# Patient Record
Sex: MALE | Race: WHITE | NOT HISPANIC OR LATINO | Employment: FULL TIME | URBAN - METROPOLITAN AREA
[De-identification: names, ages, dates, MRNs, and addresses within clinical notes are randomized per-mention and may not be internally consistent; named-entity substitution may affect disease eponyms.]

---

## 2018-02-19 ENCOUNTER — TRANSCRIBE ORDERS (OUTPATIENT)
Dept: ADMINISTRATIVE | Facility: HOSPITAL | Age: 48
End: 2018-02-19

## 2018-02-19 DIAGNOSIS — C02.1 MALIGNANT NEOPLASM OF TIP AND LATERAL BORDER OF TONGUE (HCC): Primary | ICD-10-CM

## 2018-02-23 ENCOUNTER — HOSPITAL ENCOUNTER (OUTPATIENT)
Dept: RADIOLOGY | Age: 48
Discharge: HOME | End: 2018-02-23
Payer: COMMERCIAL

## 2018-02-23 VITALS — WEIGHT: 226 LBS

## 2018-02-23 DIAGNOSIS — C02.1 MALIGNANT NEOPLASM OF TIP AND LATERAL BORDER OF TONGUE (HCC): ICD-10-CM

## 2018-02-23 LAB — GLUCOSE SERPL-MCNC: 69 MG/DL (ref 65–140)

## 2018-02-23 PROCEDURE — 78815 PET IMAGE W/CT SKULL-THIGH: CPT

## 2018-02-23 PROCEDURE — 74177 CT ABD & PELVIS W/CONTRAST: CPT

## 2018-02-23 PROCEDURE — 82948 REAGENT STRIP/BLOOD GLUCOSE: CPT

## 2018-02-23 PROCEDURE — 71260 CT THORAX DX C+: CPT

## 2018-02-23 PROCEDURE — A9552 F18 FDG: HCPCS

## 2018-02-23 RX ORDER — LORAZEPAM 2 MG/ML
1 INJECTION INTRAMUSCULAR
Status: COMPLETED | OUTPATIENT
Start: 2018-02-23 | End: 2018-02-23

## 2018-02-23 RX ADMIN — LORAZEPAM 1 MG: 2 INJECTION INTRAMUSCULAR; INTRAVENOUS at 09:05

## 2018-02-23 RX ADMIN — IOHEXOL 10 ML: 240 INJECTION, SOLUTION INTRATHECAL; INTRAVASCULAR; INTRAVENOUS; ORAL at 09:10

## 2018-02-23 RX ADMIN — IOHEXOL 100 ML: 350 INJECTION, SOLUTION INTRAVENOUS at 11:40

## 2018-05-18 PROBLEM — C02.9 TONGUE CANCER (HCC): Status: ACTIVE | Noted: 2018-05-18

## 2018-05-18 RX ORDER — PROCHLORPERAZINE MALEATE 10 MG
10 TABLET ORAL EVERY 6 HOURS PRN
COMMUNITY

## 2018-05-18 RX ORDER — OXYCODONE HYDROCHLORIDE AND ACETAMINOPHEN 5; 325 MG/1; MG/1
1 TABLET ORAL EVERY 4 HOURS PRN
COMMUNITY

## 2018-05-18 RX ORDER — ALPRAZOLAM 0.25 MG/1
TABLET ORAL 3 TIMES DAILY PRN
COMMUNITY
End: 2022-05-18 | Stop reason: ALTCHOICE

## 2018-05-18 RX ORDER — ONDANSETRON HYDROCHLORIDE 8 MG/1
TABLET, FILM COATED ORAL EVERY 8 HOURS PRN
COMMUNITY

## 2018-05-22 ENCOUNTER — RADIATION ONCOLOGY CONSULT (OUTPATIENT)
Dept: RADIATION ONCOLOGY | Facility: HOSPITAL | Age: 48
End: 2018-05-22
Attending: RADIOLOGY

## 2018-09-17 ENCOUNTER — TRANSCRIBE ORDERS (OUTPATIENT)
Dept: ADMINISTRATIVE | Facility: HOSPITAL | Age: 48
End: 2018-09-17

## 2018-09-17 DIAGNOSIS — C02.9 TONGUE CARCINOMA (HCC): Primary | ICD-10-CM

## 2018-11-07 ENCOUNTER — HOSPITAL ENCOUNTER (OUTPATIENT)
Dept: RADIOLOGY | Age: 48
Discharge: HOME | End: 2018-11-07
Payer: COMMERCIAL

## 2018-11-07 DIAGNOSIS — C02.9 TONGUE CARCINOMA (HCC): ICD-10-CM

## 2018-11-07 LAB — GLUCOSE SERPL-MCNC: 84 MG/DL (ref 65–140)

## 2018-11-07 PROCEDURE — 78815 PET IMAGE W/CT SKULL-THIGH: CPT

## 2018-11-07 PROCEDURE — A9552 F18 FDG: HCPCS

## 2018-11-07 PROCEDURE — 82948 REAGENT STRIP/BLOOD GLUCOSE: CPT

## 2018-11-07 RX ORDER — LORAZEPAM 2 MG/ML
1 INJECTION INTRAMUSCULAR
Status: COMPLETED | OUTPATIENT
Start: 2018-11-07 | End: 2018-11-07

## 2018-11-07 RX ADMIN — LORAZEPAM 1 MG: 2 INJECTION INTRAMUSCULAR; INTRAVENOUS at 09:55

## 2018-12-12 ENCOUNTER — TRANSCRIBE ORDERS (OUTPATIENT)
Dept: ADMINISTRATIVE | Facility: HOSPITAL | Age: 48
End: 2018-12-12

## 2018-12-12 DIAGNOSIS — C02.9 LEIOMYOSARCOMA OF TONGUE (HCC): Primary | ICD-10-CM

## 2019-02-27 ENCOUNTER — HOSPITAL ENCOUNTER (OUTPATIENT)
Dept: RADIOLOGY | Age: 49
Discharge: HOME | End: 2019-02-27
Payer: COMMERCIAL

## 2019-02-27 DIAGNOSIS — C02.9 LEIOMYOSARCOMA OF TONGUE (HCC): ICD-10-CM

## 2019-02-27 PROCEDURE — 70491 CT SOFT TISSUE NECK W/DYE: CPT

## 2019-02-27 RX ADMIN — IODIXANOL 100 ML: 320 INJECTION, SOLUTION INTRAVASCULAR at 13:58

## 2020-01-15 ENCOUNTER — TRANSCRIBE ORDERS (OUTPATIENT)
Dept: ADMINISTRATIVE | Facility: HOSPITAL | Age: 50
End: 2020-01-15

## 2020-01-15 ENCOUNTER — HOSPITAL ENCOUNTER (OUTPATIENT)
Dept: NON INVASIVE DIAGNOSTICS | Facility: HOSPITAL | Age: 50
Discharge: HOME | End: 2020-01-15
Attending: PREVENTIVE MEDICINE
Payer: COMMERCIAL

## 2020-01-15 ENCOUNTER — HOSPITAL ENCOUNTER (OUTPATIENT)
Dept: RADIOLOGY | Facility: HOSPITAL | Age: 50
Discharge: HOME | End: 2020-01-15
Attending: PREVENTIVE MEDICINE
Payer: COMMERCIAL

## 2020-01-15 ENCOUNTER — APPOINTMENT (OUTPATIENT)
Dept: WOUND CARE | Facility: HOSPITAL | Age: 50
End: 2020-01-15
Payer: COMMERCIAL

## 2020-01-15 DIAGNOSIS — M27.2: Primary | ICD-10-CM

## 2020-01-15 DIAGNOSIS — M27.2: ICD-10-CM

## 2020-01-15 LAB
ATRIAL RATE: 59 BPM
P AXIS: 59 DEGREES
PR INTERVAL: 154 MS
QRS AXIS: 27 DEGREES
QRSD INTERVAL: 98 MS
QT INTERVAL: 426 MS
QTC INTERVAL: 421 MS
T WAVE AXIS: 64 DEGREES
VENTRICULAR RATE: 59 BPM

## 2020-01-15 PROCEDURE — G0463 HOSPITAL OUTPT CLINIC VISIT: HCPCS

## 2020-01-15 PROCEDURE — 93010 ELECTROCARDIOGRAM REPORT: CPT | Performed by: INTERNAL MEDICINE

## 2020-01-15 PROCEDURE — 93005 ELECTROCARDIOGRAM TRACING: CPT

## 2020-01-15 PROCEDURE — 71046 X-RAY EXAM CHEST 2 VIEWS: CPT

## 2020-01-15 PROCEDURE — 99203 OFFICE O/P NEW LOW 30 MIN: CPT

## 2020-01-22 ENCOUNTER — APPOINTMENT (OUTPATIENT)
Dept: WOUND CARE | Facility: HOSPITAL | Age: 50
End: 2020-01-22
Payer: COMMERCIAL

## 2020-01-22 PROCEDURE — G0277 HBOT, FULL BODY CHAMBER, 30M: HCPCS

## 2020-01-23 ENCOUNTER — APPOINTMENT (OUTPATIENT)
Dept: WOUND CARE | Facility: HOSPITAL | Age: 50
End: 2020-01-23
Payer: COMMERCIAL

## 2020-01-23 PROCEDURE — G0277 HBOT, FULL BODY CHAMBER, 30M: HCPCS

## 2020-01-24 ENCOUNTER — APPOINTMENT (OUTPATIENT)
Dept: WOUND CARE | Facility: HOSPITAL | Age: 50
End: 2020-01-24
Payer: COMMERCIAL

## 2020-01-24 PROCEDURE — G0277 HBOT, FULL BODY CHAMBER, 30M: HCPCS

## 2020-01-27 ENCOUNTER — APPOINTMENT (OUTPATIENT)
Dept: WOUND CARE | Facility: HOSPITAL | Age: 50
End: 2020-01-27
Payer: COMMERCIAL

## 2020-01-27 PROCEDURE — G0277 HBOT, FULL BODY CHAMBER, 30M: HCPCS

## 2020-01-28 ENCOUNTER — APPOINTMENT (OUTPATIENT)
Dept: WOUND CARE | Facility: HOSPITAL | Age: 50
End: 2020-01-28
Payer: COMMERCIAL

## 2020-01-28 PROCEDURE — G0277 HBOT, FULL BODY CHAMBER, 30M: HCPCS

## 2020-01-29 ENCOUNTER — APPOINTMENT (OUTPATIENT)
Dept: WOUND CARE | Facility: HOSPITAL | Age: 50
End: 2020-01-29
Payer: COMMERCIAL

## 2020-01-29 PROCEDURE — G0277 HBOT, FULL BODY CHAMBER, 30M: HCPCS

## 2020-01-30 ENCOUNTER — APPOINTMENT (OUTPATIENT)
Dept: WOUND CARE | Facility: HOSPITAL | Age: 50
End: 2020-01-30
Payer: COMMERCIAL

## 2020-01-30 PROCEDURE — G0277 HBOT, FULL BODY CHAMBER, 30M: HCPCS

## 2020-01-31 ENCOUNTER — APPOINTMENT (OUTPATIENT)
Dept: WOUND CARE | Facility: HOSPITAL | Age: 50
End: 2020-01-31
Payer: COMMERCIAL

## 2020-01-31 PROCEDURE — G0277 HBOT, FULL BODY CHAMBER, 30M: HCPCS

## 2020-02-03 ENCOUNTER — APPOINTMENT (OUTPATIENT)
Dept: WOUND CARE | Facility: HOSPITAL | Age: 50
End: 2020-02-03
Payer: COMMERCIAL

## 2020-02-03 PROCEDURE — G0277 HBOT, FULL BODY CHAMBER, 30M: HCPCS

## 2020-02-04 ENCOUNTER — APPOINTMENT (OUTPATIENT)
Dept: WOUND CARE | Facility: HOSPITAL | Age: 50
End: 2020-02-04
Payer: COMMERCIAL

## 2020-02-04 PROCEDURE — G0277 HBOT, FULL BODY CHAMBER, 30M: HCPCS

## 2020-02-05 ENCOUNTER — APPOINTMENT (OUTPATIENT)
Dept: WOUND CARE | Facility: HOSPITAL | Age: 50
End: 2020-02-05
Payer: COMMERCIAL

## 2020-02-05 PROCEDURE — G0277 HBOT, FULL BODY CHAMBER, 30M: HCPCS

## 2020-02-06 ENCOUNTER — APPOINTMENT (OUTPATIENT)
Dept: WOUND CARE | Facility: HOSPITAL | Age: 50
End: 2020-02-06
Payer: COMMERCIAL

## 2020-02-06 PROCEDURE — G0277 HBOT, FULL BODY CHAMBER, 30M: HCPCS

## 2020-02-07 ENCOUNTER — APPOINTMENT (OUTPATIENT)
Dept: WOUND CARE | Facility: HOSPITAL | Age: 50
End: 2020-02-07
Payer: COMMERCIAL

## 2020-02-07 PROCEDURE — G0277 HBOT, FULL BODY CHAMBER, 30M: HCPCS

## 2020-02-10 ENCOUNTER — APPOINTMENT (OUTPATIENT)
Dept: WOUND CARE | Facility: HOSPITAL | Age: 50
End: 2020-02-10
Payer: COMMERCIAL

## 2020-02-10 PROCEDURE — G0277 HBOT, FULL BODY CHAMBER, 30M: HCPCS

## 2020-02-11 ENCOUNTER — APPOINTMENT (OUTPATIENT)
Dept: WOUND CARE | Facility: HOSPITAL | Age: 50
End: 2020-02-11
Payer: COMMERCIAL

## 2020-02-11 PROCEDURE — G0277 HBOT, FULL BODY CHAMBER, 30M: HCPCS

## 2020-02-12 ENCOUNTER — APPOINTMENT (OUTPATIENT)
Dept: WOUND CARE | Facility: HOSPITAL | Age: 50
End: 2020-02-12
Payer: COMMERCIAL

## 2020-02-12 PROCEDURE — G0277 HBOT, FULL BODY CHAMBER, 30M: HCPCS

## 2020-02-13 ENCOUNTER — APPOINTMENT (OUTPATIENT)
Dept: WOUND CARE | Facility: HOSPITAL | Age: 50
End: 2020-02-13
Payer: COMMERCIAL

## 2020-02-13 PROCEDURE — G0277 HBOT, FULL BODY CHAMBER, 30M: HCPCS

## 2020-02-14 ENCOUNTER — APPOINTMENT (OUTPATIENT)
Dept: WOUND CARE | Facility: HOSPITAL | Age: 50
End: 2020-02-14
Payer: COMMERCIAL

## 2020-02-14 PROCEDURE — G0277 HBOT, FULL BODY CHAMBER, 30M: HCPCS

## 2020-02-18 ENCOUNTER — APPOINTMENT (OUTPATIENT)
Dept: WOUND CARE | Facility: HOSPITAL | Age: 50
End: 2020-02-18
Payer: COMMERCIAL

## 2020-02-18 PROCEDURE — G0277 HBOT, FULL BODY CHAMBER, 30M: HCPCS

## 2020-02-19 ENCOUNTER — APPOINTMENT (OUTPATIENT)
Dept: WOUND CARE | Facility: HOSPITAL | Age: 50
End: 2020-02-19
Payer: COMMERCIAL

## 2020-02-19 PROCEDURE — G0277 HBOT, FULL BODY CHAMBER, 30M: HCPCS

## 2020-03-03 ENCOUNTER — APPOINTMENT (OUTPATIENT)
Dept: WOUND CARE | Facility: HOSPITAL | Age: 50
End: 2020-03-03
Payer: COMMERCIAL

## 2020-03-03 PROCEDURE — G0277 HBOT, FULL BODY CHAMBER, 30M: HCPCS

## 2020-03-05 ENCOUNTER — APPOINTMENT (OUTPATIENT)
Dept: WOUND CARE | Facility: HOSPITAL | Age: 50
End: 2020-03-05
Payer: COMMERCIAL

## 2020-03-05 PROCEDURE — G0277 HBOT, FULL BODY CHAMBER, 30M: HCPCS

## 2020-03-06 ENCOUNTER — APPOINTMENT (OUTPATIENT)
Dept: WOUND CARE | Facility: HOSPITAL | Age: 50
End: 2020-03-06
Payer: COMMERCIAL

## 2020-03-06 PROCEDURE — G0277 HBOT, FULL BODY CHAMBER, 30M: HCPCS

## 2020-03-09 ENCOUNTER — APPOINTMENT (OUTPATIENT)
Dept: WOUND CARE | Facility: HOSPITAL | Age: 50
End: 2020-03-09
Payer: COMMERCIAL

## 2020-03-09 PROCEDURE — G0277 HBOT, FULL BODY CHAMBER, 30M: HCPCS

## 2020-03-10 ENCOUNTER — APPOINTMENT (OUTPATIENT)
Dept: WOUND CARE | Facility: HOSPITAL | Age: 50
End: 2020-03-10
Payer: COMMERCIAL

## 2020-03-10 PROCEDURE — G0277 HBOT, FULL BODY CHAMBER, 30M: HCPCS

## 2020-03-11 ENCOUNTER — APPOINTMENT (OUTPATIENT)
Dept: WOUND CARE | Facility: HOSPITAL | Age: 50
End: 2020-03-11
Payer: COMMERCIAL

## 2020-03-11 PROCEDURE — G0277 HBOT, FULL BODY CHAMBER, 30M: HCPCS

## 2020-03-12 ENCOUNTER — APPOINTMENT (OUTPATIENT)
Dept: WOUND CARE | Facility: HOSPITAL | Age: 50
End: 2020-03-12
Payer: COMMERCIAL

## 2020-03-12 PROCEDURE — G0277 HBOT, FULL BODY CHAMBER, 30M: HCPCS

## 2020-03-13 ENCOUNTER — APPOINTMENT (OUTPATIENT)
Dept: WOUND CARE | Facility: HOSPITAL | Age: 50
End: 2020-03-13
Payer: COMMERCIAL

## 2020-03-13 PROCEDURE — G0277 HBOT, FULL BODY CHAMBER, 30M: HCPCS

## 2020-03-16 ENCOUNTER — APPOINTMENT (OUTPATIENT)
Dept: WOUND CARE | Facility: HOSPITAL | Age: 50
End: 2020-03-16
Payer: COMMERCIAL

## 2020-03-16 PROCEDURE — G0277 HBOT, FULL BODY CHAMBER, 30M: HCPCS

## 2020-03-17 ENCOUNTER — APPOINTMENT (OUTPATIENT)
Dept: WOUND CARE | Facility: HOSPITAL | Age: 50
End: 2020-03-17
Payer: COMMERCIAL

## 2020-03-17 PROCEDURE — G0277 HBOT, FULL BODY CHAMBER, 30M: HCPCS

## 2020-09-02 ENCOUNTER — TRANSCRIBE ORDERS (OUTPATIENT)
Dept: ADMINISTRATIVE | Facility: HOSPITAL | Age: 50
End: 2020-09-02

## 2020-09-02 DIAGNOSIS — Z09 FOLLOW UP: Primary | ICD-10-CM

## 2020-09-02 DIAGNOSIS — C02.9 LEIOMYOSARCOMA OF TONGUE (HCC): ICD-10-CM

## 2020-09-08 ENCOUNTER — HOSPITAL ENCOUNTER (OUTPATIENT)
Dept: RADIOLOGY | Age: 50
Discharge: HOME | End: 2020-09-08
Payer: COMMERCIAL

## 2020-09-08 DIAGNOSIS — Z09 FOLLOW UP: ICD-10-CM

## 2020-09-08 DIAGNOSIS — C02.9 LEIOMYOSARCOMA OF TONGUE (HCC): ICD-10-CM

## 2020-09-08 PROCEDURE — 71260 CT THORAX DX C+: CPT

## 2020-09-08 PROCEDURE — 70491 CT SOFT TISSUE NECK W/DYE: CPT

## 2020-09-08 PROCEDURE — G1004 CDSM NDSC: HCPCS

## 2020-09-08 RX ADMIN — IOHEXOL 85 ML: 350 INJECTION, SOLUTION INTRAVENOUS at 14:14

## 2020-09-22 ENCOUNTER — TRANSCRIBE ORDERS (OUTPATIENT)
Dept: RADIOLOGY | Facility: HOSPITAL | Age: 50
End: 2020-09-22

## 2020-09-22 DIAGNOSIS — C02.9 TONGUE CANCER (HCC): Primary | ICD-10-CM

## 2020-09-23 ENCOUNTER — TRANSCRIBE ORDERS (OUTPATIENT)
Dept: ADMINISTRATIVE | Facility: HOSPITAL | Age: 50
End: 2020-09-23

## 2020-09-23 DIAGNOSIS — C02.9 MALIGNANT NEOPLASM OF TONGUE, UNSPECIFIED (HCC): Primary | ICD-10-CM

## 2020-10-01 ENCOUNTER — HOSPITAL ENCOUNTER (OUTPATIENT)
Dept: RADIOLOGY | Facility: HOSPITAL | Age: 50
Discharge: HOME | End: 2020-10-01
Payer: COMMERCIAL

## 2020-10-01 DIAGNOSIS — C02.9 MALIGNANT NEOPLASM OF TONGUE, UNSPECIFIED (HCC): ICD-10-CM

## 2020-10-01 PROCEDURE — G1004 CDSM NDSC: HCPCS

## 2020-10-01 PROCEDURE — 74177 CT ABD & PELVIS W/CONTRAST: CPT

## 2020-10-01 RX ADMIN — IOHEXOL 100 ML: 350 INJECTION, SOLUTION INTRAVENOUS at 17:24

## 2020-10-07 ENCOUNTER — TRANSCRIBE ORDERS (OUTPATIENT)
Dept: RADIOLOGY | Facility: HOSPITAL | Age: 50
End: 2020-10-07

## 2020-10-07 DIAGNOSIS — C02.9 TONGUE CANCER (HCC): Primary | ICD-10-CM

## 2020-10-08 ENCOUNTER — PREP FOR PROCEDURE (OUTPATIENT)
Dept: INTERVENTIONAL RADIOLOGY/VASCULAR | Facility: CLINIC | Age: 50
End: 2020-10-08

## 2020-10-08 DIAGNOSIS — C02.9 TONGUE CANCER (HCC): ICD-10-CM

## 2020-10-08 DIAGNOSIS — R16.0 LIVER MASS: Primary | ICD-10-CM

## 2020-10-09 RX ORDER — SODIUM CHLORIDE 9 MG/ML
75 INJECTION, SOLUTION INTRAVENOUS CONTINUOUS
Status: CANCELLED | OUTPATIENT
Start: 2020-10-15

## 2020-10-15 ENCOUNTER — HOSPITAL ENCOUNTER (OUTPATIENT)
Dept: RADIOLOGY | Facility: HOSPITAL | Age: 50
Discharge: HOME | End: 2020-10-15
Attending: RADIOLOGY | Admitting: RADIOLOGY
Payer: COMMERCIAL

## 2020-10-15 VITALS
WEIGHT: 220 LBS | SYSTOLIC BLOOD PRESSURE: 122 MMHG | HEART RATE: 65 BPM | DIASTOLIC BLOOD PRESSURE: 65 MMHG | OXYGEN SATURATION: 98 % | HEIGHT: 72 IN | TEMPERATURE: 97.8 F | BODY MASS INDEX: 29.8 KG/M2 | RESPIRATION RATE: 18 BRPM

## 2020-10-15 DIAGNOSIS — C02.9 TONGUE CANCER (HCC): ICD-10-CM

## 2020-10-15 DIAGNOSIS — R16.0 LIVER MASS: ICD-10-CM

## 2020-10-15 LAB
BASOPHILS # BLD AUTO: 0.05 THOUSANDS/ΜL (ref 0–0.1)
BASOPHILS NFR BLD AUTO: 1 % (ref 0–1)
EOSINOPHIL # BLD AUTO: 0.2 THOUSAND/ΜL (ref 0–0.61)
EOSINOPHIL NFR BLD AUTO: 3 % (ref 0–6)
ERYTHROCYTE [DISTWIDTH] IN BLOOD BY AUTOMATED COUNT: 12.5 % (ref 11.6–15.1)
HCT VFR BLD AUTO: 45.8 % (ref 36.5–49.3)
HGB BLD-MCNC: 16.2 G/DL (ref 12–17)
IMM GRANULOCYTES # BLD AUTO: 0.03 THOUSAND/UL (ref 0–0.2)
IMM GRANULOCYTES NFR BLD AUTO: 1 % (ref 0–2)
INR PPP: 0.96 (ref 0.84–1.19)
LYMPHOCYTES # BLD AUTO: 1.6 THOUSANDS/ΜL (ref 0.6–4.47)
LYMPHOCYTES NFR BLD AUTO: 26 % (ref 14–44)
MCH RBC QN AUTO: 33.7 PG (ref 26.8–34.3)
MCHC RBC AUTO-ENTMCNC: 35.4 G/DL (ref 31.4–37.4)
MCV RBC AUTO: 95 FL (ref 82–98)
MONOCYTES # BLD AUTO: 0.66 THOUSAND/ΜL (ref 0.17–1.22)
MONOCYTES NFR BLD AUTO: 11 % (ref 4–12)
NEUTROPHILS # BLD AUTO: 3.52 THOUSANDS/ΜL (ref 1.85–7.62)
NEUTS SEG NFR BLD AUTO: 58 % (ref 43–75)
NRBC BLD AUTO-RTO: 0 /100 WBCS
PLATELET # BLD AUTO: 203 THOUSANDS/UL (ref 149–390)
PMV BLD AUTO: 9.4 FL (ref 8.9–12.7)
PROTHROMBIN TIME: 12.8 SECONDS (ref 11.6–14.5)
RBC # BLD AUTO: 4.81 MILLION/UL (ref 3.88–5.62)
WBC # BLD AUTO: 6.06 THOUSAND/UL (ref 4.31–10.16)

## 2020-10-15 PROCEDURE — 85025 COMPLETE CBC W/AUTO DIFF WBC: CPT | Performed by: RADIOLOGY

## 2020-10-15 PROCEDURE — NC001 PR NO CHARGE: Performed by: RADIOLOGY

## 2020-10-15 PROCEDURE — 88341 IMHCHEM/IMCYTCHM EA ADD ANTB: CPT | Performed by: PATHOLOGY

## 2020-10-15 PROCEDURE — 88333 PATH CONSLTJ SURG CYTO XM 1: CPT | Performed by: PATHOLOGY

## 2020-10-15 PROCEDURE — 88342 IMHCHEM/IMCYTCHM 1ST ANTB: CPT

## 2020-10-15 PROCEDURE — 88342 IMHCHEM/IMCYTCHM 1ST ANTB: CPT | Performed by: PATHOLOGY

## 2020-10-15 PROCEDURE — 99152 MOD SED SAME PHYS/QHP 5/>YRS: CPT

## 2020-10-15 PROCEDURE — 85610 PROTHROMBIN TIME: CPT | Performed by: RADIOLOGY

## 2020-10-15 PROCEDURE — 88307 TISSUE EXAM BY PATHOLOGIST: CPT | Performed by: PATHOLOGY

## 2020-10-15 PROCEDURE — 47000 NEEDLE BIOPSY OF LIVER PERQ: CPT | Performed by: RADIOLOGY

## 2020-10-15 PROCEDURE — 77012 CT SCAN FOR NEEDLE BIOPSY: CPT

## 2020-10-15 PROCEDURE — 77012 CT SCAN FOR NEEDLE BIOPSY: CPT | Performed by: RADIOLOGY

## 2020-10-15 PROCEDURE — 88365 INSITU HYBRIDIZATION (FISH): CPT

## 2020-10-15 PROCEDURE — 99152 MOD SED SAME PHYS/QHP 5/>YRS: CPT | Performed by: RADIOLOGY

## 2020-10-15 PROCEDURE — 99153 MOD SED SAME PHYS/QHP EA: CPT

## 2020-10-15 PROCEDURE — 47000 NEEDLE BIOPSY OF LIVER PERQ: CPT

## 2020-10-15 RX ORDER — MIDAZOLAM HYDROCHLORIDE 2 MG/2ML
INJECTION, SOLUTION INTRAMUSCULAR; INTRAVENOUS CODE/TRAUMA/SEDATION MEDICATION
Status: COMPLETED | OUTPATIENT
Start: 2020-10-15 | End: 2020-10-15

## 2020-10-15 RX ORDER — LIDOCAINE WITH 8.4% SOD BICARB 0.9%(10ML)
SYRINGE (ML) INJECTION CODE/TRAUMA/SEDATION MEDICATION
Status: COMPLETED | OUTPATIENT
Start: 2020-10-15 | End: 2020-10-15

## 2020-10-15 RX ORDER — FENTANYL CITRATE 50 UG/ML
INJECTION, SOLUTION INTRAMUSCULAR; INTRAVENOUS CODE/TRAUMA/SEDATION MEDICATION
Status: COMPLETED | OUTPATIENT
Start: 2020-10-15 | End: 2020-10-15

## 2020-10-15 RX ADMIN — MIDAZOLAM 1 MG: 1 INJECTION INTRAMUSCULAR; INTRAVENOUS at 10:55

## 2020-10-15 RX ADMIN — FENTANYL CITRATE 50 MCG: 50 INJECTION, SOLUTION INTRAMUSCULAR; INTRAVENOUS at 10:34

## 2020-10-15 RX ADMIN — FENTANYL CITRATE 25 MCG: 50 INJECTION, SOLUTION INTRAMUSCULAR; INTRAVENOUS at 10:55

## 2020-10-15 RX ADMIN — MIDAZOLAM 1 MG: 1 INJECTION INTRAMUSCULAR; INTRAVENOUS at 10:13

## 2020-10-15 RX ADMIN — IODIXANOL 80 ML: 320 INJECTION, SOLUTION INTRAVASCULAR at 11:51

## 2020-10-15 RX ADMIN — Medication 10 ML: at 10:44

## 2020-10-15 RX ADMIN — MIDAZOLAM 1 MG: 1 INJECTION INTRAMUSCULAR; INTRAVENOUS at 10:33

## 2020-10-15 RX ADMIN — FENTANYL CITRATE 50 MCG: 50 INJECTION, SOLUTION INTRAMUSCULAR; INTRAVENOUS at 10:14

## 2020-10-19 LAB — SCAN RESULT: NORMAL

## 2020-10-28 ENCOUNTER — TRANSCRIBE ORDERS (OUTPATIENT)
Dept: ADMINISTRATIVE | Facility: HOSPITAL | Age: 50
End: 2020-10-28

## 2020-10-28 DIAGNOSIS — C78.7 SECONDARY MALIGNANT NEOPLASM OF LIVER (HCC): Primary | ICD-10-CM

## 2020-11-05 ENCOUNTER — HOSPITAL ENCOUNTER (OUTPATIENT)
Dept: RADIOLOGY | Age: 50
Discharge: HOME | End: 2020-11-05
Payer: COMMERCIAL

## 2020-11-05 DIAGNOSIS — C78.7 SECONDARY MALIGNANT NEOPLASM OF LIVER (HCC): ICD-10-CM

## 2020-11-05 LAB — GLUCOSE SERPL-MCNC: 94 MG/DL (ref 65–140)

## 2020-11-05 PROCEDURE — G1004 CDSM NDSC: HCPCS

## 2020-11-05 PROCEDURE — A9552 F18 FDG: HCPCS

## 2020-11-05 PROCEDURE — 82948 REAGENT STRIP/BLOOD GLUCOSE: CPT

## 2020-11-05 PROCEDURE — 78815 PET IMAGE W/CT SKULL-THIGH: CPT

## 2020-11-12 LAB — SCAN RESULT: NORMAL

## 2021-03-17 ENCOUNTER — TRANSCRIBE ORDERS (OUTPATIENT)
Dept: ADMINISTRATIVE | Facility: HOSPITAL | Age: 51
End: 2021-03-17

## 2021-03-17 DIAGNOSIS — C25.3 MALIGNANT NEOPLASM OF PANCREATIC DUCT (HCC): Primary | ICD-10-CM

## 2021-03-24 ENCOUNTER — HOSPITAL ENCOUNTER (OUTPATIENT)
Dept: RADIOLOGY | Age: 51
Discharge: HOME | End: 2021-03-24
Payer: COMMERCIAL

## 2021-03-24 DIAGNOSIS — C25.3 MALIGNANT NEOPLASM OF PANCREATIC DUCT (HCC): ICD-10-CM

## 2021-03-24 PROCEDURE — G1004 CDSM NDSC: HCPCS

## 2021-03-24 PROCEDURE — 74177 CT ABD & PELVIS W/CONTRAST: CPT

## 2021-03-24 RX ADMIN — IOHEXOL 100 ML: 350 INJECTION, SOLUTION INTRAVENOUS at 10:39

## 2021-04-26 ENCOUNTER — TRANSCRIBE ORDERS (OUTPATIENT)
Dept: ADMINISTRATIVE | Facility: HOSPITAL | Age: 51
End: 2021-04-26

## 2021-04-26 DIAGNOSIS — C25.3 MALIGNANT NEOPLASM OF PANCREATIC DUCT (HCC): Primary | ICD-10-CM

## 2021-04-28 ENCOUNTER — HOSPITAL ENCOUNTER (OUTPATIENT)
Dept: RADIOLOGY | Age: 51
Discharge: HOME | End: 2021-04-28
Payer: COMMERCIAL

## 2021-04-28 DIAGNOSIS — C25.3 MALIGNANT NEOPLASM OF PANCREATIC DUCT (HCC): ICD-10-CM

## 2021-04-28 LAB — GLUCOSE SERPL-MCNC: 114 MG/DL (ref 65–140)

## 2021-04-28 PROCEDURE — 78815 PET IMAGE W/CT SKULL-THIGH: CPT

## 2021-04-28 PROCEDURE — 82948 REAGENT STRIP/BLOOD GLUCOSE: CPT

## 2021-04-28 PROCEDURE — A9552 F18 FDG: HCPCS

## 2021-04-28 PROCEDURE — G1004 CDSM NDSC: HCPCS

## 2021-12-17 ENCOUNTER — APPOINTMENT (OUTPATIENT)
Dept: LAB | Age: 51
End: 2021-12-17
Payer: COMMERCIAL

## 2021-12-17 DIAGNOSIS — C22.8 MALIGNANT NEOPLASM OF LIVER, PRIMARY (HCC): ICD-10-CM

## 2021-12-17 LAB
ALBUMIN SERPL BCP-MCNC: 4 G/DL (ref 3.5–5)
ALP SERPL-CCNC: 55 U/L (ref 46–116)
ALT SERPL W P-5'-P-CCNC: 58 U/L (ref 12–78)
ANION GAP SERPL CALCULATED.3IONS-SCNC: 5 MMOL/L (ref 4–13)
AST SERPL W P-5'-P-CCNC: 38 U/L (ref 5–45)
BASOPHILS # BLD AUTO: 0.06 THOUSANDS/ΜL (ref 0–0.1)
BASOPHILS NFR BLD AUTO: 1 % (ref 0–1)
BILIRUB SERPL-MCNC: 0.78 MG/DL (ref 0.2–1)
BUN SERPL-MCNC: 9 MG/DL (ref 5–25)
CALCIUM SERPL-MCNC: 9.2 MG/DL (ref 8.3–10.1)
CEA SERPL-MCNC: 1.1 NG/ML (ref 0–3)
CHLORIDE SERPL-SCNC: 103 MMOL/L (ref 100–108)
CO2 SERPL-SCNC: 29 MMOL/L (ref 21–32)
CREAT SERPL-MCNC: 0.89 MG/DL (ref 0.6–1.3)
EOSINOPHIL # BLD AUTO: 0.14 THOUSAND/ΜL (ref 0–0.61)
EOSINOPHIL NFR BLD AUTO: 2 % (ref 0–6)
ERYTHROCYTE [DISTWIDTH] IN BLOOD BY AUTOMATED COUNT: 12 % (ref 11.6–15.1)
GFR SERPL CREATININE-BSD FRML MDRD: 99 ML/MIN/1.73SQ M
GLUCOSE P FAST SERPL-MCNC: 121 MG/DL (ref 65–99)
HCT VFR BLD AUTO: 49.7 % (ref 36.5–49.3)
HGB BLD-MCNC: 17.3 G/DL (ref 12–17)
IMM GRANULOCYTES # BLD AUTO: 0.03 THOUSAND/UL (ref 0–0.2)
IMM GRANULOCYTES NFR BLD AUTO: 1 % (ref 0–2)
LYMPHOCYTES # BLD AUTO: 1.42 THOUSANDS/ΜL (ref 0.6–4.47)
LYMPHOCYTES NFR BLD AUTO: 23 % (ref 14–44)
MCH RBC QN AUTO: 34.7 PG (ref 26.8–34.3)
MCHC RBC AUTO-ENTMCNC: 34.8 G/DL (ref 31.4–37.4)
MCV RBC AUTO: 100 FL (ref 82–98)
MONOCYTES # BLD AUTO: 0.65 THOUSAND/ΜL (ref 0.17–1.22)
MONOCYTES NFR BLD AUTO: 11 % (ref 4–12)
NEUTROPHILS # BLD AUTO: 3.86 THOUSANDS/ΜL (ref 1.85–7.62)
NEUTS SEG NFR BLD AUTO: 62 % (ref 43–75)
NRBC BLD AUTO-RTO: 0 /100 WBCS
PLATELET # BLD AUTO: 213 THOUSANDS/UL (ref 149–390)
PMV BLD AUTO: 9.5 FL (ref 8.9–12.7)
POTASSIUM SERPL-SCNC: 4.8 MMOL/L (ref 3.5–5.3)
PROT SERPL-MCNC: 7.3 G/DL (ref 6.4–8.2)
RBC # BLD AUTO: 4.99 MILLION/UL (ref 3.88–5.62)
SODIUM SERPL-SCNC: 137 MMOL/L (ref 136–145)
WBC # BLD AUTO: 6.16 THOUSAND/UL (ref 4.31–10.16)

## 2021-12-17 PROCEDURE — 85025 COMPLETE CBC W/AUTO DIFF WBC: CPT

## 2021-12-17 PROCEDURE — 86301 IMMUNOASSAY TUMOR CA 19-9: CPT

## 2021-12-17 PROCEDURE — 82378 CARCINOEMBRYONIC ANTIGEN: CPT

## 2021-12-17 PROCEDURE — 80053 COMPREHEN METABOLIC PANEL: CPT

## 2021-12-17 PROCEDURE — 36415 COLL VENOUS BLD VENIPUNCTURE: CPT

## 2021-12-19 LAB — CANCER AG19-9 SERPL-ACNC: 25 U/ML (ref 0–35)

## 2021-12-22 ENCOUNTER — HOSPITAL ENCOUNTER (OUTPATIENT)
Dept: RADIOLOGY | Age: 51
Discharge: HOME | End: 2021-12-22
Payer: COMMERCIAL

## 2021-12-22 DIAGNOSIS — C22.8 MALIGNANT NEOPLASM OF LIVER, PRIMARY, UNSPECIFIED AS TO TYPE (HCC): ICD-10-CM

## 2021-12-22 PROCEDURE — 74177 CT ABD & PELVIS W/CONTRAST: CPT

## 2021-12-22 RX ADMIN — IOHEXOL 100 ML: 350 INJECTION, SOLUTION INTRAVENOUS at 08:53

## 2022-04-26 ENCOUNTER — HOSPITAL ENCOUNTER (OUTPATIENT)
Dept: RADIOLOGY | Facility: HOSPITAL | Age: 52
Discharge: HOME | End: 2022-04-26
Payer: COMMERCIAL

## 2022-04-26 DIAGNOSIS — C22.1 INTRAHEPATIC BILE DUCT CARCINOMA (HCC): ICD-10-CM

## 2022-04-26 PROCEDURE — 74177 CT ABD & PELVIS W/CONTRAST: CPT

## 2022-04-26 PROCEDURE — G1004 CDSM NDSC: HCPCS

## 2022-04-26 PROCEDURE — 70491 CT SOFT TISSUE NECK W/DYE: CPT

## 2022-04-26 PROCEDURE — 71260 CT THORAX DX C+: CPT

## 2022-04-26 RX ADMIN — IOHEXOL 100 ML: 350 INJECTION, SOLUTION INTRAVENOUS at 07:54

## 2022-05-11 ENCOUNTER — PREP FOR PROCEDURE (OUTPATIENT)
Dept: INTERVENTIONAL RADIOLOGY/VASCULAR | Facility: CLINIC | Age: 52
End: 2022-05-11

## 2022-05-11 DIAGNOSIS — K76.9 LIVER LESION: Primary | ICD-10-CM

## 2022-05-11 RX ORDER — SODIUM CHLORIDE 9 MG/ML
75 INJECTION, SOLUTION INTRAVENOUS CONTINUOUS
Status: CANCELLED | OUTPATIENT
Start: 2022-05-11

## 2022-05-16 NOTE — PRE-PROCEDURE INSTRUCTIONS
Pre-procedure Instructions for Interventional Radiology  [address] [place] [address] [address] [phone]    You are scheduled for a/an Liver Mass Biopsy  On Weds 5/18/22  Your tentative arrival time is AM   Short stay will notify you the day before your procedure with the exact arrival time and the location to arrive  To prepare for your procedure:  1  Please arrange for someone to drive you home after the procedure and stay with you until the next morning if you are instructed to do so  This is typically for patients receiving some type of sedative or anesthetic for the procedure  2  DO NOT EAT OR DRINK ANYTHING after midnight on the evening before your procedure including candy & gum   3  ONLY SIPS OF WATER with your medications are allowed on the morning of your procedure  4  TAKE ALL OF YOUR REGULAR MEDICATIONS THE MORNING OF YOUR PROCEDURE with sips of water! We may call you to stop some of your blood sugar, blood pressure and blood thinning medications depending on the procedure  Please take all of these medications unless we instruct you to stop them  5  If you have an allergy to x-ray dye, please contact Interventional Radiology for an x-ray dye preparation which usually consists of an oral steroid and Benadryl  The day of your procedure:  1  Bring a list of the medications you take at home  2  Bring medications you take for breathing problems (such as inhalers), medications for chest pain, or both  3  Bring a case for your glasses or contacts  4  Bring your insurance card and a form of photo ID   5  Please leave all valuables such as credit cards and jewelry at home  6  Report to the registration desk in the main lobby at the [facility], [facility]  Ask to be directed to [facility]  7  While your procedure is being performed, your family may wait in the Radiology Waiting Room on the 1st floor in Radiology    if they need to leave, they may provide a number to be called following the procedure  8  Be prepared to stay overnight just in case  Sometimes procedures will indicate the need for further observation or treatment  9  If you are scheduled for a follow-up visit with the Interventional Radiologist after your procedure, you will be called with a date and time  10  Covid vaccine completed      Special Instructions (Medications to stop taking before your procedure etc ):

## 2022-05-18 ENCOUNTER — HOSPITAL ENCOUNTER (OUTPATIENT)
Dept: RADIOLOGY | Facility: HOSPITAL | Age: 52
Discharge: HOME | End: 2022-05-18
Attending: RADIOLOGY | Admitting: INTERNAL MEDICINE
Payer: COMMERCIAL

## 2022-05-18 VITALS
RESPIRATION RATE: 17 BRPM | SYSTOLIC BLOOD PRESSURE: 129 MMHG | HEIGHT: 72 IN | WEIGHT: 240 LBS | BODY MASS INDEX: 32.51 KG/M2 | OXYGEN SATURATION: 96 % | HEART RATE: 77 BPM | TEMPERATURE: 98.4 F | DIASTOLIC BLOOD PRESSURE: 64 MMHG

## 2022-05-18 DIAGNOSIS — K76.9 LIVER LESION: ICD-10-CM

## 2022-05-18 LAB
ANION GAP SERPL CALCULATED.3IONS-SCNC: 5 MMOL/L (ref 4–13)
BUN SERPL-MCNC: 13 MG/DL (ref 5–25)
CALCIUM SERPL-MCNC: 9.1 MG/DL (ref 8.3–10.1)
CHLORIDE SERPL-SCNC: 107 MMOL/L (ref 100–108)
CO2 SERPL-SCNC: 24 MMOL/L (ref 21–32)
CREAT SERPL-MCNC: 0.78 MG/DL (ref 0.6–1.3)
ERYTHROCYTE [DISTWIDTH] IN BLOOD BY AUTOMATED COUNT: 13.6 % (ref 11.6–15.1)
GFR SERPL CREATININE-BSD FRML MDRD: 105 ML/MIN/1.73SQ M
GLUCOSE P FAST SERPL-MCNC: 111 MG/DL (ref 65–99)
GLUCOSE SERPL-MCNC: 111 MG/DL (ref 65–140)
HCT VFR BLD AUTO: 38.5 % (ref 36.5–49.3)
HGB BLD-MCNC: 13.6 G/DL (ref 12–17)
INR PPP: 1.01 (ref 0.84–1.19)
MCH RBC QN AUTO: 33.6 PG (ref 26.8–34.3)
MCHC RBC AUTO-ENTMCNC: 35.3 G/DL (ref 31.4–37.4)
MCV RBC AUTO: 95 FL (ref 82–98)
PLATELET # BLD AUTO: 220 THOUSANDS/UL (ref 149–390)
PMV BLD AUTO: 9.1 FL (ref 8.9–12.7)
POTASSIUM SERPL-SCNC: 3.9 MMOL/L (ref 3.5–5.3)
PROTHROMBIN TIME: 12.9 SECONDS (ref 11.6–14.5)
RBC # BLD AUTO: 4.05 MILLION/UL (ref 3.88–5.62)
SODIUM SERPL-SCNC: 136 MMOL/L (ref 136–145)
WBC # BLD AUTO: 34.27 THOUSAND/UL (ref 4.31–10.16)

## 2022-05-18 PROCEDURE — 88341 IMHCHEM/IMCYTCHM EA ADD ANTB: CPT | Performed by: PATHOLOGY

## 2022-05-18 PROCEDURE — 47000 NEEDLE BIOPSY OF LIVER PERQ: CPT | Performed by: INTERNAL MEDICINE

## 2022-05-18 PROCEDURE — 99152 MOD SED SAME PHYS/QHP 5/>YRS: CPT

## 2022-05-18 PROCEDURE — 88363 XM ARCHIVE TISSUE MOLEC ANAL: CPT | Performed by: PATHOLOGY

## 2022-05-18 PROCEDURE — 85027 COMPLETE CBC AUTOMATED: CPT | Performed by: RADIOLOGY

## 2022-05-18 PROCEDURE — 99152 MOD SED SAME PHYS/QHP 5/>YRS: CPT | Performed by: INTERNAL MEDICINE

## 2022-05-18 PROCEDURE — 85610 PROTHROMBIN TIME: CPT | Performed by: RADIOLOGY

## 2022-05-18 PROCEDURE — 88307 TISSUE EXAM BY PATHOLOGIST: CPT | Performed by: PATHOLOGY

## 2022-05-18 PROCEDURE — 88342 IMHCHEM/IMCYTCHM 1ST ANTB: CPT | Performed by: PATHOLOGY

## 2022-05-18 PROCEDURE — 76942 ECHO GUIDE FOR BIOPSY: CPT | Performed by: INTERNAL MEDICINE

## 2022-05-18 PROCEDURE — 47000 NEEDLE BIOPSY OF LIVER PERQ: CPT

## 2022-05-18 PROCEDURE — 80048 BASIC METABOLIC PNL TOTAL CA: CPT | Performed by: RADIOLOGY

## 2022-05-18 PROCEDURE — 99153 MOD SED SAME PHYS/QHP EA: CPT

## 2022-05-18 RX ORDER — SODIUM CHLORIDE 9 MG/ML
75 INJECTION, SOLUTION INTRAVENOUS CONTINUOUS
Status: DISCONTINUED | OUTPATIENT
Start: 2022-05-18 | End: 2022-05-19 | Stop reason: HOSPADM

## 2022-05-18 RX ORDER — FENTANYL CITRATE 50 UG/ML
INJECTION, SOLUTION INTRAMUSCULAR; INTRAVENOUS CODE/TRAUMA/SEDATION MEDICATION
Status: COMPLETED | OUTPATIENT
Start: 2022-05-18 | End: 2022-05-18

## 2022-05-18 RX ORDER — MIDAZOLAM HYDROCHLORIDE 2 MG/2ML
INJECTION, SOLUTION INTRAMUSCULAR; INTRAVENOUS CODE/TRAUMA/SEDATION MEDICATION
Status: COMPLETED | OUTPATIENT
Start: 2022-05-18 | End: 2022-05-18

## 2022-05-18 RX ORDER — LIDOCAINE WITH 8.4% SOD BICARB 0.9%(10ML)
SYRINGE (ML) INJECTION CODE/TRAUMA/SEDATION MEDICATION
Status: COMPLETED | OUTPATIENT
Start: 2022-05-18 | End: 2022-05-18

## 2022-05-18 RX ADMIN — Medication 10 ML: at 11:03

## 2022-05-18 RX ADMIN — MIDAZOLAM 1 MG: 1 INJECTION INTRAMUSCULAR; INTRAVENOUS at 11:02

## 2022-05-18 RX ADMIN — FENTANYL CITRATE 50 MCG: 50 INJECTION INTRAMUSCULAR; INTRAVENOUS at 11:09

## 2022-05-18 RX ADMIN — MIDAZOLAM 1 MG: 1 INJECTION INTRAMUSCULAR; INTRAVENOUS at 11:15

## 2022-05-18 RX ADMIN — SODIUM CHLORIDE 75 ML/HR: 0.9 INJECTION, SOLUTION INTRAVENOUS at 09:49

## 2022-05-18 RX ADMIN — MIDAZOLAM 1 MG: 1 INJECTION INTRAMUSCULAR; INTRAVENOUS at 11:09

## 2022-05-18 RX ADMIN — FENTANYL CITRATE 50 MCG: 50 INJECTION INTRAMUSCULAR; INTRAVENOUS at 11:34

## 2022-05-18 RX ADMIN — FENTANYL CITRATE 50 MCG: 50 INJECTION INTRAMUSCULAR; INTRAVENOUS at 11:02

## 2022-05-18 RX ADMIN — MIDAZOLAM 1 MG: 1 INJECTION INTRAMUSCULAR; INTRAVENOUS at 11:23

## 2022-05-18 NOTE — BRIEF OP NOTE (RAD/CATH)
INTERVENTIONAL RADIOLOGY PROCEDURE NOTE    Date: 5/18/2022    Procedure: IR BIOPSY LIVER MASS    Preoperative diagnosis:   1  Liver lesion         Postoperative diagnosis: Same  Surgeon: Chevy Cristina MD     Assistant: None  No qualified resident was available  Blood loss: 5 mL    Specimens: Multiple core biopsy of liver mass     Findings: Ultrasound-guided core biopsy of a left hepatic liver mass  Please see the radiology report for details  Complications: None immediate      Anesthesia: conscious sedation and local

## 2022-05-18 NOTE — SEDATION DOCUMENTATION
Liver biopsy completed by Dr Yulisa Zamora  VSS  Bedrest for 4 hours to begin now  Report called to Jian Maguire in short stay

## 2022-05-18 NOTE — H&P
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, and history of liver cancer s/p chemotherapy and ablation  Most recently, he had a CT of the abdomen and pelvis showing interval growth of rim enhancing peripheral liver lesions  We will plan for ultrasound-guided biopsy of one of the peripheral left lateral hepatic lesions today  Problem List Items Addressed This Visit    None     Visit Diagnoses     Liver lesion        Relevant Orders    IR biopsy liver mass             Subjective:     Patient ID: Irvin Negron is a 49 y o  male  History of Present Illness  Patient is a 49year-old male with 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, and history of liver cancer s/p chemotherapy and ablation  Most recently, he had a CT of the abdomen and pelvis showing growth of rim enhancing peripheral liver lesions  He states he is currently doing well and denies any symptoms    Review of Systems   All other systems reviewed and are negative  Past Medical History:   Diagnosis Date    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 [facility])         Past Surgical History:   Procedure Laterality Date    CT NEEDLE BIOPSY LIVER  10/15/2020        Social History     Tobacco Use   Smoking Status Current Every Day Smoker    Packs/day: 2 00    Years: 20 00    Pack years: 40 00   Smokeless Tobacco Current User        Social History     Substance and Sexual Activity   Alcohol Use Yes    Comment: 1-2 drinks per day        Social History     Substance and Sexual Activity   Drug Use Not on file        Allergies   Allergen Reactions    Penicillins Other (See Comments)     As a child had reaction not sure what it was          Current Outpatient Medications Medication Sig Dispense Refill    ondansetron (ZOFRAN) 8 mg tablet Take by mouth every 8 (eight) hours as needed for nausea or vomiting      oxyCODONE-acetaminophen (PERCOCET) 5-325 mg per tablet Take 1 tablet by mouth every 4 (four) hours as needed for moderate pain      prochlorperazine (COMPAZINE) 10 mg tablet Take 10 mg by mouth every 6 (six) hours as needed for nausea or vomiting       Current Facility-Administered Medications   Medication Dose Route Frequency Provider Last Rate Last Admin    sodium chloride 0 9 % infusion  75 mL/hr Intravenous Continuous Giang MD Elizabeth 75 mL/hr at 05/18/22 0949 75 mL/hr at 05/18/22 0949          Objective:    Vitals:    05/18/22 1029   BP: 117/69   Pulse: 71   Resp: 18   Temp: 98 3 °F (36 8 °C)   TempSrc: Oral   SpO2: 95%   Weight: 109 kg (240 lb)   Height: 6' (1 829 m)        Physical Exam  Vitals reviewed  Constitutional:       Appearance: Normal appearance  HENT:      Head: Normocephalic and atraumatic  Nose: Nose normal    Cardiovascular:      Rate and Rhythm: Normal rate  Pulmonary:      Effort: Pulmonary effort is normal    Neurological:      Mental Status: He is alert  No results found for: BNP   Lab Results   Component Value Date    WBC 6 16 12/17/2021    HGB 13 6 05/18/2022    HCT 38 5 05/18/2022    MCV 95 05/18/2022     05/18/2022     Lab Results   Component Value Date    INR 1 01 05/18/2022    INR 0 96 10/15/2020    PROTIME 12 9 05/18/2022    PROTIME 12 8 10/15/2020     No results found for: PTT      I have personally reviewed pertinent imaging and laboratory results  Code Status: No Order  Advance Directive and Living Will:      Power of :    POLST:      This text is generated with voice recognition software  There may be translation, syntax,  or grammatical errors  If you have any questions, please contact the dictating provider

## 2022-05-18 NOTE — DISCHARGE INSTRUCTIONS
Percutaneous Liver Biopsy   WHAT YOU NEED TO KNOW:   A PLB is a procedure to remove a sample of tissue from your liver  The sample can be sent to a lab and tested for liver disease, cancer, or infection  After the procedure you may have pain and bruising at the biopsy site  You may also have pain in your right shoulder  These symptoms should get better in 48 to 72 hours  DISCHARGE INSTRUCTIONS:     [address] and Destiny Felipe patients,  Contact Interventional Radiology at [phone] PATIENTS: Contact Interventional Radiology at [phone]   [facility] PATIENTS: Contact Interventional Radiology at [phone] if:    Fever greater than 101 or chills  You have severe pain in your abdomen  Your abdomen is larger than usual and feels hard  Your neck is more swollen and you have trouble swallowing  You feel weak or dizzy  Your heart is beating faster than usual    Your pain does not get better after you take pain medicine  Your wound is red, swollen, or draining pus  You have nausea or are vomiting  Your skin is itchy, swollen, or you have a rash  You have questions or concerns about your condition or care  Medicines:   Acetaminophen decreases pain and fever  It is available without a doctor's order  Acetaminophen can cause liver damage if not taken correctly  Take your home medicine as directed  Resume your normal diet  Small sips of flat soda will help with mild nausea  Self-care:   Rest as directed  Do not play sports, exercise, or lift anything heavier than 5 pounds for up to 1 week  Apply firm, steady pressure if bleeding occurs  A small amount of bleeding from your wound is possible  Apply pressure with a clean gauze or towel for 5 to 10 minutes  Call 911 if bleeding becomes heavy or does not stop  Ask your healthcare provider when to take your blood thinner or antiplatelet medicine  You may need to wait 24 to 72 hours to take your medicine   This will prevent bleeding  Follow up with your healthcare provider as directed: Write down your questions so you remember to ask them during your visits  Moderate Sedation   WHAT YOU NEED TO KNOW:   Moderate sedation, or conscious sedation, is medicine used during procedures to help you feel relaxed and calm  You will be awake and able to follow directions without anxiety or pain  You will remember little to none of the procedure  You may feel tired, weak, or unsteady on your feet after you get sedation  You may also have trouble concentrating or short-term memory loss  These symptoms should go away in 24 hours or less  DISCHARGE INSTRUCTIONS:   Call 911 or have someone else call for any of the following: You have sudden trouble breathing  You cannot be woken  Seek care immediately if:   You have a severe headache or dizziness  Your heart is beating faster than usual   Contact your healthcare provider if:   You have a fever  You have nausea or are vomiting for more than 8 hours after the procedure  Your skin is itchy, swollen, or you have a rash  You have questions or concerns about your condition or care  Self-care:   Have someone stay with you for 24 hours  This person can drive you to errands and help you do things around the house  This person can also watch for problems  Rest and do quiet activities for 24 hours  Do not exercise, ride a bike, or play sports  Stand up slowly to prevent dizziness and falls  Take short walks around the house with another person  Slowly return to your usual activities the next day  Do not drive or use dangerous machines or tools for 24 hours  You may injure yourself or others  Examples include a lawnmower, saw, or drill  Do not return to work for 24 hours if you use dangerous machines or tools for work  Do not make important decisions for 24 hours  For example, do not sign important papers or invest money  Drink liquids as directed  Liquids help flush the sedation medicine out of your body  Ask how much liquid to drink each day and which liquids are best for you  Eat small, frequent meals to prevent nausea and vomiting  Start with clear liquids such as juice or broth  If you do not vomit after clear liquids, you can eat your usual foods  Do not drink alcohol or take medicines that make you drowsy  This includes medicines that help you sleep and anxiety medicines  Ask your healthcare provider if it is safe for you to take pain medicine  Follow up with your healthcare provider as directed: Write down your questions so you remember to ask them during your visits  © 2017 [address] [place] Information is for End User's use only and may not be sold, redistributed or otherwise used for commercial purposes  All illustrations and images included in CareNotes® are the copyrighted property of [employer]  or Ramin Mendez  The above information is an  only  It is not intended as medical advice for individual conditions or treatments  Talk to your doctor, nurse or pharmacist before following any medical regimen to see if it is safe and effective for you

## 2022-05-28 ENCOUNTER — NURSE TRIAGE (OUTPATIENT)
Dept: OTHER | Facility: OTHER | Age: 52
End: 2022-05-28

## 2022-05-28 NOTE — TELEPHONE ENCOUNTER
Regarding: tooth abscess  ----- Message from Gokul Govea sent at 5/28/2022  9:54 AM EDT -----  " I called his Oncologist and they recommend I called here, it looks like he has an abscess "

## 2022-05-28 NOTE — TELEPHONE ENCOUNTER
Reason for Disposition   Face is very swollen    Answer Assessment - Initial Assessment Questions  1  LOCATION: "Which tooth is hurting?"  (e g , right-side/left-side, upper/lower, front/back)      Bottom right, back    2  ONSET: "When did the toothache start?"  (e g , hours, days)       5/26, Friday prescribed abx by dentist 5/27 but was not seen in office for an eval    3  SEVERITY: "How bad is the toothache?"  (Scale 1-10; mild, moderate or severe)    - MILD (1-3): doesn't interfere with chewing     - MODERATE (4-7): interferes with chewing, interferes with normal activities, awakens from sleep      - SEVERE (8-10): unable to eat, unable to do any normal activities, excruciating pain         Moderate    4  SWELLING: "Is there any visible swelling of your face?"      Swelling on outside of face, going up cheek  Increased swelling with talking/chewing    5   OTHER SYMPTOMS: "Do you have any other symptoms?" (e g , fever)      Denies    Protocols used: TOOTHACHE-ADULT-

## 2022-10-24 ENCOUNTER — HOSPITAL ENCOUNTER (OUTPATIENT)
Dept: RADIOLOGY | Facility: HOSPITAL | Age: 52
Discharge: HOME | End: 2022-10-24
Payer: COMMERCIAL

## 2022-10-24 DIAGNOSIS — C24.8 MALIGNANT NEOPLASM OF OVERLAPPING SITES OF BILIARY TRACT (HCC): ICD-10-CM

## 2022-10-24 PROCEDURE — 71260 CT THORAX DX C+: CPT

## 2022-10-24 PROCEDURE — G1004 CDSM NDSC: HCPCS

## 2022-10-24 PROCEDURE — 74177 CT ABD & PELVIS W/CONTRAST: CPT

## 2022-10-24 RX ADMIN — IOHEXOL 100 ML: 350 INJECTION, SOLUTION INTRAVENOUS at 07:04

## 2023-01-01 ENCOUNTER — APPOINTMENT (INPATIENT)
Dept: RADIOLOGY | Facility: HOSPITAL | Age: 53
End: 2023-01-01
Attending: INTERNAL MEDICINE

## 2023-01-01 ENCOUNTER — APPOINTMENT (INPATIENT)
Dept: RADIOLOGY | Facility: HOSPITAL | Age: 53
End: 2023-01-01

## 2023-01-01 ENCOUNTER — HOSPICE ADMISSION (OUTPATIENT)
Dept: HOME HOSPICE | Facility: HOSPICE | Age: 53
End: 2023-01-01

## 2023-01-01 ENCOUNTER — HOME HEALTH ADMISSION (OUTPATIENT)
Dept: HOME HEALTH SERVICES | Facility: HOME HEALTHCARE | Age: 53
End: 2023-01-01

## 2023-01-01 ENCOUNTER — APPOINTMENT (INPATIENT)
Dept: NON INVASIVE DIAGNOSTICS | Facility: HOSPITAL | Age: 53
End: 2023-01-01

## 2023-01-01 ENCOUNTER — APPOINTMENT (EMERGENCY)
Dept: RADIOLOGY | Facility: HOSPITAL | Age: 53
End: 2023-01-01

## 2023-01-01 ENCOUNTER — HOSPITAL ENCOUNTER (INPATIENT)
Facility: HOSPITAL | Age: 53
LOS: 27 days | End: 2023-03-19
Attending: EMERGENCY MEDICINE | Admitting: EMERGENCY MEDICINE

## 2023-01-01 VITALS
HEIGHT: 72 IN | BODY MASS INDEX: 28.36 KG/M2 | RESPIRATION RATE: 10 BRPM | OXYGEN SATURATION: 95 % | TEMPERATURE: 97.8 F | HEART RATE: 93 BPM | WEIGHT: 209.4 LBS | SYSTOLIC BLOOD PRESSURE: 71 MMHG | DIASTOLIC BLOOD PRESSURE: 34 MMHG

## 2023-01-01 DIAGNOSIS — R18.0 MALIGNANT ASCITES: Primary | ICD-10-CM

## 2023-01-01 DIAGNOSIS — R65.21 SEPTIC SHOCK (HCC): ICD-10-CM

## 2023-01-01 DIAGNOSIS — E87.0 HYPERNATREMIA: ICD-10-CM

## 2023-01-01 DIAGNOSIS — R10.9 ABDOMINAL PAIN: ICD-10-CM

## 2023-01-01 DIAGNOSIS — E86.0 DEHYDRATION: ICD-10-CM

## 2023-01-01 DIAGNOSIS — N17.9 AKI (ACUTE KIDNEY INJURY) (HCC): ICD-10-CM

## 2023-01-01 DIAGNOSIS — E87.1 HYPONATREMIA: ICD-10-CM

## 2023-01-01 DIAGNOSIS — C78.7 METASTASIS TO LIVER (HCC): ICD-10-CM

## 2023-01-01 DIAGNOSIS — C22.1 CHOLANGIOCARCINOMA (HCC): ICD-10-CM

## 2023-01-01 DIAGNOSIS — C78.02 MALIGNANT NEOPLASM METASTATIC TO BOTH LUNGS (HCC): ICD-10-CM

## 2023-01-01 DIAGNOSIS — R11.2 NAUSEA AND VOMITING: ICD-10-CM

## 2023-01-01 DIAGNOSIS — D69.6 THROMBOCYTOPENIA (HCC): ICD-10-CM

## 2023-01-01 DIAGNOSIS — A41.9 SEPTIC SHOCK (HCC): ICD-10-CM

## 2023-01-01 DIAGNOSIS — A41.9 SEPSIS (HCC): ICD-10-CM

## 2023-01-01 DIAGNOSIS — K55.069 MESENTERIC THROMBOSIS (HCC): ICD-10-CM

## 2023-01-01 DIAGNOSIS — L03.90 CELLULITIS: ICD-10-CM

## 2023-01-01 DIAGNOSIS — K76.7 HEPATORENAL SYNDROME (HCC): ICD-10-CM

## 2023-01-01 DIAGNOSIS — R53.83 FATIGUE: ICD-10-CM

## 2023-01-01 DIAGNOSIS — C02.9 SQUAMOUS CELL CARCINOMA OF TONGUE (HCC): ICD-10-CM

## 2023-01-01 DIAGNOSIS — C78.01 MALIGNANT NEOPLASM METASTATIC TO BOTH LUNGS (HCC): ICD-10-CM

## 2023-01-01 LAB
25(OH)D2 SERPL-MCNC: 10 NG/ML
25(OH)D3 SERPL-MCNC: 18.4 NG/ML (ref 30–100)
25(OH)D3 SERPL-MCNC: <1 NG/ML
25(OH)D3+25(OH)D2 SERPL-MCNC: 11 NG/ML
2HR DELTA HS TROPONIN: 0 NG/L
4HR DELTA HS TROPONIN: -1 NG/L
ACTH PLAS-MCNC: 7.4 PG/ML (ref 7.2–63.3)
ALBUMIN FLD-MCNC: 1.7 G/DL
ALBUMIN SERPL BCP-MCNC: 1.9 G/DL (ref 3.5–5)
ALBUMIN SERPL BCP-MCNC: 2.6 G/DL (ref 3.5–5)
ALBUMIN SERPL BCP-MCNC: 3.1 G/DL (ref 3.5–5)
ALBUMIN SERPL BCP-MCNC: 3.1 G/DL (ref 3.5–5)
ALBUMIN SERPL BCP-MCNC: 3.2 G/DL (ref 3.5–5)
ALBUMIN SERPL BCP-MCNC: 3.2 G/DL (ref 3.5–5)
ALBUMIN SERPL BCP-MCNC: 3.3 G/DL (ref 3.5–5)
ALBUMIN SERPL BCP-MCNC: 3.3 G/DL (ref 3.5–5)
ALBUMIN SERPL BCP-MCNC: 3.4 G/DL (ref 3.5–5)
ALBUMIN SERPL BCP-MCNC: 3.4 G/DL (ref 3.5–5)
ALBUMIN SERPL BCP-MCNC: 3.5 G/DL (ref 3.5–5)
ALBUMIN SERPL BCP-MCNC: 3.6 G/DL (ref 3.5–5)
ALBUMIN SERPL BCP-MCNC: 3.8 G/DL (ref 3.5–5)
ALBUMIN SERPL BCP-MCNC: 3.9 G/DL (ref 3.5–5)
ALBUMIN SERPL BCP-MCNC: 4.1 G/DL (ref 3.5–5)
ALBUMIN SERPL BCP-MCNC: 4.1 G/DL (ref 3.5–5)
ALBUMIN SERPL BCP-MCNC: 4.2 G/DL (ref 3.5–5)
ALBUMIN SERPL BCP-MCNC: 4.4 G/DL (ref 3.5–5)
ALBUMIN SERPL BCP-MCNC: 5.1 G/DL (ref 3.5–5)
ALBUMIN SERPL ELPH-MCNC: 3.9 G/DL (ref 3.5–5)
ALBUMIN SERPL ELPH-MCNC: 70.9 % (ref 52–65)
ALBUMIN UR ELPH-MCNC: 35.7 %
ALP SERPL-CCNC: 103 U/L (ref 46–116)
ALP SERPL-CCNC: 103 U/L (ref 46–116)
ALP SERPL-CCNC: 104 U/L (ref 46–116)
ALP SERPL-CCNC: 105 U/L (ref 46–116)
ALP SERPL-CCNC: 105 U/L (ref 46–116)
ALP SERPL-CCNC: 109 U/L (ref 46–116)
ALP SERPL-CCNC: 110 U/L (ref 46–116)
ALP SERPL-CCNC: 113 U/L (ref 46–116)
ALP SERPL-CCNC: 115 U/L (ref 46–116)
ALP SERPL-CCNC: 116 U/L (ref 46–116)
ALP SERPL-CCNC: 118 U/L (ref 46–116)
ALP SERPL-CCNC: 127 U/L (ref 46–116)
ALP SERPL-CCNC: 129 U/L (ref 46–116)
ALP SERPL-CCNC: 137 U/L (ref 46–116)
ALP SERPL-CCNC: 141 U/L (ref 46–116)
ALP SERPL-CCNC: 198 U/L (ref 46–116)
ALP SERPL-CCNC: 84 U/L (ref 46–116)
ALP SERPL-CCNC: 88 U/L (ref 46–116)
ALP SERPL-CCNC: 94 U/L (ref 46–116)
ALP SERPL-CCNC: 95 U/L (ref 46–116)
ALP SERPL-CCNC: 98 U/L (ref 46–116)
ALPHA1 GLOB MFR UR ELPH: 3.4 %
ALPHA1 GLOB SERPL ELPH-MCNC: 0.17 G/DL (ref 0.1–0.4)
ALPHA1 GLOB SERPL ELPH-MCNC: 3 % (ref 2.5–5)
ALPHA2 GLOB MFR UR ELPH: 15.9 %
ALPHA2 GLOB SERPL ELPH-MCNC: 0.24 G/DL (ref 0.4–1.2)
ALPHA2 GLOB SERPL ELPH-MCNC: 4.3 % (ref 7–13)
ALT SERPL W P-5'-P-CCNC: 14 U/L (ref 12–78)
ALT SERPL W P-5'-P-CCNC: 14 U/L (ref 12–78)
ALT SERPL W P-5'-P-CCNC: 15 U/L (ref 12–78)
ALT SERPL W P-5'-P-CCNC: 16 U/L (ref 12–78)
ALT SERPL W P-5'-P-CCNC: 16 U/L (ref 12–78)
ALT SERPL W P-5'-P-CCNC: 18 U/L (ref 12–78)
ALT SERPL W P-5'-P-CCNC: 22 U/L (ref 12–78)
ALT SERPL W P-5'-P-CCNC: 25 U/L (ref 12–78)
ALT SERPL W P-5'-P-CCNC: 27 U/L (ref 12–78)
ALT SERPL W P-5'-P-CCNC: 27 U/L (ref 12–78)
ALT SERPL W P-5'-P-CCNC: 34 U/L (ref 12–78)
ALT SERPL W P-5'-P-CCNC: 37 U/L (ref 12–78)
ALT SERPL W P-5'-P-CCNC: 39 U/L (ref 12–78)
ALT SERPL W P-5'-P-CCNC: 39 U/L (ref 12–78)
ALT SERPL W P-5'-P-CCNC: 41 U/L (ref 12–78)
ALT SERPL W P-5'-P-CCNC: 42 U/L (ref 12–78)
ALT SERPL W P-5'-P-CCNC: 44 U/L (ref 12–78)
ALT SERPL W P-5'-P-CCNC: 48 U/L (ref 12–78)
ALT SERPL W P-5'-P-CCNC: 48 U/L (ref 12–78)
ALT SERPL W P-5'-P-CCNC: 54 U/L (ref 12–78)
ALT SERPL W P-5'-P-CCNC: 54 U/L (ref 12–78)
ALT SERPL W P-5'-P-CCNC: 71 U/L (ref 12–78)
ALT SERPL W P-5'-P-CCNC: 91 U/L (ref 12–78)
AMMONIA PLAS-SCNC: 106 UMOL/L (ref 11–35)
AMMONIA PLAS-SCNC: 63 UMOL/L (ref 11–35)
AMYLASE FLD QL: 13 U/L
AMYLASE FLD QL: 15 U/L
ANION GAP SERPL CALCULATED.3IONS-SCNC: 10 MMOL/L (ref 4–13)
ANION GAP SERPL CALCULATED.3IONS-SCNC: 11 MMOL/L (ref 4–13)
ANION GAP SERPL CALCULATED.3IONS-SCNC: 12 MMOL/L (ref 4–13)
ANION GAP SERPL CALCULATED.3IONS-SCNC: 13 MMOL/L (ref 4–13)
ANION GAP SERPL CALCULATED.3IONS-SCNC: 14 MMOL/L (ref 4–13)
ANION GAP SERPL CALCULATED.3IONS-SCNC: 15 MMOL/L (ref 4–13)
ANION GAP SERPL CALCULATED.3IONS-SCNC: 18 MMOL/L (ref 4–13)
ANION GAP SERPL CALCULATED.3IONS-SCNC: 7 MMOL/L (ref 4–13)
ANION GAP SERPL CALCULATED.3IONS-SCNC: 8 MMOL/L (ref 4–13)
ANION GAP SERPL CALCULATED.3IONS-SCNC: 9 MMOL/L (ref 4–13)
AORTIC ROOT: 3.5 CM
APICAL FOUR CHAMBER EJECTION FRACTION: 79 %
APPEARANCE FLD: ABNORMAL
APTT PPP: 102 SECONDS (ref 23–37)
APTT PPP: 103 SECONDS (ref 23–37)
APTT PPP: 105 SECONDS (ref 23–37)
APTT PPP: 107 SECONDS (ref 23–37)
APTT PPP: 109 SECONDS (ref 23–37)
APTT PPP: 118 SECONDS (ref 23–37)
APTT PPP: 126 SECONDS (ref 23–37)
APTT PPP: 140 SECONDS (ref 23–37)
APTT PPP: 182 SECONDS (ref 23–37)
APTT PPP: 38 SECONDS (ref 23–37)
APTT PPP: 47 SECONDS (ref 23–37)
APTT PPP: 65 SECONDS (ref 23–37)
APTT PPP: 72 SECONDS (ref 23–37)
APTT PPP: 73 SECONDS (ref 23–37)
APTT PPP: 79 SECONDS (ref 23–37)
APTT PPP: 82 SECONDS (ref 23–37)
APTT PPP: 83 SECONDS (ref 23–37)
APTT PPP: 83 SECONDS (ref 23–37)
APTT PPP: 86 SECONDS (ref 23–37)
APTT PPP: 87 SECONDS (ref 23–37)
APTT PPP: 91 SECONDS (ref 23–37)
APTT PPP: 97 SECONDS (ref 23–37)
APTT PPP: >210 SECONDS (ref 23–37)
AST SERPL W P-5'-P-CCNC: 50 U/L (ref 5–45)
AST SERPL W P-5'-P-CCNC: 52 U/L (ref 5–45)
AST SERPL W P-5'-P-CCNC: 53 U/L (ref 5–45)
AST SERPL W P-5'-P-CCNC: 54 U/L (ref 5–45)
AST SERPL W P-5'-P-CCNC: 57 U/L (ref 5–45)
AST SERPL W P-5'-P-CCNC: 58 U/L (ref 5–45)
AST SERPL W P-5'-P-CCNC: 59 U/L (ref 5–45)
AST SERPL W P-5'-P-CCNC: 61 U/L (ref 5–45)
AST SERPL W P-5'-P-CCNC: 62 U/L (ref 5–45)
AST SERPL W P-5'-P-CCNC: 64 U/L (ref 5–45)
AST SERPL W P-5'-P-CCNC: 65 U/L (ref 5–45)
AST SERPL W P-5'-P-CCNC: 65 U/L (ref 5–45)
AST SERPL W P-5'-P-CCNC: 67 U/L (ref 5–45)
AST SERPL W P-5'-P-CCNC: 71 U/L (ref 5–45)
AST SERPL W P-5'-P-CCNC: 73 U/L (ref 5–45)
AST SERPL W P-5'-P-CCNC: 76 U/L (ref 5–45)
AST SERPL W P-5'-P-CCNC: 80 U/L (ref 5–45)
AST SERPL W P-5'-P-CCNC: 80 U/L (ref 5–45)
AST SERPL W P-5'-P-CCNC: 89 U/L (ref 5–45)
AST SERPL W P-5'-P-CCNC: 94 U/L (ref 5–45)
AST SERPL W P-5'-P-CCNC: 98 U/L (ref 5–45)
ATRIAL RATE: 576 BPM
ATRIAL RATE: 66 BPM
ATRIAL RATE: 77 BPM
ATRIAL RATE: 83 BPM
B-GLOBULIN MFR UR ELPH: 21.2 %
BACTERIA BLD CULT: NORMAL
BACTERIA SPEC ANAEROBE CULT: NO GROWTH
BACTERIA SPEC BFLD CULT: NO GROWTH
BACTERIA UR QL AUTO: ABNORMAL /HPF
BASE EX.OXY STD BLDV CALC-SCNC: 71.8 % (ref 60–80)
BASE EX.OXY STD BLDV CALC-SCNC: 74.2 % (ref 60–80)
BASE EX.OXY STD BLDV CALC-SCNC: 78.4 % (ref 60–80)
BASE EX.OXY STD BLDV CALC-SCNC: 81.4 % (ref 60–80)
BASE EX.OXY STD BLDV CALC-SCNC: 94.8 % (ref 60–80)
BASE EXCESS BLDA CALC-SCNC: -2.6 MMOL/L
BASE EXCESS BLDA CALC-SCNC: -6 MMOL/L (ref -2–3)
BASE EXCESS BLDA CALC-SCNC: -6.5 MMOL/L
BASE EXCESS BLDA CALC-SCNC: -7.7 MMOL/L
BASE EXCESS BLDV CALC-SCNC: -1.6 MMOL/L
BASE EXCESS BLDV CALC-SCNC: -4.3 MMOL/L
BASE EXCESS BLDV CALC-SCNC: -6.9 MMOL/L
BASE EXCESS BLDV CALC-SCNC: -7.6 MMOL/L
BASE EXCESS BLDV CALC-SCNC: -9.9 MMOL/L
BASOPHILS # BLD AUTO: 0.02 THOUSANDS/ÂΜL (ref 0–0.1)
BASOPHILS # BLD AUTO: 0.03 THOUSANDS/ÂΜL (ref 0–0.1)
BASOPHILS # BLD AUTO: 0.04 THOUSANDS/ÂΜL (ref 0–0.1)
BASOPHILS # BLD AUTO: 0.07 THOUSANDS/ÂΜL (ref 0–0.1)
BASOPHILS NFR BLD AUTO: 0 % (ref 0–1)
BETA GLOB ABNORMAL SERPL ELPH-MCNC: 0.07 G/DL (ref 0.4–0.8)
BETA1 GLOB SERPL ELPH-MCNC: 1.3 % (ref 5–13)
BETA2 GLOB SERPL ELPH-MCNC: 4.7 % (ref 2–8)
BETA2+GAMMA GLOB SERPL ELPH-MCNC: 0.26 G/DL (ref 0.2–0.5)
BILIRUB DIRECT SERPL-MCNC: 0.92 MG/DL (ref 0–0.2)
BILIRUB DIRECT SERPL-MCNC: 1.26 MG/DL (ref 0–0.2)
BILIRUB DIRECT SERPL-MCNC: 1.3 MG/DL (ref 0–0.2)
BILIRUB DIRECT SERPL-MCNC: 2.38 MG/DL (ref 0–0.2)
BILIRUB DIRECT SERPL-MCNC: 2.54 MG/DL (ref 0–0.2)
BILIRUB SERPL-MCNC: 1.18 MG/DL (ref 0.2–1)
BILIRUB SERPL-MCNC: 1.96 MG/DL (ref 0.2–1)
BILIRUB SERPL-MCNC: 2.61 MG/DL (ref 0.2–1)
BILIRUB SERPL-MCNC: 2.67 MG/DL (ref 0.2–1)
BILIRUB SERPL-MCNC: 2.95 MG/DL (ref 0.2–1)
BILIRUB SERPL-MCNC: 3.03 MG/DL (ref 0.2–1)
BILIRUB SERPL-MCNC: 3.07 MG/DL (ref 0.2–1)
BILIRUB SERPL-MCNC: 3.25 MG/DL (ref 0.2–1)
BILIRUB SERPL-MCNC: 3.95 MG/DL (ref 0.2–1)
BILIRUB SERPL-MCNC: 4.77 MG/DL (ref 0.2–1)
BILIRUB SERPL-MCNC: 5.01 MG/DL (ref 0.2–1)
BILIRUB SERPL-MCNC: 5.23 MG/DL (ref 0.2–1)
BILIRUB SERPL-MCNC: 5.34 MG/DL (ref 0.2–1)
BILIRUB SERPL-MCNC: 5.35 MG/DL (ref 0.2–1)
BILIRUB SERPL-MCNC: 5.41 MG/DL (ref 0.2–1)
BILIRUB SERPL-MCNC: 5.64 MG/DL (ref 0.2–1)
BILIRUB SERPL-MCNC: 5.93 MG/DL (ref 0.2–1)
BILIRUB SERPL-MCNC: 5.94 MG/DL (ref 0.2–1)
BILIRUB SERPL-MCNC: 5.99 MG/DL (ref 0.2–1)
BILIRUB SERPL-MCNC: 6.15 MG/DL (ref 0.2–1)
BILIRUB SERPL-MCNC: 6.15 MG/DL (ref 0.2–1)
BILIRUB SERPL-MCNC: 6.49 MG/DL (ref 0.2–1)
BILIRUB SERPL-MCNC: 6.78 MG/DL (ref 0.2–1)
BILIRUB UR QL STRIP: ABNORMAL
BILIRUB UR QL STRIP: ABNORMAL
BILIRUB UR QL STRIP: NEGATIVE
BLD SMEAR INTERP: NORMAL
BUN SERPL-MCNC: 104 MG/DL (ref 5–25)
BUN SERPL-MCNC: 105 MG/DL (ref 5–25)
BUN SERPL-MCNC: 106 MG/DL (ref 5–25)
BUN SERPL-MCNC: 107 MG/DL (ref 5–25)
BUN SERPL-MCNC: 108 MG/DL (ref 5–25)
BUN SERPL-MCNC: 108 MG/DL (ref 5–25)
BUN SERPL-MCNC: 112 MG/DL (ref 5–25)
BUN SERPL-MCNC: 120 MG/DL (ref 5–25)
BUN SERPL-MCNC: 121 MG/DL (ref 5–25)
BUN SERPL-MCNC: 124 MG/DL (ref 5–25)
BUN SERPL-MCNC: 27 MG/DL (ref 5–25)
BUN SERPL-MCNC: 27 MG/DL (ref 5–25)
BUN SERPL-MCNC: 28 MG/DL (ref 5–25)
BUN SERPL-MCNC: 35 MG/DL (ref 5–25)
BUN SERPL-MCNC: 35 MG/DL (ref 5–25)
BUN SERPL-MCNC: 41 MG/DL (ref 5–25)
BUN SERPL-MCNC: 47 MG/DL (ref 5–25)
BUN SERPL-MCNC: 47 MG/DL (ref 5–25)
BUN SERPL-MCNC: 49 MG/DL (ref 5–25)
BUN SERPL-MCNC: 53 MG/DL (ref 5–25)
BUN SERPL-MCNC: 56 MG/DL (ref 5–25)
BUN SERPL-MCNC: 58 MG/DL (ref 5–25)
BUN SERPL-MCNC: 59 MG/DL (ref 5–25)
BUN SERPL-MCNC: 62 MG/DL (ref 5–25)
BUN SERPL-MCNC: 64 MG/DL (ref 5–25)
BUN SERPL-MCNC: 66 MG/DL (ref 5–25)
BUN SERPL-MCNC: 74 MG/DL (ref 5–25)
BUN SERPL-MCNC: 75 MG/DL (ref 5–25)
BUN SERPL-MCNC: 79 MG/DL (ref 5–25)
BUN SERPL-MCNC: 81 MG/DL (ref 5–25)
BUN SERPL-MCNC: 83 MG/DL (ref 5–25)
BUN SERPL-MCNC: 85 MG/DL (ref 5–25)
BUN SERPL-MCNC: 86 MG/DL (ref 5–25)
BUN SERPL-MCNC: 89 MG/DL (ref 5–25)
BUN SERPL-MCNC: 94 MG/DL (ref 5–25)
BUN SERPL-MCNC: 96 MG/DL (ref 5–25)
BUN SERPL-MCNC: 96 MG/DL (ref 5–25)
BUN SERPL-MCNC: 98 MG/DL (ref 5–25)
CA-I BLD-SCNC: 0.88 MMOL/L (ref 1.12–1.32)
CA-I BLD-SCNC: 0.89 MMOL/L (ref 1.12–1.32)
CA-I BLD-SCNC: 0.94 MMOL/L (ref 1.12–1.32)
CA-I BLD-SCNC: 0.95 MMOL/L (ref 1.12–1.32)
CA-I BLD-SCNC: 0.96 MMOL/L (ref 1.12–1.32)
CA-I BLD-SCNC: 0.97 MMOL/L (ref 1.12–1.32)
CA-I BLD-SCNC: 0.98 MMOL/L (ref 1.12–1.32)
CA-I BLD-SCNC: 0.99 MMOL/L (ref 1.12–1.32)
CA-I BLD-SCNC: 0.99 MMOL/L (ref 1.12–1.32)
CA-I BLD-SCNC: 1.02 MMOL/L (ref 1.12–1.32)
CA-I BLD-SCNC: 2 MMOL/L (ref 1.12–1.32)
CALCIUM ALBUM COR SERPL-MCNC: 7.2 MG/DL (ref 8.3–10.1)
CALCIUM ALBUM COR SERPL-MCNC: 7.5 MG/DL (ref 8.3–10.1)
CALCIUM ALBUM COR SERPL-MCNC: 7.8 MG/DL (ref 8.3–10.1)
CALCIUM ALBUM COR SERPL-MCNC: 7.9 MG/DL (ref 8.3–10.1)
CALCIUM ALBUM COR SERPL-MCNC: 7.9 MG/DL (ref 8.3–10.1)
CALCIUM ALBUM COR SERPL-MCNC: 8.2 MG/DL (ref 8.3–10.1)
CALCIUM ALBUM COR SERPL-MCNC: 8.6 MG/DL (ref 8.3–10.1)
CALCIUM ALBUM COR SERPL-MCNC: 8.8 MG/DL (ref 8.3–10.1)
CALCIUM SERPL-MCNC: 6.2 MG/DL (ref 8.3–10.1)
CALCIUM SERPL-MCNC: 6.4 MG/DL (ref 8.3–10.1)
CALCIUM SERPL-MCNC: 6.5 MG/DL (ref 8.3–10.1)
CALCIUM SERPL-MCNC: 6.5 MG/DL (ref 8.3–10.1)
CALCIUM SERPL-MCNC: 6.6 MG/DL (ref 8.3–10.1)
CALCIUM SERPL-MCNC: 6.6 MG/DL (ref 8.3–10.1)
CALCIUM SERPL-MCNC: 6.7 MG/DL (ref 8.3–10.1)
CALCIUM SERPL-MCNC: 6.9 MG/DL (ref 8.3–10.1)
CALCIUM SERPL-MCNC: 7.1 MG/DL (ref 8.3–10.1)
CALCIUM SERPL-MCNC: 7.1 MG/DL (ref 8.3–10.1)
CALCIUM SERPL-MCNC: 7.2 MG/DL (ref 8.3–10.1)
CALCIUM SERPL-MCNC: 7.4 MG/DL (ref 8.3–10.1)
CALCIUM SERPL-MCNC: 7.4 MG/DL (ref 8.3–10.1)
CALCIUM SERPL-MCNC: 7.5 MG/DL (ref 8.3–10.1)
CALCIUM SERPL-MCNC: 7.5 MG/DL (ref 8.3–10.1)
CALCIUM SERPL-MCNC: 7.6 MG/DL (ref 8.3–10.1)
CALCIUM SERPL-MCNC: 7.8 MG/DL (ref 8.3–10.1)
CALCIUM SERPL-MCNC: 7.8 MG/DL (ref 8.3–10.1)
CALCIUM SERPL-MCNC: 8 MG/DL (ref 8.3–10.1)
CALCIUM SERPL-MCNC: 8.1 MG/DL (ref 8.3–10.1)
CALCIUM SERPL-MCNC: 8.2 MG/DL (ref 8.3–10.1)
CALCIUM SERPL-MCNC: 8.4 MG/DL (ref 8.3–10.1)
CALCIUM SERPL-MCNC: 8.5 MG/DL (ref 8.3–10.1)
CALCIUM SERPL-MCNC: 8.5 MG/DL (ref 8.3–10.1)
CALCIUM SERPL-MCNC: 8.7 MG/DL (ref 8.3–10.1)
CARDIAC TROPONIN I PNL SERPL HS: 16 NG/L
CARDIAC TROPONIN I PNL SERPL HS: 17 NG/L
CARDIAC TROPONIN I PNL SERPL HS: 17 NG/L
CHLORIDE SERPL-SCNC: 100 MMOL/L (ref 96–108)
CHLORIDE SERPL-SCNC: 101 MMOL/L (ref 96–108)
CHLORIDE SERPL-SCNC: 102 MMOL/L (ref 96–108)
CHLORIDE SERPL-SCNC: 103 MMOL/L (ref 96–108)
CHLORIDE SERPL-SCNC: 104 MMOL/L (ref 96–108)
CHLORIDE SERPL-SCNC: 104 MMOL/L (ref 96–108)
CHLORIDE SERPL-SCNC: 105 MMOL/L (ref 96–108)
CHLORIDE SERPL-SCNC: 106 MMOL/L (ref 96–108)
CHLORIDE SERPL-SCNC: 108 MMOL/L (ref 96–108)
CHLORIDE SERPL-SCNC: 108 MMOL/L (ref 96–108)
CHLORIDE SERPL-SCNC: 109 MMOL/L (ref 96–108)
CHLORIDE SERPL-SCNC: 91 MMOL/L (ref 96–108)
CHLORIDE SERPL-SCNC: 92 MMOL/L (ref 96–108)
CHLORIDE SERPL-SCNC: 93 MMOL/L (ref 96–108)
CHLORIDE SERPL-SCNC: 94 MMOL/L (ref 96–108)
CHLORIDE SERPL-SCNC: 95 MMOL/L (ref 96–108)
CHLORIDE SERPL-SCNC: 95 MMOL/L (ref 96–108)
CHLORIDE SERPL-SCNC: 96 MMOL/L (ref 96–108)
CHLORIDE SERPL-SCNC: 97 MMOL/L (ref 96–108)
CHLORIDE SERPL-SCNC: 98 MMOL/L (ref 96–108)
CHLORIDE SERPL-SCNC: 99 MMOL/L (ref 96–108)
CHLORIDE UR-SCNC: <10 MMOL/L
CHOLEST SERPL-MCNC: <50 MG/DL
CLARITY UR: ABNORMAL
CO2 SERPL-SCNC: 13 MMOL/L (ref 21–32)
CO2 SERPL-SCNC: 14 MMOL/L (ref 21–32)
CO2 SERPL-SCNC: 14 MMOL/L (ref 21–32)
CO2 SERPL-SCNC: 15 MMOL/L (ref 21–32)
CO2 SERPL-SCNC: 16 MMOL/L (ref 21–32)
CO2 SERPL-SCNC: 17 MMOL/L (ref 21–32)
CO2 SERPL-SCNC: 18 MMOL/L (ref 21–32)
CO2 SERPL-SCNC: 19 MMOL/L (ref 21–32)
CO2 SERPL-SCNC: 22 MMOL/L (ref 21–32)
COLOR FLD: ABNORMAL
COLOR UR: ABNORMAL
COLOR UR: YELLOW
COLOR UR: YELLOW
CORTIS SERPL-MCNC: 10.1 UG/DL
CORTIS SERPL-MCNC: 20 UG/DL
CORTIS SERPL-MCNC: 25.4 UG/DL
CORTIS SERPL-MCNC: 31.5 UG/DL
CREAT SERPL-MCNC: 0.55 MG/DL (ref 0.6–1.3)
CREAT SERPL-MCNC: 0.6 MG/DL (ref 0.6–1.3)
CREAT SERPL-MCNC: 0.71 MG/DL (ref 0.6–1.3)
CREAT SERPL-MCNC: 0.79 MG/DL (ref 0.6–1.3)
CREAT SERPL-MCNC: 0.8 MG/DL (ref 0.6–1.3)
CREAT SERPL-MCNC: 0.87 MG/DL (ref 0.6–1.3)
CREAT SERPL-MCNC: 0.9 MG/DL (ref 0.6–1.3)
CREAT SERPL-MCNC: 0.92 MG/DL (ref 0.6–1.3)
CREAT SERPL-MCNC: 1.06 MG/DL (ref 0.6–1.3)
CREAT SERPL-MCNC: 1.15 MG/DL (ref 0.6–1.3)
CREAT SERPL-MCNC: 1.31 MG/DL (ref 0.6–1.3)
CREAT SERPL-MCNC: 1.43 MG/DL (ref 0.6–1.3)
CREAT SERPL-MCNC: 1.5 MG/DL (ref 0.6–1.3)
CREAT SERPL-MCNC: 1.51 MG/DL (ref 0.6–1.3)
CREAT SERPL-MCNC: 1.51 MG/DL (ref 0.6–1.3)
CREAT SERPL-MCNC: 1.54 MG/DL (ref 0.6–1.3)
CREAT SERPL-MCNC: 1.68 MG/DL (ref 0.6–1.3)
CREAT SERPL-MCNC: 1.71 MG/DL (ref 0.6–1.3)
CREAT SERPL-MCNC: 1.76 MG/DL (ref 0.6–1.3)
CREAT SERPL-MCNC: 1.76 MG/DL (ref 0.6–1.3)
CREAT SERPL-MCNC: 1.8 MG/DL (ref 0.6–1.3)
CREAT SERPL-MCNC: 1.88 MG/DL (ref 0.6–1.3)
CREAT SERPL-MCNC: 2.04 MG/DL (ref 0.6–1.3)
CREAT SERPL-MCNC: 2.05 MG/DL (ref 0.6–1.3)
CREAT SERPL-MCNC: 2.06 MG/DL (ref 0.6–1.3)
CREAT SERPL-MCNC: 2.14 MG/DL (ref 0.6–1.3)
CREAT SERPL-MCNC: 2.37 MG/DL (ref 0.6–1.3)
CREAT SERPL-MCNC: 2.38 MG/DL (ref 0.6–1.3)
CREAT SERPL-MCNC: 2.49 MG/DL (ref 0.6–1.3)
CREAT SERPL-MCNC: 2.54 MG/DL (ref 0.6–1.3)
CREAT SERPL-MCNC: 2.6 MG/DL (ref 0.6–1.3)
CREAT SERPL-MCNC: 2.71 MG/DL (ref 0.6–1.3)
CREAT SERPL-MCNC: 2.76 MG/DL (ref 0.6–1.3)
CREAT SERPL-MCNC: 2.8 MG/DL (ref 0.6–1.3)
CREAT SERPL-MCNC: 2.8 MG/DL (ref 0.6–1.3)
CREAT SERPL-MCNC: 3.4 MG/DL (ref 0.6–1.3)
CREAT SERPL-MCNC: 3.76 MG/DL (ref 0.6–1.3)
CREAT SERPL-MCNC: 4.32 MG/DL (ref 0.6–1.3)
CREAT UR-MCNC: 68.8 MG/DL
D DIMER PPP FEU-MCNC: 4.25 UG/ML FEU
DAT POLY-SP REAG RBC QL: NEGATIVE
DEPRECATED AT III PPP: 37 % OF NORMAL (ref 92–136)
DME PARACHUTE DELIVERY DATE ACTUAL: NORMAL
DME PARACHUTE DELIVERY DATE REQUESTED: NORMAL
DME PARACHUTE DELIVERY DATE REQUESTED: NORMAL
DME PARACHUTE ITEM DESCRIPTION: NORMAL
DME PARACHUTE ITEM DESCRIPTION: NORMAL
DME PARACHUTE ORDER STATUS: NORMAL
DME PARACHUTE ORDER STATUS: NORMAL
DME PARACHUTE SUPPLIER NAME: NORMAL
DME PARACHUTE SUPPLIER NAME: NORMAL
DME PARACHUTE SUPPLIER PHONE: NORMAL
DME PARACHUTE SUPPLIER PHONE: NORMAL
E WAVE DECELERATION TIME: 321 MS
EOSINOPHIL # BLD AUTO: 0.01 THOUSAND/ÂΜL (ref 0–0.61)
EOSINOPHIL # BLD AUTO: 0.02 THOUSAND/ÂΜL (ref 0–0.61)
EOSINOPHIL # BLD AUTO: 0.06 THOUSAND/ÂΜL (ref 0–0.61)
EOSINOPHIL # BLD AUTO: 0.08 THOUSAND/ÂΜL (ref 0–0.61)
EOSINOPHIL # BLD AUTO: 0.08 THOUSAND/ÂΜL (ref 0–0.61)
EOSINOPHIL # BLD AUTO: 0.1 THOUSAND/ÂΜL (ref 0–0.61)
EOSINOPHIL # BLD AUTO: 0.15 THOUSAND/ÂΜL (ref 0–0.61)
EOSINOPHIL # BLD AUTO: 0.17 THOUSAND/ÂΜL (ref 0–0.61)
EOSINOPHIL # BLD AUTO: 0.19 THOUSAND/ÂΜL (ref 0–0.61)
EOSINOPHIL # BLD AUTO: 0.19 THOUSAND/ÂΜL (ref 0–0.61)
EOSINOPHIL # BLD AUTO: 0.21 THOUSAND/ÂΜL (ref 0–0.61)
EOSINOPHIL # BLD AUTO: 0.22 THOUSAND/ÂΜL (ref 0–0.61)
EOSINOPHIL # BLD AUTO: 0.25 THOUSAND/ÂΜL (ref 0–0.61)
EOSINOPHIL # BLD AUTO: 0.25 THOUSAND/ÂΜL (ref 0–0.61)
EOSINOPHIL # BLD AUTO: 0.26 THOUSAND/ÂΜL (ref 0–0.61)
EOSINOPHIL # BLD AUTO: 0.3 THOUSAND/ÂΜL (ref 0–0.61)
EOSINOPHIL NFR BLD AUTO: 0 % (ref 0–6)
EOSINOPHIL NFR BLD AUTO: 1 % (ref 0–6)
EOSINOPHIL NFR BLD AUTO: 2 % (ref 0–6)
ERYTHROCYTE [DISTWIDTH] IN BLOOD BY AUTOMATED COUNT: 14.2 % (ref 11.6–15.1)
ERYTHROCYTE [DISTWIDTH] IN BLOOD BY AUTOMATED COUNT: 14.3 % (ref 11.6–15.1)
ERYTHROCYTE [DISTWIDTH] IN BLOOD BY AUTOMATED COUNT: 14.4 % (ref 11.6–15.1)
ERYTHROCYTE [DISTWIDTH] IN BLOOD BY AUTOMATED COUNT: 14.6 % (ref 11.6–15.1)
ERYTHROCYTE [DISTWIDTH] IN BLOOD BY AUTOMATED COUNT: 14.8 % (ref 11.6–15.1)
ERYTHROCYTE [DISTWIDTH] IN BLOOD BY AUTOMATED COUNT: 15.1 % (ref 11.6–15.1)
ERYTHROCYTE [DISTWIDTH] IN BLOOD BY AUTOMATED COUNT: 15.5 % (ref 11.6–15.1)
ERYTHROCYTE [DISTWIDTH] IN BLOOD BY AUTOMATED COUNT: 15.7 % (ref 11.6–15.1)
ERYTHROCYTE [DISTWIDTH] IN BLOOD BY AUTOMATED COUNT: 16.5 % (ref 11.6–15.1)
ERYTHROCYTE [DISTWIDTH] IN BLOOD BY AUTOMATED COUNT: 17.4 % (ref 11.6–15.1)
ERYTHROCYTE [DISTWIDTH] IN BLOOD BY AUTOMATED COUNT: 17.5 % (ref 11.6–15.1)
ERYTHROCYTE [DISTWIDTH] IN BLOOD BY AUTOMATED COUNT: 17.7 % (ref 11.6–15.1)
ERYTHROCYTE [DISTWIDTH] IN BLOOD BY AUTOMATED COUNT: 18.4 % (ref 11.6–15.1)
ERYTHROCYTE [DISTWIDTH] IN BLOOD BY AUTOMATED COUNT: 18.7 % (ref 11.6–15.1)
ERYTHROCYTE [DISTWIDTH] IN BLOOD BY AUTOMATED COUNT: 19.3 % (ref 11.6–15.1)
ERYTHROCYTE [DISTWIDTH] IN BLOOD BY AUTOMATED COUNT: 19.4 % (ref 11.6–15.1)
ERYTHROCYTE [DISTWIDTH] IN BLOOD BY AUTOMATED COUNT: 19.9 % (ref 11.6–15.1)
ERYTHROCYTE [DISTWIDTH] IN BLOOD BY AUTOMATED COUNT: 20.4 % (ref 11.6–15.1)
ERYTHROCYTE [DISTWIDTH] IN BLOOD BY AUTOMATED COUNT: 21.1 % (ref 11.6–15.1)
ERYTHROCYTE [DISTWIDTH] IN BLOOD BY AUTOMATED COUNT: 21.2 % (ref 11.6–15.1)
ERYTHROCYTE [DISTWIDTH] IN BLOOD BY AUTOMATED COUNT: 21.5 % (ref 11.6–15.1)
ERYTHROCYTE [DISTWIDTH] IN BLOOD BY AUTOMATED COUNT: 22.3 % (ref 11.6–15.1)
ERYTHROCYTE [DISTWIDTH] IN BLOOD BY AUTOMATED COUNT: 22.7 % (ref 11.6–15.1)
FDP BLD QL AGGL: <10
FIBRINOGEN PPP-MCNC: 155 MG/DL (ref 227–495)
FRACTIONAL SHORTENING: 35 (ref 28–44)
GAMMA GLOB ABNORMAL SERPL ELPH-MCNC: 0.87 G/DL (ref 0.5–1.6)
GAMMA GLOB MFR UR ELPH: 23.8 %
GAMMA GLOB SERPL ELPH-MCNC: 15.8 % (ref 12–22)
GFR SERPL CREATININE-BSD FRML MDRD: 103 ML/MIN/1.73SQ M
GFR SERPL CREATININE-BSD FRML MDRD: 103 ML/MIN/1.73SQ M
GFR SERPL CREATININE-BSD FRML MDRD: 108 ML/MIN/1.73SQ M
GFR SERPL CREATININE-BSD FRML MDRD: 116 ML/MIN/1.73SQ M
GFR SERPL CREATININE-BSD FRML MDRD: 120 ML/MIN/1.73SQ M
GFR SERPL CREATININE-BSD FRML MDRD: 14 ML/MIN/1.73SQ M
GFR SERPL CREATININE-BSD FRML MDRD: 17 ML/MIN/1.73SQ M
GFR SERPL CREATININE-BSD FRML MDRD: 19 ML/MIN/1.73SQ M
GFR SERPL CREATININE-BSD FRML MDRD: 24 ML/MIN/1.73SQ M
GFR SERPL CREATININE-BSD FRML MDRD: 24 ML/MIN/1.73SQ M
GFR SERPL CREATININE-BSD FRML MDRD: 25 ML/MIN/1.73SQ M
GFR SERPL CREATININE-BSD FRML MDRD: 25 ML/MIN/1.73SQ M
GFR SERPL CREATININE-BSD FRML MDRD: 27 ML/MIN/1.73SQ M
GFR SERPL CREATININE-BSD FRML MDRD: 28 ML/MIN/1.73SQ M
GFR SERPL CREATININE-BSD FRML MDRD: 28 ML/MIN/1.73SQ M
GFR SERPL CREATININE-BSD FRML MDRD: 30 ML/MIN/1.73SQ M
GFR SERPL CREATININE-BSD FRML MDRD: 30 ML/MIN/1.73SQ M
GFR SERPL CREATININE-BSD FRML MDRD: 34 ML/MIN/1.73SQ M
GFR SERPL CREATININE-BSD FRML MDRD: 36 ML/MIN/1.73SQ M
GFR SERPL CREATININE-BSD FRML MDRD: 40 ML/MIN/1.73SQ M
GFR SERPL CREATININE-BSD FRML MDRD: 42 ML/MIN/1.73SQ M
GFR SERPL CREATININE-BSD FRML MDRD: 43 ML/MIN/1.73SQ M
GFR SERPL CREATININE-BSD FRML MDRD: 43 ML/MIN/1.73SQ M
GFR SERPL CREATININE-BSD FRML MDRD: 45 ML/MIN/1.73SQ M
GFR SERPL CREATININE-BSD FRML MDRD: 46 ML/MIN/1.73SQ M
GFR SERPL CREATININE-BSD FRML MDRD: 51 ML/MIN/1.73SQ M
GFR SERPL CREATININE-BSD FRML MDRD: 52 ML/MIN/1.73SQ M
GFR SERPL CREATININE-BSD FRML MDRD: 52 ML/MIN/1.73SQ M
GFR SERPL CREATININE-BSD FRML MDRD: 53 ML/MIN/1.73SQ M
GFR SERPL CREATININE-BSD FRML MDRD: 56 ML/MIN/1.73SQ M
GFR SERPL CREATININE-BSD FRML MDRD: 62 ML/MIN/1.73SQ M
GFR SERPL CREATININE-BSD FRML MDRD: 73 ML/MIN/1.73SQ M
GFR SERPL CREATININE-BSD FRML MDRD: 80 ML/MIN/1.73SQ M
GFR SERPL CREATININE-BSD FRML MDRD: 95 ML/MIN/1.73SQ M
GFR SERPL CREATININE-BSD FRML MDRD: 98 ML/MIN/1.73SQ M
GFR SERPL CREATININE-BSD FRML MDRD: 99 ML/MIN/1.73SQ M
GLUCOSE FLD-MCNC: 139 MG/DL
GLUCOSE SERPL-MCNC: 100 MG/DL (ref 65–140)
GLUCOSE SERPL-MCNC: 101 MG/DL (ref 65–140)
GLUCOSE SERPL-MCNC: 105 MG/DL (ref 65–140)
GLUCOSE SERPL-MCNC: 106 MG/DL (ref 65–140)
GLUCOSE SERPL-MCNC: 108 MG/DL (ref 65–140)
GLUCOSE SERPL-MCNC: 108 MG/DL (ref 65–140)
GLUCOSE SERPL-MCNC: 109 MG/DL (ref 65–140)
GLUCOSE SERPL-MCNC: 110 MG/DL (ref 65–140)
GLUCOSE SERPL-MCNC: 111 MG/DL (ref 65–140)
GLUCOSE SERPL-MCNC: 112 MG/DL (ref 65–140)
GLUCOSE SERPL-MCNC: 113 MG/DL (ref 65–140)
GLUCOSE SERPL-MCNC: 114 MG/DL (ref 65–140)
GLUCOSE SERPL-MCNC: 115 MG/DL (ref 65–140)
GLUCOSE SERPL-MCNC: 116 MG/DL (ref 65–140)
GLUCOSE SERPL-MCNC: 117 MG/DL (ref 65–140)
GLUCOSE SERPL-MCNC: 118 MG/DL (ref 65–140)
GLUCOSE SERPL-MCNC: 118 MG/DL (ref 65–140)
GLUCOSE SERPL-MCNC: 119 MG/DL (ref 65–140)
GLUCOSE SERPL-MCNC: 120 MG/DL (ref 65–140)
GLUCOSE SERPL-MCNC: 121 MG/DL (ref 65–140)
GLUCOSE SERPL-MCNC: 122 MG/DL (ref 65–140)
GLUCOSE SERPL-MCNC: 123 MG/DL (ref 65–140)
GLUCOSE SERPL-MCNC: 124 MG/DL (ref 65–140)
GLUCOSE SERPL-MCNC: 124 MG/DL (ref 65–140)
GLUCOSE SERPL-MCNC: 125 MG/DL (ref 65–140)
GLUCOSE SERPL-MCNC: 126 MG/DL (ref 65–140)
GLUCOSE SERPL-MCNC: 128 MG/DL (ref 65–140)
GLUCOSE SERPL-MCNC: 128 MG/DL (ref 65–140)
GLUCOSE SERPL-MCNC: 129 MG/DL (ref 65–140)
GLUCOSE SERPL-MCNC: 130 MG/DL (ref 65–140)
GLUCOSE SERPL-MCNC: 130 MG/DL (ref 65–140)
GLUCOSE SERPL-MCNC: 131 MG/DL (ref 65–140)
GLUCOSE SERPL-MCNC: 131 MG/DL (ref 65–140)
GLUCOSE SERPL-MCNC: 132 MG/DL (ref 65–140)
GLUCOSE SERPL-MCNC: 133 MG/DL (ref 65–140)
GLUCOSE SERPL-MCNC: 133 MG/DL (ref 65–140)
GLUCOSE SERPL-MCNC: 135 MG/DL (ref 65–140)
GLUCOSE SERPL-MCNC: 136 MG/DL (ref 65–140)
GLUCOSE SERPL-MCNC: 136 MG/DL (ref 65–140)
GLUCOSE SERPL-MCNC: 137 MG/DL (ref 65–140)
GLUCOSE SERPL-MCNC: 139 MG/DL (ref 65–140)
GLUCOSE SERPL-MCNC: 140 MG/DL (ref 65–140)
GLUCOSE SERPL-MCNC: 141 MG/DL (ref 65–140)
GLUCOSE SERPL-MCNC: 142 MG/DL (ref 65–140)
GLUCOSE SERPL-MCNC: 142 MG/DL (ref 65–140)
GLUCOSE SERPL-MCNC: 143 MG/DL (ref 65–140)
GLUCOSE SERPL-MCNC: 143 MG/DL (ref 65–140)
GLUCOSE SERPL-MCNC: 144 MG/DL (ref 65–140)
GLUCOSE SERPL-MCNC: 145 MG/DL (ref 65–140)
GLUCOSE SERPL-MCNC: 146 MG/DL (ref 65–140)
GLUCOSE SERPL-MCNC: 147 MG/DL (ref 65–140)
GLUCOSE SERPL-MCNC: 149 MG/DL (ref 65–140)
GLUCOSE SERPL-MCNC: 149 MG/DL (ref 65–140)
GLUCOSE SERPL-MCNC: 152 MG/DL (ref 65–140)
GLUCOSE SERPL-MCNC: 153 MG/DL (ref 65–140)
GLUCOSE SERPL-MCNC: 153 MG/DL (ref 65–140)
GLUCOSE SERPL-MCNC: 155 MG/DL (ref 65–140)
GLUCOSE SERPL-MCNC: 156 MG/DL (ref 65–140)
GLUCOSE SERPL-MCNC: 158 MG/DL (ref 65–140)
GLUCOSE SERPL-MCNC: 158 MG/DL (ref 65–140)
GLUCOSE SERPL-MCNC: 159 MG/DL (ref 65–140)
GLUCOSE SERPL-MCNC: 160 MG/DL (ref 65–140)
GLUCOSE SERPL-MCNC: 162 MG/DL (ref 65–140)
GLUCOSE SERPL-MCNC: 163 MG/DL (ref 65–140)
GLUCOSE SERPL-MCNC: 164 MG/DL (ref 65–140)
GLUCOSE SERPL-MCNC: 166 MG/DL (ref 65–140)
GLUCOSE SERPL-MCNC: 167 MG/DL (ref 65–140)
GLUCOSE SERPL-MCNC: 168 MG/DL (ref 65–140)
GLUCOSE SERPL-MCNC: 169 MG/DL (ref 65–140)
GLUCOSE SERPL-MCNC: 173 MG/DL (ref 65–140)
GLUCOSE SERPL-MCNC: 179 MG/DL (ref 65–140)
GLUCOSE SERPL-MCNC: 184 MG/DL (ref 65–140)
GLUCOSE SERPL-MCNC: 184 MG/DL (ref 65–140)
GLUCOSE SERPL-MCNC: 187 MG/DL (ref 65–140)
GLUCOSE SERPL-MCNC: 187 MG/DL (ref 65–140)
GLUCOSE SERPL-MCNC: 190 MG/DL (ref 65–140)
GLUCOSE SERPL-MCNC: 191 MG/DL (ref 65–140)
GLUCOSE SERPL-MCNC: 192 MG/DL (ref 65–140)
GLUCOSE SERPL-MCNC: 202 MG/DL (ref 65–140)
GLUCOSE SERPL-MCNC: 208 MG/DL (ref 65–140)
GLUCOSE SERPL-MCNC: 225 MG/DL (ref 65–140)
GLUCOSE SERPL-MCNC: 228 MG/DL (ref 65–140)
GLUCOSE SERPL-MCNC: 254 MG/DL (ref 65–140)
GLUCOSE SERPL-MCNC: 256 MG/DL (ref 65–140)
GLUCOSE SERPL-MCNC: 69 MG/DL (ref 65–140)
GLUCOSE SERPL-MCNC: 73 MG/DL (ref 65–140)
GLUCOSE SERPL-MCNC: 75 MG/DL (ref 65–140)
GLUCOSE SERPL-MCNC: 85 MG/DL (ref 65–140)
GLUCOSE SERPL-MCNC: 89 MG/DL (ref 65–140)
GLUCOSE SERPL-MCNC: 92 MG/DL (ref 65–140)
GLUCOSE SERPL-MCNC: 93 MG/DL (ref 65–140)
GLUCOSE SERPL-MCNC: 96 MG/DL (ref 65–140)
GLUCOSE SERPL-MCNC: 96 MG/DL (ref 65–140)
GLUCOSE SERPL-MCNC: 97 MG/DL (ref 65–140)
GLUCOSE SERPL-MCNC: 97 MG/DL (ref 65–140)
GLUCOSE UR STRIP-MCNC: NEGATIVE MG/DL
GRAM STN SPEC: NORMAL
GRAN CASTS #/AREA URNS LPF: ABNORMAL /[LPF]
HAPTOGLOB SERPL-MCNC: 44 MG/DL (ref 29–370)
HCO3 BLDA-SCNC: 14.6 MMOL/L (ref 22–28)
HCO3 BLDA-SCNC: 16.1 MMOL/L (ref 22–28)
HCO3 BLDA-SCNC: 17.7 MMOL/L (ref 24–30)
HCO3 BLDA-SCNC: 18.3 MMOL/L (ref 22–28)
HCO3 BLDV-SCNC: 14.7 MMOL/L (ref 24–30)
HCO3 BLDV-SCNC: 15.8 MMOL/L (ref 24–30)
HCO3 BLDV-SCNC: 17.3 MMOL/L (ref 24–30)
HCO3 BLDV-SCNC: 17.5 MMOL/L (ref 24–30)
HCO3 BLDV-SCNC: 19.7 MMOL/L (ref 24–30)
HCT VFR BLD AUTO: 25.7 % (ref 36.5–49.3)
HCT VFR BLD AUTO: 26.2 % (ref 36.5–49.3)
HCT VFR BLD AUTO: 26.3 % (ref 36.5–49.3)
HCT VFR BLD AUTO: 27 % (ref 36.5–49.3)
HCT VFR BLD AUTO: 27.4 % (ref 36.5–49.3)
HCT VFR BLD AUTO: 28.6 % (ref 36.5–49.3)
HCT VFR BLD AUTO: 29.1 % (ref 36.5–49.3)
HCT VFR BLD AUTO: 29.3 % (ref 36.5–49.3)
HCT VFR BLD AUTO: 29.3 % (ref 36.5–49.3)
HCT VFR BLD AUTO: 29.5 % (ref 36.5–49.3)
HCT VFR BLD AUTO: 30.4 % (ref 36.5–49.3)
HCT VFR BLD AUTO: 30.6 % (ref 36.5–49.3)
HCT VFR BLD AUTO: 31 % (ref 36.5–49.3)
HCT VFR BLD AUTO: 32.5 % (ref 36.5–49.3)
HCT VFR BLD AUTO: 32.7 % (ref 36.5–49.3)
HCT VFR BLD AUTO: 32.9 % (ref 36.5–49.3)
HCT VFR BLD AUTO: 34 % (ref 36.5–49.3)
HCT VFR BLD AUTO: 34.8 % (ref 36.5–49.3)
HCT VFR BLD AUTO: 34.8 % (ref 36.5–49.3)
HCT VFR BLD AUTO: 36.4 % (ref 36.5–49.3)
HCT VFR BLD AUTO: 36.9 % (ref 36.5–49.3)
HCT VFR BLD AUTO: 38.8 % (ref 36.5–49.3)
HCT VFR BLD AUTO: 41.5 % (ref 36.5–49.3)
HCT VFR BLD AUTO: 48.2 % (ref 36.5–49.3)
HCT VFR BLD CALC: 49 % (ref 36.5–49.3)
HDLC SERPL-MCNC: 16 MG/DL
HGB BLD-MCNC: 10 G/DL (ref 12–17)
HGB BLD-MCNC: 10.3 G/DL (ref 12–17)
HGB BLD-MCNC: 10.5 G/DL (ref 12–17)
HGB BLD-MCNC: 10.7 G/DL (ref 12–17)
HGB BLD-MCNC: 10.7 G/DL (ref 12–17)
HGB BLD-MCNC: 11.4 G/DL (ref 12–17)
HGB BLD-MCNC: 11.6 G/DL (ref 12–17)
HGB BLD-MCNC: 11.6 G/DL (ref 12–17)
HGB BLD-MCNC: 11.8 G/DL (ref 12–17)
HGB BLD-MCNC: 12.2 G/DL (ref 12–17)
HGB BLD-MCNC: 12.5 G/DL (ref 12–17)
HGB BLD-MCNC: 13 G/DL (ref 12–17)
HGB BLD-MCNC: 13.3 G/DL (ref 12–17)
HGB BLD-MCNC: 14.6 G/DL (ref 12–17)
HGB BLD-MCNC: 16.9 G/DL (ref 12–17)
HGB BLD-MCNC: 9.1 G/DL (ref 12–17)
HGB BLD-MCNC: 9.2 G/DL (ref 12–17)
HGB BLD-MCNC: 9.2 G/DL (ref 12–17)
HGB BLD-MCNC: 9.6 G/DL (ref 12–17)
HGB BLD-MCNC: 9.6 G/DL (ref 12–17)
HGB BLD-MCNC: 9.8 G/DL (ref 12–17)
HGB BLD-MCNC: 9.9 G/DL (ref 12–17)
HGB BLD-MCNC: 9.9 G/DL (ref 12–17)
HGB BLDA-MCNC: 16.7 G/DL (ref 12–17)
HGB UR QL STRIP.AUTO: ABNORMAL
HGB UR QL STRIP.AUTO: NEGATIVE
HGB UR QL STRIP.AUTO: NEGATIVE
HISTIOCYTES NFR FLD: 14 %
HYALINE CASTS #/AREA URNS LPF: ABNORMAL /LPF
IGG/ALB SER: 2.44 {RATIO} (ref 1.1–1.8)
IMM GRANULOCYTES # BLD AUTO: 0.03 THOUSAND/UL (ref 0–0.2)
IMM GRANULOCYTES # BLD AUTO: 0.04 THOUSAND/UL (ref 0–0.2)
IMM GRANULOCYTES # BLD AUTO: 0.07 THOUSAND/UL (ref 0–0.2)
IMM GRANULOCYTES # BLD AUTO: 0.07 THOUSAND/UL (ref 0–0.2)
IMM GRANULOCYTES # BLD AUTO: 0.08 THOUSAND/UL (ref 0–0.2)
IMM GRANULOCYTES # BLD AUTO: 0.08 THOUSAND/UL (ref 0–0.2)
IMM GRANULOCYTES # BLD AUTO: 0.09 THOUSAND/UL (ref 0–0.2)
IMM GRANULOCYTES # BLD AUTO: 0.1 THOUSAND/UL (ref 0–0.2)
IMM GRANULOCYTES # BLD AUTO: 0.12 THOUSAND/UL (ref 0–0.2)
IMM GRANULOCYTES # BLD AUTO: 0.13 THOUSAND/UL (ref 0–0.2)
IMM GRANULOCYTES # BLD AUTO: 0.15 THOUSAND/UL (ref 0–0.2)
IMM GRANULOCYTES # BLD AUTO: 0.25 THOUSAND/UL (ref 0–0.2)
IMM GRANULOCYTES NFR BLD AUTO: 0 % (ref 0–2)
IMM GRANULOCYTES NFR BLD AUTO: 1 % (ref 0–2)
INR PPP: 1.49 (ref 0.84–1.19)
INR PPP: 1.66 (ref 0.84–1.19)
INR PPP: 2.28 (ref 0.84–1.19)
INR PPP: 2.31 (ref 0.84–1.19)
INR PPP: 2.32 (ref 0.84–1.19)
INR PPP: 2.36 (ref 0.84–1.19)
INR PPP: 2.36 (ref 0.84–1.19)
INR PPP: 2.42 (ref 0.84–1.19)
INR PPP: 2.45 (ref 0.84–1.19)
INR PPP: 2.46 (ref 0.84–1.19)
INR PPP: 2.46 (ref 0.84–1.19)
INR PPP: 2.6 (ref 0.84–1.19)
INR PPP: 2.62 (ref 0.84–1.19)
INR PPP: 2.67 (ref 0.84–1.19)
INR PPP: 2.86 (ref 0.84–1.19)
INR PPP: 3.38 (ref 0.84–1.19)
INR PPP: 3.64 (ref 0.84–1.19)
INR PPP: 4 (ref 0.84–1.19)
INR PPP: 4.56 (ref 0.84–1.19)
INR PPP: 5.99 (ref 0.84–1.19)
INR PPP: 8.74 (ref 0.84–1.19)
INTERPRETATION UR IFE-IMP: NORMAL
INTERVENTRICULAR SEPTUM IN DIASTOLE (PARASTERNAL SHORT AXIS VIEW): 1.3 CM
INTERVENTRICULAR SEPTUM: 1.3 CM (ref 0.6–1.1)
KAPPA LC FREE SER-MCNC: 114.3 MG/L (ref 3.3–19.4)
KAPPA LC FREE/LAMBDA FREE SER: 1.32 {RATIO} (ref 0.26–1.65)
KETONES UR STRIP-MCNC: ABNORMAL MG/DL
KETONES UR STRIP-MCNC: ABNORMAL MG/DL
KETONES UR STRIP-MCNC: NEGATIVE MG/DL
LAAS-AP2: 16 CM2
LAAS-AP4: 18.1 CM2
LACTATE SERPL-SCNC: 3.1 MMOL/L (ref 0.5–2)
LACTATE SERPL-SCNC: 3.3 MMOL/L (ref 0.5–2)
LACTATE SERPL-SCNC: 3.5 MMOL/L (ref 0.5–2)
LACTATE SERPL-SCNC: 3.5 MMOL/L (ref 0.5–2)
LACTATE SERPL-SCNC: 3.8 MMOL/L (ref 0.5–2)
LACTATE SERPL-SCNC: 4 MMOL/L (ref 0.5–2)
LACTATE SERPL-SCNC: 4.1 MMOL/L (ref 0.5–2)
LACTATE SERPL-SCNC: 4.2 MMOL/L (ref 0.5–2)
LACTATE SERPL-SCNC: 4.3 MMOL/L (ref 0.5–2)
LACTATE SERPL-SCNC: 4.6 MMOL/L (ref 0.5–2)
LACTATE SERPL-SCNC: 4.8 MMOL/L (ref 0.5–2)
LACTATE SERPL-SCNC: 5.2 MMOL/L (ref 0.5–2)
LACTATE SERPL-SCNC: 6.1 MMOL/L (ref 0.5–2)
LACTATE SERPL-SCNC: 8.8 MMOL/L (ref 0.5–2)
LAMBDA LC FREE SERPL-MCNC: 86.4 MG/L (ref 5.7–26.3)
LDH FLD L TO P-CCNC: 131 U/L
LDH SERPL-CCNC: 284 U/L (ref 81–234)
LEFT ATRIUM SIZE: 3.3 CM
LEFT INTERNAL DIMENSION IN SYSTOLE: 3 CM (ref 2.1–4)
LEFT VENTRICULAR INTERNAL DIMENSION IN DIASTOLE: 4.6 CM (ref 3.5–6)
LEFT VENTRICULAR POSTERIOR WALL IN END DIASTOLE: 1.2 CM
LEFT VENTRICULAR STROKE VOLUME: 63 ML
LEUKOCYTE ESTERASE UR QL STRIP: ABNORMAL
LEUKOCYTE ESTERASE UR QL STRIP: NEGATIVE
LEUKOCYTE ESTERASE UR QL STRIP: NEGATIVE
LVSV (TEICH): 63 ML
LYMPHOCYTES # BLD AUTO: 0.66 THOUSANDS/ÂΜL (ref 0.6–4.47)
LYMPHOCYTES # BLD AUTO: 0.7 THOUSANDS/ÂΜL (ref 0.6–4.47)
LYMPHOCYTES # BLD AUTO: 0.71 THOUSANDS/ÂΜL (ref 0.6–4.47)
LYMPHOCYTES # BLD AUTO: 0.82 THOUSANDS/ÂΜL (ref 0.6–4.47)
LYMPHOCYTES # BLD AUTO: 0.84 THOUSANDS/ÂΜL (ref 0.6–4.47)
LYMPHOCYTES # BLD AUTO: 0.84 THOUSANDS/ÂΜL (ref 0.6–4.47)
LYMPHOCYTES # BLD AUTO: 0.85 THOUSANDS/ÂΜL (ref 0.6–4.47)
LYMPHOCYTES # BLD AUTO: 0.95 THOUSANDS/ÂΜL (ref 0.6–4.47)
LYMPHOCYTES # BLD AUTO: 0.97 THOUSANDS/ÂΜL (ref 0.6–4.47)
LYMPHOCYTES # BLD AUTO: 0.98 THOUSANDS/ÂΜL (ref 0.6–4.47)
LYMPHOCYTES # BLD AUTO: 1.07 THOUSANDS/ÂΜL (ref 0.6–4.47)
LYMPHOCYTES # BLD AUTO: 1.16 THOUSANDS/ÂΜL (ref 0.6–4.47)
LYMPHOCYTES # BLD AUTO: 1.21 THOUSANDS/ÂΜL (ref 0.6–4.47)
LYMPHOCYTES # BLD AUTO: 1.25 THOUSANDS/ÂΜL (ref 0.6–4.47)
LYMPHOCYTES # BLD AUTO: 1.28 THOUSANDS/ÂΜL (ref 0.6–4.47)
LYMPHOCYTES # BLD AUTO: 1.43 THOUSANDS/ÂΜL (ref 0.6–4.47)
LYMPHOCYTES NFR BLD AUTO: 10 % (ref 14–44)
LYMPHOCYTES NFR BLD AUTO: 10 % (ref 14–44)
LYMPHOCYTES NFR BLD AUTO: 11 % (ref 14–44)
LYMPHOCYTES NFR BLD AUTO: 33 %
LYMPHOCYTES NFR BLD AUTO: 4 % (ref 14–44)
LYMPHOCYTES NFR BLD AUTO: 5 % (ref 14–44)
LYMPHOCYTES NFR BLD AUTO: 50 %
LYMPHOCYTES NFR BLD AUTO: 57 %
LYMPHOCYTES NFR BLD AUTO: 6 % (ref 14–44)
LYMPHOCYTES NFR BLD AUTO: 6 % (ref 14–44)
LYMPHOCYTES NFR BLD AUTO: 7 % (ref 14–44)
LYMPHOCYTES NFR BLD AUTO: 7 % (ref 14–44)
LYMPHOCYTES NFR BLD AUTO: 8 % (ref 14–44)
LYMPHOCYTES NFR BLD AUTO: 9 % (ref 14–44)
LYMPHOCYTES NFR BLD: 2 % (ref 14–44)
M PEAK ID 2: 2.4 %
M PROTEIN 1 MFR SERPL ELPH: 2.8 %
M PROTEIN 1 SERPL ELPH-MCNC: 0.15 G/DL
M PROTEIN 2 SERPL ELPH-MCNC: 0.13 G/DL
M PROTEIN 3 MFR SERPL ELPH: 2 %
M PROTEIN 3 SERPL ELPH-MCNC: 0.11 G/DL
MAGNESIUM SERPL-MCNC: 2.7 MG/DL (ref 1.6–2.6)
MAGNESIUM SERPL-MCNC: 2.8 MG/DL (ref 1.6–2.6)
MAGNESIUM SERPL-MCNC: 2.9 MG/DL (ref 1.6–2.6)
MAGNESIUM SERPL-MCNC: 2.9 MG/DL (ref 1.6–2.6)
MAGNESIUM SERPL-MCNC: 3 MG/DL (ref 1.6–2.6)
MAGNESIUM SERPL-MCNC: 3.1 MG/DL (ref 1.6–2.6)
MAGNESIUM SERPL-MCNC: 3.1 MG/DL (ref 1.6–2.6)
MAGNESIUM SERPL-MCNC: 3.6 MG/DL (ref 1.6–2.6)
MAGNESIUM SERPL-MCNC: 4.5 MG/DL (ref 1.6–2.6)
MCH RBC QN AUTO: 30.1 PG (ref 26.8–34.3)
MCH RBC QN AUTO: 30.2 PG (ref 26.8–34.3)
MCH RBC QN AUTO: 30.3 PG (ref 26.8–34.3)
MCH RBC QN AUTO: 30.4 PG (ref 26.8–34.3)
MCH RBC QN AUTO: 30.5 PG (ref 26.8–34.3)
MCH RBC QN AUTO: 30.6 PG (ref 26.8–34.3)
MCH RBC QN AUTO: 30.7 PG (ref 26.8–34.3)
MCH RBC QN AUTO: 31 PG (ref 26.8–34.3)
MCH RBC QN AUTO: 31 PG (ref 26.8–34.3)
MCH RBC QN AUTO: 31.1 PG (ref 26.8–34.3)
MCH RBC QN AUTO: 31.2 PG (ref 26.8–34.3)
MCH RBC QN AUTO: 31.2 PG (ref 26.8–34.3)
MCH RBC QN AUTO: 31.3 PG (ref 26.8–34.3)
MCH RBC QN AUTO: 31.5 PG (ref 26.8–34.3)
MCH RBC QN AUTO: 31.7 PG (ref 26.8–34.3)
MCHC RBC AUTO-ENTMCNC: 32.8 G/DL (ref 31.4–37.4)
MCHC RBC AUTO-ENTMCNC: 33.1 G/DL (ref 31.4–37.4)
MCHC RBC AUTO-ENTMCNC: 33.3 G/DL (ref 31.4–37.4)
MCHC RBC AUTO-ENTMCNC: 33.8 G/DL (ref 31.4–37.4)
MCHC RBC AUTO-ENTMCNC: 33.8 G/DL (ref 31.4–37.4)
MCHC RBC AUTO-ENTMCNC: 33.9 G/DL (ref 31.4–37.4)
MCHC RBC AUTO-ENTMCNC: 34.3 G/DL (ref 31.4–37.4)
MCHC RBC AUTO-ENTMCNC: 34.5 G/DL (ref 31.4–37.4)
MCHC RBC AUTO-ENTMCNC: 34.7 G/DL (ref 31.4–37.4)
MCHC RBC AUTO-ENTMCNC: 34.9 G/DL (ref 31.4–37.4)
MCHC RBC AUTO-ENTMCNC: 35 G/DL (ref 31.4–37.4)
MCHC RBC AUTO-ENTMCNC: 35 G/DL (ref 31.4–37.4)
MCHC RBC AUTO-ENTMCNC: 35.1 G/DL (ref 31.4–37.4)
MCHC RBC AUTO-ENTMCNC: 35.2 G/DL (ref 31.4–37.4)
MCHC RBC AUTO-ENTMCNC: 35.2 G/DL (ref 31.4–37.4)
MCHC RBC AUTO-ENTMCNC: 35.3 G/DL (ref 31.4–37.4)
MCHC RBC AUTO-ENTMCNC: 35.4 G/DL (ref 31.4–37.4)
MCHC RBC AUTO-ENTMCNC: 35.4 G/DL (ref 31.4–37.4)
MCHC RBC AUTO-ENTMCNC: 35.6 G/DL (ref 31.4–37.4)
MCV RBC AUTO: 87 FL (ref 82–98)
MCV RBC AUTO: 88 FL (ref 82–98)
MCV RBC AUTO: 89 FL (ref 82–98)
MCV RBC AUTO: 90 FL (ref 82–98)
MCV RBC AUTO: 91 FL (ref 82–98)
MCV RBC AUTO: 91 FL (ref 82–98)
MCV RBC AUTO: 92 FL (ref 82–98)
MCV RBC AUTO: 92 FL (ref 82–98)
MONO+MESO NFR FLD MANUAL: 10 %
MONO+MESO NFR FLD MANUAL: 4 %
MONO+MESO NFR FLD MANUAL: 7 %
MONOCYTES # BLD AUTO: 0.87 THOUSAND/ÂΜL (ref 0.17–1.22)
MONOCYTES # BLD AUTO: 1.02 THOUSAND/ÂΜL (ref 0.17–1.22)
MONOCYTES # BLD AUTO: 1.11 THOUSAND/ÂΜL (ref 0.17–1.22)
MONOCYTES # BLD AUTO: 1.19 THOUSAND/ÂΜL (ref 0.17–1.22)
MONOCYTES # BLD AUTO: 1.21 THOUSAND/ÂΜL (ref 0.17–1.22)
MONOCYTES # BLD AUTO: 1.36 THOUSAND/ÂΜL (ref 0.17–1.22)
MONOCYTES # BLD AUTO: 1.42 THOUSAND/ÂΜL (ref 0.17–1.22)
MONOCYTES # BLD AUTO: 1.47 THOUSAND/ÂΜL (ref 0.17–1.22)
MONOCYTES # BLD AUTO: 1.48 THOUSAND/ÂΜL (ref 0.17–1.22)
MONOCYTES # BLD AUTO: 1.54 THOUSAND/ÂΜL (ref 0.17–1.22)
MONOCYTES # BLD AUTO: 1.55 THOUSAND/ÂΜL (ref 0.17–1.22)
MONOCYTES # BLD AUTO: 1.7 THOUSAND/ÂΜL (ref 0.17–1.22)
MONOCYTES # BLD AUTO: 1.73 THOUSAND/ÂΜL (ref 0.17–1.22)
MONOCYTES # BLD AUTO: 1.98 THOUSAND/ÂΜL (ref 0.17–1.22)
MONOCYTES # BLD AUTO: 2.41 THOUSAND/ÂΜL (ref 0.17–1.22)
MONOCYTES # BLD AUTO: 4.18 THOUSAND/ÂΜL (ref 0.17–1.22)
MONOCYTES NFR BLD AUTO: 10 % (ref 4–12)
MONOCYTES NFR BLD AUTO: 11 % (ref 4–12)
MONOCYTES NFR BLD AUTO: 12 % (ref 4–12)
MONOCYTES NFR BLD AUTO: 13 % (ref 4–12)
MONOCYTES NFR BLD AUTO: 14 %
MONOCYTES NFR BLD AUTO: 15 % (ref 4–12)
MONOCYTES NFR BLD AUTO: 16 % (ref 4–12)
MONOCYTES NFR BLD AUTO: 3 % (ref 4–12)
MONOCYTES NFR BLD AUTO: 35 %
MONOCYTES NFR BLD AUTO: 7 % (ref 4–12)
MONOCYTES NFR BLD AUTO: 8 % (ref 4–12)
MONOCYTES NFR BLD AUTO: 8 % (ref 4–12)
MONOCYTES NFR BLD AUTO: 9 % (ref 4–12)
MRSA NOSE QL CULT: NORMAL
MUCOUS THREADS UR QL AUTO: ABNORMAL
MV E'TISSUE VEL-LAT: 17 CM/S
MV E'TISSUE VEL-SEP: 11 CM/S
MV PEAK A VEL: 0.73 M/S
MV PEAK E VEL: 92 CM/S
MV STENOSIS PRESSURE HALF TIME: 93 MS
MV VALVE AREA P 1/2 METHOD: 2.37
NEUTROPHILS # BLD AUTO: 10.41 THOUSANDS/ÂΜL (ref 1.85–7.62)
NEUTROPHILS # BLD AUTO: 10.66 THOUSANDS/ÂΜL (ref 1.85–7.62)
NEUTROPHILS # BLD AUTO: 11.89 THOUSANDS/ÂΜL (ref 1.85–7.62)
NEUTROPHILS # BLD AUTO: 11.91 THOUSANDS/ÂΜL (ref 1.85–7.62)
NEUTROPHILS # BLD AUTO: 12.26 THOUSANDS/ÂΜL (ref 1.85–7.62)
NEUTROPHILS # BLD AUTO: 12.55 THOUSANDS/ÂΜL (ref 1.85–7.62)
NEUTROPHILS # BLD AUTO: 14.48 THOUSANDS/ÂΜL (ref 1.85–7.62)
NEUTROPHILS # BLD AUTO: 15.42 THOUSANDS/ÂΜL (ref 1.85–7.62)
NEUTROPHILS # BLD AUTO: 16.98 THOUSANDS/ÂΜL (ref 1.85–7.62)
NEUTROPHILS # BLD AUTO: 17.4 THOUSANDS/ÂΜL (ref 1.85–7.62)
NEUTROPHILS # BLD AUTO: 25.46 THOUSANDS/ÂΜL (ref 1.85–7.62)
NEUTROPHILS # BLD AUTO: 6.51 THOUSANDS/ÂΜL (ref 1.85–7.62)
NEUTROPHILS # BLD AUTO: 6.85 THOUSANDS/ÂΜL (ref 1.85–7.62)
NEUTROPHILS # BLD AUTO: 6.94 THOUSANDS/ÂΜL (ref 1.85–7.62)
NEUTROPHILS # BLD AUTO: 7.32 THOUSANDS/ÂΜL (ref 1.85–7.62)
NEUTROPHILS # BLD AUTO: 8.95 THOUSANDS/ÂΜL (ref 1.85–7.62)
NEUTS SEG NFR BLD AUTO: 14 %
NEUTS SEG NFR BLD AUTO: 21 %
NEUTS SEG NFR BLD AUTO: 40 %
NEUTS SEG NFR BLD AUTO: 71 % (ref 43–75)
NEUTS SEG NFR BLD AUTO: 73 % (ref 43–75)
NEUTS SEG NFR BLD AUTO: 75 % (ref 43–75)
NEUTS SEG NFR BLD AUTO: 78 % (ref 43–75)
NEUTS SEG NFR BLD AUTO: 79 % (ref 43–75)
NEUTS SEG NFR BLD AUTO: 80 % (ref 43–75)
NEUTS SEG NFR BLD AUTO: 80 % (ref 43–75)
NEUTS SEG NFR BLD AUTO: 82 % (ref 43–75)
NEUTS SEG NFR BLD AUTO: 85 % (ref 43–75)
NEUTS SEG NFR BLD AUTO: 86 % (ref 43–75)
NEUTS SEG NFR BLD AUTO: 95 % (ref 45–77)
NITRITE UR QL STRIP: NEGATIVE
NON VENT ROOM AIR: 21 %
NON-SQ EPI CELLS URNS QL MICRO: ABNORMAL /HPF
NONHDLC SERPL-MCNC: <34 MG/DL
NRBC BLD AUTO-RTO: 0 /100 WBCS
NT-PROBNP SERPL-MCNC: 340 PG/ML
O2 CT BLDA-SCNC: 17.7 ML/DL (ref 16–23)
O2 CT BLDA-SCNC: 18.7 ML/DL (ref 16–23)
O2 CT BLDA-SCNC: 19 ML/DL (ref 16–23)
O2 CT BLDV-SCNC: 13.1 ML/DL
O2 CT BLDV-SCNC: 13.3 ML/DL
O2 CT BLDV-SCNC: 15.3 ML/DL
O2 CT BLDV-SCNC: 16.6 ML/DL
O2 CT BLDV-SCNC: 17.6 ML/DL
OSMOLALITY UR/SERPL-RTO: 288 MMOL/KG (ref 282–298)
OSMOLALITY UR/SERPL-RTO: 296 MMOL/KG (ref 282–298)
OSMOLALITY UR: 373 MMOL/KG
OSMOLALITY UR: 506 MMOL/KG
OTHER FIO2: ABNORMAL %
OXYHGB MFR BLDA: 96.1 % (ref 94–97)
OXYHGB MFR BLDA: 96.4 % (ref 94–97)
OXYHGB MFR BLDA: 96.5 % (ref 94–97)
P AXIS: 56 DEGREES
P AXIS: 67 DEGREES
P AXIS: 70 DEGREES
P AXIS: 72 DEGREES
PCO2 BLD: 19 MMOL/L (ref 21–32)
PCO2 BLD: 31.5 MM HG (ref 42–50)
PCO2 BLDA: 22.5 MM HG (ref 36–44)
PCO2 BLDA: 23 MM HG (ref 36–44)
PCO2 BLDA: 25.3 MM HG (ref 36–44)
PCO2 BLDV: 24.9 MM HG (ref 42–50)
PCO2 BLDV: 25.1 MM HG (ref 42–50)
PCO2 BLDV: 26.4 MM HG (ref 42–50)
PCO2 BLDV: 29 MM HG (ref 42–50)
PCO2 BLDV: 30.9 MM HG (ref 42–50)
PF4 HEPARIN CMPLX AB SER-ACNC: 0.26 OD (ref 0–0.4)
PH BLD: 7.36 [PH] (ref 7.3–7.4)
PH BLDA: 7.42 [PH] (ref 7.35–7.45)
PH BLDA: 7.43 [PH] (ref 7.35–7.45)
PH BLDA: 7.52 [PH] (ref 7.35–7.45)
PH BLDV: 7.32 [PH] (ref 7.3–7.4)
PH BLDV: 7.37 [PH] (ref 7.3–7.4)
PH BLDV: 7.39 [PH] (ref 7.3–7.4)
PH BLDV: 7.46 [PH] (ref 7.3–7.4)
PH BLDV: 7.51 [PH] (ref 7.3–7.4)
PH UR STRIP.AUTO: 5 [PH]
PHOSPHATE SERPL-MCNC: 1.2 MG/DL (ref 2.7–4.5)
PHOSPHATE SERPL-MCNC: 1.5 MG/DL (ref 2.7–4.5)
PHOSPHATE SERPL-MCNC: 2.3 MG/DL (ref 2.7–4.5)
PHOSPHATE SERPL-MCNC: 2.4 MG/DL (ref 2.7–4.5)
PHOSPHATE SERPL-MCNC: 2.5 MG/DL (ref 2.7–4.5)
PHOSPHATE SERPL-MCNC: 2.7 MG/DL (ref 2.7–4.5)
PHOSPHATE SERPL-MCNC: 3.1 MG/DL (ref 2.7–4.5)
PHOSPHATE SERPL-MCNC: 3.4 MG/DL (ref 2.7–4.5)
PHOSPHATE SERPL-MCNC: 3.6 MG/DL (ref 2.7–4.5)
PHOSPHATE SERPL-MCNC: 3.8 MG/DL (ref 2.7–4.5)
PHOSPHATE SERPL-MCNC: 4.4 MG/DL (ref 2.7–4.5)
PHOSPHATE SERPL-MCNC: 4.8 MG/DL (ref 2.7–4.5)
PHOSPHATE SERPL-MCNC: 4.9 MG/DL (ref 2.7–4.5)
PHOSPHATE SERPL-MCNC: 4.9 MG/DL (ref 2.7–4.5)
PLATELET # BLD AUTO: 102 THOUSANDS/UL (ref 149–390)
PLATELET # BLD AUTO: 107 THOUSANDS/UL (ref 149–390)
PLATELET # BLD AUTO: 136 THOUSANDS/UL (ref 149–390)
PLATELET # BLD AUTO: 35 THOUSANDS/UL (ref 149–390)
PLATELET # BLD AUTO: 43 THOUSANDS/UL (ref 149–390)
PLATELET # BLD AUTO: 46 THOUSANDS/UL (ref 149–390)
PLATELET # BLD AUTO: 47 THOUSANDS/UL (ref 149–390)
PLATELET # BLD AUTO: 48 THOUSANDS/UL (ref 149–390)
PLATELET # BLD AUTO: 50 THOUSANDS/UL (ref 149–390)
PLATELET # BLD AUTO: 50 THOUSANDS/UL (ref 149–390)
PLATELET # BLD AUTO: 58 THOUSANDS/UL (ref 149–390)
PLATELET # BLD AUTO: 60 THOUSANDS/UL (ref 149–390)
PLATELET # BLD AUTO: 63 THOUSANDS/UL (ref 149–390)
PLATELET # BLD AUTO: 66 THOUSANDS/UL (ref 149–390)
PLATELET # BLD AUTO: 69 THOUSANDS/UL (ref 149–390)
PLATELET # BLD AUTO: 70 THOUSANDS/UL (ref 149–390)
PLATELET # BLD AUTO: 71 THOUSANDS/UL (ref 149–390)
PLATELET # BLD AUTO: 71 THOUSANDS/UL (ref 149–390)
PLATELET # BLD AUTO: 72 THOUSANDS/UL (ref 149–390)
PLATELET # BLD AUTO: 75 THOUSANDS/UL (ref 149–390)
PLATELET # BLD AUTO: 76 THOUSANDS/UL (ref 149–390)
PLATELET # BLD AUTO: 77 THOUSANDS/UL (ref 149–390)
PLATELET # BLD AUTO: 81 THOUSANDS/UL (ref 149–390)
PLATELET # BLD AUTO: 88 THOUSANDS/UL (ref 149–390)
PLATELET # BLD AUTO: 93 THOUSANDS/UL (ref 149–390)
PLATELET # BLD AUTO: 94 THOUSANDS/UL (ref 149–390)
PLATELET # BLD AUTO: 98 THOUSANDS/UL (ref 149–390)
PLATELET BLD QL SMEAR: ABNORMAL
PMV BLD AUTO: 10.1 FL (ref 8.9–12.7)
PMV BLD AUTO: 10.1 FL (ref 8.9–12.7)
PMV BLD AUTO: 10.2 FL (ref 8.9–12.7)
PMV BLD AUTO: 10.4 FL (ref 8.9–12.7)
PMV BLD AUTO: 10.5 FL (ref 8.9–12.7)
PMV BLD AUTO: 10.5 FL (ref 8.9–12.7)
PMV BLD AUTO: 10.9 FL (ref 8.9–12.7)
PMV BLD AUTO: 11 FL (ref 8.9–12.7)
PMV BLD AUTO: 11.1 FL (ref 8.9–12.7)
PMV BLD AUTO: 11.1 FL (ref 8.9–12.7)
PMV BLD AUTO: 11.2 FL (ref 8.9–12.7)
PMV BLD AUTO: 11.3 FL (ref 8.9–12.7)
PMV BLD AUTO: 11.4 FL (ref 8.9–12.7)
PMV BLD AUTO: 11.5 FL (ref 8.9–12.7)
PMV BLD AUTO: 11.8 FL (ref 8.9–12.7)
PMV BLD AUTO: 12.1 FL (ref 8.9–12.7)
PMV BLD AUTO: 12.6 FL (ref 8.9–12.7)
PMV BLD AUTO: 12.9 FL (ref 8.9–12.7)
PMV BLD AUTO: 9.7 FL (ref 8.9–12.7)
PMV BLD AUTO: 9.8 FL (ref 8.9–12.7)
PMV BLD AUTO: 9.8 FL (ref 8.9–12.7)
PMV BLD AUTO: 9.9 FL (ref 8.9–12.7)
PO2 BLD: 40 MM HG (ref 35–45)
PO2 BLDA: 91.3 MM HG (ref 75–129)
PO2 BLDA: 92.5 MM HG (ref 75–129)
PO2 BLDA: 94.6 MM HG (ref 75–129)
PO2 BLDV: 120.8 MM HG (ref 35–45)
PO2 BLDV: 37.4 MM HG (ref 35–45)
PO2 BLDV: 40.3 MM HG (ref 35–45)
PO2 BLDV: 45.4 MM HG (ref 35–45)
PO2 BLDV: 45.7 MM HG (ref 35–45)
POTASSIUM BLD-SCNC: >8 MMOL/L (ref 3.5–5.3)
POTASSIUM SERPL-SCNC: 3.3 MMOL/L (ref 3.5–5.3)
POTASSIUM SERPL-SCNC: 3.4 MMOL/L (ref 3.5–5.3)
POTASSIUM SERPL-SCNC: 3.4 MMOL/L (ref 3.5–5.3)
POTASSIUM SERPL-SCNC: 3.5 MMOL/L (ref 3.5–5.3)
POTASSIUM SERPL-SCNC: 3.6 MMOL/L (ref 3.5–5.3)
POTASSIUM SERPL-SCNC: 3.7 MMOL/L (ref 3.5–5.3)
POTASSIUM SERPL-SCNC: 3.8 MMOL/L (ref 3.5–5.3)
POTASSIUM SERPL-SCNC: 3.9 MMOL/L (ref 3.5–5.3)
POTASSIUM SERPL-SCNC: 3.9 MMOL/L (ref 3.5–5.3)
POTASSIUM SERPL-SCNC: 4 MMOL/L (ref 3.5–5.3)
POTASSIUM SERPL-SCNC: 4.1 MMOL/L (ref 3.5–5.3)
POTASSIUM SERPL-SCNC: 4.1 MMOL/L (ref 3.5–5.3)
POTASSIUM SERPL-SCNC: 4.2 MMOL/L (ref 3.5–5.3)
POTASSIUM SERPL-SCNC: 4.2 MMOL/L (ref 3.5–5.3)
POTASSIUM SERPL-SCNC: 4.3 MMOL/L (ref 3.5–5.3)
POTASSIUM SERPL-SCNC: 4.4 MMOL/L (ref 3.5–5.3)
POTASSIUM SERPL-SCNC: 4.5 MMOL/L (ref 3.5–5.3)
POTASSIUM SERPL-SCNC: 4.5 MMOL/L (ref 3.5–5.3)
POTASSIUM SERPL-SCNC: 4.6 MMOL/L (ref 3.5–5.3)
POTASSIUM SERPL-SCNC: 4.6 MMOL/L (ref 3.5–5.3)
POTASSIUM SERPL-SCNC: 4.7 MMOL/L (ref 3.5–5.3)
POTASSIUM SERPL-SCNC: 5 MMOL/L (ref 3.5–5.3)
POTASSIUM SERPL-SCNC: 5.1 MMOL/L (ref 3.5–5.3)
POTASSIUM SERPL-SCNC: 5.7 MMOL/L (ref 3.5–5.3)
POTASSIUM SERPL-SCNC: 6.2 MMOL/L (ref 3.5–5.3)
POTASSIUM SERPL-SCNC: 6.3 MMOL/L (ref 3.5–5.3)
PR INTERVAL: 150 MS
PR INTERVAL: 152 MS
PR INTERVAL: 172 MS
PROCALCITONIN SERPL-MCNC: 4.53 NG/ML
PROCALCITONIN SERPL-MCNC: 4.92 NG/ML
PROT FLD-MCNC: 2.2 G/DL
PROT PATTERN SERPL ELPH-IMP: ABNORMAL
PROT PATTERN UR ELPH-IMP: ABNORMAL
PROT SERPL-MCNC: 4.4 G/DL (ref 6.4–8.4)
PROT SERPL-MCNC: 4.5 G/DL (ref 6.4–8.4)
PROT SERPL-MCNC: 4.6 G/DL (ref 6.4–8.4)
PROT SERPL-MCNC: 4.7 G/DL (ref 6.4–8.4)
PROT SERPL-MCNC: 4.7 G/DL (ref 6.4–8.4)
PROT SERPL-MCNC: 4.8 G/DL (ref 6.4–8.4)
PROT SERPL-MCNC: 4.9 G/DL (ref 6.4–8.4)
PROT SERPL-MCNC: 5 G/DL (ref 6.4–8.4)
PROT SERPL-MCNC: 5 G/DL (ref 6.4–8.4)
PROT SERPL-MCNC: 5.1 G/DL (ref 6.4–8.4)
PROT SERPL-MCNC: 5.2 G/DL (ref 6.4–8.4)
PROT SERPL-MCNC: 5.3 G/DL (ref 6.4–8.4)
PROT SERPL-MCNC: 5.3 G/DL (ref 6.4–8.4)
PROT SERPL-MCNC: 5.5 G/DL (ref 6.4–8.2)
PROT SERPL-MCNC: 5.6 G/DL (ref 6.4–8.4)
PROT SERPL-MCNC: 5.8 G/DL (ref 6.4–8.4)
PROT SERPL-MCNC: 5.8 G/DL (ref 6.4–8.4)
PROT SERPL-MCNC: 6.2 G/DL (ref 6.4–8.4)
PROT UR STRIP-MCNC: ABNORMAL MG/DL
PROT UR-MCNC: 131 MG/DL
PROTHROMBIN TIME: 18.3 SECONDS (ref 11.6–14.5)
PROTHROMBIN TIME: 19.8 SECONDS (ref 11.6–14.5)
PROTHROMBIN TIME: 25.4 SECONDS (ref 11.6–14.5)
PROTHROMBIN TIME: 25.6 SECONDS (ref 11.6–14.5)
PROTHROMBIN TIME: 25.7 SECONDS (ref 11.6–14.5)
PROTHROMBIN TIME: 26.1 SECONDS (ref 11.6–14.5)
PROTHROMBIN TIME: 26.1 SECONDS (ref 11.6–14.5)
PROTHROMBIN TIME: 26.6 SECONDS (ref 11.6–14.5)
PROTHROMBIN TIME: 26.9 SECONDS (ref 11.6–14.5)
PROTHROMBIN TIME: 26.9 SECONDS (ref 11.6–14.5)
PROTHROMBIN TIME: 27 SECONDS (ref 11.6–14.5)
PROTHROMBIN TIME: 28.1 SECONDS (ref 11.6–14.5)
PROTHROMBIN TIME: 28.3 SECONDS (ref 11.6–14.5)
PROTHROMBIN TIME: 28.7 SECONDS (ref 11.6–14.5)
PROTHROMBIN TIME: 30.2 SECONDS (ref 11.6–14.5)
PROTHROMBIN TIME: 34.4 SECONDS (ref 11.6–14.5)
PROTHROMBIN TIME: 36.5 SECONDS (ref 11.6–14.5)
PROTHROMBIN TIME: 39.3 SECONDS (ref 11.6–14.5)
PROTHROMBIN TIME: 43.5 SECONDS (ref 11.6–14.5)
PROTHROMBIN TIME: 53.7 SECONDS (ref 11.6–14.5)
PROTHROMBIN TIME: 71.9 SECONDS (ref 11.6–14.5)
PTH-INTACT SERPL-MCNC: 619.6 PG/ML (ref 18.4–80.1)
PTH-INTACT SERPL-MCNC: 713.2 PG/ML (ref 18.4–80.1)
QRS AXIS: 37 DEGREES
QRS AXIS: 38 DEGREES
QRS AXIS: 40 DEGREES
QRS AXIS: 73 DEGREES
QRSD INTERVAL: 88 MS
QRSD INTERVAL: 90 MS
QRSD INTERVAL: 92 MS
QRSD INTERVAL: 96 MS
QT INTERVAL: 254 MS
QT INTERVAL: 308 MS
QT INTERVAL: 400 MS
QT INTERVAL: 433 MS
QTC INTERVAL: 372 MS
QTC INTERVAL: 453 MS
QTC INTERVAL: 470 MS
QTC INTERVAL: 491 MS
RA PRESSURE ESTIMATED: 15 MMHG
RBC # BLD AUTO: 2.9 MILLION/UL (ref 3.88–5.62)
RBC # BLD AUTO: 2.92 MILLION/UL (ref 3.88–5.62)
RBC # BLD AUTO: 2.92 MILLION/UL (ref 3.88–5.62)
RBC # BLD AUTO: 3.09 MILLION/UL (ref 3.88–5.62)
RBC # BLD AUTO: 3.09 MILLION/UL (ref 3.88–5.62)
RBC # BLD AUTO: 3.16 MILLION/UL (ref 3.88–5.62)
RBC # BLD AUTO: 3.25 MILLION/UL (ref 3.88–5.62)
RBC # BLD AUTO: 3.26 MILLION/UL (ref 3.88–5.62)
RBC # BLD AUTO: 3.29 MILLION/UL (ref 3.88–5.62)
RBC # BLD AUTO: 3.31 MILLION/UL (ref 3.88–5.62)
RBC # BLD AUTO: 3.42 MILLION/UL (ref 3.88–5.62)
RBC # BLD AUTO: 3.45 MILLION/UL (ref 3.88–5.62)
RBC # BLD AUTO: 3.48 MILLION/UL (ref 3.88–5.62)
RBC # BLD AUTO: 3.65 MILLION/UL (ref 3.88–5.62)
RBC # BLD AUTO: 3.72 MILLION/UL (ref 3.88–5.62)
RBC # BLD AUTO: 3.78 MILLION/UL (ref 3.88–5.62)
RBC # BLD AUTO: 3.78 MILLION/UL (ref 3.88–5.62)
RBC # BLD AUTO: 3.82 MILLION/UL (ref 3.88–5.62)
RBC # BLD AUTO: 3.87 MILLION/UL (ref 3.88–5.62)
RBC # BLD AUTO: 4.03 MILLION/UL (ref 3.88–5.62)
RBC # BLD AUTO: 4.11 MILLION/UL (ref 3.88–5.62)
RBC # BLD AUTO: 4.14 MILLION/UL (ref 3.88–5.62)
RBC # BLD AUTO: 4.14 MILLION/UL (ref 3.88–5.62)
RBC # BLD AUTO: 4.18 MILLION/UL (ref 3.88–5.62)
RBC # BLD AUTO: 4.42 MILLION/UL (ref 3.88–5.62)
RBC # BLD AUTO: 5.55 MILLION/UL (ref 3.88–5.62)
RBC #/AREA URNS AUTO: ABNORMAL /HPF
RBC MORPH BLD: NORMAL
RETICS # AUTO: NORMAL 10*3/UL (ref 14356–105094)
RETICS # CALC: 1.25 % (ref 0.37–1.87)
RIGHT ATRIUM AREA SYSTOLE A4C: 16 CM2
RIGHT VENTRICLE ID DIMENSION: 3.8 CM
RV PSP: 30 MMHG
SAO2 % BLD FROM PO2: 73 % (ref 60–85)
SITE: ABNORMAL
SL CV LEFT ATRIUM LENGTH A2C: 5 CM
SL CV LV EF: 65
SL CV PED ECHO LEFT VENTRICLE DIASTOLIC VOLUME (MOD BIPLANE) 2D: 98 ML
SL CV PED ECHO LEFT VENTRICLE SYSTOLIC VOLUME (MOD BIPLANE) 2D: 35 ML
SODIUM 24H UR-SCNC: <5 MOL/L
SODIUM 24H UR-SCNC: <5 MOL/L
SODIUM BLD-SCNC: 114 MMOL/L (ref 136–145)
SODIUM SERPL-SCNC: 118 MMOL/L (ref 135–147)
SODIUM SERPL-SCNC: 121 MMOL/L (ref 135–147)
SODIUM SERPL-SCNC: 122 MMOL/L (ref 135–147)
SODIUM SERPL-SCNC: 122 MMOL/L (ref 135–147)
SODIUM SERPL-SCNC: 123 MMOL/L (ref 135–147)
SODIUM SERPL-SCNC: 123 MMOL/L (ref 135–147)
SODIUM SERPL-SCNC: 124 MMOL/L (ref 135–147)
SODIUM SERPL-SCNC: 125 MMOL/L (ref 135–147)
SODIUM SERPL-SCNC: 125 MMOL/L (ref 135–147)
SODIUM SERPL-SCNC: 126 MMOL/L (ref 135–147)
SODIUM SERPL-SCNC: 127 MMOL/L (ref 135–147)
SODIUM SERPL-SCNC: 128 MMOL/L (ref 135–147)
SODIUM SERPL-SCNC: 129 MMOL/L (ref 135–147)
SODIUM SERPL-SCNC: 130 MMOL/L (ref 135–147)
SODIUM SERPL-SCNC: 131 MMOL/L (ref 135–147)
SODIUM SERPL-SCNC: 132 MMOL/L (ref 135–147)
SODIUM SERPL-SCNC: 133 MMOL/L (ref 135–147)
SODIUM SERPL-SCNC: 134 MMOL/L (ref 135–147)
SODIUM SERPL-SCNC: 135 MMOL/L (ref 135–147)
SP GR UR STRIP.AUTO: 1.01 (ref 1–1.03)
SP GR UR STRIP.AUTO: 1.02 (ref 1–1.03)
SP GR UR STRIP.AUTO: 1.02 (ref 1–1.03)
SPECIMEN SOURCE: ABNORMAL
SRA .2 IU/ML UFH SER-ACNC: 3 % (ref 0–20)
SRA 100IU/ML UFH SER-ACNC: <1 % (ref 0–20)
SRA UFH SER-IMP: NORMAL
T WAVE AXIS: 60 DEGREES
T WAVE AXIS: 68 DEGREES
T WAVE AXIS: 74 DEGREES
T WAVE AXIS: 97 DEGREES
TOTAL CELLS COUNTED SPEC: 10
TOTAL CELLS COUNTED SPEC: 100
TOTAL CELLS COUNTED SPEC: 100
TOTAL CELLS COUNTED SPEC: 14
TPA PPP QL CHRO: 27 % OF NORMAL (ref 77–138)
TR MAX PG: 15 MMHG
TR PEAK VELOCITY: 1.9 M/S
TRICUSPID ANNULAR PLANE SYSTOLIC EXCURSION: 3.1 CM
TRICUSPID VALVE PEAK REGURGITATION VELOCITY: 1.92 M/S
TRIGL SERPL-MCNC: 39 MG/DL
TSH SERPL DL<=0.05 MIU/L-ACNC: 3.89 UIU/ML (ref 0.45–4.5)
UROBILINOGEN UR STRIP-ACNC: 2 MG/DL
UROBILINOGEN UR STRIP-ACNC: <2 MG/DL
UROBILINOGEN UR STRIP-ACNC: <2 MG/DL
VANCOMYCIN SERPL-MCNC: 11.4 UG/ML (ref 10–20)
VENTRICULAR RATE: 129 BPM
VENTRICULAR RATE: 130 BPM
VENTRICULAR RATE: 77 BPM
VENTRICULAR RATE: 83 BPM
WBC # BLD AUTO: 10.03 THOUSAND/UL (ref 4.31–10.16)
WBC # BLD AUTO: 11.52 THOUSAND/UL (ref 4.31–10.16)
WBC # BLD AUTO: 12.31 THOUSAND/UL (ref 4.31–10.16)
WBC # BLD AUTO: 13.3 THOUSAND/UL (ref 4.31–10.16)
WBC # BLD AUTO: 14.63 THOUSAND/UL (ref 4.31–10.16)
WBC # BLD AUTO: 14.93 THOUSAND/UL (ref 4.31–10.16)
WBC # BLD AUTO: 15.32 THOUSAND/UL (ref 4.31–10.16)
WBC # BLD AUTO: 15.88 THOUSAND/UL (ref 4.31–10.16)
WBC # BLD AUTO: 16.16 THOUSAND/UL (ref 4.31–10.16)
WBC # BLD AUTO: 16.95 THOUSAND/UL (ref 4.31–10.16)
WBC # BLD AUTO: 17.77 THOUSAND/UL (ref 4.31–10.16)
WBC # BLD AUTO: 19.65 THOUSAND/UL (ref 4.31–10.16)
WBC # BLD AUTO: 20.02 THOUSAND/UL (ref 4.31–10.16)
WBC # BLD AUTO: 20.02 THOUSAND/UL (ref 4.31–10.16)
WBC # BLD AUTO: 20.95 THOUSAND/UL (ref 4.31–10.16)
WBC # BLD AUTO: 21.3 THOUSAND/UL (ref 4.31–10.16)
WBC # BLD AUTO: 31.18 THOUSAND/UL (ref 4.31–10.16)
WBC # BLD AUTO: 6.72 THOUSAND/UL (ref 4.31–10.16)
WBC # BLD AUTO: 7.1 THOUSAND/UL (ref 4.31–10.16)
WBC # BLD AUTO: 7.82 THOUSAND/UL (ref 4.31–10.16)
WBC # BLD AUTO: 8.19 THOUSAND/UL (ref 4.31–10.16)
WBC # BLD AUTO: 9.05 THOUSAND/UL (ref 4.31–10.16)
WBC # BLD AUTO: 9.1 THOUSAND/UL (ref 4.31–10.16)
WBC # BLD AUTO: 9.21 THOUSAND/UL (ref 4.31–10.16)
WBC # BLD AUTO: 9.21 THOUSAND/UL (ref 4.31–10.16)
WBC # BLD AUTO: 9.97 THOUSAND/UL (ref 4.31–10.16)
WBC # FLD MANUAL: 155 /UL
WBC # FLD MANUAL: 385 /UL
WBC # FLD MANUAL: 78 /UL
WBC #/AREA URNS AUTO: ABNORMAL /HPF

## 2023-01-01 PROCEDURE — 03HY32Z INSERTION OF MONITORING DEVICE INTO UPPER ARTERY, PERCUTANEOUS APPROACH: ICD-10-PCS | Performed by: INTERNAL MEDICINE

## 2023-01-01 PROCEDURE — 0W9G3ZZ DRAINAGE OF PERITONEAL CAVITY, PERCUTANEOUS APPROACH: ICD-10-PCS | Performed by: INTERNAL MEDICINE

## 2023-01-01 PROCEDURE — 0W9G30Z DRAINAGE OF PERITONEAL CAVITY WITH DRAINAGE DEVICE, PERCUTANEOUS APPROACH: ICD-10-PCS | Performed by: RADIOLOGY

## 2023-01-01 PROCEDURE — 4A133B1 MONITORING OF ARTERIAL PRESSURE, PERIPHERAL, PERCUTANEOUS APPROACH: ICD-10-PCS | Performed by: INTERNAL MEDICINE

## 2023-01-01 PROCEDURE — 0W9G3ZZ DRAINAGE OF PERITONEAL CAVITY, PERCUTANEOUS APPROACH: ICD-10-PCS | Performed by: RADIOLOGY

## 2023-01-01 PROCEDURE — 4A133J1 MONITORING OF ARTERIAL PULSE, PERIPHERAL, PERCUTANEOUS APPROACH: ICD-10-PCS | Performed by: INTERNAL MEDICINE

## 2023-01-01 RX ORDER — ALBUMIN (HUMAN) 12.5 G/50ML
25 SOLUTION INTRAVENOUS EVERY 8 HOURS
Status: DISCONTINUED | OUTPATIENT
Start: 2023-01-01 | End: 2023-01-01

## 2023-01-01 RX ORDER — CHLORHEXIDINE GLUCONATE 0.12 MG/ML
15 RINSE ORAL EVERY 12 HOURS SCHEDULED
Status: DISCONTINUED | OUTPATIENT
Start: 2023-01-01 | End: 2023-01-01 | Stop reason: HOSPADM

## 2023-01-01 RX ORDER — HEPARIN SODIUM 10000 [USP'U]/100ML
3-30 INJECTION, SOLUTION INTRAVENOUS
Status: DISCONTINUED | OUTPATIENT
Start: 2023-01-01 | End: 2023-01-01

## 2023-01-01 RX ORDER — OXYCODONE HYDROCHLORIDE 5 MG/1
5 TABLET ORAL EVERY 4 HOURS PRN
Status: DISCONTINUED | OUTPATIENT
Start: 2023-01-01 | End: 2023-01-01

## 2023-01-01 RX ORDER — ONDANSETRON 2 MG/ML
4 INJECTION INTRAMUSCULAR; INTRAVENOUS EVERY 4 HOURS PRN
Status: DISCONTINUED | OUTPATIENT
Start: 2023-01-01 | End: 2023-01-01 | Stop reason: HOSPADM

## 2023-01-01 RX ORDER — LIDOCAINE HYDROCHLORIDE 10 MG/ML
INJECTION, SOLUTION EPIDURAL; INFILTRATION; INTRACAUDAL; PERINEURAL AS NEEDED
Status: COMPLETED | OUTPATIENT
Start: 2023-01-01 | End: 2023-01-01

## 2023-01-01 RX ORDER — LIDOCAINE HYDROCHLORIDE 10 MG/ML
INJECTION, SOLUTION EPIDURAL; INFILTRATION; INTRACAUDAL; PERINEURAL
Status: COMPLETED
Start: 2023-01-01 | End: 2023-01-01

## 2023-01-01 RX ORDER — CALCIUM GLUCONATE 20 MG/ML
2 INJECTION, SOLUTION INTRAVENOUS ONCE
Status: COMPLETED | OUTPATIENT
Start: 2023-01-01 | End: 2023-01-01

## 2023-01-01 RX ORDER — ENOXAPARIN SODIUM 100 MG/ML
1 INJECTION SUBCUTANEOUS EVERY 12 HOURS SCHEDULED
Status: DISCONTINUED | OUTPATIENT
Start: 2023-01-01 | End: 2023-01-01

## 2023-01-01 RX ORDER — ALBUMIN, HUMAN INJ 5% 5 %
SOLUTION INTRAVENOUS
Status: COMPLETED
Start: 2023-01-01 | End: 2023-01-01

## 2023-01-01 RX ORDER — ALBUMIN (HUMAN) 12.5 G/50ML
12.5 SOLUTION INTRAVENOUS ONCE
Status: COMPLETED | OUTPATIENT
Start: 2023-01-01 | End: 2023-01-01

## 2023-01-01 RX ORDER — HEPARIN SODIUM 1000 [USP'U]/ML
8800 INJECTION, SOLUTION INTRAVENOUS; SUBCUTANEOUS EVERY 6 HOURS PRN
Status: DISCONTINUED | OUTPATIENT
Start: 2023-01-01 | End: 2023-01-01

## 2023-01-01 RX ORDER — LIDOCAINE WITH 8.4% SOD BICARB 0.9%(10ML)
10 SYRINGE (ML) INJECTION ONCE
Status: DISCONTINUED | OUTPATIENT
Start: 2023-01-01 | End: 2023-01-01

## 2023-01-01 RX ORDER — SODIUM CHLORIDE, SODIUM GLUCONATE, SODIUM ACETATE, POTASSIUM CHLORIDE, MAGNESIUM CHLORIDE, SODIUM PHOSPHATE, DIBASIC, AND POTASSIUM PHOSPHATE .53; .5; .37; .037; .03; .012; .00082 G/100ML; G/100ML; G/100ML; G/100ML; G/100ML; G/100ML; G/100ML
500 INJECTION, SOLUTION INTRAVENOUS ONCE
Status: COMPLETED | OUTPATIENT
Start: 2023-01-01 | End: 2023-01-01

## 2023-01-01 RX ORDER — FENTANYL CITRATE 50 UG/ML
INJECTION, SOLUTION INTRAMUSCULAR; INTRAVENOUS AS NEEDED
Status: COMPLETED | OUTPATIENT
Start: 2023-01-01 | End: 2023-01-01

## 2023-01-01 RX ORDER — LANOLIN ALCOHOL/MO/W.PET/CERES
6 CREAM (GRAM) TOPICAL
Status: DISCONTINUED | OUTPATIENT
Start: 2023-01-01 | End: 2023-01-01

## 2023-01-01 RX ORDER — MIDAZOLAM HYDROCHLORIDE 2 MG/2ML
INJECTION, SOLUTION INTRAMUSCULAR; INTRAVENOUS AS NEEDED
Status: COMPLETED | OUTPATIENT
Start: 2023-01-01 | End: 2023-01-01

## 2023-01-01 RX ORDER — ALBUMIN, HUMAN INJ 5% 5 %
25 SOLUTION INTRAVENOUS ONCE
Status: COMPLETED | OUTPATIENT
Start: 2023-01-01 | End: 2023-01-01

## 2023-01-01 RX ORDER — HEPARIN SODIUM 1000 [USP'U]/ML
7600 INJECTION, SOLUTION INTRAVENOUS; SUBCUTANEOUS EVERY 6 HOURS PRN
Status: DISCONTINUED | OUTPATIENT
Start: 2023-01-01 | End: 2023-01-01

## 2023-01-01 RX ORDER — SODIUM CHLORIDE 9 MG/ML
75 INJECTION, SOLUTION INTRAVENOUS CONTINUOUS
Status: DISCONTINUED | OUTPATIENT
Start: 2023-01-01 | End: 2023-01-01

## 2023-01-01 RX ORDER — ALBUMIN (HUMAN) 12.5 G/50ML
25 SOLUTION INTRAVENOUS EVERY 6 HOURS
Status: DISPENSED | OUTPATIENT
Start: 2023-01-01 | End: 2023-01-01

## 2023-01-01 RX ORDER — HEPARIN SODIUM 1000 [USP'U]/ML
4400 INJECTION, SOLUTION INTRAVENOUS; SUBCUTANEOUS EVERY 6 HOURS PRN
Status: DISCONTINUED | OUTPATIENT
Start: 2023-01-01 | End: 2023-01-01

## 2023-01-01 RX ORDER — LACTULOSE 20 G/30ML
20 SOLUTION ORAL 3 TIMES DAILY
Status: DISCONTINUED | OUTPATIENT
Start: 2023-01-01 | End: 2023-01-01

## 2023-01-01 RX ORDER — LIDOCAINE HYDROCHLORIDE 10 MG/ML
5 INJECTION, SOLUTION EPIDURAL; INFILTRATION; INTRACAUDAL; PERINEURAL ONCE
Status: COMPLETED | OUTPATIENT
Start: 2023-01-01 | End: 2023-01-01

## 2023-01-01 RX ORDER — FUROSEMIDE 10 MG/ML
20 INJECTION INTRAMUSCULAR; INTRAVENOUS ONCE
Status: DISCONTINUED | OUTPATIENT
Start: 2023-01-01 | End: 2023-01-01

## 2023-01-01 RX ORDER — XYLITOL/YERBA SANTA
5 AEROSOL, SPRAY WITH PUMP (ML) MUCOUS MEMBRANE 4 TIMES DAILY PRN
Status: DISCONTINUED | OUTPATIENT
Start: 2023-01-01 | End: 2023-01-01 | Stop reason: HOSPADM

## 2023-01-01 RX ORDER — ALBUMIN (HUMAN) 12.5 G/50ML
25 SOLUTION INTRAVENOUS EVERY 6 HOURS
Status: COMPLETED | OUTPATIENT
Start: 2023-01-01 | End: 2023-01-01

## 2023-01-01 RX ORDER — VANCOMYCIN HYDROCHLORIDE 1 G/200ML
12.5 INJECTION, SOLUTION INTRAVENOUS DAILY PRN
Status: DISCONTINUED | OUTPATIENT
Start: 2023-01-01 | End: 2023-01-01

## 2023-01-01 RX ORDER — INSULIN LISPRO 100 [IU]/ML
1-6 INJECTION, SOLUTION INTRAVENOUS; SUBCUTANEOUS
Status: DISCONTINUED | OUTPATIENT
Start: 2023-01-01 | End: 2023-01-01

## 2023-01-01 RX ORDER — CALCIUM GLUCONATE 20 MG/ML
1 INJECTION, SOLUTION INTRAVENOUS ONCE
Status: COMPLETED | OUTPATIENT
Start: 2023-01-01 | End: 2023-01-01

## 2023-01-01 RX ORDER — DEXTROSE MONOHYDRATE 25 G/50ML
25 INJECTION, SOLUTION INTRAVENOUS ONCE
Status: COMPLETED | OUTPATIENT
Start: 2023-01-01 | End: 2023-01-01

## 2023-01-01 RX ORDER — INSULIN LISPRO 100 [IU]/ML
5 INJECTION, SOLUTION INTRAVENOUS; SUBCUTANEOUS ONCE
Status: COMPLETED | OUTPATIENT
Start: 2023-01-01 | End: 2023-01-01

## 2023-01-01 RX ORDER — ARGATROBAN 1 MG/ML
.1-3 INJECTION INTRAVENOUS
Status: DISCONTINUED | OUTPATIENT
Start: 2023-01-01 | End: 2023-01-01

## 2023-01-01 RX ORDER — POTASSIUM CHLORIDE 20 MEQ/1
20 TABLET, EXTENDED RELEASE ORAL ONCE
Status: COMPLETED | OUTPATIENT
Start: 2023-01-01 | End: 2023-01-01

## 2023-01-01 RX ORDER — ALBUMIN (HUMAN) 12.5 G/50ML
25 SOLUTION INTRAVENOUS EVERY 6 HOURS
Status: DISCONTINUED | OUTPATIENT
Start: 2023-01-01 | End: 2023-01-01

## 2023-01-01 RX ORDER — BUMETANIDE 0.25 MG/ML
2 INJECTION INTRAMUSCULAR; INTRAVENOUS ONCE
Status: DISCONTINUED | OUTPATIENT
Start: 2023-01-01 | End: 2023-01-01

## 2023-01-01 RX ORDER — NOREPINEPHRINE BITARTRATE 1 MG/ML
INJECTION, SOLUTION INTRAVENOUS
Status: DISPENSED
Start: 2023-01-01 | End: 2023-01-01

## 2023-01-01 RX ORDER — PANTOPRAZOLE SODIUM 40 MG/10ML
40 INJECTION, POWDER, LYOPHILIZED, FOR SOLUTION INTRAVENOUS
Status: DISCONTINUED | OUTPATIENT
Start: 2023-01-01 | End: 2023-01-01

## 2023-01-01 RX ORDER — METRONIDAZOLE 500 MG/100ML
500 INJECTION, SOLUTION INTRAVENOUS EVERY 8 HOURS
Status: DISCONTINUED | OUTPATIENT
Start: 2023-01-01 | End: 2023-01-01

## 2023-01-01 RX ORDER — DEXTROSE MONOHYDRATE 25 G/50ML
INJECTION, SOLUTION INTRAVENOUS
Status: COMPLETED
Start: 2023-01-01 | End: 2023-01-01

## 2023-01-01 RX ORDER — ALBUMIN (HUMAN) 12.5 G/50ML
12.5 SOLUTION INTRAVENOUS 2 TIMES DAILY
Status: DISCONTINUED | OUTPATIENT
Start: 2023-01-01 | End: 2023-01-01

## 2023-01-01 RX ORDER — LIDOCAINE HYDROCHLORIDE 10 MG/ML
10 INJECTION, SOLUTION EPIDURAL; INFILTRATION; INTRACAUDAL; PERINEURAL ONCE
Status: COMPLETED | OUTPATIENT
Start: 2023-01-01 | End: 2023-01-01

## 2023-01-01 RX ORDER — CALCITRIOL 0.25 UG/1
0.25 CAPSULE, LIQUID FILLED ORAL DAILY
Status: COMPLETED | OUTPATIENT
Start: 2023-01-01 | End: 2023-01-01

## 2023-01-01 RX ORDER — SODIUM CHLORIDE 9 MG/ML
60 INJECTION, SOLUTION INTRAVENOUS CONTINUOUS
Status: DISPENSED | OUTPATIENT
Start: 2023-01-01 | End: 2023-01-01

## 2023-01-01 RX ORDER — PANTOPRAZOLE SODIUM 40 MG/1
40 TABLET, DELAYED RELEASE ORAL
Status: DISCONTINUED | OUTPATIENT
Start: 2023-01-01 | End: 2023-01-01

## 2023-01-01 RX ORDER — LACTULOSE 20 G/30ML
30 SOLUTION ORAL ONCE
Status: COMPLETED | OUTPATIENT
Start: 2023-01-01 | End: 2023-01-01

## 2023-01-01 RX ORDER — HEPARIN SODIUM 1000 [USP'U]/ML
7600 INJECTION, SOLUTION INTRAVENOUS; SUBCUTANEOUS ONCE
Status: COMPLETED | OUTPATIENT
Start: 2023-01-01 | End: 2023-01-01

## 2023-01-01 RX ORDER — VANCOMYCIN HYDROCHLORIDE 1 G/200ML
12.5 INJECTION, SOLUTION INTRAVENOUS EVERY 12 HOURS
Status: DISCONTINUED | OUTPATIENT
Start: 2023-01-01 | End: 2023-01-01

## 2023-01-01 RX ORDER — OCTREOTIDE ACETATE 500 UG/ML
200 INJECTION, SOLUTION INTRAVENOUS; SUBCUTANEOUS EVERY 8 HOURS SCHEDULED
Status: DISCONTINUED | OUTPATIENT
Start: 2023-01-01 | End: 2023-01-01

## 2023-01-01 RX ORDER — MIDODRINE HYDROCHLORIDE 5 MG/1
10 TABLET ORAL
Status: DISCONTINUED | OUTPATIENT
Start: 2023-01-01 | End: 2023-01-01

## 2023-01-01 RX ORDER — CEFAZOLIN SODIUM 1 G/50ML
SOLUTION INTRAVENOUS
Status: COMPLETED | OUTPATIENT
Start: 2023-01-01 | End: 2023-01-01

## 2023-01-01 RX ORDER — GABAPENTIN 100 MG/1
200 CAPSULE ORAL 2 TIMES DAILY
Status: DISCONTINUED | OUTPATIENT
Start: 2023-01-01 | End: 2023-01-01

## 2023-01-01 RX ORDER — ONDANSETRON 2 MG/ML
4 INJECTION INTRAMUSCULAR; INTRAVENOUS EVERY 8 HOURS PRN
Status: DISCONTINUED | OUTPATIENT
Start: 2023-01-01 | End: 2023-01-01

## 2023-01-01 RX ORDER — HEPARIN SODIUM 1000 [USP'U]/ML
3800 INJECTION, SOLUTION INTRAVENOUS; SUBCUTANEOUS EVERY 6 HOURS PRN
Status: DISCONTINUED | OUTPATIENT
Start: 2023-01-01 | End: 2023-01-01

## 2023-01-01 RX ORDER — SODIUM CHLORIDE, SODIUM LACTATE, POTASSIUM CHLORIDE, CALCIUM CHLORIDE 600; 310; 30; 20 MG/100ML; MG/100ML; MG/100ML; MG/100ML
100 INJECTION, SOLUTION INTRAVENOUS CONTINUOUS
Status: DISCONTINUED | OUTPATIENT
Start: 2023-01-01 | End: 2023-01-01

## 2023-01-01 RX ORDER — SODIUM POLYSTYRENE SULFONATE 4.1 MEQ/G
15 POWDER, FOR SUSPENSION ORAL; RECTAL ONCE
Status: DISCONTINUED | OUTPATIENT
Start: 2023-01-01 | End: 2023-01-01

## 2023-01-01 RX ORDER — GABAPENTIN 300 MG/1
300 CAPSULE ORAL 2 TIMES DAILY
Status: DISCONTINUED | OUTPATIENT
Start: 2023-01-01 | End: 2023-01-01

## 2023-01-01 RX ORDER — FLUCONAZOLE 200 MG/1
200 TABLET ORAL DAILY
Status: DISPENSED | OUTPATIENT
Start: 2023-01-01 | End: 2023-01-01

## 2023-01-01 RX ORDER — FLUCONAZOLE 200 MG/1
200 TABLET ORAL DAILY
Status: DISCONTINUED | OUTPATIENT
Start: 2023-01-01 | End: 2023-01-01

## 2023-01-01 RX ORDER — ENOXAPARIN SODIUM 100 MG/ML
1 INJECTION SUBCUTANEOUS EVERY 12 HOURS SCHEDULED
Qty: 60 ML | Refills: 0 | Status: SHIPPED | OUTPATIENT
Start: 2023-01-01 | End: 2023-01-01

## 2023-01-01 RX ORDER — SODIUM BICARBONATE 650 MG/1
1300 TABLET ORAL
Status: DISCONTINUED | OUTPATIENT
Start: 2023-01-01 | End: 2023-01-01

## 2023-01-01 RX ORDER — ENOXAPARIN SODIUM 100 MG/ML
1 INJECTION SUBCUTANEOUS
Status: DISCONTINUED | OUTPATIENT
Start: 2023-01-01 | End: 2023-01-01

## 2023-01-01 RX ORDER — INSULIN LISPRO 100 [IU]/ML
10 INJECTION, SOLUTION INTRAVENOUS; SUBCUTANEOUS ONCE
Status: DISCONTINUED | OUTPATIENT
Start: 2023-01-01 | End: 2023-01-01

## 2023-01-01 RX ORDER — LACTULOSE 20 G/30ML
20 SOLUTION ORAL 2 TIMES DAILY
Status: DISCONTINUED | OUTPATIENT
Start: 2023-01-01 | End: 2023-01-01

## 2023-01-01 RX ORDER — MICONAZOLE NITRATE 20 MG/G
CREAM TOPICAL 2 TIMES DAILY
Status: DISCONTINUED | OUTPATIENT
Start: 2023-01-01 | End: 2023-01-01

## 2023-01-01 RX ORDER — ALBUMIN (HUMAN) 12.5 G/50ML
25 SOLUTION INTRAVENOUS ONCE
Status: COMPLETED | OUTPATIENT
Start: 2023-01-01 | End: 2023-01-01

## 2023-01-01 RX ORDER — HEPARIN SODIUM 1000 [USP'U]/ML
8800 INJECTION, SOLUTION INTRAVENOUS; SUBCUTANEOUS ONCE
Status: DISCONTINUED | OUTPATIENT
Start: 2023-01-01 | End: 2023-01-01

## 2023-01-01 RX ORDER — SODIUM CHLORIDE 1000 MG
2 TABLET, SOLUBLE MISCELLANEOUS 2 TIMES DAILY WITH MEALS
Status: DISCONTINUED | OUTPATIENT
Start: 2023-01-01 | End: 2023-01-01

## 2023-01-01 RX ORDER — ALBUMIN, HUMAN INJ 5% 5 %
12.5 SOLUTION INTRAVENOUS ONCE
Status: COMPLETED | OUTPATIENT
Start: 2023-01-01 | End: 2023-01-01

## 2023-01-01 RX ORDER — CEFAZOLIN SODIUM 1 G/50ML
1000 SOLUTION INTRAVENOUS EVERY 8 HOURS
Status: DISCONTINUED | OUTPATIENT
Start: 2023-01-01 | End: 2023-01-01

## 2023-01-01 RX ORDER — RIFAMPIN 300 MG/1
600 CAPSULE ORAL EVERY 12 HOURS SCHEDULED
Status: DISCONTINUED | OUTPATIENT
Start: 2023-01-01 | End: 2023-01-01

## 2023-01-01 RX ORDER — POTASSIUM CHLORIDE 20MEQ/15ML
40 LIQUID (ML) ORAL ONCE
Status: COMPLETED | OUTPATIENT
Start: 2023-01-01 | End: 2023-01-01

## 2023-01-01 RX ORDER — SODIUM CHLORIDE, SODIUM GLUCONATE, SODIUM ACETATE, POTASSIUM CHLORIDE, MAGNESIUM CHLORIDE, SODIUM PHOSPHATE, DIBASIC, AND POTASSIUM PHOSPHATE .53; .5; .37; .037; .03; .012; .00082 G/100ML; G/100ML; G/100ML; G/100ML; G/100ML; G/100ML; G/100ML
1000 INJECTION, SOLUTION INTRAVENOUS ONCE
Status: DISCONTINUED | OUTPATIENT
Start: 2023-01-01 | End: 2023-01-01

## 2023-01-01 RX ORDER — MIDODRINE HYDROCHLORIDE 5 MG/1
15 TABLET ORAL 4 TIMES DAILY
Status: DISCONTINUED | OUTPATIENT
Start: 2023-01-01 | End: 2023-01-01

## 2023-01-01 RX ORDER — MIDODRINE HYDROCHLORIDE 5 MG/1
15 TABLET ORAL
Status: DISCONTINUED | OUTPATIENT
Start: 2023-01-01 | End: 2023-01-01

## 2023-01-01 RX ORDER — ALBUMIN, HUMAN INJ 5% 5 %
12.5 SOLUTION INTRAVENOUS ONCE
Status: CANCELLED | OUTPATIENT
Start: 2023-01-01 | End: 2023-01-01

## 2023-01-01 RX ORDER — HYDROMORPHONE HCL/PF 1 MG/ML
0.3 SYRINGE (ML) INJECTION EVERY 2 HOUR PRN
Status: DISCONTINUED | OUTPATIENT
Start: 2023-01-01 | End: 2023-01-01 | Stop reason: HOSPADM

## 2023-01-01 RX ORDER — LACTULOSE 20 G/30ML
30 SOLUTION ORAL 3 TIMES DAILY
Status: DISCONTINUED | OUTPATIENT
Start: 2023-01-01 | End: 2023-01-01

## 2023-01-01 RX ORDER — SODIUM CHLORIDE 1000 MG
2 TABLET, SOLUBLE MISCELLANEOUS ONCE
Status: COMPLETED | OUTPATIENT
Start: 2023-01-01 | End: 2023-01-01

## 2023-01-01 RX ORDER — GLYCOPYRROLATE 0.2 MG/ML
0.1 INJECTION INTRAMUSCULAR; INTRAVENOUS EVERY 4 HOURS PRN
Status: DISCONTINUED | OUTPATIENT
Start: 2023-01-01 | End: 2023-01-01 | Stop reason: HOSPADM

## 2023-01-01 RX ORDER — ERGOCALCIFEROL 1.25 MG/1
50000 CAPSULE ORAL WEEKLY
Status: DISCONTINUED | OUTPATIENT
Start: 2023-01-01 | End: 2023-01-01

## 2023-01-01 RX ORDER — MIDODRINE HYDROCHLORIDE 5 MG/1
20 TABLET ORAL 4 TIMES DAILY
Status: DISCONTINUED | OUTPATIENT
Start: 2023-01-01 | End: 2023-01-01

## 2023-01-01 RX ORDER — BUMETANIDE 0.25 MG/ML
1 INJECTION INTRAMUSCULAR; INTRAVENOUS ONCE
Status: COMPLETED | OUTPATIENT
Start: 2023-01-01 | End: 2023-01-01

## 2023-01-01 RX ORDER — CHLORHEXIDINE GLUCONATE 0.12 MG/ML
15 RINSE ORAL EVERY 12 HOURS SCHEDULED
Status: DISCONTINUED | OUTPATIENT
Start: 2023-01-01 | End: 2023-01-01

## 2023-01-01 RX ORDER — SUCRALFATE 1 G/1
1 TABLET ORAL
Status: DISCONTINUED | OUTPATIENT
Start: 2023-01-01 | End: 2023-01-01

## 2023-01-01 RX ORDER — LACTULOSE 20 G/30ML
10 SOLUTION ORAL DAILY
Status: DISCONTINUED | OUTPATIENT
Start: 2023-01-01 | End: 2023-01-01

## 2023-01-01 RX ORDER — HALOPERIDOL 5 MG/ML
0.5 INJECTION INTRAMUSCULAR EVERY 2 HOUR PRN
Status: DISCONTINUED | OUTPATIENT
Start: 2023-01-01 | End: 2023-01-01 | Stop reason: HOSPADM

## 2023-01-01 RX ORDER — BUMETANIDE 0.25 MG/ML
2 INJECTION INTRAMUSCULAR; INTRAVENOUS ONCE
Status: COMPLETED | OUTPATIENT
Start: 2023-01-01 | End: 2023-01-01

## 2023-01-01 RX ORDER — ALBUMIN (HUMAN) 12.5 G/50ML
12.5 SOLUTION INTRAVENOUS ONCE
Status: DISCONTINUED | OUTPATIENT
Start: 2023-01-01 | End: 2023-01-01

## 2023-01-01 RX ORDER — COSYNTROPIN 0.25 MG/ML
0.25 INJECTION, POWDER, FOR SOLUTION INTRAMUSCULAR; INTRAVENOUS ONCE
Status: COMPLETED | OUTPATIENT
Start: 2023-01-01 | End: 2023-01-01

## 2023-01-01 RX ADMIN — MICONAZOLE NITRATE: 20 CREAM TOPICAL at 17:03

## 2023-01-01 RX ADMIN — SUCRALFATE 1 G: 1 TABLET ORAL at 22:31

## 2023-01-01 RX ADMIN — NOREPINEPHRINE BITARTRATE 3 MCG/MIN: 1 INJECTION, SOLUTION, CONCENTRATE INTRAVENOUS at 09:29

## 2023-01-01 RX ADMIN — PHENYLEPHRINE HYDROCHLORIDE 200 MCG/MIN: 50 INJECTION INTRAVENOUS at 03:59

## 2023-01-01 RX ADMIN — METRONIDAZOLE 500 MG: 500 INJECTION, SOLUTION INTRAVENOUS at 08:07

## 2023-01-01 RX ADMIN — GABAPENTIN 200 MG: 100 CAPSULE ORAL at 09:03

## 2023-01-01 RX ADMIN — SODIUM BICARBONATE 1300 MG: 650 TABLET ORAL at 08:28

## 2023-01-01 RX ADMIN — ENOXAPARIN SODIUM 100 MG: 100 INJECTION SUBCUTANEOUS at 09:31

## 2023-01-01 RX ADMIN — HYDROMORPHONE HYDROCHLORIDE 0.2 MG: 1 INJECTION, SOLUTION INTRAMUSCULAR; INTRAVENOUS; SUBCUTANEOUS at 09:14

## 2023-01-01 RX ADMIN — SUCRALFATE 1 G: 1 TABLET ORAL at 21:21

## 2023-01-01 RX ADMIN — PANTOPRAZOLE SODIUM 40 MG: 40 TABLET, DELAYED RELEASE ORAL at 06:18

## 2023-01-01 RX ADMIN — MICONAZOLE NITRATE: 20 CREAM TOPICAL at 17:23

## 2023-01-01 RX ADMIN — HYDROMORPHONE HYDROCHLORIDE 0.3 MG: 1 INJECTION, SOLUTION INTRAMUSCULAR; INTRAVENOUS; SUBCUTANEOUS at 20:02

## 2023-01-01 RX ADMIN — SODIUM CHLORIDE 2 G: 1 TABLET ORAL at 17:25

## 2023-01-01 RX ADMIN — SODIUM CHLORIDE 75 ML/HR: 0.9 INJECTION, SOLUTION INTRAVENOUS at 12:15

## 2023-01-01 RX ADMIN — ALBUMIN (HUMAN) 25 G: 0.25 INJECTION, SOLUTION INTRAVENOUS at 14:18

## 2023-01-01 RX ADMIN — INSULIN LISPRO 1 UNITS: 100 INJECTION, SOLUTION INTRAVENOUS; SUBCUTANEOUS at 12:32

## 2023-01-01 RX ADMIN — ALBUMIN (HUMAN) 25 G: 0.25 INJECTION, SOLUTION INTRAVENOUS at 18:05

## 2023-01-01 RX ADMIN — METRONIDAZOLE 500 MG: 500 INJECTION, SOLUTION INTRAVENOUS at 18:02

## 2023-01-01 RX ADMIN — CHLORHEXIDINE GLUCONATE 0.12% ORAL RINSE 15 ML: 1.2 LIQUID ORAL at 20:10

## 2023-01-01 RX ADMIN — ALBUMIN (HUMAN) 25 G: 0.25 INJECTION, SOLUTION INTRAVENOUS at 11:44

## 2023-01-01 RX ADMIN — MIDODRINE HYDROCHLORIDE 15 MG: 5 TABLET ORAL at 08:14

## 2023-01-01 RX ADMIN — FLUCONAZOLE 200 MG: 200 TABLET ORAL at 10:27

## 2023-01-01 RX ADMIN — HYDROMORPHONE HYDROCHLORIDE 0.3 MG: 1 INJECTION, SOLUTION INTRAMUSCULAR; INTRAVENOUS; SUBCUTANEOUS at 00:14

## 2023-01-01 RX ADMIN — PANTOPRAZOLE SODIUM 40 MG: 40 TABLET, DELAYED RELEASE ORAL at 06:07

## 2023-01-01 RX ADMIN — MIDODRINE HYDROCHLORIDE 15 MG: 5 TABLET ORAL at 16:16

## 2023-01-01 RX ADMIN — SUCRALFATE 1 G: 1 TABLET ORAL at 11:36

## 2023-01-01 RX ADMIN — SUCRALFATE 1 G: 1 TABLET ORAL at 21:23

## 2023-01-01 RX ADMIN — INSULIN LISPRO 1 UNITS: 100 INJECTION, SOLUTION INTRAVENOUS; SUBCUTANEOUS at 11:51

## 2023-01-01 RX ADMIN — MIDODRINE HYDROCHLORIDE 15 MG: 5 TABLET ORAL at 06:00

## 2023-01-01 RX ADMIN — SODIUM CHLORIDE 75 ML/HR: 0.9 INJECTION, SOLUTION INTRAVENOUS at 14:48

## 2023-01-01 RX ADMIN — ARGATROBAN 0.25 MCG/KG/MIN: 50 INJECTION, SOLUTION INTRAVENOUS at 04:42

## 2023-01-01 RX ADMIN — MIDODRINE HYDROCHLORIDE 15 MG: 5 TABLET ORAL at 11:38

## 2023-01-01 RX ADMIN — SODIUM BICARBONATE 100 ML/HR: 84 INJECTION, SOLUTION INTRAVENOUS at 23:01

## 2023-01-01 RX ADMIN — INSULIN LISPRO 1 UNITS: 100 INJECTION, SOLUTION INTRAVENOUS; SUBCUTANEOUS at 07:47

## 2023-01-01 RX ADMIN — SUCRALFATE 1 G: 1 TABLET ORAL at 06:14

## 2023-01-01 RX ADMIN — CALCIUM GLUCONATE 2 G: 20 INJECTION, SOLUTION INTRAVENOUS at 05:52

## 2023-01-01 RX ADMIN — MIDODRINE HYDROCHLORIDE 15 MG: 5 TABLET ORAL at 16:49

## 2023-01-01 RX ADMIN — ALBUMIN (HUMAN) 25 G: 0.25 INJECTION, SOLUTION INTRAVENOUS at 05:58

## 2023-01-01 RX ADMIN — MELATONIN 6 MG: at 20:57

## 2023-01-01 RX ADMIN — SUCRALFATE 1 G: 1 TABLET ORAL at 06:01

## 2023-01-01 RX ADMIN — GABAPENTIN 200 MG: 100 CAPSULE ORAL at 17:00

## 2023-01-01 RX ADMIN — FLUCONAZOLE 200 MG: 200 TABLET ORAL at 13:12

## 2023-01-01 RX ADMIN — GABAPENTIN 300 MG: 300 CAPSULE ORAL at 17:15

## 2023-01-01 RX ADMIN — CALCIUM GLUCONATE 2 G: 20 INJECTION, SOLUTION INTRAVENOUS at 07:32

## 2023-01-01 RX ADMIN — VANCOMYCIN HYDROCHLORIDE 2000 MG: 10 INJECTION, POWDER, LYOPHILIZED, FOR SOLUTION INTRAVENOUS at 09:13

## 2023-01-01 RX ADMIN — MICONAZOLE NITRATE: 20 CREAM TOPICAL at 09:40

## 2023-01-01 RX ADMIN — MIDODRINE HYDROCHLORIDE 10 MG: 5 TABLET ORAL at 15:59

## 2023-01-01 RX ADMIN — SUCRALFATE 1 G: 1 TABLET ORAL at 12:10

## 2023-01-01 RX ADMIN — MIDODRINE HYDROCHLORIDE 15 MG: 5 TABLET ORAL at 21:32

## 2023-01-01 RX ADMIN — ALBUMIN (HUMAN) 12.5 G: 12.5 INJECTION, SOLUTION INTRAVENOUS at 09:24

## 2023-01-01 RX ADMIN — NOREPINEPHRINE BITARTRATE 5 MCG/MIN: 1 INJECTION, SOLUTION, CONCENTRATE INTRAVENOUS at 16:00

## 2023-01-01 RX ADMIN — NOREPINEPHRINE BITARTRATE 7 MCG/MIN: 1 INJECTION, SOLUTION, CONCENTRATE INTRAVENOUS at 20:34

## 2023-01-01 RX ADMIN — SUCRALFATE 1 G: 1 TABLET ORAL at 05:49

## 2023-01-01 RX ADMIN — LIDOCAINE HYDROCHLORIDE 10 ML: 20 SOLUTION ORAL; TOPICAL at 07:15

## 2023-01-01 RX ADMIN — MIDODRINE HYDROCHLORIDE 15 MG: 5 TABLET ORAL at 06:08

## 2023-01-01 RX ADMIN — PHENYLEPHRINE HYDROCHLORIDE 220 MCG/MIN: 50 INJECTION INTRAVENOUS at 08:34

## 2023-01-01 RX ADMIN — SUCRALFATE 1 G: 1 TABLET ORAL at 21:37

## 2023-01-01 RX ADMIN — NOREPINEPHRINE BITARTRATE 5 MCG/MIN: 1 INJECTION, SOLUTION, CONCENTRATE INTRAVENOUS at 13:59

## 2023-01-01 RX ADMIN — POTASSIUM CHLORIDE 20 MEQ: 1500 TABLET, EXTENDED RELEASE ORAL at 14:30

## 2023-01-01 RX ADMIN — MIDODRINE HYDROCHLORIDE 15 MG: 5 TABLET ORAL at 16:22

## 2023-01-01 RX ADMIN — ALBUMIN (HUMAN) 12.5 G: 0.25 INJECTION, SOLUTION INTRAVENOUS at 13:03

## 2023-01-01 RX ADMIN — SODIUM BICARBONATE 100 ML/HR: 84 INJECTION, SOLUTION INTRAVENOUS at 08:55

## 2023-01-01 RX ADMIN — ALBUMIN (HUMAN) 25 G: 0.25 INJECTION, SOLUTION INTRAVENOUS at 15:51

## 2023-01-01 RX ADMIN — SUCRALFATE 1 G: 1 TABLET ORAL at 11:18

## 2023-01-01 RX ADMIN — LACTULOSE 200 G: 10 SOLUTION ORAL; RECTAL at 16:01

## 2023-01-01 RX ADMIN — GABAPENTIN 300 MG: 300 CAPSULE ORAL at 17:25

## 2023-01-01 RX ADMIN — LIDOCAINE HYDROCHLORIDE 10 ML: 10 INJECTION, SOLUTION EPIDURAL; INFILTRATION; INTRACAUDAL; PERINEURAL at 16:05

## 2023-01-01 RX ADMIN — ONDANSETRON HYDROCHLORIDE 4 MG: 2 INJECTION, SOLUTION INTRAMUSCULAR; INTRAVENOUS at 00:06

## 2023-01-01 RX ADMIN — MIDAZOLAM 0.5 MG: 1 INJECTION INTRAMUSCULAR; INTRAVENOUS at 14:52

## 2023-01-01 RX ADMIN — BUMETANIDE 2 MG: 0.25 INJECTION INTRAMUSCULAR; INTRAVENOUS at 12:47

## 2023-01-01 RX ADMIN — FLUCONAZOLE 200 MG: 200 TABLET ORAL at 09:26

## 2023-01-01 RX ADMIN — METRONIDAZOLE 500 MG: 500 INJECTION, SOLUTION INTRAVENOUS at 00:48

## 2023-01-01 RX ADMIN — THIAMINE HYDROCHLORIDE 200 MG: 100 INJECTION, SOLUTION INTRAMUSCULAR; INTRAVENOUS at 11:43

## 2023-01-01 RX ADMIN — CHLORHEXIDINE GLUCONATE 0.12% ORAL RINSE 15 ML: 1.2 LIQUID ORAL at 08:18

## 2023-01-01 RX ADMIN — SUCRALFATE 1 G: 1 TABLET ORAL at 17:15

## 2023-01-01 RX ADMIN — PANTOPRAZOLE SODIUM 40 MG: 40 TABLET, DELAYED RELEASE ORAL at 06:00

## 2023-01-01 RX ADMIN — LACTULOSE 20 G: 20 SOLUTION ORAL at 16:38

## 2023-01-01 RX ADMIN — METRONIDAZOLE 500 MG: 500 INJECTION, SOLUTION INTRAVENOUS at 00:32

## 2023-01-01 RX ADMIN — SODIUM CHLORIDE 125 ML/HR: 0.9 INJECTION, SOLUTION INTRAVENOUS at 13:00

## 2023-01-01 RX ADMIN — SUCRALFATE 1 G: 1 TABLET ORAL at 21:46

## 2023-01-01 RX ADMIN — LACTULOSE 30 G: 20 SOLUTION ORAL at 07:46

## 2023-01-01 RX ADMIN — LACTULOSE 20 G: 20 SOLUTION ORAL at 09:31

## 2023-01-01 RX ADMIN — CHLORHEXIDINE GLUCONATE 0.12% ORAL RINSE 15 ML: 1.2 LIQUID ORAL at 09:13

## 2023-01-01 RX ADMIN — LIDOCAINE HYDROCHLORIDE 10 ML: 10 INJECTION, SOLUTION EPIDURAL; INFILTRATION; INTRACAUDAL; PERINEURAL at 11:10

## 2023-01-01 RX ADMIN — LACTULOSE 20 G: 20 SOLUTION ORAL at 16:40

## 2023-01-01 RX ADMIN — DEXTROSE MONOHYDRATE 25 ML: 25 INJECTION, SOLUTION INTRAVENOUS at 07:51

## 2023-01-01 RX ADMIN — ENOXAPARIN SODIUM 100 MG: 100 INJECTION SUBCUTANEOUS at 21:09

## 2023-01-01 RX ADMIN — MIDODRINE HYDROCHLORIDE 15 MG: 5 TABLET ORAL at 17:01

## 2023-01-01 RX ADMIN — PANTOPRAZOLE SODIUM 40 MG: 40 TABLET, DELAYED RELEASE ORAL at 06:06

## 2023-01-01 RX ADMIN — HYDROMORPHONE HYDROCHLORIDE 0.3 MG: 1 INJECTION, SOLUTION INTRAMUSCULAR; INTRAVENOUS; SUBCUTANEOUS at 02:07

## 2023-01-01 RX ADMIN — SODIUM BICARBONATE 1300 MG: 650 TABLET ORAL at 09:37

## 2023-01-01 RX ADMIN — SODIUM BICARBONATE 50 MEQ: 84 INJECTION INTRAVENOUS at 08:04

## 2023-01-01 RX ADMIN — METRONIDAZOLE 500 MG: 500 INJECTION, SOLUTION INTRAVENOUS at 16:00

## 2023-01-01 RX ADMIN — CHLORHEXIDINE GLUCONATE 0.12% ORAL RINSE 15 ML: 1.2 LIQUID ORAL at 09:29

## 2023-01-01 RX ADMIN — MICONAZOLE NITRATE: 20 CREAM TOPICAL at 08:32

## 2023-01-01 RX ADMIN — MIDODRINE HYDROCHLORIDE 15 MG: 5 TABLET ORAL at 06:33

## 2023-01-01 RX ADMIN — GABAPENTIN 200 MG: 100 CAPSULE ORAL at 08:13

## 2023-01-01 RX ADMIN — GLYCOPYRROLATE 0.1 MG: 0.2 INJECTION, SOLUTION INTRAMUSCULAR; INTRAVENOUS at 20:20

## 2023-01-01 RX ADMIN — ENOXAPARIN SODIUM 100 MG: 100 INJECTION SUBCUTANEOUS at 08:58

## 2023-01-01 RX ADMIN — ALBUMIN (HUMAN) 25 G: 0.25 INJECTION, SOLUTION INTRAVENOUS at 19:32

## 2023-01-01 RX ADMIN — ENOXAPARIN SODIUM 100 MG: 100 INJECTION SUBCUTANEOUS at 08:18

## 2023-01-01 RX ADMIN — RIFAXIMIN 550 MG: 550 TABLET ORAL at 20:57

## 2023-01-01 RX ADMIN — PANTOPRAZOLE SODIUM 40 MG: 40 TABLET, DELAYED RELEASE ORAL at 06:33

## 2023-01-01 RX ADMIN — GABAPENTIN 200 MG: 100 CAPSULE ORAL at 08:26

## 2023-01-01 RX ADMIN — SUCRALFATE 1 G: 1 TABLET ORAL at 17:22

## 2023-01-01 RX ADMIN — LACTULOSE 20 G: 20 SOLUTION ORAL at 21:33

## 2023-01-01 RX ADMIN — CHLORHEXIDINE GLUCONATE 0.12% ORAL RINSE 15 ML: 1.2 LIQUID ORAL at 08:13

## 2023-01-01 RX ADMIN — LIDOCAINE HYDROCHLORIDE 10 ML: 20 SOLUTION ORAL; TOPICAL at 15:51

## 2023-01-01 RX ADMIN — SUCRALFATE 1 G: 1 TABLET ORAL at 06:06

## 2023-01-01 RX ADMIN — SUCRALFATE 1 G: 1 TABLET ORAL at 11:21

## 2023-01-01 RX ADMIN — GABAPENTIN 200 MG: 100 CAPSULE ORAL at 17:31

## 2023-01-01 RX ADMIN — ENOXAPARIN SODIUM 100 MG: 100 INJECTION SUBCUTANEOUS at 08:14

## 2023-01-01 RX ADMIN — MIDODRINE HYDROCHLORIDE 15 MG: 5 TABLET ORAL at 06:03

## 2023-01-01 RX ADMIN — CHLORHEXIDINE GLUCONATE 0.12% ORAL RINSE 15 ML: 1.2 LIQUID ORAL at 21:37

## 2023-01-01 RX ADMIN — CHLORHEXIDINE GLUCONATE 0.12% ORAL RINSE 15 ML: 1.2 LIQUID ORAL at 08:17

## 2023-01-01 RX ADMIN — HALOPERIDOL LACTATE 0.5 MG: 5 INJECTION, SOLUTION INTRAMUSCULAR at 16:47

## 2023-01-01 RX ADMIN — CHLORHEXIDINE GLUCONATE 0.12% ORAL RINSE 15 ML: 1.2 LIQUID ORAL at 21:14

## 2023-01-01 RX ADMIN — SODIUM BICARBONATE 100 ML/HR: 84 INJECTION, SOLUTION INTRAVENOUS at 10:16

## 2023-01-01 RX ADMIN — MIDODRINE HYDROCHLORIDE 15 MG: 5 TABLET ORAL at 09:12

## 2023-01-01 RX ADMIN — MIDODRINE HYDROCHLORIDE 15 MG: 5 TABLET ORAL at 16:45

## 2023-01-01 RX ADMIN — ENOXAPARIN SODIUM 100 MG: 100 INJECTION SUBCUTANEOUS at 08:13

## 2023-01-01 RX ADMIN — MICONAZOLE NITRATE: 20 CREAM TOPICAL at 08:18

## 2023-01-01 RX ADMIN — CHLORHEXIDINE GLUCONATE 0.12% ORAL RINSE 15 ML: 1.2 LIQUID ORAL at 10:27

## 2023-01-01 RX ADMIN — CALCIUM GLUCONATE 2 G: 20 INJECTION, SOLUTION INTRAVENOUS at 21:45

## 2023-01-01 RX ADMIN — HYDROMORPHONE HYDROCHLORIDE 0.2 MG: 1 INJECTION, SOLUTION INTRAMUSCULAR; INTRAVENOUS; SUBCUTANEOUS at 14:05

## 2023-01-01 RX ADMIN — METRONIDAZOLE 500 MG: 500 INJECTION, SOLUTION INTRAVENOUS at 16:07

## 2023-01-01 RX ADMIN — CHLORHEXIDINE GLUCONATE 0.12% ORAL RINSE 15 ML: 1.2 LIQUID ORAL at 21:15

## 2023-01-01 RX ADMIN — CALCIUM GLUCONATE 1 G: 20 INJECTION, SOLUTION INTRAVENOUS at 13:10

## 2023-01-01 RX ADMIN — SUCRALFATE 1 G: 1 TABLET ORAL at 21:09

## 2023-01-01 RX ADMIN — MELATONIN 6 MG: at 21:05

## 2023-01-01 RX ADMIN — MIDODRINE HYDROCHLORIDE 15 MG: 5 TABLET ORAL at 11:30

## 2023-01-01 RX ADMIN — SUCRALFATE 1 G: 1 TABLET ORAL at 12:08

## 2023-01-01 RX ADMIN — INSULIN LISPRO 2 UNITS: 100 INJECTION, SOLUTION INTRAVENOUS; SUBCUTANEOUS at 08:13

## 2023-01-01 RX ADMIN — SODIUM BICARBONATE 1300 MG: 650 TABLET ORAL at 08:18

## 2023-01-01 RX ADMIN — LIDOCAINE HYDROCHLORIDE 10 ML: 20 SOLUTION ORAL; TOPICAL at 12:32

## 2023-01-01 RX ADMIN — THIAMINE HYDROCHLORIDE 200 MG: 100 INJECTION, SOLUTION INTRAMUSCULAR; INTRAVENOUS at 00:03

## 2023-01-01 RX ADMIN — SUCRALFATE 1 G: 1 TABLET ORAL at 16:10

## 2023-01-01 RX ADMIN — HYDROMORPHONE HYDROCHLORIDE 0.3 MG: 1 INJECTION, SOLUTION INTRAMUSCULAR; INTRAVENOUS; SUBCUTANEOUS at 16:50

## 2023-01-01 RX ADMIN — ALBUMIN (HUMAN) 25 G: 0.25 INJECTION, SOLUTION INTRAVENOUS at 05:22

## 2023-01-01 RX ADMIN — CHLORHEXIDINE GLUCONATE 0.12% ORAL RINSE 15 ML: 1.2 LIQUID ORAL at 08:34

## 2023-01-01 RX ADMIN — ALBUMIN (HUMAN) 25 G: 0.25 INJECTION, SOLUTION INTRAVENOUS at 04:06

## 2023-01-01 RX ADMIN — CHLORHEXIDINE GLUCONATE 0.12% ORAL RINSE 15 ML: 1.2 LIQUID ORAL at 10:14

## 2023-01-01 RX ADMIN — ENOXAPARIN SODIUM 100 MG: 100 INJECTION SUBCUTANEOUS at 08:16

## 2023-01-01 RX ADMIN — HYDROMORPHONE HYDROCHLORIDE 0.2 MG: 1 INJECTION, SOLUTION INTRAMUSCULAR; INTRAVENOUS; SUBCUTANEOUS at 18:05

## 2023-01-01 RX ADMIN — LIDOCAINE HYDROCHLORIDE 10 ML: 20 SOLUTION ORAL; TOPICAL at 18:25

## 2023-01-01 RX ADMIN — LACTULOSE 30 G: 20 SOLUTION ORAL at 21:37

## 2023-01-01 RX ADMIN — SUCRALFATE 1 G: 1 TABLET ORAL at 06:07

## 2023-01-01 RX ADMIN — PANTOPRAZOLE SODIUM 40 MG: 40 TABLET, DELAYED RELEASE ORAL at 06:08

## 2023-01-01 RX ADMIN — HYDROMORPHONE HYDROCHLORIDE 0.3 MG: 1 INJECTION, SOLUTION INTRAMUSCULAR; INTRAVENOUS; SUBCUTANEOUS at 19:00

## 2023-01-01 RX ADMIN — CHLORHEXIDINE GLUCONATE 0.12% ORAL RINSE 15 ML: 1.2 LIQUID ORAL at 08:36

## 2023-01-01 RX ADMIN — FENTANYL CITRATE 25 MCG: 50 INJECTION, SOLUTION INTRAMUSCULAR; INTRAVENOUS at 14:30

## 2023-01-01 RX ADMIN — ENOXAPARIN SODIUM 100 MG: 100 INJECTION SUBCUTANEOUS at 20:57

## 2023-01-01 RX ADMIN — DEXTROSE MONOHYDRATE 25 ML: 25 INJECTION, SOLUTION INTRAVENOUS at 11:44

## 2023-01-01 RX ADMIN — CHLORHEXIDINE GLUCONATE 0.12% ORAL RINSE 15 ML: 1.2 LIQUID ORAL at 08:16

## 2023-01-01 RX ADMIN — MICONAZOLE NITRATE: 20 CREAM TOPICAL at 10:19

## 2023-01-01 RX ADMIN — MICONAZOLE NITRATE: 20 CREAM TOPICAL at 08:17

## 2023-01-01 RX ADMIN — CEFEPIME 2000 MG: 2 INJECTION, POWDER, FOR SOLUTION INTRAVENOUS at 07:35

## 2023-01-01 RX ADMIN — CHLORHEXIDINE GLUCONATE 0.12% ORAL RINSE 15 ML: 1.2 LIQUID ORAL at 22:11

## 2023-01-01 RX ADMIN — ERGOCALCIFEROL 50000 UNITS: 1.25 CAPSULE, LIQUID FILLED ORAL at 09:38

## 2023-01-01 RX ADMIN — POTASSIUM CHLORIDE 20 MEQ: 1500 TABLET, EXTENDED RELEASE ORAL at 11:38

## 2023-01-01 RX ADMIN — HYDROMORPHONE HYDROCHLORIDE 0.3 MG: 1 INJECTION, SOLUTION INTRAMUSCULAR; INTRAVENOUS; SUBCUTANEOUS at 22:03

## 2023-01-01 RX ADMIN — HEPARIN SODIUM 15 UNITS/KG/HR: 10000 INJECTION, SOLUTION INTRAVENOUS at 21:10

## 2023-01-01 RX ADMIN — HYDROMORPHONE HYDROCHLORIDE 0.3 MG: 1 INJECTION, SOLUTION INTRAMUSCULAR; INTRAVENOUS; SUBCUTANEOUS at 20:20

## 2023-01-01 RX ADMIN — SUCRALFATE 1 G: 1 TABLET ORAL at 06:00

## 2023-01-01 RX ADMIN — ENOXAPARIN SODIUM 100 MG: 100 INJECTION SUBCUTANEOUS at 08:28

## 2023-01-01 RX ADMIN — SUCRALFATE 1 G: 1 TABLET ORAL at 08:30

## 2023-01-01 RX ADMIN — SUCRALFATE 1 G: 1 TABLET ORAL at 06:33

## 2023-01-01 RX ADMIN — PHENYLEPHRINE HYDROCHLORIDE 210 MCG/MIN: 50 INJECTION INTRAVENOUS at 00:23

## 2023-01-01 RX ADMIN — CHLORHEXIDINE GLUCONATE 0.12% ORAL RINSE 15 ML: 1.2 LIQUID ORAL at 20:57

## 2023-01-01 RX ADMIN — SUCRALFATE 1 G: 1 TABLET ORAL at 22:29

## 2023-01-01 RX ADMIN — MIDODRINE HYDROCHLORIDE 15 MG: 5 TABLET ORAL at 11:36

## 2023-01-01 RX ADMIN — ALBUMIN (HUMAN) 25 G: 0.25 INJECTION, SOLUTION INTRAVENOUS at 16:08

## 2023-01-01 RX ADMIN — RIFAXIMIN 550 MG: 550 TABLET ORAL at 21:36

## 2023-01-01 RX ADMIN — CEFAZOLIN SODIUM 1000 MG: 1 SOLUTION INTRAVENOUS at 14:35

## 2023-01-01 RX ADMIN — SUCRALFATE 1 G: 1 TABLET ORAL at 17:13

## 2023-01-01 RX ADMIN — GABAPENTIN 200 MG: 100 CAPSULE ORAL at 17:33

## 2023-01-01 RX ADMIN — SODIUM CHLORIDE 2 G: 1 TABLET ORAL at 16:21

## 2023-01-01 RX ADMIN — MIDODRINE HYDROCHLORIDE 15 MG: 5 TABLET ORAL at 05:42

## 2023-01-01 RX ADMIN — SUCRALFATE 1 G: 1 TABLET ORAL at 22:00

## 2023-01-01 RX ADMIN — SUCRALFATE 1 G: 1 TABLET ORAL at 11:48

## 2023-01-01 RX ADMIN — CHLORHEXIDINE GLUCONATE 0.12% ORAL RINSE 15 ML: 1.2 LIQUID ORAL at 10:04

## 2023-01-01 RX ADMIN — HEPARIN SODIUM 7600 UNITS: 1000 INJECTION INTRAVENOUS; SUBCUTANEOUS at 15:55

## 2023-01-01 RX ADMIN — POTASSIUM & SODIUM PHOSPHATES POWDER PACK 280-160-250 MG 2 PACKET: 280-160-250 PACK at 16:22

## 2023-01-01 RX ADMIN — ALBUMIN (HUMAN) 25 G: 0.25 INJECTION, SOLUTION INTRAVENOUS at 05:00

## 2023-01-01 RX ADMIN — INSULIN LISPRO 1 UNITS: 100 INJECTION, SOLUTION INTRAVENOUS; SUBCUTANEOUS at 12:33

## 2023-01-01 RX ADMIN — FENTANYL CITRATE 25 MCG: 50 INJECTION, SOLUTION INTRAMUSCULAR; INTRAVENOUS at 14:52

## 2023-01-01 RX ADMIN — INSULIN LISPRO 1 UNITS: 100 INJECTION, SOLUTION INTRAVENOUS; SUBCUTANEOUS at 18:13

## 2023-01-01 RX ADMIN — ARGATROBAN 0.06 MCG/KG/MIN: 50 INJECTION, SOLUTION INTRAVENOUS at 14:11

## 2023-01-01 RX ADMIN — MIDODRINE HYDROCHLORIDE 15 MG: 5 TABLET ORAL at 11:44

## 2023-01-01 RX ADMIN — ALBUMIN (HUMAN) 25 G: 0.25 INJECTION, SOLUTION INTRAVENOUS at 22:21

## 2023-01-01 RX ADMIN — MIDODRINE HYDROCHLORIDE 15 MG: 5 TABLET ORAL at 06:18

## 2023-01-01 RX ADMIN — SODIUM CHLORIDE 2 G: 1 TABLET ORAL at 08:30

## 2023-01-01 RX ADMIN — MICONAZOLE NITRATE: 20 CREAM TOPICAL at 18:19

## 2023-01-01 RX ADMIN — LIDOCAINE HYDROCHLORIDE 10 ML: 10 INJECTION, SOLUTION EPIDURAL; INFILTRATION; INTRACAUDAL; PERINEURAL at 14:52

## 2023-01-01 RX ADMIN — LIDOCAINE HYDROCHLORIDE 10 ML: 20 SOLUTION ORAL; TOPICAL at 05:51

## 2023-01-01 RX ADMIN — FLUCONAZOLE 200 MG: 200 TABLET ORAL at 08:17

## 2023-01-01 RX ADMIN — OCTREOTIDE ACETATE 200 MCG: 500 INJECTION, SOLUTION INTRAVENOUS; SUBCUTANEOUS at 05:00

## 2023-01-01 RX ADMIN — CALCIUM GLUCONATE 1 G: 20 INJECTION, SOLUTION INTRAVENOUS at 06:27

## 2023-01-01 RX ADMIN — SODIUM BICARBONATE 1300 MG: 650 TABLET ORAL at 17:25

## 2023-01-01 RX ADMIN — OCTREOTIDE ACETATE 200 MCG: 500 INJECTION, SOLUTION INTRAVENOUS; SUBCUTANEOUS at 16:25

## 2023-01-01 RX ADMIN — ALBUMIN (HUMAN) 25 G: 0.25 INJECTION, SOLUTION INTRAVENOUS at 12:47

## 2023-01-01 RX ADMIN — LACTULOSE 10 G: 20 SOLUTION ORAL at 08:36

## 2023-01-01 RX ADMIN — HYDROMORPHONE HYDROCHLORIDE 0.3 MG: 1 INJECTION, SOLUTION INTRAMUSCULAR; INTRAVENOUS; SUBCUTANEOUS at 15:10

## 2023-01-01 RX ADMIN — ALBUMIN, HUMAN INJ 5% 25 G: 5 SOLUTION at 13:53

## 2023-01-01 RX ADMIN — INSULIN LISPRO 1 UNITS: 100 INJECTION, SOLUTION INTRAVENOUS; SUBCUTANEOUS at 11:54

## 2023-01-01 RX ADMIN — MICONAZOLE NITRATE: 20 CREAM TOPICAL at 08:34

## 2023-01-01 RX ADMIN — HYDROMORPHONE HYDROCHLORIDE 0.3 MG: 1 INJECTION, SOLUTION INTRAMUSCULAR; INTRAVENOUS; SUBCUTANEOUS at 17:54

## 2023-01-01 RX ADMIN — RIFAXIMIN 550 MG: 550 TABLET ORAL at 07:46

## 2023-01-01 RX ADMIN — MICONAZOLE NITRATE: 20 CREAM TOPICAL at 09:03

## 2023-01-01 RX ADMIN — LIDOCAINE HYDROCHLORIDE 5 ML: 10 INJECTION, SOLUTION EPIDURAL; INFILTRATION; INTRACAUDAL; PERINEURAL at 16:54

## 2023-01-01 RX ADMIN — MIDODRINE HYDROCHLORIDE 15 MG: 5 TABLET ORAL at 06:06

## 2023-01-01 RX ADMIN — POTASSIUM & SODIUM PHOSPHATES POWDER PACK 280-160-250 MG 2 PACKET: 280-160-250 PACK at 07:44

## 2023-01-01 RX ADMIN — CEFTRIAXONE SODIUM 2000 MG: 10 INJECTION, POWDER, FOR SOLUTION INTRAVENOUS at 10:25

## 2023-01-01 RX ADMIN — MICONAZOLE NITRATE: 20 CREAM TOPICAL at 08:19

## 2023-01-01 RX ADMIN — HEPARIN SODIUM 12 UNITS/KG/HR: 10000 INJECTION, SOLUTION INTRAVENOUS at 10:42

## 2023-01-01 RX ADMIN — SODIUM CHLORIDE 60 ML/HR: 0.9 INJECTION, SOLUTION INTRAVENOUS at 18:40

## 2023-01-01 RX ADMIN — CEFTRIAXONE SODIUM 2000 MG: 10 INJECTION, POWDER, FOR SOLUTION INTRAVENOUS at 10:01

## 2023-01-01 RX ADMIN — CEFAZOLIN SODIUM 1000 MG: 1 SOLUTION INTRAVENOUS at 21:40

## 2023-01-01 RX ADMIN — MICONAZOLE NITRATE: 20 CREAM TOPICAL at 17:11

## 2023-01-01 RX ADMIN — ALBUMIN (HUMAN) 25 G: 0.25 INJECTION, SOLUTION INTRAVENOUS at 13:10

## 2023-01-01 RX ADMIN — SUCRALFATE 1 G: 1 TABLET ORAL at 21:02

## 2023-01-01 RX ADMIN — SUCRALFATE 1 G: 1 TABLET ORAL at 21:10

## 2023-01-01 RX ADMIN — Medication 5 SPRAY: at 05:51

## 2023-01-01 RX ADMIN — CALCITRIOL CAPSULES 0.25 MCG 0.25 MCG: 0.25 CAPSULE ORAL at 11:20

## 2023-01-01 RX ADMIN — VANCOMYCIN HYDROCHLORIDE 1000 MG: 1 INJECTION, SOLUTION INTRAVENOUS at 20:14

## 2023-01-01 RX ADMIN — MIDODRINE HYDROCHLORIDE 15 MG: 5 TABLET ORAL at 22:11

## 2023-01-01 RX ADMIN — SODIUM CHLORIDE 2 G: 1 TABLET ORAL at 14:30

## 2023-01-01 RX ADMIN — SUCRALFATE 1 G: 1 TABLET ORAL at 21:41

## 2023-01-01 RX ADMIN — SUCRALFATE 1 G: 1 TABLET ORAL at 11:20

## 2023-01-01 RX ADMIN — LACTULOSE 10 G: 20 SOLUTION ORAL at 08:26

## 2023-01-01 RX ADMIN — ENOXAPARIN SODIUM 100 MG: 100 INJECTION SUBCUTANEOUS at 08:21

## 2023-01-01 RX ADMIN — CHLORHEXIDINE GLUCONATE 0.12% ORAL RINSE 15 ML: 1.2 LIQUID ORAL at 08:27

## 2023-01-01 RX ADMIN — HYDROMORPHONE HYDROCHLORIDE 0.3 MG: 1 INJECTION, SOLUTION INTRAMUSCULAR; INTRAVENOUS; SUBCUTANEOUS at 05:09

## 2023-01-01 RX ADMIN — SUCRALFATE 1 G: 1 TABLET ORAL at 11:44

## 2023-01-01 RX ADMIN — MICONAZOLE NITRATE: 20 CREAM TOPICAL at 17:14

## 2023-01-01 RX ADMIN — MELATONIN 6 MG: at 22:00

## 2023-01-01 RX ADMIN — MIDODRINE HYDROCHLORIDE 15 MG: 5 TABLET ORAL at 08:28

## 2023-01-01 RX ADMIN — ERGOCALCIFEROL 50000 UNITS: 1.25 CAPSULE, LIQUID FILLED ORAL at 08:04

## 2023-01-01 RX ADMIN — ARGATROBAN 0.5 MCG/KG/MIN: 50 INJECTION, SOLUTION INTRAVENOUS at 17:09

## 2023-01-01 RX ADMIN — CHLORHEXIDINE GLUCONATE 0.12% ORAL RINSE 15 ML: 1.2 LIQUID ORAL at 09:02

## 2023-01-01 RX ADMIN — MICONAZOLE NITRATE: 20 CREAM TOPICAL at 17:27

## 2023-01-01 RX ADMIN — ALBUMIN (HUMAN) 25 G: 12.5 INJECTION, SOLUTION INTRAVENOUS at 11:09

## 2023-01-01 RX ADMIN — SODIUM BICARBONATE 50 MEQ: 84 INJECTION INTRAVENOUS at 13:49

## 2023-01-01 RX ADMIN — CALCITRIOL CAPSULES 0.25 MCG 0.25 MCG: 0.25 CAPSULE ORAL at 08:13

## 2023-01-01 RX ADMIN — METRONIDAZOLE 500 MG: 500 INJECTION, SOLUTION INTRAVENOUS at 23:53

## 2023-01-01 RX ADMIN — MELATONIN 6 MG: at 19:55

## 2023-01-01 RX ADMIN — SODIUM BICARBONATE 1300 MG: 650 TABLET ORAL at 17:21

## 2023-01-01 RX ADMIN — HYDROMORPHONE HYDROCHLORIDE 0.2 MG: 1 INJECTION, SOLUTION INTRAMUSCULAR; INTRAVENOUS; SUBCUTANEOUS at 02:43

## 2023-01-01 RX ADMIN — ALBUMIN (HUMAN) 25 G: 0.25 INJECTION, SOLUTION INTRAVENOUS at 00:05

## 2023-01-01 RX ADMIN — MIDODRINE HYDROCHLORIDE 15 MG: 5 TABLET ORAL at 22:29

## 2023-01-01 RX ADMIN — MICONAZOLE NITRATE: 20 CREAM TOPICAL at 08:05

## 2023-01-01 RX ADMIN — MIDODRINE HYDROCHLORIDE 15 MG: 5 TABLET ORAL at 06:07

## 2023-01-01 RX ADMIN — MICONAZOLE NITRATE: 20 CREAM TOPICAL at 08:13

## 2023-01-01 RX ADMIN — CEFEPIME 2000 MG: 2 INJECTION, POWDER, FOR SOLUTION INTRAVENOUS at 09:30

## 2023-01-01 RX ADMIN — POTASSIUM & SODIUM PHOSPHATES POWDER PACK 280-160-250 MG 2 PACKET: 280-160-250 PACK at 07:24

## 2023-01-01 RX ADMIN — PANTOPRAZOLE SODIUM 40 MG: 40 TABLET, DELAYED RELEASE ORAL at 06:03

## 2023-01-01 RX ADMIN — SUCRALFATE 1 G: 1 TABLET ORAL at 16:02

## 2023-01-01 RX ADMIN — MIDODRINE HYDROCHLORIDE 15 MG: 5 TABLET ORAL at 11:56

## 2023-01-01 RX ADMIN — PANTOPRAZOLE SODIUM 40 MG: 40 TABLET, DELAYED RELEASE ORAL at 05:49

## 2023-01-01 RX ADMIN — SODIUM CHLORIDE 2 G: 1 TABLET ORAL at 08:18

## 2023-01-01 RX ADMIN — ALBUMIN (HUMAN) 12.5 G: 12.5 INJECTION, SOLUTION INTRAVENOUS at 15:58

## 2023-01-01 RX ADMIN — LACTULOSE 10 G: 20 SOLUTION ORAL at 08:58

## 2023-01-01 RX ADMIN — ALBUMIN (HUMAN) 25 G: 0.25 INJECTION, SOLUTION INTRAVENOUS at 04:48

## 2023-01-01 RX ADMIN — SUCRALFATE 1 G: 1 TABLET ORAL at 16:45

## 2023-01-01 RX ADMIN — MIDODRINE HYDROCHLORIDE 15 MG: 5 TABLET ORAL at 17:21

## 2023-01-01 RX ADMIN — SODIUM BICARBONATE 1300 MG: 650 TABLET ORAL at 12:33

## 2023-01-01 RX ADMIN — SODIUM BICARBONATE 100 ML/HR: 84 INJECTION, SOLUTION INTRAVENOUS at 11:34

## 2023-01-01 RX ADMIN — SUCRALFATE 1 G: 1 TABLET ORAL at 06:18

## 2023-01-01 RX ADMIN — CEFAZOLIN SODIUM 1000 MG: 1 SOLUTION INTRAVENOUS at 13:55

## 2023-01-01 RX ADMIN — CEFEPIME 2000 MG: 2 INJECTION, POWDER, FOR SOLUTION INTRAVENOUS at 00:58

## 2023-01-01 RX ADMIN — MICONAZOLE NITRATE: 20 CREAM TOPICAL at 17:01

## 2023-01-01 RX ADMIN — ALBUMIN (HUMAN) 12.5 G: 0.25 INJECTION, SOLUTION INTRAVENOUS at 16:20

## 2023-01-01 RX ADMIN — ENOXAPARIN SODIUM 100 MG: 100 INJECTION SUBCUTANEOUS at 21:37

## 2023-01-01 RX ADMIN — CEFTRIAXONE SODIUM 2000 MG: 10 INJECTION, POWDER, FOR SOLUTION INTRAVENOUS at 11:48

## 2023-01-01 RX ADMIN — LIDOCAINE HYDROCHLORIDE 10 ML: 20 SOLUTION ORAL; TOPICAL at 11:48

## 2023-01-01 RX ADMIN — CHLORHEXIDINE GLUCONATE 0.12% ORAL RINSE 15 ML: 1.2 LIQUID ORAL at 08:26

## 2023-01-01 RX ADMIN — MIDODRINE HYDROCHLORIDE 15 MG: 5 TABLET ORAL at 11:48

## 2023-01-01 RX ADMIN — ENOXAPARIN SODIUM 100 MG: 100 INJECTION SUBCUTANEOUS at 22:11

## 2023-01-01 RX ADMIN — MICONAZOLE NITRATE: 20 CREAM TOPICAL at 17:28

## 2023-01-01 RX ADMIN — CHLORHEXIDINE GLUCONATE 0.12% ORAL RINSE 15 ML: 1.2 LIQUID ORAL at 22:46

## 2023-01-01 RX ADMIN — OCTREOTIDE ACETATE 200 MCG: 500 INJECTION, SOLUTION INTRAVENOUS; SUBCUTANEOUS at 22:09

## 2023-01-01 RX ADMIN — MICONAZOLE NITRATE: 20 CREAM TOPICAL at 18:27

## 2023-01-01 RX ADMIN — CHLORHEXIDINE GLUCONATE 0.12% ORAL RINSE 15 ML: 1.2 LIQUID ORAL at 22:29

## 2023-01-01 RX ADMIN — ALBUMIN (HUMAN) 25 G: 0.25 INJECTION, SOLUTION INTRAVENOUS at 10:28

## 2023-01-01 RX ADMIN — RIFAXIMIN 550 MG: 550 TABLET ORAL at 08:28

## 2023-01-01 RX ADMIN — RIFAXIMIN 550 MG: 550 TABLET ORAL at 21:46

## 2023-01-01 RX ADMIN — SUCRALFATE 1 G: 1 TABLET ORAL at 15:47

## 2023-01-01 RX ADMIN — CHLORHEXIDINE GLUCONATE 0.12% ORAL RINSE 15 ML: 1.2 LIQUID ORAL at 21:09

## 2023-01-01 RX ADMIN — ALBUMIN (HUMAN) 25 G: 0.25 INJECTION, SOLUTION INTRAVENOUS at 00:54

## 2023-01-01 RX ADMIN — RIFAXIMIN 550 MG: 550 TABLET ORAL at 08:18

## 2023-01-01 RX ADMIN — SUCRALFATE 1 G: 1 TABLET ORAL at 16:43

## 2023-01-01 RX ADMIN — LACTULOSE 20 G: 20 SOLUTION ORAL at 17:22

## 2023-01-01 RX ADMIN — HYDROMORPHONE HYDROCHLORIDE 0.3 MG: 1 INJECTION, SOLUTION INTRAMUSCULAR; INTRAVENOUS; SUBCUTANEOUS at 12:36

## 2023-01-01 RX ADMIN — ENOXAPARIN SODIUM 100 MG: 100 INJECTION SUBCUTANEOUS at 08:36

## 2023-01-01 RX ADMIN — PHENYLEPHRINE HYDROCHLORIDE 210 MCG/MIN: 50 INJECTION INTRAVENOUS at 12:44

## 2023-01-01 RX ADMIN — MIDODRINE HYDROCHLORIDE 15 MG: 5 TABLET ORAL at 09:37

## 2023-01-01 RX ADMIN — GABAPENTIN 200 MG: 100 CAPSULE ORAL at 19:32

## 2023-01-01 RX ADMIN — ALBUMIN (HUMAN) 25 G: 0.25 INJECTION, SOLUTION INTRAVENOUS at 17:16

## 2023-01-01 RX ADMIN — MIDODRINE HYDROCHLORIDE 15 MG: 5 TABLET ORAL at 16:02

## 2023-01-01 RX ADMIN — ENOXAPARIN SODIUM 100 MG: 100 INJECTION SUBCUTANEOUS at 21:40

## 2023-01-01 RX ADMIN — MIDODRINE HYDROCHLORIDE 15 MG: 5 TABLET ORAL at 17:06

## 2023-01-01 RX ADMIN — CHLORHEXIDINE GLUCONATE 0.12% ORAL RINSE 15 ML: 1.2 LIQUID ORAL at 07:46

## 2023-01-01 RX ADMIN — CHLORHEXIDINE GLUCONATE 0.12% ORAL RINSE 15 ML: 1.2 LIQUID ORAL at 08:04

## 2023-01-01 RX ADMIN — CHLORHEXIDINE GLUCONATE 0.12% ORAL RINSE 15 ML: 1.2 LIQUID ORAL at 08:55

## 2023-01-01 RX ADMIN — ALBUMIN (HUMAN) 12.5 G: 0.25 INJECTION, SOLUTION INTRAVENOUS at 20:52

## 2023-01-01 RX ADMIN — SUCRALFATE 1 G: 1 TABLET ORAL at 18:07

## 2023-01-01 RX ADMIN — SUCRALFATE 1 G: 1 TABLET ORAL at 06:27

## 2023-01-01 RX ADMIN — SUCRALFATE 1 G: 1 TABLET ORAL at 12:37

## 2023-01-01 RX ADMIN — GABAPENTIN 300 MG: 300 CAPSULE ORAL at 08:15

## 2023-01-01 RX ADMIN — ALBUMIN, HUMAN INJ 5% 12.5 G: 5 SOLUTION at 15:58

## 2023-01-01 RX ADMIN — PANTOPRAZOLE SODIUM 40 MG: 40 TABLET, DELAYED RELEASE ORAL at 06:27

## 2023-01-01 RX ADMIN — METRONIDAZOLE 500 MG: 500 INJECTION, SOLUTION INTRAVENOUS at 15:51

## 2023-01-01 RX ADMIN — LACTULOSE 20 G: 20 SOLUTION ORAL at 17:26

## 2023-01-01 RX ADMIN — BUMETANIDE 2 MG: 0.25 INJECTION INTRAMUSCULAR; INTRAVENOUS at 12:57

## 2023-01-01 RX ADMIN — MICONAZOLE NITRATE: 20 CREAM TOPICAL at 17:34

## 2023-01-01 RX ADMIN — SUCRALFATE 1 G: 1 TABLET ORAL at 21:33

## 2023-01-01 RX ADMIN — ENOXAPARIN SODIUM 100 MG: 100 INJECTION SUBCUTANEOUS at 07:46

## 2023-01-01 RX ADMIN — ALBUMIN (HUMAN) 25 G: 0.25 INJECTION, SOLUTION INTRAVENOUS at 05:59

## 2023-01-01 RX ADMIN — MELATONIN 6 MG: at 21:32

## 2023-01-01 RX ADMIN — MIDODRINE HYDROCHLORIDE 20 MG: 5 TABLET ORAL at 21:36

## 2023-01-01 RX ADMIN — ALBUMIN (HUMAN) 25 G: 0.25 INJECTION, SOLUTION INTRAVENOUS at 19:37

## 2023-01-01 RX ADMIN — MICONAZOLE NITRATE: 20 CREAM TOPICAL at 18:15

## 2023-01-01 RX ADMIN — LACTULOSE 30 G: 20 SOLUTION ORAL at 14:27

## 2023-01-01 RX ADMIN — ALBUMIN (HUMAN) 25 G: 0.25 INJECTION, SOLUTION INTRAVENOUS at 12:59

## 2023-01-01 RX ADMIN — ALBUMIN (HUMAN) 25 G: 12.5 INJECTION, SOLUTION INTRAVENOUS at 13:53

## 2023-01-01 RX ADMIN — CHLORHEXIDINE GLUCONATE 0.12% ORAL RINSE 15 ML: 1.2 LIQUID ORAL at 08:58

## 2023-01-01 RX ADMIN — MICONAZOLE NITRATE: 20 CREAM TOPICAL at 17:37

## 2023-01-01 RX ADMIN — METRONIDAZOLE 500 MG: 500 INJECTION, SOLUTION INTRAVENOUS at 10:15

## 2023-01-01 RX ADMIN — GABAPENTIN 300 MG: 300 CAPSULE ORAL at 16:40

## 2023-01-01 RX ADMIN — METRONIDAZOLE 500 MG: 500 INJECTION, SOLUTION INTRAVENOUS at 10:26

## 2023-01-01 RX ADMIN — CHLORHEXIDINE GLUCONATE 0.12% ORAL RINSE 15 ML: 1.2 LIQUID ORAL at 21:36

## 2023-01-01 RX ADMIN — ALBUMIN (HUMAN) 25 G: 0.25 INJECTION, SOLUTION INTRAVENOUS at 11:43

## 2023-01-01 RX ADMIN — MELATONIN 6 MG: at 21:09

## 2023-01-01 RX ADMIN — LACTULOSE 10 G: 20 SOLUTION ORAL at 14:26

## 2023-01-01 RX ADMIN — INSULIN HUMAN 10 UNITS: 100 INJECTION, SOLUTION PARENTERAL at 11:44

## 2023-01-01 RX ADMIN — GABAPENTIN 300 MG: 300 CAPSULE ORAL at 08:36

## 2023-01-01 RX ADMIN — SUCRALFATE 1 G: 1 TABLET ORAL at 06:08

## 2023-01-01 RX ADMIN — CALCIUM GLUCONATE 1 G: 20 INJECTION, SOLUTION INTRAVENOUS at 07:24

## 2023-01-01 RX ADMIN — MIDODRINE HYDROCHLORIDE 10 MG: 5 TABLET ORAL at 11:13

## 2023-01-01 RX ADMIN — SUCRALFATE 1 G: 1 TABLET ORAL at 17:31

## 2023-01-01 RX ADMIN — LACTULOSE 20 G: 20 SOLUTION ORAL at 08:46

## 2023-01-01 RX ADMIN — MIDODRINE HYDROCHLORIDE 15 MG: 5 TABLET ORAL at 12:37

## 2023-01-01 RX ADMIN — HEPARIN SODIUM 18 UNITS/KG/HR: 10000 INJECTION, SOLUTION INTRAVENOUS at 16:20

## 2023-01-01 RX ADMIN — METRONIDAZOLE 500 MG: 500 INJECTION, SOLUTION INTRAVENOUS at 09:01

## 2023-01-01 RX ADMIN — INSULIN LISPRO 1 UNITS: 100 INJECTION, SOLUTION INTRAVENOUS; SUBCUTANEOUS at 12:10

## 2023-01-01 RX ADMIN — PANTOPRAZOLE SODIUM 40 MG: 40 TABLET, DELAYED RELEASE ORAL at 05:59

## 2023-01-01 RX ADMIN — ALBUMIN (HUMAN) 12.5 G: 0.25 INJECTION, SOLUTION INTRAVENOUS at 17:26

## 2023-01-01 RX ADMIN — METRONIDAZOLE 500 MG: 500 INJECTION, SOLUTION INTRAVENOUS at 01:58

## 2023-01-01 RX ADMIN — GABAPENTIN 300 MG: 300 CAPSULE ORAL at 17:21

## 2023-01-01 RX ADMIN — VASOPRESSIN 0.04 UNITS/MIN: 20 INJECTION INTRAVENOUS at 00:11

## 2023-01-01 RX ADMIN — PHYTONADIONE 5 MG: 10 INJECTION, EMULSION INTRAMUSCULAR; INTRAVENOUS; SUBCUTANEOUS at 11:17

## 2023-01-01 RX ADMIN — CALCITRIOL CAPSULES 0.25 MCG 0.25 MCG: 0.25 CAPSULE ORAL at 08:04

## 2023-01-01 RX ADMIN — NOREPINEPHRINE BITARTRATE: 1 INJECTION, SOLUTION, CONCENTRATE INTRAVENOUS at 00:11

## 2023-01-01 RX ADMIN — MELATONIN 6 MG: at 21:41

## 2023-01-01 RX ADMIN — APIXABAN 10 MG: 5 TABLET, FILM COATED ORAL at 10:25

## 2023-01-01 RX ADMIN — CHLORHEXIDINE GLUCONATE 0.12% ORAL RINSE 15 ML: 1.2 LIQUID ORAL at 20:15

## 2023-01-01 RX ADMIN — SUCRALFATE 1 G: 1 TABLET ORAL at 12:32

## 2023-01-01 RX ADMIN — PANTOPRAZOLE SODIUM 40 MG: 40 TABLET, DELAYED RELEASE ORAL at 05:41

## 2023-01-01 RX ADMIN — CHLORHEXIDINE GLUCONATE 0.12% ORAL RINSE 15 ML: 1.2 LIQUID ORAL at 09:31

## 2023-01-01 RX ADMIN — INSULIN HUMAN 10 UNITS: 100 INJECTION, SOLUTION PARENTERAL at 06:00

## 2023-01-01 RX ADMIN — ENOXAPARIN SODIUM 100 MG: 100 INJECTION SUBCUTANEOUS at 09:38

## 2023-01-01 RX ADMIN — SODIUM BICARBONATE 1300 MG: 650 TABLET ORAL at 08:58

## 2023-01-01 RX ADMIN — VANCOMYCIN HYDROCHLORIDE 1000 MG: 1 INJECTION, SOLUTION INTRAVENOUS at 09:27

## 2023-01-01 RX ADMIN — CEFEPIME 2000 MG: 2 INJECTION, POWDER, FOR SOLUTION INTRAVENOUS at 17:13

## 2023-01-01 RX ADMIN — SUCRALFATE 1 G: 1 TABLET ORAL at 17:26

## 2023-01-01 RX ADMIN — HALOPERIDOL LACTATE 0.5 MG: 5 INJECTION, SOLUTION INTRAMUSCULAR at 03:25

## 2023-01-01 RX ADMIN — MIDODRINE HYDROCHLORIDE 15 MG: 5 TABLET ORAL at 11:20

## 2023-01-01 RX ADMIN — ALBUMIN (HUMAN) 25 G: 0.25 INJECTION, SOLUTION INTRAVENOUS at 10:24

## 2023-01-01 RX ADMIN — MIDODRINE HYDROCHLORIDE 15 MG: 5 TABLET ORAL at 11:53

## 2023-01-01 RX ADMIN — RIFAXIMIN 550 MG: 550 TABLET ORAL at 09:13

## 2023-01-01 RX ADMIN — PHYTONADIONE 10 MG: 10 INJECTION, EMULSION INTRAMUSCULAR; INTRAVENOUS; SUBCUTANEOUS at 18:19

## 2023-01-01 RX ADMIN — ENOXAPARIN SODIUM 100 MG: 100 INJECTION SUBCUTANEOUS at 22:29

## 2023-01-01 RX ADMIN — NOREPINEPHRINE BITARTRATE: 1 INJECTION, SOLUTION, CONCENTRATE INTRAVENOUS at 10:35

## 2023-01-01 RX ADMIN — ALBUMIN (HUMAN) 25 G: 0.25 INJECTION, SOLUTION INTRAVENOUS at 16:11

## 2023-01-01 RX ADMIN — SUCRALFATE 1 G: 1 TABLET ORAL at 06:04

## 2023-01-01 RX ADMIN — PANTOPRAZOLE SODIUM 40 MG: 40 TABLET, DELAYED RELEASE ORAL at 06:04

## 2023-01-01 RX ADMIN — INSULIN LISPRO 1 UNITS: 100 INJECTION, SOLUTION INTRAVENOUS; SUBCUTANEOUS at 08:22

## 2023-01-01 RX ADMIN — PANTOPRAZOLE SODIUM 40 MG: 40 INJECTION, POWDER, FOR SOLUTION INTRAVENOUS at 12:58

## 2023-01-01 RX ADMIN — ALBUMIN (HUMAN) 25 G: 0.25 INJECTION, SOLUTION INTRAVENOUS at 05:45

## 2023-01-01 RX ADMIN — MIDODRINE HYDROCHLORIDE 15 MG: 5 TABLET ORAL at 18:07

## 2023-01-01 RX ADMIN — PANTOPRAZOLE SODIUM 40 MG: 40 TABLET, DELAYED RELEASE ORAL at 06:01

## 2023-01-01 RX ADMIN — ALBUMIN (HUMAN) 25 G: 0.25 INJECTION, SOLUTION INTRAVENOUS at 22:45

## 2023-01-01 RX ADMIN — LACTULOSE 20 G: 20 SOLUTION ORAL at 17:14

## 2023-01-01 RX ADMIN — SUCRALFATE 1 G: 1 TABLET ORAL at 22:05

## 2023-01-01 RX ADMIN — SUCRALFATE 1 G: 1 TABLET ORAL at 11:38

## 2023-01-01 RX ADMIN — SUCRALFATE 1 G: 1 TABLET ORAL at 21:15

## 2023-01-01 RX ADMIN — METRONIDAZOLE 500 MG: 500 INJECTION, SOLUTION INTRAVENOUS at 08:58

## 2023-01-01 RX ADMIN — ENOXAPARIN SODIUM 100 MG: 100 INJECTION SUBCUTANEOUS at 21:15

## 2023-01-01 RX ADMIN — MELATONIN 6 MG: at 21:23

## 2023-01-01 RX ADMIN — THIAMINE HYDROCHLORIDE 200 MG: 100 INJECTION, SOLUTION INTRAMUSCULAR; INTRAVENOUS at 23:36

## 2023-01-01 RX ADMIN — ENOXAPARIN SODIUM 100 MG: 100 INJECTION SUBCUTANEOUS at 08:26

## 2023-01-01 RX ADMIN — ALBUTEROL SULFATE 10 MG: 2.5 SOLUTION RESPIRATORY (INHALATION) at 10:40

## 2023-01-01 RX ADMIN — SUCRALFATE 1 G: 1 TABLET ORAL at 22:39

## 2023-01-01 RX ADMIN — CALCIUM GLUCONATE 2 G: 20 INJECTION, SOLUTION INTRAVENOUS at 07:36

## 2023-01-01 RX ADMIN — ALBUMIN (HUMAN) 25 G: 0.25 INJECTION, SOLUTION INTRAVENOUS at 10:41

## 2023-01-01 RX ADMIN — SUCRALFATE 1 G: 1 TABLET ORAL at 22:11

## 2023-01-01 RX ADMIN — PANTOPRAZOLE SODIUM 40 MG: 40 TABLET, DELAYED RELEASE ORAL at 05:58

## 2023-01-01 RX ADMIN — HYDROMORPHONE HYDROCHLORIDE 0.3 MG: 1 INJECTION, SOLUTION INTRAMUSCULAR; INTRAVENOUS; SUBCUTANEOUS at 04:41

## 2023-01-01 RX ADMIN — MIDODRINE HYDROCHLORIDE 15 MG: 5 TABLET ORAL at 11:18

## 2023-01-01 RX ADMIN — ALBUMIN (HUMAN) 12.5 G: 0.25 INJECTION, SOLUTION INTRAVENOUS at 14:28

## 2023-01-01 RX ADMIN — MIDODRINE HYDROCHLORIDE 15 MG: 5 TABLET ORAL at 17:15

## 2023-01-01 RX ADMIN — MELATONIN 6 MG: at 20:55

## 2023-01-01 RX ADMIN — ALBUMIN (HUMAN) 25 G: 0.25 INJECTION, SOLUTION INTRAVENOUS at 14:11

## 2023-01-01 RX ADMIN — HALOPERIDOL LACTATE 0.5 MG: 5 INJECTION, SOLUTION INTRAMUSCULAR at 01:17

## 2023-01-01 RX ADMIN — SODIUM BICARBONATE 1300 MG: 650 TABLET ORAL at 16:10

## 2023-01-01 RX ADMIN — INSULIN LISPRO 1 UNITS: 100 INJECTION, SOLUTION INTRAVENOUS; SUBCUTANEOUS at 17:06

## 2023-01-01 RX ADMIN — INSULIN LISPRO 5 UNITS: 100 INJECTION, SOLUTION INTRAVENOUS; SUBCUTANEOUS at 02:50

## 2023-01-01 RX ADMIN — CHLORHEXIDINE GLUCONATE 0.12% ORAL RINSE 15 ML: 1.2 LIQUID ORAL at 20:28

## 2023-01-01 RX ADMIN — MELATONIN 6 MG: at 21:15

## 2023-01-01 RX ADMIN — MIDODRINE HYDROCHLORIDE 15 MG: 5 TABLET ORAL at 11:28

## 2023-01-01 RX ADMIN — SODIUM CHLORIDE, SODIUM LACTATE, POTASSIUM CHLORIDE, AND CALCIUM CHLORIDE 100 ML/HR: .6; .31; .03; .02 INJECTION, SOLUTION INTRAVENOUS at 21:33

## 2023-01-01 RX ADMIN — CALCIUM GLUCONATE 2 G: 20 INJECTION, SOLUTION INTRAVENOUS at 14:01

## 2023-01-01 RX ADMIN — MICONAZOLE NITRATE: 20 CREAM TOPICAL at 08:55

## 2023-01-01 RX ADMIN — MIDODRINE HYDROCHLORIDE 15 MG: 5 TABLET ORAL at 05:49

## 2023-01-01 RX ADMIN — MELATONIN 6 MG: at 20:15

## 2023-01-01 RX ADMIN — MIDODRINE HYDROCHLORIDE 15 MG: 5 TABLET ORAL at 06:04

## 2023-01-01 RX ADMIN — ALBUMIN (HUMAN) 12.5 G: 0.25 INJECTION, SOLUTION INTRAVENOUS at 14:43

## 2023-01-01 RX ADMIN — MICONAZOLE NITRATE: 20 CREAM TOPICAL at 17:31

## 2023-01-01 RX ADMIN — ENOXAPARIN SODIUM 100 MG: 100 INJECTION SUBCUTANEOUS at 20:55

## 2023-01-01 RX ADMIN — MIDODRINE HYDROCHLORIDE 10 MG: 5 TABLET ORAL at 06:14

## 2023-01-01 RX ADMIN — CEFAZOLIN SODIUM 1000 MG: 1 SOLUTION INTRAVENOUS at 05:49

## 2023-01-01 RX ADMIN — LACTULOSE 30 G: 20 SOLUTION ORAL at 20:57

## 2023-01-01 RX ADMIN — LIDOCAINE HYDROCHLORIDE 10 ML: 20 SOLUTION ORAL; TOPICAL at 21:16

## 2023-01-01 RX ADMIN — ALBUMIN (HUMAN) 25 G: 0.25 INJECTION, SOLUTION INTRAVENOUS at 22:30

## 2023-01-01 RX ADMIN — HYDROMORPHONE HYDROCHLORIDE 0.3 MG: 1 INJECTION, SOLUTION INTRAMUSCULAR; INTRAVENOUS; SUBCUTANEOUS at 15:14

## 2023-01-01 RX ADMIN — MIDODRINE HYDROCHLORIDE 15 MG: 5 TABLET ORAL at 16:10

## 2023-01-01 RX ADMIN — ENOXAPARIN SODIUM 100 MG: 100 INJECTION SUBCUTANEOUS at 08:46

## 2023-01-01 RX ADMIN — NOREPINEPHRINE BITARTRATE 4 MCG/MIN: 1 INJECTION, SOLUTION, CONCENTRATE INTRAVENOUS at 05:49

## 2023-01-01 RX ADMIN — MIDODRINE HYDROCHLORIDE 10 MG: 5 TABLET ORAL at 18:40

## 2023-01-01 RX ADMIN — CEFAZOLIN SODIUM 1000 MG: 1 SOLUTION INTRAVENOUS at 14:11

## 2023-01-01 RX ADMIN — HEPARIN SODIUM 18 UNITS/KG/HR: 10000 INJECTION, SOLUTION INTRAVENOUS at 07:38

## 2023-01-01 RX ADMIN — LACTULOSE 10 G: 20 SOLUTION ORAL at 09:38

## 2023-01-01 RX ADMIN — ALBUMIN (HUMAN) 25 G: 0.25 INJECTION, SOLUTION INTRAVENOUS at 22:29

## 2023-01-01 RX ADMIN — MIDODRINE HYDROCHLORIDE 15 MG: 5 TABLET ORAL at 16:40

## 2023-01-01 RX ADMIN — RIFAXIMIN 550 MG: 550 TABLET ORAL at 09:31

## 2023-01-01 RX ADMIN — FLUCONAZOLE 200 MG: 200 TABLET ORAL at 08:55

## 2023-01-01 RX ADMIN — SODIUM BICARBONATE 1300 MG: 650 TABLET ORAL at 17:15

## 2023-01-01 RX ADMIN — MIDODRINE HYDROCHLORIDE 15 MG: 5 TABLET ORAL at 15:47

## 2023-01-01 RX ADMIN — MICONAZOLE NITRATE: 20 CREAM TOPICAL at 10:28

## 2023-01-01 RX ADMIN — METRONIDAZOLE 500 MG: 500 INJECTION, SOLUTION INTRAVENOUS at 02:29

## 2023-01-01 RX ADMIN — SODIUM BICARBONATE 1300 MG: 650 TABLET ORAL at 08:46

## 2023-01-01 RX ADMIN — MIDODRINE HYDROCHLORIDE 15 MG: 5 TABLET ORAL at 16:31

## 2023-01-01 RX ADMIN — ALBUMIN (HUMAN) 25 G: 0.25 INJECTION, SOLUTION INTRAVENOUS at 04:42

## 2023-01-01 RX ADMIN — MICONAZOLE NITRATE: 20 CREAM TOPICAL at 18:08

## 2023-01-01 RX ADMIN — ENOXAPARIN SODIUM 100 MG: 100 INJECTION SUBCUTANEOUS at 21:24

## 2023-01-01 RX ADMIN — PANTOPRAZOLE SODIUM 40 MG: 40 TABLET, DELAYED RELEASE ORAL at 05:25

## 2023-01-01 RX ADMIN — MICONAZOLE NITRATE: 20 CREAM TOPICAL at 18:12

## 2023-01-01 RX ADMIN — ALBUMIN (HUMAN) 25 G: 0.25 INJECTION, SOLUTION INTRAVENOUS at 16:19

## 2023-01-01 RX ADMIN — MELATONIN 6 MG: at 21:01

## 2023-01-01 RX ADMIN — SUCRALFATE 1 G: 1 TABLET ORAL at 16:40

## 2023-01-01 RX ADMIN — SUCRALFATE 1 G: 1 TABLET ORAL at 17:25

## 2023-01-01 RX ADMIN — SUCRALFATE 1 G: 1 TABLET ORAL at 06:03

## 2023-01-01 RX ADMIN — PANTOPRAZOLE SODIUM 40 MG: 40 TABLET, DELAYED RELEASE ORAL at 05:33

## 2023-01-01 RX ADMIN — SODIUM CHLORIDE 75 ML/HR: 0.9 INJECTION, SOLUTION INTRAVENOUS at 09:01

## 2023-01-01 RX ADMIN — MIDODRINE HYDROCHLORIDE 15 MG: 5 TABLET ORAL at 12:42

## 2023-01-01 RX ADMIN — CHLORHEXIDINE GLUCONATE 0.12% ORAL RINSE 15 ML: 1.2 LIQUID ORAL at 21:40

## 2023-01-01 RX ADMIN — MICONAZOLE NITRATE: 20 CREAM TOPICAL at 09:02

## 2023-01-01 RX ADMIN — DEXTROSE MONOHYDRATE 25 ML: 25 INJECTION, SOLUTION INTRAVENOUS at 11:20

## 2023-01-01 RX ADMIN — SUCRALFATE 1 G: 1 TABLET ORAL at 16:31

## 2023-01-01 RX ADMIN — CHLORHEXIDINE GLUCONATE 0.12% ORAL RINSE 15 ML: 1.2 LIQUID ORAL at 21:10

## 2023-01-01 RX ADMIN — ALBUMIN (HUMAN) 25 G: 12.5 INJECTION, SOLUTION INTRAVENOUS at 23:16

## 2023-01-01 RX ADMIN — PHENYLEPHRINE HYDROCHLORIDE 220 MCG/MIN: 50 INJECTION INTRAVENOUS at 20:48

## 2023-01-01 RX ADMIN — SODIUM BICARBONATE 1300 MG: 650 TABLET ORAL at 08:36

## 2023-01-01 RX ADMIN — MIDODRINE HYDROCHLORIDE 20 MG: 5 TABLET ORAL at 21:45

## 2023-01-01 RX ADMIN — PHENYLEPHRINE HYDROCHLORIDE 210 MCG/MIN: 50 INJECTION INTRAVENOUS at 18:10

## 2023-01-01 RX ADMIN — RIFAXIMIN 550 MG: 550 TABLET ORAL at 22:29

## 2023-01-01 RX ADMIN — MIDODRINE HYDROCHLORIDE 15 MG: 5 TABLET ORAL at 06:01

## 2023-01-01 RX ADMIN — HALOPERIDOL LACTATE 0.5 MG: 5 INJECTION, SOLUTION INTRAMUSCULAR at 20:02

## 2023-01-01 RX ADMIN — PHENYLEPHRINE HYDROCHLORIDE 25 MCG/MIN: 50 INJECTION INTRAVENOUS at 22:33

## 2023-01-01 RX ADMIN — CHLORHEXIDINE GLUCONATE 0.12% ORAL RINSE 15 ML: 1.2 LIQUID ORAL at 08:22

## 2023-01-01 RX ADMIN — MIDODRINE HYDROCHLORIDE 15 MG: 5 TABLET ORAL at 16:43

## 2023-01-01 RX ADMIN — SUCRALFATE 1 G: 1 TABLET ORAL at 15:59

## 2023-01-01 RX ADMIN — HYDROMORPHONE HYDROCHLORIDE 0.3 MG: 1 INJECTION, SOLUTION INTRAMUSCULAR; INTRAVENOUS; SUBCUTANEOUS at 00:54

## 2023-01-01 RX ADMIN — SODIUM BICARBONATE 1300 MG: 650 TABLET ORAL at 16:21

## 2023-01-01 RX ADMIN — SUCRALFATE 1 G: 1 TABLET ORAL at 11:30

## 2023-01-01 RX ADMIN — SUCRALFATE 1 G: 1 TABLET ORAL at 11:53

## 2023-01-01 RX ADMIN — SUCRALFATE 1 G: 1 TABLET ORAL at 11:13

## 2023-01-01 RX ADMIN — ALBUMIN (HUMAN) 25 G: 0.25 INJECTION, SOLUTION INTRAVENOUS at 03:05

## 2023-01-01 RX ADMIN — ALBUMIN (HUMAN) 25 G: 0.25 INJECTION, SOLUTION INTRAVENOUS at 22:19

## 2023-01-01 RX ADMIN — SUCRALFATE 1 G: 1 TABLET ORAL at 22:46

## 2023-01-01 RX ADMIN — SUCRALFATE 1 G: 1 TABLET ORAL at 11:41

## 2023-01-01 RX ADMIN — GABAPENTIN 200 MG: 100 CAPSULE ORAL at 17:06

## 2023-01-01 RX ADMIN — SUCRALFATE 1 G: 1 TABLET ORAL at 21:05

## 2023-01-01 RX ADMIN — SUCRALFATE 1 G: 1 TABLET ORAL at 11:28

## 2023-01-01 RX ADMIN — SODIUM CHLORIDE 2 G: 1 TABLET ORAL at 18:40

## 2023-01-01 RX ADMIN — MIDODRINE HYDROCHLORIDE 15 MG: 5 TABLET ORAL at 17:26

## 2023-01-01 RX ADMIN — GABAPENTIN 200 MG: 100 CAPSULE ORAL at 08:16

## 2023-01-01 RX ADMIN — SODIUM BICARBONATE 1300 MG: 650 TABLET ORAL at 16:40

## 2023-01-01 RX ADMIN — SUCRALFATE 1 G: 1 TABLET ORAL at 17:06

## 2023-01-01 RX ADMIN — MIDODRINE HYDROCHLORIDE 20 MG: 5 TABLET ORAL at 08:18

## 2023-01-01 RX ADMIN — CHLORHEXIDINE GLUCONATE 0.12% ORAL RINSE 15 ML: 1.2 LIQUID ORAL at 21:23

## 2023-01-01 RX ADMIN — ALBUMIN (HUMAN) 25 G: 0.25 INJECTION, SOLUTION INTRAVENOUS at 00:03

## 2023-01-01 RX ADMIN — SODIUM CHLORIDE 2 UNITS/HR: 9 INJECTION, SOLUTION INTRAVENOUS at 08:55

## 2023-01-01 RX ADMIN — ALBUMIN (HUMAN) 25 G: 0.25 INJECTION, SOLUTION INTRAVENOUS at 11:28

## 2023-01-01 RX ADMIN — NOREPINEPHRINE BITARTRATE: 1 INJECTION, SOLUTION, CONCENTRATE INTRAVENOUS at 04:39

## 2023-01-01 RX ADMIN — NOREPINEPHRINE BITARTRATE 9 MCG/MIN: 1 INJECTION, SOLUTION, CONCENTRATE INTRAVENOUS at 04:34

## 2023-01-01 RX ADMIN — MICONAZOLE NITRATE 1 APPLICATION.: 20 CREAM TOPICAL at 20:56

## 2023-01-01 RX ADMIN — PHENYLEPHRINE HYDROCHLORIDE 180 MCG/MIN: 50 INJECTION INTRAVENOUS at 08:49

## 2023-01-01 RX ADMIN — MELATONIN 6 MG: at 21:14

## 2023-01-01 RX ADMIN — METRONIDAZOLE 500 MG: 500 INJECTION, SOLUTION INTRAVENOUS at 18:00

## 2023-01-01 RX ADMIN — ALBUMIN (HUMAN) 25 G: 0.25 INJECTION, SOLUTION INTRAVENOUS at 10:00

## 2023-01-01 RX ADMIN — GABAPENTIN 300 MG: 300 CAPSULE ORAL at 08:58

## 2023-01-01 RX ADMIN — SODIUM CHLORIDE, SODIUM GLUCONATE, SODIUM ACETATE, POTASSIUM CHLORIDE, MAGNESIUM CHLORIDE, SODIUM PHOSPHATE, DIBASIC, AND POTASSIUM PHOSPHATE 500 ML: .53; .5; .37; .037; .03; .012; .00082 INJECTION, SOLUTION INTRAVENOUS at 01:58

## 2023-01-01 RX ADMIN — ENOXAPARIN SODIUM 100 MG: 100 INJECTION SUBCUTANEOUS at 09:02

## 2023-01-01 RX ADMIN — GABAPENTIN 300 MG: 300 CAPSULE ORAL at 07:46

## 2023-01-01 RX ADMIN — FLUCONAZOLE 200 MG: 200 TABLET ORAL at 10:14

## 2023-01-01 RX ADMIN — ALBUMIN (HUMAN) 25 G: 0.25 INJECTION, SOLUTION INTRAVENOUS at 16:31

## 2023-01-01 RX ADMIN — MICONAZOLE NITRATE: 20 CREAM TOPICAL at 08:40

## 2023-01-01 RX ADMIN — CALCIUM GLUCONATE 2 G: 20 INJECTION, SOLUTION INTRAVENOUS at 04:31

## 2023-01-01 RX ADMIN — RIFAXIMIN 550 MG: 550 TABLET ORAL at 21:37

## 2023-01-01 RX ADMIN — MIDODRINE HYDROCHLORIDE 15 MG: 5 TABLET ORAL at 17:25

## 2023-01-01 RX ADMIN — OCTREOTIDE ACETATE 200 MCG: 500 INJECTION, SOLUTION INTRAVENOUS; SUBCUTANEOUS at 05:49

## 2023-01-01 RX ADMIN — CALCIUM GLUCONATE 2 G: 20 INJECTION, SOLUTION INTRAVENOUS at 07:42

## 2023-01-01 RX ADMIN — ERGOCALCIFEROL 50000 UNITS: 1.25 CAPSULE, LIQUID FILLED ORAL at 18:45

## 2023-01-01 RX ADMIN — CEFEPIME 2000 MG: 2 INJECTION, POWDER, FOR SOLUTION INTRAVENOUS at 20:10

## 2023-01-01 RX ADMIN — LIDOCAINE HYDROCHLORIDE 10 ML: 20 SOLUTION ORAL; TOPICAL at 16:51

## 2023-01-01 RX ADMIN — HALOPERIDOL LACTATE 0.5 MG: 5 INJECTION, SOLUTION INTRAMUSCULAR at 21:49

## 2023-01-01 RX ADMIN — ALBUMIN (HUMAN) 25 G: 0.25 INJECTION, SOLUTION INTRAVENOUS at 21:39

## 2023-01-01 RX ADMIN — CALCIUM GLUCONATE 2 G: 20 INJECTION, SOLUTION INTRAVENOUS at 10:20

## 2023-01-01 RX ADMIN — ENOXAPARIN SODIUM 100 MG: 100 INJECTION SUBCUTANEOUS at 21:10

## 2023-01-01 RX ADMIN — SUCRALFATE 1 G: 1 TABLET ORAL at 22:13

## 2023-01-01 RX ADMIN — MICONAZOLE NITRATE: 20 CREAM TOPICAL at 18:41

## 2023-01-01 RX ADMIN — MICONAZOLE NITRATE: 20 CREAM TOPICAL at 08:28

## 2023-01-01 RX ADMIN — MIDODRINE HYDROCHLORIDE 15 MG: 5 TABLET ORAL at 11:17

## 2023-01-01 RX ADMIN — PANTOPRAZOLE SODIUM 40 MG: 40 TABLET, DELAYED RELEASE ORAL at 06:21

## 2023-01-01 RX ADMIN — ALBUMIN (HUMAN) 25 G: 0.25 INJECTION, SOLUTION INTRAVENOUS at 05:26

## 2023-01-01 RX ADMIN — ALBUMIN (HUMAN) 25 G: 0.25 INJECTION, SOLUTION INTRAVENOUS at 18:11

## 2023-01-01 RX ADMIN — OCTREOTIDE ACETATE 200 MCG: 500 INJECTION, SOLUTION INTRAVENOUS; SUBCUTANEOUS at 17:14

## 2023-01-01 RX ADMIN — ALBUMIN (HUMAN) 25 G: 0.25 INJECTION, SOLUTION INTRAVENOUS at 00:39

## 2023-01-01 RX ADMIN — SODIUM BICARBONATE 1300 MG: 650 TABLET ORAL at 09:31

## 2023-01-01 RX ADMIN — CEFTRIAXONE SODIUM 2000 MG: 10 INJECTION, POWDER, FOR SOLUTION INTRAVENOUS at 10:29

## 2023-01-01 RX ADMIN — MIDODRINE HYDROCHLORIDE 15 MG: 5 TABLET ORAL at 12:32

## 2023-01-01 RX ADMIN — POTASSIUM CHLORIDE 40 MEQ: 1.5 SOLUTION ORAL at 21:07

## 2023-01-01 RX ADMIN — SUCRALFATE 1 G: 1 TABLET ORAL at 11:56

## 2023-01-01 RX ADMIN — SUCRALFATE 1 G: 1 TABLET ORAL at 16:22

## 2023-01-01 RX ADMIN — SODIUM CHLORIDE, SODIUM GLUCONATE, SODIUM ACETATE, POTASSIUM CHLORIDE, MAGNESIUM CHLORIDE, SODIUM PHOSPHATE, DIBASIC, AND POTASSIUM PHOSPHATE 1000 ML: .53; .5; .37; .037; .03; .012; .00082 INJECTION, SOLUTION INTRAVENOUS at 07:39

## 2023-01-01 RX ADMIN — CEFAZOLIN SODIUM 1000 MG: 1 SOLUTION INTRAVENOUS at 12:58

## 2023-01-01 RX ADMIN — CHLORHEXIDINE GLUCONATE 0.12% ORAL RINSE 15 ML: 1.2 LIQUID ORAL at 20:55

## 2023-01-01 RX ADMIN — COSYNTROPIN 0.25 MG: 0.25 INJECTION, POWDER, LYOPHILIZED, FOR SOLUTION INTRAVENOUS at 06:02

## 2023-01-01 RX ADMIN — SUCRALFATE 1 G: 1 TABLET ORAL at 16:49

## 2023-01-01 RX ADMIN — SODIUM BICARBONATE 1300 MG: 650 TABLET ORAL at 08:15

## 2023-01-01 RX ADMIN — MIDODRINE HYDROCHLORIDE 5 MG: 5 TABLET ORAL at 16:28

## 2023-01-01 RX ADMIN — MIDODRINE HYDROCHLORIDE 10 MG: 5 TABLET ORAL at 06:18

## 2023-01-01 RX ADMIN — MIDAZOLAM 0.5 MG: 1 INJECTION INTRAMUSCULAR; INTRAVENOUS at 14:30

## 2023-01-01 RX ADMIN — GABAPENTIN 200 MG: 100 CAPSULE ORAL at 08:22

## 2023-01-01 RX ADMIN — LIDOCAINE HYDROCHLORIDE 10 ML: 20 SOLUTION ORAL; TOPICAL at 08:03

## 2023-01-01 RX ADMIN — ALBUTEROL SULFATE 10 MG: 2.5 SOLUTION RESPIRATORY (INHALATION) at 08:02

## 2023-01-01 RX ADMIN — SUCRALFATE 1 G: 1 TABLET ORAL at 17:21

## 2023-01-01 RX ADMIN — MIDODRINE HYDROCHLORIDE 15 MG: 5 TABLET ORAL at 17:13

## 2023-01-01 RX ADMIN — METRONIDAZOLE 500 MG: 500 INJECTION, SOLUTION INTRAVENOUS at 08:13

## 2023-01-01 RX ADMIN — SUCRALFATE 1 G: 1 TABLET ORAL at 21:01

## 2023-01-01 RX ADMIN — METRONIDAZOLE 500 MG: 500 INJECTION, SOLUTION INTRAVENOUS at 18:13

## 2023-01-01 RX ADMIN — ALBUMIN (HUMAN) 25 G: 0.25 INJECTION, SOLUTION INTRAVENOUS at 00:24

## 2023-01-01 RX ADMIN — RIFAXIMIN 550 MG: 550 TABLET ORAL at 22:11

## 2023-01-01 RX ADMIN — MELATONIN 6 MG: at 21:21

## 2023-01-01 RX ADMIN — MIDODRINE HYDROCHLORIDE 15 MG: 5 TABLET ORAL at 21:02

## 2023-01-01 RX ADMIN — POTASSIUM & SODIUM PHOSPHATES POWDER PACK 280-160-250 MG 2 PACKET: 280-160-250 PACK at 16:49

## 2023-01-01 RX ADMIN — SUCRALFATE 1 G: 1 TABLET ORAL at 06:21

## 2023-01-01 RX ADMIN — ALBUMIN (HUMAN) 25 G: 0.25 INJECTION, SOLUTION INTRAVENOUS at 01:00

## 2023-01-01 RX ADMIN — MIDODRINE HYDROCHLORIDE 20 MG: 5 TABLET ORAL at 08:46

## 2023-01-01 RX ADMIN — POTASSIUM CHLORIDE 20 MEQ: 1500 TABLET, EXTENDED RELEASE ORAL at 10:13

## 2023-01-01 RX ADMIN — ALBUMIN (HUMAN) 25 G: 0.25 INJECTION, SOLUTION INTRAVENOUS at 10:39

## 2023-01-01 RX ADMIN — SODIUM CHLORIDE 1000 ML: 0.9 INJECTION, SOLUTION INTRAVENOUS at 10:30

## 2023-01-01 RX ADMIN — ENOXAPARIN SODIUM 100 MG: 100 INJECTION SUBCUTANEOUS at 22:39

## 2023-01-01 RX ADMIN — CHLORHEXIDINE GLUCONATE 0.12% ORAL RINSE 15 ML: 1.2 LIQUID ORAL at 21:21

## 2023-01-01 RX ADMIN — CALCITRIOL CAPSULES 0.25 MCG 0.25 MCG: 0.25 CAPSULE ORAL at 08:17

## 2023-01-01 RX ADMIN — POTASSIUM & SODIUM PHOSPHATES POWDER PACK 280-160-250 MG 2 PACKET: 280-160-250 PACK at 10:13

## 2023-01-01 RX ADMIN — CHLORHEXIDINE GLUCONATE 0.12% ORAL RINSE 15 ML: 1.2 LIQUID ORAL at 08:15

## 2023-01-01 RX ADMIN — CHLORHEXIDINE GLUCONATE 0.12% ORAL RINSE 15 ML: 1.2 LIQUID ORAL at 09:38

## 2023-01-01 RX ADMIN — SODIUM CHLORIDE 125 ML/HR: 0.9 INJECTION, SOLUTION INTRAVENOUS at 10:15

## 2023-01-01 RX ADMIN — Medication 5 SPRAY: at 22:27

## 2023-01-01 RX ADMIN — PERFLUTREN 0.4 ML/MIN: 6.52 INJECTION, SUSPENSION INTRAVENOUS at 07:45

## 2023-01-01 RX ADMIN — INSULIN LISPRO 1 UNITS: 100 INJECTION, SOLUTION INTRAVENOUS; SUBCUTANEOUS at 11:19

## 2023-01-01 RX ADMIN — SUCRALFATE 1 G: 1 TABLET ORAL at 11:17

## 2023-01-01 RX ADMIN — SODIUM BICARBONATE 100 ML/HR: 84 INJECTION, SOLUTION INTRAVENOUS at 00:47

## 2023-01-01 RX ADMIN — ALBUMIN (HUMAN) 12.5 G: 0.25 INJECTION, SOLUTION INTRAVENOUS at 12:37

## 2023-01-01 RX ADMIN — CHLORHEXIDINE GLUCONATE 0.12% ORAL RINSE 15 ML: 1.2 LIQUID ORAL at 22:39

## 2023-01-01 RX ADMIN — SODIUM BICARBONATE 100 ML/HR: 84 INJECTION, SOLUTION INTRAVENOUS at 16:30

## 2023-01-01 RX ADMIN — HEPARIN SODIUM 6 UNITS/KG/HR: 10000 INJECTION, SOLUTION INTRAVENOUS at 17:57

## 2023-01-01 RX ADMIN — SODIUM BICARBONATE 1300 MG: 650 TABLET ORAL at 16:31

## 2023-01-01 RX ADMIN — BUMETANIDE 1 MG: 0.25 INJECTION INTRAMUSCULAR; INTRAVENOUS at 18:11

## 2023-01-01 RX ADMIN — SODIUM BICARBONATE 1300 MG: 650 TABLET ORAL at 07:46

## 2023-01-01 RX ADMIN — METRONIDAZOLE 500 MG: 500 INJECTION, SOLUTION INTRAVENOUS at 02:14

## 2023-01-01 RX ADMIN — MICONAZOLE NITRATE: 20 CREAM TOPICAL at 07:47

## 2023-01-01 RX ADMIN — ERGOCALCIFEROL 50000 UNITS: 1.25 CAPSULE, LIQUID FILLED ORAL at 08:49

## 2023-01-01 RX ADMIN — MELATONIN 6 MG: at 20:28

## 2023-01-01 RX ADMIN — LACTULOSE 20 G: 20 SOLUTION ORAL at 08:15

## 2023-01-01 RX ADMIN — VASOPRESSIN 0.04 UNITS/MIN: 20 INJECTION INTRAVENOUS at 17:11

## 2023-01-01 RX ADMIN — ENOXAPARIN SODIUM 100 MG: 100 INJECTION SUBCUTANEOUS at 21:33

## 2023-01-01 RX ADMIN — SUCRALFATE 1 G: 1 TABLET ORAL at 17:01

## 2023-01-01 RX ADMIN — CEFAZOLIN SODIUM 1000 MG: 1 SOLUTION INTRAVENOUS at 06:05

## 2023-01-01 RX ADMIN — MICONAZOLE NITRATE: 20 CREAM TOPICAL at 09:31

## 2023-01-01 RX ADMIN — SUCRALFATE 1 G: 1 TABLET ORAL at 18:40

## 2023-01-01 RX ADMIN — SUCRALFATE 1 G: 1 TABLET ORAL at 21:36

## 2023-01-01 RX ADMIN — MELATONIN 6 MG: at 21:10

## 2023-01-01 RX ADMIN — CHLORHEXIDINE GLUCONATE 0.12% ORAL RINSE 15 ML: 1.2 LIQUID ORAL at 21:33

## 2023-01-01 RX ADMIN — MIDODRINE HYDROCHLORIDE 15 MG: 5 TABLET ORAL at 21:37

## 2023-01-01 RX ADMIN — ENOXAPARIN SODIUM 100 MG: 100 INJECTION SUBCUTANEOUS at 09:20

## 2023-01-01 RX ADMIN — CHLORHEXIDINE GLUCONATE 0.12% ORAL RINSE 15 ML: 1.2 LIQUID ORAL at 21:05

## 2023-01-01 RX ADMIN — CHLORHEXIDINE GLUCONATE 0.12% ORAL RINSE 15 ML: 1.2 LIQUID ORAL at 21:59

## 2023-01-01 RX ADMIN — SODIUM CHLORIDE 75 ML/HR: 0.9 INJECTION, SOLUTION INTRAVENOUS at 01:56

## 2023-01-01 RX ADMIN — MIDODRINE HYDROCHLORIDE 15 MG: 5 TABLET ORAL at 11:41

## 2023-01-01 RX ADMIN — OCTREOTIDE ACETATE 200 MCG: 500 INJECTION, SOLUTION INTRAVENOUS; SUBCUTANEOUS at 00:20

## 2023-01-01 RX ADMIN — POTASSIUM PHOSPHATE, MONOBASIC AND POTASSIUM PHOSPHATE, DIBASIC 30 MMOL: 224; 236 INJECTION, SOLUTION, CONCENTRATE INTRAVENOUS at 08:14

## 2023-01-01 RX ADMIN — MELATONIN 6 MG: at 22:46

## 2023-01-01 RX ADMIN — SUCRALFATE 1 G: 1 TABLET ORAL at 21:07

## 2023-01-01 RX ADMIN — INSULIN LISPRO 1 UNITS: 100 INJECTION, SOLUTION INTRAVENOUS; SUBCUTANEOUS at 17:21

## 2023-01-01 RX ADMIN — NOREPINEPHRINE BITARTRATE: 1 INJECTION, SOLUTION, CONCENTRATE INTRAVENOUS at 17:12

## 2023-01-01 RX ADMIN — ALBUMIN (HUMAN) 25 G: 0.25 INJECTION, SOLUTION INTRAVENOUS at 23:27

## 2023-01-01 RX ADMIN — ENOXAPARIN SODIUM 100 MG: 100 INJECTION SUBCUTANEOUS at 22:46

## 2023-01-01 RX ADMIN — DEXTROSE 500 ML: 5 SOLUTION INTRAVENOUS at 00:12

## 2023-01-01 RX ADMIN — CALCIUM GLUCONATE 2 G: 20 INJECTION, SOLUTION INTRAVENOUS at 03:38

## 2023-01-01 RX ADMIN — ALBUMIN (HUMAN) 25 G: 0.25 INJECTION, SOLUTION INTRAVENOUS at 14:15

## 2023-01-01 RX ADMIN — MELATONIN 6 MG: at 22:39

## 2023-01-01 RX ADMIN — PANTOPRAZOLE SODIUM 40 MG: 40 TABLET, DELAYED RELEASE ORAL at 05:00

## 2023-01-01 RX ADMIN — ENOXAPARIN SODIUM 100 MG: 100 INJECTION SUBCUTANEOUS at 08:04

## 2023-01-01 RX ADMIN — VASOPRESSIN 0.04 UNITS/MIN: 20 INJECTION INTRAVENOUS at 01:58

## 2023-01-01 RX ADMIN — INSULIN LISPRO 1 UNITS: 100 INJECTION, SOLUTION INTRAVENOUS; SUBCUTANEOUS at 16:31

## 2023-01-01 RX ADMIN — MICONAZOLE NITRATE: 20 CREAM TOPICAL at 17:19

## 2023-01-01 RX ADMIN — MIDODRINE HYDROCHLORIDE 15 MG: 5 TABLET ORAL at 17:31

## 2023-01-01 RX ADMIN — CHLORHEXIDINE GLUCONATE 0.12% ORAL RINSE 15 ML: 1.2 LIQUID ORAL at 22:00

## 2023-01-01 RX ADMIN — SODIUM CHLORIDE 1000 ML: 0.9 INJECTION, SOLUTION INTRAVENOUS at 04:15

## 2023-01-01 RX ADMIN — VASOPRESSIN 0.04 UNITS/MIN: 20 INJECTION INTRAVENOUS at 06:39

## 2023-01-01 RX ADMIN — SODIUM BICARBONATE 100 ML/HR: 84 INJECTION, SOLUTION INTRAVENOUS at 22:01

## 2023-01-01 RX ADMIN — LACTULOSE 30 G: 20 SOLUTION ORAL at 08:27

## 2023-01-01 RX ADMIN — INSULIN LISPRO 1 UNITS: 100 INJECTION, SOLUTION INTRAVENOUS; SUBCUTANEOUS at 11:38

## 2023-01-01 RX ADMIN — PHENYLEPHRINE HYDROCHLORIDE 200 MCG/MIN: 50 INJECTION INTRAVENOUS at 04:04

## 2023-01-01 RX ADMIN — GABAPENTIN 300 MG: 300 CAPSULE ORAL at 09:38

## 2023-01-01 RX ADMIN — VANCOMYCIN HYDROCHLORIDE 1000 MG: 1 INJECTION, SOLUTION INTRAVENOUS at 10:20

## 2023-01-01 RX ADMIN — MIDODRINE HYDROCHLORIDE 15 MG: 5 TABLET ORAL at 12:10

## 2023-01-01 RX ADMIN — CHLORHEXIDINE GLUCONATE 0.12% ORAL RINSE 15 ML: 1.2 LIQUID ORAL at 21:01

## 2023-01-01 RX ADMIN — ALBUMIN (HUMAN) 25 G: 12.5 INJECTION, SOLUTION INTRAVENOUS at 00:28

## 2023-01-01 RX ADMIN — SUCRALFATE 1 G: 1 TABLET ORAL at 15:51

## 2023-01-01 RX ADMIN — CALCIUM GLUCONATE 2 G: 20 INJECTION, SOLUTION INTRAVENOUS at 08:38

## 2023-01-01 RX ADMIN — SODIUM CHLORIDE 1000 ML: 0.9 INJECTION, SOLUTION INTRAVENOUS at 08:19

## 2023-01-01 RX ADMIN — PANTOPRAZOLE SODIUM 40 MG: 40 TABLET, DELAYED RELEASE ORAL at 05:40

## 2023-01-01 RX ADMIN — INSULIN HUMAN 10 UNITS: 100 INJECTION, SOLUTION PARENTERAL at 07:52

## 2023-01-01 RX ADMIN — CEFAZOLIN SODIUM 1000 MG: 1 SOLUTION INTRAVENOUS at 21:24

## 2023-01-01 RX ADMIN — RIFAXIMIN 550 MG: 550 TABLET ORAL at 08:47

## 2023-01-01 RX ADMIN — MIDODRINE HYDROCHLORIDE 15 MG: 5 TABLET ORAL at 07:46

## 2023-01-01 RX ADMIN — SUCRALFATE 1 G: 1 TABLET ORAL at 05:40

## 2023-01-09 ENCOUNTER — DOCUMENTATION (OUTPATIENT)
Dept: HEMATOLOGY ONCOLOGY | Facility: CLINIC | Age: 53
End: 2023-01-09

## 2023-01-09 ENCOUNTER — HOSPITAL ENCOUNTER (INPATIENT)
Facility: HOSPITAL | Age: 53
LOS: 2 days | Discharge: HOME | End: 2023-01-12
Attending: EMERGENCY MEDICINE | Admitting: GENERAL PRACTICE

## 2023-01-09 ENCOUNTER — APPOINTMENT (EMERGENCY)
Dept: CT IMAGING | Facility: HOSPITAL | Age: 53
End: 2023-01-09

## 2023-01-09 ENCOUNTER — PATIENT OUTREACH (OUTPATIENT)
Dept: HEMATOLOGY ONCOLOGY | Facility: CLINIC | Age: 53
End: 2023-01-09

## 2023-01-09 ENCOUNTER — TELEPHONE (OUTPATIENT)
Dept: SURGICAL ONCOLOGY | Facility: CLINIC | Age: 53
End: 2023-01-09

## 2023-01-09 DIAGNOSIS — C22.0 HEPATOCELLULAR CARCINOMA (HCC): ICD-10-CM

## 2023-01-09 DIAGNOSIS — R11.2 INTRACTABLE NAUSEA AND VOMITING: Primary | ICD-10-CM

## 2023-01-09 DIAGNOSIS — R11.14 BILIOUS VOMITING WITH NAUSEA: ICD-10-CM

## 2023-01-09 DIAGNOSIS — C22.7 OTHER SPECIFIED CARCINOMAS OF LIVER (HCC): ICD-10-CM

## 2023-01-09 DIAGNOSIS — C02.9 TONGUE CANCER (HCC): ICD-10-CM

## 2023-01-09 DIAGNOSIS — R18.8 OTHER ASCITES: ICD-10-CM

## 2023-01-09 DIAGNOSIS — K62.5 RECTAL BLEEDING: ICD-10-CM

## 2023-01-09 DIAGNOSIS — R18.0 ASCITES, MALIGNANT: ICD-10-CM

## 2023-01-09 DIAGNOSIS — M89.8X5 LYTIC BONE LESION OF HIP: ICD-10-CM

## 2023-01-09 DIAGNOSIS — C79.9 METASTATIC CANCER (HCC): ICD-10-CM

## 2023-01-09 DIAGNOSIS — R10.9 ABDOMINAL PAIN: ICD-10-CM

## 2023-01-09 PROBLEM — E87.1 HYPONATREMIA: Status: ACTIVE | Noted: 2023-01-09

## 2023-01-09 PROBLEM — C22.9 LIVER CANCER (HCC): Status: ACTIVE | Noted: 2023-01-09

## 2023-01-09 PROBLEM — J18.1 LEFT LOWER LOBE CONSOLIDATION (HCC): Status: ACTIVE | Noted: 2023-01-09

## 2023-01-09 LAB
ALBUMIN SERPL BCP-MCNC: 3.2 G/DL (ref 3.5–5)
ALBUMIN SERPL BCP-MCNC: 3.2 G/DL (ref 3.5–5)
ALP SERPL-CCNC: 86 U/L (ref 34–104)
ALP SERPL-CCNC: 86 U/L (ref 34–104)
ALT SERPL W P-5'-P-CCNC: 18 U/L (ref 7–52)
ALT SERPL W P-5'-P-CCNC: 18 U/L (ref 7–52)
AMORPH URATE CRY URNS QL MICRO: ABNORMAL
AMORPH URATE CRY URNS QL MICRO: ABNORMAL
ANION GAP SERPL CALCULATED.3IONS-SCNC: 7 MMOL/L (ref 4–13)
ANION GAP SERPL CALCULATED.3IONS-SCNC: 7 MMOL/L (ref 4–13)
AST SERPL W P-5'-P-CCNC: 39 U/L (ref 13–39)
AST SERPL W P-5'-P-CCNC: 39 U/L (ref 13–39)
ATRIAL RATE: 82 BPM
ATRIAL RATE: 82 BPM
BACTERIA UR QL AUTO: ABNORMAL /HPF
BACTERIA UR QL AUTO: ABNORMAL /HPF
BASOPHILS # BLD AUTO: 0.05 THOUSANDS/ÂΜL (ref 0–0.1)
BASOPHILS # BLD AUTO: 0.05 THOUSANDS/ÂΜL (ref 0–0.1)
BASOPHILS NFR BLD AUTO: 1 % (ref 0–1)
BASOPHILS NFR BLD AUTO: 1 % (ref 0–1)
BILIRUB SERPL-MCNC: 1.12 MG/DL (ref 0.2–1)
BILIRUB SERPL-MCNC: 1.12 MG/DL (ref 0.2–1)
BILIRUB UR QL STRIP: NEGATIVE
BILIRUB UR QL STRIP: NEGATIVE
BUN SERPL-MCNC: 8 MG/DL (ref 5–25)
BUN SERPL-MCNC: 8 MG/DL (ref 5–25)
CALCIUM ALBUM COR SERPL-MCNC: 8.9 MG/DL (ref 8.3–10.1)
CALCIUM ALBUM COR SERPL-MCNC: 8.9 MG/DL (ref 8.3–10.1)
CALCIUM SERPL-MCNC: 8.3 MG/DL (ref 8.4–10.2)
CALCIUM SERPL-MCNC: 8.3 MG/DL (ref 8.4–10.2)
CHLORIDE SERPL-SCNC: 100 MMOL/L (ref 96–108)
CHLORIDE SERPL-SCNC: 100 MMOL/L (ref 96–108)
CLARITY UR: ABNORMAL
CLARITY UR: ABNORMAL
CO2 SERPL-SCNC: 27 MMOL/L (ref 21–32)
CO2 SERPL-SCNC: 27 MMOL/L (ref 21–32)
COLOR UR: YELLOW
COLOR UR: YELLOW
CREAT SERPL-MCNC: 0.72 MG/DL (ref 0.6–1.3)
CREAT SERPL-MCNC: 0.72 MG/DL (ref 0.6–1.3)
EOSINOPHIL # BLD AUTO: 0.05 THOUSAND/ÂΜL (ref 0–0.61)
EOSINOPHIL # BLD AUTO: 0.05 THOUSAND/ÂΜL (ref 0–0.61)
EOSINOPHIL NFR BLD AUTO: 1 % (ref 0–6)
EOSINOPHIL NFR BLD AUTO: 1 % (ref 0–6)
ERYTHROCYTE [DISTWIDTH] IN BLOOD BY AUTOMATED COUNT: 12.2 % (ref 11.6–15.1)
ERYTHROCYTE [DISTWIDTH] IN BLOOD BY AUTOMATED COUNT: 12.2 % (ref 11.6–15.1)
GFR SERPL CREATININE-BSD FRML MDRD: 108 ML/MIN/1.73SQ M
GFR SERPL CREATININE-BSD FRML MDRD: 108 ML/MIN/1.73SQ M
GLUCOSE SERPL-MCNC: 108 MG/DL (ref 65–140)
GLUCOSE SERPL-MCNC: 108 MG/DL (ref 65–140)
GLUCOSE UR STRIP-MCNC: NEGATIVE MG/DL
GLUCOSE UR STRIP-MCNC: NEGATIVE MG/DL
HCT VFR BLD AUTO: 44.2 % (ref 36.5–49.3)
HCT VFR BLD AUTO: 44.2 % (ref 36.5–49.3)
HGB BLD-MCNC: 14.4 G/DL (ref 12–17)
HGB BLD-MCNC: 14.4 G/DL (ref 12–17)
HGB UR QL STRIP.AUTO: NEGATIVE
HGB UR QL STRIP.AUTO: NEGATIVE
IMM GRANULOCYTES # BLD AUTO: 0.03 THOUSAND/UL (ref 0–0.2)
IMM GRANULOCYTES # BLD AUTO: 0.03 THOUSAND/UL (ref 0–0.2)
IMM GRANULOCYTES NFR BLD AUTO: 1 % (ref 0–2)
IMM GRANULOCYTES NFR BLD AUTO: 1 % (ref 0–2)
KETONES UR STRIP-MCNC: NEGATIVE MG/DL
KETONES UR STRIP-MCNC: NEGATIVE MG/DL
LEUKOCYTE ESTERASE UR QL STRIP: NEGATIVE
LEUKOCYTE ESTERASE UR QL STRIP: NEGATIVE
LIPASE SERPL-CCNC: 16 U/L (ref 11–82)
LIPASE SERPL-CCNC: 16 U/L (ref 11–82)
LYMPHOCYTES # BLD AUTO: 0.66 THOUSANDS/ÂΜL (ref 0.6–4.47)
LYMPHOCYTES # BLD AUTO: 0.66 THOUSANDS/ÂΜL (ref 0.6–4.47)
LYMPHOCYTES NFR BLD AUTO: 10 % (ref 14–44)
LYMPHOCYTES NFR BLD AUTO: 10 % (ref 14–44)
MCH RBC QN AUTO: 32.2 PG (ref 26.8–34.3)
MCH RBC QN AUTO: 32.2 PG (ref 26.8–34.3)
MCHC RBC AUTO-ENTMCNC: 32.6 G/DL (ref 31.4–37.4)
MCHC RBC AUTO-ENTMCNC: 32.6 G/DL (ref 31.4–37.4)
MCV RBC AUTO: 99 FL (ref 82–98)
MCV RBC AUTO: 99 FL (ref 82–98)
MONOCYTES # BLD AUTO: 0.69 THOUSAND/ÂΜL (ref 0.17–1.22)
MONOCYTES # BLD AUTO: 0.69 THOUSAND/ÂΜL (ref 0.17–1.22)
MONOCYTES NFR BLD AUTO: 11 % (ref 4–12)
MONOCYTES NFR BLD AUTO: 11 % (ref 4–12)
MUCOUS THREADS UR QL AUTO: ABNORMAL
MUCOUS THREADS UR QL AUTO: ABNORMAL
NEUTROPHILS # BLD AUTO: 4.98 THOUSANDS/ÂΜL (ref 1.85–7.62)
NEUTROPHILS # BLD AUTO: 4.98 THOUSANDS/ÂΜL (ref 1.85–7.62)
NEUTS SEG NFR BLD AUTO: 76 % (ref 43–75)
NEUTS SEG NFR BLD AUTO: 76 % (ref 43–75)
NITRITE UR QL STRIP: NEGATIVE
NITRITE UR QL STRIP: NEGATIVE
NON-SQ EPI CELLS URNS QL MICRO: ABNORMAL /HPF
NON-SQ EPI CELLS URNS QL MICRO: ABNORMAL /HPF
NRBC BLD AUTO-RTO: 0 /100 WBCS
NRBC BLD AUTO-RTO: 0 /100 WBCS
P AXIS: 50 DEGREES
P AXIS: 50 DEGREES
PH UR STRIP.AUTO: 6 [PH]
PH UR STRIP.AUTO: 6 [PH]
PLATELET # BLD AUTO: 154 THOUSANDS/UL (ref 149–390)
PLATELET # BLD AUTO: 154 THOUSANDS/UL (ref 149–390)
PMV BLD AUTO: 10.3 FL (ref 8.9–12.7)
PMV BLD AUTO: 10.3 FL (ref 8.9–12.7)
POTASSIUM SERPL-SCNC: 4 MMOL/L (ref 3.5–5.3)
POTASSIUM SERPL-SCNC: 4 MMOL/L (ref 3.5–5.3)
PR INTERVAL: 146 MS
PR INTERVAL: 146 MS
PROT SERPL-MCNC: 6.1 G/DL (ref 6.4–8.4)
PROT SERPL-MCNC: 6.1 G/DL (ref 6.4–8.4)
PROT UR STRIP-MCNC: ABNORMAL MG/DL
PROT UR STRIP-MCNC: ABNORMAL MG/DL
QRS AXIS: 54 DEGREES
QRS AXIS: 54 DEGREES
QRSD INTERVAL: 88 MS
QRSD INTERVAL: 88 MS
QT INTERVAL: 404 MS
QT INTERVAL: 404 MS
QTC INTERVAL: 472 MS
QTC INTERVAL: 472 MS
RBC # BLD AUTO: 4.47 MILLION/UL (ref 3.88–5.62)
RBC # BLD AUTO: 4.47 MILLION/UL (ref 3.88–5.62)
RBC #/AREA URNS AUTO: ABNORMAL /HPF
RBC #/AREA URNS AUTO: ABNORMAL /HPF
SODIUM SERPL-SCNC: 134 MMOL/L (ref 135–147)
SODIUM SERPL-SCNC: 134 MMOL/L (ref 135–147)
SP GR UR STRIP.AUTO: 1.02 (ref 1–1.03)
SP GR UR STRIP.AUTO: 1.02 (ref 1–1.03)
T WAVE AXIS: 50 DEGREES
T WAVE AXIS: 50 DEGREES
UROBILINOGEN UR STRIP-ACNC: <2 MG/DL
UROBILINOGEN UR STRIP-ACNC: <2 MG/DL
VENTRICULAR RATE: 82 BPM
VENTRICULAR RATE: 82 BPM
WBC # BLD AUTO: 6.46 THOUSAND/UL (ref 4.31–10.16)
WBC # BLD AUTO: 6.46 THOUSAND/UL (ref 4.31–10.16)
WBC #/AREA URNS AUTO: ABNORMAL /HPF
WBC #/AREA URNS AUTO: ABNORMAL /HPF

## 2023-01-09 RX ORDER — MORPHINE SULFATE 4 MG/ML
4 INJECTION, SOLUTION INTRAMUSCULAR; INTRAVENOUS ONCE
Status: COMPLETED | OUTPATIENT
Start: 2023-01-09 | End: 2023-01-09

## 2023-01-09 RX ORDER — ACETAMINOPHEN 325 MG/1
650 TABLET ORAL EVERY 6 HOURS PRN
Status: DISCONTINUED | OUTPATIENT
Start: 2023-01-09 | End: 2023-01-12 | Stop reason: HOSPADM

## 2023-01-09 RX ORDER — ONDANSETRON 2 MG/ML
4 INJECTION INTRAMUSCULAR; INTRAVENOUS ONCE
Status: COMPLETED | OUTPATIENT
Start: 2023-01-09 | End: 2023-01-09

## 2023-01-09 RX ORDER — OXYCODONE HYDROCHLORIDE 5 MG/1
5 TABLET ORAL EVERY 4 HOURS PRN
Status: DISCONTINUED | OUTPATIENT
Start: 2023-01-09 | End: 2023-01-12 | Stop reason: HOSPADM

## 2023-01-09 RX ORDER — HYDROMORPHONE HCL IN WATER/PF 6 MG/30 ML
0.2 PATIENT CONTROLLED ANALGESIA SYRINGE INTRAVENOUS EVERY 2 HOUR PRN
Status: DISCONTINUED | OUTPATIENT
Start: 2023-01-09 | End: 2023-01-09

## 2023-01-09 RX ORDER — ACETAMINOPHEN 325 MG/1
650 TABLET ORAL EVERY 4 HOURS PRN
Status: DISCONTINUED | OUTPATIENT
Start: 2023-01-09 | End: 2023-01-09

## 2023-01-09 RX ORDER — OXYCODONE HYDROCHLORIDE 5 MG/1
2.5 TABLET ORAL EVERY 4 HOURS PRN
Status: DISCONTINUED | OUTPATIENT
Start: 2023-01-09 | End: 2023-01-12 | Stop reason: HOSPADM

## 2023-01-09 RX ORDER — ENOXAPARIN SODIUM 100 MG/ML
40 INJECTION SUBCUTANEOUS DAILY
Status: DISCONTINUED | OUTPATIENT
Start: 2023-01-10 | End: 2023-01-12 | Stop reason: HOSPADM

## 2023-01-09 RX ORDER — HYDROMORPHONE HCL IN WATER/PF 6 MG/30 ML
0.2 PATIENT CONTROLLED ANALGESIA SYRINGE INTRAVENOUS
Status: DISCONTINUED | OUTPATIENT
Start: 2023-01-09 | End: 2023-01-12 | Stop reason: HOSPADM

## 2023-01-09 RX ADMIN — MORPHINE SULFATE 4 MG: 4 INJECTION INTRAVENOUS at 14:14

## 2023-01-09 RX ADMIN — MORPHINE SULFATE 2 MG: 2 INJECTION, SOLUTION INTRAMUSCULAR; INTRAVENOUS at 16:48

## 2023-01-09 RX ADMIN — IOHEXOL 100 ML: 350 INJECTION, SOLUTION INTRAVENOUS at 15:00

## 2023-01-09 RX ADMIN — ONDANSETRON 4 MG: 2 INJECTION INTRAMUSCULAR; INTRAVENOUS at 14:11

## 2023-01-09 RX ADMIN — SODIUM CHLORIDE 500 ML: 0.9 INJECTION, SOLUTION INTRAVENOUS at 14:10

## 2023-01-09 RX ADMIN — ONDANSETRON 4 MG: 2 INJECTION INTRAMUSCULAR; INTRAVENOUS at 16:47

## 2023-01-09 NOTE — ASSESSMENT & PLAN NOTE
History of cholangiocarcinoma in 2020 s/p chemotherapy and ablation and recurrence of liver cancer in April 2022 status post 2 sessions of chemotherapy  Patient currently on Ivosidenib to 50 mg takes 2 tablets once a day- medication for cholangiocarcinoma  Patient has been for about 2 months on medication  Will hold off in the setting oral intolerance

## 2023-01-09 NOTE — H&P
[facility]  H&P- Purvi Andujar 1970, 47 y o  male MRN: [id number]  Unit/Bed#: S -01 Encounter: [id number]  Primary Care Provider: No primary care provider on file  Date and time admitted to hospital: 1/9/2023 12:09 PM    * Nausea and vomiting  Assessment & Plan  Patient on admission withf episodes of vomiting, with nonbloody, bilious stomach content, starting since Friday  Reports poor oral intake, heartburn  Denies chills, fever, dyspnea, chest pain  Medication side effect versus ascitis induced vs malignancy    Plan:  · N p o  for possible IR procedure  · Tigan as needed for nausea and vomiting  Patient with QT prolongation we will hold off on Reglan and Zofran  · Advance diet as tolerated  · GI consulted, appreciated input  Hyponatremia  Assessment & Plan  Lab Results   Component Value Date    SODIUM 134 (L) 01/09/2023    SODIUM 136 05/18/2022    SODIUM 137 12/17/2021     Weekly in the setting of ascites  Will trend daily BMP    Left lower lobe consolidation (HCC)  Assessment & Plan  CT abdomen and pelvis showed left lower lobe consolidation consistent with atelectasis  Underlying pneumonia is occluded  Patient denies chest pain, shortness of breath, cough, fever, chills  Currently on room air  Monitor vital signs and CBC  Pending procalcitonin    Lytic bone lesion of hip  Assessment & Plan  Patient with history of squamous cell cancer of the tongue, cholangiocarcinoma in 2020 s/p therapy and ablation, and recurrent hepatic cancer detected in April 2022 s/p 8 sessions of chemotherapy  Currently on Ivosidenib  CT scan abdomen and pelvis Slight interval enlargement of the right iliac bone lytic metastasis  Oncology team consult in place, appreciated input  Rectal bleeding  Assessment & Plan  Reported 1 episode of rectal bleeding  Bright red blood per rectum happening this morning  Reports also tenderness in the rectal area    Denies hematemesis  Physical exam with blood noted in the perineal area  GI consult in place  Appreciated input  Monitor CBC and hemoglobin    Ascites  Assessment & Plan  Patient presents with distended abdomen that progressively got worse for the past few months  Complains of dull tenderness  CT abdomen and pelvis showing Increasing size of the enhancing peritoneal metastases  Given the patent portal vein and lack of varices, the ascites is likely malignant and related to the peritoneaho implants  Plan:  · IR paracentesis therapeutic and diagnostic  · Ascitic fluid analysis including cytology, culture WBC with differentials  · Patient will be n p o  Liver cancer [facility])  Assessment & Plan  History of cholangiocarcinoma in 2020 s/p chemotherapy and ablation and recurrence of liver cancer in April 2022 status post 2 sessions of chemotherapy  Patient currently on Ivosidenib to 50 mg takes 2 tablets once a day- medication for cholangiocarcinoma  Patient has been for about 2 months on medication  Will hold off in the setting oral intolerance  Tongue cancer [facility])  Assessment & Plan  History of squamous cell carcinoma of the tongue s/p chemoradiation and partial glossectomy as well as bilateral neck dissection with lymph node removal, in remission  Patient with history of Tobacco abuse  Quit smoking    VTE Pharmacologic Prophylaxis: VTE Score: 4 Moderate Risk (Score 3-4) - Pharmacological DVT Prophylaxis Ordered: enoxaparin (Lovenox)  Code Status: Level 1 - Full Code patient  Discussion with family: Updated  (wife) at bedside  Anticipated Length of Stay: Patient will be admitted on an observation basis with an anticipated length of stay of less than 2 midnights secondary to nausea vomiting, ascitis      Chief Complaint: Nausea vomiting, abdominal pain, rectal bleeding    History of Present Illness:  Lizzie Seip is a 47 y o  male with a PMH of squamous cell carcinoma of the tongue, cholangiocarcinoma, metastasis to bone, liver, lungs, tobacco abuse who presents with results of nausea and vomiting, abdominal pain, rectal bleeding, abdominal distention  Nausea vomiting started on Friday patient reports none bloody, bilious stomach content  Patient reports abdominal distention that gradually progressed and for the past few days he reports neurolysed dull pain  On the Friday patient also mentioned episodes of diarrhea for 5 stools  As of this morning patient had one episode on hematochezia  In the ED patient stable, normal vital signs  On physical exam patient presents with abdominal distention and generalized tenderness on palpation  Noted fluid shift  CT scan following large amount of ascites with pneumoperitoneum, concern for malignant origin given patent portal vein and lack of varices  Patient was admitted for IR paracentesis with therapeutic and diagnostic purposes  Patient will also be evaluated by GI considering hematochezia (on rectal exam noted red blood in the perineal area)  Heme-onc team also consulted  Patient was admitted for observation under [facility] service  Review of Systems:  Review of Systems   Constitutional: Positive for appetite change  Negative for chills and fever  HENT: Negative for ear pain and sore throat  Eyes: Negative for pain and visual disturbance  Respiratory: Negative for cough and shortness of breath  Cardiovascular: Negative for chest pain and palpitations  Gastrointestinal: Positive for abdominal distention, abdominal pain, anal bleeding, blood in stool, diarrhea, nausea, rectal pain and vomiting  Genitourinary: Negative for difficulty urinating, dysuria, flank pain and hematuria  Musculoskeletal: Positive for back pain (Abdominal pain radiating to the back)  Negative for arthralgias  Skin: Negative for color change and rash  Neurological: Negative for seizures and syncope     All other systems reviewed and are negative  Past Medical and Surgical History:   Past Medical History:   Diagnosis Date   • 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 [facility])        Past Surgical History:   Procedure Laterality Date   • CT NEEDLE BIOPSY LIVER  10/15/2020   • IR BIOPSY LIVER MASS  5/18/2022       Meds/Allergies:  Prior to Admission medications    Medication Sig Start Date End Date Taking? Authorizing Provider   Ivosidenib 250 MG TABS Take by mouth   Yes Historical Provider, MD   ondansetron (ZOFRAN) 8 mg tablet Take by mouth every 8 (eight) hours as needed for nausea or vomiting    Historical Provider, MD   oxyCODONE-acetaminophen (PERCOCET) 5-325 mg per tablet Take 1 tablet by mouth every 4 (four) hours as needed for moderate pain    Historical Provider, MD   prochlorperazine (COMPAZINE) 10 mg tablet Take 10 mg by mouth every 6 (six) hours as needed for nausea or vomiting    Historical Provider, MD     I have reviewed home medications with patient personally  Allergies: Allergies   Allergen Reactions   • Penicillins Other (See Comments)     As a child had reaction not sure what it was          Social History:  Marital Status: /Civil Union   Occupation:   Patient Pre-hospital Living Situation: With spouse  Patient Pre-hospital Level of Mobility: walks  Patient Pre-hospital Diet Restrictions: none  Substance Use History:   Social History     Substance and Sexual Activity   Alcohol Use Yes    Comment: 1-2 drinks per day     Social History     Tobacco Use   Smoking Status Every Day   • Packs/day: 2 00   • Years: 20 00   • Pack years: 40 00   • Types: Cigarettes   Smokeless Tobacco Current     Social History     Substance and Sexual Activity   Drug Use Not on file       Family History:  Family History   Problem Relation Age of Onset   • Prostate cancer Father        Physical Exam:     Vitals:   Blood Pressure: 122/74 (01/09/23 1908)  Pulse: 77 (01/09/23 1908)  Temperature: 98 3 °F (36 8 °C) (01/09/23 1908)  Temp Source: Oral (01/09/23 1151)  Respirations: 16 (01/09/23 1630)  Weight - Scale: 112 kg (247 lb) (01/09/23 1151)  SpO2: 93 % (01/09/23 1908)    Physical Exam  Vitals and nursing note reviewed  Constitutional:       General: He is not in acute distress  Appearance: He is well-developed  He is not ill-appearing  HENT:      Head: Normocephalic and atraumatic  Eyes:      Conjunctiva/sclera: Conjunctivae normal    Cardiovascular:      Rate and Rhythm: Normal rate and regular rhythm  Heart sounds: No murmur heard  Pulmonary:      Effort: Pulmonary effort is normal  No respiratory distress  Breath sounds: No wheezing or rales  Abdominal:      General: Bowel sounds are normal  There is distension  Palpations: Abdomen is soft  Tenderness: There is abdominal tenderness  There is no right CVA tenderness or left CVA tenderness  Genitourinary:     Comments: Red blood in the perineal area  Musculoskeletal:         General: No swelling  Cervical back: Neck supple  Right lower leg: No edema  Left lower leg: No edema  Skin:     General: Skin is warm and dry  Capillary Refill: Capillary refill takes less than 2 seconds  Neurological:      General: No focal deficit present  Mental Status: He is alert and oriented to person, place, and time  Mental status is at baseline     Psychiatric:         Mood and Affect: Mood normal           Additional Data:     Lab Results:  Results from last 7 days   Lab Units 01/09/23  1242   WBC Thousand/uL 6 46   HEMOGLOBIN g/dL 14 4   HEMATOCRIT % 44 2   PLATELETS Thousands/uL 154   NEUTROS PCT % 76*   LYMPHS PCT % 10*   MONOS PCT % 11   EOS PCT % 1     Results from last 7 days   Lab Units 01/09/23  1242   SODIUM mmol/L 134*   POTASSIUM mmol/L 4 0   CHLORIDE mmol/L 100   CO2 mmol/L 27   BUN mg/dL 8   CREATININE mg/dL 0 72   ANION GAP mmol/L 7   CALCIUM mg/dL 8 3*   ALBUMIN g/dL 3 2*   TOTAL BILIRUBIN mg/dL 1 12*   ALK PHOS U/L 86 ALT U/L 18   AST U/L 39   GLUCOSE RANDOM mg/dL 108                       Lines/Drains:  Invasive Devices     Peripheral Intravenous Line  Duration           Peripheral IV 01/09/23 Left Antecubital <1 day                    Imaging: Reviewed radiology reports from this admission including: abdominal/pelvic CT  CT abdomen pelvis with contrast   Final Result by Fareed Marcano MD (01/09 1611)      Multifocal hepatoma  Increasing size of the enhancing peritoneal metastases  Given the patent portal vein and lack of varices, the ascites is likely malignant and related to the peritoneal implants  Multiple bilateral pulmonary nodules are stable  Slight interval enlargement of the right iliac bone lytic metastasis  Left lower lobe consolidation consistent with atelectasis  Underlying pneumonia is occluded  Right perinephric stranding of uncertain etiology  Diverticulosis  Workstation performed: [id number]         IR INPATIENT Paracentesis    (Results Pending)       EKG and Other Studies Reviewed on Admission:   · EKG: NSR  HR 82, noted QT prolongation 472  ** Please Note: This note has been constructed using a voice recognition system   **

## 2023-01-09 NOTE — ASSESSMENT & PLAN NOTE
History of squamous cell carcinoma of the tongue s/p chemoradiation and partial glossectomy as well as bilateral neck dissection with lymph node removal, in remission  Patient with history of Tobacco abuse      Quit smoking

## 2023-01-09 NOTE — ASSESSMENT & PLAN NOTE
Patient with history of squamous cell cancer of the tongue, cholangiocarcinoma in 2020 s/p therapy and ablation, and recurrent hepatic cancer detected in April 2022 s/p 8 sessions of chemotherapy  Currently on Ivosidenib  CT scan abdomen and pelvis Slight interval enlargement of the right iliac bone lytic metastasis  Oncology team consult in place, appreciated input

## 2023-01-09 NOTE — ED PROVIDER NOTES
History  Chief Complaint   Patient presents with   • Abdominal Pain     Patient c/o abdominal pain and bright red blood after a bowel movement today  Unsure if he has hemorrhoids  This is a 26-year-old male patient with known hepatocellular carcinoma and is under the care of a oncologist who has affiliation with both [facility] and [facility]  He was diagnosed with cancer in 2020  I am able to view some previous interactions however his charts were not merged  He has had previous chemo and radiation and is now on oral chemotherapy  Since December he is getting a distended abdomen and now has a lot of pressure and right upper quadrant pain  It is associated with nausea and vomiting for the last 2 days  Unable to keep anything down  Denies any fever chills headache blurred vision double vision cough chest no sore throat no chest pain or shortness of breath nothing makes it better or worse  Patient is requesting a second opinion and did call [facility] today there is notes documented in his history  He also noted today that he had a bright red bloody stool he believes it may be his hemorrhoids however he is unsure  He is nontoxic in no acute distress  He is not tachycardic or hypotensive  Differential diagnosis includes not limited to worsening of his cellular carcinoma with ascites, rectal bleeding secondary to hemorrhoids or metastasis/true rectal bleeding  Prior to Admission Medications   Prescriptions Last Dose Informant Patient Reported?  Taking?   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)        Past Surgical History:   Procedure Laterality Date   • CT NEEDLE BIOPSY LIVER  10/15/2020   • IR BIOPSY LIVER MASS  5/18/2022       Family History   Problem Relation Age of Onset   • Prostate cancer Father      I have reviewed and agree with the history as documented  E-Cigarette/Vaping     E-Cigarette/Vaping Substances     Social History     Tobacco Use   • Smoking status: Every Day     Packs/day: 2 00     Years: 20 00     Pack years: 40 00     Types: Cigarettes   • Smokeless tobacco: Current   Substance Use Topics   • Alcohol use: Yes     Comment: 1-2 drinks per day       Review of Systems   Constitutional: Negative for chills, diaphoresis, fatigue and fever  HENT: Negative for congestion, ear pain, nosebleeds and sore throat  Eyes: Negative for photophobia, pain, discharge and visual disturbance  Respiratory: Negative for cough, choking, chest tightness, shortness of breath and wheezing  Cardiovascular: Negative for chest pain and palpitations  Gastrointestinal: Positive for abdominal pain, blood in stool, nausea and vomiting  Negative for abdominal distention, anal bleeding, constipation, diarrhea and rectal pain  Genitourinary: Negative for dysuria, flank pain, frequency and hematuria  Musculoskeletal: Negative for arthralgias, back pain, gait problem and joint swelling  Skin: Negative for color change and rash  Neurological: Negative for dizziness, seizures, syncope and headaches  Psychiatric/Behavioral: Negative for behavioral problems and confusion  The patient is not nervous/anxious  All other systems reviewed and are negative  Physical Exam  Physical Exam  Vitals and nursing note reviewed  Constitutional:       General: He is not in acute distress  Appearance: Normal appearance  He is well-developed   He is not ill-appearing, toxic-appearing or diaphoretic  HENT:      Head: Normocephalic and atraumatic  Right Ear: Tympanic membrane, ear canal and external ear normal       Left Ear: Tympanic membrane, ear canal and external ear normal       Nose: Nose normal       Mouth/Throat:      Mouth: Mucous membranes are moist       Pharynx: Oropharynx is clear  No oropharyngeal exudate or posterior oropharyngeal erythema  Eyes:      General: No scleral icterus  Right eye: No discharge  Left eye: No discharge  Conjunctiva/sclera: Conjunctivae normal       Pupils: Pupils are equal, round, and reactive to light  Cardiovascular:      Rate and Rhythm: Normal rate and regular rhythm  Pulmonary:      Effort: Pulmonary effort is normal       Breath sounds: Normal breath sounds  Abdominal:      General: Bowel sounds are normal  There is distension  Palpations: Abdomen is soft  There is fluid wave  Tenderness: There is abdominal tenderness in the right upper quadrant  There is guarding  There is no right CVA tenderness, left CVA tenderness or rebound  Genitourinary:     Rectum: Guaiac result positive  Comments: Nonbleeding nonthrombosed hemorrhoid noted  Musculoskeletal:         General: Normal range of motion  Cervical back: Normal range of motion and neck supple  Right lower leg: No edema  Left lower leg: No edema  Skin:     General: Skin is warm  Capillary Refill: Capillary refill takes less than 2 seconds  Neurological:      General: No focal deficit present  Mental Status: He is alert and oriented to person, place, and time  Mental status is at baseline     Psychiatric:         Mood and Affect: Mood normal          Behavior: Behavior normal          Vital Signs  ED Triage Vitals   Temperature Pulse Respirations Blood Pressure SpO2   01/09/23 1151 01/09/23 1151 01/09/23 1151 01/09/23 1151 01/09/23 1151   97 7 °F (36 5 °C) 97 18 120/80 98 %      Temp Source Heart Rate Source Patient Position - Orthostatic VS BP Location FiO2 (%)   01/09/23 1151 01/09/23 1151 01/09/23 1245 01/09/23 1245 --   Oral Monitor Sitting Right arm       Pain Score       01/09/23 1400       4           Vitals:    01/09/23 1151 01/09/23 1245 01/09/23 1400   BP: 120/80 118/78 117/78   Pulse: 97 80 82   Patient Position - Orthostatic VS:  Sitting Lying         Visual Acuity      ED Medications  Medications   ondansetron (ZOFRAN) injection 4 mg (has no administration in time range)   morphine injection 2 mg (has no administration in time range)   sodium chloride 0 9 % bolus 500 mL (0 mL Intravenous Stopped 1/9/23 1553)   ondansetron (ZOFRAN) injection 4 mg (4 mg Intravenous Given 1/9/23 1411)   morphine injection 4 mg (4 mg Intravenous Given 1/9/23 1414)   iohexol (OMNIPAQUE) 350 MG/ML injection (SINGLE-DOSE) 100 mL (100 mL Intravenous Given 1/9/23 1500)       Diagnostic Studies  Results Reviewed     Procedure Component Value Units Date/Time    Urine Microscopic [[id number]]  (Abnormal) Collected: 01/09/23 1419    Lab Status: Final result Specimen: Urine, Clean Catch Updated: 01/09/23 1446     RBC, UA 1-2 /hpf      WBC, UA 2-4 /hpf      Epithelial Cells Occasional /hpf      Bacteria, UA Occasional /hpf      MUCUS THREADS Moderate     Amorphous Crystals, UA Occasional    UA w Reflex to Microscopic w Reflex to Culture [[id number]]  (Abnormal) Collected: 01/09/23 1419    Lab Status: Final result Specimen: Urine, Clean Catch Updated: 01/09/23 1434     Color, UA Yellow     Clarity, UA Turbid     Specific Lindsay, UA 1 019     pH, UA 6 0     Leukocytes, UA Negative     Nitrite, UA Negative     Protein, UA Trace mg/dl      Glucose, UA Negative mg/dl      Ketones, UA Negative mg/dl      Urobilinogen, UA <2 0 mg/dl      Bilirubin, UA Negative     Occult Blood, UA Negative    Lipase [[id number]]  (Normal) Collected: 01/09/23 1242    Lab Status: Final result Specimen: Blood from Arm, Left Updated: 01/09/23 1307     Lipase 16 u/L Comprehensive metabolic panel [[id number]]  (Abnormal) Collected: 01/09/23 1242    Lab Status: Final result Specimen: Blood from Arm, Left Updated: 01/09/23 1307     Sodium 134 mmol/L      Potassium 4 0 mmol/L      Chloride 100 mmol/L      CO2 27 mmol/L      ANION GAP 7 mmol/L      BUN 8 mg/dL      Creatinine 0 72 mg/dL      Glucose 108 mg/dL      Calcium 8 3 mg/dL      Corrected Calcium 8 9 mg/dL      AST 39 U/L      ALT 18 U/L      Alkaline Phosphatase 86 U/L      Total Protein 6 1 g/dL      Albumin 3 2 g/dL      Total Bilirubin 1 12 mg/dL      eGFR 108 ml/min/1 73sq m     Narrative:      [place] guidelines for Chronic Kidney Disease (CKD):   •  Stage 1 with normal or high GFR (GFR > 90 mL/min/1 73 square meters)  •  Stage 2 Mild CKD (GFR = 60-89 mL/min/1 73 square meters)  •  Stage 3A Moderate CKD (GFR = 45-59 mL/min/1 73 square meters)  •  Stage 3B Moderate CKD (GFR = 30-44 mL/min/1 73 square meters)  •  Stage 4 Severe CKD (GFR = 15-29 mL/min/1 73 square meters)  •  Stage 5 End Stage CKD (GFR <15 mL/min/1 73 square meters)  Note: GFR calculation is accurate only with a steady state creatinine    CBC and differential [[id number]]  (Abnormal) Collected: 01/09/23 1242    Lab Status: Final result Specimen: Blood from Arm, Left Updated: 01/09/23 1249     WBC 6 46 Thousand/uL      RBC 4 47 Million/uL      Hemoglobin 14 4 g/dL      Hematocrit 44 2 %      MCV 99 fL      MCH 32 2 pg      MCHC 32 6 g/dL      RDW 12 2 %      MPV 10 3 fL      Platelets 543 Thousands/uL      nRBC 0 /100 WBCs      Neutrophils Relative 76 %      Immat GRANS % 1 %      Lymphocytes Relative 10 %      Monocytes Relative 11 %      Eosinophils Relative 1 %      Basophils Relative 1 %      Neutrophils Absolute 4 98 Thousands/µL      Immature Grans Absolute 0 03 Thousand/uL      Lymphocytes Absolute 0 66 Thousands/µL      Monocytes Absolute 0 69 Thousand/µL      Eosinophils Absolute 0 05 Thousand/µL      Basophils Absolute 0 05 Thousands/µL                  CT abdomen pelvis with contrast   Final Result by Rocky Hendrix MD (01/09 1611)      Multifocal hepatoma  Increasing size of the enhancing peritoneal metastases  Given the patent portal vein and lack of varices, the ascites is likely malignant and related to the peritoneal implants  Multiple bilateral pulmonary nodules are stable  Slight interval enlargement of the right iliac bone lytic metastasis  Left lower lobe consolidation consistent with atelectasis  Underlying pneumonia is occluded  Right perinephric stranding of uncertain etiology  Diverticulosis  Workstation performed: [id number]                    Procedures  Procedures         ED Course                                             Medical Decision Making  52-year-old male patient known hepatocellular cancer has had increasing size of his abdomen and as of 2 days ago cannot eat or drink anything he has vomiting and nausea and right upper quadrant pain  He is not jaundiced  Patient had CAT scan and blood work to evaluate ascites and cancer pain control antiemetics  He is also looking for a second opinion regarding oncology  Abdominal pain: acute illness or injury     Details: Given morphine x2 with some decrease but not alleviation of the pain  Ascites, malignant: acute illness or injury     Details: Is increasing and will require paracentesis  Intractable nausea and vomiting: acute illness or injury     Details: Given 2 doses of Zofran no longer vomiting but having difficulty keeping liquids down  Metastatic cancer [facility]): acute illness or injury     Details: Increasing with metastasis to right iliac area  Rectal bleeding: acute illness or injury     Details: Heme-negative hemorrhoid noted that is not thrombosed or actively bleeding  H&H stable nothing seen on CT  Amount and/or Complexity of Data Reviewed  Independent Historian: spouse     Details:  And patient  External Data Reviewed: labs, radiology and notes  Details: Charts were not merged I was able to read information regarding patient's past medical history  Labs: ordered  Decision-making details documented in ED Course  Details: All labs were reviewed no significant abnormality that we can treat at this time  Radiology: ordered  Decision-making details documented in ED Course  Details: Please see CT report increasing ascites and increasing metastatic disease to the iliac right  Discussion of management or test interpretation with external provider(s): At this time due to the lack of p o  intake and pain control also because of the significant ascites patient will be admitted to the hospital observation for paracentesis and further evaluation by oncology and medicine    Risk  Prescription drug management  Decision regarding hospitalization  Risk Details:  Moderate to high risk patient has metastatic hepatocellular cancer unable to keep food or fluids down at this time will require paracentesis to help with the abdominal distention and malignant ascites        Disposition  Final diagnoses:   Intractable nausea and vomiting   Ascites, malignant   Abdominal pain   Metastatic cancer ([facility] [address] )   Rectal bleeding     Time reflects when diagnosis was documented in both MDM as applicable and the Disposition within this note     Time User Action Codes Description Comment    1/9/2023  4:35 PM DiNapoli, Mazin Add [R11 2] Intractable nausea and vomiting     1/9/2023  4:36 PM DiNapoli, Mazin Add [R18 0] Ascites, malignant     1/9/2023  4:36 PM DiNapoli, Mazin Add [R10 9] Abdominal pain     1/9/2023  4:36 PM DiNapoli, Mazin Add [C79 9] Metastatic cancer ([facility] [address] )     1/9/2023  4:41 PM DiNapoli, Mazin Add [K62 5] Rectal bleeding       ED Disposition     ED Disposition   Admit    Condition   Stable    Date/Time   Mon Jan 9, 2023  4:35 PM    Comment   Case was discussed with Dr Blanquita Avila and the patient's admission status was agreed to be Admission Status: observation status to the service of Dr Malu Restrepo   Follow-up Information    None         Patient's Medications   Discharge Prescriptions    No medications on file       No discharge procedures on file      PDMP Review     None          ED Provider  Electronically Signed by           Pam Perez PA-C  01/09/23 Faby Butcher PA-C  01/09/23 [id number]

## 2023-01-09 NOTE — ASSESSMENT & PLAN NOTE
Patient presents with distended abdomen that progressively got worse for the past few months  Complains of dull tenderness  CT abdomen and pelvis showing Increasing size of the enhancing peritoneal metastases  Given the patent portal vein and lack of varices, the ascites is likely malignant and related to the peritoneaho implants  Plan:  · IR paracentesis therapeutic and diagnostic  · Ascitic fluid analysis including cytology, culture WBC with differentials    · Patient will be n p o

## 2023-01-09 NOTE — PROGRESS NOTES
Intake received from [facility]  Chart reviewed with Vilma MCCORMACK  Most records are in patients chart  Requested MRI 12/2 done at [facility] be pushed through [facility] to patients chart  Per [address] patients wife requested we NOT reach out to Dr Brian Vaughn office for records at this time

## 2023-01-09 NOTE — PROGRESS NOTES
Chart reviewed for Initial contact, tracking, and assessment  The patient is currently being seen in [facility] for abdominal pain  Navigation will follow up after discharge

## 2023-01-09 NOTE — ASSESSMENT & PLAN NOTE
CT abdomen and pelvis showed left lower lobe consolidation consistent with atelectasis  Underlying pneumonia is occluded    Patient denies chest pain, shortness of breath, cough, fever, chills  Currently on room air  Monitor vital signs and CBC  Pending procalcitonin

## 2023-01-09 NOTE — ASSESSMENT & PLAN NOTE
Patient on admission withf episodes of vomiting, with nonbloody, bilious stomach content, starting since Friday  Reports poor oral intake, heartburn  Denies chills, fever, dyspnea, chest pain  Medication side effect versus ascitis induced vs malignancy    Plan:  · N p o  for possible IR procedure  · Tigan as needed for nausea and vomiting  Patient with QT prolongation we will hold off on Reglan and Zofran  · Advance diet as tolerated  · GI consulted, appreciated input

## 2023-01-09 NOTE — ASSESSMENT & PLAN NOTE
Reported 1 episode of rectal bleeding  Bright red blood per rectum happening this morning  Reports also tenderness in the rectal area  Denies hematemesis  Physical exam with blood noted in the perineal area  GI consult in place  Appreciated input    Monitor CBC and hemoglobin

## 2023-01-09 NOTE — PROGRESS NOTES
Intake received, chart reviewed for need of external records  Outside records requested from:[facility]  Pathology completed on:5/2022 (done at [facility])  Fax:  Phone:  Imaging completed on:12/2/2022  Fax: [phone]  Phone: [phone]    At patient request do not reach out to Dr Bobby Orellana office for records at this time

## 2023-01-10 ENCOUNTER — APPOINTMENT (OUTPATIENT)
Dept: RADIOLOGY | Facility: HOSPITAL | Age: 53
End: 2023-01-10

## 2023-01-10 LAB
ALBUMIN FLD-MCNC: 1.3 G/DL
ALBUMIN FLD-MCNC: 1.3 G/DL
ALBUMIN SERPL BCP-MCNC: 2.6 G/DL (ref 3.5–5)
ALBUMIN SERPL BCP-MCNC: 2.6 G/DL (ref 3.5–5)
ALP SERPL-CCNC: 73 U/L (ref 34–104)
ALP SERPL-CCNC: 73 U/L (ref 34–104)
ALT SERPL W P-5'-P-CCNC: 15 U/L (ref 7–52)
ALT SERPL W P-5'-P-CCNC: 15 U/L (ref 7–52)
ANION GAP SERPL CALCULATED.3IONS-SCNC: 5 MMOL/L (ref 4–13)
ANION GAP SERPL CALCULATED.3IONS-SCNC: 5 MMOL/L (ref 4–13)
APPEARANCE FLD: CLEAR
APPEARANCE FLD: CLEAR
AST SERPL W P-5'-P-CCNC: 32 U/L (ref 13–39)
AST SERPL W P-5'-P-CCNC: 32 U/L (ref 13–39)
BILIRUB SERPL-MCNC: 0.82 MG/DL (ref 0.2–1)
BILIRUB SERPL-MCNC: 0.82 MG/DL (ref 0.2–1)
BUN SERPL-MCNC: 8 MG/DL (ref 5–25)
BUN SERPL-MCNC: 8 MG/DL (ref 5–25)
CALCIUM ALBUM COR SERPL-MCNC: 8.7 MG/DL (ref 8.3–10.1)
CALCIUM ALBUM COR SERPL-MCNC: 8.7 MG/DL (ref 8.3–10.1)
CALCIUM SERPL-MCNC: 7.6 MG/DL (ref 8.4–10.2)
CALCIUM SERPL-MCNC: 7.6 MG/DL (ref 8.4–10.2)
CHLORIDE SERPL-SCNC: 103 MMOL/L (ref 96–108)
CHLORIDE SERPL-SCNC: 103 MMOL/L (ref 96–108)
CO2 SERPL-SCNC: 25 MMOL/L (ref 21–32)
CO2 SERPL-SCNC: 25 MMOL/L (ref 21–32)
COLOR FLD: YELLOW
COLOR FLD: YELLOW
CREAT SERPL-MCNC: 0.58 MG/DL (ref 0.6–1.3)
CREAT SERPL-MCNC: 0.58 MG/DL (ref 0.6–1.3)
EOSINOPHIL NFR FLD MANUAL: 1 %
EOSINOPHIL NFR FLD MANUAL: 1 %
ERYTHROCYTE [DISTWIDTH] IN BLOOD BY AUTOMATED COUNT: 12.4 % (ref 11.6–15.1)
ERYTHROCYTE [DISTWIDTH] IN BLOOD BY AUTOMATED COUNT: 12.4 % (ref 11.6–15.1)
GFR SERPL CREATININE-BSD FRML MDRD: 118 ML/MIN/1.73SQ M
GFR SERPL CREATININE-BSD FRML MDRD: 118 ML/MIN/1.73SQ M
GLUCOSE FLD-MCNC: 105 MG/DL
GLUCOSE FLD-MCNC: 105 MG/DL
GLUCOSE P FAST SERPL-MCNC: 96 MG/DL (ref 65–99)
GLUCOSE P FAST SERPL-MCNC: 96 MG/DL (ref 65–99)
GLUCOSE SERPL-MCNC: 96 MG/DL (ref 65–140)
GLUCOSE SERPL-MCNC: 96 MG/DL (ref 65–140)
HCT VFR BLD AUTO: 41.3 % (ref 36.5–49.3)
HCT VFR BLD AUTO: 41.3 % (ref 36.5–49.3)
HGB BLD-MCNC: 13.6 G/DL (ref 12–17)
HGB BLD-MCNC: 13.6 G/DL (ref 12–17)
LDH FLD L TO P-CCNC: 78 U/L
LDH FLD L TO P-CCNC: 78 U/L
LYMPHOCYTES NFR BLD AUTO: 30 %
LYMPHOCYTES NFR BLD AUTO: 30 %
MCH RBC QN AUTO: 33.1 PG (ref 26.8–34.3)
MCH RBC QN AUTO: 33.1 PG (ref 26.8–34.3)
MCHC RBC AUTO-ENTMCNC: 32.9 G/DL (ref 31.4–37.4)
MCHC RBC AUTO-ENTMCNC: 32.9 G/DL (ref 31.4–37.4)
MCV RBC AUTO: 101 FL (ref 82–98)
MCV RBC AUTO: 101 FL (ref 82–98)
MONO+MESO NFR FLD MANUAL: 6 %
MONO+MESO NFR FLD MANUAL: 6 %
MONOCYTES NFR BLD AUTO: 51 %
MONOCYTES NFR BLD AUTO: 51 %
NEUTS SEG NFR BLD AUTO: 12 %
NEUTS SEG NFR BLD AUTO: 12 %
PATHOLOGY REVIEW: YES
PATHOLOGY REVIEW: YES
PLATELET # BLD AUTO: 129 THOUSANDS/UL (ref 149–390)
PLATELET # BLD AUTO: 129 THOUSANDS/UL (ref 149–390)
PMV BLD AUTO: 10.7 FL (ref 8.9–12.7)
PMV BLD AUTO: 10.7 FL (ref 8.9–12.7)
POTASSIUM SERPL-SCNC: 3.7 MMOL/L (ref 3.5–5.3)
POTASSIUM SERPL-SCNC: 3.7 MMOL/L (ref 3.5–5.3)
PROT FLD-MCNC: 2.2 G/DL
PROT FLD-MCNC: 2.2 G/DL
PROT SERPL-MCNC: 5 G/DL (ref 6.4–8.4)
PROT SERPL-MCNC: 5 G/DL (ref 6.4–8.4)
RBC # BLD AUTO: 4.11 MILLION/UL (ref 3.88–5.62)
RBC # BLD AUTO: 4.11 MILLION/UL (ref 3.88–5.62)
SITE: NORMAL
SITE: NORMAL
SODIUM SERPL-SCNC: 133 MMOL/L (ref 135–147)
SODIUM SERPL-SCNC: 133 MMOL/L (ref 135–147)
TOTAL CELLS COUNTED SPEC: 100
TOTAL CELLS COUNTED SPEC: 100
WBC # BLD AUTO: 4.48 THOUSAND/UL (ref 4.31–10.16)
WBC # BLD AUTO: 4.48 THOUSAND/UL (ref 4.31–10.16)
WBC # FLD MANUAL: 160 /UL
WBC # FLD MANUAL: 160 /UL

## 2023-01-10 PROCEDURE — 0W9G3ZZ DRAINAGE OF PERITONEAL CAVITY, PERCUTANEOUS APPROACH: ICD-10-PCS | Performed by: INTERNAL MEDICINE

## 2023-01-10 PROCEDURE — 0W9G3ZX DRAINAGE OF PERITONEAL CAVITY, PERCUTANEOUS APPROACH, DIAGNOSTIC: ICD-10-PCS | Performed by: INTERNAL MEDICINE

## 2023-01-10 RX ORDER — LIDOCAINE HYDROCHLORIDE 10 MG/ML
INJECTION, SOLUTION EPIDURAL; INFILTRATION; INTRACAUDAL; PERINEURAL AS NEEDED
Status: COMPLETED | OUTPATIENT
Start: 2023-01-10 | End: 2023-01-10

## 2023-01-10 RX ADMIN — ENOXAPARIN SODIUM 40 MG: 40 INJECTION SUBCUTANEOUS at 09:52

## 2023-01-10 RX ADMIN — LIDOCAINE HYDROCHLORIDE 10 ML: 10 INJECTION, SOLUTION EPIDURAL; INFILTRATION; INTRACAUDAL at 12:57

## 2023-01-10 NOTE — UTILIZATION REVIEW
Initial Clinical Review    Observation 1/9 1637 changed to inpatient 1/10 1443  Pt requiring continued stay for Cardiology consult related to prolonged QT on EKG  Admission: Date/Time/Statement:   Admission Orders (From admission, onward)     Ordered        01/10/23 1443  Inpatient Admission  Once            01/09/23 1637  Place in Observation  Once                      Orders Placed This Encounter   Procedures   • Inpatient Admission     Standing Status:   Standing     Number of Occurrences:   1     Order Specific Question:   Level of Care     Answer:   Med Surg [16]     Order Specific Question:   Estimated length of stay     Answer:   More than 2 Midnights     Order Specific Question:   Certification     Answer:   I certify that inpatient services are medically necessary for this patient for a duration of greater than two midnights  See H&P and MD Progress Notes for additional information about the patient's course of treatment  ED Arrival Information     Expected   -    Arrival   1/9/2023 11:41    Acuity   Urgent            Means of arrival   Walk-In    Escorted by   Spouse    Service   Hospitalist    Admission type   Emergency            Arrival complaint   Rectal Bleeding/Abdominal Pain           Chief Complaint   Patient presents with   • Abdominal Pain     Patient c/o abdominal pain and bright red blood after a bowel movement today  Unsure if he has hemorrhoids  Initial Presentation: 47 y o  male with a PMH of squamous cell carcinoma of the tongue, cholangiocarcinoma, metastasis to bone, liver, lungs, tobacco abuse who presents with results of nausea and vomiting, abdominal pain, rectal bleeding, abdominal distention  Nausea vomiting started on Friday patient reports none bloody, bilious stomach content  Patient reports abdominal distention that gradually progressed and for the past few days he reports neurolysed dull pain    On the Friday patient also mentioned episodes of diarrhea for 5 stools  As of this morning patient had one episode on hematochezia  CT scan following large amount of ascites with pneumoperitoneum, concern for malignant origin given patent portal vein and lack of varices  Patient was admitted for IR paracentesis with therapeutic and diagnostic purposes  Plan: Observation for nausea and vomiting, hyponatremia, LLL consolidation, rectal bleeding, ascites, liver cancer: NPO for possible IR procedure, Tigan prn, GI consult, trend BMP and CBC, IR consult, Ascitic fluid analysis including cytology, culture WBC with differentials, hold lvosidenib due to oral intolerance, consult Hem/Onc      1/10 Observation changed to inpatient  Internal medicine: IR consult for paracentesis, Hem/Onc consult as mets increasing  GI consult: continue symptomatic management, would readdress discussion with palliative care for symptom management  Oncology consult: He is on Ivosidenib 500 mg daily, and tolerating this well  There is a question of prolonged QT on EKG and contraindication for continuing this medication  It is my recommendation to hold his medication until cleared by cardiology  We discussed that he has only been on the medication for 2 months and usually we have reassess response to treatment every 3 months  There are no other immediate needs for oncology during this hospitalization but we will continue to follow peripherally  Date: 1/11 Day: 2    Internal medicine: IR paracentesis yesterday- removed 3 9L, awaiting pathology report  Might need another paracentesis  Repeat EKG, QTC WNL, zofran PRN  Trend daily BMP, CBC and Hgb       ED Triage Vitals   Temperature Pulse Respirations Blood Pressure SpO2   01/09/23 1151 01/09/23 1151 01/09/23 1151 01/09/23 1151 01/09/23 1151   97 7 °F (36 5 °C) 97 18 120/80 98 %      Temp Source Heart Rate Source Patient Position - Orthostatic VS BP Location FiO2 (%)   01/09/23 1151 01/09/23 1151 01/09/23 1245 01/09/23 1245 --   Oral Monitor Sitting Right arm       Pain Score       01/09/23 1400       4          Wt Readings from Last 1 Encounters:   01/09/23 112 kg (247 lb)     Additional Vital Signs:     Date/Time Temp Pulse Resp BP MAP (mmHg) SpO2 O2 Device   01/11/23 0900 -- -- -- -- -- 93 % --   01/11/23 07:17:48 98 2 °F (36 8 °C) 67 18 112/67 82 92 % --   01/10/23 22:27:29 98 2 °F (36 8 °C) 76 16 112/66 81 91 % --   01/10/23 13:25:34 -- 71 16 121/67 -- 96 % --   01/10/23 12:55:46 -- 74 16 114/68 -- 97 % --   01/10/23 07:59:50 97 8 °F (36 6 °C) 65 18 114/69 84 95 % --   01/09/23 22:25:07 98 7 °F (37 1 °C) 86 17 121/78 92 94 % --   01/09/23 19:08:33 98 3 °F (36 8 °C) 77 -- 122/74 90 93 % --   01/09/23 1630 -- 82 16 128/76 97 93 % None (Room air)   01/09/23 1400 -- 82 16 117/78 94 97 % None (Room air)   01/09/23 1245 -- 80 16 118/78 94 98 % None (Room air)     Pertinent Labs/Diagnostic Test Results:   IR INPATIENT Paracentesis   Final Result by Guadalupe Levni MD (01/10 1641)   Diagnostic and therapeutic paracentesis  Workstation performed: [id number]         CT abdomen pelvis with contrast   Final Result by Aron Noel MD (01/09 1611)      Multifocal hepatoma  Increasing size of the enhancing peritoneal metastases  Given the patent portal vein and lack of varices, the ascites is likely malignant and related to the peritoneal implants  Multiple bilateral pulmonary nodules are stable  Slight interval enlargement of the right iliac bone lytic metastasis  Left lower lobe consolidation consistent with atelectasis  Underlying pneumonia is occluded  Right perinephric stranding of uncertain etiology  Diverticulosis              Workstation performed: [id number]           1/9 EKG:  Normal sinus rhythm  Low voltage QRS  Abnormal ECG  When compared with ECG of 15-LAURA-2020 11:06,  No significant change was found      Results from last 7 days   Lab Units 01/11/23  0611 01/10/23  0532 01/09/23  1242   WBC Thousand/uL 4  93 4 48 6 46   HEMOGLOBIN g/dL 13 7 13 6 14 4   HEMATOCRIT % 41 9 41 3 44 2   PLATELETS Thousands/uL 110* 129* 154   NEUTROS ABS Thousands/µL 3 62  --  4 98         Results from last 7 days   Lab Units 01/11/23  0611 01/10/23  0532 01/09/23  1242   SODIUM mmol/L 133* 133* 134*   POTASSIUM mmol/L 3 7 3 7 4 0   CHLORIDE mmol/L 104 103 100   CO2 mmol/L 25 25 27   ANION GAP mmol/L 4 5 7   BUN mg/dL 10 8 8   CREATININE mg/dL 0 55* 0 58* 0 72   EGFR ml/min/1 73sq m 120 118 108   CALCIUM mg/dL 7 3* 7 6* 8 3*   MAGNESIUM mg/dL 1 7*  --   --    PHOSPHORUS mg/dL 2 9  --   --      Results from last 7 days   Lab Units 01/10/23  0532 01/09/23  1242   AST U/L 32 39   ALT U/L 15 18   ALK PHOS U/L 73 86   TOTAL PROTEIN g/dL 5 0* 6 1*   ALBUMIN g/dL 2 6* 3 2*   TOTAL BILIRUBIN mg/dL 0 82 1 12*         Results from last 7 days   Lab Units 01/11/23  0611 01/10/23  0532 01/09/23  1242   GLUCOSE RANDOM mg/dL 110 96 108         Results from last 7 days   Lab Units 01/09/23  1242   LIPASE u/L 16                 Results from last 7 days   Lab Units 01/09/23  1419   CLARITY UA  Turbid   COLOR UA  Yellow   SPEC GRAV UA  1 019   PH UA  6 0   GLUCOSE UA mg/dl Negative   KETONES UA mg/dl Negative   BLOOD UA  Negative   PROTEIN UA mg/dl Trace*   NITRITE UA  Negative   BILIRUBIN UA  Negative   UROBILINOGEN UA (BE) mg/dl <2 0   LEUKOCYTES UA  Negative   WBC UA /hpf 2-4*   RBC UA /hpf 1-2   BACTERIA UA /hpf Occasional   EPITHELIAL CELLS WET PREP /hpf Occasional   MUCUS THREADS  Moderate*         ED Treatment:   Medication Administration from 01/09/2023 1141 to 01/09/2023 1844       Date/Time Order Dose Route Action     01/09/2023 1410 EST sodium chloride 0 9 % bolus 500 mL 500 mL Intravenous New Bag     01/09/2023 1411 EST ondansetron (ZOFRAN) injection 4 mg 4 mg Intravenous Given     01/09/2023 1414 EST morphine injection 4 mg 4 mg Intravenous Given     01/09/2023 1500 EST iohexol (OMNIPAQUE) 350 MG/ML injection (SINGLE-DOSE) 100 mL 100 mL Intravenous Given     01/09/2023 1647 EST ondansetron (ZOFRAN) injection 4 mg 4 mg Intravenous Given     01/09/2023 1648 EST morphine injection 2 mg 2 mg Intravenous Given        Past Medical History:   Diagnosis Date   • 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 ) [C79 9]  Abdominal pain [R10 9]  Intractable nausea and vomiting [R11 2]  Bilious vomiting with nausea [R11 14]  Age/Sex: 47 y o  male  Admission Orders:  Scheduled Medications:  enoxaparin, 40 mg, Subcutaneous, Daily  magnesium sulfate, 2 g, Intravenous, Once      Continuous IV Infusions:     PRN Meds:  acetaminophen, 650 mg, Oral, Q6H PRN  HYDROmorphone, 0 2 mg, Intravenous, Q3H PRN  oxyCODONE, 2 5 mg, Oral, Q4H PRN  oxyCODONE, 5 mg, Oral, Q4H PRN  trimethobenzamide, 200 mg, Intramuscular, Q6H PRN        IP CONSULT TO GASTROENTEROLOGY  IP CONSULT TO ONCOLOGY    Network Utilization Review Department  ATTENTION: Please call with any questions or concerns to [phone] and carefully listen to the prompts so that you are directed to the right person  All voicemails are confidential   Izaiah Alberts all requests for admission clinical reviews, approved or denied determinations and any other requests to dedicated fax number below belonging to the campus where the patient is receiving treatment   List of dedicated fax numbers for the Facilities:  [address] [place] [address] DENIALS (Administrative/Medical Necessity) [phone]   [address] [place] [address] (Maternity/NICU/Pediatrics) [phone]   [address] [place]iel  [phone]   [address] [address] [place] [address] [phone]   [address] Shelbi Plummer [facility] [address] [phone]

## 2023-01-10 NOTE — ASSESSMENT & PLAN NOTE
History of squamous cell carcinoma of the tongue s/p chemoradiation and partial glossectomy as well as bilateral neck dissection with lymph node removal, in remission    Patient with history of Tobacco abuse    Not currently smoking

## 2023-01-10 NOTE — ASSESSMENT & PLAN NOTE
Lab Results   Component Value Date    SODIUM 134 (L) 01/09/2023    SODIUM 136 05/18/2022    SODIUM 137 12/17/2021     Weekly in the setting of ascites  Will trend daily BMP

## 2023-01-10 NOTE — ASSESSMENT & PLAN NOTE
Reported 1 episode of rectal bleeding  Bright red blood per rectum happening this morning  Reports also tenderness in the rectal area  Denies hematemesis    Physical exam with blood noted in the perineal area  Hemoglobin 13 6    Plan-  Appreciate gastroenterology recommendations  Monitor CBC and hemoglobin

## 2023-01-10 NOTE — CONSULTS
Consultation - GI   Dariantroy Vazquezmarylu Andujar 47 y o  male MRN: [id number]  Unit/Bed#: S -01 Encounter: [id number]      Assessment/Plan     Assessment:  1  Abdominal distention associated with n/v and pain  - related to malignant ascites in the setting of stage IV cholangiocarcinoma  - going for IR paracentesis this afternoon    2  Cholangiocarcinoma  3  Plan:  - continue symptomatic management  - would readdress discussion with palliative care for symptom management    History of Present Illness   Physician Requesting Consult: Aiyana Montero DO  Reason for Consult / Principal Problem: abdominal distention  Hx and PE limited by:   HPI: Terri Penn is a 47 y o  male with a PMH of previous squamous cell carcinoma of the tongue, current cholangiocarcinoma on oral chemotherapy, metastasis to bone, liver, lungs, tobacco abuse who presents with results of nausea and vomiting, abdominal pain, rectal bleeding, abdominal distention  Patient's symptoms started on 1/6 with left sided abdominal pain that has spread to more generalized pain  He has associated nausea, indigestion, heartburn, and 2 episodes of vomiting on Friday/Saturday that was bilious  The emesis did relieve his pain temporarily  He has increased bloating and abdominal distention  Yesterday, he was having a bowel movement that was initially harder than usual, and then turned to diarrhea  At the end of the bowel movement, he noted blood on his toilet paper as well as some in the bowl  CT imaging showed ascites seemingly malignant in nature and some increase in his mets  The portal veins are patent, splenomegaly is present  Inpatient consult to gastroenterology  Consult performed by: Babs Jaimes DO  Consult ordered by: Aranza Head MD          Review of Systems   Constitutional: Positive for appetite change  Negative for chills and fever  HENT: Negative for trouble swallowing      Respiratory: Positive for shortness of breath (occasionally)  Negative for cough, choking and chest tightness  Cardiovascular: Negative for chest pain, palpitations and leg swelling  Gastrointestinal: Positive for abdominal distention, abdominal pain, anal bleeding, diarrhea and nausea  Negative for blood in stool, constipation and vomiting (not currently)  Genitourinary: Negative for difficulty urinating and frequency  Neurological: Negative for dizziness, weakness, light-headedness and headaches  Historical Information   Past Medical History:   Diagnosis Date   • 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 [facility])      Past Surgical History:   Procedure Laterality Date   • CT NEEDLE BIOPSY LIVER  10/15/2020   • IR BIOPSY LIVER MASS  5/18/2022     Social History   Social History     Substance and Sexual Activity   Alcohol Use Yes    Comment: 1-2 drinks per day     Social History     Substance and Sexual Activity   Drug Use Not on file     E-Cigarette/Vaping     E-Cigarette/Vaping Substances     Social History     Tobacco Use   Smoking Status Every Day   • Packs/day: 2 00   • Years: 20 00   • Pack years: 40 00   • Types: Cigarettes   Smokeless Tobacco Current     Family History:   Family History   Problem Relation Age of Onset   • Prostate cancer Father        Meds/Allergies   all current active meds have been reviewed    Allergies   Allergen Reactions   • Penicillins Other (See Comments)     As a child had reaction not sure what it was  Objective       Intake/Output Summary (Last 24 hours) at 1/10/2023 0833  Last data filed at 1/9/2023 1911  Gross per 24 hour   Intake 500 ml   Output 300 ml   Net 200 ml       Invasive Devices:   Peripheral IV 01/09/23 Left Antecubital (Active)   Site Assessment WDL 01/09/23 1930   Dressing Type Transparent 01/09/23 1930   Line Status Flushed;Saline locked 01/09/23 1930   Dressing Status Clean;Dry; Intact 01/09/23 1930   Dressing Change Due 01/13/23 01/09/23 1930   Reason Not Rotated Not due 01/09/23 1930       Physical Exam  Vitals and nursing note reviewed  Constitutional:       General: He is not in acute distress  Appearance: He is well-developed  HENT:      Head: Normocephalic and atraumatic  Eyes:      Extraocular Movements: Extraocular movements intact  Conjunctiva/sclera: Conjunctivae normal    Cardiovascular:      Rate and Rhythm: Normal rate and regular rhythm  Heart sounds: No murmur heard  Pulmonary:      Effort: Pulmonary effort is normal  No respiratory distress  Breath sounds: Normal breath sounds  Abdominal:      General: Bowel sounds are normal  There is distension  Palpations: Abdomen is soft  There is no mass  Tenderness: There is no abdominal tenderness  There is no guarding or rebound  Hernia: No hernia is present  Musculoskeletal:         General: No swelling  Cervical back: Neck supple  Right lower leg: No edema  Left lower leg: No edema  Skin:     General: Skin is warm and dry  Capillary Refill: Capillary refill takes less than 2 seconds  Neurological:      Mental Status: He is alert  Psychiatric:         Mood and Affect: Mood normal          Lab Results: I have personally reviewed pertinent reports  Imaging Studies: I have personally reviewed pertinent films in PACS  EKG, Pathology, and Other Studies: I have personally reviewed pertinent reports  VTE Prophylaxis: Enoxaparin (Lovenox)    Counseling / Coordination of Care  Total floor / unit time spent today 45 minutes  Greater than 50% of total time was spent with the patient and / or family counseling and / or coordination of care

## 2023-01-10 NOTE — CONSULTS
Medical Oncology/Hematology Consult Note  Honorio Fabian, male, 47 y o , 1970,  S /S -01, [id number]     Assessment and Plan  1  Stage IV Cholangiocarcinoma  Mr Sergio Hernandez is a 76-year-old male with metastatic cholangiocarcinoma getting treatment with third line treatment with an outpatient oncologist, Dr Josette Hawley  Presented to the ER with worsening abdominal distention with pain along with diarrhea nausea and vomiting  He was found to have a large amount of ascites which was removed with paracentesis today  Complete history was obtained from the patient as we are unable to view records from Dr Nikia Kirkland office  he is on Ivosidenib 500 mg daily, and tolerating this well  There is a question of prolonged QT on EKG and contraindication for continuing this medication  It is my recommendation to hold his medication until cleared by cardiology  He should also be evaluated with cardio oncology as an outpatient for close monitoring  He will continue to follow with his outpatient oncologist regarding some progression on CT scan  We discussed that he has only been on the medication for 2 months and usually we have reassess response to treatment every 3 months  There are no other immediate needs for oncology during this hospitalization but we will continue to follow peripherally and are available via [facility] text for any questions or concerns  The patient wishes to continue to follow with his outpatient oncologist on follow-up  He does have the number for HOPE line if he wishes to establish care within [facility] in the future  2  History of SCC of the tongue  -Continue to follow with outpatient oncologist    I have discussed this case with Dr Palacios, and they are in agreement with my assessment and plan        Reason for consultation: metastatic cancer, lytic bone lesion of hip    History of present illness:   Honorio Fabian is a 76-year-old male with past medical history of 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/chemo, metastatic cholangiocarcinoma metastatic to the bone/liver/lungs, tobacco abuse  He presented to the emergency room yesterday with nausea, vomiting, abdominal pain, rectal bleeding, and abdominal distention  He states that the symptoms started on Friday and worsened over the weekend which brought him to the emergency room  He does have as needed medications at home for symptoms which she did not take  He had episodes of diarrhea on Friday which likely provoked the rectal bleeding  On exam he had abdominal distention and tenderness to palpation  CT scan demonstrated a large amount of ascites with pneumoperitoneum  This imaging also noted increasing size of peritoneal metastasis, slight enlargement of the right iliac bone lytic lesion, and stable pulmonary nodules  Extensive oncology history obtained from the patient as his outpatient oncologist does not share records with our facility  He was diagnosed with squamous cell carcinoma of the tongue in 2018 he received radiation treatment after surgery along with chemotherapy and immunotherapy at [facility]  He continue to follow with his outpatient oncologist when in December 2020 he was found to have a lesion on his bile duct  It continued to grow and it was determined to be cholangiocarcinoma status post surgery and adjuvant treatment for about 6 months  Prior to May he was MIGEL  In May 2022 it had recurred and he was started on immunotherapy and chemotherapy for 8 cycles this showed progression and he was started on treatment with Ivosidenib 500 mg daily   He has been taking this medication for the last 2 months prior to coming into the hospital     On my exam today he just got back to his room from interventional radiology where they removed 3900 mL from his abdomen with paracentesis  Blood counts appear to be stable    And he states that his symptoms have slightly improved since admission  Review of Systems:   Review of Systems   Constitutional: Positive for appetite change  Negative for activity change, fatigue, fever and unexpected weight change  HENT: Negative  Eyes: Negative for photophobia and visual disturbance  Respiratory: Negative for cough, chest tightness and shortness of breath  Cardiovascular: Negative for chest pain, palpitations and leg swelling  Gastrointestinal: Positive for abdominal distention and abdominal pain  Negative for constipation, diarrhea, nausea and vomiting  Endocrine: Negative for cold intolerance and heat intolerance  Genitourinary: Negative  Musculoskeletal: Negative  Skin: Negative  Neurological: Negative  Hematological: Negative for adenopathy  Does not bruise/bleed easily  Psychiatric/Behavioral: Negative          Past Medical History:   Diagnosis Date   • 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 [facility])        Past Surgical History:   Procedure Laterality Date   • CT NEEDLE BIOPSY LIVER  10/15/2020   • IR BIOPSY LIVER MASS  5/18/2022       Family History   Problem Relation Age of Onset   • Prostate cancer Father        Social History     Socioeconomic History   • Marital status: /Civil Union     Spouse name: None   • Number of children: None   • Years of education: None   • Highest education level: None   Occupational History   • None   Tobacco Use   • Smoking status: Every Day     Packs/day: 2 00     Years: 20 00     Pack years: 40 00     Types: Cigarettes   • Smokeless tobacco: Current   Substance and Sexual Activity   • Alcohol use: Yes     Comment: 1-2 drinks per day   • Drug use: None   • Sexual activity: None   Other Topics Concern   • None   Social History Narrative   • None     Social Determinants of Health     Financial Resource Strain: Not on file   Food Insecurity: Not on file   Transportation Needs: Not on file   Physical Activity: Not on file   Stress: Not on file   Social Connections: Not on file   Intimate Partner Violence: Not on file   Housing Stability: Not on file         Current Facility-Administered Medications:   •  acetaminophen (TYLENOL) tablet 650 mg, 650 mg, Oral, Q6H PRN, Van Mcintosh MD  •  enoxaparin (LOVENOX) subcutaneous injection 40 mg, 40 mg, Subcutaneous, Daily, Ivan Adames MD, 40 mg at 01/10/23 2849  •  HYDROmorphone HCl (DILAUDID) injection 0 2 mg, 0 2 mg, Intravenous, Q3H PRN, Van Mcintosh MD  •  oxyCODONE (ROXICODONE) IR tablet 2 5 mg, 2 5 mg, Oral, Q4H PRN, Ivan Adames MD  •  oxyCODONE (ROXICODONE) IR tablet 5 mg, 5 mg, Oral, Q4H PRN, Ivan Adames MD  •  trimethobenzamide (TIGAN) IM injection 200 mg, 200 mg, Intramuscular, Q6H PRN, Ivan Adames MD    Medications Prior to Admission   Medication   • Ivosidenib 250 MG TABS   • ondansetron (ZOFRAN) 8 mg tablet   • oxyCODONE-acetaminophen (PERCOCET) 5-325 mg per tablet   • prochlorperazine (COMPAZINE) 10 mg tablet       Allergies   Allergen Reactions   • Penicillins Other (See Comments)     As a child had reaction not sure what it was  Physical Exam:    /69   Pulse 65   Temp 97 8 °F (36 6 °C)   Resp 18   Wt 112 kg (247 lb)   SpO2 95%   BMI 33 50 kg/m²     Physical Exam  Constitutional:       General: He is not in acute distress  Appearance: Normal appearance  He is ill-appearing  HENT:      Head: Normocephalic and atraumatic  Eyes:      Extraocular Movements: Extraocular movements intact  Conjunctiva/sclera: Conjunctivae normal    Cardiovascular:      Rate and Rhythm: Normal rate and regular rhythm  Pulses: Normal pulses  Heart sounds: Normal heart sounds  No murmur heard  Pulmonary:      Effort: Pulmonary effort is normal  No respiratory distress  Breath sounds: Normal breath sounds  Abdominal:      General: There is distension  Tenderness: There is abdominal tenderness     Musculoskeletal:         General: Normal range of motion  Cervical back: Normal range of motion  Right lower leg: No edema  Left lower leg: No edema  Skin:     General: Skin is warm and dry  Capillary Refill: Capillary refill takes less than 2 seconds  Coloration: Skin is pale  Skin is not jaundiced  Neurological:      General: No focal deficit present  Mental Status: He is alert and oriented to person, place, and time  Mental status is at baseline  Motor: No weakness  Gait: Gait normal    Psychiatric:         Mood and Affect: Mood normal          Behavior: Behavior normal          Thought Content:  Thought content normal          Judgment: Judgment normal          Recent Results (from the past 48 hour(s))   ECG 12 lead    Collection Time: 01/09/23 12:34 PM   Result Value Ref Range    Ventricular Rate 82 BPM    Atrial Rate 82 BPM    NJ Interval 146 ms    QRSD Interval 88 ms    QT Interval 404 ms    QTC Interval 472 ms    P Axis 50 degrees    QRS Axis 54 degrees    T Wave Axis 50 degrees   CBC and differential    Collection Time: 01/09/23 12:42 PM   Result Value Ref Range    WBC 6 46 4 31 - 10 16 Thousand/uL    RBC 4 47 3 88 - 5 62 Million/uL    Hemoglobin 14 4 12 0 - 17 0 g/dL    Hematocrit 44 2 36 5 - 49 3 %    MCV 99 (H) 82 - 98 fL    MCH 32 2 26 8 - 34 3 pg    MCHC 32 6 31 4 - 37 4 g/dL    RDW 12 2 11 6 - 15 1 %    MPV 10 3 8 9 - 12 7 fL    Platelets 896 712 - 898 Thousands/uL    nRBC 0 /100 WBCs    Neutrophils Relative 76 (H) 43 - 75 %    Immat GRANS % 1 0 - 2 %    Lymphocytes Relative 10 (L) 14 - 44 %    Monocytes Relative 11 4 - 12 %    Eosinophils Relative 1 0 - 6 %    Basophils Relative 1 0 - 1 %    Neutrophils Absolute 4 98 1 85 - 7 62 Thousands/µL    Immature Grans Absolute 0 03 0 00 - 0 20 Thousand/uL    Lymphocytes Absolute 0 66 0 60 - 4 47 Thousands/µL    Monocytes Absolute 0 69 0 17 - 1 22 Thousand/µL    Eosinophils Absolute 0 05 0 00 - 0 61 Thousand/µL    Basophils Absolute 0 05 0 00 - 0 10 Thousands/µL   Comprehensive metabolic panel    Collection Time: 01/09/23 12:42 PM   Result Value Ref Range    Sodium 134 (L) 135 - 147 mmol/L    Potassium 4 0 3 5 - 5 3 mmol/L    Chloride 100 96 - 108 mmol/L    CO2 27 21 - 32 mmol/L    ANION GAP 7 4 - 13 mmol/L    BUN 8 5 - 25 mg/dL    Creatinine 0 72 0 60 - 1 30 mg/dL    Glucose 108 65 - 140 mg/dL    Calcium 8 3 (L) 8 4 - 10 2 mg/dL    Corrected Calcium 8 9 8 3 - 10 1 mg/dL    AST 39 13 - 39 U/L    ALT 18 7 - 52 U/L    Alkaline Phosphatase 86 34 - 104 U/L    Total Protein 6 1 (L) 6 4 - 8 4 g/dL    Albumin 3 2 (L) 3 5 - 5 0 g/dL    Total Bilirubin 1 12 (H) 0 20 - 1 00 mg/dL    eGFR 108 ml/min/1 73sq m   Lipase    Collection Time: 01/09/23 12:42 PM   Result Value Ref Range    Lipase 16 11 - 82 u/L   UA w Reflex to Microscopic w Reflex to Culture    Collection Time: 01/09/23  2:19 PM    Specimen: Urine, Clean Catch   Result Value Ref Range    Color, UA Yellow     Clarity, UA Turbid     Specific [place], UA 1 019 1 003 - 1 030    pH, UA 6 0 4 5, 5 0, 5 5, 6 0, 6 5, 7 0, 7 5, 8 0    Leukocytes, UA Negative Negative    Nitrite, UA Negative Negative    Protein, UA Trace (A) Negative mg/dl    Glucose, UA Negative Negative mg/dl    Ketones, UA Negative Negative mg/dl    Urobilinogen, UA <2 0 <2 0 mg/dl mg/dl    Bilirubin, UA Negative Negative    Occult Blood, UA Negative Negative   Urine Microscopic    Collection Time: 01/09/23  2:19 PM   Result Value Ref Range    RBC, UA 1-2 None Seen, 1-2 /hpf    WBC, UA 2-4 (A) None Seen, 1-2 /hpf    Epithelial Cells Occasional None Seen, Occasional /hpf    Bacteria, UA Occasional None Seen, Occasional /hpf    MUCUS THREADS Moderate (A) None Seen    Amorphous Crystals, UA Occasional    CBC (With Platelets)    Collection Time: 01/10/23  5:32 AM   Result Value Ref Range    WBC 4 48 4 31 - 10 16 Thousand/uL    RBC 4 11 3 88 - 5 62 Million/uL    Hemoglobin 13 6 12 0 - 17 0 g/dL    Hematocrit 41 3 36 5 - 49 3 %     (H) 82 - 98 fL MCH 33 1 26 8 - 34 3 pg    MCHC 32 9 31 4 - 37 4 g/dL    RDW 12 4 11 6 - 15 1 %    Platelets 877 (L) 891 - 390 Thousands/uL    MPV 10 7 8 9 - 12 7 fL   Comprehensive metabolic panel    Collection Time: 01/10/23  5:32 AM   Result Value Ref Range    Sodium 133 (L) 135 - 147 mmol/L    Potassium 3 7 3 5 - 5 3 mmol/L    Chloride 103 96 - 108 mmol/L    CO2 25 21 - 32 mmol/L    ANION GAP 5 4 - 13 mmol/L    BUN 8 5 - 25 mg/dL    Creatinine 0 58 (L) 0 60 - 1 30 mg/dL    Glucose 96 65 - 140 mg/dL    Glucose, Fasting 96 65 - 99 mg/dL    Calcium 7 6 (L) 8 4 - 10 2 mg/dL    Corrected Calcium 8 7 8 3 - 10 1 mg/dL    AST 32 13 - 39 U/L    ALT 15 7 - 52 U/L    Alkaline Phosphatase 73 34 - 104 U/L    Total Protein 5 0 (L) 6 4 - 8 4 g/dL    Albumin 2 6 (L) 3 5 - 5 0 g/dL    Total Bilirubin 0 82 0 20 - 1 00 mg/dL    eGFR 118 ml/min/1 73sq m       CT abdomen pelvis with contrast    Result Date: 1/9/2023  Narrative: CT ABDOMEN AND PELVIS WITH IV CONTRAST INDICATION:   Hepatic cancer that is established now with ascites and right upper quadrant pain  COMPARISON:  October 24, 2022 TECHNIQUE:  CT examination of the abdomen and pelvis was performed  Axial, sagittal, and coronal 2D reformatted images were created from the source data and submitted for interpretation  Radiation dose length product (DLP) for this visit:  2832 mGy-cm   This examination, like all CT scans performed in the [facility], was performed utilizing techniques to minimize radiation dose exposure, including the use of iterative reconstruction and automated exposure control  IV Contrast:  100 mL of iohexol (OMNIPAQUE) Enteric Contrast:  Enteric contrast was not administered  FINDINGS: ABDOMEN LOWER CHEST:  Left lower lobe consolidation consistent with atelectasis  Pneumonia or aspiration are not excluded  Subsegmental right lower lobe atelectasis  Bilateral pulmonary nodules are stable   LIVER/BILIARY TREE:  The lateral segment left lobe is heterogeneous due to multiple hypodense nodules which may represent multifocal hepatoma or a single large mass  Scattered hypodensities liver history which are overall unchanged  Post ablation changes are noted at the junction of segment 4A/8  There The main portal vein, right portal vein and left portal vein are patent  GALLBLADDER:  Gallbladder is not identified  SPLEEN:  The spleen is enlarged, measuring 16 cm  PANCREAS:  Unremarkable  ADRENAL GLANDS:  Unremarkable  KIDNEYS/URETERS:  One or more simple renal cyst(s) is noted  New perinephric stranding on the right may be related to the ascites  Clinical correlation to exclude infection  Otherwise unremarkable kidneys  No hydronephrosis  STOMACH AND BOWEL:  There is colonic diverticulosis without evidence of acute diverticulitis  APPENDIX:  A normal appendix was visualized  ABDOMINOPELVIC CAVITY:  Large volume ascites  No pneumoperitoneum  Prominent portal lymph nodes measuring up to 1 cm transverse diameter  Multiple enhancing nodules along the pleural surface adjacent to the liver  The largest on series 1 image 29 measures 5 3 x 2 0 cm and is slightly increased in size compared to the prior study (6 x 1 4 cm)  Enhancing 4 mm nodule along the peritoneal wall in the right lower quadrant (301/77)  VESSELS:  Atherosclerotic changes are present  No evidence of aneurysm  No varices  PELVIS REPRODUCTIVE ORGANS:  Unremarkable for patient's age  URINARY BLADDER:  Unremarkable  ABDOMINAL WALL/INGUINAL REGIONS:  Umbilical hernia containing ascites  OSSEOUS STRUCTURES:  2 3 x 2 1 cm lytic lesion in the right iliac bone with disruption of the cortex consistent with a metastasis  Impression: Multifocal hepatoma  Increasing size of the enhancing peritoneal metastases  Given the patent portal vein and lack of varices, the ascites is likely malignant and related to the peritoneal implants  Multiple bilateral pulmonary nodules are stable   Slight interval enlargement of the right iliac bone lytic metastasis  Left lower lobe consolidation consistent with atelectasis  Underlying pneumonia is occluded  Right perinephric stranding of uncertain etiology  Diverticulosis  Workstation performed: [id number]       Labs and pertinent reports reviewed  This note has been generated by voice recognition software system  Therefore, there may be spelling, grammar, and or syntax errors  Please contact if questions arise

## 2023-01-10 NOTE — ASSESSMENT & PLAN NOTE
Patient presents with distended abdomen that progressively got worse for the past few months  Complains of dull tenderness  CT abdomen and pelvis showing Increasing size of the enhancing peritoneal metastases  Given the patent portal vein and lack of varices, the ascites is likely malignant and related to the peritoneaho implants      Plan:  · IR paracentesis therapeutic and diagnostic  · Ascitic fluid analysis including cytology, culture WBC with differentials

## 2023-01-10 NOTE — BRIEF OP NOTE (RAD/CATH)
INTERVENTIONAL RADIOLOGY PROCEDURE NOTE    Date: 1/10/2023    Procedure:   Paracentesis    Preoperative diagnosis:   1  Intractable nausea and vomiting    2  Ascites, malignant    3  Abdominal pain    4  Metastatic cancer ([address] )    5  Rectal bleeding    6  Bilious vomiting with nausea    7  Tongue cancer ([facility] [address] )    8  Hepatocellular carcinoma ([facility] [address] )    9  Lytic bone lesion of hip         Postoperative diagnosis: Same  Surgeon: Mitch Higuera MD     Assistant: None  No qualified resident was available  Blood loss: 1 mL    Specimens: Ascites fluid sent to the laboratory     Findings: Ultrasound-guided paracentesis performed  Complications: None immediate      Anesthesia: local

## 2023-01-10 NOTE — ASSESSMENT & PLAN NOTE
Lab Results   Component Value Date    SODIUM 133 (L) 01/10/2023    SODIUM 134 (L) 01/09/2023    SODIUM 136 05/18/2022     Weekly in the setting of ascites  Improved    Plan  Will trend daily BMP

## 2023-01-10 NOTE — DISCHARGE INSTRUCTIONS
Abdominal Paracentesis     WHAT YOU NEED TO KNOW:   Abdominal paracentesis is a procedure to remove abnormal fluid buildup in your abdomen  Fluid builds up because of liver problems, such as swelling and scarring  Heart failure, kidney disease, a mass, or problems with your pancreas may also cause fluid buildup  DISCHARGE INSTRUCTIONS:     Follow up with your healthcare provider as directed: Write down your questions so you remember to ask them during your visits  Wound care: Remove dressing after 24 hours  Leave glue in place  Return to your normal activities    Contact Interventional Radiology at [phone] [place] PATIENTS: Contact Interventional Radiology at [phone]) Kody Kline PATIENTS: Contact Interventional Radiology at [phone]) if:  You have a fever and your wound is red and swollen  You have yellow, green, or bad-smelling discharge coming from your wound  You have pain or swelling in your abdomen  You have an upset stomach or you vomit  You have sudden, sharp pain in your abdomen  You urinate very little or not at all  You feel confused and more tired than usual    Your arm or leg feels warm, tender, and painful  It may look swollen and red  You suddenly feel lightheaded and have trouble breathing

## 2023-01-10 NOTE — ASSESSMENT & PLAN NOTE
History of cholangiocarcinoma in 2020 s/p chemotherapy and ablation and recurrence of liver cancer in April 2022 status post 8 sessions of chemotherapy  Patient currently on Ivosidenib to 50 mg takes 2 tablets once a day- medication for cholangiocarcinoma      Patient has been for about 2 months on medication    Plan-  Plan to restart chemotherapy inpatient after paracentesis

## 2023-01-10 NOTE — PROGRESS NOTES
[facility]  Progress Note Velvet Feast Artchitracarlie 1970, 47 y o  male MRN: [id number]  Unit/Bed#: S -Liu Encounter: [id number]  Primary Care Provider: No primary care provider on file  Date and time admitted to hospital: 1/9/2023 12:09 PM    Ascites  Assessment & Plan  Patient presents with distended abdomen that progressively got worse for the past few months  Complains of dull tenderness  CT abdomen and pelvis showing Increasing size of the enhancing peritoneal metastases  Given the patent portal vein and lack of varices, the ascites is likely malignant and related to the peritoneaho implants  Plan:  · IR paracentesis therapeutic and diagnostic  · Ascitic fluid analysis including cytology, culture WBC with differentials    * Nausea and vomiting  Assessment & Plan  Patient on admission withf episodes of vomiting, with nonbloody, bilious stomach content, starting since Friday  Reports poor oral intake, heartburn  Denies chills, fever, dyspnea, chest pain  Medication side effect versus ascitis induced vs malignancy    Plan:  · Pt was NPO for possible GI intervention or imaging   · Tigan as needed for nausea and vomiting  Patient with QT prolongation we will hold off on Reglan and Zofran  · Appreciate gastroenterology recommendations  · Advance diet as tolerated after paracentesis    Hyponatremia  Assessment & Plan  Lab Results   Component Value Date    SODIUM 133 (L) 01/10/2023    SODIUM 134 (L) 01/09/2023    SODIUM 136 05/18/2022     Weekly in the setting of ascites  Improved    Plan  Will trend daily BMP    Left lower lobe consolidation ([address] )  Assessment & Plan  CT abdomen and pelvis showed left lower lobe consolidation consistent with atelectasis  Underlying pneumonia is occluded    Patient denies chest pain, shortness of breath, cough, fever, chills  Currently on room air    Plan-  Monitor vital signs and CBC  Procal-pending    Lytic bone lesion of hip  Assessment & Plan  Patient with history of squamous cell cancer of the tongue, cholangiocarcinoma in 2020 s/p therapy and ablation, and recurrent hepatic cancer detected in April 2022 s/p 8 sessions of chemotherapy  Currently on Ivosidenib  CT scan abdomen and pelvis Slight interval enlargement of the right iliac bone lytic metastasis  Plan-  Appreciate oncology recommendations      Rectal bleeding  Assessment & Plan  Reported 1 episode of rectal bleeding  Bright red blood per rectum happening this morning  Reports also tenderness in the rectal area  Denies hematemesis  Physical exam with blood noted in the perineal area  Hemoglobin 13 6    Plan-  Appreciate gastroenterology recommendations  Monitor CBC and hemoglobin    Liver cancer [facility])  Assessment & Plan  History of cholangiocarcinoma in 2020 s/p chemotherapy and ablation and recurrence of liver cancer in April 2022 status post 8 sessions of chemotherapy  Patient currently on Ivosidenib to 50 mg takes 2 tablets once a day- medication for cholangiocarcinoma  Patient has been for about 2 months on medication    Plan-  Plan to restart chemotherapy inpatient after paracentesis    Tongue cancer [facility])  Assessment & Plan  History of squamous cell carcinoma of the tongue s/p chemoradiation and partial glossectomy as well as bilateral neck dissection with lymph node removal, in remission  Patient with history of Tobacco abuse    Not currently smoking        VTE Pharmacologic Prophylaxis: VTE Score: 4 Moderate Risk (Score 3-4) - Pharmacological DVT Prophylaxis Ordered: enoxaparin (Lovenox)  Patient Centered Rounds: I performed bedside rounds with nursing staff today  Discussions with Specialists or Other Care Team Provider: Interventional radiology, gastroenterology, oncology    Education and Discussions with Family / Patient: Updated  (wife) via phone      Current Length of Stay: 0 day(s)  Current Patient Status: Observation   Discharge Plan: Anticipate discharge in 24-48 hrs to home  Code Status: Level 1 - Full Code    Subjective:   Patient was seen and examined at the bedside  Patient was resting comfortably in no acute distress  Patient is complaining of right-sided abdominal pain  Patient reports he feels well from admission yesterday  Patient denies headache, dizziness, fatigue, NVD, rectal bleeding, leg pain, or any other symptoms at this time  Objective:     Vitals:   Temp (24hrs), Av 3 °F (36 8 °C), Min:97 8 °F (36 6 °C), Max:98 7 °F (37 1 °C)    Temp:  [97 8 °F (36 6 °C)-98 7 °F (37 1 °C)] 97 8 °F (36 6 °C)  HR:  [65-86] 74  Resp:  [16-18] 16  BP: (114-128)/(68-78) 114/68  SpO2:  [93 %-97 %] 97 %  Body mass index is 33 5 kg/m²  Input and Output Summary (last 24 hours): Intake/Output Summary (Last 24 hours) at 1/10/2023 1313  Last data filed at 2023 1911  Gross per 24 hour   Intake 500 ml   Output 300 ml   Net 200 ml       Physical Exam:   Physical Exam  Vitals and nursing note reviewed  Constitutional:       General: He is not in acute distress  Appearance: Normal appearance  He is well-developed  He is not ill-appearing, toxic-appearing or diaphoretic  HENT:      Head: Normocephalic and atraumatic  Mouth/Throat:      Mouth: Mucous membranes are moist    Eyes:      Extraocular Movements: Extraocular movements intact  Conjunctiva/sclera: Conjunctivae normal       Pupils: Pupils are equal, round, and reactive to light  Cardiovascular:      Rate and Rhythm: Normal rate and regular rhythm  Pulses: Normal pulses  Heart sounds: Normal heart sounds  No murmur heard  No friction rub  No gallop  Pulmonary:      Effort: Pulmonary effort is normal  No respiratory distress  Breath sounds: Wheezing present  No rhonchi or rales  Comments: Inspiratory and expiratory wheezes bilaterally  Chest:      Chest wall: No tenderness     Abdominal:      General: Bowel sounds are normal  There is distension  Palpations: Abdomen is soft  Tenderness: There is abdominal tenderness  There is no right CVA tenderness or left CVA tenderness  Musculoskeletal:         General: Swelling present  No tenderness  Normal range of motion  Cervical back: Normal range of motion and neck supple  Right lower leg: No edema  Left lower leg: No edema  Skin:     General: Skin is warm and dry  Capillary Refill: Capillary refill takes less than 2 seconds  Neurological:      General: No focal deficit present  Mental Status: He is alert and oriented to person, place, and time  Mental status is at baseline  Psychiatric:         Mood and Affect: Mood normal          Behavior: Behavior normal          Thought Content:  Thought content normal           Additional Data:     Labs:  Results from last 7 days   Lab Units 01/10/23  0532 01/09/23  1242   WBC Thousand/uL 4 48 6 46   HEMOGLOBIN g/dL 13 6 14 4   HEMATOCRIT % 41 3 44 2   PLATELETS Thousands/uL 129* 154   NEUTROS PCT %  --  76*   LYMPHS PCT %  --  10*   MONOS PCT %  --  11   EOS PCT %  --  1     Results from last 7 days   Lab Units 01/10/23  0532   SODIUM mmol/L 133*   POTASSIUM mmol/L 3 7   CHLORIDE mmol/L 103   CO2 mmol/L 25   BUN mg/dL 8   CREATININE mg/dL 0 58*   ANION GAP mmol/L 5   CALCIUM mg/dL 7 6*   ALBUMIN g/dL 2 6*   TOTAL BILIRUBIN mg/dL 0 82   ALK PHOS U/L 73   ALT U/L 15   AST U/L 32   GLUCOSE RANDOM mg/dL 96                       Lines/Drains:  Invasive Devices     Peripheral Intravenous Line  Duration           Peripheral IV 01/09/23 Left Antecubital 1 day                      Imaging: Reviewed radiology reports from this admission including: abdominal/pelvic CT    Recent Cultures (last 7 days):         Last 24 Hours Medication List:   Current Facility-Administered Medications   Medication Dose Route Frequency Provider Last Rate   • acetaminophen  650 mg Oral Q6H PRN Van Malhotra MD     • enoxaparin  40 mg Subcutaneous Daily Linda Koo MD     • HYDROmorphone  0 2 mg Intravenous Q3H PRN Vilma Shaffer MD     • lidocaine (PF)    PRN Darnella Fabry, MD     • oxyCODONE  2 5 mg Oral Q4H PRN Linda Koo MD     • oxyCODONE  5 mg Oral Q4H PRN Linda Koo MD     • trimethobenzamide  200 mg Intramuscular Q6H PRN Linda Koo MD          Today, Patient Was Seen By: Christine Rivas MD    **Please Note: This note may have been constructed using a voice recognition system  **

## 2023-01-10 NOTE — ASSESSMENT & PLAN NOTE
Patient with history of squamous cell cancer of the tongue, cholangiocarcinoma in 2020 s/p therapy and ablation, and recurrent hepatic cancer detected in April 2022 s/p 8 sessions of chemotherapy  Currently on Ivosidenib  CT scan abdomen and pelvis Slight interval enlargement of the right iliac bone lytic metastasis        Plan-  Appreciate oncology recommendations

## 2023-01-10 NOTE — ASSESSMENT & PLAN NOTE
Patient on admission withf episodes of vomiting, with nonbloody, bilious stomach content, starting since Friday  Reports poor oral intake, heartburn  Denies chills, fever, dyspnea, chest pain  Medication side effect versus ascitis induced vs malignancy    Plan:  · Pt was NPO for possible GI intervention or imaging   · Tigan as needed for nausea and vomiting    Patient with QT prolongation we will hold off on Reglan and Zofran  · Appreciate gastroenterology recommendations  · Advance diet as tolerated after paracentesis

## 2023-01-10 NOTE — ASSESSMENT & PLAN NOTE
CT abdomen and pelvis showed left lower lobe consolidation consistent with atelectasis  Underlying pneumonia is occluded    Patient denies chest pain, shortness of breath, cough, fever, chills  Currently on room air    Plan-  Monitor vital signs and CBC  Procal-pending

## 2023-01-11 PROBLEM — R11.2 NAUSEA AND VOMITING: Status: RESOLVED | Noted: 2023-01-09 | Resolved: 2023-01-11

## 2023-01-11 LAB
ANION GAP SERPL CALCULATED.3IONS-SCNC: 4 MMOL/L (ref 4–13)
ANION GAP SERPL CALCULATED.3IONS-SCNC: 4 MMOL/L (ref 4–13)
ATRIAL RATE: 69 BPM
ATRIAL RATE: 69 BPM
BASOPHILS # BLD AUTO: 0.03 THOUSANDS/ÂΜL (ref 0–0.1)
BASOPHILS # BLD AUTO: 0.03 THOUSANDS/ÂΜL (ref 0–0.1)
BASOPHILS NFR BLD AUTO: 1 % (ref 0–1)
BASOPHILS NFR BLD AUTO: 1 % (ref 0–1)
BUN SERPL-MCNC: 10 MG/DL (ref 5–25)
BUN SERPL-MCNC: 10 MG/DL (ref 5–25)
CALCIUM SERPL-MCNC: 7.3 MG/DL (ref 8.4–10.2)
CALCIUM SERPL-MCNC: 7.3 MG/DL (ref 8.4–10.2)
CHLORIDE SERPL-SCNC: 104 MMOL/L (ref 96–108)
CHLORIDE SERPL-SCNC: 104 MMOL/L (ref 96–108)
CO2 SERPL-SCNC: 25 MMOL/L (ref 21–32)
CO2 SERPL-SCNC: 25 MMOL/L (ref 21–32)
CREAT SERPL-MCNC: 0.55 MG/DL (ref 0.6–1.3)
CREAT SERPL-MCNC: 0.55 MG/DL (ref 0.6–1.3)
EOSINOPHIL # BLD AUTO: 0.08 THOUSAND/ÂΜL (ref 0–0.61)
EOSINOPHIL # BLD AUTO: 0.08 THOUSAND/ÂΜL (ref 0–0.61)
EOSINOPHIL NFR BLD AUTO: 2 % (ref 0–6)
EOSINOPHIL NFR BLD AUTO: 2 % (ref 0–6)
ERYTHROCYTE [DISTWIDTH] IN BLOOD BY AUTOMATED COUNT: 12.3 % (ref 11.6–15.1)
ERYTHROCYTE [DISTWIDTH] IN BLOOD BY AUTOMATED COUNT: 12.3 % (ref 11.6–15.1)
GFR SERPL CREATININE-BSD FRML MDRD: 120 ML/MIN/1.73SQ M
GFR SERPL CREATININE-BSD FRML MDRD: 120 ML/MIN/1.73SQ M
GLUCOSE SERPL-MCNC: 110 MG/DL (ref 65–140)
GLUCOSE SERPL-MCNC: 110 MG/DL (ref 65–140)
HCT VFR BLD AUTO: 41.9 % (ref 36.5–49.3)
HCT VFR BLD AUTO: 41.9 % (ref 36.5–49.3)
HGB BLD-MCNC: 13.7 G/DL (ref 12–17)
HGB BLD-MCNC: 13.7 G/DL (ref 12–17)
IMM GRANULOCYTES # BLD AUTO: 0.01 THOUSAND/UL (ref 0–0.2)
IMM GRANULOCYTES # BLD AUTO: 0.01 THOUSAND/UL (ref 0–0.2)
IMM GRANULOCYTES NFR BLD AUTO: 0 % (ref 0–2)
IMM GRANULOCYTES NFR BLD AUTO: 0 % (ref 0–2)
LYMPHOCYTES # BLD AUTO: 0.65 THOUSANDS/ÂΜL (ref 0.6–4.47)
LYMPHOCYTES # BLD AUTO: 0.65 THOUSANDS/ÂΜL (ref 0.6–4.47)
LYMPHOCYTES NFR BLD AUTO: 13 % (ref 14–44)
LYMPHOCYTES NFR BLD AUTO: 13 % (ref 14–44)
MAGNESIUM SERPL-MCNC: 1.7 MG/DL (ref 1.9–2.7)
MAGNESIUM SERPL-MCNC: 1.7 MG/DL (ref 1.9–2.7)
MCH RBC QN AUTO: 32.3 PG (ref 26.8–34.3)
MCH RBC QN AUTO: 32.3 PG (ref 26.8–34.3)
MCHC RBC AUTO-ENTMCNC: 32.7 G/DL (ref 31.4–37.4)
MCHC RBC AUTO-ENTMCNC: 32.7 G/DL (ref 31.4–37.4)
MCV RBC AUTO: 99 FL (ref 82–98)
MCV RBC AUTO: 99 FL (ref 82–98)
MONOCYTES # BLD AUTO: 0.54 THOUSAND/ÂΜL (ref 0.17–1.22)
MONOCYTES # BLD AUTO: 0.54 THOUSAND/ÂΜL (ref 0.17–1.22)
MONOCYTES NFR BLD AUTO: 11 % (ref 4–12)
MONOCYTES NFR BLD AUTO: 11 % (ref 4–12)
NEUTROPHILS # BLD AUTO: 3.62 THOUSANDS/ÂΜL (ref 1.85–7.62)
NEUTROPHILS # BLD AUTO: 3.62 THOUSANDS/ÂΜL (ref 1.85–7.62)
NEUTS SEG NFR BLD AUTO: 73 % (ref 43–75)
NEUTS SEG NFR BLD AUTO: 73 % (ref 43–75)
NRBC BLD AUTO-RTO: 0 /100 WBCS
NRBC BLD AUTO-RTO: 0 /100 WBCS
P AXIS: 14 DEGREES
P AXIS: 14 DEGREES
PHOSPHATE SERPL-MCNC: 2.9 MG/DL (ref 2.7–4.5)
PHOSPHATE SERPL-MCNC: 2.9 MG/DL (ref 2.7–4.5)
PLATELET # BLD AUTO: 110 THOUSANDS/UL (ref 149–390)
PLATELET # BLD AUTO: 110 THOUSANDS/UL (ref 149–390)
PMV BLD AUTO: 10.3 FL (ref 8.9–12.7)
PMV BLD AUTO: 10.3 FL (ref 8.9–12.7)
POTASSIUM SERPL-SCNC: 3.7 MMOL/L (ref 3.5–5.3)
POTASSIUM SERPL-SCNC: 3.7 MMOL/L (ref 3.5–5.3)
PR INTERVAL: 106 MS
PR INTERVAL: 106 MS
QRS AXIS: 13 DEGREES
QRS AXIS: 13 DEGREES
QRSD INTERVAL: 92 MS
QRSD INTERVAL: 92 MS
QT INTERVAL: 440 MS
QT INTERVAL: 440 MS
QTC INTERVAL: 471 MS
QTC INTERVAL: 471 MS
RBC # BLD AUTO: 4.24 MILLION/UL (ref 3.88–5.62)
RBC # BLD AUTO: 4.24 MILLION/UL (ref 3.88–5.62)
SODIUM SERPL-SCNC: 133 MMOL/L (ref 135–147)
SODIUM SERPL-SCNC: 133 MMOL/L (ref 135–147)
T WAVE AXIS: 54 DEGREES
T WAVE AXIS: 54 DEGREES
VENTRICULAR RATE: 69 BPM
VENTRICULAR RATE: 69 BPM
WBC # BLD AUTO: 4.93 THOUSAND/UL (ref 4.31–10.16)
WBC # BLD AUTO: 4.93 THOUSAND/UL (ref 4.31–10.16)

## 2023-01-11 RX ORDER — POTASSIUM CHLORIDE 20 MEQ/1
20 TABLET, EXTENDED RELEASE ORAL ONCE
Status: COMPLETED | OUTPATIENT
Start: 2023-01-11 | End: 2023-01-11

## 2023-01-11 RX ORDER — MAGNESIUM SULFATE HEPTAHYDRATE 40 MG/ML
2 INJECTION, SOLUTION INTRAVENOUS ONCE
Status: COMPLETED | OUTPATIENT
Start: 2023-01-11 | End: 2023-01-12

## 2023-01-11 RX ADMIN — ENOXAPARIN SODIUM 40 MG: 40 INJECTION SUBCUTANEOUS at 09:11

## 2023-01-11 RX ADMIN — POTASSIUM CHLORIDE 20 MEQ: 1500 TABLET, EXTENDED RELEASE ORAL at 10:36

## 2023-01-11 RX ADMIN — POTASSIUM CHLORIDE 20 MEQ: 1500 TABLET, EXTENDED RELEASE ORAL at 10:34

## 2023-01-11 RX ADMIN — MAGNESIUM SULFATE HEPTAHYDRATE 2 G: 40 INJECTION, SOLUTION INTRAVENOUS at 09:12

## 2023-01-11 NOTE — ASSESSMENT & PLAN NOTE
Reported 1 episode of rectal bleeding  Bright red blood per rectum happening this morning  Reports also tenderness in the rectal area  Denies hematemesis    Physical exam with blood noted in the perineal area  Hemoglobin 13 7  Stable    Plan-  Appreciate gastroenterology recommendations- If sx reoccur, possible consideration for colonoscopy  Monitor CBC and hemoglobin Mother

## 2023-01-11 NOTE — ASSESSMENT & PLAN NOTE
History of cholangiocarcinoma in 2020 s/p chemotherapy and ablation and recurrence of liver cancer in April 2022 status post 8 sessions of chemotherapy  Patient currently on Ivosidenib to 50 mg takes 2 tablets once a day- medication for cholangiocarcinoma    Patient has been for about 2 months on medication    [id number] Hematology Oncology recommendations- Recommends cardiology oncology consult and close monitoring as prolonged QTC has been reported, Ivosidenib needs to bee reassessed every 3 months regardless, no inpatient intervention at this time  Pt plans to switch to [facility] for further management

## 2023-01-11 NOTE — ASSESSMENT & PLAN NOTE
History of squamous cell carcinoma of the tongue s/p chemoradiation and partial glossectomy as well as bilateral neck dissection with lymph node removal, in remission    Patient with history of Tobacco abuse    Not currently smoking Left message for son-in-law Chuy regarding X-ray result.  Negative for TB.  We can fax result over to facility if needed.  Chuy instructed to call back with instructions.

## 2023-01-11 NOTE — ASSESSMENT & PLAN NOTE
Reported 1 episode of rectal bleeding  Bright red blood per rectum happening this morning  Reports also tenderness in the rectal area  Denies hematemesis    Physical exam with blood noted in the perineal area  Hemoglobin 13 7  Stable    Plan-  Appreciate gastroenterology recommendations- If sx reoccur, possible consideration for colonoscopy  Monitor CBC and hemoglobin

## 2023-01-11 NOTE — ASSESSMENT & PLAN NOTE
CT abdomen and pelvis showed left lower lobe consolidation consistent with atelectasis  Underlying pneumonia is occluded    Patient denies chest pain, shortness of breath, cough, fever, chills  Currently on room air    Plan-  Monitor vital signs and CBC  Monitor off ABX

## 2023-01-11 NOTE — PROGRESS NOTES
[facility]  Progress Note Jason Chao Detrixhe 1970, 47 y o  male MRN: [id number]  Unit/Bed#: S -01 Encounter: [id number]  Primary Care Provider: No primary care provider on file  Date and time admitted to hospital: 1/9/2023 12:09 PM    Ascites  Assessment & Plan  Patient presents with distended abdomen that progressively got worse for the past few months  Complains of dull tenderness  CT abdomen and pelvis showing Increasing size of the enhancing peritoneal metastases  Given the patent portal vein and lack of varices, the ascites is likely malignant and related to the peritoneaho implants  Fluid  Protein 2 2, Albumin 1 3, LD 78, Gram stain no bacteria or polys   SAAG 1 1- Indicating likely portal HTN     Plan:  · IR paracentesis yesterday- removed 3 9L, awaiting pathology report  · Will monitor another day, might need another paracentesis or follow up as outpatient     * Nausea and vomiting-resolved as of 1/11/2023  Assessment & Plan  Patient on admission withf episodes of vomiting, with nonbloody, bilious stomach content, starting since Friday  Reports poor oral intake, heartburn  Denies chills, fever, dyspnea, chest pain  Medication side effect versus ascitis induced vs malignancy    Plan  · Repeat EKG, QTC WNL, zofran PRN  · Appreciate gastroenterology recommendations- Sx management     Hyponatremia  Assessment & Plan  Lab Results   Component Value Date    SODIUM 133 (L) 01/11/2023    SODIUM 133 (L) 01/10/2023    SODIUM 134 (L) 01/09/2023   Likely in the setting of ascites and initial poor po intake  Stable    Plan  Will trend daily BMP    Left lower lobe consolidation ([address] )  Assessment & Plan  CT abdomen and pelvis showed left lower lobe consolidation consistent with atelectasis  Underlying pneumonia is occluded    Patient denies chest pain, shortness of breath, cough, fever, chills  Currently on room air    Plan-  Monitor vital signs and CBC  Monitor off ABX      Lytic bone lesion of hip  Assessment & Plan  Patient with history of squamous cell cancer of the tongue, cholangiocarcinoma in 2020 s/p therapy and ablation, and recurrent hepatic cancer detected in April 2022 s/p 8 sessions of chemotherapy  Currently on Ivosidenib  CT scan abdomen and pelvis Slight interval enlargement of the right iliac bone lytic metastasis  Plan-  Appreciate oncology recommendations- see liver CA plan    Rectal bleeding  Assessment & Plan  Reported 1 episode of rectal bleeding  Bright red blood per rectum happening this morning  Reports also tenderness in the rectal area  Denies hematemesis  Physical exam with blood noted in the perineal area  Hemoglobin 13 7  Stable    Plan-  Appreciate gastroenterology recommendations- If sx reoccur, possible consideration for colonoscopy  Monitor CBC and hemoglobin    Liver cancer [facility])  Assessment & Plan  History of cholangiocarcinoma in 2020 s/p chemotherapy and ablation and recurrence of liver cancer in April 2022 status post 8 sessions of chemotherapy  Patient currently on Ivosidenib to 50 mg takes 2 tablets once a day- medication for cholangiocarcinoma  Patient has been for about 2 months on medication    Plan-  Appreciate Hematology Oncology recommendations- Recommends cardiology oncology consult and close monitoring as prolonged QTC has been reported, Ivosidenib needs to bee reassessed every 3 months regardless, no inpatient intervention at this time  Pt plans to switch to [facility] Hematology Oncology for further management     Tongue cancer [facility])  Assessment & Plan  History of squamous cell carcinoma of the tongue s/p chemoradiation and partial glossectomy as well as bilateral neck dissection with lymph node removal, in remission    Patient with history of Tobacco abuse    Not currently smoking            VTE Pharmacologic Prophylaxis: VTE Score: 4 Moderate Risk (Score 3-4) - Pharmacological DVT Prophylaxis Ordered: enoxaparin (Lovenox)  Patient Centered Rounds: I performed bedside rounds with nursing staff today  Discussions with Specialists or Other Care Team Provider: Gastroenterology, Interventional Radiology     Education and Discussions with Family / Patient: Pt reports he would update significant other  Current Length of Stay: 1 day(s)  Current Patient Status: Inpatient   Discharge Plan: Anticipate discharge in 24-48 hrs to home  Code Status: Level 1 - Full Code    Subjective:   Patient was seen and examined at the bedside resting comfortably in no acute distress  Pt reports that his abdomen feels better from yesterday however thinks the fluid had reaccumulated overnight  Pt denies fever, chills, abdominal pain, nvd, rectal bleeding, leg pain, or any other sx at this time    Objective:     Vitals:   Temp (24hrs), Av 2 °F (36 8 °C), Min:98 2 °F (36 8 °C), Max:98 2 °F (36 8 °C)    Temp:  [98 2 °F (36 8 °C)] 98 2 °F (36 8 °C)  HR:  [67-76] 67  Resp:  [16-18] 18  BP: (112)/(66-67) 112/67  SpO2:  [91 %-93 %] 93 %  Body mass index is 33 5 kg/m²  Input and Output Summary (last 24 hours): Intake/Output Summary (Last 24 hours) at 2023 1354  Last data filed at 1/10/2023 1500  Gross per 24 hour   Intake 420 ml   Output --   Net 420 ml       Physical Exam:   Physical Exam  Vitals and nursing note reviewed  Constitutional:       General: He is not in acute distress  Appearance: Normal appearance  He is well-developed  He is not ill-appearing, toxic-appearing or diaphoretic  HENT:      Head: Normocephalic and atraumatic  Mouth/Throat:      Mouth: Mucous membranes are moist    Eyes:      Extraocular Movements: Extraocular movements intact  Conjunctiva/sclera: Conjunctivae normal       Pupils: Pupils are equal, round, and reactive to light  Cardiovascular:      Rate and Rhythm: Normal rate and regular rhythm  Pulses: Normal pulses  Heart sounds: Normal heart sounds  No murmur heard      No friction rub  No gallop  Pulmonary:      Effort: Pulmonary effort is normal  No respiratory distress  Breath sounds: Normal breath sounds  No wheezing, rhonchi or rales  Chest:      Chest wall: No tenderness  Abdominal:      General: Bowel sounds are normal  There is distension  Palpations: Abdomen is soft  Tenderness: There is no abdominal tenderness  There is no right CVA tenderness or left CVA tenderness  Comments: Slightly improved from yesterday however still tense   Musculoskeletal:         General: No tenderness  Normal range of motion  Cervical back: Normal range of motion and neck supple  Right lower leg: No edema  Left lower leg: No edema  Skin:     General: Skin is warm and dry  Capillary Refill: Capillary refill takes less than 2 seconds  Neurological:      General: No focal deficit present  Mental Status: He is alert and oriented to person, place, and time  Mental status is at baseline  Psychiatric:         Mood and Affect: Mood normal          Behavior: Behavior normal          Thought Content:  Thought content normal           Additional Data:     Labs:  Results from last 7 days   Lab Units 01/11/23  0611   WBC Thousand/uL 4 93   HEMOGLOBIN g/dL 13 7   HEMATOCRIT % 41 9   PLATELETS Thousands/uL 110*   NEUTROS PCT % 73   LYMPHS PCT % 13*   MONOS PCT % 11   EOS PCT % 2     Results from last 7 days   Lab Units 01/11/23  0611 01/10/23  0532   SODIUM mmol/L 133* 133*   POTASSIUM mmol/L 3 7 3 7   CHLORIDE mmol/L 104 103   CO2 mmol/L 25 25   BUN mg/dL 10 8   CREATININE mg/dL 0 55* 0 58*   ANION GAP mmol/L 4 5   CALCIUM mg/dL 7 3* 7 6*   ALBUMIN g/dL  --  2 6*   TOTAL BILIRUBIN mg/dL  --  0 82   ALK PHOS U/L  --  73   ALT U/L  --  15   AST U/L  --  32   GLUCOSE RANDOM mg/dL 110 96                       Lines/Drains:  Invasive Devices     Peripheral Intravenous Line  Duration           Peripheral IV 01/09/23 Left Antecubital 2 days Imaging: Reviewed radiology reports from this admission including: abdominal/pelvic CT and procedure reports    Recent Cultures (last 7 days):   Results from last 7 days   Lab Units 01/10/23  1308   GRAM STAIN RESULT  No Polys or Bacteria seen   BODY FLUID CULTURE, STERILE  No growth       Last 24 Hours Medication List:   Current Facility-Administered Medications   Medication Dose Route Frequency Provider Last Rate   • acetaminophen  650 mg Oral Q6H PRN Yloi Shaffer MD     • enoxaparin  40 mg Subcutaneous Daily Gretchen Ennis MD     • HYDROmorphone  0 2 mg Intravenous Q3H PRN Yoli Shaffer MD     • oxyCODONE  2 5 mg Oral Q4H PRN Gretchen Ennis MD     • oxyCODONE  5 mg Oral Q4H PRN Gretchen Ennis MD     • trimethobenzamide  200 mg Intramuscular Q6H PRN Gretchen Ennis MD          Today, Patient Was Seen By: Yuliet Flanagan MD    **Please Note: This note may have been constructed using a voice recognition system  **

## 2023-01-11 NOTE — ASSESSMENT & PLAN NOTE
Patient presents with distended abdomen that progressively got worse for the past few months  Complains of dull tenderness  CT abdomen and pelvis showing Increasing size of the enhancing peritoneal metastases  Given the patent portal vein and lack of varices, the ascites is likely malignant and related to the peritoneaho implants    Fluid  Protein 2 2, Albumin 1 3, LD 78, Gram stain no bacteria or polys   SAAG 1 1- Indicating likely portal HTN     Plan:  · IR paracentesis yesterday- removed 3 9L, awaiting pathology report  · Will monitor another day, might need another paracentesis or follow up as outpatient

## 2023-01-11 NOTE — DISCHARGE SUMMARY
[facility]  Discharge- Genia Andujar 1970, 47 y o  male MRN: [id number]  Unit/Bed#: S -01 Encounter: [id number]  Primary Care Provider: No primary care provider on file  Date and time admitted to hospital: 1/9/2023 12:09 PM    Ascites  Assessment & Plan  Patient presents with distended abdomen that progressively got worse for the past few months  Complains of dull tenderness  CT abdomen and pelvis showing Increasing size of the enhancing peritoneal metastases  Given the patent portal vein and lack of varices, the ascites is likely malignant and related to the peritoneaho implants  Fluid  Protein 2 2, Albumin 1 3, LD 78, Gram stain no bacteria or polys   SAAG 1 1- Indicating likely portal HTN     Plan:  · IR paracentesis yesterday- removed 3 9L, awaiting pathology report  · Will monitor another day, might need another paracentesis or follow up as outpatient     * Nausea and vomiting-resolved as of 1/11/2023  Assessment & Plan  Patient on admission withf episodes of vomiting, with nonbloody, bilious stomach content, starting since Friday  Reports poor oral intake, heartburn  Denies chills, fever, dyspnea, chest pain  Medication side effect versus ascitis induced vs malignancy    Plan  · Repeat EKG, QTC WNL, zofran PRN  · Appreciate gastroenterology recommendations- Sx management     Hyponatremia  Assessment & Plan  Lab Results   Component Value Date    SODIUM 133 (L) 01/11/2023    SODIUM 133 (L) 01/10/2023    SODIUM 134 (L) 01/09/2023   Likely in the setting of ascites and initial poor po intake  Stable    Plan  Will trend daily BMP    Left lower lobe consolidation ([address] )  Assessment & Plan  CT abdomen and pelvis showed left lower lobe consolidation consistent with atelectasis  Underlying pneumonia is occluded    Patient denies chest pain, shortness of breath, cough, fever, chills  Currently on room air    Plan-  Monitor vital signs and CBC  Monitor off ABX      Lytic bone lesion of hip  Assessment & Plan  Patient with history of squamous cell cancer of the tongue, cholangiocarcinoma in 2020 s/p therapy and ablation, and recurrent hepatic cancer detected in April 2022 s/p 8 sessions of chemotherapy  Currently on Ivosidenib  CT scan abdomen and pelvis Slight interval enlargement of the right iliac bone lytic metastasis  Plan-  Appreciate oncology recommendations- see liver CA plan    Rectal bleeding  Assessment & Plan  Reported 1 episode of rectal bleeding  Bright red blood per rectum happening this morning  Reports also tenderness in the rectal area  Denies hematemesis  Physical exam with blood noted in the perineal area  Hemoglobin 13 7  Stable    Plan-  Appreciate gastroenterology recommendations- If sx reoccur, possible consideration for colonoscopy  Monitor CBC and hemoglobin    Liver cancer [facility])  Assessment & Plan  History of cholangiocarcinoma in 2020 s/p chemotherapy and ablation and recurrence of liver cancer in April 2022 status post 8 sessions of chemotherapy  Patient currently on Ivosidenib to 50 mg takes 2 tablets once a day- medication for cholangiocarcinoma  Patient has been for about 2 months on medication    Plan-  Appreciate Hematology Oncology recommendations- Recommends cardiology oncology consult and close monitoring as prolonged QTC has been reported, Ivosidenib needs to bee reassessed every 3 months regardless, no inpatient intervention at this time  Pt plans to switch to [facility] for further management     Tongue cancer [facility])  Assessment & Plan  History of squamous cell carcinoma of the tongue s/p chemoradiation and partial glossectomy as well as bilateral neck dissection with lymph node removal, in remission    Patient with history of Tobacco abuse    Not currently smoking          VTE Pharmacologic Prophylaxis: VTE Score: 4 High Risk (Score >/= 5) - Pharmacological DVT Prophylaxis Ordered: enoxaparin (Lovenox)  Sequential Compression Devices Ordered  Patient Centered Rounds: I performed bedside rounds with nursing staff today  Discussions with Specialists or Other Care Team Provider: Gastroenterology, Interventional Radiology    Education and Discussions with Family / Patient: Pt reports he will update significant other  Current Length of Stay: 1 day(s)  Current Patient Status: Inpatient   Discharge Plan: Anticipate discharge in 24-48 hrs to home  Code Status: Level 1 - Full Code    Subjective:   Patient was seen and examined at the bedside resting comfortably in no acute distress  Pt reports that his abdomen feels better from yesterday however thinks the fluid had reaccumulated overnight  Pt denies fever, chills, abdominal pain, nvd, rectal bleeding, leg pain, or any other sx at this time  Objective:     Vitals:   Temp (24hrs), Av 2 °F (36 8 °C), Min:98 2 °F (36 8 °C), Max:98 2 °F (36 8 °C)    Temp:  [98 2 °F (36 8 °C)] 98 2 °F (36 8 °C)  HR:  [67-76] 67  Resp:  [16-18] 18  BP: (112)/(66-67) 112/67  SpO2:  [91 %-93 %] 93 %  Body mass index is 33 5 kg/m²  Input and Output Summary (last 24 hours): Intake/Output Summary (Last 24 hours) at 2023 1348  Last data filed at 1/10/2023 1500  Gross per 24 hour   Intake 420 ml   Output --   Net 420 ml       Physical Exam:   Physical Exam  Vitals and nursing note reviewed  Constitutional:       General: He is not in acute distress  Appearance: Normal appearance  He is well-developed  He is not ill-appearing, toxic-appearing or diaphoretic  HENT:      Head: Normocephalic and atraumatic  Mouth/Throat:      Mouth: Mucous membranes are moist    Eyes:      Extraocular Movements: Extraocular movements intact  Conjunctiva/sclera: Conjunctivae normal       Pupils: Pupils are equal, round, and reactive to light  Cardiovascular:      Rate and Rhythm: Normal rate and regular rhythm  Pulses: Normal pulses        Heart sounds: Normal heart sounds  No murmur heard  No friction rub  No gallop  Pulmonary:      Effort: Pulmonary effort is normal  No respiratory distress  Breath sounds: Normal breath sounds  No wheezing, rhonchi or rales  Comments: Inspiratory and expiratory wheezes bilaterally  Chest:      Chest wall: No tenderness  Abdominal:      General: Bowel sounds are normal  There is distension  Palpations: Abdomen is soft  Tenderness: There is no abdominal tenderness  There is no right CVA tenderness or left CVA tenderness  Comments: Slightly improved from yesterday however still tense   Musculoskeletal:         General: No tenderness  Normal range of motion  Cervical back: Normal range of motion and neck supple  Right lower leg: No edema  Left lower leg: No edema  Skin:     General: Skin is warm and dry  Capillary Refill: Capillary refill takes less than 2 seconds  Neurological:      General: No focal deficit present  Mental Status: He is alert and oriented to person, place, and time  Mental status is at baseline  Psychiatric:         Mood and Affect: Mood normal          Behavior: Behavior normal          Thought Content:  Thought content normal           Additional Data:     Labs:  Results from last 7 days   Lab Units 01/11/23  0611   WBC Thousand/uL 4 93   HEMOGLOBIN g/dL 13 7   HEMATOCRIT % 41 9   PLATELETS Thousands/uL 110*   NEUTROS PCT % 73   LYMPHS PCT % 13*   MONOS PCT % 11   EOS PCT % 2     Results from last 7 days   Lab Units 01/11/23  0611 01/10/23  0532   SODIUM mmol/L 133* 133*   POTASSIUM mmol/L 3 7 3 7   CHLORIDE mmol/L 104 103   CO2 mmol/L 25 25   BUN mg/dL 10 8   CREATININE mg/dL 0 55* 0 58*   ANION GAP mmol/L 4 5   CALCIUM mg/dL 7 3* 7 6*   ALBUMIN g/dL  --  2 6*   TOTAL BILIRUBIN mg/dL  --  0 82   ALK PHOS U/L  --  73   ALT U/L  --  15   AST U/L  --  32   GLUCOSE RANDOM mg/dL 110 96                       Lines/Drains:  Invasive Devices     Peripheral Intravenous Line  Duration           Peripheral IV 01/09/23 Left Antecubital 2 days                      Imaging: Reviewed radiology reports from this admission including: abdominal/pelvic CT and procedure reports    Recent Cultures (last 7 days):   Results from last 7 days   Lab Units 01/10/23  1308   GRAM STAIN RESULT  No Polys or Bacteria seen   BODY FLUID CULTURE, STERILE  No growth       Last 24 Hours Medication List:   Current Facility-Administered Medications   Medication Dose Route Frequency Provider Last Rate   • acetaminophen  650 mg Oral Q6H PRN Yoli Shaffer MD     • enoxaparin  40 mg Subcutaneous Daily Gretchen Ennis MD     • HYDROmorphone  0 2 mg Intravenous Q3H PRN Yoli Shaffer MD     • oxyCODONE  2 5 mg Oral Q4H PRN Gretchen Ennis MD     • oxyCODONE  5 mg Oral Q4H PRN Gretchen Ennis MD     • trimethobenzamide  200 mg Intramuscular Q6H PRN Gretchen Ennis MD          Today, Patient Was Seen By: Yuliet Flanagan MD    **Please Note: This note may have been constructed using a voice recognition system  **

## 2023-01-11 NOTE — PLAN OF CARE
Problem: Nutrition/Hydration-ADULT  Goal: Nutrient/Hydration intake appropriate for improving, restoring or maintaining nutritional needs  Description: Monitor and assess patient's nutrition/hydration status for malnutrition  Collaborate with interdisciplinary team and initiate plan and interventions as ordered  Monitor patient's weight and dietary intake as ordered or per policy  Utilize nutrition screening tool and intervene as necessary  Determine patient's food preferences and provide high-protein, high-caloric foods as appropriate       INTERVENTIONS:  - Monitor oral intake, urinary output, labs, and treatment plans  - Assess nutrition and hydration status and recommend course of action  - Evaluate amount of meals eaten  - Assist patient with eating if necessary   - Allow adequate time for meals  - Recommend/ encourage appropriate diets, oral nutritional supplements, and vitamin/mineral supplements  - Order, calculate, and assess calorie counts as needed  - Recommend, monitor, and adjust tube feedings and TPN/PPN based on assessed needs  - Assess need for intravenous fluids  - Provide specific nutrition/hydration education as appropriate  - Include patient/family/caregiver in decisions related to nutrition  Outcome: Progressing     Problem: Potential for Falls  Goal: Patient will remain free of falls  Description: INTERVENTIONS:  - Educate patient/family on patient safety including physical limitations  - Instruct patient to call for assistance with activity   - Consult OT/PT to assist with strengthening/mobility   - Keep Call bell within reach  - Keep bed low and locked with side rails adjusted as appropriate  - Keep care items and personal belongings within reach  - Initiate and maintain comfort rounds  - Make Fall Risk Sign visible to staff  - Offer Toileting every 2 Hours, in advance of need  - Initiate/Maintain bed alarm  - Apply yellow socks and bracelet for high fall risk patients  - Consider moving patient to room near nurses station  Outcome: Progressing

## 2023-01-11 NOTE — ASSESSMENT & PLAN NOTE
Patient with history of squamous cell cancer of the tongue, cholangiocarcinoma in 2020 s/p therapy and ablation, and recurrent hepatic cancer detected in April 2022 s/p 8 sessions of chemotherapy  Currently on Ivosidenib  CT scan abdomen and pelvis Slight interval enlargement of the right iliac bone lytic metastasis        Plan-  Appreciate oncology recommendations- see liver CA plan

## 2023-01-11 NOTE — ASSESSMENT & PLAN NOTE
Patient on admission withf episodes of vomiting, with nonbloody, bilious stomach content, starting since Friday  Reports poor oral intake, heartburn    Denies chills, fever, dyspnea, chest pain  Medication side effect versus ascitis induced vs malignancy    Plan  · Repeat EKG, QTC WNL, zofran PRN  · Appreciate gastroenterology recommendations- Sx management

## 2023-01-11 NOTE — ASSESSMENT & PLAN NOTE
Lab Results   Component Value Date    SODIUM 133 (L) 01/11/2023    SODIUM 133 (L) 01/10/2023    SODIUM 134 (L) 01/09/2023   Likely in the setting of ascites and initial poor po intake  Stable    Plan  Will trend daily BMP

## 2023-01-11 NOTE — ASSESSMENT & PLAN NOTE
History of cholangiocarcinoma in 2020 s/p chemotherapy and ablation and recurrence of liver cancer in April 2022 status post 8 sessions of chemotherapy  Patient currently on Ivosidenib to 50 mg takes 2 tablets once a day- medication for cholangiocarcinoma    Patient has been for about 2 months on medication    [address] Hematology Oncology recommendations- Recommends cardiology oncology consult and close monitoring as prolonged QTC has been reported, Ivosidenib needs to bee reassessed every 3 months regardless, no inpatient intervention at this time  Pt plans to switch to [facility] for further management

## 2023-01-12 VITALS
DIASTOLIC BLOOD PRESSURE: 67 MMHG | SYSTOLIC BLOOD PRESSURE: 115 MMHG | WEIGHT: 247 LBS | DIASTOLIC BLOOD PRESSURE: 67 MMHG | BODY MASS INDEX: 33.5 KG/M2 | OXYGEN SATURATION: 92 % | BODY MASS INDEX: 33.5 KG/M2 | TEMPERATURE: 97.9 F | OXYGEN SATURATION: 92 % | SYSTOLIC BLOOD PRESSURE: 115 MMHG | RESPIRATION RATE: 18 BRPM | RESPIRATION RATE: 18 BRPM | HEART RATE: 63 BPM | HEART RATE: 63 BPM | WEIGHT: 247 LBS | TEMPERATURE: 97.9 F

## 2023-01-12 LAB
ANION GAP SERPL CALCULATED.3IONS-SCNC: 5 MMOL/L (ref 4–13)
ANION GAP SERPL CALCULATED.3IONS-SCNC: 5 MMOL/L (ref 4–13)
BASOPHILS # BLD AUTO: 0.04 THOUSANDS/ÂΜL (ref 0–0.1)
BASOPHILS # BLD AUTO: 0.04 THOUSANDS/ÂΜL (ref 0–0.1)
BASOPHILS NFR BLD AUTO: 1 % (ref 0–1)
BASOPHILS NFR BLD AUTO: 1 % (ref 0–1)
BUN SERPL-MCNC: 8 MG/DL (ref 5–25)
BUN SERPL-MCNC: 8 MG/DL (ref 5–25)
CALCIUM SERPL-MCNC: 7.3 MG/DL (ref 8.4–10.2)
CALCIUM SERPL-MCNC: 7.3 MG/DL (ref 8.4–10.2)
CHLORIDE SERPL-SCNC: 104 MMOL/L (ref 96–108)
CHLORIDE SERPL-SCNC: 104 MMOL/L (ref 96–108)
CO2 SERPL-SCNC: 25 MMOL/L (ref 21–32)
CO2 SERPL-SCNC: 25 MMOL/L (ref 21–32)
CREAT SERPL-MCNC: 0.54 MG/DL (ref 0.6–1.3)
CREAT SERPL-MCNC: 0.54 MG/DL (ref 0.6–1.3)
EOSINOPHIL # BLD AUTO: 0.11 THOUSAND/ÂΜL (ref 0–0.61)
EOSINOPHIL # BLD AUTO: 0.11 THOUSAND/ÂΜL (ref 0–0.61)
EOSINOPHIL NFR BLD AUTO: 2 % (ref 0–6)
EOSINOPHIL NFR BLD AUTO: 2 % (ref 0–6)
ERYTHROCYTE [DISTWIDTH] IN BLOOD BY AUTOMATED COUNT: 12.2 % (ref 11.6–15.1)
ERYTHROCYTE [DISTWIDTH] IN BLOOD BY AUTOMATED COUNT: 12.2 % (ref 11.6–15.1)
GFR SERPL CREATININE-BSD FRML MDRD: 121 ML/MIN/1.73SQ M
GFR SERPL CREATININE-BSD FRML MDRD: 121 ML/MIN/1.73SQ M
GLUCOSE SERPL-MCNC: 96 MG/DL (ref 65–140)
GLUCOSE SERPL-MCNC: 96 MG/DL (ref 65–140)
HCT VFR BLD AUTO: 40.6 % (ref 36.5–49.3)
HCT VFR BLD AUTO: 40.6 % (ref 36.5–49.3)
HGB BLD-MCNC: 13.3 G/DL (ref 12–17)
HGB BLD-MCNC: 13.3 G/DL (ref 12–17)
IMM GRANULOCYTES # BLD AUTO: 0.01 THOUSAND/UL (ref 0–0.2)
IMM GRANULOCYTES # BLD AUTO: 0.01 THOUSAND/UL (ref 0–0.2)
IMM GRANULOCYTES NFR BLD AUTO: 0 % (ref 0–2)
IMM GRANULOCYTES NFR BLD AUTO: 0 % (ref 0–2)
LYMPHOCYTES # BLD AUTO: 0.75 THOUSANDS/ÂΜL (ref 0.6–4.47)
LYMPHOCYTES # BLD AUTO: 0.75 THOUSANDS/ÂΜL (ref 0.6–4.47)
LYMPHOCYTES NFR BLD AUTO: 16 % (ref 14–44)
LYMPHOCYTES NFR BLD AUTO: 16 % (ref 14–44)
MAGNESIUM SERPL-MCNC: 1.9 MG/DL (ref 1.9–2.7)
MAGNESIUM SERPL-MCNC: 1.9 MG/DL (ref 1.9–2.7)
MCH RBC QN AUTO: 32.3 PG (ref 26.8–34.3)
MCH RBC QN AUTO: 32.3 PG (ref 26.8–34.3)
MCHC RBC AUTO-ENTMCNC: 32.8 G/DL (ref 31.4–37.4)
MCHC RBC AUTO-ENTMCNC: 32.8 G/DL (ref 31.4–37.4)
MCV RBC AUTO: 99 FL (ref 82–98)
MCV RBC AUTO: 99 FL (ref 82–98)
MONOCYTES # BLD AUTO: 0.58 THOUSAND/ÂΜL (ref 0.17–1.22)
MONOCYTES # BLD AUTO: 0.58 THOUSAND/ÂΜL (ref 0.17–1.22)
MONOCYTES NFR BLD AUTO: 12 % (ref 4–12)
MONOCYTES NFR BLD AUTO: 12 % (ref 4–12)
NEUTROPHILS # BLD AUTO: 3.28 THOUSANDS/ÂΜL (ref 1.85–7.62)
NEUTROPHILS # BLD AUTO: 3.28 THOUSANDS/ÂΜL (ref 1.85–7.62)
NEUTS SEG NFR BLD AUTO: 69 % (ref 43–75)
NEUTS SEG NFR BLD AUTO: 69 % (ref 43–75)
NRBC BLD AUTO-RTO: 0 /100 WBCS
NRBC BLD AUTO-RTO: 0 /100 WBCS
PHOSPHATE SERPL-MCNC: 3.1 MG/DL (ref 2.7–4.5)
PHOSPHATE SERPL-MCNC: 3.1 MG/DL (ref 2.7–4.5)
PLATELET # BLD AUTO: 131 THOUSANDS/UL (ref 149–390)
PLATELET # BLD AUTO: 131 THOUSANDS/UL (ref 149–390)
PMV BLD AUTO: 10.9 FL (ref 8.9–12.7)
PMV BLD AUTO: 10.9 FL (ref 8.9–12.7)
POTASSIUM SERPL-SCNC: 4 MMOL/L (ref 3.5–5.3)
POTASSIUM SERPL-SCNC: 4 MMOL/L (ref 3.5–5.3)
RBC # BLD AUTO: 4.12 MILLION/UL (ref 3.88–5.62)
RBC # BLD AUTO: 4.12 MILLION/UL (ref 3.88–5.62)
SODIUM SERPL-SCNC: 134 MMOL/L (ref 135–147)
SODIUM SERPL-SCNC: 134 MMOL/L (ref 135–147)
WBC # BLD AUTO: 4.77 THOUSAND/UL (ref 4.31–10.16)
WBC # BLD AUTO: 4.77 THOUSAND/UL (ref 4.31–10.16)

## 2023-01-12 RX ORDER — MAGNESIUM SULFATE HEPTAHYDRATE 40 MG/ML
2 INJECTION, SOLUTION INTRAVENOUS ONCE
Status: COMPLETED | OUTPATIENT
Start: 2023-01-12 | End: 2023-01-12

## 2023-01-12 RX ADMIN — MAGNESIUM SULFATE HEPTAHYDRATE 2 G: 40 INJECTION, SOLUTION INTRAVENOUS at 09:44

## 2023-01-12 NOTE — ASSESSMENT & PLAN NOTE
Reported 1 episode of rectal bleeding  Bright red blood per rectum happening this morning  Reports also tenderness in the rectal area  Denies hematemesis    Physical exam with blood noted in the perineal area  Hemoglobin 13 3  Stable    Plan-  Appreciate gastroenterology recommendations- If sx reoccur, possible consideration for colonoscopy  Monitor CBC and hemoglobin

## 2023-01-12 NOTE — INCIDENTAL FINDINGS
The following findings require follow up:  Radiographic finding   Finding:   IR INPATIENT Paracentesis   Final Result by Estefani Fraser MD (01/10 1641)   Diagnostic and therapeutic paracentesis  Workstation performed: [id number]         CT abdomen pelvis with contrast   Final Result by Reese Gonsalves MD (01/09 1611)      Multifocal hepatoma  Increasing size of the enhancing peritoneal metastases  Given the patent portal vein and lack of varices, the ascites is likely malignant and related to the peritoneal implants  Multiple bilateral pulmonary nodules are stable  Slight interval enlargement of the right iliac bone lytic metastasis  Left lower lobe consolidation consistent with atelectasis  Underlying pneumonia is occluded  Right perinephric stranding of uncertain etiology  Diverticulosis  Workstation performed: [id number]         IR paracentesis    (Results Pending)        Follow up required: PCP, Hematology/Oncology, GI    Follow up should be done within 1 week(s)    Please notify the following clinician to assist with the follow up:    Your PCP (Will establish care with [address] [place] [address])

## 2023-01-12 NOTE — ASSESSMENT & PLAN NOTE
Lab Results   Component Value Date    SODIUM 134 (L) 01/12/2023    SODIUM 133 (L) 01/11/2023    SODIUM 133 (L) 01/10/2023   Likely in the setting of ascites and initial poor po intake  Stable    Plan  Follow-up with PCP

## 2023-01-12 NOTE — PLAN OF CARE
Problem: Nutrition/Hydration-ADULT  Goal: Nutrient/Hydration intake appropriate for improving, restoring or maintaining nutritional needs  Description: Monitor and assess patient's nutrition/hydration status for malnutrition  Collaborate with interdisciplinary team and initiate plan and interventions as ordered  Monitor patient's weight and dietary intake as ordered or per policy  Utilize nutrition screening tool and intervene as necessary  Determine patient's food preferences and provide high-protein, high-caloric foods as appropriate       INTERVENTIONS:  - Monitor oral intake, urinary output, labs, and treatment plans  - Assess nutrition and hydration status and recommend course of action  - Evaluate amount of meals eaten  - Assist patient with eating if necessary   - Allow adequate time for meals  - Recommend/ encourage appropriate diets, oral nutritional supplements, and vitamin/mineral supplements  - Order, calculate, and assess calorie counts as needed  - Recommend, monitor, and adjust tube feedings and TPN/PPN based on assessed needs  - Assess need for intravenous fluids  - Provide specific nutrition/hydration education as appropriate  - Include patient/family/caregiver in decisions related to nutrition  Outcome: Progressing     Problem: Potential for Falls  Goal: Patient will remain free of falls  Description: INTERVENTIONS:  - Educate patient/family on patient safety including physical limitations  - Instruct patient to call for assistance with activity   - Consult OT/PT to assist with strengthening/mobility   - Keep Call bell within reach  - Keep bed low and locked with side rails adjusted as appropriate  - Keep care items and personal belongings within reach  - Initiate and maintain comfort rounds  - Make Fall Risk Sign visible to staff  - Offer Toileting every 2 Hours, in advance of need  - Initiate/Maintain bed alarm  - Obtain necessary fall risk management equipment  - Apply yellow socks and bracelet for high fall risk patients  - Consider moving patient to room near nurses station  Outcome: Progressing     Problem: GASTROINTESTINAL - ADULT  Goal: Minimal or absence of nausea and/or vomiting  Description: INTERVENTIONS:  - Administer IV fluids if ordered to ensure adequate hydration  - Maintain NPO status until nausea and vomiting are resolved  - Nasogastric tube if ordered  - Administer ordered antiemetic medications as needed  - Provide nonpharmacologic comfort measures as appropriate  - Advance diet as tolerated, if ordered  - Consider nutrition services referral to assist patient with adequate nutrition and appropriate food choices  Outcome: Progressing  Goal: Maintains or returns to baseline bowel function  Description: INTERVENTIONS:  - Assess bowel function  - Encourage oral fluids to ensure adequate hydration  - Administer IV fluids if ordered to ensure adequate hydration  - Administer ordered medications as needed  - Encourage mobilization and activity  - Consider nutritional services referral to assist patient with adequate nutrition and appropriate food choices  Outcome: Progressing  Goal: Maintains adequate nutritional intake  Description: INTERVENTIONS:  - Monitor percentage of each meal consumed  - Identify factors contributing to decreased intake, treat as appropriate  - Assist with meals as needed  - Monitor I&O, weight, and lab values if indicated  - Obtain nutrition services referral as needed  Outcome: Progressing     Problem: METABOLIC, FLUID AND ELECTROLYTES - ADULT  Goal: Fluid balance maintained  Description: INTERVENTIONS:  - Monitor labs   - Monitor I/O and WT  - Instruct patient on fluid and nutrition as appropriate  - Assess for signs & symptoms of volume excess or deficit  Outcome: Progressing

## 2023-01-12 NOTE — ASSESSMENT & PLAN NOTE
CT abdomen and pelvis showed left lower lobe consolidation consistent with atelectasis  Underlying pneumonia is occluded    Patient denies chest pain, shortness of breath, cough, fever, chills  Currently on room air    Plan-  Follow-up with PCP

## 2023-01-12 NOTE — DISCHARGE INSTR - AVS FIRST PAGE
Dear Mary Orlando,     It was our pleasure to care for you here at [facility], [address]  It is our hope that we were always able to exceed the expected standards for your care during your stay  You were hospitalized due to abdominal pain  You were cared for on the 3rd floor by Hudson Bynum MD under the service of Pau Pruitt,  with the [facility] who covers for your primary care physician (PCP), No primary care provider on file  , while you were hospitalized  If you have any questions or concerns related to this hospitalization, you may contact us at [phone]  For follow up as well as any medication refills, we recommend that you follow up with your primary care physician  A registered nurse will reach out to you by phone within a few days after your discharge to answer any additional questions that you may have after going home  However, at this time we provide for you here, the most important instructions / recommendations at discharge:     Notable Medication Adjustments -   Hold your chemotherapy until you see or speak with Dr Freada Lennox   Please call your hematology oncologist ASAP to address when to restart your chemotherapy and dosing   Testing Required after Discharge -   Blood work in 1 week   Important follow up information -   Please follow-up with interventional radiology for paracentesis  Other Instructions -   Please follow-up with your PCP in 1 week- If you need to establish care with a PCP we have provided a family medicine practice below  Please follow up with Gastroenterology   Please review this entire after visit summary as additional general instructions including medication list, appointments, activity, diet, any pertinent wound care, and other additional recommendations from your care team that may be provided for you        Sincerely,     Hudson Bynum MD

## 2023-01-12 NOTE — ASSESSMENT & PLAN NOTE
Patient presents with distended abdomen that progressively got worse for the past few months  Complains of dull tenderness  CT abdomen and pelvis showing Increasing size of the enhancing peritoneal metastases  Given the patent portal vein and lack of varices, the ascites is likely malignant and related to the peritoneaho implants    Fluid  Protein 2 2, Albumin 1 3, LD 78, Gram stain no bacteria or polys   SAAG 1 1- Indicating likely portal HTN     Plan:  · IR paracentesis - removed 3 9L, awaiting pathology report  · Might need another paracentesis or follow up as outpatient

## 2023-01-12 NOTE — DISCHARGE SUMMARY
[facility]  Discharge- Jose Carlos Sabillon Detrixhe 1970, 47 y o  male MRN: [id number]  Unit/Bed#: S -Liu Encounter: [id number]  Primary Care Provider: No primary care provider on file  Date and time admitted to hospital: 1/9/2023 12:09 PM    Ascites  Assessment & Plan  Patient presents with distended abdomen that progressively got worse for the past few months  Complains of dull tenderness  CT abdomen and pelvis showing Increasing size of the enhancing peritoneal metastases  Given the patent portal vein and lack of varices, the ascites is likely malignant and related to the peritoneaho implants  Fluid  Protein 2 2, Albumin 1 3, LD 78, Gram stain no bacteria or polys   SAAG 1 1- Indicating likely portal HTN     Plan:  · IR paracentesis - removed 3 9L, awaiting pathology report  · Might need another paracentesis or follow up as outpatient     Hyponatremia  Assessment & Plan  Lab Results   Component Value Date    SODIUM 134 (L) 01/12/2023    SODIUM 133 (L) 01/11/2023    SODIUM 133 (L) 01/10/2023   Likely in the setting of ascites and initial poor po intake  Stable    Plan  Follow-up with PCP    Left lower lobe consolidation [facility])  Assessment & Plan  CT abdomen and pelvis showed left lower lobe consolidation consistent with atelectasis  Underlying pneumonia is occluded  Patient denies chest pain, shortness of breath, cough, fever, chills  Currently on room air    Plan-  Follow-up with PCP      Lytic bone lesion of hip  Assessment & Plan  Patient with history of squamous cell cancer of the tongue, cholangiocarcinoma in 2020 s/p therapy and ablation, and recurrent hepatic cancer detected in April 2022 s/p 8 sessions of chemotherapy  Currently on Ivosidenib  CT scan abdomen and pelvis Slight interval enlargement of the right iliac bone lytic metastasis        Plan-  Appreciate oncology recommendations- see liver CA plan    Rectal bleeding  Assessment & Plan  Reported 1 episode of rectal bleeding  Bright red blood per rectum happening this morning  Reports also tenderness in the rectal area  Denies hematemesis  Physical exam with blood noted in the perineal area  Hemoglobin 13 3  Stable    Plan-  Appreciate gastroenterology recommendations- If sx reoccur, possible consideration for colonoscopy  Monitor CBC and hemoglobin    Liver cancer [facility])  Assessment & Plan  History of cholangiocarcinoma in 2020 s/p chemotherapy and ablation and recurrence of liver cancer in April 2022 status post 8 sessions of chemotherapy  Patient currently on Ivosidenib to 50 mg takes 2 tablets once a day- medication for cholangiocarcinoma  Patient has been for about 2 months on medication    Plan-  Appreciate Hematology Oncology recommendations- Recommends cardiology oncology consult and close monitoring as prolonged QTC has been reported, Ivosidenib needs to bee reassessed every 3 months regardless, no inpatient intervention at this time  Pt following Dr Wilbert [place] as an outpatient, however is considering [facility] for further management     Tongue cancer [facility])  Assessment & Plan  History of squamous cell carcinoma of the tongue s/p chemoradiation and partial glossectomy as well as bilateral neck dissection with lymph node removal, in remission  Patient with history of Tobacco abuse    Not currently smoking        Medical Problems     Resolved Problems  Date Reviewed: 1/10/2023          Resolved    * (Principal) Nausea and vomiting 1/11/2023     Resolved by  Jessa Quesada MD        Discharging Resident: Jessa Quesada MD  Discharging Attending: Mickey Dash DO  PCP: No primary care provider on file    Admission Date:   Admission Orders (From admission, onward)     Ordered        01/10/23 1443  Inpatient Admission  Once            01/09/23 1637  Place in Observation  Once                      Discharge Date: 01/12/23    Consultations During Hospital Stay:  · Interventional radiology, gastroenterology    Procedures Performed:   · Paracentesis    Significant Findings / Test Results:   IR INPATIENT Paracentesis   Final Result by Marko Dietrich MD (01/10 1641)   Diagnostic and therapeutic paracentesis  Workstation performed: [id number]         CT abdomen pelvis with contrast   Final Result by Roberto Gilbert MD (01/09 1611)      Multifocal hepatoma  Increasing size of the enhancing peritoneal metastases  Given the patent portal vein and lack of varices, the ascites is likely malignant and related to the peritoneal implants  Multiple bilateral pulmonary nodules are stable  Slight interval enlargement of the right iliac bone lytic metastasis  Left lower lobe consolidation consistent with atelectasis  Underlying pneumonia is occluded  Right perinephric stranding of uncertain etiology  Diverticulosis  Workstation performed: [id number]         IR INPATIENT Paracentesis    (Results Pending)   ·   ·     Incidental Findings:   · See above  · I reviewed the above mentioned incidental findings with the patient and/or family and they expressed understanding  Test Results Pending at Discharge (will require follow up): · Pathology from paracentesis     Outpatient Tests Requested:  · CBC , BMP in 1 week    Complications: None    Reason for Admission: None    Hospital Course:   Rocky Negron is a 47 y o  male patient with a past medical history of squamous cell carcinoma of the tongue, cholangiocarcinoma, 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 liver lungs, who originally presented to the hospital on 1/9/2023 due to nausea vomiting abdominal pain/distention and rectal bleeding  Interventional radiology was consulted during hospitalization who was able to do a paracentesis and removed 3 9 L of yellow clear fluids cytology and pathology was sent    Gastroenterology was also consulted due to patient's rectal bleeding, symptomatic management and if bleeding continued colonoscopy however patient has had 1 streaky episode, hemoglobin has been stable  We have recommended the patient to get a therapeutic paracentesis either tomorrow or Monday as he may need these weekly from here on out  Patient is also recommended to follow-up with hematology oncology as we held chemotherapy during hospitalization  Please see above list of diagnoses and related plan for additional information  Condition at Discharge: stable    Discharge Day Visit / Exam:   Subjective: Patient was seen and examined at the bedside  Patient was resting comfortably no acute distress  Patient reports that he did have a bowel movement yesterday and when he wiped there was some blood there however there were no clots in the toilet, or no dark stools  Patient also notes that his abdomen still feels distended however much improved from admission  Patient denies any other complaints at this time  Patient denies headache, dizziness, fever, chills, chest pain, shortness of breath, NVD, or any other symptoms at this time  Vitals: Blood Pressure: 115/67 (01/12/23 0726)  Pulse: 63 (01/12/23 0726)  Temperature: 97 9 °F (36 6 °C) (01/12/23 0729)  Temp Source: Oral (01/12/23 0729)  Respirations: 18 (01/11/23 0717)  Weight - Scale: 112 kg (247 lb) (01/09/23 1151)  SpO2: 92 % (01/12/23 0726)  Exam:   Physical Exam  Vitals and nursing note reviewed  Constitutional:       General: He is not in acute distress  Appearance: Normal appearance  He is well-developed  He is not ill-appearing, toxic-appearing or diaphoretic  HENT:      Head: Normocephalic and atraumatic  Mouth/Throat:      Mouth: Mucous membranes are moist    Eyes:      Extraocular Movements: Extraocular movements intact  Conjunctiva/sclera: Conjunctivae normal       Pupils: Pupils are equal, round, and reactive to light  Cardiovascular:      Rate and Rhythm: Normal rate and regular rhythm  Pulses: Normal pulses        Heart sounds: Normal heart sounds  No murmur heard  No friction rub  No gallop  Pulmonary:      Effort: Pulmonary effort is normal  No respiratory distress  Breath sounds: Normal breath sounds  No wheezing, rhonchi or rales  Chest:      Chest wall: No tenderness  Abdominal:      General: Bowel sounds are normal  There is distension  Palpations: Abdomen is soft  Tenderness: There is no abdominal tenderness  There is no right CVA tenderness or left CVA tenderness  Musculoskeletal:         General: No tenderness  Normal range of motion  Cervical back: Normal range of motion and neck supple  Right lower leg: No edema  Left lower leg: No edema  Skin:     General: Skin is warm and dry  Capillary Refill: Capillary refill takes less than 2 seconds  Neurological:      General: No focal deficit present  Mental Status: He is alert and oriented to person, place, and time  Mental status is at baseline  Psychiatric:         Mood and Affect: Mood normal          Behavior: Behavior normal          Thought Content: Thought content normal           Discussion with Family: Patient reports he is updating family members  Discharge instructions/Information to patient and family:   See after visit summary for information provided to patient and family  Provisions for Follow-Up Care:  See after visit summary for information related to follow-up care and any pertinent home health orders  Disposition:   Home    Planned Readmission: None    Discharge Medications:  See after visit summary for reconciled discharge medications provided to patient and/or family        **Please Note: This note may have been constructed using a voice recognition system**

## 2023-01-12 NOTE — ASSESSMENT & PLAN NOTE
History of cholangiocarcinoma in 2020 s/p chemotherapy and ablation and recurrence of liver cancer in April 2022 status post 8 sessions of chemotherapy  Patient currently on Ivosidenib to 50 mg takes 2 tablets once a day- medication for cholangiocarcinoma    Patient has been for about 2 months on medication    [address] Hematology Oncology recommendations- Recommends cardiology oncology consult and close monitoring as prolonged QTC has been reported, Ivosidenib needs to bee reassessed every 3 months regardless, no inpatient intervention at this time  Pt following Dr Hossein Her as an outpatient, however is considering [facility] for further management

## 2023-01-13 LAB
BACTERIA SPEC BFLD CULT: NO GROWTH
BACTERIA SPEC BFLD CULT: NO GROWTH
GRAM STN SPEC: NORMAL
GRAM STN SPEC: NORMAL

## 2023-01-13 NOTE — UTILIZATION REVIEW
NOTIFICATION OF ADMISSION DISCHARGE   This is a Notification of Discharge from [facility]  Please be advised that this patient has been discharge from our facility  Below you will find the admission and discharge date and time including the patient’s disposition  UTILIZATION REVIEW CONTACT:  Jin Gregg  Utilization   Network Utilization Review Department  Phone: [phone] x carefully listen to the prompts  All voicemails are confidential   Email: [email or web address]     ADMISSION INFORMATION  PRESENTATION DATE: 1/9/2023 12:09 PM  OBERVATION ADMISSION DATE:   INPATIENT ADMISSION DATE: 1/10/23  2:43 PM   DISCHARGE DATE: 1/12/2023  5:28 PM   DISPOSITION:Home/Self Care    IMPORTANT INFORMATION:  Send all requests for admission clinical reviews, approved or denied determinations and any other requests to dedicated fax number below belonging to the campus where the patient is receiving treatment   List of dedicated fax numbers:  [address] DENIALS (Administrative/Medical Necessity) [phone]   [address] (Maternity/NICU/Pediatrics) [phone]   [facility] [phone]   [facility] [address] [phone]   [address] [phone]   [address] [facility] [phone]   [address] [place] [address] [phone]   [address] [place] [address] 119 [phone]   [facility]  [phone]   [address] [facility] [phone]   [address] [facility] [address] [facility] [phone]

## 2023-01-16 ENCOUNTER — PATIENT OUTREACH (OUTPATIENT)
Dept: HEMATOLOGY ONCOLOGY | Facility: CLINIC | Age: 53
End: 2023-01-16

## 2023-01-16 DIAGNOSIS — C22.8 PRIMARY MALIGNANT NEOPLASM OF LIVER (HCC): Primary | ICD-10-CM

## 2023-01-16 LAB — PATHOLOGIST INTERPRETATION: NORMAL

## 2023-01-16 NOTE — PROGRESS NOTES
The patient returned my call,  I introduced myself and explained my role  I went over his upcoming appointments and the patient is aware  The patient reports understanding his diagnosis and was "calm" when speaking with him  Pt reports continuous 7/10 right abdominal pain onset "late October" and gradually worsened  Pt also reports losing weight but is unsure how much  He denies N/V or diarrhea at present  He reports having a good appetite but he admits to not being able to finish any of his meals due to "feeling full" and can't finish  I suggested that he get ensure and he reports does have that to supplement when he can't eat  He reports eating soup, fruits, and vegetables  Pt reports living at home with his wife and is a good support for him  He denies any barriers getting to or from any of his appointment and states he will drive himself or his wife can drive him if needed  He denies any physical barriers at present  I did place referrals for Palliative Care, Social Work, and Nutrition  I explained what each service can offer and the patient agreed  At this point he declined any other resource options  We completed a General Assessment and MST together, I provided my contact information and Anil Branch and instructed him to please call if he has any questions or concerns  I thanked him for his time

## 2023-01-16 NOTE — PROGRESS NOTES
Phone outreach to the patient for initial contact, tracking, and assessment  I left message on the patient's vm introducing myself and asking to please return my call to discuss next steps

## 2023-01-17 ENCOUNTER — DOCUMENTATION (OUTPATIENT)
Dept: HEMATOLOGY ONCOLOGY | Facility: CLINIC | Age: 53
End: 2023-01-17

## 2023-01-17 ENCOUNTER — TELEPHONE (OUTPATIENT)
Dept: NUTRITION | Facility: CLINIC | Age: 53
End: 2023-01-17

## 2023-01-17 ENCOUNTER — HOSPITAL ENCOUNTER (INPATIENT)
Facility: HOSPITAL | Age: 53
LOS: 1 days | Discharge: HOME | End: 2023-01-19
Attending: EMERGENCY MEDICINE | Admitting: INTERNAL MEDICINE

## 2023-01-17 DIAGNOSIS — R18.8 ASCITES: ICD-10-CM

## 2023-01-17 DIAGNOSIS — C22.0 HEPATOCELLULAR CARCINOMA (HCC): ICD-10-CM

## 2023-01-17 DIAGNOSIS — C22.8 PRIMARY MALIGNANT NEOPLASM OF LIVER (HCC): Primary | ICD-10-CM

## 2023-01-17 DIAGNOSIS — R18.8 REFRACTORY ASCITES: Primary | ICD-10-CM

## 2023-01-17 DIAGNOSIS — R18.0 MALIGNANT ASCITES: ICD-10-CM

## 2023-01-17 DIAGNOSIS — R11.2 NAUSEA AND VOMITING: ICD-10-CM

## 2023-01-17 DIAGNOSIS — E87.1 HYPONATREMIA: ICD-10-CM

## 2023-01-17 NOTE — TELEPHONE ENCOUNTER
Received notification from [address] on 1/16/23 that patient is appropriate for oncology nutrition services (reason for referral: Malnutrition Screening Tool (MST) Triggers: scored a 3 indicating an unsure amout of recent wt loss and is eating poorly due to a decreased appetite  (Date of MST: 1/16/23) and liver diagnosis)  Chart review completed, pt has consult w/ oncologist on 1/31/23  Will check back after consult for tx plan and establish care with oncology nutrition accordingly

## 2023-01-18 ENCOUNTER — APPOINTMENT (EMERGENCY)
Dept: CT IMAGING | Facility: HOSPITAL | Age: 53
End: 2023-01-18

## 2023-01-18 PROBLEM — Z85.810 HISTORY OF TONGUE CANCER: Status: ACTIVE | Noted: 2018-05-18

## 2023-01-18 LAB
2HR DELTA HS TROPONIN: 0 NG/L
4HR DELTA HS TROPONIN: -1 NG/L
ALBUMIN SERPL BCP-MCNC: 3.2 G/DL (ref 3.5–5)
ALP SERPL-CCNC: 87 U/L (ref 34–104)
ALT SERPL W P-5'-P-CCNC: 20 U/L (ref 7–52)
AMMONIA PLAS-SCNC: 43 UMOL/L (ref 18–72)
ANION GAP SERPL CALCULATED.3IONS-SCNC: 9 MMOL/L (ref 4–13)
APPEARANCE FLD: CLEAR
APTT PPP: 35 SECONDS (ref 23–37)
AST SERPL W P-5'-P-CCNC: 41 U/L (ref 13–39)
ATRIAL RATE: 99 BPM
BASOPHILS # BLD AUTO: 0.04 THOUSANDS/ÂΜL (ref 0–0.1)
BASOPHILS NFR BLD AUTO: 0 % (ref 0–1)
BILIRUB SERPL-MCNC: 1.41 MG/DL (ref 0.2–1)
BUN SERPL-MCNC: 12 MG/DL (ref 5–25)
CALCIUM ALBUM COR SERPL-MCNC: 9.6 MG/DL (ref 8.3–10.1)
CALCIUM SERPL-MCNC: 9 MG/DL (ref 8.4–10.2)
CARDIAC TROPONIN I PNL SERPL HS: 10 NG/L
CARDIAC TROPONIN I PNL SERPL HS: 11 NG/L
CARDIAC TROPONIN I PNL SERPL HS: 11 NG/L
CHLORIDE SERPL-SCNC: 98 MMOL/L (ref 96–108)
CO2 SERPL-SCNC: 22 MMOL/L (ref 21–32)
COLOR FLD: YELLOW
CREAT SERPL-MCNC: 0.61 MG/DL (ref 0.6–1.3)
EOSINOPHIL # BLD AUTO: 0.04 THOUSAND/ÂΜL (ref 0–0.61)
EOSINOPHIL NFR BLD AUTO: 0 % (ref 0–6)
ERYTHROCYTE [DISTWIDTH] IN BLOOD BY AUTOMATED COUNT: 12.5 % (ref 11.6–15.1)
GFR SERPL CREATININE-BSD FRML MDRD: 115 ML/MIN/1.73SQ M
GLUCOSE FLD-MCNC: 120 MG/DL
GLUCOSE SERPL-MCNC: 117 MG/DL (ref 65–140)
HCT VFR BLD AUTO: 45.8 % (ref 36.5–49.3)
HGB BLD-MCNC: 15.2 G/DL (ref 12–17)
HISTIOCYTES NFR FLD: 62 %
IMM GRANULOCYTES # BLD AUTO: 0.04 THOUSAND/UL (ref 0–0.2)
IMM GRANULOCYTES NFR BLD AUTO: 0 % (ref 0–2)
INR PPP: 1.26 (ref 0.84–1.19)
LACTATE SERPL-SCNC: 2 MMOL/L (ref 0.5–2)
LDH FLD L TO P-CCNC: 89 U/L
LYMPHOCYTES # BLD AUTO: 0.75 THOUSANDS/ÂΜL (ref 0.6–4.47)
LYMPHOCYTES NFR BLD AUTO: 19 %
LYMPHOCYTES NFR BLD AUTO: 8 % (ref 14–44)
MCH RBC QN AUTO: 31.2 PG (ref 26.8–34.3)
MCHC RBC AUTO-ENTMCNC: 33.2 G/DL (ref 31.4–37.4)
MCV RBC AUTO: 94 FL (ref 82–98)
MONO+MESO NFR FLD MANUAL: 6 %
MONOCYTES # BLD AUTO: 1.04 THOUSAND/ÂΜL (ref 0.17–1.22)
MONOCYTES NFR BLD AUTO: 10 % (ref 4–12)
NEUTROPHILS # BLD AUTO: 8.14 THOUSANDS/ÂΜL (ref 1.85–7.62)
NEUTS SEG NFR BLD AUTO: 10 %
NEUTS SEG NFR BLD AUTO: 82 % (ref 43–75)
NRBC BLD AUTO-RTO: 0 /100 WBCS
P AXIS: 51 DEGREES
PATHOLOGIST INTERPRETATION: NORMAL
PATHOLOGY REVIEW: YES
PLATELET # BLD AUTO: 147 THOUSANDS/UL (ref 149–390)
PLATELET # BLD AUTO: 181 THOUSANDS/UL (ref 149–390)
PMV BLD AUTO: 9.8 FL (ref 8.9–12.7)
POTASSIUM SERPL-SCNC: 4.4 MMOL/L (ref 3.5–5.3)
PR INTERVAL: 146 MS
PROT FLD-MCNC: <2 G/DL
PROT SERPL-MCNC: 6.3 G/DL (ref 6.4–8.4)
PROTHROMBIN TIME: 16 SECONDS (ref 11.6–14.5)
QRS AXIS: -11 DEGREES
QRSD INTERVAL: 86 MS
QT INTERVAL: 370 MS
QTC INTERVAL: 474 MS
RBC # BLD AUTO: 4.87 MILLION/UL (ref 3.88–5.62)
SITE: NORMAL
SODIUM SERPL-SCNC: 129 MMOL/L (ref 135–147)
T WAVE AXIS: 50 DEGREES
TOTAL CELLS COUNTED SPEC: 100
VENTRICULAR RATE: 99 BPM
WBC # BLD AUTO: 10.05 THOUSAND/UL (ref 4.31–10.16)
WBC # FLD MANUAL: 153 /UL
WBC OTHER NFR FLD MANUAL: 3 %

## 2023-01-18 PROCEDURE — 0W9G3ZZ DRAINAGE OF PERITONEAL CAVITY, PERCUTANEOUS APPROACH: ICD-10-PCS

## 2023-01-18 RX ORDER — ALBUMIN (HUMAN) 12.5 G/50ML
25 SOLUTION INTRAVENOUS ONCE
Status: COMPLETED | OUTPATIENT
Start: 2023-01-18 | End: 2023-01-18

## 2023-01-18 RX ORDER — ONDANSETRON 2 MG/ML
4 INJECTION INTRAMUSCULAR; INTRAVENOUS ONCE
Status: COMPLETED | OUTPATIENT
Start: 2023-01-18 | End: 2023-01-18

## 2023-01-18 RX ORDER — OXYCODONE HYDROCHLORIDE AND ACETAMINOPHEN 5; 325 MG/1; MG/1
1 TABLET ORAL EVERY 4 HOURS PRN
Status: DISCONTINUED | OUTPATIENT
Start: 2023-01-18 | End: 2023-01-19 | Stop reason: HOSPADM

## 2023-01-18 RX ORDER — HYDROMORPHONE HCL/PF 1 MG/ML
0.5 SYRINGE (ML) INJECTION EVERY 4 HOURS PRN
Status: DISCONTINUED | OUTPATIENT
Start: 2023-01-18 | End: 2023-01-19 | Stop reason: HOSPADM

## 2023-01-18 RX ORDER — ONDANSETRON 2 MG/ML
4 INJECTION INTRAMUSCULAR; INTRAVENOUS EVERY 4 HOURS PRN
Status: DISCONTINUED | OUTPATIENT
Start: 2023-01-18 | End: 2023-01-19 | Stop reason: HOSPADM

## 2023-01-18 RX ORDER — SUCRALFATE 1 G/1
1 TABLET ORAL ONCE
Status: COMPLETED | OUTPATIENT
Start: 2023-01-18 | End: 2023-01-18

## 2023-01-18 RX ORDER — HYDROMORPHONE HCL/PF 1 MG/ML
0.5 SYRINGE (ML) INJECTION ONCE
Status: COMPLETED | OUTPATIENT
Start: 2023-01-18 | End: 2023-01-18

## 2023-01-18 RX ORDER — METOCLOPRAMIDE HYDROCHLORIDE 5 MG/ML
10 INJECTION INTRAMUSCULAR; INTRAVENOUS ONCE
Status: COMPLETED | OUTPATIENT
Start: 2023-01-18 | End: 2023-01-18

## 2023-01-18 RX ORDER — ENOXAPARIN SODIUM 100 MG/ML
40 INJECTION SUBCUTANEOUS DAILY
Status: DISCONTINUED | OUTPATIENT
Start: 2023-01-18 | End: 2023-01-19 | Stop reason: HOSPADM

## 2023-01-18 RX ADMIN — ENOXAPARIN SODIUM 40 MG: 40 INJECTION SUBCUTANEOUS at 09:32

## 2023-01-18 RX ADMIN — ALBUMIN HUMAN 25 G: 0.25 SOLUTION INTRAVENOUS at 04:03

## 2023-01-18 RX ADMIN — IOHEXOL 100 ML: 350 INJECTION, SOLUTION INTRAVENOUS at 01:30

## 2023-01-18 RX ADMIN — SODIUM CHLORIDE, SODIUM LACTATE, POTASSIUM CHLORIDE, AND CALCIUM CHLORIDE 1000 ML: 600; 310; 30; 20 INJECTION, SOLUTION INTRAVENOUS at 04:58

## 2023-01-18 RX ADMIN — ONDANSETRON 4 MG: 2 INJECTION INTRAMUSCULAR; INTRAVENOUS at 02:12

## 2023-01-18 RX ADMIN — METOCLOPRAMIDE 10 MG: 5 INJECTION, SOLUTION INTRAMUSCULAR; INTRAVENOUS at 03:52

## 2023-01-18 RX ADMIN — ONDANSETRON 4 MG: 2 INJECTION INTRAMUSCULAR; INTRAVENOUS at 00:46

## 2023-01-18 RX ADMIN — ONDANSETRON 4 MG: 2 INJECTION INTRAMUSCULAR; INTRAVENOUS at 19:50

## 2023-01-18 RX ADMIN — SUCRALFATE 1 G: 1 TABLET ORAL at 04:58

## 2023-01-18 RX ADMIN — HYDROMORPHONE HYDROCHLORIDE 0.5 MG: 1 INJECTION, SOLUTION INTRAMUSCULAR; INTRAVENOUS; SUBCUTANEOUS at 00:46

## 2023-01-18 NOTE — ASSESSMENT & PLAN NOTE
Patient had gotten paracentesis on 1/10/22 with 3 9 L of straw-colored fluid removed  Fluid cytology at that point showed no bacteria or polys  SAAG score was 1 1 indicating likely portal HTN  Patient had a follow-up appointment with IR scheduled for tomorrow for repeat paracentesis but, the fluid reaccumulated faster than anticipated  Patient began experiencing nausea and vomiting starting yesterday  Paracentesis done in the ED today, 5 L of fluid removed, 25 g of albumin IV given after large-volume paracentesis  Paracentesis fluid sent for analysis and culture to rule out SBP  Plan:  Follow-up on paracentesis fluid results   Patient might need a more frequent paracentesis, touch base with oncology for recommendations

## 2023-01-18 NOTE — ASSESSMENT & PLAN NOTE
Patient has a history of cholangiocarcinoma diagnosed in 2020 status postchemotherapy and ablation  Patient's liver cancer recurred in 4/22 for which he has received 8 sessions of chemotherapy  He is currently on Ivosidenib 250 mg 2 tablets once a day for cholangiocarcinoma      Patient is currently followed by Dr Ash Gonzalez outpatient however he would like to transition to [facility] Hematology Oncology and has an appointment scheduled for 1/31/222    Plan:   Continue Ivosidenib

## 2023-01-18 NOTE — ASSESSMENT & PLAN NOTE
Patient started having nausea and bilious vomiting starting 6 PM yesterday which prompted him to come into the ED  He also noted to have some episodes of diarrhea yesterday  Patient was recently admitted 1/9-1/12 for rectal bleed, during the admission patient was noted to have ascites which was drained and IR, 3 9 L were removed at that time  Patient was discharged to follow-up with IR on 1/19 for repeat paracentesis  In the ED patient received Zofran which slightly improved his nausea  Patient was seen to have prominent ascites, paracentesis was done in the ED today (5 L fluids removed),  which provided relief for the patient and improved his nausea  Plan:  Zofran 4 mg Q4 IV prn for nausea  Regular diet as tolerated

## 2023-01-18 NOTE — ASSESSMENT & PLAN NOTE
Patient has a history of squamous cell carcinoma of the tongue in 2018 S/P chemoradiation and partial glossectomy  Patient also had bilateral neck dissection with lymph nodes removal   Patient's tongue cancer is currently in remission  As per patient he quit smoking sometime in September 2022

## 2023-01-18 NOTE — CASE MANAGEMENT
Case Management Progress Note    Patient name Darryn Rausch  Location W /W -48 MRN [id number]  : 1970 Date 2023       LOS (days): 0  Geometric Mean LOS (GMLOS) (days): 3 30  Days to GMLOS:2 9        OBJECTIVE:        Current admission status: Inpatient  Preferred Pharmacy:   [address] [place] [address] [id number] Berenice NORTON  [facility] [address]  Phone: [phone] Fax: [phone]    Primary Care Provider: No primary care provider on file  Primary Insurance: BLUE CROSS  Secondary Insurance:     PROGRESS NOTE:  CM received consult for SARA/OUD  CM contacted [facility]ified  to refer patient and provided minimal necessary information  CRS to meet with patient and follow up with CM to provide update on plan of care following patient connection

## 2023-01-18 NOTE — ASSESSMENT & PLAN NOTE
Patient has hyponatremia with sodium of 129 on admission, likely secondary to vomiting, poor oral intake, and ascites  His sodium level was around 133 last admission  With improvement in nausea, patient voiced wanting to eat food  I anticipate sodium levels to increase with dietary intake of salt  I advised patient to be liberal with salt on his meals      Plan:  Monitor sodium level

## 2023-01-18 NOTE — H&P
[facility]  H&P- Kayla Ashby 1970, 47 y o  male MRN: [id number]  Unit/Bed#: [facility] 408-01 Encounter: [id number]  Primary Care Provider: No primary care provider on file  Date and time admitted to hospital: 1/17/2023 11:55 PM    * Nausea and vomiting  Assessment & Plan  Patient started having nausea and bilious vomiting starting 6 PM yesterday which prompted him to come into the ED  He also noted to have some episodes of diarrhea yesterday  Patient was recently admitted 1/9-1/12 for rectal bleed, during the admission patient was noted to have ascites which was drained and IR, 3 9 L were removed at that time  Patient was discharged to follow-up with IR on 1/19 for repeat paracentesis  In the ED patient received Zofran which slightly improved his nausea  Patient was seen to have prominent ascites, paracentesis was done in the ED today (5 L fluids removed),  which provided relief for the patient and improved his nausea  Plan:  Zofran 4 mg Q4 IV prn for nausea  Regular diet as tolerated  Liver cancer [facility])  Assessment & Plan  Patient has a history of cholangiocarcinoma diagnosed in 2020 status postchemotherapy and ablation  Patient's liver cancer recurred in 4/22 for which he has received 8 sessions of chemotherapy  He is currently on Ivosidenib 250 mg 2 tablets once a day for cholangiocarcinoma  Patient is currently followed by Dr Javid Shelby outpatient however he would like to transition to [facility] and has an appointment scheduled for 1/31/222    Plan:   Continue Ivosidenib         Ascites  Assessment & Plan  Patient had gotten paracentesis on 1/10/22 with 3 9 L of straw-colored fluid removed  Fluid cytology at that point showed no bacteria or polys  SAAG score was 1 1 indicating likely portal HTN    Patient had a follow-up appointment with IR scheduled for tomorrow for repeat paracentesis but, the fluid reaccumulated faster than anticipated  Patient began experiencing nausea and vomiting starting yesterday  Paracentesis done in the ED today, 5 L of fluid removed, 25 g of albumin IV given after large-volume paracentesis  Paracentesis fluid sent for analysis and culture to rule out SBP  Plan:  Follow-up on paracentesis fluid results   Patient might need a more frequent paracentesis, touch base with oncology for recommendations  Hyponatremia  Assessment & Plan  Patient has hyponatremia with sodium of 129 on admission, likely secondary to vomiting, poor oral intake, and ascites  His sodium level was around 133 last admission  With improvement in nausea, patient voiced wanting to eat food  I anticipate sodium levels to increase with dietary intake of salt  I advised patient to be liberal with salt on his meals  Plan:  Monitor sodium level      History of tongue cancer  Assessment & Plan  Patient has a history of squamous cell carcinoma of the tongue in 2018 S/P chemoradiation and partial glossectomy  Patient also had bilateral neck dissection with lymph nodes removal   Patient's tongue cancer is currently in remission  As per patient he quit smoking sometime in September 2022  VTE Pharmacologic Prophylaxis: VTE Score: 4 Moderate Risk (Score 3-4) - Pharmacological DVT Prophylaxis Ordered: enoxaparin (Lovenox)  Code Status: Level 1 - Full Code As per patient  Discussion with family: Updated  (wife) at bedside  Anticipated Length of Stay: Patient will be admitted on an observation basis with an anticipated length of stay of less than 2 midnights secondary to Nausea and vomiting  Chief Complaint: Nausea and vomiting    History of Present Illness:  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 47 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  male with a PMH of squamous cell cancer of the tongue, cholangiocarcinoma with metastasis who presents with nausea and vomiting starting about 6 PM yesterday    She was recently admitted for rectal bleed, during the admission he was found to have ascites which was drained by IR  Patient was to follow-up with IR outpatient on 1/19 for repeat paracentesis but ascites recurred faster than expected resulting in patient having abdominal pain along with nausea and vomiting  As per patient he ran out of his home medications of Zofran and Compazine  Came to ED for evaluation for nausea and vomiting  Paracentesis was done in the ED yesterday and 5 L of dark-colored fluid was removed  Patient feels resolution of nausea post paracentesis  Patient denies any other complaints at this time  He denies any fevers chills shortness of breath or chest pain  Review of Systems:  Review of Systems   Constitutional: Positive for activity change (gets fatigued more easily) and appetite change (decreased appetite)  Negative for chills and fever  HENT: Negative for congestion, postnasal drip, rhinorrhea and sore throat  Eyes: Negative for photophobia and visual disturbance  Respiratory: Negative for cough, chest tightness and shortness of breath  Cardiovascular: Positive for leg swelling  Negative for chest pain  Gastrointestinal: Positive for abdominal distention, abdominal pain, diarrhea, nausea and vomiting  Negative for blood in stool and constipation  Genitourinary: Negative for difficulty urinating, dysuria and hematuria  Musculoskeletal: Negative for arthralgias, back pain, joint swelling and myalgias  Neurological: Negative for dizziness and headaches  Psychiatric/Behavioral: Negative for agitation, behavioral problems and confusion         Past Medical and Surgical History:   Past Medical History:   Diagnosis Date   • 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 Authorizing Provider   Ivosidenib 250 MG TABS Take by mouth   Yes Historical Provider, MD   oxyCODONE-acetaminophen (PERCOCET) 5-325 mg per tablet Take 1 tablet by mouth every 4 (four) hours as needed for moderate pain   Yes Historical Provider, MD   ondansetron (ZOFRAN) 8 mg tablet Take by mouth every 8 (eight) hours as needed for nausea or vomiting    Historical Provider, MD   prochlorperazine (COMPAZINE) 10 mg tablet Take 10 mg by mouth every 6 (six) hours as needed for nausea or vomiting    Historical Provider, MD     I have reviewed home medications with patient personally  Allergies: Allergies   Allergen Reactions   • Penicillins Other (See Comments)     As a child had reaction not sure what it was  Social History:  Marital Status: /Civil Union   Occupation:   Patient Pre-hospital Living Situation: Home  Patient Pre-hospital Level of Mobility: walks  Patient Pre-hospital Diet Restrictions: None  Substance Use History:   Social History     Substance and Sexual Activity   Alcohol Use Yes    Comment: 1-2 drinks per day     Social History     Tobacco Use   Smoking Status Every Day   • Packs/day: 2 00   • Years: 20 00   • Pack years: 40 00   • Types: Cigarettes   Smokeless Tobacco Current     Social History     Substance and Sexual Activity   Drug Use Not on file       Family History:  Family History   Problem Relation Age of Onset   • Prostate cancer Father        Physical Exam:     Vitals:   Blood Pressure: 137/77 (01/18/23 0415)  Pulse: 86 (01/18/23 0415)  Temperature: 98 4 °F (36 9 °C) (01/18/23 0007)  Temp Source: Oral (01/18/23 0007)  Respirations: 20 (01/18/23 0415)  Weight - Scale: 113 kg (250 lb) (01/18/23 0002)  SpO2: 94 % (01/18/23 0415)    Physical Exam  Constitutional:       General: He is not in acute distress  Appearance: Normal appearance  He is not ill-appearing  HENT:      Head: Normocephalic and atraumatic        Nose: Nose normal  No congestion  Mouth/Throat:      Mouth: Mucous membranes are dry  Pharynx: No oropharyngeal exudate or posterior oropharyngeal erythema  Eyes:      General: No scleral icterus  Extraocular Movements: Extraocular movements intact  Conjunctiva/sclera: Conjunctivae normal    Cardiovascular:      Rate and Rhythm: Normal rate and regular rhythm  Pulses: Normal pulses  Pulmonary:      Effort: Pulmonary effort is normal  No respiratory distress  Breath sounds: No stridor  No wheezing  Abdominal:      General: Bowel sounds are normal       Palpations: Abdomen is soft  There is no mass  Tenderness: There is abdominal tenderness (right upper quadrant)  There is no guarding  Hernia: No hernia is present  Comments: protuberant abdomen  Musculoskeletal:         General: No tenderness  Right lower leg: Edema present  Left lower leg: Edema present  Skin:     Findings: No bruising or erythema  Neurological:      General: No focal deficit present  Mental Status: He is alert and oriented to person, place, and time  Mental status is at baseline  Psychiatric:         Mood and Affect: Mood normal          Behavior: Behavior normal          Thought Content:  Thought content normal           Additional Data:     Lab Results:  Results from last 7 days   Lab Units 01/18/23  0042   WBC Thousand/uL 10 05   HEMOGLOBIN g/dL 15 2   HEMATOCRIT % 45 8   PLATELETS Thousands/uL 181   NEUTROS PCT % 82*   LYMPHS PCT % 8*   MONOS PCT % 10   EOS PCT % 0     Results from last 7 days   Lab Units 01/18/23  0042   SODIUM mmol/L 129*   POTASSIUM mmol/L 4 4   CHLORIDE mmol/L 98   CO2 mmol/L 22   BUN mg/dL 12   CREATININE mg/dL 0 61   ANION GAP mmol/L 9   CALCIUM mg/dL 9 0   ALBUMIN g/dL 3 2*   TOTAL BILIRUBIN mg/dL 1 41*   ALK PHOS U/L 87   ALT U/L 20   AST U/L 41*   GLUCOSE RANDOM mg/dL 117     Results from last 7 days   Lab Units 01/18/23  0042   INR  1 26*             Results from last 7 days   Lab Units 01/18/23  0042   LACTIC ACID mmol/L 2 0       Lines/Drains:  Invasive Devices     Peripheral Intravenous Line  Duration           Peripheral IV 01/18/23 Left Antecubital <1 day                    Imaging: Reviewed radiology reports from this admission including: chest CT scan and abdominal/pelvic CT  CT pe study w abdomen pelvis w contrast   Final Result by Reji Rider MD (01/18 7453)         1  Interval increase in size of pulmonary nodules since the October 2022 exam concerning for progression of metastatic disease  2   No evidence of proximal pulmonary artery embolus, thoracic aortic aneurysm or dissection  3   Stable hepatic metastases  No biliary obstruction  4   Moderate to large amount of ascites  5   No evidence of bowel obstruction, colitis or diverticulitis  Workstation performed: [id number]             EKG and Other Studies Reviewed on Admission:   · EKG: NSR  HR 99     ** Please Note: This note has been constructed using a voice recognition system   **

## 2023-01-18 NOTE — ED ATTENDING ATTESTATION
1/17/2023  Ginny MUNIZ MD, saw and evaluated the patient  I have discussed the patient with the resident/non-physician practitioner and agree with the resident's/non-physician practitioner's findings, Plan of Care, and MDM as documented in the resident's/non-physician practitioner's note, except where noted  All available labs and Radiology studies were reviewed  I was present for key portions of any procedure(s) performed by the resident/non-physician practitioner and I was immediately available to provide assistance  At this point I agree with the current assessment done in the Emergency Department  I have conducted an independent evaluation of this patient a history and physical is as follows:    ED Course         Critical Care Time  Procedures    Patient with a history of cancer, presents with abdominal pain, nausea and vomiting  No focal neuro deficits  + ascites  No chest pin    + abdominal distension  Unable to eat or drink without vomiting  No f/c/s  No UTI symptoms  + non-bloody diarrhea  MDM pleasant 47 yom, will check labs, image

## 2023-01-19 VITALS
BODY MASS INDEX: 33.91 KG/M2 | TEMPERATURE: 98.8 F | DIASTOLIC BLOOD PRESSURE: 70 MMHG | SYSTOLIC BLOOD PRESSURE: 114 MMHG | HEART RATE: 85 BPM | WEIGHT: 250 LBS | RESPIRATION RATE: 16 BRPM | OXYGEN SATURATION: 92 %

## 2023-01-19 LAB
ALBUMIN SERPL BCP-MCNC: 2.6 G/DL (ref 3.5–5)
ALP SERPL-CCNC: 67 U/L (ref 34–104)
ALT SERPL W P-5'-P-CCNC: 15 U/L (ref 7–52)
ANION GAP SERPL CALCULATED.3IONS-SCNC: 4 MMOL/L (ref 4–13)
AST SERPL W P-5'-P-CCNC: 30 U/L (ref 13–39)
BILIRUB SERPL-MCNC: 0.81 MG/DL (ref 0.2–1)
BUN SERPL-MCNC: 10 MG/DL (ref 5–25)
CALCIUM ALBUM COR SERPL-MCNC: 8.9 MG/DL (ref 8.3–10.1)
CALCIUM SERPL-MCNC: 7.8 MG/DL (ref 8.4–10.2)
CHLORIDE SERPL-SCNC: 102 MMOL/L (ref 96–108)
CO2 SERPL-SCNC: 26 MMOL/L (ref 21–32)
CREAT SERPL-MCNC: 0.6 MG/DL (ref 0.6–1.3)
ERYTHROCYTE [DISTWIDTH] IN BLOOD BY AUTOMATED COUNT: 12.5 % (ref 11.6–15.1)
GFR SERPL CREATININE-BSD FRML MDRD: 116 ML/MIN/1.73SQ M
GLUCOSE SERPL-MCNC: 109 MG/DL (ref 65–140)
HCT VFR BLD AUTO: 38.8 % (ref 36.5–49.3)
HGB BLD-MCNC: 13 G/DL (ref 12–17)
MAGNESIUM SERPL-MCNC: 1.8 MG/DL (ref 1.9–2.7)
MCH RBC QN AUTO: 32.3 PG (ref 26.8–34.3)
MCHC RBC AUTO-ENTMCNC: 33.5 G/DL (ref 31.4–37.4)
MCV RBC AUTO: 96 FL (ref 82–98)
PHOSPHATE SERPL-MCNC: 3.5 MG/DL (ref 2.7–4.5)
PLATELET # BLD AUTO: 111 THOUSANDS/UL (ref 149–390)
PMV BLD AUTO: 10.4 FL (ref 8.9–12.7)
POTASSIUM SERPL-SCNC: 4.4 MMOL/L (ref 3.5–5.3)
PROT SERPL-MCNC: 4.9 G/DL (ref 6.4–8.4)
RBC # BLD AUTO: 4.03 MILLION/UL (ref 3.88–5.62)
SODIUM SERPL-SCNC: 132 MMOL/L (ref 135–147)
WBC # BLD AUTO: 5.57 THOUSAND/UL (ref 4.31–10.16)

## 2023-01-19 RX ORDER — MAGNESIUM SULFATE HEPTAHYDRATE 40 MG/ML
2 INJECTION, SOLUTION INTRAVENOUS ONCE
Status: COMPLETED | OUTPATIENT
Start: 2023-01-19 | End: 2023-01-19

## 2023-01-19 RX ORDER — POTASSIUM CHLORIDE 750 MG/1
10 TABLET, EXTENDED RELEASE ORAL DAILY
Qty: 90 TABLET | Refills: 0 | Status: SHIPPED | OUTPATIENT
Start: 2023-01-19

## 2023-01-19 RX ORDER — FUROSEMIDE 20 MG/1
20 TABLET ORAL DAILY
Qty: 90 TABLET | Refills: 0 | Status: SHIPPED | OUTPATIENT
Start: 2023-01-19

## 2023-01-19 RX ADMIN — MAGNESIUM SULFATE HEPTAHYDRATE 2 G: 40 INJECTION, SOLUTION INTRAVENOUS at 08:31

## 2023-01-19 NOTE — DISCHARGE INSTR - AVS FIRST PAGE
Dear Crow Chris,     It was our pleasure to care for you here at [facility], [address]  It is our hope that we were always able to exceed the expected standards for your care during your stay  You were hospitalized due to Recurrent Ascites requiring paracentesis  You were cared for on the [facility] 4 floor by Willie Corbin, DO under the service of Yany Dial, DO with the [facility] who covers for your primary care physician (PCP), No primary care provider on file  , while you were hospitalized  If you have any questions or concerns related to this hospitalization, you may contact us at [phone]  For follow up as well as any medication refills, we recommend that you follow up with your primary care physician  A registered nurse will reach out to you by phone within a few days after your discharge to answer any additional questions that you may have after going home  However, at this time we provide for you here, the most important instructions / recommendations at discharge:     Notable Medication Adjustments -   Will start Lasix 20 mg daily at discharge  Will start 10 mg Kdur daily at discharge  Testing Required after Discharge -   Appointment for CT chest, abdomen, pelvis on 1/26 already scheduled  Important follow up information -   Palliative medicine appointment on 1/20, information in the upcoming appointments section  Imaging appointment for CT on 1/26, information in the upcoming appointments section  Oncology appointment on 1/31, information in the upcoming appointments section  Please establish with a PCP within a couple days of discharge   Information is provided in the follow up information section  Other Instructions -   Please call physician or come to ED with increased vomiting/ abdomen distention  Please restrict fluids to 1800 ml a day  Please review this entire after visit summary as additional general instructions including medication list, appointments, activity, diet, any pertinent wound care, and other additional recommendations from your care team that may be provided for you        Sincerely,     Willie Corbin, DO

## 2023-01-19 NOTE — INCIDENTAL FINDINGS
The following findings require follow up:  Radiographic finding   Finding: Interval increase in size of pulmonary nodules since the October 2022 exam concerning for progression of metastatic disease     Follow up required: yes   Follow up should be done within 2 week(s)   Patient is to follow up with oncology on 1/31/23    Please notify the following clinician to assist with the follow up:   Dr Aga Álvarez, hematology/oncology

## 2023-01-19 NOTE — UTILIZATION REVIEW
Initial Clinical Review    Admission: Date/Time/Statement:   Admission Orders (From admission, onward)     Ordered        01/18/23 0452  INPATIENT ADMISSION  Once                      Orders Placed This Encounter   Procedures   • INPATIENT ADMISSION     Standing Status:   Standing     Number of Occurrences:   1     Order Specific Question:   Level of Care     Answer:   Med Surg [16]     Order Specific Question:   Estimated length of stay     Answer:   More than 2 Midnights     Order Specific Question:   Certification     Answer:   I certify that inpatient services are medically necessary for this patient for a duration of greater than two midnights  See H&P and MD Progress Notes for additional information about the patient's course of treatment  ED Arrival Information     Expected   -    Arrival   1/17/2023 23:42    Acuity   Urgent            Means of arrival   Walk-In    Escorted by   Spouse    Service   Hospitalist    Admission type   Emergency            Arrival complaint   Abd Pain           Chief Complaint   Patient presents with   • Abdominal Pain     Pt presenting w/ generalized abdominal pain and distention  Hx of bile duct cancer, pt had his abdomen drained for excess fluid last week at [facility]   Made a follow up appointment but started w/ pain, N/V/D tonight  Unable to keep food or liquids down        Initial Presentation: 47 y o  male with hx SCC tongue , cholangiocarcinoma with metastasis on Ivosidenib , recent admission for rectal bleed with paracentesis who presents to ED from home with nausea and vomiting, abdominal pain  starting about 6 PM yesterday  Pt reports he ran out of home Compazine and Zofran  On exam,abdomen distended, +ascites , tenderness RUQ abdomen, BLE edema  Labs Na 129, T bili 1 41, albumin 3 2   CT A Chest, CT A/P shows increased size pulm nodules, moderate to large amt ascites    Paracentesis done in ED for 5 L of dark-colored fluid    Nausea resolved after paracentesis Pt given IV antiemetics, IVF, IV analgesics, IV Albumin in ED  Pt admitted as Inpatient with nausea and vomiting, ascites, hyponatremia  Paln - Reg diet as dimitris, antiemetics prn, f/u fluid cx, monitor sodium level , fluid restriction  Monitor renal function , BP   Date:1/19   Day 2:   Pt w/o N/V  Tolerating po intake   Mild abdominal tenderness  Pt to be d/c'ed earlier than expected on po lasix  F/U w/ oncology   Pt medically stable    GI consult- Malignant ascites  Pts symptoms improved s/p paracentesis completed yesterday- studies negative for SBP  Consider palliative Tenckhoff catheter for recurrent ascites eventually  ED Triage Vitals   Temperature Pulse Respirations Blood Pressure SpO2   01/18/23 0007 01/18/23 0002 01/18/23 0002 01/18/23 0002 01/18/23 0002   98 4 °F (36 9 °C) 99 20 142/89 95 %      Temp Source Heart Rate Source Patient Position - Orthostatic VS BP Location FiO2 (%)   01/18/23 0007 01/18/23 0002 01/18/23 0002 01/18/23 0002 --   Oral Monitor Sitting Right arm       Pain Score       01/18/23 0046       7          Wt Readings from Last 1 Encounters:   01/18/23 113 kg (250 lb)     Additional Vital Signs:   Date/Time Temp Pulse Resp BP MAP (mmHg) SpO2   01/19/23 08:35:31 98 8 °F (37 1 °C) 85 16 114/70 85 92 %   01/19/23 01:39:10 -- 77 -- 108/63 78 93 %   01/18/23 15:19:10 97 8 °F (36 6 °C) 78 16 104/61 75 94 %   01/18/23 08:06:14 -- 89 17 120/71 87 90 %   01/18/23 0415 -- 86 20 137/77 101 94 %   01/18/23 0400 -- 90 20 110/69 85 94 %   01/18/23 0330 -- 94 20 125/81 98 92 %   01/18/23 0305 -- -- -- -- -- --   01/18/23 0215 -- 100 22 135/85 104 92 %   01/18/23 0100 -- 97 20 133/86 105 91 %     Pertinent Labs/Diagnostic Test Results:    1/18 ECG-NSR, low volatge QRS  CT pe study w abdomen pelvis w contrast   Final Result by Felipa Louise MD (01/18 4420)         1  Interval increase in size of pulmonary nodules since the October 2022 exam concerning for progression of metastatic disease  2   No evidence of proximal pulmonary artery embolus, thoracic aortic aneurysm or dissection  3   Stable hepatic metastases  No biliary obstruction  4   Moderate to large amount of ascites  5   No evidence of bowel obstruction, colitis or diverticulitis  Workstation performed: [id number]               Results from last 7 days   Lab Units 01/19/23  0541 01/18/23  0639 01/18/23  0042   WBC Thousand/uL 5 57  --  10 05   HEMOGLOBIN g/dL 13 0  --  15 2   HEMATOCRIT % 38 8  --  45 8   PLATELETS Thousands/uL 111* 147* 181   NEUTROS ABS Thousands/µL  --   --  8 14*         Results from last 7 days   Lab Units 01/19/23  0541 01/18/23  0042   SODIUM mmol/L 132* 129*   POTASSIUM mmol/L 4 4 4 4   CHLORIDE mmol/L 102 98   CO2 mmol/L 26 22   ANION GAP mmol/L 4 9   BUN mg/dL 10 12   CREATININE mg/dL 0 60 0 61   EGFR ml/min/1 73sq m 116 115   CALCIUM mg/dL 7 8* 9 0   MAGNESIUM mg/dL 1 8*  --    PHOSPHORUS mg/dL 3 5  --      Results from last 7 days   Lab Units 01/19/23  0541 01/18/23  0042   AST U/L 30 41*   ALT U/L 15 20   ALK PHOS U/L 67 87   TOTAL PROTEIN g/dL 4 9* 6 3*   ALBUMIN g/dL 2 6* 3 2*   TOTAL BILIRUBIN mg/dL 0 81 1 41*   AMMONIA umol/L  --  43         Results from last 7 days   Lab Units 01/19/23  0541 01/18/23 0042   GLUCOSE RANDOM mg/dL 109 117           Results from last 7 days   Lab Units 01/18/23  0458 01/18/23  0303 01/18/23  0042   HS TNI 0HR ng/L  --   --  11   HS TNI 2HR ng/L  --  11  --    HSTNI D2 ng/L  --  0  --    HS TNI 4HR ng/L 10  --   --    HSTNI D4 ng/L -1  --   --          Results from last 7 days   Lab Units 01/18/23 0042   PROTIME seconds 16 0*   INR  1 26*   PTT seconds 35             Results from last 7 days   Lab Units 01/18/23 0042   LACTIC ACID mmol/L 2 0                       Results from last 7 days   Lab Units 01/18/23  0402 01/18/23 0042   BLOOD CULTURE   --  No Growth at 24 hrs  No Growth at 24 hrs     GRAM STAIN RESULT  1+ Polys  No organisms seen  -- Results from last 7 days   Lab Units 01/18/23  0402   TOTAL COUNTED  100   WBC FLUID /ul 153           ED Treatment:   Medication Administration from 01/17/2023 2341 to 01/18/2023 3214       Date/Time Order Dose Route Action     01/18/2023 0046 EST ondansetron (ZOFRAN) injection 4 mg 4 mg Intravenous Given     01/18/2023 0046 EST HYDROmorphone (DILAUDID) injection 0 5 mg 0 5 mg Intravenous Given     01/18/2023 0130 EST iohexol (OMNIPAQUE) 350 MG/ML injection (SINGLE-DOSE) 100 mL 100 mL Intravenous Given     01/18/2023 0256 EST ondansetron (ZOFRAN) injection 4 mg 4 mg Intravenous Given     01/18/2023 0352 EST metoclopramide (REGLAN) injection 10 mg 10 mg Intravenous Given     01/18/2023 0403 EST albumin human (FLEXBUMIN) 25 % injection 25 g 25 g Intravenous Given     01/18/2023 0458 EST lactated ringers bolus 1,000 mL 1,000 mL Intravenous New Bag     01/18/2023 0458 EST sucralfate (CARAFATE) tablet 1 g 1 g Oral Given        Past Medical History:   Diagnosis Date   • 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 ([facility] [address] )      Present on Admission:  • Ascites  • Hyponatremia  • Liver cancer ([facility] [address] )  • History of tongue cancer      Admitting Diagnosis: Hyponatremia [E87 1]  Abdominal pain [R10 9]  Nausea and vomiting [R11 2]  Refractory ascites [R18 8]  Age/Sex: 47 y o  male  Admission Orders:  Scheduled Medications:  enoxaparin, 40 mg, Subcutaneous, Daily  Ivosidenib, 250 mg, Oral, Daily  magnesium sulfate, 2 g, Intravenous, Once      Continuous IV Infusions:     PRN Meds:  HYDROmorphone, 0 5 mg, Intravenous, Q4H PRN  ondansetron, 4 mg, Intravenous, Q4H PRN x1 1/18  oxyCODONE-acetaminophen, 1 tablet, Oral, Q4H PRN    SCD   OOB as dimitris  IP CONSULT TO GASTROENTEROLOGY    Network Utilization Review Department  ATTENTION: Please call with any questions or concerns to [phone] and carefully listen to the prompts so that you are directed to the right person   All voicemails are confidential   Ed Nash all requests for admission clinical reviews, approved or denied determinations and any other requests to dedicated fax number below belonging to the campus where the patient is receiving treatment   List of dedicated fax numbers for the Facilities:  [address] [place] [address] DENIALS (Administrative/Medical Necessity) [phone]   [address] (Maternity/NICU/Pediatrics) [phone]   [address] [phone]   [facility] [phone]   [address] [phone] [address] [facility] 134 815 [facility] [phone]

## 2023-01-19 NOTE — ASSESSMENT & PLAN NOTE
Patient has a history of cholangiocarcinoma diagnosed in 2020 status postchemotherapy and ablation  Patient's liver cancer recurred in 4/22 for which he has received 8 sessions of chemotherapy  He is currently on Ivosidenib 250 mg 2 tablets once a day for cholangiocarcinoma      Patient is currently followed by Dr Bing Noova outpatient however he would like to transition to [facility] and has an appointment scheduled for 1/31/222    Plan:   Continue Ivosidenib

## 2023-01-19 NOTE — DISCHARGE SUMMARY
[facility]  Discharge- Ivy Quarlesjoni 1970, 47 y o  male MRN: [id number]  Unit/Bed#: [facility] 408-01 Encounter: [id number]  Primary Care Provider: No primary care provider on file  Date and time admitted to hospital: 1/17/2023 11:55 PM    * Nausea and vomiting  Assessment & Plan  Patient started having nausea and bilious vomiting starting 6 PM yesterday which prompted him to come into the ED  He also noted to have some episodes of diarrhea yesterday  Patient was recently admitted 1/9-1/12 for rectal bleed, during the admission patient was noted to have ascites which was drained and IR, 3 9 L were removed at that time  Patient was discharged to follow-up with IR on 1/19 for repeat paracentesis  In the ED patient received Zofran which slightly improved his nausea  Patient was seen to have prominent ascites, paracentesis was done in the ED today (5 L fluids removed),  which provided relief for the patient and improved his nausea  Plan:  Zofran prn for nausea  Regular diet as tolerated  Ascites  Assessment & Plan  Patient had gotten paracentesis on 1/10/22 with 3 9 L of straw-colored fluid removed  Fluid cytology at that point showed no bacteria or polys  SAAG score was 1 1 indicating likely portal HTN  Patient had a follow-up appointment with IR scheduled for tomorrow for repeat paracentesis but, the fluid reaccumulated faster than anticipated  Patient began experiencing nausea and vomiting starting yesterday  Paracentesis done in the ED today, 5 L of fluid removed, 25 g of albumin IV given after large-volume paracentesis  Paracentesis fluid sent for analysis and culture to rule out SBP  Plan:  Patient might need a more frequent paracentesis, touch base with oncology for recommendations    Patient will follow up outpatient with oncology  Will give 20 mg lasix daily at discharge  Will give 10 mg Kdur at discharge      Hyponatremia  Assessment & Plan  Patient has hyponatremia with sodium of 129 on admission, likely secondary to vomiting, poor oral intake, and ascites  His sodium level was around 133 last admission  With improvement in nausea, patient voiced wanting to eat food  I anticipate sodium levels to increase with dietary intake of salt  I advised patient to be liberal with salt on his meals  Plan:  Monitor sodium level      Liver cancer [facility])  Assessment & Plan  Patient has a history of cholangiocarcinoma diagnosed in 2020 status postchemotherapy and ablation  Patient's liver cancer recurred in 4/22 for which he has received 8 sessions of chemotherapy  He is currently on Ivosidenib 250 mg 2 tablets once a day for cholangiocarcinoma  Patient is currently followed by Dr Janna Ennis outpatient however he would like to transition to [facility] and has an appointment scheduled for 1/31/222    Plan:   Continue Ivosidenib         History of tongue cancer  Assessment & Plan  Patient has a history of squamous cell carcinoma of the tongue in 2018 S/P chemoradiation and partial glossectomy  Patient also had bilateral neck dissection with lymph nodes removal   Patient's tongue cancer is currently in remission  As per patient he quit smoking sometime in September 2022  Medical Problems     Resolved Problems  Date Reviewed: 1/18/2023   None       Discharging Resident: Meme Escoto DO  Discharging Attending: Bk Wood DO  PCP: No primary care provider on file  Admission Date:   Admission Orders (From admission, onward)     Ordered        01/18/23 0452  INPATIENT ADMISSION  Once                      Discharge Date: 01/19/23    Consultations During Hospital Stay:  · GI    Procedures Performed:   · paracentesis    Significant Findings / Test Results:   CT: 1  Interval increase in size of pulmonary nodules since the October 2022 exam concerning for progression of metastatic disease    2   No evidence of proximal pulmonary artery embolus, thoracic aortic aneurysm or dissection  3   Stable hepatic metastases  No biliary obstruction  4   Moderate to large amount of ascites  5   No evidence of bowel obstruction, colitis or diverticulitis  Incidental Findings:   Interval increase in size of pulmonary nodules since the October 2022 exam concerning for progression of metastatic disease  Incidental note completed  Follow up with heme/onc outpatient on 1/31 with Dr Gloria Diaz Results Pending at Discharge (will require follow up):  · none     Outpatient Tests Requested:  · none    Complications:  none    Reason for Admission: nausea/vomiting, ascites, paracentesis    Hospital Course:   Stella Rivers is a 47 y o  male patient who originally presented to the hospital on 1/17/2023 due to nausea and vomiting  CT showed large amount of ascites  Paracentesis was done which drained 5L fluid  Nausea and pain regimen were implemented  GI was consulted and patient observed after the paracentesis  Patient nausea/ vomiting subsided even with food intake so patient was deemed fit for discharge on 1/19  Patient will follow up with oncology outpatient  The patient, initially admitted to the hospital as inpatient, was discharged earlier than expected given the following: continued to be medically stable after paracentesis  Please see above list of diagnoses and related plan for additional information  Condition at Discharge: stable    Discharge Day Visit / Exam:   Subjective:  Patient was seen at bedside this morning  Patient reports no nausea/vomiting  Patient reports no pain  Vitals: Blood Pressure: 114/70 (01/19/23 0835)  Pulse: 85 (01/19/23 0835)  Temperature: 98 8 °F (37 1 °C) (01/19/23 0835)  Temp Source: Oral (01/19/23 0835)  Respirations: 16 (01/19/23 0835)  Weight - Scale: 113 kg (250 lb) (01/18/23 0002)  SpO2: 92 % (01/19/23 0835)  Exam:   Physical Exam  Vitals and nursing note reviewed  Constitutional:       General: He is not in acute distress  Appearance: He is well-developed  HENT:      Head: Normocephalic and atraumatic  Right Ear: External ear normal       Left Ear: External ear normal       Nose: No congestion or rhinorrhea  Mouth/Throat:      Mouth: Mucous membranes are moist       Pharynx: Oropharynx is clear  Eyes:      Conjunctiva/sclera: Conjunctivae normal    Cardiovascular:      Rate and Rhythm: Normal rate and regular rhythm  Heart sounds: Normal heart sounds  No murmur heard  Pulmonary:      Effort: Pulmonary effort is normal  No respiratory distress  Breath sounds: Normal breath sounds  Abdominal:      General: Bowel sounds are normal       Palpations: Abdomen is soft  Tenderness: There is no abdominal tenderness (mild)  Musculoskeletal:         General: No swelling  Cervical back: Neck supple  Skin:     General: Skin is warm and dry  Capillary Refill: Capillary refill takes less than 2 seconds  Neurological:      General: No focal deficit present  Mental Status: He is alert and oriented to person, place, and time  Psychiatric:         Mood and Affect: Mood normal          Behavior: Behavior normal           Discussion with Family: Updated  (wife) at bedside  Discharge instructions/Information to patient and family:   See after visit summary for information provided to patient and family  Provisions for Follow-Up Care:  See after visit summary for information related to follow-up care and any pertinent home health orders  Disposition:   Home    Planned Readmission: none    Discharge Medications:  See after visit summary for reconciled discharge medications provided to patient and/or family        **Please Note: This note may have been constructed using a voice recognition system**

## 2023-01-19 NOTE — ASSESSMENT & PLAN NOTE
Patient had gotten paracentesis on 1/10/22 with 3 9 L of straw-colored fluid removed  Fluid cytology at that point showed no bacteria or polys  SAAG score was 1 1 indicating likely portal HTN  Patient had a follow-up appointment with IR scheduled for tomorrow for repeat paracentesis but, the fluid reaccumulated faster than anticipated  Patient began experiencing nausea and vomiting starting yesterday  Paracentesis done in the ED today, 5 L of fluid removed, 25 g of albumin IV given after large-volume paracentesis  Paracentesis fluid sent for analysis and culture to rule out SBP  Plan:  Patient might need a more frequent paracentesis, touch base with oncology for recommendations    Patient will follow up outpatient with oncology  Will give 20 mg lasix daily at discharge  Will give 10 mg Kdur at discharge

## 2023-01-19 NOTE — CONSULTS
Consultation - GI   Shruthi Ashby 47 y o  male MRN: [id number]  Unit/Bed#: W -01 Encounter: [id number]        Assessment:  1  Nausea, vomiting and abdominal distention in setting of known metastatic cholangiocarcinoma and malignant ascites  2  Malignant ascites s/p 5L paracentesis with studies negative for SBP  Plan:  1  Symptoms improved  Able to tolerate diet  Continue with supportive care  2  Outpatient follow up with oncology Dr Kendall Perez on 1/31   3  Outpatient palliative follow up scheduled tomorrow  4  May consider palliative Tenckhoff catheter for recurrent ascites eventually  5  Will assist with scheduling outpatient paracentesis  Please refer to attestation for final and accurate attestation  History of Present Illness   Physician Requesting Consult: Malgorzata Campos DO  Reason for Consult / Principal Problem: N/V   Hx and PE limited by:   HPI: Jeanette Franks is a 47y o  year old male with PMHx of metastatic cholangiocarcinoma on chemotherapy who presents with complaints of nausea, vomiting and abdominal distention  Also reports diarrhea  He underwent paracentesis with 5L fluid removed with improvement of his symptoms  He was admitted last week for same symptoms and also underwent paracentesis  Patient is currently feeling better and able to tolerate diet and denies further GI symptoms  GI service consulted for further management  Inpatient consult to gastroenterology  Consult performed by: Shana Cano MD  Consult ordered by: Matty Hope DO          Review of Systems   Constitutional: Negative for chills and fever  HENT: Negative for ear pain and sore throat  Eyes: Negative for pain and visual disturbance  Respiratory: Negative for cough and shortness of breath  Cardiovascular: Negative for chest pain and palpitations  Gastrointestinal: Positive for abdominal pain, diarrhea and vomiting          GI symptoms resolved      Genitourinary: Negative for dysuria and hematuria  Musculoskeletal: Negative for arthralgias and back pain  Skin: Negative for color change and rash  Neurological: Negative for seizures and syncope  All other systems reviewed and are negative  Historical Information   Past Medical History:   Diagnosis Date   • 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 [facility])      Past Surgical History:   Procedure Laterality Date   • CT NEEDLE BIOPSY LIVER  10/15/2020   • IR BIOPSY LIVER MASS  5/18/2022   • IR PARACENTESIS  1/10/2023     Social History   Social History     Substance and Sexual Activity   Alcohol Use Yes    Comment: 1-2 drinks per day     Social History     Substance and Sexual Activity   Drug Use Not on file     E-Cigarette/Vaping     E-Cigarette/Vaping Substances     Social History     Tobacco Use   Smoking Status Every Day   • Packs/day: 2 00   • Years: 20 00   • Pack years: 40 00   • Types: Cigarettes   Smokeless Tobacco Current     Family History:   Family History   Problem Relation Age of Onset   • Prostate cancer Father        Meds/Allergies   all current active meds have been reviewed    Allergies   Allergen Reactions   • Penicillins Other (See Comments)     As a child had reaction not sure what it was  Objective     No intake or output data in the 24 hours ending 01/19/23 1030    Invasive Devices:   Peripheral IV 01/18/23 Left Antecubital (Active)   Site Assessment WDL 01/18/23 2004   Dressing Type Transparent 01/18/23 2004   Line Status Blood return noted; Flushed;Saline locked 01/18/23 2004   Dressing Status Clean;Dry; Intact 01/18/23 2004       Physical Exam  Vitals and nursing note reviewed  Constitutional:       General: He is not in acute distress  Appearance: He is well-developed  He is not ill-appearing  HENT:      Head: Normocephalic and atraumatic  Eyes:      Conjunctiva/sclera: Conjunctivae normal    Cardiovascular:      Rate and Rhythm: Normal rate and regular rhythm  Heart sounds: No murmur heard  Pulmonary:      Effort: Pulmonary effort is normal  No respiratory distress  Breath sounds: Normal breath sounds  Abdominal:      General: Bowel sounds are normal  There is distension  Palpations: Abdomen is soft  Tenderness: There is no abdominal tenderness  Musculoskeletal:         General: No swelling  Cervical back: Neck supple  Skin:     General: Skin is warm and dry  Neurological:      General: No focal deficit present  Mental Status: He is alert and oriented to person, place, and time  Psychiatric:         Mood and Affect: Mood normal          Lab Results: I have personally reviewed pertinent reports  Imaging Studies: I have personally reviewed pertinent reports  EKG, Pathology, and Other Studies: I have personally reviewed pertinent reports  Counseling / Coordination of Care  Total floor / unit time spent today 61 minutes  Greater than 50% of total time was spent with the patient and / or family counseling and / or coordination of care

## 2023-01-19 NOTE — ASSESSMENT & PLAN NOTE
Patient started having nausea and bilious vomiting starting 6 PM yesterday which prompted him to come into the ED  He also noted to have some episodes of diarrhea yesterday  Patient was recently admitted 1/9-1/12 for rectal bleed, during the admission patient was noted to have ascites which was drained and IR, 3 9 L were removed at that time  Patient was discharged to follow-up with IR on 1/19 for repeat paracentesis  In the ED patient received Zofran which slightly improved his nausea  Patient was seen to have prominent ascites, paracentesis was done in the ED today (5 L fluids removed),  which provided relief for the patient and improved his nausea  Plan:  Zofran prn for nausea  Regular diet as tolerated

## 2023-01-20 ENCOUNTER — CONSULT (OUTPATIENT)
Dept: PALLIATIVE MEDICINE | Facility: CLINIC | Age: 53
End: 2023-01-20

## 2023-01-20 ENCOUNTER — PATIENT OUTREACH (OUTPATIENT)
Dept: HEMATOLOGY ONCOLOGY | Facility: CLINIC | Age: 53
End: 2023-01-20

## 2023-01-20 VITALS
BODY MASS INDEX: 34.5 KG/M2 | TEMPERATURE: 97.6 F | OXYGEN SATURATION: 95 % | HEART RATE: 94 BPM | WEIGHT: 254.41 LBS | DIASTOLIC BLOOD PRESSURE: 80 MMHG | SYSTOLIC BLOOD PRESSURE: 110 MMHG

## 2023-01-20 DIAGNOSIS — C22.8 PRIMARY MALIGNANT NEOPLASM OF LIVER (HCC): ICD-10-CM

## 2023-01-20 DIAGNOSIS — K21.9 GERD (GASTROESOPHAGEAL REFLUX DISEASE): ICD-10-CM

## 2023-01-20 DIAGNOSIS — R11.2 NAUSEA AND VOMITING, UNSPECIFIED VOMITING TYPE: ICD-10-CM

## 2023-01-20 DIAGNOSIS — M89.8X5 LYTIC BONE LESION OF HIP: ICD-10-CM

## 2023-01-20 DIAGNOSIS — C22.0 HEPATOCELLULAR CARCINOMA (HCC): Primary | ICD-10-CM

## 2023-01-20 DIAGNOSIS — R18.0 MALIGNANT ASCITES: ICD-10-CM

## 2023-01-20 RX ORDER — ONDANSETRON 4 MG/1
2 TABLET, FILM COATED ORAL EVERY 8 HOURS PRN
Qty: 20 TABLET | Refills: 0 | Status: SHIPPED | OUTPATIENT
Start: 2023-01-20

## 2023-01-20 RX ORDER — PANTOPRAZOLE SODIUM 40 MG/1
40 TABLET, DELAYED RELEASE ORAL DAILY
Qty: 30 TABLET | Refills: 0 | Status: SHIPPED | OUTPATIENT
Start: 2023-01-20

## 2023-01-20 RX ORDER — ONDANSETRON 4 MG/1
4 TABLET, FILM COATED ORAL EVERY 8 HOURS PRN
Qty: 20 TABLET | Refills: 0 | Status: SHIPPED | OUTPATIENT
Start: 2023-01-20 | End: 2023-01-20

## 2023-01-20 NOTE — PROGRESS NOTES
Outpatient Consultation - Palliative and Supportive Care   Eliza Ashby 47 y o  male [id number]    Assessment & Plan  Problem List Items Addressed This Visit        Digestive    Liver cancer ([facility] [address] ) - Primary    Nausea and vomiting    GERD (gastroesophageal reflux disease)       Other    Ascites    Lytic bone lesion of hip     Plan:  1  Symptom management  · Cancer-related pain  · Continue oxycodone 5 mg PO q4 hrs PRN moderate or severe pain   · Patient unsure if it is oxycodone or oxycodone-acetaminophen that he has at home  Will check for next visit  · Instructed to not exceed 3000 mg acetaminophen daily  · Can consider scheduled tylenol at next visit if patient only has oxycodon  · Nausea/vomiting  · Zofran 2 mg PO q8 hrs PRN nausea or vomiting  · Last QTC: 474, QT:370, used in hospital without issue  · Patient's chemotherapy also may increase QTC  Recommend oncology continue to monitor QTC with frequent EKGs  · Protonix 40 mg PO daily    2  Goals of care  · Patient desires to continue disease-directed therapies without limits at this time  · ACP visit planned for next visit; given documents  · Wishes to name wife Keri Patel as healthcare decision maker    3  Social Support  · Patient has wife Keri Patel and 1 biological son  States he has two other nonbiological children and is very involved in their life  · Mother and father are still alive  · Brother and grandmother    3  Follow-up  · Patient instructed to follow up in 2 weeks    · Will meet with  at next visit  · Provided phone number and instructed to call if any questions or concerns    Medications adjusted this encounter:  Requested Prescriptions     Signed Prescriptions Disp Refills   • pantoprazole (PROTONIX) 40 mg tablet 30 tablet 0     Sig: Take 1 tablet (40 mg total) by mouth daily   • ondansetron (ZOFRAN) 4 mg tablet 20 tablet 0     Sig: Take 0 5 tablets (2 mg total) by mouth every 8 (eight) hours as needed for nausea or vomiting No orders of the defined types were placed in this encounter  Medications Discontinued During This Encounter   Medication Reason   • ondansetron (ZOFRAN) 8 mg tablet        PPS: 90%      Declan Johnson was seen today for symptoms and planning cares related to above illnesses  Above orders were sent electronically, or provided in hardcopy in clinic  I have reviewed the patient's controlled substance dispensing history in the Prescription Drug Monitoring Program in compliance with the [facility] regulations before prescribing any controlled substances  Filled  Written  Sold  ID  Drug  QTY  Days  Prescriber  RX #  Dispenser  Refill  Daily Dose*  Pymt Type       01/14/2023 01/09/2023   1  Oxycodone Hcl (Ir) 5 Mg Tablet 90 00  30  [place] Jimmy  [id number]   Thr (0326)   22 50 MME  [place] Ins  [place]     05/12/2022 05/12/2022   1  Oxycodone Hcl (Ir) 5 Mg Cap 9 00  3  Me Hag  [id number]   Thr (0326)   22 50 MME  [place] Ins  [place]        We appreciate the referral, and wish for him to continue to follow with us  If there are questions or concerns, please contact us through our clinic/answering service 24 hours a day, seven days a week  Leopoldo Kendall, PA-C  [facility]  [phone]      Visit Information    Accompanied By: No one    Source of History: Self, Medical record    History Limitations: None     Chief Complaint  Chief Complaint   Patient presents with   • Consult   • Abdominal Pain   • Nausea   • Fatigue       History of Present Illness      Declan Johnson is a 47 y o  male who presents as a referral from Dr Demetrio De Jesus of oncology for primary palliative diagnosis of cholangiocarcinoma  Consultation is requested for: symptom management, goal of care assessment and decisional support, disease process education and discussion of prognosis, advance care planning, emotional support in the setting of serious illness       He has a history of cholangiocarcinoma diagnosed in 2020, now s/p chemotherapy and ablation  Recurrence in April 2022 status post 8 sessions of chemotherapy  Patient currently on Ivosidenib to 50 mg takes 2 tablets once a day  Previously followed with Dr Rita White outpatient and is transitioning to [place] [id number] oncology  He also has a 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 as well as bilateral neck dissection with lymph node removal, in remission  He has a history of tobacco abuse and not currently smoking  He discusses recent hospitalization where he was experiencing nausea, vomiting, abdominal pain  He states symptoms have become worse since starting Ivosidenib  Pain is primarily in RUQ, currently 2/10, intermittently sharp and stabbing  Has increasing fatigue  Denies lack of appetite, insomnia, shortness of breath, vomiting, diarrhea, constipation, urinary issues  Prescribed oxycodone (he is unsure if it is oxycodone-acetaminophen, will clarify at next visit) by Dr Rita White  He does not take this often and he does not like taking medications unless absolutely necessary  At most takes 2 doses in a day  Previously used zofran for nausea and took zofran in hospital which was effective  Zofran was included on discharge summary, but patient does not have any more at home and not given refill  Discusses history of drinking 1-2 drinks a day (beer or a shot)  States last drink was 2 drinks on [place] day  Has been cutting down and discussed importance of this       Pertinent Palliative Care Domains    Physical Symptoms:ambulates    Psychological Symptoms:no anxiety, good insight, coping well    Social Aspects: support system: wife, has 1 child, works as     Spiritual Aspects: Spiritism/spiritual unknown      Advance Directive and Living Will:    None on file  Power of :   None on file  POLST:   None on file    Historical Data  Past Medical History:   Diagnosis Date   • 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)      Past Surgical History:   Procedure Laterality Date   • CT NEEDLE BIOPSY LIVER  10/15/2020   • IR BIOPSY LIVER MASS  5/18/2022   • IR PARACENTESIS  1/10/2023     Social History     Socioeconomic History   • Marital status: /Civil Union     Spouse name: Not on file   • Number of children: Not on file   • Years of education: Not on file   • Highest education level: Not on file   Occupational History   • Not on file   Tobacco Use   • Smoking status: Every Day     Packs/day: 2 00     Years: 20 00     Pack years: 40 00     Types: Cigarettes   • Smokeless tobacco: Current   Substance and Sexual Activity   • Alcohol use: Yes     Comment: 1-2 drinks per day   • Drug use: Not on file   • Sexual activity: Not on file   Other Topics Concern   • Not on file   Social History Narrative   • Not on file     Social Determinants of Health     Financial Resource Strain: Not on file   Food Insecurity: Not on file   Transportation Needs: Not on file   Physical Activity: Not on file   Stress: Not on file   Social Connections: Not on file   Intimate Partner Violence: Not on file   Housing Stability: Not on file     Family History   Problem Relation Age of Onset   • Prostate cancer Father      Allergies   Allergen Reactions   • Penicillins Other (See Comments)     As a child had reaction not sure what it was        Current Outpatient Medications   Medication Sig Dispense Refill   • ondansetron (ZOFRAN) 4 mg tablet Take 0 5 tablets (2 mg total) by mouth every 8 (eight) hours as needed for nausea or vomiting 20 tablet 0   • oxyCODONE-acetaminophen (PERCOCET) 5-325 mg per tablet Take 1 tablet by mouth every 4 (four) hours as needed for moderate pain     • pantoprazole (PROTONIX) 40 mg tablet Take 1 tablet (40 mg total) by mouth daily 30 tablet 0   • furosemide (LASIX) 20 mg tablet Take 1 tablet (20 mg total) by mouth daily 90 tablet 0   • Ivosidenib 250 MG TABS Take by mouth     • potassium chloride (K-DUR,KLOR-CON) 10 mEq tablet Take 1 tablet (10 mEq total) by mouth daily 90 tablet 0   • prochlorperazine (COMPAZINE) 10 mg tablet Take 10 mg by mouth every 6 (six) hours as needed for nausea or vomiting       No current facility-administered medications for this visit  Review of Systems   Constitutional: Positive for malaise/fatigue  Negative for chills, decreased appetite, diaphoresis, fever and night sweats  HENT: Negative for congestion and sore throat  Cardiovascular: Negative for chest pain and leg swelling  Respiratory: Negative for cough and shortness of breath  Gastrointestinal: Positive for abdominal pain and nausea  Negative for constipation, diarrhea and vomiting  Genitourinary: Negative for frequency and hematuria  Neurological: Negative for dizziness and focal weakness  Psychiatric/Behavioral: The patient does not have insomnia and is not nervous/anxious  Vital Signs    /80 (BP Location: Right arm, Cuff Size: Standard)   Pulse 94   Temp 97 6 °F (36 4 °C) (Temporal)   Wt 115 kg (254 lb 6 6 oz)   SpO2 95%   BMI 34 50 kg/m²     Physical Exam and Objective Data  Physical Exam  Vitals reviewed  Constitutional:       General: He is not in acute distress  Appearance: He is well-developed  HENT:      Head: Normocephalic and atraumatic  Eyes:      Conjunctiva/sclera: Conjunctivae normal    Cardiovascular:      Rate and Rhythm: Normal rate  Pulmonary:      Effort: Pulmonary effort is normal  No respiratory distress  Abdominal:      Palpations: Abdomen is rigid  Tenderness: There is abdominal tenderness  Musculoskeletal:         General: No swelling  Cervical back: Neck supple  Skin:     General: Skin is warm and dry  Capillary Refill: Capillary refill takes less than 2 seconds  Neurological:      General: No focal deficit present  Mental Status: He is alert and oriented to person, place, and time     Psychiatric:         Mood and Affect: Mood normal            Radiology and Laboratory:  I personally reviewed and interpreted the following results: EKG 1/18/23, CMP and CBC 1/19/23, CT abdomen/pelvis 1/18/23    60 minutes was spent face to face with Concepción Ashby with greater than 50% of the time spent in counseling or coordination of care including discussions of etiology of diagnosis, diagnostic results, impression, and recommendations, risks and benefits of treatment, instructions for disease self management, treatment instructions, follow up requirements, patient and family counseling/involvement in care and compliance with treatment regimen  All of the patient's questions were answered during this discussion

## 2023-01-20 NOTE — PROGRESS NOTES
I called the patient back and for a new MRI appointment for 1/17/23 at 7:30pm at the [address] [place] [address]  The patient confirmed understanding and agreed

## 2023-01-20 NOTE — UTILIZATION REVIEW
NOTIFICATION OF ADMISSION DISCHARGE   This is a Notification of Discharge from [facility]  Please be advised that this patient has been discharge from our facility  Below you will find the admission and discharge date and time including the patient’s disposition  UTILIZATION REVIEW CONTACT:  David Monroy  Utilization   Network Utilization Review Department  Phone: [phone] x carefully listen to the prompts  All voicemails are confidential   Email: [email or web address]     ADMISSION INFORMATION  PRESENTATION DATE: 1/17/2023 11:55 PM  OBERVATION ADMISSION DATE:   INPATIENT ADMISSION DATE: 1/18/23  4:52 AM   DISCHARGE DATE: 1/19/2023  2:33 PM   DISPOSITION:Home/Self Care    IMPORTANT INFORMATION:  Send all requests for admission clinical reviews, approved or denied determinations and any other requests to dedicated fax number below belonging to the campus where the patient is receiving treatment   List of dedicated fax numbers:  [address] DENIALS (Administrative/Medical Necessity) [phone]   [address] (Maternity/NICU/Pediatrics) [phone]   [facility] [phone]   [facility] 87 [phone]   [address] [phone]   [address] [facility] [phone]   [address] [place] [address] [phone]   [facility] 119 [phone]   [facility]  [phone]   [address] [facility] [phone]   [facility] [address] [facility] [phone]

## 2023-01-20 NOTE — PROGRESS NOTES
I spoke with the patient and discussed his recent visit the emergency department for fatigue, N/V, and abd pain with ascites  He had a paracentesis while there and feels better, he denies n/v at present and report slight abdominal discomfort  He reports had to cancel his scheduled MRI because he was not able to get to the location at the time  I will reschedule him for another MRI

## 2023-01-21 ENCOUNTER — PATIENT OUTREACH (OUTPATIENT)
Dept: CASE MANAGEMENT | Facility: HOSPITAL | Age: 53
End: 2023-01-21

## 2023-01-21 LAB
BACTERIA SPEC BFLD CULT: NO GROWTH
GRAM STN SPEC: NORMAL
GRAM STN SPEC: NORMAL

## 2023-01-21 NOTE — ED PROVIDER NOTES
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, squamous cell carcinoma of tongue presenting with diffuse abdominal pain, nausea and vomiting  This is associated with significant abdominal distension  Pt notes no blood in vomit, but has been unable to keep down PO intake for last day  Denies any fevers, chills, chest pain  Admits some mild shortness of breath  Has not had any diarrhea  Notably, pt was admitted last week for concern for lower GI bleed which was thought to be related to hemorrhoids, and at that time did have paracentesis to drain significant ascites  3900cc was drained last week by IR  This was the first time he had required drainage for ascites  Prior to Admission Medications   Prescriptions Last Dose Informant Patient Reported? Taking?    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 [facility])        Past Surgical History:   Procedure Laterality Date   • CT NEEDLE BIOPSY LIVER  10/15/2020   • IR BIOPSY LIVER MASS  5/18/2022   • IR PARACENTESIS  1/10/2023       Family History   Problem Relation Age of Onset   • Prostate cancer Father      I have reviewed and agree with the history as documented  E-Cigarette/Vaping     E-Cigarette/Vaping Substances     Social History     Tobacco Use   • Smoking status: Every Day     Packs/day: 2 00     Years: 20 00     Pack years: 40 00     Types: Cigarettes   • Smokeless tobacco: Current   Substance Use Topics   • Alcohol use: Yes     Comment: 1-2 drinks per day        Review of Systems   Constitutional: Positive for fatigue  Negative for chills and fever  HENT: Negative for ear pain and sore throat  Eyes: Negative for pain and visual disturbance  Respiratory: Negative for cough and shortness of breath  Cardiovascular: Negative for chest pain and palpitations  Gastrointestinal: Positive for abdominal distention, abdominal pain, nausea and vomiting  Genitourinary: Negative for dysuria and hematuria  Musculoskeletal: Negative for arthralgias and back pain  Skin: Negative for color change and rash  Neurological: Negative for seizures and syncope  All other systems reviewed and are negative  Physical Exam  ED Triage Vitals   Temperature Pulse Respirations Blood Pressure SpO2   01/18/23 0007 01/18/23 0002 01/18/23 0002 01/18/23 0002 01/18/23 0002   98 4 °F (36 9 °C) 99 20 142/89 95 %      Temp Source Heart Rate Source Patient Position - Orthostatic VS BP Location FiO2 (%)   01/18/23 0007 01/18/23 0002 01/18/23 0002 01/18/23 0002 --   Oral Monitor Sitting Right arm       Pain Score       01/18/23 0046       7             Orthostatic Vital Signs  Vitals:    01/18/23 0806 01/18/23 1519 01/19/23 0139 01/19/23 0835   BP: 120/71 104/61 108/63 114/70   Pulse: 89 78 77 85   Patient Position - Orthostatic VS:    Sitting       Physical Exam  Vitals and nursing note reviewed  Constitutional:       General: He is not in acute distress  Appearance: He is ill-appearing  HENT:      Head: Normocephalic and atraumatic  Eyes:      Conjunctiva/sclera: Conjunctivae normal    Cardiovascular:      Rate and Rhythm: Normal rate and regular rhythm  Heart sounds: No murmur heard  Pulmonary:      Effort: Pulmonary effort is normal  No respiratory distress  Breath sounds: Normal breath sounds  Abdominal:      General: There is distension  Palpations: There is shifting dullness and fluid wave  Tenderness: There is generalized abdominal tenderness  Musculoskeletal:         General: No swelling  Cervical back: Neck supple  Skin:     General: Skin is warm and dry  Capillary Refill: Capillary refill takes less than 2 seconds  Neurological:      Mental Status: He is alert  Psychiatric:         Mood and Affect: Mood normal          ED Medications  Medications   ondansetron (ZOFRAN) injection 4 mg (4 mg Intravenous Given 1/18/23 0046)   HYDROmorphone (DILAUDID) injection 0 5 mg (0 5 mg Intravenous Given 1/18/23 0046)   iohexol (OMNIPAQUE) 350 MG/ML injection (SINGLE-DOSE) 100 mL (100 mL Intravenous Given 1/18/23 0130)   ondansetron (ZOFRAN) injection 4 mg (4 mg Intravenous Given 1/18/23 0212)   metoclopramide (REGLAN) injection 10 mg (10 mg Intravenous Given 1/18/23 0352)   albumin human (FLEXBUMIN) 25 % injection 25 g (25 g Intravenous Given 1/18/23 0403)   lactated ringers bolus 1,000 mL (1,000 mL Intravenous New Bag 1/18/23 0458)   sucralfate (CARAFATE) tablet 1 g (1 g Oral Given 1/18/23 0458)   magnesium sulfate 2 g/50 mL IVPB (premix) 2 g (0 g Intravenous Stopped 1/19/23 1030)       Diagnostic Studies  Results Reviewed     Procedure Component Value Units Date/Time    Body fluid culture and Gram stain [[id number]] Collected: 01/18/23 0402    Lab Status: Preliminary result Specimen: Body Fluid from Paracentesis Updated: 01/20/23 0743     Body Fluid Culture, Sterile No growth     Gram Stain Result 1+ Polys      No organisms seen    Blood culture #1 [[id number]] Collected: 01/18/23 0042    Lab Status: Preliminary result Specimen: Blood from Arm, Left Updated: 01/20/23 0701     Blood Culture No Growth at 48 hrs      Blood culture #2 [[id number]] Collected: 01/18/23 0042    Lab Status: Preliminary result Specimen: Blood from Hand, Right Updated: 01/20/23 0701     Blood Culture No Growth at 48 hrs  Total Protein, Fluid [[id number]] Collected: 01/18/23 0402    Lab Status: Final result Specimen: Body Fluid from Peritoneal Fluid Updated: 01/18/23 1054     Protein, Fluid <2 0 g/dL     Glucose, body fluid [[id number]] Collected: 01/18/23 0402    Lab Status: Final result Specimen: Body Fluid from Peritoneal Fluid Updated: 01/18/23 1054     Glucose, Fluid 120 mg/dL     Path Slide Review [[id number]] Collected: 01/18/23 0402    Lab Status: Final result Specimen: Body Fluid from Paracentesis Updated: 01/18/23 1053     Path Review Paracentesis fluid:  Histiocytes, reactive mesothelial cells, and mixed inflammation  LD (LDH), Body Fluid [[id number]] Collected: 01/18/23 0402    Lab Status: Final result Specimen: Body Fluid from Paracentesis Updated: 01/18/23 1032     LD, Fluid 89 U/L     Platelet count [[id number]]  (Abnormal) Collected: 01/18/23 0639    Lab Status: Final result Specimen: Blood from Hand, Right Updated: 01/18/23 0723     Platelets 620 Thousands/uL     Body Fluid Diff [[id number]]  (Abnormal) Collected: 01/18/23 0402    Lab Status: Final result Specimen: Body Fluid from Paracentesis Updated: 01/18/23 0704     Total Counted 100     Neutrophils % (Fluid) 10 %      Lymphs % (Fluid) 19 %      Mesothelial % (Fluid) 6 %      Histiocyte % (Fluid) 62 %      Unclassified % (Fluid) 3 %      Pathology Review Yes    Body fluid white cell count with differential [[id number]] Collected: 01/18/23 0402    Lab Status: Final result Specimen:  Body Fluid from Paracentesis Updated: 01/18/23 0637     Site Paracentesis     Color, Fluid Yellow     Clarity, Fluid Clear     WBC, Fluid 153 /ul     HS Troponin I 4hr [[id number]]  (Normal) Collected: 01/18/23 0458    Lab Status: Final result Specimen: Blood from Arm, Left Updated: 01/18/23 0544     hs TnI 4hr 10 ng/L Delta 4hr hsTnI -1 ng/L     HS Troponin I 2hr [[id number]]  (Normal) Collected: 01/18/23 0303    Lab Status: Final result Specimen: Blood from Arm, Left Updated: 01/18/23 0343     hs TnI 2hr 11 ng/L      Delta 2hr hsTnI 0 ng/L     HS Troponin 0hr (reflex protocol) [[id number]]  (Normal) Collected: 01/18/23 0042    Lab Status: Final result Specimen: Blood from Arm, Left Updated: 01/18/23 0118     hs TnI 0hr 11 ng/L     APTT [[id number]]  (Normal) Collected: 01/18/23 0042    Lab Status: Final result Specimen: Blood from Arm, Left Updated: 01/18/23 0112     PTT 35 seconds     Protime-INR [[id number]]  (Abnormal) Collected: 01/18/23 0042    Lab Status: Final result Specimen: Blood from Arm, Left Updated: 01/18/23 0112     Protime 16 0 seconds      INR 1 26    Comprehensive metabolic panel [[id number]]  (Abnormal) Collected: 01/18/23 0042    Lab Status: Final result Specimen: Blood from Arm, Left Updated: 01/18/23 0109     Sodium 129 mmol/L      Potassium 4 4 mmol/L      Chloride 98 mmol/L      CO2 22 mmol/L      ANION GAP 9 mmol/L      BUN 12 mg/dL      Creatinine 0 61 mg/dL      Glucose 117 mg/dL      Calcium 9 0 mg/dL      Corrected Calcium 9 6 mg/dL      AST 41 U/L      ALT 20 U/L      Alkaline Phosphatase 87 U/L      Total Protein 6 3 g/dL      Albumin 3 2 g/dL      Total Bilirubin 1 41 mg/dL      eGFR 115 ml/min/1 73sq m     Narrative:      [facility] guidelines for Chronic Kidney Disease (CKD):   •  Stage 1 with normal or high GFR (GFR > 90 mL/min/1 73 square meters)  •  Stage 2 Mild CKD (GFR = 60-89 mL/min/1 73 square meters)  •  Stage 3A Moderate CKD (GFR = 45-59 mL/min/1 73 square meters)  •  Stage 3B Moderate CKD (GFR = 30-44 mL/min/1 73 square meters)  •  Stage 4 Severe CKD (GFR = 15-29 mL/min/1 73 square meters)  •  Stage 5 End Stage CKD (GFR <15 mL/min/1 73 square meters)  Note: GFR calculation is accurate only with a steady state creatinine    Ammonia [[id number]]  (Normal) Collected: 01/18/23 0042    Lab Status: Final result Specimen: Blood from Arm, Left Updated: 01/18/23 0108     Ammonia 43 umol/L     Lactic acid [[id number]]  (Normal) Collected: 01/18/23 0042    Lab Status: Final result Specimen: Blood from Arm, Left Updated: 01/18/23 0108     LACTIC ACID 2 0 mmol/L     Narrative:      Result may be elevated if tourniquet was used during collection  CBC and differential [[id number]]  (Abnormal) Collected: 01/18/23 0042    Lab Status: Final result Specimen: Blood from Arm, Left Updated: 01/18/23 0051     WBC 10 05 Thousand/uL      RBC 4 87 Million/uL      Hemoglobin 15 2 g/dL      Hematocrit 45 8 %      MCV 94 fL      MCH 31 2 pg      MCHC 33 2 g/dL      RDW 12 5 %      MPV 9 8 fL      Platelets 849 Thousands/uL      nRBC 0 /100 WBCs      Neutrophils Relative 82 %      Immat GRANS % 0 %      Lymphocytes Relative 8 %      Monocytes Relative 10 %      Eosinophils Relative 0 %      Basophils Relative 0 %      Neutrophils Absolute 8 14 Thousands/µL      Immature Grans Absolute 0 04 Thousand/uL      Lymphocytes Absolute 0 75 Thousands/µL      Monocytes Absolute 1 04 Thousand/µL      Eosinophils Absolute 0 04 Thousand/µL      Basophils Absolute 0 04 Thousands/µL                  CT pe study w abdomen pelvis w contrast   Final Result by Leander Garvey MD (01/18 3504)         1  Interval increase in size of pulmonary nodules since the October 2022 exam concerning for progression of metastatic disease  2   No evidence of proximal pulmonary artery embolus, thoracic aortic aneurysm or dissection  3   Stable hepatic metastases  No biliary obstruction  4   Moderate to large amount of ascites  5   No evidence of bowel obstruction, colitis or diverticulitis                       Workstation performed: [id number]               Procedures  Paracentesis    Date/Time: 1/18/2023 12:15 AM  Performed by: Euell Dubin, DO  Authorized by: Euell Dubin, DO     Patient location:  ED  Assisting Provider(s): Yes (comment) Malcom Mejia MD)    Consent:     Consent obtained:  Verbal and written    Consent given by:  Patient    Risks discussed:  Bleeding, infection, incomplete drainage, nerve damage and pain    Alternatives discussed:  No treatment  Universal protocol:     Patient identity confirmed:  Verbally with patient and arm band  Indications:     Indications:  Ascites with significant abdominal pain, nausea and vomiting  Diagnostic for SBP  Pre-procedure details:     Skin preparation:  ChloraPrep    Preparation: Patient was prepped and draped in the usual sterile fashion    Anesthesia (see MAR for exact dosages): Anesthesia method:  Local infiltration    Local anesthetic:  Lidocaine 1% w/o epi  Procedure Detail:     Equipment used:  6Fr Safe T-Cesis paracentesis kit, universal drainage set, safety scalpel 11 blade, 1100mL vacuum bottles x5    Procedure note (site, laterality, method, findings):  Site was identified using ultrasound to find large pocket of ascitic fluid  Site was marked and area was prepped and draped in sterile fashion  Safe-t centesis catheter over needle was introduced after infiltration of local anesthetic  On entry into abdomen, needle was withdrawn and catheter remained  Extension tubing was attached to catheter and vacuum bottle and 5L of straw-colored ascitic fluid was obtained  Samples were sent for further testing  Post-procedure details:     Patient tolerance of procedure: Tolerated well, no immediate complications          ED Course  ED Course as of 01/20/23 2344 Wed Jan 18, 2023   0110 Sodium(!): 129  Acute on chronic, likely in setting of ascites   0125 Plan for CT abd pelvis to rule out any acute obstruction, other emergent pathology  Given immunosuppressed state, would plan for diagnostic paracentesis pending CT scan      1836 Paracentesis done at bedside, able to evaluate ~5L of fluid from abdomen   0424 Plan to reach out to [facility] for potential admission   0456 Accepted for admission under Dr Nadege Fortune Making  46 y/o M patient with active malignancy presenting with acute nausea and vomiting, abdominal distension  On initial exam, pt appeared to have large volume of ascites, though with history of malignancy with metastasis, bowel obstruction could not entirely be ruled out as potential cause  CT scan was ordered to clarify and rule out any bowel obstruction that showed a moderate amount of ascites without evidence of obstruction or inflammatory pathology  Pt did require multiple doses of pain and nausea medication related to symptoms, so paracentesis was done as described above for both diagnostic and therapeutic purposes  5L of total fluid was drained and did not appear sanguinous, purulent  Pt was repleted with albumin given large volume of fluid taken off  Labs were drawn and only significant for some acute on seemingly chronic hyponatremia, likely related to ascites and liver pathology  At that time, pt was counseled that we would like to admit him for observation given significant symptoms requiring paracentesis  [facility] was contacted and pt was admitted for observation in stable condition  Hyponatremia: acute illness or injury  Nausea and vomiting: acute illness or injury  Refractory ascites: acute illness or injury  Amount and/or Complexity of Data Reviewed  Labs: ordered  Decision-making details documented in ED Course  Radiology: ordered  Risk  Prescription drug management  Decision regarding hospitalization              Disposition  Final diagnoses:   Refractory ascites   Nausea and vomiting   Hyponatremia     Time reflects when diagnosis was documented in both MDM as applicable and the Disposition within this note     Time User Action Codes Description Comment    1/18/2023  4:51 AM Brianne Dejesus Add [R18 8] Refractory ascites     1/18/2023  4:51 AM Silver Richard Add [R11 2] Nausea and vomiting     1/18/2023  4:52 AM Amelia Galvin Add [E87 1] Hyponatremia     1/19/2023  9:42 AM SarahMason spears B Add [R18 0] Malignant ascites     1/19/2023  9:42 AM Mason Acuna B Add [C22 0] Hepatocellular carcinoma ([address] )     1/19/2023 11:23 AM Monica Wilson B Add [R18 8] Ascites       ED Disposition     ED Disposition   Admit    Condition   Stable    Date/Time   Wed Jan 18, 2023  4:51 AM    Comment   Case was discussed with [facility] team and the patient's admission status was agreed to be observation to the service of Dr Delia Haider   Follow-up Information     Follow up With Specialties Details Why Contact Info Additional Brittany Sanchez MD Hematology and Oncology, Hematology, Oncology Go on 1/31/2023 [facility]   [facility] is the location of he appointment  Appointment at 9:40, please arrive by 9:25 [facility] [address] [facility] [address]       [facility] [address] Internal Medicine Schedule an appointment as soon as possible for a visit  [address] [facility] Internal Medicine [facility], [address] [place], [place], [address]          Discharge Medication List as of 1/19/2023  1:33 PM      START taking these medications    Details   furosemide (LASIX) 20 mg tablet Take 1 tablet (20 mg total) by mouth daily, Starting Thu 1/19/2023, Normal      potassium chloride (K-DUR,KLOR-CON) 10 mEq tablet Take 1 tablet (10 mEq total) by mouth daily, Starting Thu 1/19/2023, Normal         CONTINUE these medications which have NOT CHANGED    Details   Ivosidenib 250 MG TABS Take by mouth, Historical Med      oxyCODONE-acetaminophen (PERCOCET) 5-325 mg per tablet Take 1 tablet by mouth every 4 (four) hours as needed for moderate pain, Historical Med      prochlorperazine (COMPAZINE) 10 mg tablet Take 10 mg by mouth every 6 (six) hours as needed for nausea or vomiting, Historical Med ondansetron (ZOFRAN) 8 mg tablet Take by mouth every 8 (eight) hours as needed for nausea or vomiting, Historical Med           Outpatient Discharge Orders   Discharge Diet       PDMP Review       Value Time User    PDMP Reviewed  Yes 1/20/2023  8:11 AM Renita Roman PA-C           ED Provider  Attending physically available and evaluated Marlise Range  I managed the patient along with the ED Attending      Electronically Signed by         Julio Morocho DO  01/21/23 6729

## 2023-01-22 NOTE — PROGRESS NOTES
MSW received referral and chart reviewed  MSW will initiate initial assessment and DT screening  MSW will follow

## 2023-01-23 ENCOUNTER — OFFICE VISIT (OUTPATIENT)
Dept: INTERNAL MEDICINE CLINIC | Facility: CLINIC | Age: 53
End: 2023-01-23

## 2023-01-23 VITALS
HEART RATE: 102 BPM | HEIGHT: 72 IN | SYSTOLIC BLOOD PRESSURE: 100 MMHG | WEIGHT: 261.6 LBS | DIASTOLIC BLOOD PRESSURE: 85 MMHG | BODY MASS INDEX: 35.43 KG/M2 | OXYGEN SATURATION: 97 % | TEMPERATURE: 99 F

## 2023-01-23 DIAGNOSIS — R18.0 MALIGNANT ASCITES: ICD-10-CM

## 2023-01-23 DIAGNOSIS — C78.01 MALIGNANT NEOPLASM METASTATIC TO BOTH LUNGS (HCC): ICD-10-CM

## 2023-01-23 DIAGNOSIS — C22.1 CHOLANGIOCARCINOMA (HCC): Primary | ICD-10-CM

## 2023-01-23 DIAGNOSIS — E83.42 HYPOMAGNESEMIA: ICD-10-CM

## 2023-01-23 DIAGNOSIS — E55.9 VITAMIN D DEFICIENCY: ICD-10-CM

## 2023-01-23 DIAGNOSIS — C78.02 MALIGNANT NEOPLASM METASTATIC TO BOTH LUNGS (HCC): ICD-10-CM

## 2023-01-23 DIAGNOSIS — C78.7 METASTASIS TO LIVER (HCC): ICD-10-CM

## 2023-01-23 PROBLEM — Z98.890 S/P EXPLORATORY LAPAROTOMY: Status: RESOLVED | Noted: 2020-12-16 | Resolved: 2023-01-01

## 2023-01-23 PROBLEM — C02.9 TONGUE CARCINOMA (HCC): Status: ACTIVE | Noted: 2020-11-16

## 2023-01-23 PROBLEM — K62.5 RECTAL BLEEDING: Status: RESOLVED | Noted: 2023-01-09 | Resolved: 2023-01-23

## 2023-01-23 PROBLEM — F17.200 TOBACCO USE DISORDER: Status: ACTIVE | Noted: 2019-02-28

## 2023-01-23 PROBLEM — C22.8 PRIMARY MALIGNANT NEOPLASM OF LIVER (HCC): Status: ACTIVE | Noted: 2020-11-16

## 2023-01-23 PROBLEM — R91.8 PULMONARY NODULES: Status: ACTIVE | Noted: 2023-01-23

## 2023-01-23 PROBLEM — C02.9 TONGUE CARCINOMA (HCC): Status: RESOLVED | Noted: 2020-11-16 | Resolved: 2023-01-23

## 2023-01-23 PROBLEM — G52.8: Status: ACTIVE | Noted: 2018-06-05

## 2023-01-23 PROBLEM — D69.6 THROMBOCYTOPENIA (HCC): Status: ACTIVE | Noted: 2023-01-01

## 2023-01-23 PROBLEM — J18.1 LEFT LOWER LOBE CONSOLIDATION (HCC): Status: RESOLVED | Noted: 2023-01-09 | Resolved: 2023-01-23

## 2023-01-23 PROBLEM — C02.9 SQUAMOUS CELL CARCINOMA OF TONGUE (HCC): Status: ACTIVE | Noted: 2018-03-06

## 2023-01-23 PROBLEM — Z98.890 S/P EXPLORATORY LAPAROTOMY: Status: ACTIVE | Noted: 2020-12-16

## 2023-01-23 LAB
BACTERIA BLD CULT: NORMAL
BACTERIA BLD CULT: NORMAL

## 2023-01-23 RX ORDER — ERGOCALCIFEROL 1.25 MG/1
50000 CAPSULE ORAL WEEKLY
COMMUNITY
Start: 2022-12-29

## 2023-01-23 RX ORDER — MAGNESIUM OXIDE 400 MG/1
400 TABLET ORAL DAILY
Qty: 30 TABLET | Refills: 2 | Status: SHIPPED | OUTPATIENT
Start: 2023-01-23

## 2023-01-23 RX ORDER — CYCLOBENZAPRINE HCL 5 MG
TABLET ORAL
COMMUNITY
Start: 2022-12-22

## 2023-01-23 RX ORDER — OXYCODONE HYDROCHLORIDE 5 MG/1
5 TABLET ORAL EVERY 6 HOURS PRN
COMMUNITY
Start: 2023-01-14

## 2023-01-23 NOTE — ASSESSMENT & PLAN NOTE
· Large volume  Recurrent likely from liver cirrhosis  · Pathology did not show malignant ascites  Appeared inflammatory with histiocytes, reactive mesothelial cells  · Continue lasix for now at current dosing  Unfortunately, borderline low blood pressure here in the office today precludes us from adding spironolactone or up-titrating lasix  · Needs scheduled paracentesis with IR

## 2023-01-23 NOTE — PROGRESS NOTES
Name: Pastor Dennison      : 1970      MRN: [id number]  Encounter Provider: Albina Skinner MD  Encounter Date: 2023   Encounter department: [facility] INTERNAL MEDICINE [place]    Assessment & Plan     1  Cholangiocarcinoma, stage IV [facility])  Assessment & Plan:  Follows with Dr Janice Thacker  Currently on 3rd line treatment with Ivosidenib  Appears to be metastatic to at least pulmonary, hepatic, and osseous structure of the hip   +ascites though cytology does not appear to be consistent with malignant ascites  There has been mention of worsening peritoneal metastasis  Patient planned for repeat MRI abd w wo contrast    Will be establishing with Dr Ibrahima Bruner  Appt  for second opinion  Continues to follow with [facility] as well  Following with palliative  Will check EKG in the office today given borderline prolonged EKG  Patient chemotherapy apparently can cause prolonged QT  QTC in the office today is improved at 452  Orders:  -     Basic metabolic panel; Future; Expected date: 2023    2  Malignant ascites  Assessment & Plan:  Large volume  Recurrent likely from liver cirrhosis  Pathology did not show malignant ascites  Appeared inflammatory with histiocytes, reactive mesothelial cells  Continue lasix for now at current dosing  Unfortunately, borderline low blood pressure here in the office today precludes us from adding spironolactone or up-titrating lasix  Needs scheduled paracentesis with IR  3  Metastasis to liver [facility])  Assessment & Plan:  Recurrent ascites  See plans below  4  Malignant neoplasm metastatic to both lungs ([facility] [address] )    5  Hypomagnesemia  -     magnesium oxide (MAG-OX) 400 mg tablet; Take 1 tablet (400 mg total) by mouth daily  -     Magnesium; Future; Expected date: 2023    6  BMI 35 0-35 9,adult  -     Lipid Panel with Direct LDL reflex; Future    7  Vitamin D deficiency  -     Vitamin D 25 hydroxy;  Future; Expected date: 02/23/2023        Depression Screening and Follow-up Plan: Patient was screened for depression during today's encounter  They screened negative with a PHQ-2 score of 0  Subjective      I had the pleasure of seeing Mr Ashby today to establish care  He has stage IV cholangiocarcinoma, with bone, peritoneal, lung, and hepatic mets  He also has hx of SCC of head/neck (tongue)  Former smoker  Patient recently hospitalized for recurrent ascites in the setting of metastatic cholangiocarcinoma  Patient has been requiring frequent large volume paracentesis, with removal of 4-5L of ascitic fluid on a weekly basis  He has 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 and peritoneal mets though ascitic fluid has not been consistent with malignant ascites on cytology, more so inflammatory cells  He notes worsening ascites since starting third line chemo medication with Ivosidenib  Patient follows with Dr Robert Jean, Oncology  He is currently in talks of switching regimen  He has an appt with Dr Shanthi Gorman for second opinion with [facility]  He denies any nausea or vomiting currently  No rectal bleeding  No SOB  Has zofran prn  He is scheduled for repeat paracentesis tomorrow with IR  He is taking 20 mg lasix daily that was started in the hospital      Review of Systems   Constitutional: Positive for activity change, fatigue and unexpected weight change  Negative for chills and fever  Respiratory: Negative for cough, shortness of breath and wheezing  Cardiovascular: Positive for leg swelling  Negative for chest pain and palpitations  Gastrointestinal: Positive for abdominal distention and abdominal pain  Negative for blood in stool, constipation, diarrhea, nausea and vomiting  Genitourinary: Negative for difficulty urinating         Current Outpatient Medications on File Prior to Visit   Medication Sig   • ergocalciferol (VITAMIN D2) 50,000 units Take 50,000 Units by mouth once a week   • furosemide (LASIX) 20 mg tablet Take 1 tablet (20 mg total) by mouth daily   • Ivosidenib 250 MG TABS Take by mouth   • ondansetron (ZOFRAN) 4 mg tablet Take 0 5 tablets (2 mg total) by mouth every 8 (eight) hours as needed for nausea or vomiting   • oxyCODONE (ROXICODONE) 5 immediate release tablet Take 5 mg by mouth 3 (three) times a day   • pantoprazole (PROTONIX) 40 mg tablet Take 1 tablet (40 mg total) by mouth daily   • potassium chloride (K-DUR,KLOR-CON) 10 mEq tablet Take 1 tablet (10 mEq total) by mouth daily   • cyclobenzaprine (FLEXERIL) 5 mg tablet take 1 tablet by mouth every 8 hours (3 TIMES A DAY) (Patient not taking: Reported on 1/23/2023)   • [DISCONTINUED] oxyCODONE-acetaminophen (PERCOCET) 5-325 mg per tablet Take 1 tablet by mouth every 4 (four) hours as needed for moderate pain (Patient not taking: Reported on 1/23/2023)   • [DISCONTINUED] prochlorperazine (COMPAZINE) 10 mg tablet Take 10 mg by mouth every 6 (six) hours as needed for nausea or vomiting (Patient not taking: Reported on 1/23/2023)       Objective     /85 (BP Location: Left arm, Patient Position: Sitting, Cuff Size: Large)   Pulse 102   Temp 99 °F (37 2 °C) (Tympanic)   Ht 6' (1 829 m)   Wt 119 kg (261 lb 9 6 oz)   SpO2 97%   BMI 35 48 kg/m²     Physical Exam  Vitals reviewed  Constitutional:       General: He is not in acute distress  Appearance: He is ill-appearing  He is not toxic-appearing  Cardiovascular:      Rate and Rhythm: Normal rate and regular rhythm  Pulmonary:      Effort: No respiratory distress  Breath sounds: No wheezing, rhonchi or rales  Abdominal:      General: There is distension  Palpations: There is no mass  Tenderness: There is no abdominal tenderness  There is no rebound  Comments: + ascites   Musculoskeletal:         General: Swelling (2+ pitting bilateral edema) present  Skin:     General: Skin is warm and dry     Neurological:      Mental Status: Mental status is at baseline         Albina Skinner MD

## 2023-01-23 NOTE — ASSESSMENT & PLAN NOTE
· Follows with Dr Nicholas Gomez  Currently on 3rd line treatment with Ivosidenib  Appears to be metastatic to at least pulmonary, hepatic, and osseous structure of the hip   +ascites though cytology does not appear to be consistent with malignant ascites  There has been mention of worsening peritoneal metastasis  Patient planned for repeat MRI abd w wo contrast    · Will be establishing with Dr Aga Álvarez  Appt 1/31 for second opinion  Continues to follow with [facility] as well  · Following with palliative  · Will check EKG in the office today given borderline prolonged EKG  Patient chemotherapy apparently can cause prolonged QT  QTC in the office today is improved at 452

## 2023-01-24 ENCOUNTER — TELEPHONE (OUTPATIENT)
Dept: HEMATOLOGY ONCOLOGY | Facility: CLINIC | Age: 53
End: 2023-01-24

## 2023-01-24 ENCOUNTER — HOSPITAL ENCOUNTER (OUTPATIENT)
Dept: RADIOLOGY | Facility: HOSPITAL | Age: 53
Discharge: HOME | End: 2023-01-24
Admitting: RADIOLOGY

## 2023-01-24 VITALS — DIASTOLIC BLOOD PRESSURE: 66 MMHG | HEART RATE: 76 BPM | OXYGEN SATURATION: 98 % | SYSTOLIC BLOOD PRESSURE: 112 MMHG

## 2023-01-24 DIAGNOSIS — R18.0 MALIGNANT ASCITES: ICD-10-CM

## 2023-01-24 DIAGNOSIS — R18.8 OTHER ASCITES: ICD-10-CM

## 2023-01-24 DIAGNOSIS — C22.1 CHOLANGIOCARCINOMA (HCC): Primary | ICD-10-CM

## 2023-01-24 RX ORDER — LIDOCAINE HYDROCHLORIDE 10 MG/ML
INJECTION, SOLUTION EPIDURAL; INFILTRATION; INTRACAUDAL; PERINEURAL AS NEEDED
Status: COMPLETED | OUTPATIENT
Start: 2023-01-24 | End: 2023-01-24

## 2023-01-24 RX ADMIN — LIDOCAINE HYDROCHLORIDE 10 ML: 10 INJECTION, SOLUTION EPIDURAL; INFILTRATION; INTRACAUDAL; PERINEURAL at 09:12

## 2023-01-24 NOTE — SEDATION DOCUMENTATION
Paracentesis performed by Kristine Wiggins  Procedure tolerated well, patient drained for 7400 of clear serous fluid

## 2023-01-24 NOTE — BRIEF OP NOTE (RAD/CATH)
IR PARACENTESIS Procedure Note    PATIENT NAME: Concepción Ashby  : 1970  MRN: [id number]    Pre-op Diagnosis:   1  Other ascites      Post-op Diagnosis:   1   Other ascites        Provider:   HARINI Archibald  Assistants:     No qualified resident was available, Resident is only observing    Estimated Blood Loss: none    Findings: 7400 mL clear serous ascites, RLQ    Specimens: none    Complications:  None immediate     Anesthesia: local    HARINI Archibald     Date: 2023  Time: 12:23 PM

## 2023-01-24 NOTE — DISCHARGE INSTRUCTIONS
Abdominal Paracentesis     WHAT YOU NEED TO KNOW:   Abdominal paracentesis is a procedure to remove abnormal fluid buildup in your abdomen  Fluid builds up because of liver problems, such as swelling and scarring  Heart failure, kidney disease, a mass, or problems with your pancreas may also cause fluid buildup  DISCHARGE INSTRUCTIONS:     Follow up with your healthcare provider as directed: Write down your questions so you remember to ask them during your visits  Wound care: Remove dressing after 24 hours  Leave glue in place  Return to your normal activities    Contact Interventional Radiology at [phone] [place] PATIENTS: Contact Interventional Radiology at [phone]) Roc Beasley PATIENTS: Contact Interventional Radiology at [phone]) if:  You have a fever and your wound is red and swollen  You have yellow, green, or bad-smelling discharge coming from your wound  You have pain or swelling in your abdomen  You have an upset stomach or you vomit  You have sudden, sharp pain in your abdomen  You urinate very little or not at all  You feel confused and more tired than usual    Your arm or leg feels warm, tender, and painful  It may look swollen and red  You suddenly feel lightheaded and have trouble breathing

## 2023-01-25 DIAGNOSIS — R18.0 MALIGNANT ASCITES: Primary | ICD-10-CM

## 2023-01-25 RX ORDER — ALBUMIN (HUMAN) 12.5 G/50ML
50 SOLUTION INTRAVENOUS ONCE AS NEEDED
OUTPATIENT
Start: 2023-01-25

## 2023-01-25 RX ORDER — ALBUMIN (HUMAN) 12.5 G/50ML
100 SOLUTION INTRAVENOUS ONCE AS NEEDED
OUTPATIENT
Start: 2023-01-25

## 2023-01-25 RX ORDER — ALBUMIN (HUMAN) 12.5 G/50ML
75 SOLUTION INTRAVENOUS ONCE AS NEEDED
OUTPATIENT
Start: 2023-01-25

## 2023-01-25 RX ORDER — SODIUM CHLORIDE 9 MG/ML
20 INJECTION, SOLUTION INTRAVENOUS ONCE
OUTPATIENT
Start: 2023-01-25

## 2023-01-26 ENCOUNTER — HOSPITAL ENCOUNTER (OUTPATIENT)
Dept: RADIOLOGY | Facility: HOSPITAL | Age: 53
Discharge: HOME | End: 2023-01-26

## 2023-01-26 DIAGNOSIS — C24.8 MALIGNANT NEOPLASM OF OVERLAPPING SITES OF BILIARY TRACT (HCC): ICD-10-CM

## 2023-01-26 RX ADMIN — IOHEXOL 100 ML: 350 INJECTION, SOLUTION INTRAVENOUS at 07:44

## 2023-01-30 ENCOUNTER — HOSPITAL ENCOUNTER (OUTPATIENT)
Dept: RADIOLOGY | Facility: HOSPITAL | Age: 53
Discharge: HOME | End: 2023-01-30
Attending: INTERNAL MEDICINE | Admitting: RADIOLOGY

## 2023-01-30 VITALS
DIASTOLIC BLOOD PRESSURE: 67 MMHG | SYSTOLIC BLOOD PRESSURE: 107 MMHG | HEART RATE: 80 BPM | RESPIRATION RATE: 18 BRPM | OXYGEN SATURATION: 96 %

## 2023-01-30 DIAGNOSIS — R18.0 MALIGNANT ASCITES: ICD-10-CM

## 2023-01-30 DIAGNOSIS — C22.1 CHOLANGIOCARCINOMA (HCC): ICD-10-CM

## 2023-01-30 NOTE — SEDATION DOCUMENTATION
Left side paracentesis performed by REFUGIO Martinez  82197 ml of clear yellow fluid drained  Ultrasound guided  No labs were ordered  Pt tolerated well  Puncture site is CDI and covered with a band aid  AVS reviewed with pt upon dc

## 2023-01-31 ENCOUNTER — HOSPITAL ENCOUNTER (EMERGENCY)
Facility: HOSPITAL | Age: 53
Discharge: HOME | End: 2023-01-31
Attending: EMERGENCY MEDICINE

## 2023-01-31 ENCOUNTER — TELEPHONE (OUTPATIENT)
Dept: HEMATOLOGY ONCOLOGY | Facility: CLINIC | Age: 53
End: 2023-01-31

## 2023-01-31 ENCOUNTER — APPOINTMENT (EMERGENCY)
Dept: CT IMAGING | Facility: HOSPITAL | Age: 53
End: 2023-01-31

## 2023-01-31 ENCOUNTER — APPOINTMENT (EMERGENCY)
Dept: VASCULAR ULTRASOUND | Facility: HOSPITAL | Age: 53
End: 2023-01-31

## 2023-01-31 ENCOUNTER — PATIENT OUTREACH (OUTPATIENT)
Dept: HEMATOLOGY ONCOLOGY | Facility: CLINIC | Age: 53
End: 2023-01-31

## 2023-01-31 ENCOUNTER — CONSULT (OUTPATIENT)
Dept: HEMATOLOGY ONCOLOGY | Facility: CLINIC | Age: 53
End: 2023-01-31

## 2023-01-31 VITALS
WEIGHT: 246 LBS | OXYGEN SATURATION: 100 % | HEART RATE: 92 BPM | BODY MASS INDEX: 33.32 KG/M2 | HEIGHT: 72 IN | RESPIRATION RATE: 18 BRPM | SYSTOLIC BLOOD PRESSURE: 110 MMHG | DIASTOLIC BLOOD PRESSURE: 75 MMHG

## 2023-01-31 VITALS
RESPIRATION RATE: 18 BRPM | HEART RATE: 78 BPM | OXYGEN SATURATION: 98 % | SYSTOLIC BLOOD PRESSURE: 98 MMHG | TEMPERATURE: 98.2 F | DIASTOLIC BLOOD PRESSURE: 56 MMHG

## 2023-01-31 DIAGNOSIS — C22.1 CHOLANGIOCARCINOMA (HCC): Primary | ICD-10-CM

## 2023-01-31 DIAGNOSIS — C02.9 SQUAMOUS CELL CARCINOMA OF TONGUE (HCC): ICD-10-CM

## 2023-01-31 DIAGNOSIS — R91.8 PULMONARY NODULES: Primary | ICD-10-CM

## 2023-01-31 DIAGNOSIS — C22.7 OTHER SPECIFIED CARCINOMAS OF LIVER (HCC): ICD-10-CM

## 2023-01-31 DIAGNOSIS — C78.7 METASTASIS TO LIVER (HCC): ICD-10-CM

## 2023-01-31 DIAGNOSIS — R07.89 CHEST DISCOMFORT: ICD-10-CM

## 2023-01-31 LAB
2HR DELTA HS TROPONIN: -1 NG/L
ALBUMIN SERPL BCP-MCNC: 2.5 G/DL (ref 3.5–5)
ALP SERPL-CCNC: 100 U/L (ref 34–104)
ALT SERPL W P-5'-P-CCNC: 30 U/L (ref 7–52)
ANION GAP SERPL CALCULATED.3IONS-SCNC: 9 MMOL/L (ref 4–13)
APTT PPP: 33 SECONDS (ref 23–37)
AST SERPL W P-5'-P-CCNC: 57 U/L (ref 13–39)
ATRIAL RATE: 82 BPM
BASOPHILS # BLD AUTO: 0.06 THOUSANDS/ÂΜL (ref 0–0.1)
BASOPHILS NFR BLD AUTO: 1 % (ref 0–1)
BILIRUB DIRECT SERPL-MCNC: 0.14 MG/DL (ref 0–0.2)
BILIRUB SERPL-MCNC: 0.72 MG/DL (ref 0.2–1)
BNP SERPL-MCNC: 30 PG/ML (ref 0–100)
BUN SERPL-MCNC: 15 MG/DL (ref 5–25)
CALCIUM SERPL-MCNC: 8.4 MG/DL (ref 8.4–10.2)
CARDIAC TROPONIN I PNL SERPL HS: 8 NG/L
CARDIAC TROPONIN I PNL SERPL HS: 9 NG/L
CHLORIDE SERPL-SCNC: 100 MMOL/L (ref 96–108)
CO2 SERPL-SCNC: 20 MMOL/L (ref 21–32)
CREAT SERPL-MCNC: 0.9 MG/DL (ref 0.6–1.3)
EOSINOPHIL # BLD AUTO: 0.1 THOUSAND/ÂΜL (ref 0–0.61)
EOSINOPHIL NFR BLD AUTO: 1 % (ref 0–6)
ERYTHROCYTE [DISTWIDTH] IN BLOOD BY AUTOMATED COUNT: 13.1 % (ref 11.6–15.1)
GFR SERPL CREATININE-BSD FRML MDRD: 98 ML/MIN/1.73SQ M
GLUCOSE SERPL-MCNC: 102 MG/DL (ref 65–140)
HCT VFR BLD AUTO: 47 % (ref 36.5–49.3)
HGB BLD-MCNC: 15.8 G/DL (ref 12–17)
IMM GRANULOCYTES # BLD AUTO: 0.03 THOUSAND/UL (ref 0–0.2)
IMM GRANULOCYTES NFR BLD AUTO: 0 % (ref 0–2)
INR PPP: 1.23 (ref 0.84–1.19)
LYMPHOCYTES # BLD AUTO: 0.67 THOUSANDS/ÂΜL (ref 0.6–4.47)
LYMPHOCYTES NFR BLD AUTO: 8 % (ref 14–44)
MCH RBC QN AUTO: 31.9 PG (ref 26.8–34.3)
MCHC RBC AUTO-ENTMCNC: 33.6 G/DL (ref 31.4–37.4)
MCV RBC AUTO: 95 FL (ref 82–98)
MONOCYTES # BLD AUTO: 0.9 THOUSAND/ÂΜL (ref 0.17–1.22)
MONOCYTES NFR BLD AUTO: 11 % (ref 4–12)
NEUTROPHILS # BLD AUTO: 6.31 THOUSANDS/ÂΜL (ref 1.85–7.62)
NEUTS SEG NFR BLD AUTO: 79 % (ref 43–75)
NRBC BLD AUTO-RTO: 0 /100 WBCS
P AXIS: 36 DEGREES
PLATELET # BLD AUTO: 182 THOUSANDS/UL (ref 149–390)
PMV BLD AUTO: 10.1 FL (ref 8.9–12.7)
POTASSIUM SERPL-SCNC: 5.4 MMOL/L (ref 3.5–5.3)
PR INTERVAL: 154 MS
PROT SERPL-MCNC: 5.2 G/DL (ref 6.4–8.4)
PROTHROMBIN TIME: 15.7 SECONDS (ref 11.6–14.5)
QRS AXIS: -7 DEGREES
QRSD INTERVAL: 88 MS
QT INTERVAL: 388 MS
QTC INTERVAL: 453 MS
RBC # BLD AUTO: 4.95 MILLION/UL (ref 3.88–5.62)
SODIUM SERPL-SCNC: 129 MMOL/L (ref 135–147)
T WAVE AXIS: 32 DEGREES
VENTRICULAR RATE: 82 BPM
WBC # BLD AUTO: 8.07 THOUSAND/UL (ref 4.31–10.16)

## 2023-01-31 RX ADMIN — IOHEXOL 100 ML: 350 INJECTION, SOLUTION INTRAVENOUS at 18:41

## 2023-01-31 NOTE — PROGRESS NOTES
Federico LIZ and I met with patient and his wife at his consult appointment with Dr Joel Guzman 1/31/2023  All questions asked and answered  Mary Allisondenilson is here for a 2nd opinion/transfer of care  He currently follows with Dr Bing Novoa  He is set up with IR for twice weekly paracentesis  He will get a referral placed for oncology nutrition as well as oncology social work  No future appointments were made with Dr Joel Guzman they are going to go home and discuss their intentions and will reach out if needed

## 2023-01-31 NOTE — PROGRESS NOTES
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  Patient was more recently diagnosed with cholangiocarcinoma, metastatic  Issues:    Questionable PE  When patient was seen today, Mr Eunice Rinaldi was not in any acute respiratory status  Patient had dyspnea on exertion but patient believes this was from the fluid pushing up on his diaphragm  I received a call from Dr Aidan Cunningham, radiology today after patient left the office  She was concerned about a recent CAT scan (ordered by Dr Kendrick Porter) of a questionable left lower lobe PE  I called both the patient's telephone number and his wife ([phone], [phone]) to inform them of the above  No one picked up, not able to leave a voice message  Subsequently the wife called back  I explained the above  She will have her  go to the ER now, likely [address] [place] [address], third line treatment  Patient was initially treated with gemcitabine, cisplatin and durvalumab (outside of [facility])  The specifics are not clear but patient states that there was minimal evidence of disease progression on the follow-up scans  Patient was then treated with ivosidenib (IDH1 mutation)  Eventually Mr Ashby demonstrated progression and patient is now on pemigatinib (FGFR2 fusion) - has been on the new medication for approximately 1 week  We discussed that it is impossible to tell whether or not this medication is working  Patient does not seem to be having side effects and toxicities from the pemigatinib at this time - the plan is to continue     Patient will follow up with Dr Kendrick Porter as directed  If Mr Eunice Rinaldi decides that he wishes to change his care, he can call this office    Patient has never seen oxaliplatin or 5-FU  If follow-up scans (which should be performed earlier) continue to demonstrate progression or if patient has clinical signs consistent with progression, systemic chemotherapy (for example FOLFOX) would be a viable option  Disease progression, ascites  Patient states that he is in need of paracentesis at least once and possibly twice a week  This office will speak with IR to see if paracentesis evaluation can be performed every Monday and Thursday with drainage as needed  Hopefully the third line treatment will begin to work and the amount of tumor in the abdomen will shrink and the ascites will lessen  Pain control  Patient states that his pain is under control  Patient is being followed by palliative care  Lower extremity swelling, poor nutrition  Patient will be referred to nutrition  As I understand, since patient is not receiving his oncology treatment through [facility], he not get seen by oncology nutrition  History of squamous cell carcinoma the tonsil  Respectfully, this is not an issue at this time  Xgeva  Patient stated that he was on Anupama Poster in the past   This can be clarified in the future, as above there are a number of more important pending issues  Returns to [facility] at this time is on a prn basis but Mr Ashby knows to call the hematology/oncology office if there are any other questions or concerns  Carefully review your medication list and verify that the list is accurate and up-to-date  Please call the hematology/oncology office if there are medications missing from the list, medications on the list that you are not currently taking or if there is a dosage or instruction that is different from how you're taking that medication      Patient goals and areas of care: Clarify CAT scan of the chest, rule out PE, follow-up with primary oncologist  Barriers to care: None  Patient is able to self-care with help of wife  ______________________________________________________________________________________    Chief Complaint   Patient presents with   • Consult   • Cholangiocarcinoma, metastatic     Oncology History Overview Note   Patient presents today for consult for RT for tongue carcinoma  56 yr old patient current smoker (smokes 2 PPD for over 20 years) presented with left tongue pain and sores in December 2017  He has lost about 30 pounds  He did not have health insurance at the time  He then obtained insurance and saw an oral surgeon on 2/6/18- biopsy was performed confirming p16 negative squamous cell carcinoma  2/23/18- PET  IMPRESSION:  1  There is focally intense activity in the anterior oral cavity, slightly to the left of midline, SUV 27  This corresponds with known malignancy  There are also bilateral hypermetabolic upper cervical nodes posterior to the submandibular glands, most   concerning for wilton metastases  2   There is asymmetric intense activity in the posterior left oral cavity, palate and tonsillar region, SUV 7 9  This may be physiologic, however correlation with clinical findings recommended to exclude tumor involvement  3   Mildly hypermetabolic nodular densities and infiltrates in the right middle and bilateral lower lungs, likely inflammatory/infectious  Short-term CT follow-up in 2-3 months recommended to exclude metastases  4   Subcutaneous density lateral to the left hip is also likely inflammatory/infectious  This may be correlated clinically  5   Otherwise no hypermetabolic metastases in the chest abdomen pelvis      2/23/18- CT chest abdomen and pelvis-  IMPRESSION:   As was seen on concurrent PET/CT, there is clustered small nodular groundglass densities in the right middle lobe and both lower lobes  There distribution suggests an infectious/inflammatory process rather than metastatic disease    Follow-up is   recommended    Otherwise unremarkable chest, abdomen, pelvic CT     Patient was referred to ENT Dr Dory Hernandez @ [facility] by Med onc Dr Jerrod Liu  3/6/18- Consult with ENT Dr Dory Hernandez- discussed surgical excision of the anterior portion of the tongue, along with free flap reconstruction using skin from his forearm       3/6/18- Med onc Dr Ru Bennett @ [facility]- recommended chemo-nivo surgery trial (Carbo/paclitaxel/nivo)    Chemo started on 3/14/18 and finished 4/10/18 (5 cycles completed)? 4/30/18- patient underwent direct laryngoscopy, esophagoscopy, bronchoscopy, left partial glossectomy and bilateral neck dissections level 1 through 4      5/8/18- Post-op f/u with ENT Dr Dory Hernandez- neck incision well healed  Flexible laryngoscopy revealed true vocal cords are mobile  Base of tongue is clear  Pharynx is clear  Discusses treatment moving forward would be adjuvant RT  Follow-up in 3-4 weeks  History of tongue cancer   2/2018 Initial Diagnosis    Tongue cancer ([facility] [address] )     2/6/2018 Biopsy    Left ventral surface of tongue biopsy: Squamous cell carcinoma, moderately well differently  3/14/2018 - 4/10/2018 Chemotherapy    Chemo-nivo surgery trial (Carbo/paclitaxel/nivo)  Patient had 5 cycles of chemo  4/30/2018 Surgery    Direct laryngoscopy, esophagoscopy, bronchoscopy, left partial glossectomy and bilateral neck dissections level 1 through 4  History of Present Illness: 55-year-old male with history of right tonsillar squamous cell carcinoma treated at [facility] approximately 5 years ago  Patient was on protocol, received neoadjuvant chemotherapy plus immunotherapy, surgery and then RT  Specifics are not presently available but patient has not demonstrated any evidence for recurrence  Unfortunately patient was subsequently diagnosed with cholangiocarcinoma in October 2020  Work-up and treatment were performed outside [facility]  Patient initially underwent ablation and was then placed on surveillance    Patient was eventually found to have recurrence and was started on gemcitabine, cisplatin and durvalumab  Once again the specifics are not presently clear but patient stated that follow-up scans demonstrated minimal disease progression and patient was placed on ivosidenib  Because of recent clinical progression patient was started on pemigatinib 1 week ago  Missed Detrixhe states feeling +/-  Patient is needing paracentesis once or twice a week  Activities are limited, patient spends a good time of day in bed  No recent fevers, chills or sweats  No nausea vomiting  Appetite is okay, weight changes depending upon amount of fluid removed  No headaches, blurred vision or dizziness  Pain is controlled, patient seen by palliative care  Review of Systems   Constitutional: Positive for activity change, appetite change, fatigue and unexpected weight change  HENT: Negative  Eyes: Negative  Respiratory: Negative  Cardiovascular: Negative  Gastrointestinal: Positive for abdominal distention  Endocrine: Negative  Genitourinary: Negative  Musculoskeletal: Negative  Skin: Negative  Allergic/Immunologic: Negative  Neurological: Positive for weakness  Hematological: Negative  Psychiatric/Behavioral: Negative  All other systems reviewed and are negative      Patient Active Problem List   Diagnosis   • History of tongue cancer   • Cholangiocarcinoma, stage IV (HCC)   • Nausea and vomiting   • Ascites   • Lytic bone lesion of hip   • Hyponatremia   • GERD (gastroesophageal reflux disease)   • Spinal accessory neuropathy   • Squamous cell carcinoma of tongue (HCC)   • Tobacco use disorder   • Pulmonary nodules   • Malignant neoplasm metastatic to both lungs (HCC)   • 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)   • Thrombocytopenia (HCC)   • Hypomagnesemia   • BMI 35 0-35 9,adult   • Vitamin D deficiency   • Malignant ascites     Past Medical History:   Diagnosis Date   • 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 [facility])    • Rectal bleeding 1/9/2023   • S/P exploratory laparotomy 12/16/2020     Past Surgical History:   Procedure Laterality Date   • CT NEEDLE BIOPSY LIVER  10/15/2020   • IR BIOPSY LIVER MASS  5/18/2022   • IR PARACENTESIS  1/10/2023   • IR PARACENTESIS  1/24/2023     Family History   Problem Relation Age of Onset   • Prostate cancer Father      Social History     Socioeconomic History   • Marital status: /Civil Union     Spouse name: Not on file   • Number of children: Not on file   • Years of education: Not on file   • Highest education level: Not on file   Occupational History   • Not on file   Tobacco Use   • Smoking status: Every Day     Packs/day: 2 00     Years: 20 00     Pack years: 40 00     Types: Cigarettes   • Smokeless tobacco: Current   Substance and Sexual Activity   • Alcohol use: Yes     Comment: 1-2 drinks per day   • Drug use: Not on file   • Sexual activity: Not on file   Other Topics Concern   • Not on file   Social History Narrative   • Not on file     Social Determinants of Health     Financial Resource Strain: Low Risk    • Difficulty of Paying Living Expenses: Not hard at all   Food Insecurity: No Food Insecurity   • Worried About Running Out of Food in the Last Year: Never true   • Ran Out of Food in the Last Year: Never true   Transportation Needs: No Transportation Needs   • Lack of Transportation (Medical): No   • Lack of Transportation (Non-Medical):  No   Physical Activity: Insufficiently Active   • Days of Exercise per Week: 2 days   • Minutes of Exercise per Session: 30 min   Stress: No Stress Concern Present   • Feeling of Stress : Not at all   Social Connections: Socially Isolated   • Frequency of Communication with Friends and Family: Once a week   • Frequency of Social Gatherings with Friends and Family: Once a week   • Attends Zoroastrianism Services: Never   • Active Member of Clubs or Organizations: No   • Attends Club or Organization Meetings: Never   • Marital Status:    Intimate Partner Violence: Not At Risk   • Fear of Current or Ex-Partner: No   • Emotionally Abused: No   • Physically Abused: No   • Sexually Abused: No   Housing Stability: Unknown   • Unable to Pay for Housing in the Last Year: No   • Number of Places Lived in the Last Year: Not on file   • Unstable Housing in the Last Year: No       Current Outpatient Medications:   •  ergocalciferol (VITAMIN D2) 50,000 units, Take 50,000 Units by mouth once a week, Disp: , Rfl:   •  furosemide (LASIX) 20 mg tablet, Take 1 tablet (20 mg total) by mouth daily, Disp: 90 tablet, Rfl: 0  •  Ivosidenib 250 MG TABS, Take by mouth, Disp: , Rfl:   •  magnesium oxide (MAG-OX) 400 mg tablet, Take 1 tablet (400 mg total) by mouth daily, Disp: 30 tablet, Rfl: 2  •  ondansetron (ZOFRAN) 4 mg tablet, Take 0 5 tablets (2 mg total) by mouth every 8 (eight) hours as needed for nausea or vomiting, Disp: 20 tablet, Rfl: 0  •  oxyCODONE (ROXICODONE) 5 immediate release tablet, Take 5 mg by mouth 3 (three) times a day, Disp: , Rfl:   •  pantoprazole (PROTONIX) 40 mg tablet, Take 1 tablet (40 mg total) by mouth daily, Disp: 30 tablet, Rfl: 0  •  potassium chloride (K-DUR,KLOR-CON) 10 mEq tablet, Take 1 tablet (10 mEq total) by mouth daily, Disp: 90 tablet, Rfl: 0  •  cyclobenzaprine (FLEXERIL) 5 mg tablet, take 1 tablet by mouth every 8 hours (3 TIMES A DAY) (Patient not taking: Reported on 1/23/2023), Disp: , Rfl:     Allergies   Allergen Reactions   • Penicillins Other (See Comments) and Rash     As a child had reaction not sure what it was  Vitals:    01/31/23 0938   BP: 110/75   Pulse: 92   Resp: 18   SpO2: 100%     Physical Exam  Constitutional:       Appearance: He is well-developed  HENT:      Head: Normocephalic and atraumatic        Right Ear: External ear normal       Left Ear: External ear normal    Eyes:      Conjunctiva/sclera: Conjunctivae normal       Pupils: Pupils are equal, round, and reactive to light    Neck:      Comments: Old surgical scars, chronic radiation changes no palpable adenopathy or masses  Cardiovascular:      Rate and Rhythm: Normal rate and regular rhythm  Heart sounds: Normal heart sounds  Pulmonary:      Effort: Pulmonary effort is normal       Breath sounds: Normal breath sounds  Comments: Rales at bases bilaterally  Abdominal:      General: Bowel sounds are normal       Palpations: Abdomen is soft  Comments: + Distant bowel sounds, + diffuse tenderness, no guarding, no rigidity or rebound, cannot palpate liver or spleen, + fluid   Musculoskeletal:         General: Normal range of motion  Cervical back: Normal range of motion and neck supple  Skin:     General: Skin is warm  Comments: Somewhat pale, warm, moist, no petechiae or ecchymoses   Neurological:      Mental Status: He is alert and oriented to person, place, and time  Deep Tendon Reflexes: Reflexes are normal and symmetric  Psychiatric:         Behavior: Behavior normal          Thought Content: Thought content normal          Judgment: Judgment normal      Extremities: 2-3+ bilateral lower extreme edema, no cords, pulses are decreased  Lymphatics: No adenopathy in the neck, supraclavicular region, axilla bilaterally    Labs    1/19/2023 WBC = 5 57 hemoglobin = 13 hematocrit = 38 8 MCV = 96 platelet = 983 BUN = 10 creatinine = 0 60 calcium = 7 8 AST = 30 ALT = 15 alkaline phosphatase = 67 total protein = 4 9 albumin = 2 6 total bilirubin = 0 81    Imaging    1/26/2023 CAT scan chest abdomen pelvis with contrast    Multiple pulmonary nodules are seen throughout the lungs      2 necrotic nodules at the right lung base medially are mildly enlarged compared to 1/18/2023  The larger lesion measures 1 9 x 1 8 cm (series 2 image 106 and 107), compared to 1 4 x 1 4 cm on 1/18/2022  The smaller lesion measures 1 1 cm (series 2   image 104) compared to 0 9 cm    These are new from October 2022      A 6 mm left upper lobe nodule laterally (series 3 image 126) is enlarged from 3 mm in October 2022  Other nodules are grossly similar in size  Nodules at the lung bases are somewhat difficult to compare given presence of atelectasis on the current study which displaces some of the previously seen nodules  There is suggestion of a filling defect in one of the left lower lobe segmental pulmonary arteries as it is decreased in attenuation compared to other branches at the same level (series 6 image 66-75)  LIVER/BILIARY TREE: Multiple ill-defined, peripherally enhancing lesions are seen throughout both lobes of the liver  Most of the parenchyma in the left lobe is replaced by these lesions  Overall there appears to be an increase in the number of the   lesions compared to October 2022  The largest discrete lesion measures 3 0 x 2 9 cm (series 2 image 116), compared to 2 5 x 2 2 cm in October 2022      There is a 4 5 x 2 5 cm hypodense lesion in segment 8 of the liver (series 2 image 98), similar to the recent studies when remeasured in a similar manner      ABDOMINOPELVIC CAVITY: There is moderate to large abdominal and pelvic ascites  No organized fluid collections      There has been mild progression of peritoneal carcinomatosis compared to October 2022  For instance, there is mildly increased nodularity of the omentum in the anterior abdomen (series 2 image 122) and new peritoneal thickening in the right paracolic   gutter (series 2 image 183)  There are also multiple soft tissue implants along the right hemidiaphragm, which have increased in size and number      IMPRESSION:     Questionable pulmonary embolism versus artifact in a left lower lobe segmental pulmonary artery    Recommend further evaluation with CT pulmonary angiogram      Multiple pulmonary metastases, the largest two measuring 1 9 x 1 8 cm and 1 1 cm in the right lower lobe medially, which are mildly increased from 1/18/2023      Metastatic disease in the liver, likely metastatic upper abdominal lymphadenopathy, and peritoneal carcinomatosis similar to studies from earlier in January 2022, however with progression since October 2022, as detailed above      No significant interval change in right iliac bone metastasis compared to 1/18/2023 and October 2022      Moderate to large abdominal and pelvic ascites      Retained secretions in the trachea, which may place patient at risk for aspiration      Atelectasis in both lower lobes  Pathology    Case Report   Surgical Pathology Report                         Case: [id number]                                    Authorizing Provider: Kalie Nj MD      Collected:           05/18/2022 1118               Ordering Location:     [facility]      Received:            05/18/2022 [address] [place] [address] Interventional                                                                              Radiology                                                                     Pathologist:           Carmen Johnson MD                                                                  Specimen:    Liver, mass                                                                                Addendum 2   At the request of Dr Mike Delaney, unstained slides from paraffin block A2 containing the patient's cancer cells was sent to [employer] for NGS utilizing the [employer] analysis  Upon completion of testing, [facility] report will be directly sent to the requesting physician as well as posted in the Media Tab of the patient's [facility] EMR by the [facility] Pathology Department      Please note:  The [facility]'s analysis and report is performed independently of [facility], and neither the Hospital nor the Pathology Department screen, review or comment upon [facility]'s report  Because the role of testing in cancer diagnosis and management is subject to evolving development, usage and interpretation, we strongly urge that the test limitations described in the [facility] report be carefully read, appropriately shared with the patient, and critically considered when reaching treatment decisions         This pathology material was removed from archive for purpose of molecular testing  Addendum electronically signed by Lokesh Wells MD on 9/13/2022 at 12:19 PM   Addendum   At the request of Dr Leticia Franks, unstained slides from paraffin BLOCK A5 containing the patient's cancer cells were sent to [facility] for [email or web address] CD[facility] testing  Upon completion of testing, the [address] [place] [address] report will be directly sent to the requesting physician as well as posted in the Media Tab of the patient's [place] EMR by the [employer]  Pathology Department      Please note: The [facility]'s analysis and report is performed independently of [facility], and neither the Hospital nor the Pathology Department screen, review or comment upon [address] [place] [address] report  Because the role of testing in cancer diagnosis and management is subject to evolving development, usage and interpretation, we strongly urge that the test limitations described in the [facility] [place] [address] Lab report be carefully read, appropriately shared with the patient, and critically considered when reaching treatment decisions      This pathology material was removed from archive for purpose of molecular testing  It was reviewed and approved by Dr Chairez Or        Addendum electronically signed by Lokesh Wells MD on 7/7/2022 at  7:48 AM   Final Diagnosis   A  Liver mass, needle core biopsy:  - Adenocarcinoma      Comment: Immunohistochemistry was performed on block A5  The tumor cells are positive for CK7   MOC31 and negative for P40, DPC4 show no loss  Findings compatible with intrahepatic cholangiocarcinoma/pancreatobiliary adenocarcinoma      If clinically indicated, the most appropriate tissue block(s) for ancillary testing are block(s):  A2, A5  Intradepartmental consultation is in agreement     Electronically signed by Germaine Bynum MD on 5/20/2022 at  3:16 PM

## 2023-01-31 NOTE — TELEPHONE ENCOUNTER
Called [facility] radiology reading room to request the CT CAP done 1/26/2023 be read for patients appointment at [facility]

## 2023-01-31 NOTE — BRIEF OP NOTE (RAD/CATH)
Left IR PARACENTESIS Procedure Note    PATIENT NAME: Jabier Johnston  : 1970  MRN: [id number]    Pre-op Diagnosis:   1  Cholangiocarcinoma, stage IV ([facility] [address] )    2  Malignant ascites      Post-op Diagnosis:   1  Cholangiocarcinoma, stage IV ([facility] [address] )    2  Malignant ascites        Provider:  Ghada Boyce PA-C    No qualified resident was available, Resident is only observing    Estimated Blood Loss: minimal    Findings: left sided paracentesis  10,350cc clear yellow fluid removed       Specimens: none    Complications:  None immediate    Anesthesia: local    Ghada Boyce PA-C     Date: 2023  Time: 11:23 AM

## 2023-02-02 ENCOUNTER — HOSPITAL ENCOUNTER (OUTPATIENT)
Dept: RADIOLOGY | Facility: HOSPITAL | Age: 53
Discharge: HOME | End: 2023-02-02
Attending: INTERNAL MEDICINE

## 2023-02-02 VITALS
DIASTOLIC BLOOD PRESSURE: 58 MMHG | OXYGEN SATURATION: 99 % | SYSTOLIC BLOOD PRESSURE: 100 MMHG | HEART RATE: 79 BPM | RESPIRATION RATE: 18 BRPM

## 2023-02-02 DIAGNOSIS — C22.1 CHOLANGIOCARCINOMA (HCC): ICD-10-CM

## 2023-02-02 DIAGNOSIS — R18.0 MALIGNANT ASCITES: ICD-10-CM

## 2023-02-02 NOTE — SEDATION DOCUMENTATION
Left side paracentesis performed by Betty Dailey PA-C  6500 ml of clear fluid drained  Pt tolerated well  Ultrasound guided  No labs were ordered  Puncture site is CDI and covered with a band aid  AVS reviewed with pt upon dc

## 2023-02-02 NOTE — DISCHARGE INSTRUCTIONS
Abdominal Paracentesis     WHAT YOU NEED TO KNOW:   Abdominal paracentesis is a procedure to remove abnormal fluid buildup in your abdomen  Fluid builds up because of liver problems, such as swelling and scarring  Heart failure, kidney disease, a mass, or problems with your pancreas may also cause fluid buildup  DISCHARGE INSTRUCTIONS:     Follow up with your healthcare provider as directed: Write down your questions so you remember to ask them during your visits  Wound care: Remove dressing after 24 hours  Leave glue in place  Return to your normal activities    Contact Interventional Radiology at [phone] [place] PATIENTS: Contact Interventional Radiology at [phone]) Roc Beasley PATIENTS: Contact Interventional Radiology at [phone]) if:  You have a fever and your wound is red and swollen  You have yellow, green, or bad-smelling discharge coming from your wound  You have pain or swelling in your abdomen  You have an upset stomach or you vomit  You have sudden, sharp pain in your abdomen  You urinate very little or not at all  You feel confused and more tired than usual    Your arm or leg feels warm, tender, and painful  It may look swollen and red  You suddenly feel lightheaded and have trouble breathing

## 2023-02-02 NOTE — BRIEF OP NOTE (RAD/CATH)
IR Left PARACENTESIS Procedure Note    PATIENT NAME: Eliza Ashby  : 1970  MRN: [id number]    Pre-op Diagnosis:   1  Cholangiocarcinoma, stage IV ([facility] [address] )    2  Malignant ascites      Post-op Diagnosis:   1  Cholangiocarcinoma, stage IV ([facility] [address] )    2   Malignant ascites        Provider:   Susu Christensen PA-C  Assistants:     No qualified resident was available, Resident is only observing    Estimated Blood Loss: None    Findings: 6500mL of clear yellow ascites drained from left-sided paracentesis    Specimens: None    Complications:  None immediately    Anesthesia: local    Susu Christensen PA-C     Date: 2023  Time: 1:28 PM

## 2023-02-03 ENCOUNTER — OFFICE VISIT (OUTPATIENT)
Dept: PALLIATIVE MEDICINE | Facility: CLINIC | Age: 53
End: 2023-02-03

## 2023-02-03 VITALS
SYSTOLIC BLOOD PRESSURE: 110 MMHG | OXYGEN SATURATION: 98 % | BODY MASS INDEX: 32.56 KG/M2 | WEIGHT: 240.08 LBS | HEART RATE: 80 BPM | DIASTOLIC BLOOD PRESSURE: 62 MMHG | TEMPERATURE: 97.1 F

## 2023-02-03 DIAGNOSIS — C78.01 MALIGNANT NEOPLASM METASTATIC TO BOTH LUNGS (HCC): ICD-10-CM

## 2023-02-03 DIAGNOSIS — C78.7 METASTASIS TO LIVER (HCC): ICD-10-CM

## 2023-02-03 DIAGNOSIS — C22.1 CHOLANGIOCARCINOMA (HCC): Primary | ICD-10-CM

## 2023-02-03 DIAGNOSIS — R11.2 NAUSEA AND VOMITING, UNSPECIFIED VOMITING TYPE: ICD-10-CM

## 2023-02-03 DIAGNOSIS — C78.02 MALIGNANT NEOPLASM METASTATIC TO BOTH LUNGS (HCC): ICD-10-CM

## 2023-02-03 DIAGNOSIS — C02.9 SQUAMOUS CELL CARCINOMA OF TONGUE (HCC): ICD-10-CM

## 2023-02-03 NOTE — PROGRESS NOTES
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 Continue oxycodone 5 mg PO q6 hrs PRN moderate or severe pain   ? Patient does not need refill  ? Continue flexeril 5 mg PO q8 hrs PRN muscle spasms  - Patient does not need refill  - Patient thoroughly educated about use of muscle relaxant and opioid at the same time and risk of sedation  He is aware not to drive on these medications and not to use alcohol at the same time as these medications  • Nausea/vomiting  ? Zofran 2 mg PO q8 hrs PRN nausea or vomiting  - Last QTC: 453, used in hospital without issue  - Patient's chemotherapy also may increase QTC  Recommend oncology continue to monitor QTC with frequent EKGs  ? Protonix 40 mg PO daily  ? Compazine prescribed by oncology     2  Goals of care  • Patient desires to continue disease-directed therapies without limits at this time  • He plans to continue chemotherapy as recommended by Dr Inetta Riedel at this time  Minimal side effects  • He has completed advanced directives to name wife Rasta Funk as healthcare decision maker      3  Social Support  • Patient has wife Rasta Funk and 1 biological son  States he has two other nonbiological children and is very involved in their life  • Mother and father are still alive  • Brother and grandmother     4  Follow-up  • Patient instructed to follow up in 4 weeks  • Has palliative medicine phone number and instructed to call if any increased pain, questions, or concerns      Medications adjusted this encounter:  Requested Prescriptions      No prescriptions requested or ordered in this encounter     No orders of the defined types were placed in this encounter  There are no discontinued medications  Crow Chris was seen today for symptoms and planning cares related to above illnesses  I have reviewed the patient's controlled substance dispensing history in the Prescription Drug Monitoring Program in compliance with the [facility] regulations before prescribing any controlled substances  Filled  Written  Sold  ID  Drug  QTY  Days  Prescriber  RX #  Dispenser  Refill  Daily Dose*  Pymt Type       01/14/2023 01/09/2023   1  Oxycodone Hcl (Ir) 5 Mg Tablet 90 00  30  [place] Jimmy  [id number]   Thr (0326)   22 50 MME  Comm Ins  [place]     05/12/2022 05/12/2022   1  Oxycodone Hcl (Ir) 5 Mg Cap 9 00  3  Me Hag  [id number]   Thr (0326)   22 50 MME  Comm Ins  [place]        They are invited to continue to follow with us  If there are questions or concerns, please contact us through our clinic/answering service 24 hours a day, seven days a week  Christiane Patel PA-C  [facility] Palliative and Supportive Care  [phone]      Visit Information    Accompanied By: No one    Source of History: Self, Medical record    History Limitations: None     Chief Complaint  No chief complaint on file  History of Present Illness   Crow Chris is a 47 y o  male who presents as a referral from Dr Marylou Brewer of oncology for primary palliative diagnosis of cholangiocarcinoma  Consultation is requested for: symptom management, goal of care assessment and decisional support, disease process education and discussion of prognosis, advance care planning, emotional support in the setting of serious illness       He has a history of cholangiocarcinoma diagnosed in 2020, now s/p chemotherapy and ablation  Recurrence in April 2022 status post 8 sessions of chemotherapy   Patient recommended by [facility] oncology to continue to follow Dr David Pastrana for current chemotherapy regimen       He also has a 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 as well as bilateral neck dissection with lymph node removal, in remission  He has a history of tobacco abuse and not currently smoking or using alcohol  Patient reports increased pain since last visit  Currently 3/10, last night was 8/10 with increased pain episode  States that it feels like spasms in his arms and legs  Has had this pain before  Occurs suddenly with movement  It was minimally relieved by one dose of oxycodone  Patient also has flexeril at home which he does not use often  He is educated that he can take these medications as prescribed by Dr Radha Rodriguez up to every 8 hours for flexeril and up to every 6 hours for oxycodone  He is aware not to drink alcohol or drive while taking these medications and to call office if persistent or increased pain  He had thoracentesis yesterday which relieved abdominal pain  States he occasionally is short of breath which is also relieved by thoracentesis  He reports following with Dr Radha Rodriguez oncologist  Currently on chemotherapy  Minimal side effects  Has compazine and zofran for nausea  Has completed advanced directives to name wife Keri Patel as substitute decision maker  He has not named any alternatives  This is discussed and supportive listening provided  Patient does not have any other concerns today  Past medical, surgical, social, and family histories are reviewed and pertinent updates are made  Review of Systems   Constitutional: Negative for chills, decreased appetite, fever and malaise/fatigue  Cardiovascular: Negative for chest pain  Gastrointestinal: Negative for constipation and diarrhea  All other systems reviewed and are negative      Vital Signs    /62 (BP Location: Left arm, Patient Position: Sitting, Cuff Size: Standard)   Pulse 80   Temp (!) 97 1 °F (36 2 °C) (Temporal)   Wt 109 kg (240 lb 1 3 oz)   SpO2 98%   BMI 32 56 kg/m²     Physical Exam and Objective Data  Physical Exam  Vitals and nursing note reviewed  Constitutional:       General: He is not in acute distress  Appearance: He is well-developed  Comments: Appears chronically ill   HENT:      Head: Normocephalic and atraumatic  Right Ear: External ear normal       Left Ear: External ear normal       Nose: Nose normal       Mouth/Throat:      Mouth: Mucous membranes are moist       Pharynx: Oropharynx is clear  Eyes:      Conjunctiva/sclera: Conjunctivae normal    Cardiovascular:      Rate and Rhythm: Normal rate  Pulses: Normal pulses  Pulmonary:      Effort: Pulmonary effort is normal  No respiratory distress  Abdominal:      General: There is distension  Palpations: Abdomen is soft  Tenderness: There is no abdominal tenderness  Musculoskeletal:         General: No swelling  Cervical back: Neck supple  Skin:     General: Skin is warm and dry  Capillary Refill: Capillary refill takes less than 2 seconds  Neurological:      Mental Status: He is alert  Psychiatric:         Mood and Affect: Mood normal        Radiology and Laboratory:  I personally reviewed and interpreted the following results: CTA, BMP, hepatic function tests 1/31/23    40 minutes was spent face to face with Rao Ashby with greater than 50% of the time spent in counseling or coordination of care including discussions of diagnostic results, impression, and recommendations, risks and benefits of treatment, instructions for disease self management, treatment instructions, follow up requirements, risk factors and risk reduction of disease and compliance with treatment regimen  All of the patient's or agent's questions were answered during this discussion

## 2023-02-06 ENCOUNTER — HOSPITAL ENCOUNTER (OUTPATIENT)
Dept: RADIOLOGY | Facility: HOSPITAL | Age: 53
Discharge: HOME | End: 2023-02-06
Attending: INTERNAL MEDICINE

## 2023-02-06 VITALS
HEART RATE: 88 BPM | OXYGEN SATURATION: 97 % | RESPIRATION RATE: 18 BRPM | SYSTOLIC BLOOD PRESSURE: 86 MMHG | DIASTOLIC BLOOD PRESSURE: 52 MMHG

## 2023-02-06 DIAGNOSIS — R18.0 MALIGNANT ASCITES: ICD-10-CM

## 2023-02-06 DIAGNOSIS — C22.1 CHOLANGIOCARCINOMA (HCC): ICD-10-CM

## 2023-02-06 NOTE — BRIEF OP NOTE (RAD/CATH)
IR Left PARACENTESIS Procedure Note    PATIENT NAME: Art Ashby  : 1970  MRN: [id number]    Pre-op Diagnosis:   1  Cholangiocarcinoma, stage IV ([facility] [address] )    2  Malignant ascites      Post-op Diagnosis:   1  Cholangiocarcinoma, stage IV ([facility] [address] )    2   Malignant ascites        Provider:   Nancy Cassidy PA-C  Assistants:     No qualified resident was available, Resident is only observing    Estimated Blood Loss: None    Findings: 6550mL clear yellow ascites drained from left-sided paracentesis    Specimens: None    Complications:  None immediately    Anesthesia: local    Nancy Cassidy PA-C     Date: 2023  Time: 10:50 AM

## 2023-02-06 NOTE — DISCHARGE INSTRUCTIONS
Abdominal Paracentesis     WHAT YOU NEED TO KNOW:   Abdominal paracentesis is a procedure to remove abnormal fluid buildup in your abdomen  Fluid builds up because of liver problems, such as swelling and scarring  Heart failure, kidney disease, a mass, or problems with your pancreas may also cause fluid buildup  DISCHARGE INSTRUCTIONS:     Follow up with your healthcare provider as directed: Write down your questions so you remember to ask them during your visits  Wound care: Remove dressing after 24 hours  Leave glue in place  Return to your normal activities    Contact Interventional Radiology at [phone] [place] PATIENTS: Contact Interventional Radiology at [phone]) Lina Ortiz PATIENTS: Contact Interventional Radiology at [phone]) if:  You have a fever and your wound is red and swollen  You have yellow, green, or bad-smelling discharge coming from your wound  You have pain or swelling in your abdomen  You have an upset stomach or you vomit  You have sudden, sharp pain in your abdomen  You urinate very little or not at all  You feel confused and more tired than usual    Your arm or leg feels warm, tender, and painful  It may look swollen and red  You suddenly feel lightheaded and have trouble breathing

## 2023-02-06 NOTE — SEDATION DOCUMENTATION
Left side paracentesis performed by Dorian Rosas PA-C  1830 clear yellow fluid drained  Pt tolerated well  Ultrasound guided  No labs ordered  Puncture site is CDI and covered with a band aid  AVS reviewed with pt upon dc

## 2023-02-07 ENCOUNTER — PATIENT OUTREACH (OUTPATIENT)
Dept: CASE MANAGEMENT | Facility: HOSPITAL | Age: 53
End: 2023-02-07

## 2023-02-09 ENCOUNTER — HOSPITAL ENCOUNTER (OUTPATIENT)
Dept: RADIOLOGY | Facility: HOSPITAL | Age: 53
Discharge: HOME | End: 2023-02-09
Attending: INTERNAL MEDICINE | Admitting: INTERNAL MEDICINE

## 2023-02-09 VITALS
HEART RATE: 82 BPM | OXYGEN SATURATION: 99 % | DIASTOLIC BLOOD PRESSURE: 62 MMHG | SYSTOLIC BLOOD PRESSURE: 88 MMHG | RESPIRATION RATE: 18 BRPM

## 2023-02-09 DIAGNOSIS — C22.1 CHOLANGIOCARCINOMA (HCC): ICD-10-CM

## 2023-02-09 DIAGNOSIS — R18.0 MALIGNANT ASCITES: ICD-10-CM

## 2023-02-09 NOTE — SEDATION DOCUMENTATION
Left side paracentesis performed by HARINI Mccracken  5550 ml of clear yellow fluid drained  Pt tolerated well  Ultrasound guided  No labs ordered  Puncture site is CDI and covered with a band aid  AVS reviewed with pt upon dc

## 2023-02-09 NOTE — DISCHARGE INSTRUCTIONS
Abdominal Paracentesis     WHAT YOU NEED TO KNOW:   Abdominal paracentesis is a procedure to remove abnormal fluid buildup in your abdomen  Fluid builds up because of liver problems, such as swelling and scarring  Heart failure, kidney disease, a mass, or problems with your pancreas may also cause fluid buildup  DISCHARGE INSTRUCTIONS:     Follow up with your healthcare provider as directed: Write down your questions so you remember to ask them during your visits  Wound care: Remove dressing after 24 hours  Leave glue in place  Return to your normal activities    Contact Interventional Radiology at [phone] [place] PATIENTS: Contact Interventional Radiology at [phone]) Fredo Yeboah PATIENTS: Contact Interventional Radiology at [phone]) if:  You have a fever and your wound is red and swollen  You have yellow, green, or bad-smelling discharge coming from your wound  You have pain or swelling in your abdomen  You have an upset stomach or you vomit  You have sudden, sharp pain in your abdomen  You urinate very little or not at all  You feel confused and more tired than usual    Your arm or leg feels warm, tender, and painful  It may look swollen and red  You suddenly feel lightheaded and have trouble breathing

## 2023-02-09 NOTE — BRIEF OP NOTE (RAD/CATH)
IR PARACENTESIS Procedure Note    PATIENT NAME: Mary Ashby  : 1970  MRN: [id number]    Pre-op Diagnosis:   1  Cholangiocarcinoma, stage IV ([facility] [address] )    2  Malignant ascites      Post-op Diagnosis:   1  Cholangi[facility], stage IV ([email or web address] )    2   Malignant ascites        Provider:   HARINI Moss  Assistants:     No qualified resident was available, Resident is only observing    Estimated Blood Loss: none    Findings: 5550 mL clear yellow ascites, LLQ    Specimens: none    Complications:  None immediate     Anesthesia: local    HARINI Moss     Date: 2023  Time: 10:57 AM

## 2023-02-12 DIAGNOSIS — K21.9 GERD (GASTROESOPHAGEAL REFLUX DISEASE): ICD-10-CM

## 2023-02-12 DIAGNOSIS — C22.8 PRIMARY MALIGNANT NEOPLASM OF LIVER (HCC): ICD-10-CM

## 2023-02-12 DIAGNOSIS — C22.0 HEPATOCELLULAR CARCINOMA (HCC): ICD-10-CM

## 2023-02-12 DIAGNOSIS — R11.2 NAUSEA AND VOMITING, UNSPECIFIED VOMITING TYPE: ICD-10-CM

## 2023-02-13 ENCOUNTER — HOSPITAL ENCOUNTER (OUTPATIENT)
Dept: RADIOLOGY | Facility: HOSPITAL | Age: 53
Discharge: HOME | End: 2023-02-13
Attending: INTERNAL MEDICINE | Admitting: RADIOLOGY

## 2023-02-13 VITALS
RESPIRATION RATE: 18 BRPM | SYSTOLIC BLOOD PRESSURE: 97 MMHG | HEART RATE: 71 BPM | DIASTOLIC BLOOD PRESSURE: 55 MMHG | OXYGEN SATURATION: 99 %

## 2023-02-13 DIAGNOSIS — C22.1 CHOLANGIOCARCINOMA (HCC): ICD-10-CM

## 2023-02-13 DIAGNOSIS — R18.0 MALIGNANT ASCITES: ICD-10-CM

## 2023-02-13 RX ORDER — PANTOPRAZOLE SODIUM 40 MG/1
TABLET, DELAYED RELEASE ORAL
Qty: 30 TABLET | Refills: 0 | Status: ON HOLD | OUTPATIENT
Start: 2023-02-13

## 2023-02-13 NOTE — BRIEF OP NOTE (RAD/CATH)
IR PARACENTESIS Procedure Note    PATIENT NAME: Willem Ashby  : 1970  MRN: [id number]    Pre-op Diagnosis:   1  Cholangiocarcinoma, stage IV ([facility] [address] )    2  Malignant ascites      Post-op Diagnosis:   1  Cholangiocarcinoma, stage IV ([facility] [address] )    2   Malignant ascites        Provider:   Nick Mobley PA-C    Estimated Blood Loss: none    Findings: LLQ paracentesis, 4950mL clear yellow    Specimens: none    Complications:  none    Anesthesia: local    Nick Mobley PA-C     Date: 2023  Time: 11:10 AM

## 2023-02-13 NOTE — DISCHARGE INSTRUCTIONS
Abdominal Paracentesis     WHAT YOU NEED TO KNOW:   Abdominal paracentesis is a procedure to remove abnormal fluid buildup in your abdomen  Fluid builds up because of liver problems, such as swelling and scarring  Heart failure, kidney disease, a mass, or problems with your pancreas may also cause fluid buildup  DISCHARGE INSTRUCTIONS:     Follow up with your healthcare provider as directed: Write down your questions so you remember to ask them during your visits  Wound care: Remove dressing after 24 hours  Leave glue in place  Return to your normal activities    Contact Interventional Radiology at [phone] [place] PATIENTS: Contact Interventional Radiology at [phone]) Jp Mcnair PATIENTS: Contact Interventional Radiology at [phone]) if:  You have a fever and your wound is red and swollen  You have yellow, green, or bad-smelling discharge coming from your wound  You have pain or swelling in your abdomen  You have an upset stomach or you vomit  You have sudden, sharp pain in your abdomen  You urinate very little or not at all  You feel confused and more tired than usual    Your arm or leg feels warm, tender, and painful  It may look swollen and red  You suddenly feel lightheaded and have trouble breathing

## 2023-02-13 NOTE — SEDATION DOCUMENTATION
Left side paracentesis performed by Kyle Herrera PA-C  4950 ml of clear yellow fluid drained  Ultrasound guided  No labs ordered  Pt tolerated well  Puncture site is CDI and covered with a band aid  AVS reviewed with pt upon dc

## 2023-02-16 ENCOUNTER — PATIENT OUTREACH (OUTPATIENT)
Dept: CASE MANAGEMENT | Facility: HOSPITAL | Age: 53
End: 2023-02-16

## 2023-02-16 ENCOUNTER — HOSPITAL ENCOUNTER (OUTPATIENT)
Dept: RADIOLOGY | Facility: HOSPITAL | Age: 53
Discharge: HOME | End: 2023-02-16
Attending: INTERNAL MEDICINE

## 2023-02-16 VITALS
DIASTOLIC BLOOD PRESSURE: 62 MMHG | HEART RATE: 75 BPM | RESPIRATION RATE: 18 BRPM | OXYGEN SATURATION: 98 % | SYSTOLIC BLOOD PRESSURE: 102 MMHG

## 2023-02-16 DIAGNOSIS — R18.0 MALIGNANT ASCITES: ICD-10-CM

## 2023-02-16 DIAGNOSIS — C22.1 CHOLANGIOCARCINOMA (HCC): ICD-10-CM

## 2023-02-16 NOTE — DISCHARGE INSTRUCTIONS
Abdominal Paracentesis     WHAT YOU NEED TO KNOW:   Abdominal paracentesis is a procedure to remove abnormal fluid buildup in your abdomen  Fluid builds up because of liver problems, such as swelling and scarring  Heart failure, kidney disease, a mass, or problems with your pancreas may also cause fluid buildup  DISCHARGE INSTRUCTIONS:     Follow up with your healthcare provider as directed: Write down your questions so you remember to ask them during your visits  Wound care: Remove dressing after 24 hours  Leave glue in place  Return to your normal activities    Contact Interventional Radiology at [phone] [place] PATIENTS: Contact Interventional Radiology at [phone]) Alejandro Niño PATIENTS: Contact Interventional Radiology at [phone]) if:  You have a fever and your wound is red and swollen  You have yellow, green, or bad-smelling discharge coming from your wound  You have pain or swelling in your abdomen  You have an upset stomach or you vomit  You have sudden, sharp pain in your abdomen  You urinate very little or not at all  You feel confused and more tired than usual    Your arm or leg feels warm, tender, and painful  It may look swollen and red  You suddenly feel lightheaded and have trouble breathing

## 2023-02-16 NOTE — BRIEF OP NOTE (RAD/CATH)
IR Left PARACENTESIS Procedure Note    PATIENT NAME: Mark Ashby  : 1970  MRN: [id number]    Pre-op Diagnosis:   1  Cholangiocarcinoma, stage IV ([facility] [address] )    2  Malignant ascites      Post-op Diagnosis:   1  Cholangiocarcinoma, stage IV ([facility] [address] )    2   Malignant ascites        Provider:   Janki Newton PA-C  Assistants:     No qualified resident was available, Resident is only observing    Estimated Blood Loss: None    Findings: 4500mL clear yellow ascites drained from left-sided paracentesis    Specimens: Labs collected and sent per oncology request for cytology with every paracentesis, see scanned document in media    Complications:  None immediately    Anesthesia: local    Janki Newton PA-C     Date: 2023  Time: 12:19 PM

## 2023-02-16 NOTE — PROGRESS NOTES
MSW phoned and left a voice message for a call back  MSW awaits return call  MSW will continue to follow

## 2023-02-16 NOTE — SEDATION DOCUMENTATION
Left side paracentesis performed by Desean Mireles PA-C  4500 ml of clear yellow fluid drained  Ultrasound guided  Labs sent  Pt tolerated well  Puncture site is CDI and covered with exofin  Pt said site was leaking after previous para  AVS reviewed with pt upon dc

## 2023-02-17 ENCOUNTER — HOSPITAL ENCOUNTER (OUTPATIENT)
Dept: RADIOLOGY | Facility: HOSPITAL | Age: 53
Discharge: HOME | End: 2023-02-17

## 2023-02-17 DIAGNOSIS — C22.8 PRIMARY MALIGNANT NEOPLASM OF LIVER (HCC): ICD-10-CM

## 2023-02-17 RX ADMIN — GADOBUTROL 10 ML: 604.72 INJECTION INTRAVENOUS at 19:39

## 2023-02-20 NOTE — PROGRESS NOTES
Reginald Gupta is a 47 y o  male who is currently ordered Vancomycin IV with management by the Pharmacy Consult service  Relevant clinical data and objective / subjective history reviewed  Vancomycin Assessment:  Indication and Goal AUC/Trough: Bacteremia, random level less than or equal to 15  Clinical Status: worsening  Micro:   Blood cultures x 2 in process ()  Renal Function: elevated creatinine  SCr: 2 59 mg/dL  Renal replacement: Not on dialysis  Days of Therapy: 1  Current Dose: 2000 mg IV loading dose  Vancomycin Plan: Pulse dosing due to worsening creatinine  New Dosin mg IV daily prn random level less than or equal to 15  Next Level:  at 0600  Renal Function Monitoring: Daily BMP and [place] will continue to follow closely for s/sx of nephrotoxicity, infusion reactions and appropriateness of therapy  BMP and CBC will be ordered per protocol  We will continue to follow the patient’s culture results and clinical progress daily      Nathan Elliott, Pharmacist

## 2023-02-20 NOTE — H&P
H&P Exam - [address] [place] 47 y o  male MRN: [id number]  Unit/Bed#: MICU 04 Encounter: [id number]      -------------------------------------------------------------------------------------------------------------  Chief Complaint: Dehydration  History of Present Illness   Roxy Grimm is a 47 y o  male who presents with the above complaint  He reports poor quality PO intake for several days, associated with abodimal pain, nausea with a few episodes of emesis, and profound fatigue  No reported fevers at home  No recent BMs and decreased UOP lately  Has been as compliant as possible with medications  Underwent paracentesis in ER for diagnostic purposes which was negative for SBP  Outpatient MRI of the abdomen was recently read with extensive portal venous system thrombosis and labs were consistent with DENZEL and hyperkalemia which was managed medically  History obtained from the patient   -------------------------------------------------------------------------------------------------------------  Assessment and Plan:    Neuro:   • Diagnosis: Hepatic encephalopathy  o Plan: Hold lactulose pending fluid resuscitation  • Diagnosis: GERD  o Plan: Continue protonix    CV:   • Diagnosis: Hypovolemic shock  o Plan: IVF x 4L given, add albumin    Pulm:  No issues    GI:   • Diagnosis: Metastatic cholangiocarcinoma and cirrhosis  o Plan: Continue lactulose when resuscitated  o SBP ruled out  o Treat for presumed type I HRS with triple therapy  • Diagnosis: Malignant ascites  o Plan: No antibiotics needed  o PRN paracenteses for symptom relief  • Diagnosis:  Portal venous thrombosis  o Plan:  Initiate heparin gtt    :   • Diagnosis: Type I HRS  o Plan: Midodrine, albumin, octreotide gtt  • Diagnosis: Hyperkalemia  o Plan: Consult Nephrology    F/E/N:   • Plan: Low K diet  • No maintenance fluids outside of albumin    Heme/Onc:   • Diagnosis:  Thrombocytopenia secondary to portal hypertension  o Plan: Transfuse for goal > 20k  • Diagnosis: Anemia of chronic disease  o Plan: Transfuse for goal > 7    Endo:   No issues    ID:   • Diagnosis: Septic shock of uncertain etiology  o Plan: Cefepime, flagyl, vancomycin  o Monitor cultures  o TTE, rule out endocarditis    VTE ppx:  Heparin gtt  GI ppx:  Protonix    Disposition: Admit to Critical Care   Code Status: Level 1 - Full Code  --------------------------------------------------------------------------------------------------------------  Review of Systems   Constitutional: Positive for fatigue  Gastrointestinal: Positive for abdominal pain and constipation  All other systems reviewed and are negative  A 12-point, complete review of systems was reviewed and negative except as stated above     Physical Exam  Vitals and nursing note reviewed  Constitutional:       General: He is not in acute distress  Appearance: He is well-developed  He is not ill-appearing  HENT:      Head: Normocephalic and atraumatic  Eyes:      General: No scleral icterus  Cardiovascular:      Rate and Rhythm: Regular rhythm  Tachycardia present  Heart sounds: No murmur heard  No friction rub  No gallop  Pulmonary:      Effort: Pulmonary effort is normal  No respiratory distress  Breath sounds: Normal breath sounds  No wheezing, rhonchi or rales  Abdominal:      General: Abdomen is protuberant  Bowel sounds are normal  There is distension  Palpations: Abdomen is soft  There is fluid wave and hepatomegaly  Tenderness: There is no abdominal tenderness  Skin:     General: Skin is warm and dry  Coloration: Skin is not jaundiced  Neurological:      General: No focal deficit present  Mental Status: He is alert and oriented to person, place, and time     Psychiatric:         Mood and Affect: Mood normal          Behavior: Behavior normal  --------------------------------------------------------------------------------------------------------------  Vitals:   Vitals:    02/20/23 1000 02/20/23 1044 02/20/23 1200 02/20/23 1300   BP: 90/53 (!) 83/53 (!) 80/53 (!) 89/49   BP Location:       Pulse: 74 78 92 84   Resp: 14 18 18 15   Temp:       TempSrc:       SpO2: 98% 100% 99% 98%   Weight:         Temp  Min: 97 °F (36 1 °C)  Max: 97 °F (36 1 °C)        Body mass index is 29 24 kg/m²    N/A    Laboratory and Diagnostics:  Results from last 7 days   Lab Units 02/20/23  1548 02/20/23  0728 02/20/23  0341   WBC Thousand/uL  --   --  31 18*   HEMOGLOBIN g/dL 14 6  --  16 9   I STAT HEMOGLOBIN g/dl  --  16 7  --    HEMATOCRIT % 41 5  --  48 2   HEMATOCRIT, ISTAT %  --  49  --    PLATELETS Thousands/uL  --   --  136*   NEUTROS PCT %  --   --  82*   MONOS PCT %  --   --  13*     Results from last 7 days   Lab Units 02/20/23  1548 02/20/23  1132 02/20/23  0827 02/20/23  0728 02/20/23  0341   SODIUM mmol/L 125* 124* 122*  --  118*   POTASSIUM mmol/L 5 1 5 7* 6 3*  --  6 2*   CHLORIDE mmol/L 99 99 92*  --  91*   CO2 mmol/L 15* 14* 22  --  16*   CO2, I-STAT mmol/L  --   --   --  19*  --    ANION GAP mmol/L 11 11 8  --  11   BUN mg/dL 86* 96* 105*  --  107*   CREATININE mg/dL 1 76* 2 04* 2 60*  --  2 37*   CALCIUM mg/dL 6 7* 7 2* 7 1*  --  6 5*   GLUCOSE RANDOM mg/dL 97 100 125  --  140   ALT U/L  --   --   --   --  91*   AST U/L  --   --   --   --  89*   ALK PHOS U/L  --   --   --   --  198*   ALBUMIN g/dL  --   --   --   --  1 9*   TOTAL BILIRUBIN mg/dL  --   --   --   --  1 18*     Results from last 7 days   Lab Units 02/20/23  0827   MAGNESIUM mg/dL 4 5*   PHOSPHORUS mg/dL 4 9*      Results from last 7 days   Lab Units 02/20/23  1548 02/20/23  0341   INR  1 66* 1 49*   PTT seconds 38* 47*          Results from last 7 days   Lab Units 02/20/23  1548 02/20/23  1305 02/20/23  1132 02/20/23  0916 02/20/23  0608 02/20/23  0341   LACTIC ACID mmol/L 3 5* 4 1* 3 3* 4  2* 4 8* 3 5*     ABG:    VBG:  Results from last 7 days   Lab Units 02/20/23  1548   PH SOLO  7 464*   PCO2 SOLO mm Hg 24 9*   PO2 SOLO mm Hg 45 7*   HCO3 SOLO mmol/L 17 5*   BASE EXC SOLO mmol/L -4 3     Results from last 7 days   Lab Units 02/20/23  0827 02/20/23  0341   PROCALCITONIN ng/ml 4 53* 4 92*       Micro:  Results from last 7 days   Lab Units 02/20/23  0916 02/20/23  0634 02/20/23  0341   BLOOD CULTURE   --  Received in Microbiology Lab  Culture in Progress  Received in Microbiology Lab  Culture in Progress     GRAM STAIN RESULT  No Polys or Bacteria seen  --   --        EKG: Peaked t-waves, no other abnormalities  Imaging: I have personally reviewed pertinent films in PACS    Historical Information   Past Medical History:   Diagnosis Date   • 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 ([address] )    • Rectal bleeding 1/9/2023   • S/P exploratory laparotomy 12/16/2020     Past Surgical History:   Procedure Laterality Date   • CT NEEDLE BIOPSY LIVER  10/15/2020   • IR BIOPSY LIVER MASS  5/18/2022   • IR PARACENTESIS  1/10/2023   • IR PARACENTESIS  1/24/2023   • IR PARACENTESIS  2/2/2023   • IR PARACENTESIS  1/30/2023   • IR PARACENTESIS  2/6/2023   • IR PARACENTESIS  2/9/2023   • IR PARACENTESIS  2/13/2023   • IR PARACENTESIS  2/16/2023     Social History   Social History     Substance and Sexual Activity   Alcohol Use Not Currently    Comment: 1-2 drinks per day     Social History     Substance and Sexual Activity   Drug Use Not on file     Social History     Tobacco Use   Smoking Status Former   • Packs/day: 2 00   • Years: 20 00   • Pack years: 40 00   • Types: Cigarettes   Smokeless Tobacco Current       Family History:   Family History   Problem Relation Age of Onset   • Prostate cancer Father      I have reviewed this patient's family history and commented on sigificant items within the HPI    Medications:  Current Facility-Administered Medications   Medication Dose Route Frequency   • albumin human (FLEXBUMIN) 25 % injection 25 g  25 g Intravenous Q6H   • cefepime (MAXIPIME) 2,000 mg in dextrose 5 % 50 mL IVPB  2,000 mg Intravenous Q12H   • chlorhexidine (PERIDEX) 0 12 % oral rinse 15 mL  15 mL Mouth/Throat Q12H [facility]   • ergocalciferol (VITAMIN D2) capsule 50,000 Units  50,000 Units Oral Weekly   • heparin (porcine) 25,000 units in 0 45% NaCl 250 mL infusion (premix)  3-30 Units/kg/hr (Order-Specific) Intravenous Titrated   • heparin (porcine) injection 3,800 Units  3,800 Units Intravenous Q6H PRN   • heparin (porcine) injection 7,600 Units  7,600 Units Intravenous Q6H PRN   • metroNIDAZOLE (FLAGYL) IVPB (premix) 500 mg 100 mL  500 mg Intravenous Q8H   • norepinephrine (LEVOPHED) 1 mg/mL injection **ADS Override Pull**       • norepinephrine (LEVOPHED) 4 mg (STANDARD CONCENTRATION) IV in sodium chloride 0 9% 250 mL  1-30 mcg/min Intravenous Titrated   • ondansetron (ZOFRAN) injection 4 mg  4 mg Intravenous Q8H PRN   • oxyCODONE (ROXICODONE) IR tablet 5 mg  5 mg Oral Q4H PRN   • [START ON 2/21/2023] pantoprazole (PROTONIX) EC tablet 40 mg  40 mg Oral Early Morning   • sodium bicarbonate 75 mEq in dextrose 5 % 1,000 mL infusion  100 mL/hr Intravenous Continuous   • [START ON 2/21/2023] vancomycin (VANCOCIN) IVPB (premix in dextrose) 1,000 mg 200 mL  12 5 mg/kg (Adjusted) Intravenous Daily PRN     Home medications:  Prior to Admission Medications   Prescriptions Last Dose Informant Patient Reported? Taking?    Ivosidenib 250 MG TABS   Yes No   Sig: Take by mouth   Patient not taking: Reported on 2/3/2023   cyclobenzaprine (FLEXERIL) 5 mg tablet   Yes No   Sig: take 1 tablet by mouth every 8 hours (3 TIMES A DAY)   Patient not taking: Reported on 1/23/2023   ergocalciferol (VITAMIN D2) 50,000 units   Yes No   Sig: Take 50,000 Units by mouth once a week   furosemide (LASIX) 20 mg tablet   No No   Sig: Take 1 tablet (20 mg total) by mouth daily   magnesium oxide (MAG-OX) 400 mg tablet   No No   Sig: Take 1 tablet (400 mg total) by mouth daily   ondansetron (ZOFRAN) 4 mg tablet   No No   Sig: Take 0 5 tablets (2 mg total) by mouth every 8 (eight) hours as needed for nausea or vomiting   oxyCODONE (ROXICODONE) 5 immediate release tablet   Yes No   Sig: Take 5 mg by mouth every 6 (six) hours as needed   pantoprazole (PROTONIX) 40 mg tablet   No No   Sig: take 1 tablet by mouth once daily   potassium chloride (K-DUR,KLOR-CON) 10 mEq tablet   No No   Sig: Take 1 tablet (10 mEq total) by mouth daily      Facility-Administered Medications: None     Allergies: Allergies   Allergen Reactions   • Penicillins Other (See Comments) and Rash     As a child had reaction not sure what it was       ------------------------------------------------------------------------------------------------------------  Advance Directive and Living Will:      Power of :    POLST:    ------------------------------------------------------------------------------------------------------------  Anticipated Length of Stay is > 2 midnights    Care Time Delivered:   Upon my evaluation, this patient had a high probability of imminent or life-threatening deterioration due to septic shock, which required my direct attention, intervention, and personal management  I have personally provided 85 minutes (1400 to 1525) of critical care time, exclusive of procedures, teaching, family meetings, and any prior time recorded by providers other than myself  Anali Granger MD        Portions of the record may have been created with voice recognition software  Occasional wrong word or "sound a like" substitutions may have occurred due to the inherent limitations of voice recognition software    Read the chart carefully and recognize, using context, where substitutions have occurred

## 2023-02-20 NOTE — CONSULTS
Consultation - Nephrology   Marlys Edvin Ashby 47 y o  male MRN: [id number]  [facility] Encounter: [id number]    ASSESSMENT AND PLAN:  Patient is 80-year-old male with significant medical issues of metastatic cholangiocarcinoma with metastasis to liver, lung, bone, prior history of right tonsillar squamous cell carcinoma, status posttreatment, recurrent ascites requiring paracentesis, now presents with abdominal pain, nausea, poor p o  intake  We are consulted for DENZEL, hyperkalemia, acidosis management  Severe DENZEL POA, baseline serum creatinine 0 6-0 8  -Creatinine 2 3 on admission increased to peak level 2 6 with most recent slightly improved 2 0  -DENZEL suspect in the setting of severe hypotension causing ischemic/hemodynamic insult, component of prerenal with poor p o  intake  -Was on ivosidenib and more recently on Pemigatinib, both can cause increase creatinine, another differential HRS  -CT scan shows no hydro  -status post Harvey catheter, urine output around 70 mill per hour over last 4 to 6 hours   -Change IV fluid as below  -Status post aggressive IV fluid resuscitation/bolus as per septic shock protocol  -I had detailed discussion with patient regarding severe azotemia, renal failure, hyperkalemia, acidosis and potential need for dialysis  Repeat BMP in 3 to 4 hours, if electrolyte/acid-base status worsening without significant urine output, will have low threshold to start CRRT  -Patient has provided dialysis consent which is in the chart   -Start IV albumin 25 g every 6 hourly  - UA shows multiple 25-50 hyaline cast, no significant hematuria or proteinuria  Check urine electrolytes    Severe azotemia  -Seems to be slowly improving with IV fluid resuscitation  Closely monitor for any uremic symptoms   -Does have generalized weakness, nausea, poor p o  intake      Hyperkalemia  -Potassium supplements listed in home med list although he confirms of not taking this lately   -Suspect in the setting of DENZEL  -Strict low potassium diet recommended   -Status post medical management with IV insulin, IV dextrose, bicarb  -Most recent serum potassium slightly improved 5 7  Continue to monitor with repeat BMP in 3 to 4 hours    Lactic acidosis  -Continue to closely monitor acid-base status  Most recent bicarb level 14  -Consider changing IV fluid to hypotonic IV fluid with IV D5W and 75 mg sodium bicarb at 100 mL/h   -Continue to trend lactic acid, ABG    Severe hyponatremia  -Serum sodium 118 on admission at 3:41 AM, now rapidly improving up to 124 at 11:30 AM   -Goal sodium correction no more than 8 meq in 24 hours  -Status post multiple IV normal saline boluses due to hemodynamic instability   -Change maintenance fluid to IV hypotonic fluid as above   -Continue to monitor BMP every 4 hourly for today  If sodium level continues to improve further, consider changing to IV D5W  -Check serum osmolality, urine osmolality, urine sodium    Severe hypotension  -Rule out septic shock, culture results to follow  Empiric antibiotic as per ICU team   -IV albumin 25 g every 6 hourly   -Echocardiogram results to follow  -Status post multiple IV normal saline bolus aggressive fluid resuscitation, if continued to remain hypotensive, consider vasopressor support    Metastatic cholangiocarcinoma with metastasis to liver, lymph node, bone, lungs   -Closely follows with hematology  Recent MRI showing new thrombosis of splenic, superior mesenteric and portal vein  Anticoagulation as per ICU team   Peritoneal carcinomatosis with recurrent ascites requiring paracentesis  Discussed above plan in detail with ICU team and they agree with above recommendations  HISTORY OF PRESENT ILLNESS:  Requesting Physician: Manoj Bunn,*  Reason for Consult: DENZEL, hyperkalemia    Matilde Ram is a 47y o  year old male who was admitted to [facility] after presenting with abdominal pain, nausea, poor p o  intake  A renal consultation is requested today for assistance in the management of DENZEL, hyperkalemia  Old medical records including prior lab values were reviewed from [place] [address] records  Patient has been going chemo for metastatic cholangiocarcinoma  Patient's baseline serum creatinine 0 6-0 8 although found to have severe DENZEL with hyperkalemia, hyponatremia  Patient was also found to be significantly hypotensive and was aggressively IV fluid resuscitated  Currently remains in ICU  Patient remains on room air, active, alert, answering questions appropriately  He is also accompanied by his wife who is present at bedside  Patient denies any recent NSAID use  He admits to be having decreased urine output over last 3 to 4 days  He had episodes of nausea but only once or twice vomiting  He has been having poor p o  intake  He does get recurrent paracentesis once or twice a week  PAST MEDICAL HISTORY:  Past Medical History:   Diagnosis Date   • 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 [facility])    • Rectal bleeding 1/9/2023   • S/P exploratory laparotomy 12/16/2020       PAST SURGICAL HISTORY:  Past Surgical History:   Procedure Laterality Date   • CT NEEDLE BIOPSY LIVER  10/15/2020   • IR BIOPSY LIVER MASS  5/18/2022   • IR PARACENTESIS  1/10/2023   • IR PARACENTESIS  1/24/2023   • IR PARACENTESIS  2/2/2023   • IR PARACENTESIS  1/30/2023   • IR PARACENTESIS  2/6/2023   • IR PARACENTESIS  2/9/2023   • IR PARACENTESIS  2/13/2023   • IR PARACENTESIS  2/16/2023       ALLERGIES:  Allergies   Allergen Reactions   • Penicillins Other (See Comments) and Rash     As a child had reaction not sure what it was          SOCIAL HISTORY:  Social History     Substance and Sexual Activity   Alcohol Use Not Currently    Comment: 1-2 drinks per day     Social History     Substance and Sexual Activity   Drug Use Not on file     Social History     Tobacco Use   Smoking Status Former   • Packs/day: 2 00   • Years: 20 00 • Pack years: 40 00   • Types: Cigarettes   Smokeless Tobacco Current       FAMILY HISTORY:  Family History   Problem Relation Age of Onset   • Prostate cancer Father        MEDICATIONS:    Current Facility-Administered Medications:   •  albumin human (FLEXBUMIN) 25 % injection 25 g, 25 g, Intravenous, Q6H, Renetta Munoz DO  •  cefepime (MAXIPIME) 2,000 mg in dextrose 5 % 50 mL IVPB, 2,000 mg, Intravenous, Q12H, Renetta Munoz DO  •  chlorhexidine (PERIDEX) 0 12 % oral rinse 15 mL, 15 mL, Mouth/Throat, Q12H JEMMA, Renetta Munoz DO  •  ergocalciferol (VITAMIN D2) capsule 50,000 Units, 50,000 Units, Oral, Weekly, Wallace Garcia DO  •  heparin (porcine) 25,000 units in 0 45% NaCl 250 mL infusion (premix), 3-30 Units/kg/hr (Order-Specific), Intravenous, Titrated, Claudia Schafer MD  •  heparin (porcine) injection 3,800 Units, 3,800 Units, Intravenous, Q6H PRN, Claudia Schafer MD  •  heparin (porcine) injection 7,600 Units, 7,600 Units, Intravenous, Q6H PRN, Claudia Schafer MD  •  metroNIDAZOLE (FLAGYL) IVPB (premix) 500 mg 100 mL, 500 mg, Intravenous, Q8H, Renetta Munoz DO, Last Rate: 200 mL/hr at 02/20/23 0930, Rate Change at 02/20/23 0930  •  ondansetron (ZOFRAN) injection 4 mg, 4 mg, Intravenous, Q8H PRN, Renetta Munoz DO  •  oxyCODONE (ROXICODONE) IR tablet 5 mg, 5 mg, Oral, Q4H PRN, Claudia Schafer MD  •  pantoprazole (PROTONIX) injection 40 mg, 40 mg, Intravenous, Q24H [address], Renetta Munoz DO, 40 mg at 02/20/23 1258  •  sodium bicarbonate 75 mEq in dextrose 5 % 1,000 mL infusion, 100 mL/hr, Intravenous, Continuous, Renetta Munoz DO  •  sodium polystyrene (KAYEXALATE) powder 15 g, 15 g, Oral, Once, Renetta Munoz DO  •  [START ON 2/21/2023] vancomycin (VANCOCIN) IVPB (premix in dextrose) 1,000 mg 200 mL, 12 5 mg/kg (Adjusted), Intravenous, Daily PRN, Joel Knapp MD    REVIEW OF SYSTEMS:  Complete 10 point review of systems were obtained and discussed in length with the patient  Complete review of systems were negative / unremarkable except mentioned in the HPI section       PHYSICAL EXAM:  Current Weight: Weight - Scale: 97 8 kg (215 lb 9 6 oz)  First Weight: Weight - Scale: 97 8 kg (215 lb 9 6 oz)  Vitals:    02/20/23 1300   BP: (!) 89/49   Pulse: 84   Resp: 15   Temp:    SpO2: 98%       Intake/Output Summary (Last 24 hours) at 2/20/2023 1600  Last data filed at 2/20/2023 1400  Gross per 24 hour   Intake 2181 25 ml   Output 450 ml   Net 1731 25 ml     Wt Readings from Last 3 Encounters:   02/20/23 97 8 kg (215 lb 9 6 oz)   02/03/23 109 kg (240 lb 1 3 oz)   01/31/23 112 kg (246 lb)     Temp Readings from Last 3 Encounters:   02/20/23 (!) 97 °F (36 1 °C) (Rectal)   02/03/23 (!) 97 1 °F (36 2 °C) (Temporal)   01/31/23 98 2 °F (36 8 °C) (Oral)     BP Readings from Last 3 Encounters:   02/20/23 (!) 89/49   02/16/23 102/62   02/13/23 97/55     Pulse Readings from Last 3 Encounters:   02/20/23 84   02/16/23 75   02/13/23 71        Physical Examination:  Eyes: Mild conjunctival pallor present  ENT: External examination of ear and nose unremarkable  Respiratory: Bilateral air entry present  CVS: Trace edema in legs  GI: Mild distended  CNS: Active, alert,, oriented x3  Psych: Conscious, coherent, oriented  Skin: No new rash  Musculoskeletal: No obvious new gross deformity noted    Invasive Devices:   Urethral Catheter (Active)   Reasons to continue Urinary Catheter  Accurate I&O assessment in critically ill patients (48 hr  max) 02/20/23 1030   Goal for Removal Voiding trial when ambulation improves 02/20/23 1030   Site Assessment Clean;Skin intact 02/20/23 1030   Harvey Care Done 02/20/23 1030   Collection Container Standard drainage bag 02/20/23 1030   Securement Method Securing device (Describe) 02/20/23 1030   Output (mL) 150 mL 02/20/23 1400     Lab Results:   Results from last 7 days   Lab Units 02/20/23  1548 02/20/23  1132 02/20/23  0827 02/20/23  0728 02/20/23  0341   WBC Thousand/uL  -- --   --   --  31 18*   HEMOGLOBIN g/dL 14 6  --   --   --  16 9   I STAT HEMOGLOBIN g/dl  --   --   --  16 7  --    HEMATOCRIT % 41 5  --   --   --  48 2   HEMATOCRIT, ISTAT %  --   --   --  49  --    PLATELETS Thousands/uL  --   --   --   --  136*   POTASSIUM mmol/L  --  5 7* 6 3*  --  6 2*   CHLORIDE mmol/L  --  99 92*  --  91*   CO2 mmol/L  --  14* 22  --  16*   CO2, I-STAT mmol/L  --   --   --  19*  --    BUN mg/dL  --  96* 105*  --  107*   CREATININE mg/dL  --  2 04* 2 60*  --  2 37*   CALCIUM mg/dL  --  7 2* 7 1*  --  6 5*   MAGNESIUM mg/dL  --   --  4 5*  --   --    PHOSPHORUS mg/dL  --   --  4 9*  --   --    GLUCOSE, ISTAT mg/dl  --   --   --  115  --        Other Studies:   XR chest portable ICU   Final Result by Mireya Chandra MD (02/20 0096)      No acute cardiopulmonary disease  Findings are stable               Workstation performed: [id number]         CT abdomen pelvis wo contrast   Final Result by Moi Kendrick MD (02/20 9395)      No acute pathology  Evaluation is limited in the absence of intravenous contrast enhancement, however metastatic disease appears essentially unchanged, and including dominant hepatic lesion, peritoneal carcinomatosis with moderate moderate ascites  Presumed metastatic    right lower lobe nodules also noted  Workstation performed: [id number]         Chest X images personally reviewed which shows clear lungs  Portions of the record may have been created with voice recognition software  Occasional wrong word or "sound a like" substitutions may have occurred due to the inherent limitations of voice recognition software  Read the chart carefully and recognize, using context, where substitutions have occurred

## 2023-02-20 NOTE — ED PROVIDER NOTES
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, liver, lungs and on oral chemo presenting with abdominal pain, fatigue, nausea/vomiting, odynophagia  Patient also endorses chills  Denies any fevers  Due to the ongoing symptoms for several days patient decided come to the emergency department for evaluation  Patient notably gets frequent paracentesis by IR due to ascites  Also reports lack of bowel movement for the past several days  Per patient's wife patient has been more somnolent lately but still alert and oriented  Patient otherwise has no other complaints  Prior to Admission Medications   Prescriptions Last Dose Informant Patient Reported? Taking?    Ivosidenib 250 MG TABS   Yes No   Sig: Take by mouth   Patient not taking: Reported on 2/3/2023   cyclobenzaprine (FLEXERIL) 5 mg tablet   Yes No   Sig: take 1 tablet by mouth every 8 hours (3 TIMES A DAY)   Patient not taking: Reported on 1/23/2023   ergocalciferol (VITAMIN D2) 50,000 units   Yes No   Sig: Take 50,000 Units by mouth once a week   furosemide (LASIX) 20 mg tablet   No No   Sig: Take 1 tablet (20 mg total) by mouth daily   magnesium oxide (MAG-OX) 400 mg tablet   No No   Sig: Take 1 tablet (400 mg total) by mouth daily   ondansetron (ZOFRAN) 4 mg tablet   No No   Sig: Take 0 5 tablets (2 mg total) by mouth every 8 (eight) hours as needed for nausea or vomiting   oxyCODONE (ROXICODONE) 5 immediate release tablet   Yes No   Sig: Take 5 mg by mouth every 6 (six) hours as needed   pantoprazole (PROTONIX) 40 mg tablet   No No   Sig: take 1 tablet by mouth once daily   potassium chloride (K-DUR,KLOR-CON) 10 mEq tablet   No No   Sig: Take 1 tablet (10 mEq total) by mouth daily Facility-Administered Medications: None       Past Medical History:   Diagnosis Date   • 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 [facility])    • Rectal bleeding 1/9/2023   • S/P exploratory laparotomy 12/16/2020       Past Surgical History:   Procedure Laterality Date   • CT NEEDLE BIOPSY LIVER  10/15/2020   • IR BIOPSY LIVER MASS  5/18/2022   • IR PARACENTESIS  1/10/2023   • IR PARACENTESIS  1/24/2023   • IR PARACENTESIS  2/2/2023   • IR PARACENTESIS  1/30/2023   • IR PARACENTESIS  2/6/2023   • IR PARACENTESIS  2/9/2023   • IR PARACENTESIS  2/13/2023   • IR PARACENTESIS  2/16/2023       Family History   Problem Relation Age of Onset   • Prostate cancer Father      I have reviewed and agree with the history as documented  E-Cigarette/Vaping     E-Cigarette/Vaping Substances     Social History     Tobacco Use   • Smoking status: Former     Packs/day: 2 00     Years: 20 00     Pack years: 40 00     Types: Cigarettes   • Smokeless tobacco: Current   Substance Use Topics   • Alcohol use: Not Currently     Comment: 1-2 drinks per day        Review of Systems   Constitutional: Positive for fatigue  Negative for chills, diaphoresis, fever and unexpected weight change  HENT: Negative for ear pain and sore throat  Eyes: Negative for visual disturbance  Respiratory: Negative for cough, chest tightness and shortness of breath  Cardiovascular: Negative for chest pain and leg swelling  Gastrointestinal: Positive for abdominal distention and abdominal pain  Negative for constipation, diarrhea, nausea and vomiting  Endocrine: Negative  Genitourinary: Positive for difficulty urinating  Negative for dysuria  Musculoskeletal: Negative  Skin: Negative  Allergic/Immunologic: Negative  Neurological: Positive for light-headedness  Hematological: Negative  Psychiatric/Behavioral: Negative  All other systems reviewed and are negative        Physical Exam  ED Triage Vitals   Temperature Pulse Respirations Blood Pressure SpO2   02/20/23 0330 02/20/23 0309 02/20/23 0309 02/20/23 0309 02/20/23 0309   (!) 97 °F (36 1 °C) 71 18 (!) 80/50 98 %      Temp Source Heart Rate Source Patient Position - Orthostatic VS BP Location FiO2 (%)   02/20/23 0330 02/20/23 0309 02/20/23 0309 02/20/23 0309 --   Rectal Monitor Lying Right arm       Pain Score       02/20/23 0309       6             Orthostatic Vital Signs  Vitals:    02/20/23 1000 02/20/23 1044 02/20/23 1200 02/20/23 1300   BP: 90/53 (!) 83/53 (!) 80/53 (!) 89/49   Pulse: 74 78 92 84   Patient Position - Orthostatic VS:           Physical Exam  Vitals and nursing note reviewed  Constitutional:       General: He is not in acute distress  Appearance: Normal appearance  He is not ill-appearing  HENT:      Head: Normocephalic and atraumatic  Right Ear: External ear normal       Left Ear: External ear normal       Nose: Nose normal       Mouth/Throat:      Mouth: Mucous membranes are moist       Pharynx: Oropharynx is clear  Eyes:      General: No scleral icterus  Right eye: No discharge  Left eye: No discharge  Extraocular Movements: Extraocular movements intact  Conjunctiva/sclera: Conjunctivae normal       Pupils: Pupils are equal, round, and reactive to light  Cardiovascular:      Rate and Rhythm: Normal rate and regular rhythm  Pulses: Normal pulses  Heart sounds: Normal heart sounds  Pulmonary:      Effort: Pulmonary effort is normal       Breath sounds: Normal breath sounds  Abdominal:      General: Abdomen is flat  Bowel sounds are normal  There is distension  Palpations: Abdomen is soft  There is fluid wave  Tenderness: There is generalized abdominal tenderness  There is no guarding or rebound  Musculoskeletal:         General: Normal range of motion  Cervical back: Normal range of motion and neck supple  Skin:     General: Skin is warm and dry        Capillary Refill: Capillary refill takes less than 2 seconds  Neurological:      General: No focal deficit present  Mental Status: He is alert and oriented to person, place, and time  Mental status is at baseline  GCS: GCS eye subscore is 4  GCS verbal subscore is 5  GCS motor subscore is 6  Psychiatric:         Mood and Affect: Mood normal          Behavior: Behavior is slowed and withdrawn  Thought Content:  Thought content normal          Judgment: Judgment normal          ED Medications  Medications   chlorhexidine (PERIDEX) 0 12 % oral rinse 15 mL (15 mL Mouth/Throat Not Given 2/20/23 1122)   metroNIDAZOLE (FLAGYL) IVPB (premix) 500 mg 100 mL ( Intravenous Rate/Dose Change 2/20/23 0930)   cefepime (MAXIPIME) 2,000 mg in dextrose 5 % 50 mL IVPB (has no administration in time range)   vancomycin (VANCOCIN) IVPB (premix in dextrose) 1,000 mg 200 mL (has no administration in time range)   sodium polystyrene (KAYEXALATE) powder 15 g (has no administration in time range)   pantoprazole (PROTONIX) injection 40 mg (40 mg Intravenous Given 2/20/23 1258)   ergocalciferol (VITAMIN D2) capsule 50,000 Units (50,000 Units Oral Not Given 2/20/23 1400)   heparin (porcine) 25,000 units in 0 45% NaCl 250 mL infusion (premix) (has no administration in time range)   heparin (porcine) injection 7,600 Units (has no administration in time range)   heparin (porcine) injection 3,800 Units (has no administration in time range)   ondansetron (ZOFRAN) injection 4 mg (has no administration in time range)   oxyCODONE (ROXICODONE) IR tablet 5 mg (has no administration in time range)   sodium bicarbonate 75 mEq in dextrose 5 % 1,000 mL infusion (has no administration in time range)   albumin human (FLEXBUMIN) 25 % injection 25 g (25 g Intravenous New Bag 2/20/23 1608)   norepinephrine (LEVOPHED) 1 mg/mL injection **ADS Override Pull** (  Canceled Entry 2/20/23 1609)   norepinephrine (LEVOPHED) 4 mg (STANDARD CONCENTRATION) IV in sodium chloride 0 9% 250 mL (has no administration in time range)   cefepime (MAXIPIME) 2,000 mg in dextrose 5 % 50 mL IVPB (0 mg Intravenous Stopped 2/20/23 0810)   sodium chloride 0 9 % bolus 1,000 mL (0 mL Intravenous Stopped 2/20/23 0515)   calcium gluconate 1 g in sodium chloride 0 9% 50 mL (premix) (0 g Intravenous Stopped 2/20/23 0736)   calcium gluconate 2 g in sodium chloride 0 9% 100 mL (premix) (0 g Intravenous Stopped 2/20/23 0859)   insulin regular (HumuLIN R,NovoLIN R) injection 10 Units (10 Units Intravenous Given 2/20/23 0752)   dextrose 50 % IV solution 25 mL (25 mL Intravenous Given 2/20/23 0751)   albuterol inhalation solution 10 mg (10 mg Nebulization Given 2/20/23 0802)   sodium bicarbonate 8 4 % injection 50 mEq (50 mEq Intravenous Given 2/20/23 0804)   vancomycin (VANCOCIN) 2,000 mg in sodium chloride 0 9 % 500 mL IVPB (0 mg Intravenous Stopped 2/20/23 1215)   sodium chloride 0 9 % bolus 1,000 mL (0 mL Intravenous Stopped 2/20/23 1028)   calcium gluconate 2 g in sodium chloride 0 9% 100 mL (premix) (0 g Intravenous Stopped 2/20/23 1146)   sodium chloride 0 9 % bolus 1,000 mL (0 mL Intravenous Stopped 2/20/23 1300)   insulin regular (HumuLIN R,NovoLIN R) injection 10 Units (10 Units Intravenous Given 2/20/23 1144)   dextrose 50 % IV solution 25 mL (25 mL Intravenous Given 2/20/23 1144)   albuterol inhalation solution 10 mg (10 mg Nebulization Given 2/20/23 1040)   sodium bicarbonate 8 4 % injection 50 mEq (50 mEq Intravenous Given 2/20/23 1349)   albumin human (FLEXBUMIN) 5 % injection 25 g (0 g Intravenous Stopped 2/20/23 1508)   heparin (porcine) injection 7,600 Units (7,600 Units Intravenous Given 2/20/23 1555)       Diagnostic Studies  Results Reviewed     Procedure Component Value Units Date/Time    Basic metabolic panel [[id number]]     Lab Status: No result Specimen: Blood     Basic metabolic panel [[id number]] Collected: 02/20/23 1548    Lab Status:  In process Specimen: Blood from Central Venous Line Updated: 02/20/23 1552    Body fluid culture and Gram stain [[id number]] Collected: 02/20/23 7946    Lab Status: Preliminary result Specimen: Body Fluid from Paracentesis Updated: 02/20/23 1505     Gram Stain Result No Polys or Bacteria seen    Body Fluid Diff [[id number]] Collected: 02/20/23 0916    Lab Status: Final result Specimen: Body Fluid from Paracentesis Updated: 02/20/23 1500     Total Counted 14     Neutrophils % (Fluid) 21 %      Lymphs % (Fluid) 57 %      Mesothelial % (Fluid) 7 %      Monocytes % (Fluid) 14 %     Body fluid white cell count with differential [[id number]]  (Abnormal) Collected: 02/20/23 0916    Lab Status: Final result Specimen:  Body Fluid from Paracentesis Updated: 02/20/23 1304     Site Paracentesis     Color, Fluid Light Red     Clarity, Fluid Hazy     WBC, Fluid 155 /ul     Basic metabolic panel [[id number]]  (Abnormal) Collected: 02/20/23 1132    Lab Status: Final result Specimen: Blood from Central Venous Line Updated: 02/20/23 1216     Sodium 124 mmol/L      Potassium 5 7 mmol/L      Chloride 99 mmol/L      CO2 14 mmol/L      ANION GAP 11 mmol/L      BUN 96 mg/dL      Creatinine 2 04 mg/dL      Glucose 100 mg/dL      Calcium 7 2 mg/dL      eGFR 36 ml/min/1 73sq m     Narrative:      [place] guidelines for Chronic Kidney Disease (CKD):   •  Stage 1 with normal or high GFR (GFR > 90 mL/min/1 73 square meters)  •  Stage 2 Mild CKD (GFR = 60-89 mL/min/1 73 square meters)  •  Stage 3A Moderate CKD (GFR = 45-59 mL/min/1 73 square meters)  •  Stage 3B Moderate CKD (GFR = 30-44 mL/min/1 73 square meters)  •  Stage 4 Severe CKD (GFR = 15-29 mL/min/1 73 square meters)  •  Stage 5 End Stage CKD (GFR <15 mL/min/1 73 square meters)  Note: GFR calculation is accurate only with a steady state creatinine    Blood culture #1 [[id number]] Collected: 02/20/23 0634    Lab Status: Preliminary result Specimen: Blood from Arm, Left Updated: 02/20/23 1201     Blood Culture Received in Microbiology Lab  Culture in Progress  Basic metabolic panel [[id number]]  (Abnormal) Collected: 02/20/23 0827    Lab Status: Final result Specimen: Blood from Arm, Right Updated: 02/20/23 1012     Sodium 122 mmol/L      Potassium 6 3 mmol/L      Chloride 92 mmol/L      CO2 22 mmol/L      ANION GAP 8 mmol/L       mg/dL      Creatinine 2 60 mg/dL      Glucose 125 mg/dL      Calcium 7 1 mg/dL      eGFR 27 ml/min/1 73sq m     Narrative:      [place] guidelines for Chronic Kidney Disease (CKD):   •  Stage 1 with normal or high GFR (GFR > 90 mL/min/1 73 square meters)  •  Stage 2 Mild CKD (GFR = 60-89 mL/min/1 73 square meters)  •  Stage 3A Moderate CKD (GFR = 45-59 mL/min/1 73 square meters)  •  Stage 3B Moderate CKD (GFR = 30-44 mL/min/1 73 square meters)  •  Stage 4 Severe CKD (GFR = 15-29 mL/min/1 73 square meters)  •  Stage 5 End Stage CKD (GFR <15 mL/min/1 73 square meters)  Note: GFR calculation is accurate only with a steady state creatinine    Lactic acid [[id number]]  (Abnormal) Collected: 02/20/23 0916    Lab Status: Final result Specimen: Blood from Arm, Right Updated: 02/20/23 1011     LACTIC ACID 4 2 mmol/L     Narrative:      Result may be elevated if tourniquet was used during collection  NT-BNP PRO-BE,[facility] only           [[id number]]  (Abnormal) Collected: 02/20/23 0827    Lab Status: Final result Specimen: Blood from Arm, Right Updated: 02/20/23 1006     NT-proBNP 340 pg/mL     HS Troponin 0hr (reflex protocol) [[id number]]  (Normal) Collected: 02/20/23 0916    Lab Status: Final result Specimen: Blood from Arm, Right Updated: 02/20/23 1003     hs TnI 0hr 17 ng/L     Glucose, body fluid [[id number]] Collected: 02/20/23 0919    Lab Status: Final result Specimen:  Body Fluid from Peritoneal Fluid Updated: 02/20/23 0959     Glucose, Fluid 139 mg/dL     Ammonia [[id number]]  (Abnormal) Collected: 02/20/23 0916    Lab Status: Final result Specimen: Blood from Arm, Right Updated: 02/20/23 0954     Ammonia 63 umol/L     Fingerstick Glucose (POCT) [[id number]]  (Normal) Collected: 02/20/23 0741    Lab Status: Final result Updated: 02/20/23 0949     POC Glucose 131 mg/dl     Amylase, body fluid [[id number]] Collected: 02/20/23 0919    Lab Status: Final result Specimen: Body Fluid from Other Updated: 02/20/23 0944     Amylase, Fluid 13 U/L     Procalcitonin [[id number]]  (Abnormal) Collected: 02/20/23 0827    Lab Status: Final result Specimen: Blood from Arm, Right Updated: 02/20/23 0939     Procalcitonin 4 53 ng/ml     Calcium, ionized [[id number]]  (Abnormal) Collected: 02/20/23 0916    Lab Status: Final result Specimen: Blood from Arm, Right Updated: 02/20/23 0931     Calcium, Ionized 0 97 mmol/L     Blood gas, venous [[id number]]  (Abnormal) Collected: 02/20/23 0916    Lab Status: Final result Specimen: Blood from Arm, Right Updated: 02/20/23 0930     pH, [place] 7 324     pCO2, [place] 29 0 mm Hg      pO2, [place] 45 4 mm Hg      HCO3, Cliff 14 7 mmol/L      Base Excess, Cliff -9 9 mmol/L      O2 Content, [place] 13 3 ml/dL      O2 HGB, VENOUS 74 2 %     Magnesium [[id number]]  (Abnormal) Collected: 02/20/23 0827    Lab Status: Final result Specimen: Blood from Arm, Right Updated: 02/20/23 0913     Magnesium 4 5 mg/dL     Phosphorus [[id number]]  (Abnormal) Collected: 02/20/23 0827    Lab Status: Final result Specimen: Blood from Arm, Right Updated: 02/20/23 0913     Phosphorus 4 9 mg/dL     TSH WITH REFLEX TO FREE T4 [[id number]]  (Normal) Collected: 02/20/23 0827    Lab Status: Final result Specimen: Blood from Arm, Right Updated: 02/20/23 0913     TSH 3RD Tawana Loud 3 890 uIU/mL     Narrative:      Patients undergoing fluorescein dye angiography may retain small amounts of fluorescein in the body for 48-72 hours post procedure  Samples containing fluorescein can produce falsely depressed TSH values  If the patient had this procedure,a specimen should be resubmitted post fluorescein clearance        Blood culture #2 [[id number]] Collected: 02/20/23 0341    Lab Status: Preliminary result Specimen: Blood from Central Venous Line Updated: 02/20/23 0901     Blood Culture Received in Microbiology Lab  Culture in Progress  POCT Blood Gas (CG8+) [[id number]]  (Abnormal) Collected: 02/20/23 0728    Lab Status: Final result Specimen: Venous Updated: 02/20/23 0808     ph, Cliff ISTAT 7 359     pCO2, Cliff i-STAT 31 5 mm HG      pO2, Cliff i-STAT 40 0 mm HG      BE, i-STAT -6 mmol/L      HCO3, Cliff i-STAT 17 7 mmol/L      CO2, i-STAT 19 mmol/L      O2 Sat, i-STAT 73 %      SODIUM, I-STAT 114 mmol/l      Potassium, i-STAT >8 0 mmol/L      Calcium, Ionized i-STAT 2 00 mmol/L      Hct, i-STAT 49 %      Hgb, i-STAT 16 7 g/dl      Glucose, i-STAT 115 mg/dl      Specimen Type VENOUS    Lactic acid 2 Hours [[id number]]  (Abnormal) Collected: 02/20/23 0608    Lab Status: Final result Specimen: Blood from Central Venous Line Updated: 02/20/23 0728     LACTIC ACID 4 8 mmol/L     Narrative:      Result may be elevated if tourniquet was used during collection      Urine Microscopic [[id number]]  (Abnormal) Collected: 02/20/23 1653    Lab Status: Final result Specimen: Urine, Indwelling Harvey Catheter Updated: 02/20/23 0644     RBC, UA None Seen /hpf      WBC, UA None Seen /hpf      Epithelial Cells None Seen /hpf      Bacteria, UA None Seen /hpf      MUCUS THREADS Occasional     Hyaline Casts, UA 25-50 /lpf     UA w Reflex to Microscopic w Reflex to Culture [[id number]]  (Abnormal) Collected: 02/20/23 0608    Lab Status: Final result Specimen: Urine, Indwelling Harvey Catheter Updated: 02/20/23 0630     Color, UA Yellow     Clarity, UA Turbid     Specific [place], UA 1 015     pH, UA 5 0     Leukocytes, UA Negative     Nitrite, UA Negative     Protein, UA Trace mg/dl      Glucose, UA Negative mg/dl      Ketones, UA Negative mg/dl      Urobilinogen, UA <2 0 mg/dl      Bilirubin, UA Negative     Occult Blood, UA Negative    Lactic acid [[id number]] (Abnormal) Collected: 02/20/23 0341    Lab Status: Final result Specimen: Blood from Central Venous Line Updated: 02/20/23 0434     LACTIC ACID 3 5 mmol/L     Narrative:      Result may be elevated if tourniquet was used during collection      Comprehensive metabolic panel [[id number]]  (Abnormal) Collected: 02/20/23 0341    Lab Status: Final result Specimen: Blood from Central Venous Line Updated: 02/20/23 0424     Sodium 118 mmol/L      Potassium 6 2 mmol/L      Chloride 91 mmol/L      CO2 16 mmol/L      ANION GAP 11 mmol/L       mg/dL      Creatinine 2 37 mg/dL      Glucose 140 mg/dL      Calcium 6 5 mg/dL      Corrected Calcium 8 2 mg/dL      AST 89 U/L      ALT 91 U/L      Alkaline Phosphatase 198 U/L      Total Protein 5 1 g/dL      Albumin 1 9 g/dL      Total Bilirubin 1 18 mg/dL      eGFR 30 ml/min/1 73sq m     Narrative:      [place] guidelines for Chronic Kidney Disease (CKD):   •  Stage 1 with normal or high GFR (GFR > 90 mL/min/1 73 square meters)  •  Stage 2 Mild CKD (GFR = 60-89 mL/min/1 73 square meters)  •  Stage 3A Moderate CKD (GFR = 45-59 mL/min/1 73 square meters)  •  Stage 3B Moderate CKD (GFR = 30-44 mL/min/1 73 square meters)  •  Stage 4 Severe CKD (GFR = 15-29 mL/min/1 73 square meters)  •  Stage 5 End Stage CKD (GFR <15 mL/min/1 73 square meters)  Note: GFR calculation is accurate only with a steady state creatinine    Procalcitonin [[id number]]  (Abnormal) Collected: 02/20/23 0341    Lab Status: Final result Specimen: Blood from Central Venous Line Updated: 02/20/23 0422     Procalcitonin 4 92 ng/ml     Protime-INR [[id number]]  (Abnormal) Collected: 02/20/23 0341    Lab Status: Final result Specimen: Blood from Central Venous Line Updated: 02/20/23 0410     Protime 18 3 seconds      INR 1 49    APTT [[id number]]  (Abnormal) Collected: 02/20/23 0341    Lab Status: Final result Specimen: Blood from Central Venous Line Updated: 02/20/23 0410     PTT 47 seconds CBC and differential [[id number]]  (Abnormal) Collected: 02/20/23 0341    Lab Status: Final result Specimen: Blood from Central Venous Line Updated: 02/20/23 0405     WBC 31 18 Thousand/uL      RBC 5 55 Million/uL      Hemoglobin 16 9 g/dL      Hematocrit 48 2 %      MCV 87 fL      MCH 30 5 pg      MCHC 35 1 g/dL      RDW 14 3 %      MPV 10 1 fL      Platelets 468 Thousands/uL      nRBC 0 /100 WBCs      Neutrophils Relative 82 %      Immat GRANS % 1 %      Lymphocytes Relative 4 %      Monocytes Relative 13 %      Eosinophils Relative 0 %      Basophils Relative 0 %      Neutrophils Absolute 25 46 Thousands/µL      Immature Grans Absolute 0 25 Thousand/uL      Lymphocytes Absolute 1 21 Thousands/µL      Monocytes Absolute 4 18 Thousand/µL      Eosinophils Absolute 0 01 Thousand/µL      Basophils Absolute 0 07 Thousands/µL     Narrative: This is an appended report  These results have been appended to a previously verified report  XR chest portable ICU   Final Result by Linnea Vanegas MD (02/20 1416)      No acute cardiopulmonary disease  Findings are stable               Workstation performed: [id number]         CT abdomen pelvis wo contrast   Final Result by Paul Osman MD (02/20 2101)      No acute pathology  Evaluation is limited in the absence of intravenous contrast enhancement, however metastatic disease appears essentially unchanged, and including dominant hepatic lesion, peritoneal carcinomatosis with moderate moderate ascites  Presumed metastatic    right lower lobe nodules also noted                Workstation performed: [id number]               Procedures  Procedures      ED Course  ED Course as of 02/20/23 1615   Mon Feb 20, 2023   0504 Sodium(!): 118   0504 Potassium(!): 6 2   0505 WBC(!!): 31 18   0505 Procalcitonin(!): 4 92   0505 LACTIC ACID(!!): 3 5                          Initial Sepsis Screening     Row Name 02/20/23 0407                Is the patient's history suggestive of a new or worsening infection? Yes (Proceed)  -SK        Suspected source of infection suspect infection, source unknown  -SK        Indicate SIRS criteria Tachycardia > 90 bpm;Leukocytosis (WBC > 84428 IJL) OR Leukopenia (WBC <4000 IJL) OR Bandemia (WBC >10% bands)  -SK        Are two or more of the above signs & symptoms of infection both present and new to the patient? Yes (Proceed)  -SK        Assess for evidence of organ dysfunction: Are any of the below criteria present within 6 hours of suspected infection and SIRS criteria that are NOT considered to be chronic conditions? SBP < 90  -SK        Date of presentation of septic shock 02/20/23  -SK        Time of presentation of septic shock 0408  -SK        Fluid Resuscitation: --              User Key  (r) = Recorded By, (t) = Taken By, (c) = Cosigned By    [id number] Name Provider Bulmaro Rosado MD Resident                          Medical Decision Making   47year old male with fatigue, odynophagia, n/v, and abdominal pain   Differential: SBP, Septic shock, anemia from bleed due to coagulopathy,   Will obtain sepsis workup in the setting of hypotension and tachycardia   Elevated WBC, procal, and lactate  Fluid bolus started with aim for 30cc/kg  2L given in the ED prior to contacting MICU  Abx started with cefepime and vancomycin  Hyponatremia and hyperkalemia from labs obtained prior to 2L bolus  Creatinine with significant worsening from previous lab   CT switched to non contrast study  Showing non distended bladder so concerning for renal failure  Subtle EKG changes noted with heightening of T waves in precordial leads but otherwise no other acute signs of hyperkalemia   Calcium gluconate given   Due to concerns for SBP abdominal fluid sample obtained  Spoke with Dr Meem Branch regarding patient and agreed to admit patient under SD1           Abdominal pain: acute illness or injury  Fatigue: acute illness or injury  Nausea and vomiting: self-limited or minor problem  [facility] ([facility] ): acute illness or injury  Amount and/or Complexity of Data Reviewed  Labs: ordered  Decision-making details documented in ED Course  Radiology: ordered  Risk  Prescription drug management  Decision regarding hospitalization  Fuad Driver MD        Disposition  Final diagnoses:   Nausea and vomiting   Abdominal pain   Fatigue   Septic shock ([facility] )     Time reflects when diagnosis was documented in both MDM as applicable and the Disposition within this note     Time User Action Codes Description Comment    2/20/2023  6:42 AM Mary Union Add [R11 2] Nausea and vomiting     2/20/2023  6:42 AM Mary Union Add [R10 9] Abdominal pain     2/20/2023  6:42 AM Mary Union Add [R53 83] Fatigue     2/20/2023  6:42 AM Adeel Angelito [A41 9,  R65 21] Septic shock ([facility] )     2/20/2023  7:43 AM TammyRenetta brennan Add [E86 0] Dehydration     2/20/2023  7:43 AM TammyRenetta brennan Add [E87 0] Hypernatremia     2/20/2023  7:43 AM Tammy Renetta Add [A41 9] Sepsis ([facility] )     2/20/2023  7:43 AM GreenvilleRenetta brennan Add [E87 1] Hyponatremia     2/20/2023  2:45 PM Jemaria isabel Pitts E Add [C22 1] Cholangiocarcinoma, stage IV ([facility] )     2/20/2023  3:06 PM Carlton Pitts E Add [N17 9] DENZEL (acute kidney injury) [facility])       ED Disposition     ED Disposition   Admit    Condition   Stable    Date/Time   Mon Feb 20, 2023  6:42 AM    Comment   Case was discussed with Dr Timothy Baca and the patient's admission status was agreed to be Admission Status: inpatient status to the service of Dr Timothy Baca              Follow-up Information    None         Current Discharge Medication List      CONTINUE these medications which have NOT CHANGED    Details   cyclobenzaprine (FLEXERIL) 5 mg tablet take 1 tablet by mouth every 8 hours (3 TIMES A DAY)      ergocalciferol (VITAMIN D2) 50,000 units Take 50,000 Units by mouth once a week      furosemide (LASIX) 20 mg tablet Take 1 tablet (20 mg total) by mouth daily  Qty: 90 tablet, Refills: 0    Associated Diagnoses: Refractory ascites; Malignant ascites; Ascites      Ivosidenib 250 MG TABS Take by mouth      magnesium oxide (MAG-OX) 400 mg tablet Take 1 tablet (400 mg total) by mouth daily  Qty: 30 tablet, Refills: 2    Associated Diagnoses: Hypomagnesemia      ondansetron (ZOFRAN) 4 mg tablet Take 0 5 tablets (2 mg total) by mouth every 8 (eight) hours as needed for nausea or vomiting  Qty: 20 tablet, Refills: 0    Associated Diagnoses: Primary malignant neoplasm of liver ([facility] ); Hepatocellular carcinoma ([facility] ); Nausea and vomiting, unspecified vomiting type      oxyCODONE (ROXICODONE) 5 immediate release tablet Take 5 mg by mouth every 6 (six) hours as needed      pantoprazole (PROTONIX) 40 mg tablet take 1 tablet by mouth once daily  Qty: 30 tablet, Refills: 0    Associated Diagnoses: Primary malignant neoplasm of liver ([facility] ); Hepatocellular carcinoma ([facility] ); Nausea and vomiting, unspecified vomiting type; GERD (gastroesophageal reflux disease)      potassium chloride (K-DUR,KLOR-CON) 10 mEq tablet Take 1 tablet (10 mEq total) by mouth daily  Qty: 90 tablet, Refills: 0    Associated Diagnoses: Refractory ascites; Malignant ascites; Ascites           No discharge procedures on file  PDMP Review       Value Time User    PDMP Reviewed  Yes 2/3/2023 10:31 AM Osei Dennison PA-C           ED Provider  Attending physically available and evaluated Senia Guillermo  I managed the patient along with the ED Attending      Electronically Signed by         Haily Hernandez MD  02/20/23 5879

## 2023-02-20 NOTE — ED ATTENDING ATTESTATION
2/20/2023  IRoger MD, saw and evaluated the patient  I have discussed the patient with the resident/non-physician practitioner and agree with the resident's/non-physician practitioner's findings, Plan of Care, and MDM as documented in the resident's/non-physician practitioner's note, except where noted  All available labs and Radiology studies were reviewed  I was present for key portions of any procedure(s) performed by the resident/non-physician practitioner and I was immediately available to provide assistance  At this point I agree with the current assessment done in the Emergency Department  I have conducted an independent evaluation of this patient a history and physical is as follows:    ED Course     Emergency Department Note- Alice Ashby 47 y o  male MRN: [id number]    Unit/Bed#: ED 18 Encounter: [id number]    Isauro Wilson is a 47 y o  male who presents with   Chief Complaint   Patient presents with   • Abdominal Pain     Pt arrives via EMS from home c/o dry mouth, acid reflux, +N  Pt has extensive cancer hx  History of Present Illness   HPI:  Isauro Wilson is a 47 y o  male who presents for evaluation of:  Abdominal pain, fatigue, nausea, vomiting, odynophagia  The patient has a history notable for 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; 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, liver, lungs  Patient notes that the vomitus is primarily mucus; no blood or bilious vomitus  Patient denies any associated fevers and chills  Patient has been feeling ill for several days prompting a visit to the ED  Patient seemed more confused this morning according to his spouse prompting a visit to the ED  Further review of systems is unobtainable secondary to acute encephalopathy  Review of Systems   Constitutional: Positive for fatigue  HENT: Negative for congestion and rhinorrhea      Gastrointestinal: Positive for abdominal pain, nausea and vomiting  Neurological: Positive for light-headedness  Negative for headaches  All other systems reviewed and are negative  Historical Information   Past Medical History:   Diagnosis Date   • 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 ([address] )    • Rectal bleeding 1/9/2023   • S/P exploratory laparotomy 12/16/2020     Past Surgical History:   Procedure Laterality Date   • CT NEEDLE BIOPSY LIVER  10/15/2020   • IR BIOPSY LIVER MASS  5/18/2022   • IR PARACENTESIS  1/10/2023   • IR PARACENTESIS  1/24/2023   • IR PARACENTESIS  2/2/2023   • IR PARACENTESIS  1/30/2023   • IR PARACENTESIS  2/6/2023   • IR PARACENTESIS  2/9/2023   • IR PARACENTESIS  2/13/2023   • IR PARACENTESIS  2/16/2023     Social History   Social History     Substance and Sexual Activity   Alcohol Use Not Currently    Comment: 1-2 drinks per day     Social History     Substance and Sexual Activity   Drug Use Not on file     Social History     Tobacco Use   Smoking Status Former   • Packs/day: 2 00   • Years: 20 00   • Pack years: 40 00   • Types: Cigarettes   Smokeless Tobacco Current     Family History:   Family History   Problem Relation Age of Onset   • Prostate cancer Father        Meds/Allergies   PTA meds:   Prior to Admission Medications   Prescriptions Last Dose Informant Patient Reported? Taking?    Ivosidenib 250 MG TABS   Yes No   Sig: Take by mouth   Patient not taking: Reported on 2/3/2023   cyclobenzaprine (FLEXERIL) 5 mg tablet   Yes No   Sig: take 1 tablet by mouth every 8 hours (3 TIMES A DAY)   Patient not taking: Reported on 1/23/2023   ergocalciferol (VITAMIN D2) 50,000 units   Yes No   Sig: Take 50,000 Units by mouth once a week   furosemide (LASIX) 20 mg tablet   No No   Sig: Take 1 tablet (20 mg total) by mouth daily   magnesium oxide (MAG-OX) 400 mg tablet   No No   Sig: Take 1 tablet (400 mg total) by mouth daily   ondansetron (ZOFRAN) 4 mg tablet   No No   Sig: Take 0 5 tablets (2 mg total) by mouth every 8 (eight) hours as needed for nausea or vomiting   oxyCODONE (ROXICODONE) 5 immediate release tablet   Yes No   Sig: Take 5 mg by mouth every 6 (six) hours as needed   pantoprazole (PROTONIX) 40 mg tablet   No No   Sig: take 1 tablet by mouth once daily   potassium chloride (K-DUR,KLOR-CON) 10 mEq tablet   No No   Sig: Take 1 tablet (10 mEq total) by mouth daily      Facility-Administered Medications: None     Allergies   Allergen Reactions   • Penicillins Other (See Comments) and Rash     As a child had reaction not sure what it was  Objective   First Vitals:   Blood Pressure: (!) 80/50 (02/20/23 0309)  Pulse: 71 (02/20/23 0309)  Temperature: (!) 97 °F (36 1 °C) (02/20/23 0330)  Temp Source: Rectal (02/20/23 0330)  Respirations: 18 (02/20/23 0309)  Weight - Scale: 97 8 kg (215 lb 9 6 oz) (02/20/23 0451)  SpO2: 98 % (02/20/23 0309)    Current Vitals:   Blood Pressure: 93/57 (02/20/23 0500)  Pulse: 64 (02/20/23 0500)  Temperature: (!) 97 °F (36 1 °C) (02/20/23 0330)  Temp Source: Rectal (02/20/23 0330)  Respirations: 14 (02/20/23 0500)  Weight - Scale: 97 8 kg (215 lb 9 6 oz) (02/20/23 0451)  SpO2: 99 % (02/20/23 0500)      Intake/Output Summary (Last 24 hours) at 2/20/2023 0747  Last data filed at 2/20/2023 0736  Gross per 24 hour   Intake 50 ml   Output --   Net 50 ml       Invasive Devices     Central Venous Catheter Line  Duration           Port A Cath Right Subclavian -- days          Peripheral Intravenous Line  Duration           Peripheral IV 02/20/23 Left;Ventral (anterior) Forearm <1 day    Peripheral IV 02/20/23 Right Antecubital <1 day                Physical Exam  Vitals and nursing note reviewed  Constitutional:       General: He is not in acute distress  Appearance: Normal appearance  He is well-developed  HENT:      Head: Normocephalic and atraumatic        Right Ear: External ear normal       Left Ear: External ear normal       Nose: Nose normal       Mouth/Throat:      Pharynx: No oropharyngeal exudate  Eyes:      Conjunctiva/sclera: Conjunctivae normal       Pupils: Pupils are equal, round, and reactive to light  Cardiovascular:      Rate and Rhythm: Normal rate and regular rhythm  Pulmonary:      Effort: Pulmonary effort is normal  No respiratory distress  Abdominal:      General: Abdomen is flat  Bowel sounds are normal  There is no distension  Palpations: Abdomen is soft  There is fluid wave  Tenderness: There is no abdominal tenderness  Musculoskeletal:         General: No deformity  Normal range of motion  Cervical back: Normal range of motion and neck supple  Skin:     General: Skin is warm and dry  Capillary Refill: Capillary refill takes less than 2 seconds  Neurological:      General: No focal deficit present  Mental Status: He is alert  Mental status is at baseline  He is disoriented  Coordination: Coordination normal    Psychiatric:         Mood and Affect: Mood is anxious and depressed  Behavior: Behavior normal          Thought Content: Thought content normal          Judgment: Judgment normal            Medical Decision Makin  Hypertension, leukocytosis, immunocompromise: Sepsis work-up initiated; IV antibiotics; normal saline 30 mL/kg administered  2   Ascites: At the request of the admitting critical care team, plan to do a diagnostic paracentesis to send to the lab for evaluation of spontaneous bacterial peritonitis  3   Chronic renal failure:  Numeral 4  Hyperkalemia secondary to chronic renal failure: Calcium gluconate administered IV  5   Hyponatremia: IV fluids being administered      Recent Results (from the past 36 hour(s))   ECG 12 lead    Collection Time: 23  3:18 AM   Result Value Ref Range    Ventricular Rate 130 BPM    Atrial Rate 576 BPM    MN Interval  ms    QRSD Interval 90 ms    QT Interval 308 ms    QTC Interval 453 ms    P [place] 72 degrees    QRS Axis 73 degrees    T Wave Axis 97 degrees CBC and differential    Collection Time: 02/20/23  3:41 AM   Result Value Ref Range    WBC 31 18 (HH) 4 31 - 10 16 Thousand/uL    RBC 5 55 3 88 - 5 62 Million/uL    Hemoglobin 16 9 12 0 - 17 0 g/dL    Hematocrit 48 2 36 5 - 49 3 %    MCV 87 82 - 98 fL    MCH 30 5 26 8 - 34 3 pg    MCHC 35 1 31 4 - 37 4 g/dL    RDW 14 3 11 6 - 15 1 %    MPV 10 1 8 9 - 12 7 fL    Platelets 177 (L) 383 - 390 Thousands/uL    nRBC 0 /100 WBCs    Neutrophils Relative 82 (H) 43 - 75 %    Immat GRANS % 1 0 - 2 %    Lymphocytes Relative 4 (L) 14 - 44 %    Monocytes Relative 13 (H) 4 - 12 %    Eosinophils Relative 0 0 - 6 %    Basophils Relative 0 0 - 1 %    Neutrophils Absolute 25 46 (H) 1 85 - 7 62 Thousands/µL    Immature Grans Absolute 0 25 (H) 0 00 - 0 20 Thousand/uL    Lymphocytes Absolute 1 21 0 60 - 4 47 Thousands/µL    Monocytes Absolute 4 18 (H) 0 17 - 1 22 Thousand/µL    Eosinophils Absolute 0 01 0 00 - 0 61 Thousand/µL    Basophils Absolute 0 07 0 00 - 0 10 Thousands/µL   Comprehensive metabolic panel    Collection Time: 02/20/23  3:41 AM   Result Value Ref Range    Sodium 118 (L) 135 - 147 mmol/L    Potassium 6 2 (H) 3 5 - 5 3 mmol/L    Chloride 91 (L) 96 - 108 mmol/L    CO2 16 (L) 21 - 32 mmol/L    ANION GAP 11 4 - 13 mmol/L     (H) 5 - 25 mg/dL    Creatinine 2 37 (H) 0 60 - 1 30 mg/dL    Glucose 140 65 - 140 mg/dL    Calcium 6 5 (L) 8 3 - 10 1 mg/dL    Corrected Calcium 8 2 (L) 8 3 - 10 1 mg/dL    AST 89 (H) 5 - 45 U/L    ALT 91 (H) 12 - 78 U/L    Alkaline Phosphatase 198 (H) 46 - 116 U/L    Total Protein 5 1 (L) 6 4 - 8 4 g/dL    Albumin 1 9 (L) 3 5 - 5 0 g/dL    Total Bilirubin 1 18 (H) 0 20 - 1 00 mg/dL    eGFR 30 ml/min/1 73sq m   Lactic acid    Collection Time: 02/20/23  3:41 AM   Result Value Ref Range    LACTIC ACID 3 5 (HH) 0 5 - 2 0 mmol/L   Procalcitonin    Collection Time: 02/20/23  3:41 AM   Result Value Ref Range    Procalcitonin 4 92 (H) <=0 25 ng/ml   Protime-INR    Collection Time: 02/20/23  3:41 AM Result Value Ref Range    Protime 18 3 (H) 11 6 - 14 5 seconds    INR 1 49 (H) 0 84 - 1 19   APTT    Collection Time: 02/20/23  3:41 AM   Result Value Ref Range    PTT 47 (H) 23 - 37 seconds   UA w Reflex to Microscopic w Reflex to Culture    Collection Time: 02/20/23  6:08 AM    Specimen: Urine, Indwelling Harvey Catheter   Result Value Ref Range    Color, UA Yellow     Clarity, UA Turbid     Specific [place], UA 1 015 1 003 - 1 030    pH, UA 5 0 4 5, 5 0, 5 5, 6 0, 6 5, 7 0, 7 5, 8 0    Leukocytes, UA Negative Negative    Nitrite, UA Negative Negative    Protein, UA Trace (A) Negative mg/dl    Glucose, UA Negative Negative mg/dl    Ketones, UA Negative Negative mg/dl    Urobilinogen, UA <2 0 <2 0 mg/dl mg/dl    Bilirubin, UA Negative Negative    Occult Blood, UA Negative Negative   Lactic acid 2 Hours    Collection Time: 02/20/23  6:08 AM   Result Value Ref Range    LACTIC ACID 4 8 (HH) 0 5 - 2 0 mmol/L   Urine Microscopic    Collection Time: 02/20/23  6:08 AM   Result Value Ref Range    RBC, UA None Seen None Seen, 1-2 /hpf    WBC, UA None Seen None Seen, 1-2 /hpf    Epithelial Cells None Seen None Seen, Occasional /hpf    Bacteria, UA None Seen None Seen, Occasional /hpf    MUCUS THREADS Occasional (A) None Seen    Hyaline Casts, UA 25-50 (A) None Seen /lpf   ECG 12 lead    Collection Time: 02/20/23  7:44 AM   Result Value Ref Range    Ventricular Rate 129 BPM    Atrial Rate 66 BPM    [place] Interval 172 ms    QRSD Interval 92 ms    QT Interval 254 ms    QTC Interval 372 ms    P Axis 56 degrees    QRS Axis 40 degrees    T Wave Axis 60 degrees     CT abdomen pelvis wo contrast   Final Result      No acute pathology  Evaluation is limited in the absence of intravenous contrast enhancement, however metastatic disease appears essentially unchanged, and including dominant hepatic lesion, peritoneal carcinomatosis with moderate moderate ascites  Presumed metastatic    right lower lobe nodules also noted  Workstation performed: [id number]         XR chest portable ICU    (Results Pending)         Portions of the record may have been created with voice recognition software  Occasional wrong word or "sound a like" substitutions may have occurred due to the inherent limitations of voice recognition software  Read the chart carefully and recognize, using context, where substitutions have occurred  Critical Care Time  CriticalCare Time  Performed by: Enoc Wing MD  Authorized by: Enoc Wing MD     Critical care provider statement:     Critical care time (minutes):  32    Critical care time was exclusive of:  Separately billable procedures and treating other patients and teaching time    Critical care was necessary to treat or prevent imminent or life-threatening deterioration of the following conditions:  Sepsis and renal failure  Comments:      Hypotension and leukocytosis secondary to sepsis: Septic work-up completed; normal saline 30 mL/kg IV bolus; cefepime IV and vancomycin IV administered; critical care team consulted and will admit the patient to the hospital   Hyperkalemia treated with IV calcium gluconate

## 2023-02-20 NOTE — PROCEDURES
Arterial Line Insertion    Date/Time: 2/20/2023 5:09 PM  Performed by: Elmo Owen DO  Authorized by: Elmo Owen DO     Patient location:  ICU  Consent:     Consent obtained:  Verbal    Consent given by:  Patient and spouse    Risks discussed:  Bleeding, ischemia, repeat procedure, pain and infection  Universal protocol:     Patient identity confirmed:  Verbally with patient  Indications:     Indications: hemodynamic monitoring and multiple ABGs    Pre-procedure details:     Skin preparation:  Chlorhexidine  Anesthesia (see MAR for exact dosages): Anesthesia method:  Local infiltration    Local anesthetic:  Lidocaine 1% w/o epi  Procedure details:     Location / Tip of Catheter:  Radial    Laterality:  Right    Kuldip's test performed: yes      Needle gauge:  18 G    Placement technique:  Seldinger    Number of attempts:  1    Successful placement: yes      Transducer: waveform confirmed    Post-procedure details:     Post-procedure:  Biopatch applied, sterile dressing applied and sutured    Patient tolerance of procedure:   Tolerated well, no immediate complications

## 2023-02-20 NOTE — UTILIZATION REVIEW
NOTIFICATION OF INPATIENT ADMISSION   AUTHORIZATION REQUEST   SERVICING FACILITY:   [facility]  Address: [address] [place] [address], [address] [place] [address]  Tax ID: [id number]  NPI: [id number] ATTENDING PROVIDER:  Attending Name and NPI#: Deisy Watson Md [[id number]]  Address: [address] [place] [address]  Phone: [phone]   ADMISSION INFORMATION:  Place of Service: Inpatient [address] [facility]  [address]  Place of Service Code: 21  Inpatient Admission Date/Time: 2/20/23  6:43 AM  Discharge Date/Time: No discharge date for patient encounter  Admitting Diagnosis Code/Description:  Dehydration [E86 0]  Hypernatremia [E87 0]  Hyponatremia [E87 1]  Fatigue [R53 83]  Abdominal pain [R10 9]  Nausea and vomiting [R11 2]  Septic shock ([address] ) [A41 9, R65 21]  Sepsis ([address] ) [A41 9]     UTILIZATION REVIEW CONTACT:  Jayant Wilson Utilization   Network Utilization Review Department  Phone: [phone]  Fax: [phone]  Email: [email or web address]  Contact for approvals/pending authorizations, clinical reviews, and discharge  PHYSICIAN ADVISORY SERVICES:  Medical Necessity Denial & Rrks-av-Jbnv Review  Phone: [phone]  Fax: [phone]  Email: [email or web address]

## 2023-02-21 PROBLEM — R65.21 SEPTIC SHOCK (HCC): Status: ACTIVE | Noted: 2023-01-01

## 2023-02-21 PROBLEM — A41.9 SEPTIC SHOCK (HCC): Status: ACTIVE | Noted: 2023-01-01

## 2023-02-21 NOTE — QUICK NOTE
Explained all new results and on-going treatment plan to the patient's wife, Frank Mooney, over the phone  All questions and concerns were answered and addressed appropriately        ----------------------------------------------------  Brooke Noel DO, MS, PGY-1  Internal Medicine Residency   [address] [place] [address]

## 2023-02-21 NOTE — UTILIZATION REVIEW
Initial Clinical Review    Admission: Date/Time/Statement:   Admission Orders (From admission, onward)     Ordered        02/20/23 0816  INPATIENT ADMISSION  Once                      Orders Placed This Encounter   Procedures   • INPATIENT ADMISSION     Standing Status:   Standing     Number of Occurrences:   1     Order Specific Question:   Level of Care     Answer:   Critical Care [15]     Order Specific Question:   Estimated length of stay     Answer:   More than 2 Midnights     Order Specific Question:   Certification     Answer:   I certify that inpatient services are medically necessary for this patient for a duration of greater than two midnights  See H&P and MD Progress Notes for additional information about the patient's course of treatment  ED Arrival Information     Expected   -    Arrival   2/20/2023 03:04    Acuity   Urgent            Means of arrival   Ambulance    Escorted by   [facility]/ICU    Admission type   Emergency            Arrival complaint   Weakness           Chief Complaint   Patient presents with   • Abdominal Pain     Pt arrives via EMS from home c/o dry mouth, acid reflux, +N  Pt has extensive cancer hx  Initial Presentation: 47 y o  male with PMH of oral ca s/p chemo and partial glossectomy, 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, liver, lungs and on oral chemo presented to the ED from home via EMS with abdominal pain, fatigue, nausea/vomiting, odynophagia and chills for several days  Reports poor po intake, no BM and poor UOP x several days  Reports frequent paracentesis d/t ascites  In the ED, T 97, BP 80/50  W/u showed leukocytosis, K 6 2, elevated creatinine  ECG revealing NSR with peaked T waves, non-ischemic  Initial Hs Trops 17, repeat 2h/4h pending    CT C/A/B without acute pathology and unchanged metastatic disease process including dominant hepatic lesion, peritoneal carcinomatosis with moderate moderate ascites; right lower lobe nodules also noted  on exam, aaox3, abd soft, fld wave, hepatomegaly, abd distension, tachycardia present  Underwent paracentesis for dx purpose, neg for SBP  Given 3L total IVF bolus, IV Cefepime, IV D50, IV Insulin, IV Calcium Gluc, Albuterol neb, IV Na Bicarb, IV Vanco, IV Flagyl  Admit as Inpatient for septic shock, hypovolemic shock, portal venous thrombosis, Type 1 HRS, hyperkalemia, thrombocytopenia  Plan: IVF x 4L given, add albumin  Initiate heparin gtt  Cefepime, flagyl, vancomycin  Monitor cultures  TTE, r/o endocarditis  Midodrine, albumin, octreotide gtt  Consult Nephrology  Transfuse for plt goal > 20k, Transfuse for hgb< 7     02/20 Nephrology Consult: DENZEL, hyperkalemia, acidosis, severe hyponatremia: Baseline cr 0 6-0 8, admit cr 2 3, improved to 2 today  Na improving  Repeat BMP in 3 to 4 hours, if electrolyte/acid-base status worsening without significant urine output, will have low threshold to start CRRT  Start IV albumin 25 g every 6 hourly  Check urine electrolytes  Consider changing IVF to hypotonic IVF w/ IV D5W and 75 mg sodium bicarb at 100 mL/h  Continue to trend lactic acid, ABG  Goal sodium correction no more than 8 meq in 24 hours  Check serum osmolality, urine osmolality, urine sodium  Date: 02/21   Day 2: Pt was started on Levo to maintain MAP>65 despite IVF  Overnight, levo gtt was switched to run through D5W instead of NS, and was initated on vaso   04  Was further given 5U lispro after  and subsequently 10U regular insulin due to persistently elevated BS at 254  Denies SOB/CP this am    Bld cxs pending, OFELIA pending  Cont IV Albumin  Cont vasopressor support, wean as dimitris  Cont hep gtt  consider therapeutic paracentesis porn  Continue IVF with 75mg sodium bicarb infusion and D5W at 100 cc/h  Cont to trend LA  BMP q4h  Cont SSI, BG goal 140-180  Cont IV abx  Trend CBC, transfuse if plts>20K, Hgb<7       ED Triage Vitals   Temperature Pulse Respirations Blood Pressure SpO2   02/20/23 0330 02/20/23 0309 02/20/23 0309 02/20/23 0309 02/20/23 0309   (!) 97 °F (36 1 °C) 71 18 (!) 80/50 98 %      Temp Source Heart Rate Source Patient Position - Orthostatic VS BP Location FiO2 (%)   02/20/23 0330 02/20/23 0309 02/20/23 0309 02/20/23 0309 --   Rectal Monitor Lying Right arm       Pain Score       02/20/23 0309       6          Wt Readings from Last 1 Encounters:   02/21/23 104 kg (229 lb)     Additional Vital Signs:   Date/Time Temp Pulse Resp BP MAP (mmHg) Arterial Line BP MAP SpO2 O2 Device Patient Position - Orthostatic VS   02/21/23 0705 -- 70 -- 109/60 -- -- -- -- -- --   02/21/23 0600 97 9 °F (36 6 °C) 70 23 Abnormal  109/60 79 124/44 64 mmHg Abnormal  99 % -- --   02/21/23 0500 97 9 °F (36 6 °C) 80 14 91/50 59 Abnormal  96/32 48 mmHg Abnormal  100 % -- --   02/21/23 0400 97 9 °F (36 6 °C) 72 23 Abnormal  101/58 71 122/40 60 mmHg Abnormal  100 % -- --   02/21/23 0330 97 9 °F (36 6 °C) 76 21 -- -- 108/36 54 mmHg Abnormal  100 % -- --   02/21/23 0300 98 2 °F (36 8 °C) 78 18 -- -- 118/40 58 mmHg Abnormal  100 % -- --   02/21/23 0200 98 2 °F (36 8 °C) 76 18 103/55 71 112/42 60 mmHg Abnormal  96 % -- --   02/21/23 0130 97 9 °F (36 6 °C) 76 17 -- -- 114/46 64 mmHg Abnormal  99 % -- --   02/21/23 0100 97 5 °F (36 4 °C) 76 18 111/58 81 126/52 74 mmHg 100 % -- --   02/21/23 0030 97 5 °F (36 4 °C) 74 22 -- -- 120/50 70 mmHg 100 % -- --   02/21/23 0015 97 5 °F (36 4 °C) 80 21 97/53 68 118/48 70 mmHg 99 % -- --   02/21/23 0000 97 5 °F (36 4 °C) 80 21 -- -- 100/42 58 mmHg Abnormal  100 % -- --   02/20/23 2330 97 5 °F (36 4 °C) 82 18 -- -- 92/38 52 mmHg Abnormal  100 % -- --   02/20/23 2300 97 5 °F (36 4 °C) 82 17 -- -- 98/42 58 mmHg Abnormal  100 % -- --   02/20/23 2230 96 1 °F (35 6 °C) Abnormal  84 31 Abnormal  84/51 Abnormal  60 Abnormal  100/40 58 mmHg Abnormal  99 % -- --   02/20/23 2200 95 7 °F (35 4 °C) Abnormal  86 12 96/48 Abnormal  65 94/42 56 mmHg Abnormal  99 % -- -- 02/20/23 2115 95 7 °F (35 4 °C) Abnormal  84 21 -- -- 96/40 56 mmHg Abnormal  99 % -- --   02/20/23 2030 97 5 °F (36 4 °C) 88 18 99/54 60 Abnormal  100/42 58 mmHg Abnormal  98 % -- --   02/20/23 2000 97 5 °F (36 4 °C) 90 32 Abnormal  97/53 71 98/40 56 mmHg Abnormal  100 % -- --   02/20/23 1945 97 5 °F (36 4 °C) -- -- -- -- -- -- -- -- --   02/20/23 1930 97 2 °F (36 2 °C) Abnormal  84 14 92/50 66 102/40 56 mmHg Abnormal  100 % -- --   02/20/23 1900 97 2 °F (36 2 °C) Abnormal  88 28 Abnormal  87/46 Abnormal  62 Abnormal  92/38 52 mmHg Abnormal  99 % -- --   02/20/23 1830 97 2 °F (36 2 °C) Abnormal  90 25 Abnormal  92/49 Abnormal  68 112/42 58 mmHg Abnormal  99 % -- --   02/20/23 1800 -- 92 17 97/52 67 94/36 52 mmHg Abnormal  98 % -- --   02/20/23 1730 -- 88 19 96/52 63 Abnormal  102/44 58 mmHg Abnormal  99 % -- --   02/20/23 1700 -- 84 15 80/41 Abnormal  54 Abnormal  80/26 40 mmHg Abnormal  100 % -- --   02/20/23 1630 -- 82 15 86/50 Abnormal  62 Abnormal  -- -- -- -- --   02/20/23 1600 -- 86 16 60/37 Abnormal  43 Abnormal  -- -- 98 % -- --   02/20/23 1300 -- 84 15 89/49 Abnormal  65 -- -- 98 % -- --   02/20/23 1200 -- 92 18 80/53 Abnormal  73 -- -- 99 % -- --   02/20/23 1044 -- 78 18 83/53 Abnormal  64 Abnormal  -- -- 100 % -- --   02/20/23 1000 -- 74 14 90/53 63 Abnormal  -- -- 98 % -- --   02/20/23 0500 -- 64 14 93/57 68 -- -- 99 % None (Room air) Lying   02/20/23 0430 -- 78 18 93/47 Abnormal  68 -- -- 99 % None (Room air) Lying   02/20/23 0331 -- 142 Abnormal  -- -- -- -- -- -- -- --   02/20/23 0330 97 °F (36 1 °C) Abnormal  136 Abnormal  15 85/51 Abnormal  64 Abnormal  -- -- 98 % None (Room air) Lying       Pertinent Labs/Diagnostic Test Results:   XR chest portable ICU   Final Result by Zaira Anaya MD (02/20 1416)      No acute cardiopulmonary disease        Findings are stable               Workstation performed: [id number]         CT abdomen pelvis wo contrast   Final Result by Briana Lemon MD (02/20 0615)      No acute pathology  Evaluation is limited in the absence of intravenous contrast enhancement, however metastatic disease appears essentially unchanged, and including dominant hepatic lesion, peritoneal carcinomatosis with moderate moderate ascites  Presumed metastatic    right lower lobe nodules also noted  Workstation performed: [id number]               Results from last 7 days   Lab Units 02/21/23  0432 02/20/23  1548 02/20/23  0728 02/20/23  0341   WBC Thousand/uL 21 30*  --   --  31 18*   HEMOGLOBIN g/dL 13 3 14 6  --  16 9   I STAT HEMOGLOBIN g/dl  --   --  16 7  --    HEMATOCRIT % 38 8 41 5  --  48 2   HEMATOCRIT, ISTAT %  --   --  49  --    PLATELETS Thousands/uL 107*  --   --  136*   NEUTROS ABS Thousands/µL 17 40*  --   --  25 46*         Results from last 7 days   Lab Units 02/21/23  0906 02/21/23  0432 02/21/23  0022 02/20/23  2013 02/20/23  1548 02/20/23  1132 02/20/23  0916 02/20/23  0827 02/20/23  0728   SODIUM mmol/L 122* 124* 123* 124* 125*   < >  --  122*  --    POTASSIUM mmol/L 4 0 4 2 4 7 5 0 5 1   < >  --  6 3*  --    CHLORIDE mmol/L 93* 95* 97 98 99   < >  --  92*  --    CO2 mmol/L 17* 17* 17* 14* 15*   < >  --  22  --    CO2, I-STAT mmol/L  --   --   --   --   --   --   --   --  19*   ANION GAP mmol/L 12 12 9 12 11   < >  --  8  --    BUN mg/dL 59* 66* 75* 85* 86*   < >  --  105*  --    CREATININE mg/dL 1 15 1 31* 1 51* 1 71* 1 76*   < >  --  2 60*  --    EGFR ml/min/1 73sq m 73 62 52 45 43   < >  --  27  --    CALCIUM mg/dL 6 6* 6 2* 6 4* 6 6* 6 7*   < >  --  7 1*  --    CALCIUM, IONIZED mmol/L  --  0 88*  --   --   --   --  0 97*  --   --    CALCIUM, IONIZED, ISTAT mmol/L  --   --   --   --   --   --   --   --  2 00*   MAGNESIUM mg/dL  --  3 6*  --   --   --   --   --  4 5*  --    PHOSPHORUS mg/dL  --  3 4  --   --   --   --   --  4 9*  --     < > = values in this interval not displayed       Results from last 7 days   Lab Units 02/21/23  0435 02/20/23  0916 02/20/23  0341   AST U/L 73*  --  89*   ALT U/L 71  --  91*   ALK PHOS U/L 137*  --  198*   TOTAL PROTEIN g/dL 4 7*  --  5 1*   ALBUMIN g/dL 2 6*  --  1 9*   TOTAL BILIRUBIN mg/dL 1 96*  --  1 18*   BILIRUBIN DIRECT mg/dL 0 92*  --   --    AMMONIA umol/L  --  63*  --      Results from last 7 days   Lab Units 02/21/23  0753 02/20/23  0741   POC GLUCOSE mg/dl 208* 131     Results from last 7 days   Lab Units 02/21/23  0906 02/21/23  0432 02/21/23  0022 02/20/23  2013 02/20/23  1548 02/20/23  1132 02/20/23  0827 02/20/23  0341   GLUCOSE RANDOM mg/dL 202* 254* 225* 190* 97 100 125 140     Results from last 7 days   Lab Units 02/20/23  1829   OSMOLALITY, SERUM mmol/         No results found for: BETA-HYDROXYBUTYRATE   Results from last 7 days   Lab Units 02/21/23  0730   PH ART  7 518*   PCO2 ART mm Hg 23 0*   PO2 ART mm Hg 92 5   HCO3 ART mmol/L 18 3*   BASE EXC ART mmol/L -2 6   O2 CONTENT ART mL/dL 19 0   O2 HGB, ARTERIAL % 96 4   ABG SOURCE  Line, Arterial     Results from last 7 days   Lab Units 02/21/23  0929 02/20/23  1548 02/20/23  0916   PH SOLO  7 513* 7 464* 7 324   PCO2 SOLO mm Hg 25 1* 24 9* 29 0*   PO2 SOLO mm Hg 40 3 45 7* 45 4*   HCO3 SOLO mmol/L 19 7* 17 5* 14 7*   BASE EXC SOLO mmol/L -1 6 -4 3 -9 9   O2 CONTENT SOLO ml/dL 15 3 17 6 13 3   O2 HGB, VENOUS % 78 4 81 4* 74 2     Results from last 7 days   Lab Units 02/20/23  0728   PH, SOLO I-STAT  7 359   PCO2, SOLO ISTAT mm HG 31 5*   PO2, SOLO ISTAT mm HG 40 0   HCO3, SOLO ISTAT mmol/L 17 7*   I STAT BASE EXC mmol/L -6*   I STAT O2 SAT % 73         Results from last 7 days   Lab Units 02/20/23  1305 02/20/23  1132 02/20/23  0916   HS TNI 0HR ng/L  --   --  17   HS TNI 2HR ng/L  --  17  --    HSTNI D2 ng/L  --  0  --    HS TNI 4HR ng/L 16  --   --    HSTNI D4 ng/L -1  --   --          Results from last 7 days   Lab Units 02/21/23  0729 02/20/23  2234 02/20/23  1548 02/20/23  0341   PROTIME seconds  --   --  19 8* 18 3*   INR   --   --  1 66* 1 49*   PTT seconds >210* >210* 38* 47*     Results from last 7 days   Lab Units 02/20/23  0827   TSH 3RD GENERATON uIU/mL 3 890     Results from last 7 days   Lab Units 02/20/23  0827 02/20/23  0341   PROCALCITONIN ng/ml 4 53* 4 92*     Results from last 7 days   Lab Units 02/21/23  0729 02/21/23  0432 02/21/23  0022 02/20/23 2013 02/20/23  1548 02/20/23  1305 02/20/23  1132   LACTIC ACID mmol/L 4 0* 3 8* 3 1* 4 6* 3 5* 4 1* 3 3*             Results from last 7 days   Lab Units 02/20/23  0827   NT-PRO BNP pg/mL 340*           Results from last 7 days   Lab Units 02/20/23  1829   OSMOLALITY, SERUM mmol/   OSMO UR mmol/     Results from last 7 days   Lab Units 02/20/23  1829 02/20/23  6783   CLARITY UA   --  Turbid   COLOR UA   --  Yellow   SPEC GRAV UA   --  1 015   PH UA   --  5 0   GLUCOSE UA mg/dl  --  Negative   KETONES UA mg/dl  --  Negative   BLOOD UA   --  Negative   PROTEIN UA mg/dl  --  Trace*   NITRITE UA   --  Negative   BILIRUBIN UA   --  Negative   UROBILINOGEN UA (BE) mg/dl  --  <2 0   LEUKOCYTES UA   --  Negative   WBC UA /hpf  --  None Seen   RBC UA /hpf  --  None Seen   BACTERIA UA /hpf  --  None Seen   EPITHELIAL CELLS WET PREP /hpf  --  None Seen   MUCUS THREADS   --  Occasional*   SODIUM UR  <5  --    CREATININE UR mg/dL 68 8  --            Results from last 7 days   Lab Units 02/20/23  0916 02/20/23  0634 02/20/23  0341   BLOOD CULTURE   --  Received in Microbiology Lab  Culture in Progress  No Growth at 24 hrs     GRAM STAIN RESULT  No Polys or Bacteria seen  --   --      Results from last 7 days   Lab Units 02/20/23  0916   TOTAL COUNTED  14   WBC FLUID /ul 155           ED Treatment:   Medication Administration from 02/20/2023 0304 to 02/20/2023 7542       Date/Time Order Dose Route Action     02/20/2023 0810 EST cefepime (MAXIPIME) 2,000 mg in dextrose 5 % 50 mL IVPB 0 mg Intravenous Stopped     02/20/2023 0735 EST cefepime (MAXIPIME) 2,000 mg in dextrose 5 % 50 mL IVPB 2,000 mg Intravenous New Bag     02/20/2023 0515 EST sodium chloride 0 9 % bolus 1,000 mL 0 mL Intravenous Stopped     02/20/2023 0415 EST sodium chloride 0 9 % bolus 1,000 mL 1,000 mL Intravenous New Bag     02/20/2023 0736 EST calcium gluconate 1 g in sodium chloride 0 9% 50 mL (premix) 0 g Intravenous Stopped     02/20/2023 6633 EST calcium gluconate 1 g in sodium chloride 0 9% 50 mL (premix) 1 g Intravenous New Bag     02/20/2023 0859 EST calcium gluconate 2 g in sodium chloride 0 9% 100 mL (premix) 0 g Intravenous Stopped     02/20/2023 0742 EST calcium gluconate 2 g in sodium chloride 0 9% 100 mL (premix) 2 g Intravenous New Bag     02/20/2023 0752 EST insulin regular (HumuLIN R,NovoLIN R) injection 10 Units 10 Units Intravenous Given     02/20/2023 0751 EST dextrose 50 % IV solution 25 mL 25 mL Intravenous Given     02/20/2023 0802 EST albuterol inhalation solution 10 mg 10 mg Nebulization Given     02/20/2023 0804 EST sodium bicarbonate 8 4 % injection 50 mEq 50 mEq Intravenous Given     02/20/2023 0819 EST multi-electrolyte (ISOLYTE-S PH 7 4) bolus 1,000 mL 0 mL Intravenous Stopped     02/20/2023 0739 EST multi-electrolyte (ISOLYTE-S PH 7 4) bolus 1,000 mL 1,000 mL Intravenous New Bag     02/20/2023 0913 EST vancomycin (VANCOCIN) 2,000 mg in sodium chloride 0 9 % 500 mL IVPB 2,000 mg Intravenous New Bag     02/20/2023 0930 EST metroNIDAZOLE (FLAGYL) IVPB (premix) 500 mg 100 mL -- Intravenous Rate/Dose Change     02/20/2023 0813 EST metroNIDAZOLE (FLAGYL) IVPB (premix) 500 mg 100 mL 500 mg Intravenous New Bag     02/20/2023 0819 EST sodium chloride 0 9 % bolus 1,000 mL 1,000 mL Intravenous New Bag        Past Medical History:   Diagnosis Date   • 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 ([facility] [address] )    • Rectal bleeding 1/9/2023   • S/P exploratory laparotomy 12/16/2020     Present on Admission:  • Septic shock ([address] )      Admitting Diagnosis: Dehydration [E86 0]  Hypernatremia [E87 0]  Hyponatremia [E87 1]  Fatigue [R53 83]  Abdominal pain [R10 9]  Nausea and vomiting [R11 2]  Septic shock (HCC) [A41 9, R65 21]  Sepsis ([address] ) [A41 9]  Age/Sex: 47 y o  male  Admission Orders:  SCD  Cardio-Pulmonary Monitoring, Neuro Checks, Nursing dysphagia assesment, I/O, Daily weights, Vital signs    Scheduled Medications: Albumin 25%, 25 g, Intravenous, Q6H  cefepime, 2,000 mg, Intravenous, Q12H  chlorhexidine, 15 mL, Mouth/Throat, Q12H JEMMA  ergocalciferol, 50,000 Units, Oral, Weekly  melatonin, 6 mg, Oral, HS  metroNIDAZOLE, 500 mg, Intravenous, Q8H  pantoprazole, 40 mg, Oral, Early Morning  vancomycin, 12 5 mg/kg (Adjusted), Intravenous, Q12H      Continuous IV Infusions:  heparin (porcine), 3-30 Units/kg/hr (Order-Specific), Intravenous, Titrated  insulin regular (HumuLIN R,NovoLIN R) infusion, 0 3-21 Units/hr, Intravenous, Titrated  norepinephrine (LEVOPHED) 4 mg in dextrose 5 % 250 mL infusion, Intravenous, titrated  sodium bicarbonate infusion, 100 mL/hr, Intravenous, Continuous  vasopressin, 0 04 Units/min, Intravenous, Continuous      PRN Meds:  heparin (porcine), 3,800 Units, Intravenous, Q6H PRN  heparin (porcine), 7,600 Units, Intravenous, Q6H PRN  ondansetron, 4 mg, Intravenous, Q8H PRN  oxyCODONE, 5 mg, Oral, Q4H PRN  saliva substitute, 5 spray, Mouth/Throat, 4x Daily PRN        IP CONSULT TO CASE MANAGEMENT  IP CONSULT TO PHARMACY  IP CONSULT TO PALLIATIVE CARE  IP CONSULT TO NEPHROLOGY    Network Utilization Review Department  ATTENTION: Please call with any questions or concerns to [phone] and carefully listen to the prompts so that you are directed to the right person  All voicemails are confidential   Denia Rice all requests for admission clinical reviews, approved or denied determinations and any other requests to dedicated fax number below belonging to the campus where the patient is receiving treatment   List of dedicated fax numbers for the Facilities:  FACILITY NAME UR FAX NUMBER   ADMISSION DENIALS (Administrative/Medical Necessity) [phone]   [address] (Maternity/NICU/Pediatrics) [place] [address] Christine Mcnamara  [phone]   [facility] [address] [place] [address] [facility] [address] [facility] [place] [address] [facility] [phone]

## 2023-02-21 NOTE — CONSULTS
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)   • Thrombocytopenia (HCC)   • Hypomagnesemia   • BMI 35 0-35 9,adult   • Vitamin D deficiency   • Malignant ascites   • Septic shock (HCC)     Active issues specifically addressed today include:   Cholangiocarcinoma  Nausea and vomiting  Ascites  Acute pain  GERD    Plan:  1  Symptom management -    - Patient denies any pain or nausea at this time   - Can continue care per primary team   - Home regimen of oxy 5mg PO Q6H PRN, flexeril 5mg PO Q8H PRN, zofran 2mg PO Q8H PRN, Protonix 40mg PO daily, compazine from oncology    2  Goals -    - Patient wishes to continue full cares at this time   - Actively on chemotherapy  Wants to discuss options with oncology since he is having side effects   - Advanced directive names wife, Tyree Ricardo, as his health care representative  3  Social Support   - Patient has wife Tyree Ricardo and 1 biological son  States he has two other nonbiological children and is very involved in their life  - Mother and father are still alive   - Brother and grandmother     Code Status: Full Code - Level 1   Decisional apparatus:  Patient is competent on my exam today  If competence is lost, patient's substitute decision maker would default to wife, Tyree Ricardo, by [place] Act 169     Advance Directive / Living Will / POLST:  AD on file     I have reviewed the patient's controlled substance dispensing history in the Prescription Drug Monitoring Program in compliance with the [facility] regulations before prescribing any controlled substances  We appreciate the invitation to be involved in this patient's care  We will continue to follow  Please do not hesitate to reach our on call provider through our clinic answering service at  should you have acute symptom control concerns  Maryjo King Service: [phone]  You can find me on [employer]! IDENTIFICATION:  Inpatient consult to Palliative Care  Consult performed by: Cailin Fiore  Consult ordered by: Luis Redd MD        Physician Requesting Consult: Klaus Sandhu,*  Reason for Consult / Principal Problem: cholangiocarcinoma/goals of care discussion  Hx and PE limited by: N/A    HISTORY OF PRESENT ILLNESS:       Angelina Willis is a 47 y o  male who presents with abdominal pain, severe fatigue, nausea, vomiting,and odynophagia  He also reports having poor PO intake and lack of bowel movements for the past several days  He has a history of oral cancer s/p chemoradiation and partial glossectomy and cholangiocarcinoma originally diagnosed in 2020 and recurrence in April 2022 with mets to bone, liver, and lungs currently on oral chemotherapy  He receives regular paracenteses for symptom management  Due to this history, he has been seen by palliative care as an outpatient  On arrival, he was found to have a BP of 80/50, encephalopathy, DENZEL, and electrolyte abnormalities  He was started on aggressive IV fluid resuscitation for hypovolemic/septic shock  Currently on levo 8 and vasopressin for BP support  On exam today, he was sitting in bed and alert/oriented  He was fully aware of his medical situation and able to discuss what was being done for his treatment  He complained of mild discomfort from being stuck in his bed and a little bit of abdominal gas pain  Otherwise, he has no complaints      Review of Systems   All other systems reviewed and are negative  Past Medical History:   Diagnosis Date   • 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 [facility])    • Rectal bleeding 1/9/2023   • S/P exploratory laparotomy 12/16/2020     Past Surgical History:   Procedure Laterality Date   • CT NEEDLE BIOPSY LIVER  10/15/2020   • IR BIOPSY LIVER MASS  5/18/2022   • IR PARACENTESIS  1/10/2023   • IR PARACENTESIS  1/24/2023   • IR PARACENTESIS  2/2/2023   • IR PARACENTESIS  1/30/2023   • IR PARACENTESIS  2/6/2023   • IR PARACENTESIS  2/9/2023   • IR PARACENTESIS  2/13/2023   • IR PARACENTESIS  2/16/2023     Social History     Socioeconomic History   • Marital status: /Civil Union     Spouse name: Not on file   • Number of children: Not on file   • Years of education: Not on file   • Highest education level: Not on file   Occupational History   • Not on file   Tobacco Use   • Smoking status: Former     Packs/day: 2 00     Years: 20 00     Pack years: 40 00     Types: Cigarettes   • Smokeless tobacco: Current   Substance and Sexual Activity   • Alcohol use: Not Currently     Comment: 1-2 drinks per day   • Drug use: Not on file   • Sexual activity: Not on file   Other Topics Concern   • Not on file   Social History Narrative   • Not on file     Social Determinants of Health     Financial Resource Strain: Low Risk    • Difficulty of Paying Living Expenses: Not hard at all   Food Insecurity: No Food Insecurity   • Worried About Running Out of Food in the Last Year: Never true   • Ran Out of Food in the Last Year: Never true   Transportation Needs: No Transportation Needs   • Lack of Transportation (Medical): No   • Lack of Transportation (Non-Medical):  No   Physical Activity: Insufficiently Active   • Days of Exercise per Week: 2 days   • Minutes of Exercise per Session: 30 min   Stress: No Stress Concern Present   • Feeling of Stress : Not at all   Social Connections: Socially Isolated   • Frequency of Communication with Friends and Family: Once a week   • Frequency of Social Gatherings with Friends and Family: Once a week   • Attends Yarsani Services: Never   • Active Member of Clubs or Organizations: No   • Attends Club or Organization Meetings: Never   • Marital Status:    Intimate Partner Violence: Not At Risk   • Fear of Current or Ex-Partner: No   • Emotionally Abused: No   • Physically Abused: No   • Sexually Abused: No   Housing Stability: Unknown   • Unable to Pay for Housing in the Last Year: No   • Number of Places Lived in the Last Year: Not on file   • Unstable Housing in the Last Year: No     Family History   Problem Relation Age of Onset   • Prostate cancer Father        MEDICATIONS / ALLERGIES:    all current active meds have been reviewed and current meds:   Current Facility-Administered Medications   Medication Dose Route Frequency   • al mag oxide-diphenhydramine-lidocaine viscous (MAGIC MOUTHWASH) suspension 10 mL  10 mL Swish & Swallow Q4H PRN   • albumin human (FLEXBUMIN) 25 % injection 25 g  25 g Intravenous Q6H   • cefepime (MAXIPIME) 2,000 mg in dextrose 5 % 50 mL IVPB  2,000 mg Intravenous Q12H   • chlorhexidine (PERIDEX) 0 12 % oral rinse 15 mL  15 mL Mouth/Throat Q12H [address]   • ergocalciferol (VITAMIN D2) capsule 50,000 Units  50,000 Units Oral Weekly   • heparin (porcine) 25,000 units in 0 45% NaCl 250 mL infusion (premix)  3-30 Units/kg/hr (Order-Specific) Intravenous Titrated   • heparin (porcine) injection 3,800 Units  3,800 Units Intravenous Q6H PRN   • heparin (porcine) injection 7,600 Units  7,600 Units Intravenous Q6H PRN   • insulin regular (HumuLIN R,NovoLIN R) 1 Units/mL in sodium chloride 0 9 % 100 mL infusion  0 3-21 Units/hr Intravenous Titrated   • melatonin tablet 6 mg  6 mg Oral HS   • metroNIDAZOLE (FLAGYL) IVPB (premix) 500 mg 100 mL  500 mg Intravenous Q8H   • norepinephrine (LEVOPHED) 4 mg in dextrose 5 % 250 mL infusion   Intravenous Titrated   • ondansetron (ZOFRAN) injection 4 mg  4 mg Intravenous Q8H PRN   • oxyCODONE (ROXICODONE) IR tablet 5 mg  5 mg Oral Q4H PRN   • pantoprazole (PROTONIX) EC tablet 40 mg  40 mg Oral Early Morning   • saliva substitute (MOUTH KOTE) mucosal solution 5 spray  5 spray Mouth/Throat 4x Daily PRN   • sucralfate (CARAFATE) tablet 1 g  1 g Oral 4x Daily (AC & HS)   • vancomycin (VANCOCIN) IVPB (premix in dextrose) 1,000 mg 200 mL  12 5 mg/kg (Adjusted) Intravenous Q12H   • vasopressin (PITRESSIN) 20 Units in sodium chloride 0 9 % 100 mL infusion  0 04 Units/min Intravenous Continuous       Allergies   Allergen Reactions   • Penicillins Other (See Comments) and Rash     As a child had reaction not sure what it was  OBJECTIVE:    Physical Exam  Physical Exam  Constitutional:       Appearance: Normal appearance  HENT:      Head: Normocephalic and atraumatic  Nose: Nose normal       Mouth/Throat:      Mouth: Mucous membranes are moist       Pharynx: Oropharynx is clear  Eyes:      Extraocular Movements: Extraocular movements intact  Pulmonary:      Effort: Pulmonary effort is normal  No respiratory distress  Abdominal:      General: Abdomen is flat  Musculoskeletal:      Cervical back: Normal range of motion  Skin:     General: Skin is warm  Neurological:      General: No focal deficit present  Mental Status: He is alert and oriented to person, place, and time  Psychiatric:         Mood and Affect: Mood normal          Behavior: Behavior normal          Thought Content: Thought content normal          Lab Results:   I have personally reviewed pertinent labs  , CBC:   Lab Results   Component Value Date    WBC 21 30 (H) 02/21/2023    HGB 13 3 02/21/2023    HCT 38 8 02/21/2023    MCV 88 02/21/2023     (L) 02/21/2023    MCH 30 1 02/21/2023    MCHC 34 3 02/21/2023    RDW 14 4 02/21/2023    MPV 9 9 02/21/2023    NRBC 0 02/21/2023   , CMP:   Lab Results   Component Value Date    SODIUM 122 (L) 02/21/2023    K 4 0 02/21/2023    CL 93 (L) 02/21/2023    CO2 17 (L) 02/21/2023    BUN 59 (H) 02/21/2023    CREATININE 1 15 02/21/2023    CALCIUM 6 6 (L) 02/21/2023    AST 73 (H) 02/21/2023    ALT 71 02/21/2023    ALKPHOS 137 (H) 02/21/2023    EGFR 73 02/21/2023   , BMP:  Lab Results   Component Value Date    SODIUM 122 (L) 02/21/2023    K 4 0 02/21/2023    CL 93 (L) 02/21/2023    CO2 17 (L) 02/21/2023    BUN 59 (H) 02/21/2023    CREATININE 1 15 02/21/2023    GLUC 202 (H) 02/21/2023    CALCIUM 6 6 (L) 02/21/2023    AGAP 12 02/21/2023    EGFR 73 02/21/2023     Imaging Studies:   2/20/23 X-Ray: No acute cardiopulmonary disease  Findings are stable  2/20/23 CT abdomen w/o contrast: No acute pathology  Evaluation is limited in the absence of intravenous contrast enhancement, however metastatic disease appears essentially unchanged, and including dominant hepatic lesion, peritoneal carcinomatosis with moderate moderate ascites  Presumed metastatic  right lower lobe nodules also noted  EKG, Pathology, and Other Studies: Reviewed    Counseling / Coordination of Care    Total floor / unit time spent today 45 minutes  Greater than 50% of total time was spent with the patient and / or family counseling and / or coordination of care  A description of the counseling / coordination of care: chart review, discussion of symptoms, discussion of goals of care, competency evaluation

## 2023-02-21 NOTE — CASE MANAGEMENT
Case Management Assessment & Discharge Planning Note    [id number]  : 1970 Date 2023       Current Admission Date: 2023  Current Admission Diagnosis:Septic shock [facility])   Patient Active Problem List    Diagnosis Date Noted   • Septic shock ([facility] ) 2023   • Malignant ascites 2023   • Pulmonary nodules 2023   • Malignant neoplasm metastatic to both lungs ([facility] ) 2023   • 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 ([facility] ) 2023   • Thrombocytopenia ([facility] ) 2023   • Hypomagnesemia 2023   • BMI 35 0-35 9,adult 2023   • Vitamin D deficiency 2023   • GERD (gastroesophageal reflux disease) 2023   • Nausea and vomiting 2023   • Ascites 2023   • Lytic bone lesion of hip 2023   • Hyponatremia 2023   • Cholangiocarcinoma, stage IV ([facility] ) 2020   • Tobacco use disorder 2019   • Spinal accessory neuropathy 2018   • History of tongue cancer 2018   • Squamous cell carcinoma of tongue ([facility] ) 2018      LOS (days): 1  Geometric Mean LOS (GMLOS) (days):   Days to GMLOS:     OBJECTIVE:    Risk of Unplanned Readmission Score: 32 56         Current admission status: Inpatient       Preferred Pharmacy:   [address] [place], [place] [id number] [address]  [address] [facility] [address]  Phone: [phone] Fax: [phone]    Primary Care Provider: Modesto Tubbs MD    Primary Insurance: BLUE CROSS  Secondary Insurance:     ASSESSMENT:  Active Health Care Proxies    There are no active Health Care Proxies on file                   Readmission Root Cause  30 Day Readmission: No    Patient Information  Admitted from[de-identified] Home  Mental Status: Alert  During Assessment patient was accompanied by: Not accompanied during assessment  Assessment information provided by[de-identified] Spouse  Primary Caregiver: Self  Support Systems: Self, Spouse/significant other  [place] of Residence: [address] do you live in[facility] entry access options   Select all that apply : Stairs  Number of steps to enter home : One Flight  Type of Current Residence: Apartment  Floor Level: 2  Upon entering residence, is there a bedroom on the main floor (no further steps)?: Yes  Upon entering residence, is there a bathroom on the main floor (no further steps)?: Yes  In the last 12 months, was there a time when you were not able to pay the mortgage or rent on time?: No  In the last 12 months, was there a time when you did not have a steady place to sleep or slept in a shelter (including now)?: No  Homeless/housing insecurity resource given?: N/A  Living Arrangements: Lives w/ Spouse/significant other  Is patient a ?: No    Activities of Daily Living Prior to Admission  Functional Status: Independent  Completes ADLs independently?: Yes  Ambulates independently?: Yes  Does patient use assisted devices?: No  Does patient currently own DME?: No  Does patient have a history of Outpatient Therapy (PT/OT)?: No  Does the patient have a history of Short-Term Rehab?: No  Does patient have a history of HHC?: No  Does patient currently have [facility]?: No         Patient Information Continued  Income Source: Employed  Does patient have prescription coverage?: Yes  Within the past 12 months, you worried that your food would run out before you got the money to buy more : Never true  Within the past 12 months, the food you bought just didn't last and you didn't have money to get more : Never true  Food insecurity resource given?: N/A  Does patient receive dialysis treatments?: No  Does patient have a history of substance abuse?: No  Does patient have a history of Mental Health Diagnosis?: No         Means of Transportation  Means of Transport to Appts[de-identified] Drives Self  In the past 12 months, has lack of transportation kept you from medical appointments or from getting medications?: No  In the past 12 months, has lack of transportation kept you from meetings, work, or from getting things needed for daily living?: No  Was application for public transport provided?: N/A        DISCHARGE DETAILS:    Discharge planning discussed with[de-identified] patient's wife Cassidy Massey via phone  Freedom of Choice: Yes     CM contacted family/caregiver?: Yes  Were Treatment Team discharge recommendations reviewed with patient/caregiver?: Yes  Did patient/caregiver verbalize understanding of patient care needs?: Yes  Were patient/caregiver advised of the risks associated with not following Treatment Team discharge recommendations?: Yes    Contacts  Patient Contacts: Anurag Snider, wife  Relationship to Patient[de-identified] Family  Contact Method: Phone  Phone Number: [phone]  Reason/Outcome: Emergency Contact                        Treatment Team Recommendation: Other (TBD)  Discharge Destination Plan[de-identified] Other (TBD)  Transport at Discharge : Family                   Patient/caregiver received discharge checklist   Content reviewed  Patient/caregiver encouraged to participate in discharge plan of care prior to discharge home  CM reviewed d/c planning process including the following: identifying help at home, patient preference for d/c planning needs, Discharge Lounge, [facility] Meds to Bed program, availability of treatment team to discuss questions or concerns patient and/or family may have regarding understanding medications and recognizing signs and symptoms once discharged  CM also encouraged patient to follow up with all recommended appointments after discharge  Patient advised of importance for patient and family to participate in managing patient’s medical well being  Additional Comments: Introduced self and role to patient's wife Cassidy Massey via phone  Patient is fully independent at baseline, no history of DME, HHC or PT  CM will continue to follow for d/c needs

## 2023-02-21 NOTE — PROGRESS NOTES
Vancomycin IV Pharmacy-to-Dose Consultation    Gustabo Alatorre is a 47 y o  male who is currently receiving Vancomycin IV with management by the Pharmacy Consult service  Relevant clinical data and objective / subjective history reviewed  Vancomycin Assessment:  Indication: Septic shock of unclear etiology  Status: critically ill, leukocytosis, afebrile  Micro:   2/20 MRSA culture: in process  2/20 Body fluid culture: no polys or bacteria seen  2/20 Blood cultures x 2: in process  Procalcitonin: 4 53  Renal Function: DENZEL improved, sCr 1 31 (peaked at 2 6, baseline 0 6-0 9); UOP 1 mL/kg/hr  Days of Therapy: 2  Current Dose: 2000 mg IV x 1 (last dose prior to level: 2/20 at 0913)  Goal AUC(24h)/Trough: 400-600, trough >10  Last Level: 11 4 (2/21 at 0432) 19 3 hours from last dose    Vancomycin Plan:  New Dosing: in the setting of improved renal function, change to 1000 mg IV q12h (next dose: 2/21 at 0800)  Estimated AUC: 532 mcg*hr/mL  Estimated Trough: 17 7 mcg/mL  Next Level: Random 2/28 at 0600  Renal Function Monitoring: Daily BMP and [place] 19 will continue to follow closely for s/sx of nephrotoxicity, infusion reactions and appropriateness of therapy  BMP and CBC will be ordered per protocol  We will continue to follow the patient’s culture results and clinical progress daily       Richi Sr, PharmD, [address] Clinical Pharmacist  [phone]

## 2023-02-21 NOTE — PROGRESS NOTES
NEPHROLOGY PROGRESS NOTE   Carolynn Ashby 47 y o  male MRN: [id number]  [facility] Encounter: [id number]  Reason for Consult: DENZEL    ASSESSMENT AND PLAN:  Patient is 72-year-old male with significant medical issues of metastatic cholangiocarcinoma with metastasis to liver, lung, bone, prior history of right tonsillar squamous cell carcinoma, status posttreatment, recurrent ascites requiring paracentesis, now presents with abdominal pain, nausea, poor p o  intake  We are consulted for DENZEL, hyperkalemia, acidosis management      Severe DENZEL POA, baseline serum creatinine 0 6-0 8  -Creatinine peak level 2 6 now continue to significantly improved to 1 1 most recent  -DENZEL suspect in the setting of severe hypotension, shock causing ischemic/hemodynamic insult, component of prerenal with poor p o  intake  -Was on ivosidenib and more recently on Pemigatinib, both can cause increase creatinine, less likely HRS with improving creatinine with albumin challenge  -CT scan shows no hydro  -currently has Harvey catheter, good urine output    -Patient has provided dialysis consent if needed which is in the chart  -Continue IV albumin 25 g every 6 hourly for today  -UA shows multiple 25-50 hyaline cast, no hematuria or proteinuria  Urine sodium less than 5, urine chloride less than 10 consistent with prerenal      Severe azotemia  -Overall improving with improving renal function      Hyperkalemia, resolved with improving renal function   -Suspected in the setting of DENZEL    -Strict low potassium diet recommended   -Serum potassium 4 0     Primary respiratory alkalosis  -Okay to discontinue IV bicarb drip   -Compensatory lower bicarb level 17  -Lactic acid 4 0 suspect in the setting of shock, hypotension and also liver disease     Initial hyponatremia  -Serum sodium 118 on admission appropriately improved up to corrected level 126 today AM in 24 hours  -Most recent corrected serum sodium around 124  -Currently on IV hypotonic fluid, recommend to discontinue this  Monitor off IV fluid for now  If needed may consider IV Plasma-Lyte at 50 mill per hour   -Currently on IV albumin challenge  -May change to BMP every 6 hours  -Urine sodium less than 5, urine was 506, serum osmolality 288     Shock, rule out septic shock,? Hypovolemic component  -culture results to follow  Empiric antibiotic as per ICU team   -IV albumin 25 g every 6 hourly   -Currently remains on Levophed, vasopressin   -Echocardiogram results to follow to further evaluate intravascular volume status   -Check CVP  -Status post IV fluid resuscitation, discontinue hypotonic IV fluid  If needed may consider IV Plasma-Lyte      Metastatic cholangiocarcinoma with metastasis to liver, lymph node, bone, lungs   -Closely follows with hematology  Recent MRI showing new thrombosis of splenic, superior mesenteric and portal vein  Anticoagulation as per ICU team   Peritoneal carcinomatosis with recurrent ascites requiring paracentesis  Discussed above plan in detail with ICU team and they agree with above recommendations  SUBJECTIVE:  Patient seen and examined at bedside  Denies chest pain, shortness of breath  Overall feels somewhat better  Blood pressure still remains lower, now remains on 2 vasopressors      OBJECTIVE:  Current Weight: Weight - Scale: 104 kg (229 lb)  Vitals:    02/21/23 0705   BP: 109/60   Pulse: 70   Resp:    Temp:    SpO2:        Intake/Output Summary (Last 24 hours) at 2/21/2023 0950  Last data filed at 2/21/2023 0930  Gross per 24 hour   Intake 5122 3 ml   Output 2880 ml   Net 2242 3 ml     Wt Readings from Last 3 Encounters:   02/21/23 104 kg (229 lb)   02/03/23 109 kg (240 lb 1 3 oz)   01/31/23 112 kg (246 lb)     Temp Readings from Last 3 Encounters:   02/21/23 97 9 °F (36 6 °C)   02/03/23 (!) 97 1 °F (36 2 °C) (Temporal)   01/31/23 98 2 °F (36 8 °C) (Oral)     BP Readings from Last 3 Encounters:   02/21/23 109/60   02/16/23 102/62 02/13/23 97/55     Pulse Readings from Last 3 Encounters:   02/21/23 70   02/16/23 75   02/13/23 71        Physical Examination:  Eyes: Mild conjunctival pallor present  Neck: No obvious lymphadenopathy appreciated  Respiratory: Bilateral air entry present  CVS: Trace edema in legs  GI: Mild distended  CNS: Active, alert, oriented x3  Skin: No new rash  Musculoskeletal: No obvious new gross deformity noted    Medications:    Current Facility-Administered Medications:   •  al mag oxide-diphenhydramine-lidocaine viscous (MAGIC MOUTHWASH) suspension 10 mL, 10 mL, Swish & Swallow, Q4H PRN, Raúl Frey MD  •  albumin human (FLEXBUMIN) 25 % injection 25 g, 25 g, Intravenous, Q6H, Renetta Munoz DO, Stopped at 02/21/23 0600  •  cefepime (MAXIPIME) 2,000 mg in dextrose 5 % 50 mL IVPB, 2,000 mg, Intravenous, Q12H, Renetta Munoz DO, Last Rate: 100 mL/hr at 02/21/23 0930, 2,000 mg at 02/21/23 0930  •  chlorhexidine (PERIDEX) 0 12 % oral rinse 15 mL, 15 mL, Mouth/Throat, Q12H [facility], Renetta Munoz DO, 15 mL at 02/20/23 2010  •  ergocalciferol (VITAMIN D2) capsule 50,000 Units, 50,000 Units, Oral, Weekly, Wallace Garcia DO  •  heparin (porcine) 25,000 units in 0 45% NaCl 250 mL infusion (premix), 3-30 Units/kg/hr (Order-Specific), Intravenous, Titrated, Raúl Frey MD, Last Rate: 11 4 mL/hr at 02/21/23 0925, 12 Units/kg/hr at 02/21/23 0925  •  heparin (porcine) injection 3,800 Units, 3,800 Units, Intravenous, Q6H PRN, Raúl Frey MD  •  heparin (porcine) injection 7,600 Units, 7,600 Units, Intravenous, Q6H PRN, Raúl Frey MD  •  insulin regular (HumuLIN R,NovoLIN R) 1 Units/mL in sodium chloride 0 9 % 100 mL infusion, 0 3-21 Units/hr, Intravenous, Titrated, Renetta Munoz DO, Last Rate: 2 mL/hr at 02/21/23 0855, 2 Units/hr at 02/21/23 0855  •  melatonin tablet 6 mg, 6 mg, Oral, HS, Renetta Munoz DO  •  metroNIDAZOLE (FLAGYL) IVPB (premix) 500 mg 100 mL, 500 mg, Intravenous, Q8H, Renetta Munoz, DO, Last Rate: 200 mL/hr at 02/21/23 0858, 500 mg at 02/21/23 0582  •  norepinephrine (LEVOPHED) 4 mg in dextrose 5 % 250 mL infusion, , Intravenous, Titrated, Wyatt Alpers, DO, Last Rate: 45 mL/hr at 02/21/23 0825, Rate Change at 02/21/23 0825  •  ondansetron (ZOFRAN) injection 4 mg, 4 mg, Intravenous, Q8H PRN, Renetta Munoz DO  •  oxyCODONE (ROXICODONE) IR tablet 5 mg, 5 mg, Oral, Q4H PRN, Shannon Villasenor MD  •  pantoprazole (PROTONIX) EC tablet 40 mg, 40 mg, Oral, Early Morning, Renetta Munoz DO, 40 mg at 02/21/23 0600  •  saliva substitute (MOUTH KOTE) mucosal solution 5 spray, 5 spray, Mouth/Throat, 4x Daily PRN, Renetta Munoz DO  •  sodium bicarbonate 75 mEq in sterile water 1,000 mL infusion, 100 mL/hr, Intravenous, Continuous, Renetta Munoz DO, Last Rate: 100 mL/hr at 02/21/23 0855, 100 mL/hr at 02/21/23 0855  •  sucralfate (CARAFATE) tablet 1 g, 1 g, Oral, 4x Daily (AC & HS), Shannon Villasenor MD  •  vancomycin (VANCOCIN) IVPB (premix in dextrose) 1,000 mg 200 mL, 12 5 mg/kg (Adjusted), Intravenous, Q12H, Ct Booker MD  •  vasopressin (PITRESSIN) 20 Units in sodium chloride 0 9 % 100 mL infusion, 0 04 Units/min, Intravenous, Continuous, Shai Castillo, DO, Last Rate: 12 mL/hr at 02/21/23 0639, 0 04 Units/min at 02/21/23 3473    Laboratory Results:  Results from last 7 days   Lab Units 02/21/23  0906 02/21/23  0432 02/21/23  0022 02/20/23 2013 02/20/23  1548 02/20/23  1132 02/20/23  0827 02/20/23  0728 02/20/23  0341   WBC Thousand/uL  --  21 30*  --   --   --   --   --   --  31 18*   HEMOGLOBIN g/dL  --  13 3  --   --  14 6  --   --   --  16 9   I STAT HEMOGLOBIN g/dl  --   --   --   --   --   --   --  16 7  --    HEMATOCRIT %  --  38 8  --   --  41 5  --   --   --  48 2   HEMATOCRIT, ISTAT %  --   --   --   --   --   --   --  49  --    PLATELETS Thousands/uL  --  107*  --   --   --   --   --   --  136*   SODIUM mmol/L 122* 124* 123* 124* 125* 124* 122*  --  118*   POTASSIUM mmol/L 4 0 4 2 4 7 5 0 5 1 5 7* 6 3*  --  6 2*   CHLORIDE mmol/L 93* 95* 97 98 99 99 92*  --  91*   CO2 mmol/L 17* 17* 17* 14* 15* 14* 22  --  16*   CO2, I-STAT mmol/L  --   --   --   --   --   --   --  19*  --    BUN mg/dL 59* 66* 75* 85* 86* 96* 105*  --  107*   CREATININE mg/dL 1 15 1 31* 1 51* 1 71* 1 76* 2 04* 2 60*  --  2 37*   CALCIUM mg/dL 6 6* 6 2* 6 4* 6 6* 6 7* 7 2* 7 1*  --  6 5*   MAGNESIUM mg/dL  --  3 6*  --   --   --   --  4 5*  --   --    PHOSPHORUS mg/dL  --  3 4  --   --   --   --  4 9*  --   --    GLUCOSE, ISTAT mg/dl  --   --   --   --   --   --   --  115  --        XR chest portable ICU   Final Result by Jayro Arnett MD (02/20 1416)      No acute cardiopulmonary disease  Findings are stable               Workstation performed: [id number]         CT abdomen pelvis wo contrast   Final Result by Paul Leroy MD (02/20 1992)      No acute pathology  Evaluation is limited in the absence of intravenous contrast enhancement, however metastatic disease appears essentially unchanged, and including dominant hepatic lesion, peritoneal carcinomatosis with moderate moderate ascites  Presumed metastatic    right lower lobe nodules also noted  Workstation performed: [id number]             Portions of the record may have been created with voice recognition software  Occasional wrong word or "sound a like" substitutions may have occurred due to the inherent limitations of voice recognition software  Read the chart carefully and recognize, using context, where substitutions have occurred

## 2023-02-21 NOTE — PLAN OF CARE
Problem: MOBILITY - ADULT  Goal: Maintain or return to baseline ADL function  Description: INTERVENTIONS:  -  Assess patient's ability to carry out ADLs; assess patient's baseline for ADL function and identify physical deficits which impact ability to perform ADLs (bathing, care of mouth/teeth, toileting, grooming, dressing, etc )  - Assess/evaluate cause of self-care deficits   - Assess range of motion  - Assess patient's mobility; develop plan if impaired  - Assess patient's need for assistive devices and provide as appropriate  - Encourage maximum independence but intervene and supervise when necessary  - Involve family in performance of ADLs  - Assess for home care needs following discharge   - Consider OT consult to assist with ADL evaluation and planning for discharge  - Provide patient education as appropriate  Outcome: Progressing  Goal: Maintains/Returns to pre admission functional level  Description: INTERVENTIONS:  - Perform BMAT or MOVE assessment daily    - Set and communicate daily mobility goal to care team and patient/family/caregiver  - Collaborate with rehabilitation services on mobility goals if consulted  - Perform Range of Motion 6 times a day  - Reposition patient every 2 hours    - Out of bed for toileting  - Record patient progress and toleration of activity level   Outcome: Progressing     Problem: Prexisting or High Potential for Compromised Skin Integrity  Goal: Skin integrity is maintained or improved  Description: INTERVENTIONS:  - Identify patients at risk for skin breakdown  - Assess and monitor skin integrity  - Assess and monitor nutrition and hydration status  - Monitor labs   - Assess for incontinence   - Turn and reposition patient  - Assist with mobility/ambulation  - Relieve pressure over bony prominences  - Avoid friction and shearing  - Provide appropriate hygiene as needed including keeping skin clean and dry  - Evaluate need for skin moisturizer/barrier cream  - Collaborate with interdisciplinary team   - Patient/family teaching  - Consider wound care consult   Outcome: Progressing

## 2023-02-22 NOTE — PROGRESS NOTES
Daily Progress Note - Critical Care   Patricio Ashby 47 y o  male MRN: [id number]  Unit/Bed#: MICU 04 Encounter: [id number]        ----------------------------------------------------------------------------------------  HPI/24hr events: Suhail Vela is a 47 y o  male with past medical history of heavy nicotine dependence currently in remission, SCC of head/neck (tongue) diagnosed in 2018 s/p chemo and partial glossectomy, stage IV cholangiocarcinoma (diagnosed in 2020 s/p resection followed by immuno-chemo therapy with recurrence in 2022 now with mets  to bone, peritoneum, lungs, and liver, currently on second round of pemigatinib complicated by recurrent ascites requiring frequent large volume paracentesis twice a week, with removal of 4-5L of ascitic fluid on a weekly basis, who presented to ED this AM with concerns of 7 days of decreased appetite/po intake and worsening generalized fatigue with occasional eps of non-billous vomiting w/o overt s/s bleeding  Per wife, patient noted to be more confused from baseline, prompting ED evaluation  Patient follows with Dr Ruby Love, Oncology  Was recently on ivositinib and more recently on Pemigatibib  Denies recent NSAID use  Admits to decrease urine output x3-4d  Denies recent ill contacts or long distance travel  Compliant with home meds including PRN Flexeril for and PRN oxy 5 for cancer/chemo induced pain, lasix 20mg prn for ascites, zofran/low dose dexamethason 4mg for nausea, and Protonix 40mg for GERD       On arrival to ED,  VS notable for T36 7, BP80/50, pulse 71, 98% on RA  Patient noted to be acutely encephalopathic on initial exam with abdominal fluid wave  Work up showed leukocytosis on CBC, elevated K 6 2 in setting of DENZEL with Cr 2 37 (baseline   5- 9) and ?givne bolus of NS  ECG revealing NSR with peaked T waves, non-ischemic  Patient was initiated on IV calcium gluconate   Repeat iSTAT potassium >8 --> given additional IV calcium gluconate along with IV dextrose with regular insulin and additional 1L bolus NS  Initial Hs Trops 17, repeat 2h/4h pending  Sepsis workup iniated, started on IV vanc and IV cefepime  CT C/A/B without acute pathology and unchanged metastatic disease process including dominant hepatic lesion, peritoneal carcinomatosis with moderate moderate ascites; right lower lobe nodules also noted  Additionally patient underwent diagnostic paracentesis in the ED, though he was initially scheduled for his weekly therapeutic paracentesis today by IR as an outpatient  24 hour events:  Overnight, patient had an episode of nonbilious brown-colored clear emesis w/o any overt signs of bleeding  Was subsequently given Zofran that provide mild relief   levo 3 mcg  vasopressin 0 04  heparin drip 7U    ---------------------------------------------------------------------------------------  SUBJECTIVE  Seen and examined  Endorsing nausea but no vomiting  Denies chest pain, SOB, dizziness, urinary sxs  No other concerns reported  Review of Systems  Review of systems was reviewed and negative unless stated above in HPI/24-hour events   ---------------------------------------------------------------------------------------  Assessment and Plan:    Neuro:   • Diagnosis: Hepatic encephalopathy, resolving  o Patient endorses fatigue and presented with mild confusion and slurred speech per wife   o Ammonia elevated 63 on admission  o In setting of stage IV cholangiocarcinoma with multiple metastases to the liver  Likely participated by DENZEL and hypovolemia/poor PO intake POA   Hyponatremia on admission possibly contributing  o Continue monitoring  o Can consider rifaxamine to be added on outpatient regimen if ammonia continues to remain elevated  • Diagnosis: GERD  o Known history  o Continue Protonix 40mg PO, will consider switching to IV if cannot tolerate PO    CV:   • Diagnosis: septic shock, uncertain origin vs hypovolemic  o Patient with decreased PO intake, vomiting over the past 4-7d    o Severe DENZEL with azotemia and hyponatremia on admission  o Likely also intravascularly volume depleted in setting of malignant ascites requiring weekly large-volume paracentesis 2/2 stage IV cholangiocarcinoma with mets to the liver  o S/P 4 L of IV fluid resuscitation in the ED prior to admission  o TTE shows EF 65% with normal wall motioin, no signficant valvular dysfunction  o Rule out septic shock; blood cultures pending; SBP negative based on diastolic diagnostic paracentesis fluid analysis  o Continue on albumin 25 g every 6 hours to improve MAP  o On levo 3mcg and vasopressin 0 04 for pressure support, MAP goal >60  o Wean levo as tolerated    Pulm:   · Diagnosis: Primary respiratory alkalosis  · Based on most recent ABG 2/21 [id number] 5/18 3  · Now off bicarb gtt   · Lactate remains elevated 4 0, suspect in setting of hypotension, but also liver disease likely contributing to decreased clearance      GI:   • Diagnosis: Stage IV cholangiocarcinoma cysts with mets metastasis to liver  o Diagnosed in 2020 s/p resection followed by immuno-chemo therapy with recurrence in 2022 now with mets to bone, peritoneum, lungs, and liver  o Currently on second round of pemigatinib as an outpatient under the care of Dr Tramaine Mi,  o SBP ruled out based on diagnostic paracentesis fluid analysis  o May have underlying type I hepatorenal syndrome given severe DENZEL and hyponatremia on admission, liver hepatorenal syndrome is a diagnosis of exclusion, further work-up pending to r/o hypovolemic vs septic shock  o Hold home pemigatibin in setting of severe DENZEL  · Diagnosis: Mesenteric thrombosis   · Most recent outpatient MRI imaging showing new thrombosis in portions of the splenic, superior mesenteric and main portal veins and a segment of right portal vein  · Likely cancer associated thrombosis in setting of stage IV cholangiocarcinoma  · Continue heparin gtt  • Diagnosis: Malignant ascites  o Likely peritoneal carcinomatosis in setting of stage IV cholangiocarcinoma  o No signs of compartment syndrome at this time  o Can consider therapeutic paracentesis if needed     :   • Diagnosis: Severe DENZEL, resolved  o Cr 2 3 on arrival to ED, increased to 2 6 on admission, baseline serum creatinine around 1 62 8  o Likely in setting of severe hypotension secondary to hypovolemia/poor p o  intake versus septic shock versus hepatorenal syndrome causing ischemic insult to the kidneys  o Patient's anticancer medications including ivosidenib and pemigatibin so known to cause kidney injury, possibly contributing  o Status post aggressive IV fluid resuscitation in the ED  o Status post 75mg sodium bicarb infusion and D5W at 100 cc/h on admission  • Diagnosis: Metabolic acidosis  o Lactate elevated on admission 3 5, remains elevated at 4  o Likely secondary to hypotension on admission, persistant elevations likely due to decreased hepatic metabolization setting of liver mets   o S/p IVF resuscitation  o Most recent bicarb 17, off bicarb drip 2/2 primary respiratory alkolisis  o   · Diagnosis: Severe hyponatremia  · Sodium 118 on on arrival to ED, rapidly improved to 124 within 8 hours of arrival  · Status post multiple boluses of IV normal saline in the ED, contributing to initial overcorrection  · Await repeat BMP, will consider NS at 60cc/hr for 5h for goal correction no more than 8 mEq in 24 hours  · BMP every 6 hours    F/E/N:   • F: none at this time  • Electrolytes: replete K, phos, mag, calcium as indicated  · Nutrition: full liquids    Heme/Onc:   • Diagnosis:  Thrombocytopenia  o Secondary to portal hypertension in setting of several metastases to the liver secondary to stage IV cholangiocarcinoma  o Trend CBC, Transfuse to maintain platelets over 33,630    Endo:   · Diagnosis: Hyperglycemia  · Blood sugars at goal  · Continue SSI, algorithm 3, adjust as appropriate   · Goal blood glucose 140-180    ID:   • Diagnosis: Sepsis/Septic shock, improving  o SIRS criteria on admission with concerns for sepsis given hypotension requiring pressure and evidence of end organ failure with severe DENZEL on admission  o Lactate 3 5, procalc 4 9 on arrival  o Unclear etiology; SBP ruled out; UA negative; CXR negative for PNA; don't suspect cholangitis given clinical presentation and imaging findings  o MRSA negative  o Blood cultures without growth at 48h  o Continue broad-spectrum antibiotics with IV cefepime day#3, IV Flagyl day#3        MSK/Skin:   • Diagnosis: Rash on feet  - Bilateral feet are diffusesly erythematous, with visible petechiae dorsal-medial aspect (L>R)  - Patient endorses prior history of neuropathy of feet  - Unclear etiology, possible  ?contact dermatitis  Unlikely infectious  - Continue monitoring  • Diagnosis: Pressure ulcer prophylaxis  · Frequent turning      Patient appropriate for transfer out of the ICU today?: No  Disposition: Admit to Critical Care   Code Status: Level 1 - Full Code  ---------------------------------------------------------------------------------------  ICU CORE MEASURES    Prophylaxis   VTE Pharmacologic Prophylaxis: Heparin  VTE Mechanical Prophylaxis: sequential compression device  Stress Ulcer Prophylaxis: Pantoprazole PO    ABCDE Protocol (if indicated)  Plan to perform spontaneous awakening trial today? Not applicable  Plan to perform spontaneous breathing trial today? No Not applicable  Obvious barriers to extubation?  Not applicable  CAM-ICU: Negative    Invasive Devices Review  Invasive Devices     Central Venous Catheter Line  Duration           Port A Cath Right Subclavian -- days          Peripheral Intravenous Line  Duration           Peripheral IV 02/20/23 Left;Ventral (anterior) Forearm 2 days    Peripheral IV 02/20/23 Right Antecubital 1 day          Arterial Line  Duration           Arterial Line 02/20/23 Radial 1 day              Can any invasive devices be discontinued today? No  ---------------------------------------------------------------------------------------  OBJECTIVE    Vitals   Vitals:    23 0500 23 0555 23 0700 23 0734   BP: 105/57  104/56    BP Location:   Left arm    Pulse: 80  82 80   Resp: 20  16 21   Temp:    97 5 °F (36 4 °C)   TempSrc:    Oral   SpO2: 98%  99% 99%   Weight:  95 8 kg (211 lb 3 2 oz)     Height:         Temp (24hrs), Av 9 °F (36 6 °C), Min:97 5 °F (36 4 °C), Max:98 2 °F (36 8 °C)  Current: Temperature: 97 5 °F (36 4 °C)      Respiratory:  SpO2: SpO2: 99 %       Invasive/non-invasive ventilation settings   Respiratory    Lab Data (Last 4 hours)    None         O2/Vent Data (Last 4 hours)    None                Physical Exam  Vitals and nursing note reviewed  Constitutional:       General: He is not in acute distress  Appearance: He is well-developed  He is ill-appearing  Comments: Slurred speech, unclear baseline   HENT:      Head: Normocephalic and atraumatic  Eyes:      Conjunctiva/sclera: Conjunctivae normal    Cardiovascular:      Rate and Rhythm: Normal rate and regular rhythm  Heart sounds: No murmur heard  Pulmonary:      Effort: Pulmonary effort is normal  No respiratory distress  Breath sounds: Normal breath sounds  Abdominal:      General: There is distension  Palpations: Abdomen is soft  Tenderness: There is no abdominal tenderness  Comments: Fluid wave positive   Musculoskeletal:         General: No swelling  Cervical back: Neck supple  Skin:     General: Skin is warm and dry  Capillary Refill: Capillary refill takes less than 2 seconds  Findings: Rash (bilateral feet) present  Comments: Bilateral feet are diffusesly erythematous, with visible petechiae dorsal-medial aspect (L>R)   Neurological:      Mental Status: He is alert and oriented to person, place, and time     Psychiatric:         Mood and Affect: Mood normal  Laboratory and Diagnostics:  Results from last 7 days   Lab Units 02/21/23  0432 02/20/23  1548 02/20/23  0728 02/20/23  0341   WBC Thousand/uL 21 30*  --   --  31 18*   HEMOGLOBIN g/dL 13 3 14 6  --  16 9   I STAT HEMOGLOBIN g/dl  --   --  16 7  --    HEMATOCRIT % 38 8 41 5  --  48 2   HEMATOCRIT, ISTAT %  --   --  49  --    PLATELETS Thousands/uL 107*  --   --  136*   NEUTROS PCT % 82*  --   --  82*   MONOS PCT % 11  --   --  13*     Results from last 7 days   Lab Units 02/22/23  0557 02/21/23  2358 02/21/23  1835 02/21/23  1146 02/21/23  0906 02/21/23  0432 02/21/23  0022 02/20/23  2013 02/20/23  0728 02/20/23  0341   SODIUM mmol/L  --  123* 124* 121* 122* 124* 123* 124*   < > 118*   POTASSIUM mmol/L  --  3 8 3 7 3 9 4 0 4 2 4 7 5 0   < > 6 2*   CHLORIDE mmol/L  --  95* 96 94* 93* 95* 97 98   < > 91*   CO2 mmol/L  --  18* 18* 17* 17* 17* 17* 14*   < > 16*   CO2, I-STAT   --   --   --   --   --   --   --   --    < >  --    ANION GAP mmol/L  --  10 10 10 12 12 9 12   < > 11   BUN mg/dL  --  49* 47* 56* 59* 66* 75* 85*   < > 107*   CREATININE mg/dL  --  0 90 0 87 1 06 1 15 1 31* 1 51* 1 71*   < > 2 37*   CALCIUM mg/dL  --  6 4* 6 5* 6 4* 6 6* 6 2* 6 4* 6 6*   < > 6 5*   GLUCOSE RANDOM mg/dL  --  144* 118 173* 202* 254* 225* 190*   < > 140   ALT U/L 54  --   --   --   --  71  --   --   --  91*   AST U/L 65*  --   --   --   --  73*  --   --   --  89*   ALK PHOS U/L 105  --   --   --   --  137*  --   --   --  198*   ALBUMIN g/dL 3 5  --   --   --   --  2 6*  --   --   --  1 9*   TOTAL BILIRUBIN mg/dL 3 03*  --   --   --   --  1 96*  --   --   --  1 18*    < > = values in this interval not displayed       Results from last 7 days   Lab Units 02/22/23  0557 02/21/23  0432 02/20/23  0827   MAGNESIUM mg/dL 3 0* 3 6* 4 5*   PHOSPHORUS mg/dL 2 3* 3 4 4 9*      Results from last 7 days   Lab Units 02/21/23  2358 02/21/23  1535 02/21/23  0729 02/20/23  2234 02/20/23  1548 02/20/23  0341   INR   --   --   --   -- 1 66* 1 49*   PTT seconds 97* 140* >210* >210* 38* 47*          Results from last 7 days   Lab Units 02/21/23  0729 02/21/23  0432 02/21/23  0022 02/20/23  2013 02/20/23  1548 02/20/23  1305 02/20/23  1132   LACTIC ACID mmol/L 4 0* 3 8* 3 1* 4 6* 3 5* 4 1* 3 3*     ABG:  Results from last 7 days   Lab Units 02/21/23  0730   PH ART  7 518*   PCO2 ART mm Hg 23 0*   PO2 ART mm Hg 92 5   HCO3 ART mmol/L 18 3*   BASE EXC ART mmol/L -2 6   ABG SOURCE  Line, Arterial     VBG:  Results from last 7 days   Lab Units 02/21/23  0929 02/21/23  0730   PH SOLO  7 513*  --    PCO2 SOLO mm Hg 25 1*  --    PO2 SOLO mm Hg 40 3  --    HCO3 SOLO mmol/L 19 7*  --    BASE EXC SOLO mmol/L -1 6  --    ABG SOURCE   --  Line, Arterial     Results from last 7 days   Lab Units 02/20/23  0827 02/20/23  0341   PROCALCITONIN ng/ml 4 53* 4 92*       Micro  Results from last 7 days   Lab Units 02/20/23  1305 02/20/23  0916 02/20/23  0634 02/20/23  0341   BLOOD CULTURE   --   --  No Growth at 24 hrs  No Growth at 24 hrs  GRAM STAIN RESULT   --  No Polys or Bacteria seen  --   --    BODY FLUID CULTURE, STERILE   --  No growth  --   --    MRSA CULTURE ONLY  No Methicillin Resistant Staphlyococcus aureus (MRSA) isolated  --   --   --        EKG: ECG on arrival to ED revealed normal sinus rhythm, peaked T waves, nonischemic  Imaging:   XR chest portable ICU   Final Result by Zaira Anaya MD (02/20 9808)      No acute cardiopulmonary disease  Findings are stable               Workstation performed: [id number]         CT abdomen pelvis wo contrast   Final Result by Briana Lemon MD (02/20 5626)      No acute pathology  Evaluation is limited in the absence of intravenous contrast enhancement, however metastatic disease appears essentially unchanged, and including dominant hepatic lesion, peritoneal carcinomatosis with moderate moderate ascites  Presumed metastatic    right lower lobe nodules also noted                Workstation performed: [id number]            I have personally reviewed pertinent reports  Intake and Output  I/O       02/19 0701  02/20 0700 02/20 0701  02/21 0700    P  O   180    I V  (mL/kg)  1981 3 (19 1)    IV Piggyback  2700    Total Intake(mL/kg)  4861 3 (46 7)    Urine (mL/kg/hr)  2380 (1)    Stool  0    Total Output  2380    Net  +2481 3          Unmeasured Stool Occurrence  1 x        Height and Weights   Height: 6' (182 9 cm)  IBW (Ideal Body Weight): 77 6 kg  Body mass index is 28 64 kg/m²  Weight (last 2 days)     Date/Time Weight    02/22/23 0555 95 8 (211 2)    02/21/23 0705 104 (229)    02/21/23 0600 104 (229 94)    02/20/23 0451 97 8 (215 6)            Nutrition       Diet Orders   (From admission, onward)             Start     Ordered    02/22/23 0711  Diet Regular; Regular House; Full Liquid  Diet effective now        References:    Nutrtion Support Algorithm Enteral vs  Parenteral   Question Answer Comment   Diet Type Regular    Regular Regular House    Other Restriction(s): Full Liquid    RD to adjust diet per protocol?  Yes        02/22/23 5815                  Active Medications  Scheduled Meds:  Current Facility-Administered Medications   Medication Dose Route Frequency Provider Last Rate   • al mag oxide-diphenhydramine-lidocaine viscous  10 mL Swish & Swallow Q4H PRN Carlos A Brunner MD     • Albumin 25%  25 g Intravenous Q6H Renetta Munoz DO 0 g (02/21/23 1800)   • calcium gluconate  2 g Intravenous Once Renetta Munoz DO     • cefepime  2,000 mg Intravenous Q8H Ammoira Garcia DO 2,000 mg (02/22/23 0058)   • chlorhexidine  15 mL Mouth/Throat Q12H [address] Renetta Munoz DO     • ergocalciferol  50,000 Units Oral Weekly Ammoira Garcia DO     • heparin (porcine)  3-30 Units/kg/hr (Order-Specific) Intravenous Titrated Carlos A Brunner MD 7 Units/kg/hr (02/22/23 3031)   • heparin (porcine)  3,800 Units Intravenous Q6H PRN Carlos A Brunner MD     • heparin (porcine)  7,600 Units Intravenous Q6H PRN Buffy Gómez Cece Olson MD     • insulin lispro  1-6 Units Subcutaneous TID AC Wallace Esparzam, DO     • melatonin  6 mg Oral HS Renetta Munoz DO     • metroNIDAZOLE  500 mg Intravenous Q8H Renetta Munoz  mg (02/21/23 2353)   • IV infusion builder   Intravenous Titrated Renetta Munoz DO 11 3 mL/hr at 02/22/23 0556   • ondansetron  4 mg Intravenous Q8H PRN Renetta Munoz DO     • oxyCODONE  5 mg Oral Q4H PRN Ramsey Harris MD     • pantoprazole  40 mg Oral Early Morning Renetta Munoz DO     • saliva substitute  5 spray Mouth/Throat 4x Daily PRN Renetta Munoz DO     • sucralfate  1 g Oral 4x Daily (AC & HS) Ramsey Harris MD     • trimethobenzamide  200 mg Intramuscular Once Starwilliam Singh, DO     • vancomycin  12 5 mg/kg (Adjusted) Intravenous Q12H Vannesa Stiles MD Stopped (02/21/23 2200)   • vasopressin  0 04 Units/min Intravenous Continuous Viktor Mckeon DO 0 04 Units/min (02/22/23 0158)     Continuous Infusions:  heparin (porcine), 3-30 Units/kg/hr (Order-Specific), Last Rate: 7 Units/kg/hr (02/22/23 0058)  IV infusion builder, , Last Rate: 11 3 mL/hr at 02/22/23 0556  vasopressin, 0 04 Units/min, Last Rate: 0 04 Units/min (02/22/23 0158)      PRN Meds:   al mag oxide-diphenhydramine-lidocaine viscous, 10 mL, Q4H PRN  heparin (porcine), 3,800 Units, Q6H PRN  heparin (porcine), 7,600 Units, Q6H PRN  ondansetron, 4 mg, Q8H PRN  oxyCODONE, 5 mg, Q4H PRN  saliva substitute, 5 spray, 4x Daily PRN        Allergies   Allergies   Allergen Reactions   • Penicillins Other (See Comments) and Rash     As a child had reaction not sure what it was       ---------------------------------------------------------------------------------------  Advance Directive and Living Will:      Power of :    POLST:    ---------------------------------------------------------------------------------------  Care Time Delivered:   Upon my evaluation, this patient had a high probability of imminent or life-threatening deterioration, which required my direct attention, intervention, and personal management  I have personally provided 30 minutes of critical care time, exclusive of procedures, teaching, family meetings, and any prior time recorded by providers other than myself  Shahbaz Sanders DO      Portions of the record may have been created with voice recognition software  Occasional wrong word or "sound a like" substitutions may have occurred due to the inherent limitations of voice recognition software    Read the chart carefully and recognize, using context, where substitutions have occurred

## 2023-02-22 NOTE — QUICK NOTE
Explained all new results and on-going treatment plan to the patient's wife, Keegan Nicely, at bedside  All questions and concerns were answered and addressed appropriately        ----------------------------------------------------  Traci Santos DO, MS, PGY-1  Internal Medicine Residency   [address] [facility], [address] [facility] [place]

## 2023-02-22 NOTE — CONSULTS
Medical Oncology/Hematology Consult Note  Tico Garvin, male, 47 y o , 1970,  MICU 04/MICU 04, [id number]     Assessment and Plan  1  Metastatic cholangiocarcinoma with hepatic mets  2  Splenic vein, superior mesenteric, main portal vein, and right portal vein thrombosis, new  3  Worsening anemia and thrombocytopenia  4  Ascites, requiring frequent paracentesis  5  Lower extremity swelling    Ms Ashby is a 47 Y M with hx of metastatic cholangiocarcinoma who was admitted initially for DENZEL with concern for septic shock  Patient was initially requiring multiple pressors, currently still on levo, in the process of attempting to wean off pressors  Infectious workup ongoing, no clear source identified yet  Renal function and mentation improving  Pt's recent MR abdomen had shown splenic vein, superior mesenteric, and main portal vein thrombosis, for which patient was started on hep gtt for [facility] purposes  CBC shows that pt's Hgb and plt counts have been gradually trending down since admission: Hgb 14 6 > 13 3 > 11 6 and plt count 136 > 107 > 60  Although anemia and thrombocytopenia can be seen with myelosuppression in cases of acute illness and sepsis and although thrombocytopenia can be from exposure to the broad spectrum abx patient has received during this hospitalization, we need to rule out hemolysis in setting of rising bili levels with indirect bili level being the larger component  Checking DIC panel, along with LD, haptoglobin, and retic count  With improving renal function and neurological function, there is less clinical suspicion for TTP  Additionally, although timing of worsening thrombocytopenia is not fully consistent with HIT, patient has had exposure to heparin products recently in January during his admissions at [facility]  Recommend avoiding any heparin products until we have the HIT panel results (YULIANA and heparin induced platelet antibody have been ordered)   Recommend argatroban gtt (with dose adjustment for hepatic impairment as patient has Child-Philip class C based on his LFT parameters, ascites, etc)  Patient will need long term AC for management of his new clots seen on most recent MR abdomen, however with his current liver function and child philip score, DOACs are contraindicated although they can certainly be used if coags and bili improve during this hospitalization, otherwise Coumadin might be our only option  Recommend continuing ongoing infectious workup and supportive care with abx, wean pressors as tolerated  Recommend bilateral LE US to evaluate for any DVT as patient and wife reported worsening LE swelling (L>R) over past several days  As for the cholangiocarcinoma management, it's too early to comment on whether pemigatinib is working or not  He has barely gotten about 2 weeks of therapy  Outpatient follow up with medical oncology team for more specifics on treatments for cholangiocarcinoma  If he is noted to have progression of his disease after completing several cycles of pemigatinib, likely best to either reattempt either a  combination of chemo-immunotherapy or try chemotherapy alone with FOLFOX regimen  Outpatient follow up plan: Patient used to f/u with Dr Grazyna Malik as his primary oncologist, but saw Dr Elisabet Arias in Jan 2023 more for a 2nd opinion  He prefers to continue follow-up with Dr Luis Garber, therefore medical oncology f/u appt has been arranged with Dr Elisabet Arias on 3/15/23  Communication with patient/family:  Patient and wife were updated regarding the above plan at bedside    Communication with team:  Primary team resident was updated regarding the above plan       Case was discussed with hematology/oncology attending, Dr Peewee Jones      Reason for consultation: hx of cholangiocarcinoma, thrombocytopenia    History of present illness:  Frieda Alexander is a 47 y o  male with metastatic cholangiocarcinoma (initially dx in 2020, started on systemic therapy in 2022, currently on 3rd line of treatment with targeted therapy called pemigatinib)  He is known to have multiple pulmonary mets, liver mets, recurrent abdominal ascites (requiring frequent paracentesis), and bone mets  Patient also with history of SCC of the tongue (dx in 2018, s/p chemo+immunotherapy induction, followed by partial glossectomy and bilateral neck dissection in April 2018, followed by adjuvant RT, which was completed in August 2018, this was managed at [facility]  On his most recent MR abdomen, there was concern for new thrombosis in portions of the splenic, superior mesenteric, main portal veins, and a segment of the portal vein  Intra-abdominal lymphadenopathy, multiple hepatic mets, bone mets (right iliac crest) were noted to be stable, compared to Jan 2023 CT scan findings  Patient presented to the hospital for evaluation of progressively worsening weakness/mentation and decreased PO intake x 4-5 days  ED vitals on admission notable for hypotension with SBPs in the 80s  Patient was noted to be encephalopathic  Labs were notable for Cr 2 37 and leukocytosis  Patient was initiated on broad spectrum abx given concern for septic shock  Infectious workup negative thus far  Patient has required pressors  There was also concern for cardiogenic shock  2D echo from 2/21 shows normal EF of 65% with normal wall normal and normal diastolic function  Patient's plt count has been gradually decreasing with following trend: 136 > 107 > 60 > 63  Patient did have lovenox/heparin exposure during January admissions at [facility] for abdominal distention 2/2 ascites  Review of Systems:   Review of Systems   Constitutional: Positive for activity change and fatigue  Negative for chills and fever  HENT: Negative for ear pain, sore throat, tinnitus and trouble swallowing  Eyes: Negative for pain and visual disturbance  Respiratory: Negative for cough and shortness of breath  Cardiovascular: Positive for leg swelling  Negative for chest pain and palpitations  Gastrointestinal: Negative for abdominal pain and vomiting  Genitourinary: Negative for dysuria and hematuria  Musculoskeletal: Negative for arthralgias and back pain  Skin: Negative for color change and rash  Neurological: Negative for seizures and syncope  Hematological: Negative for adenopathy  Does not bruise/bleed easily  Psychiatric/Behavioral: Positive for confusion (initially, now resolved)  Negative for agitation  All other systems reviewed and are negative  Oncology History:   Cancer Staging   No matching staging information was found for the patient  Oncology History Overview Note   Patient presents today for consult for RT for tongue carcinoma  56 yr old patient current smoker (smokes 2 PPD for over 20 years) presented with left tongue pain and sores in December 2017  He has lost about 30 pounds  He did not have health insurance at the time  He then obtained insurance and saw an oral surgeon on 2/6/18- biopsy was performed confirming p16 negative squamous cell carcinoma  2/23/18- PET  IMPRESSION:  1  There is focally intense activity in the anterior oral cavity, slightly to the left of midline, SUV 27  This corresponds with known malignancy  There are also bilateral hypermetabolic upper cervical nodes posterior to the submandibular glands, most   concerning for [place] metastases  2   There is asymmetric intense activity in the posterior left oral cavity, palate and tonsillar region, SUV 7 9  This may be physiologic, however correlation with clinical findings recommended to exclude tumor involvement  3   Mildly hypermetabolic nodular densities and infiltrates in the right middle and bilateral lower lungs, likely inflammatory/infectious  Short-term CT follow-up in 2-3 months recommended to exclude metastases    4   Subcutaneous density lateral to the left hip is also likely inflammatory/infectious  This may be correlated clinically  5   Otherwise no hypermetabolic metastases in the chest abdomen pelvis      2/23/18- CT chest abdomen and pelvis-  IMPRESSION:   As was seen on concurrent PET/CT, there is clustered small nodular groundglass densities in the right middle lobe and both lower lobes  There distribution suggests an infectious/inflammatory process rather than metastatic disease  Follow-up is   recommended    Otherwise unremarkable chest, abdomen, pelvic CT  Patient was referred to ENT Dr Gwen Wheeler @ [facility] by Med onc Dr Ludy Pereira  3/6/18- Consult with ENT Dr Gwen Wheeler- discussed surgical excision of the anterior portion of the tongue, along with free flap reconstruction using skin from his forearm       3/6/18- Med onc Dr Sepideh Santiago @ [facility]- recommended chemo-nivo surgery trial (Carbo/paclitaxel/nivo)    Chemo started on 3/14/18 and finished 4/10/18 (5 cycles completed)? 4/30/18- patient underwent direct laryngoscopy, esophagoscopy, bronchoscopy, left partial glossectomy and bilateral neck dissections level 1 through 4      5/8/18- Post-op f/u with ENT Dr Gwen Wheeler- neck incision well healed  Flexible laryngoscopy revealed true vocal cords are mobile  Base of tongue is clear  Pharynx is clear  Discusses treatment moving forward would be adjuvant RT  Follow-up in 3-4 weeks  History of tongue cancer   2/2018 Initial Diagnosis    Tongue cancer ([facility] [address] )     2/6/2018 Biopsy    Left ventral surface of tongue biopsy: Squamous cell carcinoma, moderately well differently  3/14/2018 - 4/10/2018 Chemotherapy    Chemo-nivo surgery trial (Carbo/paclitaxel/nivo)  Patient had 5 cycles of chemo  4/30/2018 Surgery    Direct laryngoscopy, esophagoscopy, bronchoscopy, left partial glossectomy and bilateral neck dissections level 1 through 4              Past Medical History:   Past Medical History:   Diagnosis Date   • 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 [facility])    • Rectal bleeding 1/9/2023   • S/P exploratory laparotomy 12/16/2020       Past Surgical History:   Procedure Laterality Date   • CT NEEDLE BIOPSY LIVER  10/15/2020   • IR BIOPSY LIVER MASS  5/18/2022   • IR PARACENTESIS  1/10/2023   • IR PARACENTESIS  1/24/2023   • IR PARACENTESIS  2/2/2023   • IR PARACENTESIS  1/30/2023   • IR PARACENTESIS  2/6/2023   • IR PARACENTESIS  2/9/2023   • IR PARACENTESIS  2/13/2023   • IR PARACENTESIS  2/16/2023       Family History   Problem Relation Age of Onset   • Prostate cancer Father        Social History     Socioeconomic History   • Marital status: /Civil Union     Spouse name: None   • Number of children: None   • Years of education: None   • Highest education level: None   Occupational History   • None   Tobacco Use   • Smoking status: Former     Packs/day: 2 00     Years: 20 00     Pack years: 40 00     Types: Cigarettes   • Smokeless tobacco: Current   Substance and Sexual Activity   • Alcohol use: Not Currently     Comment: 1-2 drinks per day   • Drug use: None   • Sexual activity: None   Other Topics Concern   • None   Social History Narrative   • None     Social Determinants of Health     Financial Resource Strain: Low Risk    • Difficulty of Paying Living Expenses: Not hard at all   Food Insecurity: No Food Insecurity   • Worried About Running Out of Food in the Last Year: Never true   • Ran Out of Food in the Last Year: Never true   Transportation Needs: No Transportation Needs   • Lack of Transportation (Medical): No   • Lack of Transportation (Non-Medical):  No   Physical Activity: Insufficiently Active   • Days of Exercise per Week: 2 days   • Minutes of Exercise per Session: 30 min   Stress: No Stress Concern Present   • Feeling of Stress : Not at all   Social Connections: Socially Isolated   • Frequency of Communication with Friends and Family: Once a week   • Frequency of Social Gatherings with Friends and Family: Once a week   • Attends Caodaism Services: Never   • Active Member of Clubs or Organizations: No   • Attends Club or Organization Meetings: Never   • Marital Status:    Intimate Partner Violence: Not At Risk   • Fear of Current or Ex-Partner: No   • Emotionally Abused: No   • Physically Abused: No   • Sexually Abused: No   Housing Stability: Unknown   • Unable to Pay for Housing in the Last Year: No   • Number of Places Lived in the Last Year: Not on file   • Unstable Housing in the Last Year: No         Current Facility-Administered Medications:   •  al mag oxide-diphenhydramine-lidocaine viscous (MAGIC MOUTHWASH) suspension 10 mL, 10 mL, Swish & Swallow, Q4H PRN, Asif Bonner MD, 10 mL at 02/21/23 1551  •  argatroban infusion (premix), 0 1-3 mcg/kg/min, Intravenous, Titrated, Wallace Garcia DO, Last Rate: 2 9 mL/hr at 02/22/23 1709, 0 5 mcg/kg/min at 02/22/23 1709  •  cefTRIAXone (ROCEPHIN) 2,000 mg in dextrose 5 % 50 mL IVPB, 2,000 mg, Intravenous, Q24H, Asif Bonner MD, Stopped at 02/22/23 1257  •  chlorhexidine (PERIDEX) 0 12 % oral rinse 15 mL, 15 mL, Mouth/Throat, Q12H [facility], Renetta Munoz DO, 15 mL at 02/22/23 5760  •  ergocalciferol (VITAMIN D2) capsule 50,000 Units, 50,000 Units, Oral, Weekly, Wallace Garcia DO  •  insulin lispro (HumaLOG) 100 units/mL subcutaneous injection 1-6 Units, 1-6 Units, Subcutaneous, TID AC **AND** Fingerstick Glucose (POCT), , , TID AC, Wallace Garcia DO  •  melatonin tablet 6 mg, 6 mg, Oral, HS, Renetta Munoz DO, 6 mg at 02/21/23 2015  •  metroNIDAZOLE (FLAGYL) IVPB (premix) 500 mg 100 mL, 500 mg, Intravenous, Q8H, Renetta Munoz DO, Last Rate: 200 mL/hr at 02/22/23 1607, 500 mg at 02/22/23 1607  •  midodrine (PROAMATINE) tablet 10 mg, 10 mg, Oral, TID AC, Kirby Booker MD, 10 mg at 02/22/23 1559  •  moisture barrier miconazole 2% cream (aka JOYCE MOISTURE BARRIER ANTIFUNGAL CREAM), , Topical, BID, Renetta Munoz DO, Given at 02/22/23 1815  •  norepinephrine (LEVOPHED) 4 mg (STANDARD CONCENTRATION) IV in sodium chloride 0 9% 250 mL, 1-30 mcg/min, Intravenous, Titrated, Renetta Munoz DO, Last Rate: 15 mL/hr at 02/22/23 1601, 4 mcg/min at 02/22/23 1601  •  ondansetron (ZOFRAN) injection 4 mg, 4 mg, Intravenous, Q8H PRN, Renetta Munoz DO, 4 mg at 02/22/23 0006  •  oxyCODONE (ROXICODONE) IR tablet 5 mg, 5 mg, Oral, Q4H PRN, Williams Middleton MD  •  pantoprazole (PROTONIX) EC tablet 40 mg, 40 mg, Oral, Early Morning, Renetta Munoz DO, 40 mg at 02/21/23 0600  •  saliva substitute (MOUTH KOTE) mucosal solution 5 spray, 5 spray, Mouth/Throat, 4x Daily PRN, Renetta Munoz DO  •  sodium chloride 0 9 % infusion, 75 mL/hr, Intravenous, Continuous, Wallace Garcia DO, Last Rate: 75 mL/hr at 02/22/23 1215, 75 mL/hr at 02/22/23 1215  •  sucralfate (CARAFATE) tablet 1 g, 1 g, Oral, 4x Daily (AC & HS), Williams Middleton MD, 1 g at 02/22/23 1559    Medications Prior to Admission   Medication   • cyclobenzaprine (FLEXERIL) 5 mg tablet   • ergocalciferol (VITAMIN D2) 50,000 units   • furosemide (LASIX) 20 mg tablet   • Ivosidenib 250 MG TABS   • magnesium oxide (MAG-OX) 400 mg tablet   • ondansetron (ZOFRAN) 4 mg tablet   • oxyCODONE (ROXICODONE) 5 immediate release tablet   • pantoprazole (PROTONIX) 40 mg tablet   • potassium chloride (K-DUR,KLOR-CON) 10 mEq tablet       Allergies   Allergen Reactions   • Penicillins Other (See Comments) and Rash     As a child had reaction not sure what it was  Physical Exam:     /61   Pulse 78   Temp 97 8 °F (36 6 °C) (Oral)   Resp (!) 27   Ht 6' (1 829 m)   Wt 95 8 kg (211 lb 3 2 oz)   SpO2 99%   BMI 28 64 kg/m²     Physical Exam  Vitals reviewed  Constitutional:       General: He is not in acute distress  Appearance: He is ill-appearing  He is not toxic-appearing or diaphoretic  HENT:      Head: Normocephalic and atraumatic  Mouth/Throat:      Mouth: Mucous membranes are moist       Pharynx: No oropharyngeal exudate     Eyes: Extraocular Movements: Extraocular movements intact  Conjunctiva/sclera: Conjunctivae normal    Cardiovascular:      Rate and Rhythm: Normal rate and regular rhythm  Heart sounds: No murmur heard  No gallop  Pulmonary:      Effort: No respiratory distress  Breath sounds: Normal breath sounds  No wheezing, rhonchi or rales  Abdominal:      General: Bowel sounds are normal  There is distension  Palpations: Abdomen is soft  There is no mass  Musculoskeletal:         General: No tenderness or deformity  Cervical back: Normal range of motion  Right lower leg: Edema present  Left lower leg: Edema present  Comments: Bilateral LE swelling, left > right   Skin:     General: Skin is warm and dry  Findings: Lesion (petechiae noted on lower extremities) present  Neurological:      General: No focal deficit present  Mental Status: He is alert and oriented to person, place, and time  Motor: Weakness (generalized) present     Psychiatric:         Mood and Affect: Mood normal          Judgment: Judgment normal          Recent Results (from the past 48 hour(s))   Basic metabolic panel    Collection Time: 02/20/23  8:13 PM   Result Value Ref Range    Sodium 124 (L) 135 - 147 mmol/L    Potassium 5 0 3 5 - 5 3 mmol/L    Chloride 98 96 - 108 mmol/L    CO2 14 (L) 21 - 32 mmol/L    ANION GAP 12 4 - 13 mmol/L    BUN 85 (H) 5 - 25 mg/dL    Creatinine 1 71 (H) 0 60 - 1 30 mg/dL    Glucose 190 (H) 65 - 140 mg/dL    Calcium 6 6 (L) 8 3 - 10 1 mg/dL    eGFR 45 ml/min/1 73sq m   Lactic acid, plasma    Collection Time: 02/20/23  8:13 PM   Result Value Ref Range    LACTIC ACID 4 6 (HH) 0 5 - 2 0 mmol/L   APTT    Collection Time: 02/20/23 10:34 PM   Result Value Ref Range    PTT >210 (HH) 23 - 37 seconds   Basic metabolic panel    Collection Time: 02/21/23 12:22 AM   Result Value Ref Range    Sodium 123 (L) 135 - 147 mmol/L    Potassium 4 7 3 5 - 5 3 mmol/L    Chloride 97 96 - 108 mmol/L    CO2 17 (L) 21 - 32 mmol/L    ANION GAP 9 4 - 13 mmol/L    BUN 75 (H) 5 - 25 mg/dL    Creatinine 1 51 (H) 0 60 - 1 30 mg/dL    Glucose 225 (H) 65 - 140 mg/dL    Calcium 6 4 (L) 8 3 - 10 1 mg/dL    eGFR 52 ml/min/1 73sq m   Lactic acid 2 Hours    Collection Time: 02/21/23 12:22 AM   Result Value Ref Range    LACTIC ACID 3 1 (HH) 0 5 - 2 0 mmol/L   Basic metabolic panel    Collection Time: 02/21/23  4:32 AM   Result Value Ref Range    Sodium 124 (L) 135 - 147 mmol/L    Potassium 4 2 3 5 - 5 3 mmol/L    Chloride 95 (L) 96 - 108 mmol/L    CO2 17 (L) 21 - 32 mmol/L    ANION GAP 12 4 - 13 mmol/L    BUN 66 (H) 5 - 25 mg/dL    Creatinine 1 31 (H) 0 60 - 1 30 mg/dL    Glucose 254 (H) 65 - 140 mg/dL    Calcium 6 2 (L) 8 3 - 10 1 mg/dL    eGFR 62 ml/min/1 73sq m   Vancomycin, random    Collection Time: 02/21/23  4:32 AM   Result Value Ref Range    Vancomycin Rm 11 4 10 0 - 20 0 ug/mL   Calcium, ionized    Collection Time: 02/21/23  4:32 AM   Result Value Ref Range    Calcium, Ionized 0 88 (L) 1 12 - 1 32 mmol/L   CBC and differential    Collection Time: 02/21/23  4:32 AM   Result Value Ref Range    WBC 21 30 (H) 4 31 - 10 16 Thousand/uL    RBC 4 42 3 88 - 5 62 Million/uL    Hemoglobin 13 3 12 0 - 17 0 g/dL    Hematocrit 38 8 36 5 - 49 3 %    MCV 88 82 - 98 fL    MCH 30 1 26 8 - 34 3 pg    MCHC 34 3 31 4 - 37 4 g/dL    RDW 14 4 11 6 - 15 1 %    MPV 9 9 8 9 - 12 7 fL    Platelets 575 (L) 939 - 390 Thousands/uL    nRBC 0 /100 WBCs    Neutrophils Relative 82 (H) 43 - 75 %    Immat GRANS % 1 0 - 2 %    Lymphocytes Relative 6 (L) 14 - 44 %    Monocytes Relative 11 4 - 12 %    Eosinophils Relative 0 0 - 6 %    Basophils Relative 0 0 - 1 %    Neutrophils Absolute 17 40 (H) 1 85 - 7 62 Thousands/µL    Immature Grans Absolute 0 13 0 00 - 0 20 Thousand/uL    Lymphocytes Absolute 1 28 0 60 - 4 47 Thousands/µL    Monocytes Absolute 2 41 (H) 0 17 - 1 22 Thousand/µL    Eosinophils Absolute 0 06 0 00 - 0 61 Thousand/µL Basophils Absolute 0 02 0 00 - 0 10 Thousands/µL   Magnesium    Collection Time: 02/21/23  4:32 AM   Result Value Ref Range    Magnesium 3 6 (H) 1 6 - 2 6 mg/dL   Phosphorus    Collection Time: 02/21/23  4:32 AM   Result Value Ref Range    Phosphorus 3 4 2 7 - 4 5 mg/dL   Hepatic function panel    Collection Time: 02/21/23  4:32 AM   Result Value Ref Range    Total Bilirubin 1 96 (H) 0 20 - 1 00 mg/dL    Bilirubin, Direct 0 92 (H) 0 00 - 0 20 mg/dL    Alkaline Phosphatase 137 (H) 46 - 116 U/L    AST 73 (H) 5 - 45 U/L    ALT 71 12 - 78 U/L    Total Protein 4 7 (L) 6 4 - 8 4 g/dL    Albumin 2 6 (L) 3 5 - 5 0 g/dL   Lactic acid, plasma    Collection Time: 02/21/23  4:32 AM   Result Value Ref Range    LACTIC ACID 3 8 (HH) 0 5 - 2 0 mmol/L   APTT    Collection Time: 02/21/23  7:29 AM   Result Value Ref Range    PTT >210 (HH) 23 - 37 seconds   Lactic acid 2 Hours    Collection Time: 02/21/23  7:29 AM   Result Value Ref Range    LACTIC ACID 4 0 (HH) 0 5 - 2 0 mmol/L   Blood gas, arterial    Collection Time: 02/21/23  7:30 AM   Result Value Ref Range    pH, Arterial 7 518 (H) 7 350 - 7 450    pCO2, Arterial 23 0 (LL) 36 0 - 44 0 mm Hg    pO2, Arterial 92 5 75 0 - 129 0 mm Hg    HCO3, Arterial 18 3 (L) 22 0 - 28 0 mmol/L    Base Excess, Arterial -2 6 mmol/L    O2 Content, Arterial 19 0 16 0 - 23 0 mL/dL    O2 HGB,Arterial  96 4 94 0 - 97 0 %    SOURCE Line, Arterial    Echo complete w/ contrast if indicated    Collection Time: 02/21/23  7:53 AM   Result Value Ref Range    A4C EF 79 %    LVIDd 4 60 cm    LVIDS 3 00 cm    IVSd 1 30 cm    LVPWd 1 20 cm    FS 35 28 - 44    MV E' Tissue Velocity Septal 11 cm/s    MV E' Tissue Velocity Lateral 17 cm/s    E wave deceleration time 321 ms    MV Peak E Rambo 92 cm/s    MV Peak A Rambo 0 73 m/s    RVID d 3 8 cm    Tricuspid annular plane systolic excursion 8 00 cm    LA size 3 3 cm    LA length (A2C) 5 00 cm    RAA A4C 16 cm2    MV stenosis pressure 1/2 time 93 ms    MV valve area p 1/2 method 2 37     TR Peak Rambo 1 9 m/s    Triscuspid Valve Regurgitation Peak Gradient 15 0 mmHg    Ao root 3 50 cm    Tricuspid valve peak regurgitation velocity 1 92 m/s    Left ventricular stroke volume (2D) 63 00 mL    IVS 1 3 cm    LEFT VENTRICLE SYSTOLIC VOLUME (MOD BIPLANE) 2D 35 mL    LV DIASTOLIC VOLUME (MOD BIPLANE) 2D 98 mL    Left Atrium Area-systolic Four Chamber 84 3 cm2    Left Atrium Area-systolic Apical Two Chamber 16 cm2    LVSV, 2D 63 mL    LV EF 65     Est  RA pres 15 0 mmHg    Right Ventricular Peak Systolic Pressure 22 31 mmHg   Fingerstick Glucose (POCT)    Collection Time: 02/21/23  7:53 AM   Result Value Ref Range    POC Glucose 208 (H) 65 - 140 mg/dl   Basic metabolic panel    Collection Time: 02/21/23  9:06 AM   Result Value Ref Range    Sodium 122 (L) 135 - 147 mmol/L    Potassium 4 0 3 5 - 5 3 mmol/L    Chloride 93 (L) 96 - 108 mmol/L    CO2 17 (L) 21 - 32 mmol/L    ANION GAP 12 4 - 13 mmol/L    BUN 59 (H) 5 - 25 mg/dL    Creatinine 1 15 0 60 - 1 30 mg/dL    Glucose 202 (H) 65 - 140 mg/dL    Calcium 6 6 (L) 8 3 - 10 1 mg/dL    eGFR 73 ml/min/1 73sq m   Blood gas, venous    Collection Time: 02/21/23  9:29 AM   Result Value Ref Range    pH, [place] 7 513 (H) 7 300 - 7 400    pCO2, [place] 25 1 (L) 42 0 - 50 0 mm Hg    pO2, [place] 40 3 35 0 - 45 0 mm Hg    HCO3, [place] 19 7 (L) 24 - 30 mmol/L    Base Excess, Cliff -1 6 mmol/L    O2 Content, Cliff 15 3 ml/dL    O2 HGB, VENOUS 78 4 60 0 - 80 0 %    ROOM AIR FIO2 21 %   Fingerstick Glucose (POCT)    Collection Time: 02/21/23 10:02 AM   Result Value Ref Range    POC Glucose 191 (H) 65 - 140 mg/dl   Cortisol    Collection Time: 02/21/23 10:39 AM   Result Value Ref Range    Cortisol, Random 20 0 ug/dL   Basic metabolic panel    Collection Time: 02/21/23 11:46 AM   Result Value Ref Range    Sodium 121 (L) 135 - 147 mmol/L    Potassium 3 9 3 5 - 5 3 mmol/L    Chloride 94 (L) 96 - 108 mmol/L    CO2 17 (L) 21 - 32 mmol/L    ANION GAP 10 4 - 13 mmol/L    BUN 56 (H) 5 - 25 mg/dL    Creatinine 1 06 0 60 - 1 30 mg/dL    Glucose 173 (H) 65 - 140 mg/dL    Calcium 6 4 (L) 8 3 - 10 1 mg/dL    eGFR 80 ml/min/1 73sq m   Fingerstick Glucose (POCT)    Collection Time: 02/21/23 11:46 AM   Result Value Ref Range    POC Glucose 179 (H) 65 - 140 mg/dl   PTH, intact    Collection Time: 02/21/23 11:46 AM   Result Value Ref Range     6 (H) 18 4 - 80 1 pg/mL   Fingerstick Glucose (POCT)    Collection Time: 02/21/23  2:03 PM   Result Value Ref Range    POC Glucose 146 (H) 65 - 140 mg/dl   APTT    Collection Time: 02/21/23  3:35 PM   Result Value Ref Range     (HH) 23 - 37 seconds   Fingerstick Glucose (POCT)    Collection Time: 02/21/23  3:41 PM   Result Value Ref Range    POC Glucose 123 65 - 140 mg/dl   Fingerstick Glucose (POCT)    Collection Time: 02/21/23  5:40 PM   Result Value Ref Range    POC Glucose 106 65 - 140 mg/dl   Basic metabolic panel    Collection Time: 02/21/23  6:35 PM   Result Value Ref Range    Sodium 124 (L) 135 - 147 mmol/L    Potassium 3 7 3 5 - 5 3 mmol/L    Chloride 96 96 - 108 mmol/L    CO2 18 (L) 21 - 32 mmol/L    ANION GAP 10 4 - 13 mmol/L    BUN 47 (H) 5 - 25 mg/dL    Creatinine 0 87 0 60 - 1 30 mg/dL    Glucose 118 65 - 140 mg/dL    Calcium 6 5 (L) 8 3 - 10 1 mg/dL    eGFR 99 ml/min/1 73sq m   Fingerstick Glucose (POCT)    Collection Time: 02/21/23  8:30 PM   Result Value Ref Range    POC Glucose 122 65 - 140 mg/dl   Basic metabolic panel    Collection Time: 02/21/23 11:58 PM   Result Value Ref Range    Sodium 123 (L) 135 - 147 mmol/L    Potassium 3 8 3 5 - 5 3 mmol/L    Chloride 95 (L) 96 - 108 mmol/L    CO2 18 (L) 21 - 32 mmol/L    ANION GAP 10 4 - 13 mmol/L    BUN 49 (H) 5 - 25 mg/dL    Creatinine 0 90 0 60 - 1 30 mg/dL    Glucose 144 (H) 65 - 140 mg/dL    Calcium 6 4 (L) 8 3 - 10 1 mg/dL    eGFR 98 ml/min/1 73sq m   APTT    Collection Time: 02/21/23 11:58 PM   Result Value Ref Range    PTT 97 (H) 23 - 37 seconds   CBC and differential    Collection Time: 02/22/23  5:57 AM   Result Value Ref Range    WBC 16 95 (H) 4 31 - 10 16 Thousand/uL    RBC 3 78 (L) 3 88 - 5 62 Million/uL    Hemoglobin 11 6 (L) 12 0 - 17 0 g/dL    Hematocrit 32 9 (L) 36 5 - 49 3 %    MCV 87 82 - 98 fL    MCH 30 7 26 8 - 34 3 pg    MCHC 35 3 31 4 - 37 4 g/dL    RDW 14 4 11 6 - 15 1 %    MPV 9 7 8 9 - 12 7 fL    Platelets 60 (L) 159 - 390 Thousands/uL    nRBC 0 /100 WBCs    Neutrophils Relative 85 (H) 43 - 75 %    Immat GRANS % 1 0 - 2 %    Lymphocytes Relative 4 (L) 14 - 44 %    Monocytes Relative 9 4 - 12 %    Eosinophils Relative 1 0 - 6 %    Basophils Relative 0 0 - 1 %    Neutrophils Absolute 14 48 (H) 1 85 - 7 62 Thousands/µL    Immature Grans Absolute 0 10 0 00 - 0 20 Thousand/uL    Lymphocytes Absolute 0 71 0 60 - 4 47 Thousands/µL    Monocytes Absolute 1 54 (H) 0 17 - 1 22 Thousand/µL    Eosinophils Absolute 0 10 0 00 - 0 61 Thousand/µL    Basophils Absolute 0 02 0 00 - 0 10 Thousands/µL   Magnesium    Collection Time: 02/22/23  5:57 AM   Result Value Ref Range    Magnesium 3 0 (H) 1 6 - 2 6 mg/dL   Phosphorus    Collection Time: 02/22/23  5:57 AM   Result Value Ref Range    Phosphorus 2 3 (L) 2 7 - 4 5 mg/dL   Calcium, ionized    Collection Time: 02/22/23  5:57 AM   Result Value Ref Range    Calcium, Ionized 0 89 (L) 1 12 - 1 32 mmol/L   Hepatic function panel    Collection Time: 02/22/23  5:57 AM   Result Value Ref Range    Total Bilirubin 3 03 (H) 0 20 - 1 00 mg/dL    Bilirubin, Direct 1 30 (H) 0 00 - 0 20 mg/dL    Alkaline Phosphatase 105 46 - 116 U/L    AST 65 (H) 5 - 45 U/L    ALT 54 12 - 78 U/L    Total Protein 4 9 (L) 6 4 - 8 4 g/dL    Albumin 3 5 3 5 - 5 0 g/dL   Basic metabolic panel    Collection Time: 02/22/23  5:58 AM   Result Value Ref Range    Sodium 124 (L) 135 - 147 mmol/L    Potassium 3 7 3 5 - 5 3 mmol/L    Chloride 98 96 - 108 mmol/L    CO2 19 (L) 21 - 32 mmol/L    ANION GAP 7 4 - 13 mmol/L    BUN 41 (H) 5 - 25 mg/dL    Creatinine 0 80 0 60 - 1 30 mg/dL    Glucose 129 65 - 140 mg/dL    Calcium 6 4 (L) 8 3 - 10 1 mg/dL    eGFR 103 ml/min/1 73sq m   PTH, intact    Collection Time: 02/22/23  5:58 AM   Result Value Ref Range     2 (H) 18 4 - 80 1 pg/mL   Fingerstick Glucose (POCT)    Collection Time: 02/22/23  6:05 AM   Result Value Ref Range    POC Glucose 119 65 - 140 mg/dl   APTT    Collection Time: 02/22/23  7:27 AM   Result Value Ref Range    PTT 82 (H) 23 - 37 seconds   Fingerstick Glucose (POCT)    Collection Time: 02/22/23  7:59 AM   Result Value Ref Range    POC Glucose 140 65 - 140 mg/dl   Fingerstick Glucose (POCT)    Collection Time: 02/22/23 11:03 AM   Result Value Ref Range    POC Glucose 137 65 - 140 mg/dl   CBC and differential    Collection Time: 02/22/23 11:44 AM   Result Value Ref Range    WBC 17 77 (H) 4 31 - 10 16 Thousand/uL    RBC 3 65 (L) 3 88 - 5 62 Million/uL    Hemoglobin 11 4 (L) 12 0 - 17 0 g/dL    Hematocrit 32 5 (L) 36 5 - 49 3 %    MCV 89 82 - 98 fL    MCH 31 2 26 8 - 34 3 pg    MCHC 35 1 31 4 - 37 4 g/dL    RDW 14 2 11 6 - 15 1 %    MPV 10 1 8 9 - 12 7 fL    Platelets 63 (L) 543 - 390 Thousands/uL    nRBC 0 /100 WBCs    Neutrophils Relative 86 (H) 43 - 75 %    Immat GRANS % 1 0 - 2 %    Lymphocytes Relative 4 (L) 14 - 44 %    Monocytes Relative 8 4 - 12 %    Eosinophils Relative 1 0 - 6 %    Basophils Relative 0 0 - 1 %    Neutrophils Absolute 15 42 (H) 1 85 - 7 62 Thousands/µL    Immature Grans Absolute 0 13 0 00 - 0 20 Thousand/uL    Lymphocytes Absolute 0 70 0 60 - 4 47 Thousands/µL    Monocytes Absolute 1 42 (H) 0 17 - 1 22 Thousand/µL    Eosinophils Absolute 0 08 0 00 - 0 61 Thousand/µL    Basophils Absolute 0 02 0 00 - 0 10 Thousands/µL   Body fluid white cell count with differential    Collection Time: 02/22/23 11:44 AM   Result Value Ref Range    Site Paracentesis     Color, Fluid Pale Yellow Clear, Colorless,Yellow    Clarity, Fluid Hazy (A) Clear    WBC, Fluid 78 /ul   Amylase, body fluid    Collection Time: 02/22/23 11:44 AM   Result Value Ref Range    Amylase, Fluid 15 U/L   Body Fluid Diff    Collection Time: 02/22/23 11:44 AM   Result Value Ref Range    Total Counted 10     Neutrophils % (Fluid) 40 %    Lymphs % (Fluid) 50 %    Mesothelial % (Fluid) 10 %   APTT    Collection Time: 02/22/23  2:23 PM   Result Value Ref Range    PTT 73 (H) 23 - 37 seconds   Fingerstick Glucose (POCT)    Collection Time: 02/22/23  4:18 PM   Result Value Ref Range    POC Glucose 135 65 - 140 mg/dl   Protime-INR    Collection Time: 02/22/23  4:56 PM   Result Value Ref Range    Protime 26 1 (H) 11 6 - 14 5 seconds    INR 2 36 (H) 0 84 - 1 19   APTT    Collection Time: 02/22/23  4:56 PM   Result Value Ref Range    PTT 65 (H) 23 - 37 seconds       CT abdomen pelvis wo contrast    Result Date: 2/20/2023  Narrative: CT ABDOMEN AND PELVIS WITHOUT IV CONTRAST INDICATION:   Abdominal pain, acute, nonlocalized abdominal pain  History of cholangiocarcinoma  COMPARISON:  CT 1/26/2023  TECHNIQUE:  CT examination of the abdomen and pelvis was performed without intravenous contrast  Axial, sagittal, and coronal 2D reformatted images were created from the source data and submitted for interpretation  Radiation dose length product (DLP) for this visit:  1031 92 mGy-cm   This examination, like all CT scans performed in the [facility], was performed utilizing techniques to minimize radiation dose exposure, including the use of iterative reconstruction and automated exposure control  Enteric contrast was not administered  FINDINGS: Absence of intravenous contrast enhancement limits evaluation of the abdominal viscera  ABDOMEN LOWER CHEST:  No significant change in multiple right lower lobe masses, largest measuring 2 3 cm within the medial costophrenic angle  No effusions  LIVER/BILIARY TREE:  Evaluation is limited in the absence of intravenous contrast enhancement    Dominant hypodense mass in the dome of the liver appears unchanged, however multiple other smaller previously seen lesions are not well visualized on this study  No biliary dilatation  GALLBLADDER:  Nonvisualized  SPLEEN:  Unremarkable  PANCREAS:  Unremarkable  ADRENAL GLANDS:  Unremarkable  KIDNEYS/URETERS:  Unremarkable  No hydronephrosis  STOMACH AND BOWEL:  No obstruction or acute inflammatory changes  Colonic diverticulosis without diverticulitis  APPENDIX:  No findings to suggest appendicitis  ABDOMINOPELVIC CAVITY:  Moderate ascites  No pneumoperitoneum  No significant change in omental implant adjacent to the liver, most conspicuous anteriorly, and diffuse infiltration of the omentum compatible with peritoneal carcinomatosis  Unchanged mild gastrohepatic ligament, periportal and portacaval adenopathy  VESSELS:  Atherosclerotic changes are present  No evidence of aneurysm  PELVIS REPRODUCTIVE ORGANS:  Unremarkable for patient's age  URINARY BLADDER:  Unremarkable  ABDOMINAL WALL/INGUINAL REGIONS:  Unremarkable  OSSEOUS STRUCTURES:  No acute fracture or destructive osseous lesion  Impression: No acute pathology  Evaluation is limited in the absence of intravenous contrast enhancement, however metastatic disease appears essentially unchanged, and including dominant hepatic lesion, peritoneal carcinomatosis with moderate moderate ascites  Presumed metastatic right lower lobe nodules also noted  Workstation performed: [id number]     MRI abdomen w wo contrast    Result Date: 2/20/2023  Narrative: MRI OF THE ABDOMEN WITH AND WITHOUT CONTRAST INDICATION: 47 years / Male  C22 8: Malignant neoplasm of liver, primary, unspecified as to type  27-year-old male with metastatic cholangiocarcinoma  COMPARISON: CT of the chest, abdomen and pelvis from 10/24/2022 and January 26, 2023   TECHNIQUE:  Multiplanar/multisequence MRI of the abdomen was performed before and after administration of contrast  IV Contrast:  10 mL of Gadobutrol injection (SINGLE-DOSE) FINDINGS: LOWER CHEST:   1 8 x 2 4 cm nodule in the posterior basilar segment of the right lower lobe (series 12, image 41), not significantly changed since the CT from 1/26/2023  The patient's other known pulmonary metastases are not visible on this MRI  LIVER: Normal size  Multiple hypoenhancing masses, fluid being confluent masses virtually replacing the entire lateral segment of the left lobe  Largest mass located at the junction of segments 4 and 5/8, measuring 2 6 x 2 8 cm (series 14, image 95)  Allowing for differences in modality, slice position and cursor placement, this does not appear significantly changed since the CT from 1/26/2023  2 7 x 4 6 cm nonenhancing structure at the dome of the liver, at the junction of segments 4A and segment 8, with T1 hyperintensity, consistent with chronic hemorrhage, either within the hepatic parenchyma or within a metastasis  This is also unchanged since the last CT  4 5 cm thrombosis in the splenic vein, extending to the origins of the portal vein and SMV, with additional thrombus in the right portal vein and its branches, all developing since 1/26/2023  BILE DUCTS:  No intrahepatic or extrahepatic bile duct dilatation  GALLBLADDER:  Not visualized; ? Prior cholecystectomy  PANCREAS:  Unremarkable  ADRENAL GLANDS:  Normal  SPLEEN:  Normal  KIDNEYS/PROXIMAL URETERS:  No hydroureteronephrosis  Bilateral renal cysts  No solid masses  STOMACH AND BOWEL:   Unremarkable  No dilated loops of bowel  PERITONEAL CAVITY/RETROPERITONEUM:  Small amount of ascites  Generalized thickening and enhancement of the peritoneal reflection  Several enhancing masses arising from the peritoneum, the largest anterior to the right lobe of the liver, measuring 1 6 x  5 8 cm (series 14, image 49) LYMPH NODES:  1 2 x 2 4 cm portacaval lymph node (series 12, image 70)  Necrotic appearing 1 0 x 1 1 cm mindi hepatis node (series 12, image 64)    1 1 x 1 5 cm necrotic appearing gastrohepatic node (series 12, image 57) 1 5 x 1 8 cm mindi hepatis node (series 12, image 70)  These have not significantly changed since the CT from 1/26/2023  VASCULAR STRUCTURES:  Thrombosis seen in portions of the splenic vein, portal vein and superior mesenteric vein as described above  Abdominal aorta unremarkable, without aneurysm  ABDOMINAL WALL:  Small umbilical hernia containing ascites  OSSEOUS STRUCTURES:  1 5 x 2 3 cm enhancing lesion in the right iliac crest, extending into through the cortex into the adjacent portion of the right iliac is muscle (series 12, image 146), similar in appearance to the CT from 1/26/2023  Peripherally enhancing lesion with diffusion restriction in the L1 vertebral body  (series 12, image 86; series 13, image 50; series 9, image 107), with no corresponding abnormality on prior CT  Impression: 1  Multiple hepatic metastases, many of which appear to be at least partially necrotic, most extensive in the lateral segment of the left lobe, not appreciably changed since a CT from 1/26/2023  2   New thrombosis in portions of the splenic, superior mesenteric and main portal veins and a segment of the right portal vein  3   Peritoneal metastases and small amount of ascites  4   Intra-abdominal lymphadenopathy, as described above, not significantly changed since 1/26/2023  5   Lesion in the right iliac crest, possibly metastasis, not appreciably changed since 1/26/2023  6   Indeterminate enhancing lesion in the L1 vertebral body, possibly metastasis  7   1 8 x 2 4 cm pulmonary metastasis in the base of the right lower lobe  The study was marked in [facility] for immediate notification  Workstation performed: [id number]     IR paracentesis    Result Date: 2/17/2023  Narrative: Ultrasound-guided paracentesis Clinical History: Recurrent symptomatic ascites, history of cholangiocarcinoma Procedure: Ultrasound used to localize the left lower quadrant ascites   The overlying skin was prepped and draped in usual sterile fashion and local anesthesia was obtained with the 1% lidocaine solution  A 5 Western Nilda Yueh needle was advanced until fluid was aspirated under live ultrasound guidance  Approximately 4500 cc' s of clear, yellow fluid was aspirated  Labs collected and sent  The patient tolerated the procedure well and left the department in stable condition  Impression: Impression: Ultrasound-guided left-sided paracentesis  Signed, performed, and dictated by Fang Dubon PA-C under the direct supervision of Dr Boyd Roblero  Workstation performed: [id number]     IR paracentesis    Result Date: 2/13/2023  Narrative: PROCEDURE: Paracentesis STAFF: Dian Greenberg PA-C COMPLICATIONS: None  MEDICATIONS: 1% lidocaine subcutaneous  INDICATION: Symptomatic ascites  Cholangiocarcinoma PROCEDURE: Using ultrasound guidance, the left paracolic gutter was punctured and a catheter was placed into the peritoneal cavity  A total of 4950 milliliters of fluid was removed  The catheter was then removed  FINDINGS: Massive ascites  Clear yellow ascites was removed  Impression: Therapeutic paracentesis  Procedure was performed, signed and dictated by: Dian Greenberg PA-C Supervising Physician: Dr Boyd Roblero Workstation performed: [id number]     IR paracentesis    Result Date: 2/10/2023  Narrative: PROCEDURE: Therapeutic paracentesis MEDICATIONS: 1% lidocaine subcutaneous  INDICATION: Symptomatic ascites  Cholangiocarcinoma  PROCEDURE: Using ultrasound guidance, the left lower quadrant was punctured and a 5f yueh catheter was placed into the abdominal cavity until ascites was aspirated  A total of 5550 milliliters of serous fluid was removed  The catheter was then removed  FINDINGS: 5550 mL of serous ascites was removed  Impression: Therapeutic paracentesis  Signed, performed, and dictated by HARINI Rai under the supervision of Dr Kraig Mar   Workstation performed: [id number]     IR paracentesis    Result Date: 2/7/2023  Narrative: Ultrasound-guided paracentesis Clinical History: Recurrent malignant ascites, history of cholangiocarcinoma Procedure: After explaining the risks and benefits of the procedure to the patient, informed consent was obtained  Ultrasound used to localize the left lower quadrant ascites  The overlying skin was prepped and draped in usual sterile fashion and local anesthesia was obtained with the 1% lidocaine solution  A 5 Western Nilda Yueh needle was advanced until fluid was aspirated under live ultrasound guidance  Approximately 6550 cc' s of clear, yellow fluid was aspirated  The patient tolerated the procedure well and left the department in stable condition  Impression: Impression: Ultrasound-guided left-sided paracentesis  Signed, performed, and dictated by Aisha Monzon PA-C under the direct supervision of Dr Siddiqui [facility]  Workstation performed: [id number]     IR paracentesis    Result Date: 2/3/2023  Narrative: PROCEDURE: Ultrasound-guided paracentesis STAFF: Rina Baumgarten, PA-C  COMPLICATIONS: None immediate  MEDICATIONS: 1% lidocaine subcutaneous  INDICATION: Symptomatic ascites  Patient with history of cholangiocarcinoma with malignant ascites  PROCEDURE: Using ultrasound guidance, the left paracolic gutter was punctured and a catheter was placed into the peritoneal cavity  A total of 10,350 milliliters of clear yellow fluid was removed  The catheter was then removed  FINDINGS: Massive ascites  10,350cc clear yellow ascites was removed  Impression: Ultrasound-guided paracentesis  Signed, performed, and dictated by Rina Baumgarten, PA-C under the direct supervision of Dr Carlos Hernandez  Workstation performed: [id number]     IR paracentesis    Result Date: 2/2/2023  Narrative: Ultrasound-guided paracentesis Clinical History: Recurrent malignant Ascites, history of cholangiocarcinoma Procedure: After explaining the risks and benefits of the procedure to the patient, informed consent was obtained   Ultrasound used to localize the left lower quadrant ascites  The overlying skin was prepped and draped in usual sterile fashion and local anesthesia was obtained with the 1% lidocaine solution  A 5 Western Nilda Yueh needle was advanced until fluid was aspirated under live ultrasound guidance  Approximately 6500 cc' s of clear, yellow fluid was aspirated  The patient tolerated the procedure well and left the department in stable condition  Impression: Impression: Ultrasound-guided left-sided paracentesis  Signed, performed, and dictated by Ruslan Oliver PA-C under the direct supervision of Dr Julio Dunn  Workstation performed: [id number]     IR paracentesis    Result Date: 1/25/2023  Narrative: PROCEDURE: Symptomatic paracentesis MEDICATIONS: 1% lidocaine subcutaneous  INDICATION: Symptomatic ascites  Hepatocellular carcinoma  PROCEDURE: Using ultrasound guidance, the right lower quadrant was punctured and a 5f yueh catheter was placed into the abdominal cavity until ascites was aspirated  A total of 7,400 milliliters of serous fluid was removed  The catheter was then removed  FINDINGS: 7400 mL of serous ascites was removed  Impression: Symptomatic paracentesis  Signed, performed, and dictated by HARINI Richard under the supervision of Dr Johnson Culver  Workstation performed: [id number]     CT chest abdomen pelvis w contrast    Result Date: 1/31/2023  Narrative: CT CHEST, ABDOMEN AND PELVIS WITH IV CONTRAST INDICATION:   C24 8: Malignant neoplasm of overlapping sites of biliary tract  As per review of electronic medical record, 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 and stage IV cholangiocarcinoma on 3rd line treatment  COMPARISON:  Several prior CTs of the chest, abdomen and pelvis, the most recent from 1/18/2023, and PET/CT from 4/28/2021  TECHNIQUE: CT examination of the chest, abdomen and pelvis was performed    In addition to portal venous phase postcontrast scanning through the abdomen and pelvis, delayed phase postcontrast scanning was performed through the upper abdominal viscera  Axial, sagittal, and coronal 2D reformatted images were created from the source data and submitted for interpretation  Radiation dose length product (DLP) for this visit:  2421 02 mGy-cm   This examination, like all CT scans performed in the [facility], was performed utilizing techniques to minimize radiation dose exposure, including the use of iterative reconstruction and automated exposure control  IV Contrast:  100 mL of iohexol (OMNIPAQUE) Enteric Contrast: Enteric contrast was administered  FINDINGS: CHEST BRONCHOPULMONARY:  Retained secretions are seen in the trachea  Otherwise clear central airways  Mild upper lobe predominant paraseptal emphysematous changes  Enhancing airspace consolidations are seen at the lung bases, left greater than right, likely areas of atelectasis  Multiple pulmonary nodules are seen throughout the lungs  2 necrotic nodules at the right lung base medially are mildly enlarged compared to 1/18/2023  The larger lesion measures 1 9 x 1 8 cm (series 2 image 106 and 107), compared to 1 4 x 1 4 cm on 1/18/2022  The smaller lesion measures 1 1 cm (series 2 image 104) compared to 0 9 cm  These are new from October 2022  A 6 mm left upper lobe nodule laterally (series 3 image 126) is enlarged from 3 mm in October 2022  Other nodules are grossly similar in size  Nodules at the lung bases are somewhat difficult to compare given presence of atelectasis on the current study which displaces some of the previously seen nodules  PLEURA:  No pleural effusions  No pneumothorax  HEART/GREAT VESSELS: The heart is normal in size  No pericardial effusion  Normal caliber thoracic aorta with no dissection   There is suggestion of a filling defect in one of the left lower lobe segmental pulmonary arteries as it is decreased in attenuation compared to other branches at the same level (series 6 image 66-75)  Main pulmonary artery dilated up to 3 3 cm, suggestive of pulmonary arterial hypertension  There is a right-sided Mediport with its tip in the SVC  MEDIASTINUM/LYMPH NODES:  No axillary, mediastinal or hilar lymphadenopathy  CHEST WALL AND LOWER NECK:  Unremarkable  ABDOMEN LIVER/BILIARY TREE: Multiple ill-defined, peripherally enhancing lesions are seen throughout both lobes of the liver  Most of the parenchyma in the left lobe is replaced by these lesions  Overall there appears to be an increase in the number of the lesions compared to October 2022  The largest discrete lesion measures 3 0 x 2 9 cm (series 2 image 116), compared to 2 5 x 2 2 cm in October 2022  There is a 4 5 x 2 5 cm hypodense lesion in segment 8 of the liver (series 2 image 98), similar to the recent studies when remeasured in a similar manner  No biliary ductal dilation  GALLBLADDER:  No calcified gallstones  No pericholecystic inflammatory change  SPLEEN: Unremarkable  PANCREAS:  Unremarkable  Normal caliber main pancreatic duct  ADRENAL GLANDS:  Unremarkable  KIDNEYS/URETERS:  Symmetric renal enhancement  No intrarenal or ureteral calculi  No hydronephrosis  Simple cyst in the upper pole the right kidney  STOMACH AND BOWEL:  Evaluation for bowel inflammation is somewhat limited by the presence of ascites  No bowel obstruction  Oral contrast has reached the descending colon  Scattered colonic diverticuli, however no evidence of diverticulitis  APPENDIX:  Normal appendix  ABDOMINOPELVIC CAVITY: There is moderate to large abdominal and pelvic ascites  No organized fluid collections  There has been mild progression of peritoneal carcinomatosis compared to October 2022  For instance, there is mildly increased nodularity of the omentum in the anterior abdomen (series 2 image 122) and new peritoneal thickening in the right paracolic gutter (series 2 image 183)    There are also multiple soft tissue implants along the right hemidiaphragm, which have increased in size and number  No pneumoperitoneum  LYMPH NODES: There is a necrotic appearing gastrohepatic ligament lymph node measuring 1 3 x 1 0 cm (series 2 image 122)  It is newly enlarged compared to October 2022  An enlarged periportal lymph node measures 2 2 x 1 2 cm (series 2 image 132)  In October 2022 it measured 1 5 x 0 9 cm  A portacaval lymph node measures 2 8 x 1 9 cm (series 2 image 140), compared to 2 4 x 1 5 cm in October 2022  No pelvic lymphadenopathy  VESSELS: Normal caliber aorta  Patent major branch vessels  Incidentally noted is a retroaortic left renal vein, a normal anatomic variant  PELVIS REPRODUCTIVE ORGANS:  The prostate is enlarged, measuring 5 2 cm in transverse dimension  URINARY BLADDER:  Unremarkable  ABDOMINAL WALL/INGUINAL REGIONS: There is mild subcutaneous tissue stranding in the anterior abdominal wall  There is a periumbilical hernia containing fluid  MUSCULOSKELETAL: 2 3 x 1 7 cm lobulated lytic lesion in the right iliac bone near the sacroiliac joint with overlying cortical disruption  It is essentially unchanged from 1/18/2023 and October 2022  This lesion was 1st noted on the CT from April 2022 and has since enlarged  Several subcentimeter densely sclerotic lesions in the right proximal femur most likely represent bone islands as they are unchanged dating back to February 2018  No other lytic or sclerotic lesions  Degenerative changes of the spine  Impression: Questionable pulmonary embolism versus artifact in a left lower lobe segmental pulmonary artery  Recommend further evaluation with CT pulmonary angiogram  Multiple pulmonary metastases, the largest two measuring 1 9 x 1 8 cm and 1 1 cm in the right lower lobe medially, which are mildly increased from 1/18/2023   Metastatic disease in the liver, likely metastatic upper abdominal lymphadenopathy, and peritoneal carcinomatosis similar to studies from earlier in January 2022, however with progression since October 2022, as detailed above  No significant interval change in right iliac bone metastasis compared to 1/18/2023 and October 2022  Moderate to large abdominal and pelvic ascites  Retained secretions in the trachea, which may place patient at risk for aspiration  Atelectasis in both lower lobes  Multiple additional findings as above  I personally discussed the findings of a possible pulmonary embolism with Dr Mascorro on 1/31/2023 at 10:31 AM who saw the patient for a second opinion on day of dictation  The study was marked in EPIC for significant notification to the ordering clinician for the rest of the findings  Workstation performed: [id number]     Paracentesis (Bedside)    Result Date: 2/22/2023  Narrative: Denice Carmona MD     2/22/2023  3:30 PM Paracentesis (Bedside) Date/Time: 2/22/2023 12:23 PM Performed by: Denice Carmona MD Authorized by: Denice Carmona MD Patient location:  ICU Consent:   Consent obtained:  Written   Consent given by:  Patient   Risks discussed:  Bleeding, bowel perforation, infection and pain Universal protocol:   Procedure explained and questions answered to patient or proxy's satisfaction: yes    Relevant documents present and verified: yes    Test results available and properly labeled: yes    Radiology Images displayed and confirmed  If images not available, report reviewed: yes    Site/side marked: yes  Pre-procedure details:   Initial or Subsequent Exam:  Subsequent   Procedure purpose:  Therapeutic   Indications: abdominal discomfort secondary to ascites    Preparation: Patient was prepped and draped in usual sterile fashion  Anesthesia (see MAR for exact dosages):    Anesthesia method:  Local infiltration   Local anesthetic:  Lidocaine 1% w/o epi Procedure details:   Needle gauge:  18   Equipment: Paracentesis kit used    Ultrasound guidance: yes    Ultrasound image availability:  Not saved   Approach:  Percutaneous   Puncture site:  R lower quadrant   Fluid removed amount:  4 5L   Fluid appearance:  Clear and yellow   Dressing:  Adhesive bandage Post-procedure details:   Patient tolerance of procedure: Tolerated well, no immediate complications Post-procedure medication (see MAR for dosing): Albumin replacement: No      XR chest portable ICU    Result Date: 2/20/2023  Narrative: CHEST INDICATION: Shortness of breath COMPARISON:  1/31/2023 EXAM PERFORMED/VIEWS:  XR CHEST PORTABLE ICU Images: 2 FINDINGS: Right-sided Mediport terminating at the cavoatrial junction, unchanged Cardiomediastinal silhouette appears unremarkable  The lungs are clear  No pneumothorax or pleural effusion  Osseous structures appear within normal limits for patient age  Bilateral neck surgical clips     Impression: No acute cardiopulmonary disease  Findings are stable Workstation performed: [id number]     CTA ED chest PE study    Result Date: 1/31/2023  Narrative: CTA - CHEST WITH IV CONTRAST - PULMONARY ANGIOGRAM INDICATION:   recent abd ct scan concerning for PE, pt sob  COMPARISON: None  TECHNIQUE: CTA examination of the chest was performed using angiographic technique according to a protocol specifically tailored to evaluate for pulmonary embolism  Axial, sagittal, and coronal 2D reformatted images were created from the source data and  submitted for interpretation  In addition, coronal 3D MIP postprocessing was performed on the acquisition scanner  Radiation dose length product (DLP) for this visit:  418 mGy-cm   This examination, like all CT scans performed in the [facility], was performed utilizing techniques to minimize radiation dose exposure, including the use of iterative reconstruction and automated exposure control  IV Contrast:  100 mL of iohexol (OMNIPAQUE)  FINDINGS: PULMONARY ARTERIAL TREE:  No evidence of an acute pulmonary thromboembolism    The vessel which was questioned on the recent CT scan is opacified on the current study  LUNGS:  6 mm anterior left upper lobe pulmonary nodule (302, 34) has been slowly increasing in size     Additional scattered subcentimeter nodules are stable to slightly increased in size when comparing to the study from October 24, 2022   2 nodules in the medial right lower lobe are stable from the most recent study from a few days ago, but also increased since the prior exam in October  Changes compatible with paraseptal emphysema  Bibasilar atelectasis  [facility] PLEURA:  Trace right pleural effusion  HEART/GREAT VESSELS:  Coronary artery calcifications, which are an indicator of coronary artery disease  No thoracic aortic aneurysm  MEDIASTINUM AND ANGEL:  Unremarkable  CHEST WALL AND LOWER NECK:   Right-sided Mediport catheter with the tip at the cavoatrial junction  VISUALIZED STRUCTURES IN THE UPPER ABDOMEN:  Multifocal tumor in the liver is again shown, better characterized on the recent CT scan through the abdomen and pelvis  Malignant-appearing ascites is noted in the upper abdomen with multiple soft tissue nodules along the peritoneal surface and beneath the right hemidiaphragm  Please see report for details  OSSEOUS STRUCTURES:  No acute fracture or destructive osseous lesion  Impression: No acute pulmonary thromboembolism  Slowly enlarging bilateral pulmonary nodules as described above  Multifocal liver tumor, upper abdominal ascites and peritoneal tumor better shown on recent CT scan from 1/26/2023  Workstation performed: [id number]     VAS lower limb venous duplex study, complete bilateral    Result Date: 1/31/2023  Narrative:  [facility] REPORT CLINICAL: Indications: Physician wants to determine patency of the venous system secondary to bilateral lower extremity edema and SOB  [facility] Operative History: No cardiovascular surgeries noted Risk Factors The patient has history of smoking (current) 2 ppd    FINDINGS:  Right         Impression GSV Mid Calf  Chronic Thick Brambila   Left          Impression           GSV Mid Calf  Chronic Thick Brambila     CONCLUSION: Impression:  RIGHT LOWER LIMB: No evidence of acute or chronic deep vein thrombosis  Evidence of chronic superficial phlebitis noted in the great saphenous vein at the mid calf  Doppler evaluation shows a normal response to augmentation maneuvers  Popliteal, posterior tibial and anterior tibial arterial Doppler waveforms are triphasic  LEFT LOWER LIMB: No evidence of acute or chronic deep vein thrombosis  Evidence of chronic superficial phlebitis noted in the great saphenous vein at the mid calf  Doppler evaluation shows a normal response to augmentation maneuvers  Popliteal, posterior tibial and anterior tibial arterial Doppler waveforms are triphasic  Technical findings were given to Dr Xin Sullivan  SIGNATURE: Electronically Signed by: Shannon Romo MD, [address] on 2023-01-31 06:53:31 PM    Echo complete w/ contrast if indicated    Result Date: 2/21/2023  Narrative: •  Study quality was poor  This was a technically difficult study •  Left Ventricle: Left ventricular cavity size is normal  Wall thickness is mildly increased  There is mild concentric hypertrophy  The left ventricular ejection fraction is 65%  Systolic function is normal  Wall motion is grossly normal  •  Right Ventricle: Right ventricular cavity size is normal  Systolic function is normal        Labs and pertinent reports reviewed

## 2023-02-22 NOTE — PROGRESS NOTES
NEPHROLOGY PROGRESS NOTE   Daniel Ashby 47 y o  male MRN: [id number]  [facility]: [id number]  Reason for Consult: DENZEL hyponatremia    ASSESSMENT AND PLAN:  Patient is 67-year-old male with significant medical issues of metastatic cholangiocarcinoma with metastasis to liver, lung, bone, prior history of right tonsillar squamous cell carcinoma, status posttreatment, recurrent ascites requiring paracentesis, now presents with abdominal pain, nausea, poor p o  intake   We are consulted for DENZEL, hyperkalemia, acidosis management      Severe DENZEL POA, baseline serum creatinine 0 6-0 8  -Creatinine peak level 2 6 now continue to significantly improved to 0 8   -DENZEL suspect in the setting of severe hypotension, shock causing ischemic/hemodynamic insult, component of prerenal with poor p o  intake  -Was on ivosidenib and more recently on Pemigatinib, both can cause increase creatinine, less likely HRS with much improved creatinine with albumin challenge  -CT scan shows no hydro  -Patient has provided dialysis consent if needed which is in the chart, no need for dialysis  -Status post IV albumin challenge, now remains off  Serum albumin 3 5  -UA shows multiple 25-50 hyaline cast, no hematuria or proteinuria  Urine sodium less than 5, urine chloride less than 10 consistent with prerenal      Initial azotemia, overall much improved with improving renal function      Hyperkalemia, resolved with improving renal function   -Suspected in the setting of DENZEL  -Strict low potassium diet recommended      Primary respiratory alkalosis  -Now remains off bicarb drip  Bicarb level stable 19   -Compensatory lower bicarb level   -Lactic acid 4 0 suspect in the setting of shock, hypotension and also liver disease     Initial hyponatremia  -Serum sodium 118 on admission initially rapidly improved requiring hypotonic IV fluid    -Status post gentle timed IV normal saline trial yesterday    Sodium level slightly improved 124 today AM    -Recommend restarting IV normal saline 75 mill per hour    -Repeat sodium level later this evening   -Urine sodium less than 5, urine was 506, serum osmolality 288     Shock, rule out septic shock,? Hypovolemic component  -Blood culture negative so far  Empiric antibiotic as per ICU team   -Status post IV albumin challenge  Vasopressors requirement slowly improving now remains on low-dose Levophed  -Echocardiogram this admission shows EF 65%, dilated IVC with blunted respiratory phasic changes  No pericardial effusion       Metastatic cholangiocarcinoma with metastasis to liver, lymph node, bone, lungs   -Closely follows with hematology  Recent MRI showing new thrombosis of splenic, superior mesenteric and portal vein   Anticoagulation as per ICU team   Peritoneal carcinomatosis with recurrent ascites requiring paracentesis  Discussed above plan in detail with primary team and they agree with above recommendations  SUBJECTIVE:  Patient seen and examined at bedside  Denies nausea, worsening shortness of breath      OBJECTIVE:  Current Weight: Weight - Scale: 95 8 kg (211 lb 3 2 oz)  Vitals:    02/22/23 1215   BP: (!) 82/56   Pulse: 80   Resp: 12   Temp:    SpO2: 98%       Intake/Output Summary (Last 24 hours) at 2/22/2023 1318  Last data filed at 2/22/2023 1257  Gross per 24 hour   Intake 2155 61 ml   Output 6610 ml   Net -4454 39 ml     Wt Readings from Last 3 Encounters:   02/22/23 95 8 kg (211 lb 3 2 oz)   02/03/23 109 kg (240 lb 1 3 oz)   01/31/23 112 kg (246 lb)     Temp Readings from Last 3 Encounters:   02/22/23 97 5 °F (36 4 °C) (Oral)   02/03/23 (!) 97 1 °F (36 2 °C) (Temporal)   01/31/23 98 2 °F (36 8 °C) (Oral)     BP Readings from Last 3 Encounters:   02/22/23 (!) 82/56   02/16/23 102/62   02/13/23 97/55     Pulse Readings from Last 3 Encounters:   02/22/23 80   02/16/23 75   02/13/23 71        Physical Examination:  Eyes: Mild conjunctival pallor present  Neck: No obvious lymphadenopathy appreciated  Respiratory: Bilateral air entry present  CVS: No significant edema in legs  GI: Soft, mild distended  CNS: Active, alert, oriented x3  Skin: No new rash  Musculoskeletal: No obvious new gross deformity noted    Medications:    Current Facility-Administered Medications:   •  al mag oxide-diphenhydramine-lidocaine viscous (MAGIC MOUTHWASH) suspension 10 mL, 10 mL, Swish & Swallow, Q4H PRN, Marley Watson MD, 10 mL at 02/21/23 1551  •  cefTRIAXone (ROCEPHIN) 2,000 mg in dextrose 5 % 50 mL IVPB, 2,000 mg, Intravenous, Q24H, Marley Watson MD, Stopped at 02/22/23 1257  •  chlorhexidine (PERIDEX) 0 12 % oral rinse 15 mL, 15 mL, Mouth/Throat, Q12H [facility], Renetta Munoz DO, 15 mL at 02/22/23 2411  •  ergocalciferol (VITAMIN D2) capsule 50,000 Units, 50,000 Units, Oral, Weekly, Wallace Garcia DO  •  heparin (porcine) 25,000 units in 0 45% NaCl 250 mL infusion (premix), 3-30 Units/kg/hr (Order-Specific), Intravenous, Titrated, Marley Watson MD, Last Rate: 6 7 mL/hr at 02/22/23 0058, 7 Units/kg/hr at 02/22/23 0058  •  heparin (porcine) injection 3,800 Units, 3,800 Units, Intravenous, Q6H PRN, Marley Watson MD  •  heparin (porcine) injection 7,600 Units, 7,600 Units, Intravenous, Q6H PRN, Marley Watson MD  •  insulin lispro (HumaLOG) 100 units/mL subcutaneous injection 1-6 Units, 1-6 Units, Subcutaneous, TID AC **AND** Fingerstick Glucose (POCT), , , TID AC, Wallace Garcia DO  •  lidocaine (PF) (XYLOCAINE-MPF) 1 % injection 10 mL, 10 mL, Infiltration, Once, Renetta Munoz DO  •  melatonin tablet 6 mg, 6 mg, Oral, HS, Renetta Munoz DO, 6 mg at 02/21/23 2015  •  metroNIDAZOLE (FLAGYL) IVPB (premix) 500 mg 100 mL, 500 mg, Intravenous, Q8H, Renetta Munoz DO, Last Rate: 200 mL/hr at 02/22/23 0807, 500 mg at 02/22/23 5190  •  norepinephrine (LEVOPHED) 4 mg (STANDARD CONCENTRATION) IV in sodium chloride 0 9% 250 mL, 1-30 mcg/min, Intravenous, Titrated, Renetta Munoz DO, Last Rate: 18 8 mL/hr at 02/22/23 1215, 5 mcg/min at 02/22/23 1215  •  ondansetron (ZOFRAN) injection 4 mg, 4 mg, Intravenous, Q8H PRN, Renetta Munoz DO, 4 mg at 02/22/23 0006  •  oxyCODONE (ROXICODONE) IR tablet 5 mg, 5 mg, Oral, Q4H PRN, Randy Brannon MD  •  pantoprazole (PROTONIX) EC tablet 40 mg, 40 mg, Oral, Early Morning, Renetta Munzo DO, 40 mg at 02/21/23 0600  •  saliva substitute (MOUTH KOTE) mucosal solution 5 spray, 5 spray, Mouth/Throat, 4x Daily PRN, Renetta Munoz DO  •  sodium chloride 0 9 % infusion, 75 mL/hr, Intravenous, Continuous, Wallace Garcia DO, Last Rate: 75 mL/hr at 02/22/23 1215, 75 mL/hr at 02/22/23 1215  •  sucralfate (CARAFATE) tablet 1 g, 1 g, Oral, 4x Daily (AC & HS), Randy Brannon MD, 1 g at 02/21/23 2231  •  trimethobenzamide (TIGAN) IM injection 200 mg, 200 mg, Intramuscular, Once, Wallace Garcia DO  •  vasopressin (PITRESSIN) 20 Units in sodium chloride 0 9 % 100 mL infusion, 0 04 Units/min, Intravenous, Continuous, Oesi Sanches DO, Held at 02/22/23 1030    Laboratory Results:  Results from last 7 days   Lab Units 02/22/23  1144 02/22/23  0558 02/22/23  0557 02/21/23  2358 02/21/23  1835 02/21/23  1146 02/21/23  0906 02/21/23  0432 02/21/23  0022 02/20/23  2013 02/20/23  1548 02/20/23  1132 02/20/23  0827 02/20/23  0728 02/20/23  0341   WBC Thousand/uL 17 77*  --  16 95*  --   --   --   --  21 30*  --   --   --   --   --   --  31 18*   HEMOGLOBIN g/dL 11 4*  --  11 6*  --   --   --   --  13 3  --   --  14 6  --   --   --  16 9   I STAT HEMOGLOBIN g/dl  --   --   --   --   --   --   --   --   --   --   --   --   --  16 7  --    HEMATOCRIT % 32 5*  --  32 9*  --   --   --   --  38 8  --   --  41 5  --   --   --  48 2   HEMATOCRIT, ISTAT %  --   --   --   --   --   --   --   --   --   --   --   --   --  49  --    PLATELETS Thousands/uL 63*  --  60*  --   --   --   --  107*  --   --   --   --   --   --  136*   SODIUM mmol/L  --  124*  --  123* 124* 121* 122* 124* 123*   < > 125*   < > 122*  --  118*   POTASSIUM mmol/L  --  3 7  --  3 8 3 7 3 9 4 0 4 2 4 7   < > 5 1   < > 6 3*  --  6 2*   CHLORIDE mmol/L  --  98  --  95* 96 94* 93* 95* 97   < > 99   < > 92*  --  91*   CO2 mmol/L  --  19*  --  18* 18* 17* 17* 17* 17*   < > 15*   < > 22  --  16*   CO2, I-STAT mmol/L  --   --   --   --   --   --   --   --   --   --   --   --   --  19*  --    BUN mg/dL  --  41*  --  49* 47* 56* 59* 66* 75*   < > 86*   < > 105*  --  107*   CREATININE mg/dL  --  0 80  --  0 90 0 87 1 06 1 15 1 31* 1 51*   < > 1 76*   < > 2 60*  --  2 37*   CALCIUM mg/dL  --  6 4*  --  6 4* 6 5* 6 4* 6 6* 6 2* 6 4*   < > 6 7*   < > 7 1*  --  6 5*   MAGNESIUM mg/dL  --   --  3 0*  --   --   --   --  3 6*  --   --   --   --  4 5*  --   --    PHOSPHORUS mg/dL  --   --  2 3*  --   --   --   --  3 4  --   --   --   --  4 9*  --   --    GLUCOSE, ISTAT mg/dl  --   --   --   --   --   --   --   --   --   --   --   --   --  115  --     < > = values in this interval not displayed  XR chest portable ICU   Final Result by Jayro Arnett MD (02/20 3273)      No acute cardiopulmonary disease  Findings are stable               Workstation performed: [id number]         CT abdomen pelvis wo contrast   Final Result by Paul Leroy MD (02/20 4275)      No acute pathology  Evaluation is limited in the absence of intravenous contrast enhancement, however metastatic disease appears essentially unchanged, and including dominant hepatic lesion, peritoneal carcinomatosis with moderate moderate ascites  Presumed metastatic    right lower lobe nodules also noted  Workstation performed: [id number]             Portions of the record may have been created with voice recognition software  Occasional wrong word or "sound a like" substitutions may have occurred due to the inherent limitations of voice recognition software  Read the chart carefully and recognize, using context, where substitutions have occurred

## 2023-02-22 NOTE — PROGRESS NOTES
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)   • Thrombocytopenia (HCC)   • Hypomagnesemia   • BMI 35 0-35 9,adult   • Vitamin D deficiency   • Malignant ascites   • Septic shock (HCC)     Active issues specifically addressed today include:   Cholangiocarcinoma  Nausea  Vomiting  Malignant Ascites      Plan:  1  Symptom management -    - Continue management per primary team   - magic mouthwash   - carafate/PPI   - melatonin 6mg qHS   - zofran 4mg q8h PRN   - Oxycodone 5mg q4h PRN   - Will likely need paracentesis soon for ascites    2  Goals -    - Patient continues to want treatment focused care  - If patient starts to deteriorate, consider getting his oncology provider on board for further discussion of options    3  Social Support              - Patient has wife Ruben Dodd and 1 biological son  States he has two other nonbiological children and is very involved in their life  - Mother and father are still alive              - Brother and grandmother     Code Status: Full code - Level 1   Decisional apparatus:  Patient is competent on my exam today  If competence is lost, patient's substitute decision maker would default to wife, Ruben Dodd by [place] Act 169  Advance Directive / Living Will / POLST:  On file    Interval history:       Patient reports having nausea and one episode of emesis overnight   He is feeling a bit better today, but feels that it is from not moving and having things sit in his stomach  He has some abdominal discomfort due to his ascites and feels it will need to be drained soon       MEDICATIONS / ALLERGIES:     all current active meds have been reviewed and current meds:   Current Facility-Administered Medications   Medication Dose Route Frequency   • al mag oxide-diphenhydramine-lidocaine viscous (MAGIC MOUTHWASH) suspension 10 mL  10 mL Swish & Swallow Q4H PRN   • cefTRIAXone (ROCEPHIN) 2,000 mg in dextrose 5 % 50 mL IVPB  2,000 mg Intravenous Q24H   • chlorhexidine (PERIDEX) 0 12 % oral rinse 15 mL  15 mL Mouth/Throat Q12H JEMMA   • ergocalciferol (VITAMIN D2) capsule 50,000 Units  50,000 Units Oral Weekly   • heparin (porcine) 25,000 units in 0 45% NaCl 250 mL infusion (premix)  3-30 Units/kg/hr (Order-Specific) Intravenous Titrated   • heparin (porcine) injection 3,800 Units  3,800 Units Intravenous Q6H PRN   • heparin (porcine) injection 7,600 Units  7,600 Units Intravenous Q6H PRN   • insulin lispro (HumaLOG) 100 units/mL subcutaneous injection 1-6 Units  1-6 Units Subcutaneous TID AC   • melatonin tablet 6 mg  6 mg Oral HS   • metroNIDAZOLE (FLAGYL) IVPB (premix) 500 mg 100 mL  500 mg Intravenous Q8H   • norepinephrine (LEVOPHED) 4 mg (STANDARD CONCENTRATION) IV in sodium chloride 0 9% 250 mL  1-30 mcg/min Intravenous Titrated   • ondansetron (ZOFRAN) injection 4 mg  4 mg Intravenous Q8H PRN   • oxyCODONE (ROXICODONE) IR tablet 5 mg  5 mg Oral Q4H PRN   • pantoprazole (PROTONIX) EC tablet 40 mg  40 mg Oral Early Morning   • saliva substitute (MOUTH KOTE) mucosal solution 5 spray  5 spray Mouth/Throat 4x Daily PRN   • sucralfate (CARAFATE) tablet 1 g  1 g Oral 4x Daily (AC & HS)   • trimethobenzamide (TIGAN) IM injection 200 mg  200 mg Intramuscular Once   • vasopressin (PITRESSIN) 20 Units in sodium chloride 0 9 % 100 mL infusion  0 04 Units/min Intravenous Continuous       Allergies   Allergen Reactions   • Penicillins Other (See Comments) and Rash     As a child had reaction not sure what it was  OBJECTIVE:    Physical Exam  Physical Exam  Vitals and nursing note reviewed  Constitutional:       Appearance: Normal appearance  HENT:      Head: Normocephalic and atraumatic  Eyes:      General:         Right eye: No discharge  Left eye: No discharge  Extraocular Movements: Extraocular movements intact  Pulmonary:      Effort: Pulmonary effort is normal  No respiratory distress  Abdominal:      General: There is distension  Musculoskeletal:      Cervical back: Normal range of motion  Skin:     General: Skin is warm and dry  Neurological:      General: No focal deficit present  Mental Status: He is alert and oriented to person, place, and time  Psychiatric:         Mood and Affect: Mood normal          Behavior: Behavior normal          Lab Results:   I have personally reviewed pertinent labs  , CBC:   Lab Results   Component Value Date    WBC 16 95 (H) 02/22/2023    HGB 11 6 (L) 02/22/2023    HCT 32 9 (L) 02/22/2023    MCV 87 02/22/2023    PLT 60 (L) 02/22/2023    MCH 30 7 02/22/2023    MCHC 35 3 02/22/2023    RDW 14 4 02/22/2023    MPV 9 7 02/22/2023    NRBC 0 02/22/2023   , CMP:   Lab Results   Component Value Date    SODIUM 124 (L) 02/22/2023    K 3 7 02/22/2023    CL 98 02/22/2023    CO2 19 (L) 02/22/2023    BUN 41 (H) 02/22/2023    CREATININE 0 80 02/22/2023    CALCIUM 6 4 (L) 02/22/2023    AST 65 (H) 02/22/2023    ALT 54 02/22/2023    ALKPHOS 105 02/22/2023    EGFR 103 02/22/2023   , BMP:  Lab Results   Component Value Date    SODIUM 124 (L) 02/22/2023    K 3 7 02/22/2023    CL 98 02/22/2023    CO2 19 (L) 02/22/2023    BUN 41 (H) 02/22/2023    CREATININE 0 80 02/22/2023    GLUC 129 02/22/2023    CALCIUM 6 4 (L) 02/22/2023    AGAP 7 02/22/2023    EGFR 103 02/22/2023     Imaging Studies: Reviewed  EKG, Pathology, and Other Studies: Reviewed    Counseling / Coordination of Care    Total floor / unit time spent today 30 minutes   Greater than 50% of total time was spent with the patient and / or family counseling and / or coordination of care  A description of the counseling / coordination of care: discussion of symptoms, discussion of goals, discussion with primary team, competency evaluation

## 2023-02-22 NOTE — PROCEDURES
Paracentesis (Bedside)    Date/Time: 2/22/2023 12:23 PM  Performed by: Charly Rollins MD  Authorized by: Charly Rollins MD     Patient location:  ICU  Consent:     Consent obtained:  Written    Consent given by:  Patient    Risks discussed:  Bleeding, bowel perforation, infection and pain  Universal protocol:     Procedure explained and questions answered to patient or proxy's satisfaction: yes      Relevant documents present and verified: yes      Test results available and properly labeled: yes      Radiology Images displayed and confirmed  If images not available, report reviewed: yes      Site/side marked: yes    Pre-procedure details:     Initial or Subsequent Exam:  Subsequent    Procedure purpose:  Therapeutic    Indications: abdominal discomfort secondary to ascites      Preparation: Patient was prepped and draped in usual sterile fashion    Anesthesia (see MAR for exact dosages): Anesthesia method:  Local infiltration    Local anesthetic:  Lidocaine 1% w/o epi  Procedure details:     Needle gauge:  18    Equipment: Paracentesis kit used      Ultrasound guidance: yes      Ultrasound image availability:  Not saved    Approach:  Percutaneous    Puncture site:  R lower quadrant    Fluid removed amount:  4 5L    Fluid appearance:  Clear and yellow    Dressing:  Adhesive bandage  Post-procedure details:     Patient tolerance of procedure: Tolerated well, no immediate complications  Post-procedure medication (see MAR for dosing):      Albumin replacement: No

## 2023-02-22 NOTE — PROGRESS NOTES
Vancomycin IV Pharmacy-to-Dose Consultation    Reginald Gupta is a 47 y o  male who is currently receiving Vancomycin IV with management by the Pharmacy Consult service for the treatment of septic shock of unclear etiology  Assessment/Plan:    The patient's chart was reviewed  Renal function is stable  There are no signs or symptoms of nephrotoxicity and/or infusion reactions documented  Based on today’s assessment, will continue current vancomycin (Day # 3) dosing of 1000 mg IV q12h, with a plan for a random level to be drawn at 0600 on 2/28 (maintenance level)  Estimated AUC: 431 mcg*hr/mL  Estimated Trough: 13 2 mcg/mL    Pharmacy will continue to follow closely for s/sx of nephrotoxicity, infusion reactions and appropriateness of therapy  BMP and CBC will be ordered per protocol  We will continue to follow the patient’s culture results and clinical progress daily        Juan C Dumas, PharmD, [address] Clinical Pharmacist  [phone]

## 2023-02-22 NOTE — CONSULTS
Vancomycin IV Pharmacy-to-Dose Consultation    Josh Flanagan is a 47 y o  male who was receiving Vancomycin IV with management by the Pharmacy Consult service for treatment of septic shock of unclear etiology  The patient’s Vancomycin therapy has been discontinued  Thank you for allowing us to take part in this patient's care  Pharmacy will sign-off now; please call or re-consult if there are any questions          Carlene Medel, PharmD, [address] Clinical Pharmacist  [phone]

## 2023-02-23 NOTE — PHYSICAL THERAPY NOTE
Physical Therapy Evaluation     Patient's Name: Marli Lloyd    Admitting Diagnosis  Dehydration [E86 0]  Hypernatremia [E87 0]  Hyponatremia [E87 1]  Fatigue [R53 83]  Abdominal pain [R10 9]  Nausea and vomiting [R11 2]  Septic shock ([address] ) [A41 9, R65 21]  Sepsis ([address] ) [A41 9]    Problem List  Patient Active Problem List   Diagnosis    History of tongue cancer    Cholangiocarcinoma, stage IV (HCC)    Nausea and vomiting    Ascites    Lytic bone lesion of hip    Hyponatremia    GERD (gastroesophageal reflux disease)    Spinal accessory neuropathy    Squamous cell carcinoma of tongue (HCC)    Tobacco use disorder    Pulmonary nodules    Malignant neoplasm metastatic to both lungs (HCC)    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)    Thrombocytopenia (HCC)    Hypomagnesemia    BMI 35 0-35 9,adult    Vitamin D deficiency    Malignant ascites    Septic shock ([address] )       Past Medical History  Past Medical History:   Diagnosis Date    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 ([facility] [address] )     Rectal bleeding 1/9/2023    S/P exploratory laparotomy 12/16/2020       Past Surgical History  Past Surgical History:   Procedure Laterality Date    CT NEEDLE BIOPSY LIVER  10/15/2020    IR BIOPSY LIVER MASS  5/18/2022    IR PARACENTESIS  1/10/2023    IR PARACENTESIS  1/24/2023    IR PARACENTESIS  2/2/2023    IR PARACENTESIS  1/30/2023    IR PARACENTESIS  2/6/2023    IR PARACENTESIS  2/9/2023    IR PARACENTESIS  2/13/2023    IR PARACENTESIS  2/16/2023 02/23/23 1054   PT Last Visit   PT Visit Date 02/23/23   Note Type   Note type Evaluation   Pain Assessment   Pain Assessment Tool 0-10   Pain Score No Pain   Restrictions/Precautions   Weight Bearing Precautions Per Order No   Braces or Orthoses   (none)   Other Precautions Fall Risk;Telemetry;Multiple lines   Home Living   Type of Home Apartment   Home Layout Two level;Stairs to enter with rails  (FF stairs to enter)   [address] [facility]   Additional Comments Per patient, prior to this admission he resided with his spouse (works however other local family/friends can assist as necessary) in a two story apartment (FF steps to enter apartment then patient resides on 1st floor)  At his baseline he is I with mobility (no use of AD), ADLs, and iADLS  +   Was working full time as    Prior Function   Level of [place] Independent with ADLs; Independent with functional mobility; Independent with IADLS   Lives With Spouse   Receives Help From Family;Friend(s)   IADLs Independent with driving; Independent with meal prep; Independent with medication management   Falls in the last 6 months 0   Vocational Full time employment   General   Additional Pertinent History 47year old male admitted to - on with reduced PO intake and abdominal pain  PMH is significant for nicotine dependence with remission, head and SCC head/neck (tongue) diagnosed in 2018 with chemo and glossectomy, stage IV cholangiocarcinoma (diagnosed in 2020 s/p resection followed by immuno-chemo therapy) with re-occurrence in 2022 now with mets to bone, peritoneum, lung, and liver  Complicated by recurrent ascites (large volume paracentesis twice/week)  Diagnoses this admission include respiratory alkalosis, septic shock, and hepatic encephalopathy     Family/Caregiver Present No   Cognition   Overall Cognitive Status WFL   Arousal/Participation Alert   Orientation Level Oriented X4   Memory Within functional limits   Following Commands Follows all commands and directions without difficulty   Comments pleasant, cooperative   Subjective   Subjective "I was really sick when I came in here"   RUE Assessment   RUE Assessment WFL   LUE Assessment   LUE Assessment WFL   RLE Assessment   RLE Assessment WFL  (4/5 grossly)   LLE Assessment   LLE Assessment WFL  (4/5 grossly)   Bed Mobility   Supine to Sit Unable to assess   Sit to Supine Unable to assess   Additional Comments patient in chair pre and post eval   Transfers   Sit to Stand 4  Minimal assistance   Additional items Assist x 1; Increased time required;Verbal cues   Stand to Sit 4  Minimal assistance   Additional items Assist x 1; Increased time required;Verbal cues   Ambulation/Elevation   Gait pattern Excessively slow; Short stride;Decreased foot clearance   Gait Assistance 4  Minimal assist   Additional items Assist x 1   Assistive Device Other (Comment)  (hand held assist (in setting of multiple lines and limited space))   Distance 20 feet  (5 feet forwards + 5 feet backwards x 2 (not approrpiate to be on DASH and ambualting in bazzi at this time per  RN))   Balance   Static Sitting Good   Static Standing Fair -   Ambulatory Poor +   Endurance Deficit   Endurance Deficit Yes   Endurance Deficit Description weakness, fatigue   Activity Tolerance   Activity Tolerance Patient tolerated treatment well   Medical Staff Made Aware This high complexity evaluation was performed with an occupational therapist due to the patient's co-morbidities, clinically unstable presentation, and present impairments which are a regression from the patient's baseline  Nurse Made Aware aleksey to see per RN [place]   Assessment   Prognosis Good   Problem List Decreased strength;Decreased endurance; Impaired balance;Decreased mobility   Assessment PT completed evaluation of 47year old male admitted to -B on with reduced PO intake and abdominal pain  PMH is significant for nicotine dependence with remission, head and SCC head/neck (tongue) diagnosed in 2018 with chemo and glossectomy, stage IV cholangiocarcinoma (diagnosed in 2020 s/p resection followed by immuno-chemo therapy) with re-occurrence in 2022 now with mets to bone, peritoneum, lung, and liver  Complicated by recurrent ascites (large volume paracentesis twice/week)  Diagnoses this admission include respiratory alkalosis, septic shock, and hepatic encephalopathy       Current status instabilities include ongoing admission to medical ICU, multiple IVs, alex, continuous O2/HR/BP monitoring, and a regression in functional status from baseline  Per patient, prior to this admission he resided with his spouse (works however other local family/friends can assist as necessary) in a two story apartment (FF steps to enter apartment then patient resides on 1st floor)  At his baseline he is I with mobility (no use of AD), ADLs, and iADLS  +   Was working full time as [employer]  Current impairments include decreased activity tolerance, gross strength and balance, and gait deviations  During PT evaluation patient required min-AX1 for sit<-->stand transfers and ambulation  He walked 5 feet forwards + 5 feet backwards x 2 (distance limited by lines and patient fatigue)  Anticipate with continued mobility this admission patient will achieve PT goals and d/c home  Will continue to assess for most appropriate f/u recommendation (home versus outpatient PT)  Patient will continue to benefit from continued skilled PT this admission to achieve maximal function and safety  Goals   Patient Goals to walk   LTG Expiration Date 03/09/23   Long Term Goal #1 1) Perform bed mobility mod-I to participate in frequent repositioning and improve skin integrity; 2) Perform functional transfers mod-I to promote I with toileting and OOB mobility; 3) Ambulate 200 feet mod-I with least restrictive device to participate in household and community level mobility; 4) Improve b/l LE strength by 1/2 grade in order to improve efficiency of tranfers; 5) Improve balance by 1 grade to reduce risk for falls; 6) Navigate 12 steps S level in order to safely navigate in/out of home   PT Treatment Day 0   Plan   Treatment/Interventions Functional transfer training;Elevations;LE strengthening/ROM; Therapeutic exercise; Endurance training;Patient/family training;Bed mobility; Equipment eval/education;Gait training;OT;Spoke to nursing   PT Frequency 3-5x/wk Recommendation   PT Discharge Recommendation (S)  Home with home health rehabilitation  (will cont to monitor for progress (may tolerate outpatient PT rather than home at time of d/c))   Equipment Recommended   (will cont to assess)   AM-PAC Basic Mobility Inpatient   Turning in Flat Bed Without Bedrails 3   Lying on Back to Sitting on Edge of Flat Bed Without Bedrails 3   Moving Bed to Chair 3   Standing Up From Chair Using Arms 3   Walk in Room 3   Climb 3-5 Stairs With Railing 3   Basic Mobility Inpatient Raw Score 18   Basic Mobility Standardized Score 41 05   Highest Level Of Mobility   JH-HLM Goal 6: Walk 10 steps or more   JH-HLM Achieved 6: Walk 10 steps or more     The patient's AM-PAC Basic Mobility Inpatient Standardized Score is less than 42 9, suggesting this patient may benefit from discharge to post-acute rehabilitation services  Please also refer to the recommendation of the Physical Therapist for safe discharge planning        Clearance Cara, PT, DPT

## 2023-02-23 NOTE — PLAN OF CARE
Problem: MOBILITY - ADULT  Goal: Maintain or return to baseline ADL function  Description: INTERVENTIONS:  -  Assess patient's ability to carry out ADLs; assess patient's baseline for ADL function and identify physical deficits which impact ability to perform ADLs (bathing, care of mouth/teeth, toileting, grooming, dressing, etc )  - Assess/evaluate cause of self-care deficits   - Assess range of motion  - Assess patient's mobility; develop plan if impaired  - Assess patient's need for assistive devices and provide as appropriate  - Encourage maximum independence but intervene and supervise when necessary  - Involve family in performance of ADLs  - Assess for home care needs following discharge   - Consider OT consult to assist with ADL evaluation and planning for discharge  - Provide patient education as appropriate  Outcome: Progressing  Goal: Maintains/Returns to pre admission functional level  Description: INTERVENTIONS:  - Perform BMAT or MOVE assessment daily    - Set and communicate daily mobility goal to care team and patient/family/caregiver  - Collaborate with rehabilitation services on mobility goals if consulted  - Perform Range of Motion 3 times a day  - Reposition patient every 2 hours    - Dangle patient 3 times a day  - Stand patient 3 times a day  - Ambulate patient 3 times a day  - Out of bed to chair 3 times a day   - Out of bed for meals 3 times a day  - Out of bed for toileting  - Record patient progress and toleration of activity level   Outcome: Progressing     Problem: Prexisting or High Potential for Compromised Skin Integrity  Goal: Skin integrity is maintained or improved  Description: INTERVENTIONS:  - Identify patients at risk for skin breakdown  - Assess and monitor skin integrity  - Assess and monitor nutrition and hydration status  - Monitor labs   - Assess for incontinence   - Turn and reposition patient  - Assist with mobility/ambulation  - Relieve pressure over bony prominences  - Avoid friction and shearing  - Provide appropriate hygiene as needed including keeping skin clean and dry  - Evaluate need for skin moisturizer/barrier cream  - Collaborate with interdisciplinary team   - Patient/family teaching  - Consider wound care consult   Outcome: Progressing     Problem: CARDIOVASCULAR - ADULT  Goal: Maintains optimal cardiac output and hemodynamic stability  Description: INTERVENTIONS:  - Monitor I/O, vital signs and rhythm  - Monitor for S/S and trends of decreased cardiac output  - Administer and titrate ordered vasoactive medications to optimize hemodynamic stability  - Assess quality of pulses, skin color and temperature  - Assess for signs of decreased coronary artery perfusion  - Instruct patient to report change in severity of symptoms  Outcome: Progressing  Goal: Absence of cardiac dysrhythmias or at baseline rhythm  Description: INTERVENTIONS:  - Continuous cardiac monitoring, vital signs, obtain 12 lead EKG if ordered  - Administer antiarrhythmic and heart rate control medications as ordered  - Monitor electrolytes and administer replacement therapy as ordered  Outcome: Progressing     Problem: GASTROINTESTINAL - ADULT  Goal: Minimal or absence of nausea and/or vomiting  Description: INTERVENTIONS:  - Administer IV fluids if ordered to ensure adequate hydration  - Maintain NPO status until nausea and vomiting are resolved  - Nasogastric tube if ordered  - Administer ordered antiemetic medications as needed  - Provide nonpharmacologic comfort measures as appropriate  - Advance diet as tolerated, if ordered  - Consider nutrition services referral to assist patient with adequate nutrition and appropriate food choices  Outcome: Progressing  Goal: Maintains or returns to baseline bowel function  Description: INTERVENTIONS:  - Assess bowel function  - Encourage oral fluids to ensure adequate hydration  - Administer IV fluids if ordered to ensure adequate hydration  - Administer ordered medications as needed  - Encourage mobilization and activity  - Consider nutritional services referral to assist patient with adequate nutrition and appropriate food choices  Outcome: Progressing  Goal: Maintains adequate nutritional intake  Description: INTERVENTIONS:  - Monitor percentage of each meal consumed  - Identify factors contributing to decreased intake, treat as appropriate  - Assist with meals as needed  - Monitor I&O, weight, and lab values if indicated  - Obtain nutrition services referral as needed  Outcome: Progressing  Goal: Oral mucous membranes remain intact  Description: INTERVENTIONS  - Assess oral mucosa and hygiene practices  - Implement preventative oral hygiene regimen  - Implement oral medicated treatments as ordered  - Initiate Nutrition services referral as needed  Outcome: Progressing     Problem: GENITOURINARY - ADULT  Goal: Maintains or returns to baseline urinary function  Description: INTERVENTIONS:  - Assess urinary function  - Encourage oral fluids to ensure adequate hydration if ordered  - Administer IV fluids as ordered to ensure adequate hydration  - Administer ordered medications as needed  - Offer frequent toileting  - Follow urinary retention protocol if ordered  Outcome: Progressing  Goal: Absence of urinary retention  Description: INTERVENTIONS:  - Assess patient’s ability to void and empty bladder  - Monitor I/O  - Bladder scan as needed  - Discuss with physician/AP medications to alleviate retention as needed  - Discuss catheterization for long term situations as appropriate  Outcome: Progressing  Goal: Urinary catheter remains patent  Description: INTERVENTIONS:  - Assess patency of urinary catheter  - If patient has a chronic davison, consider changing catheter if non-functioning  - Follow guidelines for intermittent irrigation of non-functioning urinary catheter  Outcome: Progressing     Problem: METABOLIC, FLUID AND ELECTROLYTES - ADULT  Goal: Electrolytes maintained within normal limits  Description: INTERVENTIONS:  - Monitor labs and assess patient for signs and symptoms of electrolyte imbalances  - Administer electrolyte replacement as ordered  - Monitor response to electrolyte replacements, including repeat lab results as appropriate  - Instruct patient on fluid and nutrition as appropriate  Outcome: Progressing  Goal: Fluid balance maintained  Description: INTERVENTIONS:  - Monitor labs   - Monitor I/O and WT  - Instruct patient on fluid and nutrition as appropriate  - Assess for signs & symptoms of volume excess or deficit  Outcome: Progressing  Goal: Glucose maintained within target range  Description: INTERVENTIONS:  - Monitor Blood Glucose as ordered  - Assess for signs and symptoms of hyperglycemia and hypoglycemia  - Administer ordered medications to maintain glucose within target range  - Assess nutritional intake and initiate nutrition service referral as needed  Outcome: Progressing     Problem: HEMATOLOGIC - ADULT  Goal: Maintains hematologic stability  Description: INTERVENTIONS  - Assess for signs and symptoms of bleeding or hemorrhage  - Monitor labs  - Administer supportive blood products/factors as ordered and appropriate  Outcome: Progressing     Problem: MUSCULOSKELETAL - ADULT  Goal: Maintain or return mobility to safest level of function  Description: INTERVENTIONS:  - Assess patient's ability to carry out ADLs; assess patient's baseline for ADL function and identify physical deficits which impact ability to perform ADLs (bathing, care of mouth/teeth, toileting, grooming, dressing, etc )  - Assess/evaluate cause of self-care deficits   - Assess range of motion  - Assess patient's mobility  - Assess patient's need for assistive devices and provide as appropriate  - Encourage maximum independence but intervene and supervise when necessary  - Involve family in performance of ADLs  - Assess for home care needs following discharge   - Consider OT consult to assist with ADL evaluation and planning for discharge  - Provide patient education as appropriate  Outcome: Progressing  Goal: Maintain proper alignment of affected body part  Description: INTERVENTIONS:  - Support, maintain and protect limb and body alignment  - Provide patient/ family with appropriate education  Outcome: Progressing

## 2023-02-23 NOTE — PLAN OF CARE
Problem: PHYSICAL THERAPY ADULT  Goal: Performs mobility at highest level of function for planned discharge setting  See evaluation for individualized goals  Description: Treatment/Interventions: Functional transfer training, Elevations, LE strengthening/ROM, Therapeutic exercise, Endurance training, Patient/family training, Bed mobility, Equipment eval/education, Gait training, OT, Spoke to nursing  Equipment Recommended:  (will cont to assess)       See flowsheet documentation for full assessment, interventions and recommendations  Note: Prognosis: Good  Problem List: Decreased strength, Decreased endurance, Impaired balance, Decreased mobility  Assessment: PT completed evaluation of 47year old male admitted to hospitals on with reduced PO intake and abdominal pain  PMH is significant for nicotine dependence with remission, head and SCC head/neck (tongue) diagnosed in 2018 with chemo and glossectomy, stage IV cholangiocarcinoma (diagnosed in 2020 s/p resection followed by immuno-chemo therapy) with re-occurrence in 2022 now with mets to bone, peritoneum, lung, and liver  Complicated by recurrent ascites (large volume paracentesis twice/week)  Diagnoses this admission include respiratory alkalosis, septic shock, and hepatic encephalopathy  Current status instabilities include ongoing admission to medical ICU, multiple IVs, alex, continuous O2/HR/BP monitoring, and a regression in functional status from baseline  Per patient, prior to this admission he resided with his spouse (works however other local family/friends can assist as necessary) in a two story apartment (FF steps to enter apartment then patient resides on 1st floor)  At his baseline he is I with mobility (no use of AD), ADLs, and iADLS  +   Was working full time as   Current impairments include decreased activity tolerance, gross strength and balance, and gait deviations   During PT evaluation patient required min-AX1 for sit<-->stand transfers and ambulation  He walked 5 feet forwards + 5 feet backwards x 2 (distance limited by lines and patient fatigue)  Anticipate with continued mobility this admission patient will achieve PT goals and d/c home  Will continue to assess for most appropriate f/u recommendation (hoome versus outpatient PT)  Patient will continue to benefit from continued skilled PT this admission to achieve maximal function and safety  PT Discharge Recommendation: (S) Home with home health rehabilitation (will cont to monitor for progress (may tolerate outpatient PT rather than home at time of d/c))    See flowsheet documentation for full assessment

## 2023-02-23 NOTE — PROGRESS NOTES
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)   • Thrombocytopenia (HCC)   • Hypomagnesemia   • BMI 35 0-35 9,adult   • Vitamin D deficiency   • Malignant ascites   • Septic shock (HCC)     Active issues specifically addressed today include:   Cholangiocarcinoma  Nausea/Vomiting  Malignant Ascites    Plan:  1  Symptom management - continue per primary care   - magic mouthwash/mouth kote              - carafate/PPI              - melatonin 6mg qHS              - zofran 4mg q8h PRN              - Oxycodone 5mg q4h PRN              - Continue paracenteses PRN    2  Goals - continue treatment focused care   - He wishes to follow up with Dr Michael Mercado for Oncology care instead of Dr Uche Kaplan   - He wishes to continue IR paracentesis 2x weekly due to the symptom benefit     Code Status: Full Code - Level 1   Decisional apparatus:  Patient is competent on my exam today  If competence is lost, patient's substitute decision maker would default to wife, rosalie [place] 169  Advance Directive / Living Will / POLST:  On file    Interval history:       Yesterday, he underwent paracentesis and had a discussion with medical oncology  He feels much better now and wishes to see Dr Michael Mercado following discharge for oncology care  He reports no pain or nausea at this time  At this time, his main complaint was his dry mouth, but states the current treatments are helping      MEDICATIONS / ALLERGIES:     all current active meds have been reviewed and current meds:   Current Facility-Administered Medications   Medication Dose Route Frequency   • al mag oxide-diphenhydramine-lidocaine viscous (MAGIC MOUTHWASH) suspension 10 mL  10 mL Swish & Swallow Q4H PRN   • albumin human (FLEXBUMIN) 5 % injection 25 g  25 g Intravenous Once   • argatroban infusion (premix)  0 1-3 mcg/kg/min Intravenous Titrated   • cefTRIAXone (ROCEPHIN) 2,000 mg in dextrose 5 % 50 mL IVPB  2,000 mg Intravenous Q24H   • chlorhexidine (PERIDEX) 0 12 % oral rinse 15 mL  15 mL Mouth/Throat Q12H JEMMA   • ergocalciferol (VITAMIN D2) capsule 50,000 Units  50,000 Units Oral Weekly   • fluconazole (DIFLUCAN) tablet 200 mg  200 mg Oral Daily   • insulin lispro (HumaLOG) 100 units/mL subcutaneous injection 1-6 Units  1-6 Units Subcutaneous TID AC   • melatonin tablet 6 mg  6 mg Oral HS   • metroNIDAZOLE (FLAGYL) IVPB (premix) 500 mg 100 mL  500 mg Intravenous Q8H   • midodrine (PROAMATINE) tablet 10 mg  10 mg Oral TID AC   • moisture barrier miconazole 2% cream (aka JOYCE MOISTURE BARRIER ANTIFUNGAL CREAM)   Topical BID   • norepinephrine (LEVOPHED) 4 mg (STANDARD CONCENTRATION) IV in sodium chloride 0 9% 250 mL  1-30 mcg/min Intravenous Titrated   • ondansetron (ZOFRAN) injection 4 mg  4 mg Intravenous Q8H PRN   • oxyCODONE (ROXICODONE) IR tablet 5 mg  5 mg Oral Q4H PRN   • pantoprazole (PROTONIX) EC tablet 40 mg  40 mg Oral Early Morning   • potassium phosphates 30 mmol in sodium chloride 0 9 % 250 mL infusion  30 mmol Intravenous Once   • saliva substitute (MOUTH KOTE) mucosal solution 5 spray  5 spray Mouth/Throat 4x Daily PRN   • sodium chloride 0 9 % infusion  75 mL/hr Intravenous Continuous   • sucralfate (CARAFATE) tablet 1 g  1 g Oral 4x Daily (AC & HS)       Allergies   Allergen Reactions   • Penicillins Other (See Comments) and Rash     As a child had reaction not sure what it was          OBJECTIVE:    Physical Exam  Physical Exam  Constitutional:       Appearance: He is not ill-appearing  HENT:      Head: Normocephalic and atraumatic  Mouth/Throat:      Mouth: Mucous membranes are dry  Pharynx: Oropharynx is clear  Eyes:      General:         Right eye: No discharge  Left eye: No discharge  Extraocular Movements: Extraocular movements intact  Cardiovascular:      Rate and Rhythm: Normal rate  Pulmonary:      Effort: Pulmonary effort is normal  No respiratory distress  Abdominal:      General: There is distension  Genitourinary:     Comments: Harvey in place  Musculoskeletal:      Cervical back: Normal range of motion  Skin:     General: Skin is warm and dry  Neurological:      General: No focal deficit present  Mental Status: He is alert and oriented to person, place, and time  Psychiatric:         Mood and Affect: Mood normal          Behavior: Behavior normal          Lab Results:   I have personally reviewed pertinent labs  , CBC:   Lab Results   Component Value Date    WBC 20 02 (H) 02/23/2023    WBC 20 02 (H) 02/23/2023    HGB 12 5 02/23/2023    HGB 12 5 02/23/2023    HCT 36 9 02/23/2023    HCT 36 9 02/23/2023    MCV 89 02/23/2023    MCV 89 02/23/2023    PLT 71 (L) 02/23/2023    PLT 71 (L) 02/23/2023    MCH 30 2 02/23/2023    MCH 30 2 02/23/2023    MCHC 33 9 02/23/2023    MCHC 33 9 02/23/2023    RDW 14 4 02/23/2023    RDW 14 4 02/23/2023    MPV 9 8 02/23/2023    MPV 9 8 02/23/2023    NRBC 0 02/22/2023   , CMP:   Lab Results   Component Value Date    SODIUM 129 (L) 02/23/2023    K 3 8 02/23/2023     02/23/2023    CO2 18 (L) 02/23/2023    BUN 28 (H) 02/23/2023    CREATININE 0 60 02/23/2023    CALCIUM 6 7 (L) 02/23/2023    AST 58 (H) 02/23/2023    ALT 54 02/23/2023    ALKPHOS 116 02/23/2023    EGFR 116 02/23/2023   , BMP:  Lab Results   Component Value Date    SODIUM 129 (L) 02/23/2023    K 3 8 02/23/2023     02/23/2023    CO2 18 (L) 02/23/2023    BUN 28 (H) 02/23/2023    CREATININE 0 60 02/23/2023    GLUC 92 02/23/2023 CALCIUM 6 7 (L) 02/23/2023    AGAP 8 02/23/2023    EGFR 116 02/23/2023   , PT/PTT:  Lab Results   Component Value Date     (H) 02/23/2023     Imaging Studies: Reviewed  EKG, Pathology, and Other Studies: Reviewed    Counseling / Coordination of Care    Total floor / unit time spent today 30 minutes  Greater than 50% of total time was spent with the patient and / or family counseling and / or coordination of care   A description of the counseling / coordination of care: chart review, symptom discussion, goals of care discussion

## 2023-02-23 NOTE — PROGRESS NOTES
Daily Progress Note - Critical Care   Rosalia Ashby 47 y o  male MRN: [id number]  Unit/Bed#: MICU 04 Encounter: [id number]        ----------------------------------------------------------------------------------------  HPI/24hr events: Kathryn Johnson is a 47 y o  male with past medical history of heavy nicotine dependence currently in remission, SCC of head/neck (tongue) diagnosed in 2018 s/p chemo and partial glossectomy, stage IV cholangiocarcinoma (diagnosed in 2020 s/p resection followed by immuno-chemo therapy with recurrence in 2022 now with mets  to bone, peritoneum, lungs, and liver, currently on second round of pemigatinib complicated by recurrent ascites requiring frequent large volume paracentesis twice a week, with removal of 4-5L of ascitic fluid on a weekly basis, who presented to ED this AM with concerns of 7 days of decreased appetite/po intake and worsening generalized fatigue with occasional eps of non-billous vomiting w/o overt s/s bleeding  Per wife, patient noted to be more confused from baseline, prompting ED evaluation  Patient follows with Dr Uche Kaplan, Oncology  Was recently on ivositinib and more recently on Pemigatibib  Denies recent NSAID use  Admits to decrease urine output x3-4d  Denies recent ill contacts or long distance travel  Compliant with home meds including PRN Flexeril for and PRN oxy 5 for cancer/chemo induced pain, lasix 20mg prn for ascites, zofran/low dose dexamethason 4mg for nausea, and Protonix 40mg for GERD       On arrival to ED,  VS notable for T36 7, BP80/50, pulse 71, 98% on RA  Patient noted to be acutely encephalopathic on initial exam with abdominal fluid wave  Work up showed leukocytosis on CBC, elevated K 6 2 in setting of DENZEL with Cr 2 37 (baseline   5- 9) and ?givne bolus of NS  ECG revealing NSR with peaked T waves, non-ischemic  Patient was initiated on IV calcium gluconate   Repeat iSTAT potassium >8 --> given additional IV calcium gluconate along with IV dextrose with regular insulin and additional 1L bolus NS  Initial Hs Trops 17, repeat 2h/4h pending  Sepsis workup iniated, started on IV vanc and IV cefepime  CT C/A/B without acute pathology and unchanged metastatic disease process including dominant hepatic lesion, peritoneal carcinomatosis with moderate moderate ascites; right lower lobe nodules also noted  Additionally patient underwent diagnostic paracentesis in the ED, though he was initially scheduled for his weekly therapeutic paracentesis today by IR as an outpatient  24 hour events:  S/p therapeutic paracentesis draining 4 5L clear yellow fluid  Started on midodrine 10 tid  Heme-onc consulted for further guidance on [facility] management in setting thrombocytopenia and potal/splenic/mesenteric vv thrombosis --> HIT panel, hemolysis labs ordered, switched from heparin gtt to argatroban   Was placed on urinary retention protocol, subsequently required davison cath placement   Patient noted to be bleeding from IV sties (R>L), DIC panel oredred    Review of Systems  Review of systems was reviewed and negative unless stated above in HPI/24-hour events   ---------------------------------------------------------------------------------------  Assessment and Plan:    Neuro:   • Diagnosis: Hepatic encephalopathy, resolved  o Patient endorses fatigue and presented with mild confusion and slurred speech per wife   o Ammonia elevated 63 on admission  o In setting of stage IV cholangiocarcinoma with multiple metastases to the liver  Likely participated by DENZEL and hypovolemia/poor PO intake POA   Hyponatremia on admission possibly contributing  o Continue monitoring  o Can consider rifaxamine to be added on outpatient regimen if ammonia continues to remain elevated  • Diagnosis: GERD  o Known history  o Continue Protonix 40mg PO, will consider switching to IV if cannot tolerate PO    CV:   • Diagnosis: septic shock, uncertain origin vs hypovolemic  o Patient with decreased PO intake, vomiting over the past 4-7d    o Severe DENZEL with azotemia and hyponatremia on admission  o Likely also intravascularly volume depleted in setting of malignant ascites requiring weekly large-volume paracentesis 2/2 stage IV cholangiocarcinoma with mets to the liver  o S/P 4 L of IV fluid resuscitation in the ED prior to admission  o TTE shows EF 65% with normal wall motioin, no signficant valvular dysfunction  o Blood cx without growth; SBP negative based on diastolic diagnostic paracentesis fluid analysis  o Continue on albumin 25 g every 6 hours to improve MAP  o On levo 3mcg and vasopressin 0 04 for pressure support, MAP goal >60  o Start midodrine 10 tid to assist in weaning levo       Pulm:   · Diagnosis: Primary respiratory alkalosis  · Based on most recent ABG 2/21 [id number] 5/18 3  · Now off bicarb gtt   · Lactate remains elevated 4 0, suspect in setting of hypotension, but also liver disease likely contributing to decreased clearance      GI:   • Diagnosis: Stage IV cholangiocarcinoma cysts with mets metastasis to liver  o Diagnosed in 2020 s/p resection followed by immuno-chemo therapy with recurrence in 2022 now with mets to bone, peritoneum, lungs, and liver  o Currently on second round of pemigatinib as an outpatient under the care of Dr Julián Mercado,  o SBP ruled out based on diagnostic paracentesis fluid analysis  o May have underlying type I hepatorenal syndrome given severe DENZEL and hyponatremia on admission, liver hepatorenal syndrome is a diagnosis of exclusion, further work-up pending to r/o hypovolemic vs septic shock  o Hold home pemigatibin in setting of severe DENZEL  · Diagnosis: Mesenteric thrombosis   · Most recent outpatient MRI imaging showing new thrombosis in portions of the splenic, superior mesenteric and main portal veins and a segment of right portal vein  · Likely cancer associated thrombosis in setting of stage IV cholangiocarcinoma  · Heparin gtt d/c'd transition to argotrobran in setting of thrombocytopenia and recent exposure to heparin products during prior admission at [address] 1/2023  · HIT panel pending  · Continue argotrobran gtt adjusted for hepatic dosing as patient meets Child-Philip class C criteria based on LFT parameters and ascites; okay to switch to heparin if HIT panel negative  • Diagnosis: Malignant ascites  o Likely peritoneal carcinomatosis in setting of stage IV cholangiocarcinoma  o S/p bedside therapeutic paracentesis 2/22, 4  L clear yellow fluid removed via RLQ puncture site  - Fluid analysis negative for SBP, gram stain & culture negative     :   • Diagnosis: Severe DENZEL, resolved  o Cr 2 3 on arrival to ED, increased to 2 6 on admission, baseline serum creatinine around 1 62 8  o Likely in setting of severe hypotension secondary to hypovolemia/poor p o  intake versus septic shock versus hepatorenal syndrome causing ischemic insult to the kidneys  o Patient's anticancer medications including ivosidenib and pemigatibin so known to cause kidney injury, possibly contributing  o Status post aggressive IV fluid resuscitation in the ED  o Status post 75mg sodium bicarb infusion and D5W at 100 cc/h on admission  • Diagnosis: Metabolic acidosis  o Lactate elevated on admission 3 5, remains elevated at 4  o Likely secondary to hypotension on admission, persistant elevations likely due to decreased hepatic metabolization setting of liver mets   o S/p IVF resuscitation  o Most recent bicarb 18, previously on bicarb gtt but d/c'd on 2/21 2/2 primary respiratory alkolisis  · Diagnosis: Severe hyponatremia  · Sodium 118 on on arrival to ED, rapidly improved to 124 within 8 hours of arrival  · Status post multiple boluses of IV normal saline in the ED, contributing to initial overcorrection  · Await repeat BMP, will consider NS at 60cc/hr for 5h for goal correction no more than 8 mEq in 24 hours  · BMP every 6 hours    F/E/N:   • F: NS @ 75 cc/h  • Electrolytes: replete K, phos, mag, calcium as indicated  - Replete phosphate with K-Phos 30mmol  - Replete Ca with Calcium gluconate 2 g  • PTH elevated  · Nutrition: full liquids fluid restriction 1 2 L    Heme/Onc:   • Diagnosis: Thrombocytopenia  o Likely multifactorial etiology given myelosuppression in setting of acute illness, portal hypertension in setting of several metastases to the liver secondary to stage IV cholangiocarcinoma  o Retake index low at 1 0 suggestive of hypoproliferation  o Heme-onc consulted, appreciate recs  - Obtain hemolysis labs and DIC panel to rule out hemolysis  - Obtain HIT panel; though timing of worsening thrombocytopenia not consistent with HIT however patient has had exposure to heparin products during recent admission in January, 2023 at [address] CBC, Transfuse to maintain platelets over [phone]    Endo:   · Diagnosis: Hyperglycemia  · Blood sugars at goal  · Continue SSI, algorithm 3, adjust as appropriate   · Goal blood glucose 140-180    ID:   • Diagnosis: Sepsis/Septic shock, improving  o SIRS criteria on admission with concerns for sepsis given hypotension requiring pressure and evidence of end organ failure with severe DENZEL on admission  o Lactate 3 5, procalc 4 9 on arrival  o Unclear etiology; SBP ruled out; UA negative; CXR negative for PNA; don't suspect cholangitis given clinical presentation and imaging findings  o MRSA negative  o Blood cultures without growth at 48h  o S/p 3d of IV cefepime, de-escalated to IV ceftriaxone 2/22  o Continue IV ceftriaxone day#2, IV Flagyl day#4        MSK/Skin:   • Diagnosis: Rash on feet  - Bilateral feet are diffusesly erythematous, with visible petechiae dorsal-medial aspect (L>R)  - Patient endorses prior history of neuropathy of feet  - Etiology likely 2/2 thrombocytopenia given petechaie  Unlikely infectious   ?contact dermatitis vs other allergic rxn  - Per heme-onc, LE doppler obtained, negative for DVT  - Continue monitoring  • Diagnosis: Pressure ulcer prophylaxis  · Topical miconazole cream  · Frequent turning and repositioning      Patient appropriate for transfer out of the ICU today?: No  Disposition: Admit to Critical Care   Code Status: Level 1 - Full Code  ---------------------------------------------------------------------------------------  ICU CORE MEASURES    Prophylaxis   VTE Pharmacologic Prophylaxis: Heparin  VTE Mechanical Prophylaxis: sequential compression device  Stress Ulcer Prophylaxis: Pantoprazole PO    ABCDE Protocol (if indicated)  Plan to perform spontaneous awakening trial today? Not applicable  Plan to perform spontaneous breathing trial today? No Not applicable  Obvious barriers to extubation? Not applicable  CAM-ICU: Negative    Invasive Devices Review  Invasive Devices     Central Venous Catheter Line  Duration           Port A Cath Right Subclavian -- days          Peripheral Intravenous Line  Duration           Peripheral IV 23 Left;Ventral (anterior) Forearm 2 days    Peripheral IV 23 Right Antecubital 2 days          Arterial Line  Duration           Arterial Line 23 Radial 2 days          Drain  Duration           Urethral Catheter Temperature probe 16 Fr  <1 day              Can any invasive devices be discontinued today?  No  ---------------------------------------------------------------------------------------  OBJECTIVE    Vitals   Vitals:    23 0100 23 0200 23 0300 23 0400   BP: 102/56 98/57 (!) 87/49 105/60   Pulse: 72 74 70 72   Resp: 19 14 20 (!) 11   Temp:       TempSrc:       SpO2: 98% 98% 98% 98%   Weight:       Height:         Temp (24hrs), Av 7 °F (36 5 °C), Min:97 5 °F (36 4 °C), Max:97 8 °F (36 6 °C)  Current: Temperature: 97 8 °F (36 6 °C)      Respiratory:  SpO2: SpO2: 98 %       Invasive/non-invasive ventilation settings   Respiratory    Lab Data (Last 4 hours)    None         O2/Vent Data (Last 4 hours)    None                Physical Exam  Vitals and nursing note reviewed  Constitutional:       General: He is not in acute distress  Appearance: He is well-developed  He is ill-appearing  Comments: Slurred speech, unclear baseline   HENT:      Head: Normocephalic and atraumatic  Eyes:      Conjunctiva/sclera: Conjunctivae normal    Cardiovascular:      Rate and Rhythm: Normal rate and regular rhythm  Heart sounds: No murmur heard  Pulmonary:      Effort: Pulmonary effort is normal  No respiratory distress  Breath sounds: Normal breath sounds  Abdominal:      General: There is distension  Palpations: Abdomen is soft  Tenderness: There is no abdominal tenderness  Comments: Fluid wave positive   Musculoskeletal:         General: No swelling  Cervical back: Neck supple  Skin:     General: Skin is warm and dry  Capillary Refill: Capillary refill takes less than 2 seconds  Findings: Rash (bilateral feet) present  Comments: Bilateral feet are diffusesly erythematous, with visible petechiae dorsal-medial aspect (L>R)   Neurological:      Mental Status: He is alert and oriented to person, place, and time     Psychiatric:         Mood and Affect: Mood normal               Laboratory and Diagnostics:  Results from last 7 days   Lab Units 02/23/23  0436 02/22/23  1957 02/22/23  1144 02/22/23  0557 02/21/23  0432 02/20/23  1548 02/20/23  0728 02/20/23  0341   WBC Thousand/uL 20 02*  --  17 77* 16 95* 21 30*  --   --  31 18*   HEMOGLOBIN g/dL 12 5  --  11 4* 11 6* 13 3 14 6  --  16 9   I STAT HEMOGLOBIN g/dl  --   --   --   --   --   --  16 7  --    HEMATOCRIT % 36 9  --  32 5* 32 9* 38 8 41 5  --  48 2   HEMATOCRIT, ISTAT %  --   --   --   --   --   --  49  --    PLATELETS Thousands/uL 71* 69* 63* 60* 107*  --   --  136*   NEUTROS PCT %  --   --  86* 85* 82*  --   --  82*   MONOS PCT %  --   --  8 9 11  --   --  13*     Results from last 7 days   Lab Units 02/23/23  0436 02/22/23  1824 02/22/23  6957 02/22/23  0557 02/21/23  2358 02/21/23  1835 02/21/23  1146 02/21/23  0906 02/21/23  0432 02/20/23  0728 02/20/23  0341   SODIUM mmol/L 129* 125* 124*  --  123* 124* 121* 122* 124*   < > 118*   POTASSIUM mmol/L 3 8 3 4* 3 7  --  3 8 3 7 3 9 4 0 4 2   < > 6 2*   CHLORIDE mmol/L 103 100 98  --  95* 96 94* 93* 95*   < > 91*   CO2 mmol/L 18* 18* 19*  --  18* 18* 17* 17* 17*   < > 16*   CO2, I-STAT   --   --   --   --   --   --   --   --   --    < >  --    ANION GAP mmol/L 8 7 7  --  10 10 10 12 12   < > 11   BUN mg/dL 28* 35* 41*  --  49* 47* 56* 59* 66*   < > 107*   CREATININE mg/dL 0 60 0 79 0 80  --  0 90 0 87 1 06 1 15 1 31*   < > 2 37*   CALCIUM mg/dL 6 7* 6 7* 6 4*  --  6 4* 6 5* 6 4* 6 6* 6 2*   < > 6 5*   GLUCOSE RANDOM mg/dL 92 133 129  --  144* 118 173* 202* 254*   < > 140   ALT U/L 54  --   --  54  --   --   --   --  71  --  91*   AST U/L 58*  --   --  65*  --   --   --   --  73*  --  89*   ALK PHOS U/L 116  --   --  105  --   --   --   --  137*  --  198*   ALBUMIN g/dL 3 1*  --   --  3 5  --   --   --   --  2 6*  --  1 9*   TOTAL BILIRUBIN mg/dL 2 61*  --   --  3 03*  --   --   --   --  1 96*  --  1 18*    < > = values in this interval not displayed  Results from last 7 days   Lab Units 02/23/23  0436 02/22/23  0557 02/21/23  0432 02/20/23  0827   MAGNESIUM mg/dL 2 9* 3 0* 3 6* 4 5*   PHOSPHORUS mg/dL 1 2* 2 3* 3 4 4 9*      Results from last 7 days   Lab Units 02/23/23  0436 02/22/23  2150 02/22/23  1957 02/22/23  1656 02/22/23  1423 02/22/23  0727 02/21/23  2358 02/20/23  2234 02/20/23  1548 02/20/23  0341   INR   --   --  5 99* 2 36*  --   --   --   --  1 66* 1 49*   PTT seconds 105* 118* 126* 65* 73* 82* 97*   < > 38* 47*    < > = values in this interval not displayed            Results from last 7 days   Lab Units 02/21/23  0729 02/21/23  0432 02/21/23  0022 02/20/23 2013 02/20/23  1548 02/20/23  1305 02/20/23  1132   LACTIC ACID mmol/L 4 0* 3 8* 3 1* 4 6* 3 5* 4 1* 3 3*     ABG:  Results from last 7 days   Lab Units 02/21/23  0730   PH ART  7 518*   PCO2 ART mm Hg 23 0*   PO2 ART mm Hg 92 5   HCO3 ART mmol/L 18 3*   BASE EXC ART mmol/L -2 6   ABG SOURCE  Line, Arterial     VBG:  Results from last 7 days   Lab Units 02/21/23  0929 02/21/23  0730   PH SOLO  7 513*  --    PCO2 SOLO mm Hg 25 1*  --    PO2 SOLO mm Hg 40 3  --    HCO3 SOLO mmol/L 19 7*  --    BASE EXC SOLO mmol/L -1 6  --    ABG SOURCE   --  Line, Arterial     Results from last 7 days   Lab Units 02/20/23  0827 02/20/23  0341   PROCALCITONIN ng/ml 4 53* 4 92*       Micro  Results from last 7 days   Lab Units 02/22/23  1146 02/20/23  1305 02/20/23  0916 02/20/23  0634 02/20/23  0341   BLOOD CULTURE   --   --   --  No Growth at 48 hrs  No Growth at 48 hrs  GRAM STAIN RESULT  1+ Polys  No bacteria seen  --  No Polys or Bacteria seen  --   --    BODY FLUID CULTURE, STERILE   --   --  No growth  --   --    MRSA CULTURE ONLY   --  No Methicillin Resistant Staphlyococcus aureus (MRSA) isolated  --   --   --        EKG: ECG on arrival to ED revealed normal sinus rhythm, peaked T waves, nonischemic  Imaging:   VAS lower limb venous duplex study, complete bilateral   Final Result by Shaylee Carter MD (02/22 7255)      XR chest portable ICU   Final Result by Linnea Vanegas MD (02/20 5646)      No acute cardiopulmonary disease  Findings are stable               Workstation performed: [id number]         CT abdomen pelvis wo contrast   Final Result by Paul Osman MD (02/20 5620)      No acute pathology  Evaluation is limited in the absence of intravenous contrast enhancement, however metastatic disease appears essentially unchanged, and including dominant hepatic lesion, peritoneal carcinomatosis with moderate moderate ascites  Presumed metastatic    right lower lobe nodules also noted  Workstation performed: [id number]            I have personally reviewed pertinent reports        Intake and Output  I/O       02/19 0701  02/20 0700 02/20 0701  02/21 0700    P  O   180    I V  (mL/kg)  1981 3 (19 1)    IV Piggyback  2700    Total Intake(mL/kg)  4861 3 (46 7)    Urine (mL/kg/hr)  2380 (1)    Stool  0    Total Output  2380    Net  +2481 3          Unmeasured Stool Occurrence  1 x        Height and Weights   Height: 6' (182 9 cm)  IBW (Ideal Body Weight): 77 6 kg  Body mass index is 28 64 kg/m²  Weight (last 2 days)     Date/Time Weight    02/22/23 0555 95 8 (211 2)    02/21/23 0705 104 (229)    02/21/23 0600 104 (229 94)            Nutrition       Diet Orders   (From admission, onward)             Start     Ordered    02/22/23 1325  Diet Regular; Regular House; Full Liquid, Fluid Restriction 1200 ML  Diet effective now        References:    Nutrtion Support Algorithm Enteral vs  Parenteral   Question Answer Comment   Diet Type Regular    Regular Regular House    Other Restriction(s): Full Liquid    Other Restriction(s): Fluid Restriction 1200 ML    RD to adjust diet per protocol?  Yes        02/22/23 1325                  Active Medications  Scheduled Meds:  Current Facility-Administered Medications   Medication Dose Route Frequency Provider Last Rate   • al mag oxide-diphenhydramine-lidocaine viscous  10 mL Swish & Swallow Q4H PRN Bailey Decker MD     • argatroban  0 1-3 mcg/kg/min Intravenous Titrated Ammar Alabob, DO 0 25 mcg/kg/min (02/23/23 0442)   • calcium gluconate  2 g Intravenous Once Wallace Hilton DO     • cefTRIAXone  2,000 mg Intravenous Q24H Bailey Decker MD Stopped (02/22/23 1257)   • chlorhexidine  15 mL Mouth/Throat Q12H [address] Renetta Munoz DO     • ergocalciferol  50,000 Units Oral Weekly Wallace Garcia DO     • insulin lispro  1-6 Units Subcutaneous TID AC Wallace Garcia DO     • melatonin  6 mg Oral HS Renetta Munoz DO     • metroNIDAZOLE  500 mg Intravenous Q8H Renetta Munoz  mg (02/23/23 0032)   • midodrine  10 mg Oral TID AC Tiana Mckinney MD     • JOYCE ANTIFUNGAL   Topical BID Bailey Decker DO Tammy     • norepinephrine  1-30 mcg/min Intravenous Titrated Renetta Munoz DO 5 mcg/min (02/23/23 0703)   • ondansetron  4 mg Intravenous Q8H PRN Renetta Munoz DO     • oxyCODONE  5 mg Oral Q4H PRN Prosper Russell MD     • pantoprazole  40 mg Oral Early Morning Renetta Munoz DO     • potassium phosphate  30 mmol Intravenous Once Guido Ahumada, DO     • saliva substitute  5 spray Mouth/Throat 4x Daily PRN Renetta Munoz DO     • sodium chloride  75 mL/hr Intravenous Continuous Ammar Alabob, DO     • sucralfate  1 g Oral 4x Daily (AC & HS) Prosper Russell MD       Continuous Infusions:  argatroban, 0 1-3 mcg/kg/min, Last Rate: 0 25 mcg/kg/min (02/23/23 0442)  norepinephrine, 1-30 mcg/min, Last Rate: 5 mcg/min (02/23/23 0703)  sodium chloride, 75 mL/hr      PRN Meds:   al mag oxide-diphenhydramine-lidocaine viscous, 10 mL, Q4H PRN  ondansetron, 4 mg, Q8H PRN  oxyCODONE, 5 mg, Q4H PRN  saliva substitute, 5 spray, 4x Daily PRN        Allergies   Allergies   Allergen Reactions   • Penicillins Other (See Comments) and Rash     As a child had reaction not sure what it was       ---------------------------------------------------------------------------------------  Advance Directive and Living Will:      Power of :    POLST:    ---------------------------------------------------------------------------------------  Care Time Delivered:   Upon my evaluation, this patient had a high probability of imminent or life-threatening deterioration, which required my direct attention, intervention, and personal management  I have personally provided 30 minutes of critical care time, exclusive of procedures, teaching, family meetings, and any prior time recorded by providers other than myself  Guido Ahumada, DO      Portions of the record may have been created with voice recognition software    Occasional wrong word or "sound a like" substitutions may have occurred due to the inherent limitations of voice recognition software    Read the chart carefully and recognize, using context, where substitutions have occurred

## 2023-02-23 NOTE — OCCUPATIONAL THERAPY NOTE
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 [facility])     Rectal bleeding 1/9/2023    S/P exploratory laparotomy 12/16/2020     Past Surgical History  Past Surgical History:   Procedure Laterality Date    CT NEEDLE BIOPSY LIVER  10/15/2020    IR BIOPSY LIVER MASS  5/18/2022    IR PARACENTESIS  1/10/2023    IR PARACENTESIS  1/24/2023    IR PARACENTESIS  2/2/2023    IR PARACENTESIS  1/30/2023    IR PARACENTESIS  2/6/2023    IR PARACENTESIS  2/9/2023    IR PARACENTESIS  2/13/2023    IR PARACENTESIS  2/16/2023 02/23/23 1055   OT Last Visit   OT Visit Date 02/23/23   Note Type   Note type Evaluation   Pain Assessment   Pain Score No Pain   Restrictions/Precautions   Weight Bearing Precautions Per Order No   Other Precautions Multiple lines;Telemetry; Fall Risk   Home Living   Type of [address] [facility] [address] level;Performs ADLs on one level; Able to live on main level with bedroom/bathroom;Stairs to enter with rails   Bathroom Shower/Tub Tub/shower unit   Bathroom Toilet Standard   Bathroom Accessibility Accessible   Home Equipment Cane   Additional Comments Pt lives in a2 level apartment with FFOS to enter , stays on first level upon entering home with bed bath on first level  Has SPC which he reports he was only using the last few days as he was feeling weak   Prior Function   Level of [place] Independent with ADLs; Independent with functional mobility; Independent with IADLS   Lives With Spouse   Receives Help From Family   IADLs Independent with driving; Independent with medication management; Independent with meal prep   Falls in the last 6 months 0   Vocational Full time employment   Lifestyle   Autonomy I with ADLS and IADLS +    Reciprocal Relationships supportive wife and local parents   Service to Others works FT as a    Intrinsic Gratification Spending time outside in the summer and caring for his Cat Selma   Subjective   Subjective "I didnt know i was this sick"   ADL   Where Assessed Chair   Grooming Assistance 5  Supervision/Setup   UB Bathing Assistance 5  Supervision/Setup   LB [address] 4  Minimal Assistance   [address] 5  Supervision/Setup    [facility] 4  [facility] [address]  4  Minimal Assistance   Bed Mobility   Additional Comments OOB upon presentaion and at end of session   Transfers   Sit to Stand 4  Minimal assistance   Additional items Assist x 1   Stand to Sit 4  Minimal assistance   Additional items Assist x 1   Stand pivot 4  Minimal assistance   Additional items Assist x 1   Additional Comments HHA   Functional Mobility   Functional Mobility 4  Minimal assistance   Additional Comments HHA few feet in the room   Additional items Hand hold assistance   Balance   Static Sitting Good   Dynamic Sitting Fair +   Static Standing Fair -   Dynamic Standing Poor +   Ambulatory Poor +   Activity Tolerance   Activity Tolerance Patient limited by fatigue   Medical Staff Made Aware DPT, NSG aware   Nurse Made Aware yes   RUE Assessment   RUE Assessment WFL   LUE Assessment   LUE Assessment WFL   Psychosocial   Psychosocial (WDL) WDL   Cognition   Overall Cognitive Status WFL   Arousal/Participation Alert; Responsive; Cooperative   Attention Within functional limits   Orientation Level Oriented X4   Memory Within functional limits   Following Commands Follows all commands and directions without difficulty   Comments Pleasant and cooperative   Assessment   Limitation Decreased ADL status; Decreased UE strength;Decreased Safe judgement during ADL;Decreased endurance;Decreased self-care trans;Decreased high-level ADLs   Prognosis Good   Assessment Pt is a 47 y o  male seen for OT evaluation s/p admit to [facility] on 2/20/2023 w/ Septic shock ([facility] [address] )  Comorbidities affecting pt's functional performance at time of assessment include: 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 ([facility] [address] ), Rectal bleeding (1/9/2023), and S/P exploratory laparotomy (12/16/2020)  Personal factors affecting pt at time of IE include:steps to enter environment, limited home support, difficulty performing ADLS, difficulty performing IADLS , limited insight into deficits, decreased initiation and engagement  and health management   Prior to admission, pt was I with ADLS and IADLS  Upon evaluation: Pt presents OOB in chair and is agreeable to [address]  All vitals WNL  Pt requires overall Min A 2* the following deficits impacting occupational performance: weakness, decreased strength, decreased balance, decreased tolerance, decreased safety awareness and decreased coping skills  Pt resting in chair at end of session with all needs in reach, alarm on, all lines in place and SCD's on  Pt to benefit from continued skilled OT tx while in the hospital to address deficits as defined above and maximize level of functional independence w ADL's and functional mobility  Occupational Performance areas to address include: grooming, bathing/shower, toilet hygiene, dressing, health maintenance, functional mobility, community mobility, clothing management and social participation  The patient's raw score on the AM-PAC Daily Activity inpatient short form is 21, standardized score is 44 27, greater than 39 4  Patients at this level are likely to benefit from discharge to home  Please refer to the recommendation of the Occupational Therapist for safe discharge planning  Goals   Patient Goals To walk   Plan   Treatment Interventions ADL retraining;Functional transfer training;UE strengthening/ROM; Endurance training;Patient/family training; Compensatory technique education;Continued evaluation; Energy conservation; Activityengagement   Goal Expiration Date 03/09/23   OT Frequency 2-3x/wk   Recommendation   OT Discharge Recommendation No rehabilitation needs   AM-PAC Daily Activity Inpatient   Lower Body Dressing 3   Bathing 3   Toileting 3   Upper Body Dressing 4   Grooming 4   Eating 4   Daily Activity Raw Score 21   Daily Activity Standardized Score (Calc for Raw Score >=11) 44 27   AM-PAC Applied Cognition Inpatient   Following a Speech/Presentation 4   Understanding Ordinary Conversation 4   Taking Medications 4   Remembering Where Things Are Placed or Put Away 4   Remembering List of 4-5 Errands 4   Taking Care of Complicated Tasks 4   Applied Cognition Raw Score 24   Applied Cognition Standardized Score 62 21     OT goals to be addressed in the next 14 days:    Pt will increase activity tolerance to G for 30 min txment sessions    Pt will complete UB/LB self care w/ mod I using adaptive device and DME as needed    Pt will complete toileting w/ mod I w/ G hygiene/thoroughness using DME as needed    Pt will improve functional transfers to Mod I on/off all surfaces using DME as needed w/ G balance/safety     Pt will improve functional mobility during ADL/IADL/leisure tasks to Mod I using DME as needed w/ G balance/safety     Pt will participate in simulated IADL management task with DME as needed to increase independence to  w/ G safety and endurance    Pt will demonstrate 100% carryover of energy conservation techniques t/o functional I/ADL/leisure tasks w/o cues s/p skilled education    Pt will independently identify and utilize 2-3 coping strategies to increase positive affect and promote overall well-being

## 2023-02-23 NOTE — PROGRESS NOTES
NEPHROLOGY PROGRESS NOTE   Carlos Manuel Ashby 47 y o  male MRN: [id number]  [facility]: [id number]  Reason for Consult: DENZEL hyponatremia    ASSESSMENT AND PLAN:  Patient is 50-year-old male with significant medical issues of metastatic cholangiocarcinoma with metastasis to liver, lung, bone, prior history of right tonsillar squamous cell carcinoma, status posttreatment, recurrent ascites requiring paracentesis, now presents with abdominal pain, nausea, poor p o  intake   We are consulted for DENZEL, hyperkalemia, acidosis management      Initial severe DENZEL POA, baseline serum creatinine 0 6-0 8  -DENZEL resolved  Initial peak creatinine 2 6 now improved and serum creatinine 0 6 today   -Initial DENZEL suspect in the setting of severe hypotension, shock causing ischemic/hemodynamic insult, component of prerenal with poor p o  intake  -Was on ivosidenib and more recently on Pemigatinib, both can cause increase creatinine, less likely HRS with much improved creatinine with albumin challenge  -CT scan shows no hydro  -Patient had provided dialysis consent if needed which is in the chart, no need for dialysis  -Status post IV albumin challenge, now remains off  Serum albumin 3 1  -UA shows multiple 25-50 hyaline cast, no hematuria or proteinuria   Urine sodium less than 5, urine chloride less than 10 consistent with prerenal      Initial azotemia, overall much improved with improving renal function      Hyperkalemia, resolved  -Suspected in the setting of DENZEL  -Strict low potassium diet recommended      Primary respiratory alkalosis  -Now remains off bicarb drip  Bicarb level stable 18   -Compensatory lower bicarb level   -Lactic acid 4 0 suspect in the setting of shock, hypotension and also liver disease     Initial hyponatremia  -Serum sodium 118 on admission initially rapidly improved requiring hypotonic IV fluid    Was briefly off IV fluid   -Patient was restarted on IV normal saline 75 mill per hour yesterday, sodium level now slowly and appropriately improving up to 129 today   -Continue current IV normal saline  Recommend repeat serum sodium in late afternoon   -If serum sodium drops, will discontinue IV fluid   -Urine sodium less than 5, urine was 506, serum osmolality 288     Shock,? Concern for septic shock/hypovolemic component   -? Exact source   -Blood culture negative so far   Acetic fluid culture negative  Empiric antibiotic as per ICU team   -Status post IV albumin challenge  Still remains on low-dose Levophed  -Echocardiogram this admission shows EF 65%, dilated IVC with blunted respiratory phasic changes  No pericardial effusion       Metastatic cholangiocarcinoma with metastasis to liver, lymph node, bone, lungs   -Closely follows with hematology  Recent MRI showing new thrombosis of splenic, superior mesenteric and portal vein   Anticoagulation as per ICU team   Peritoneal carcinomatosis with recurrent ascites requiring paracentesis  Thrombocytopenia, being evaluated for HIT/hemolysis, hematology follow-up  Discussed above plan in detail with ICU team and they agree with above recommendations  SUBJECTIVE:  Patient seen and examined at bedside  Overall feels okay  Denies any worsening chest pain or shortness of breath      OBJECTIVE:  Current Weight: Weight - Scale: 95 8 kg (211 lb 3 2 oz)  Vitals:    02/23/23 0700   BP: (!) 65/48   Pulse: 82   Resp: 22   Temp:    SpO2: 97%       Intake/Output Summary (Last 24 hours) at 2/23/2023 0831  Last data filed at 2/22/2023 2045  Gross per 24 hour   Intake 1643 34 ml   Output 6200 ml   Net -4556 66 ml     Wt Readings from Last 3 Encounters:   02/22/23 95 8 kg (211 lb 3 2 oz)   02/03/23 109 kg (240 lb 1 3 oz)   01/31/23 112 kg (246 lb)     Temp Readings from Last 3 Encounters:   02/22/23 97 8 °F (36 6 °C) (Oral)   02/03/23 (!) 97 1 °F (36 2 °C) (Temporal)   01/31/23 98 2 °F (36 8 °C) (Oral)     BP Readings from Last 3 Encounters: 02/23/23 (!) 65/48   02/16/23 102/62   02/13/23 97/55     Pulse Readings from Last 3 Encounters:   02/23/23 82   02/16/23 75   02/13/23 71        Physical Examination:  Eyes: No conjunctival pallor present  Neck: No obvious lymphadenopathy appreciated  Respiratory: Bilateral air entry present  CVS: No significant edema in legs  GI: Soft, mild distended, better after paracentesis  CNS: Active alert, follows commands  Skin: No new rash  Musculoskeletal: No obvious new gross deformity noted    Medications:    Current Facility-Administered Medications:   •  al mag oxide-diphenhydramine-lidocaine viscous (MAGIC MOUTHWASH) suspension 10 mL, 10 mL, Swish & Swallow, Q4H PRN, Gonzalez Batres MD, 10 mL at 02/23/23 0715  •  argatroban infusion (premix), 0 1-3 mcg/kg/min, Intravenous, Titrated, Ammoira Garcia DO, Last Rate: 0 7 mL/hr at 02/23/23 0815, 0 12 mcg/kg/min at 02/23/23 0815  •  calcium gluconate 2 g in sodium chloride 0 9% 100 mL (premix), 2 g, Intravenous, Once, Ammar Alam, DO, Last Rate: 100 mL/hr at 02/23/23 0736, 2 g at 02/23/23 0736  •  cefTRIAXone (ROCEPHIN) 2,000 mg in dextrose 5 % 50 mL IVPB, 2,000 mg, Intravenous, Q24H, Gonzalez Batres MD, Stopped at 02/22/23 1257  •  chlorhexidine (PERIDEX) 0 12 % oral rinse 15 mL, 15 mL, Mouth/Throat, Q12H [address], Renetta Munoz DO, 15 mL at 02/22/23 2010  •  ergocalciferol (VITAMIN D2) capsule 50,000 Units, 50,000 Units, Oral, Weekly, Wallace Garcia DO  •  insulin lispro (HumaLOG) 100 units/mL subcutaneous injection 1-6 Units, 1-6 Units, Subcutaneous, TID AC **AND** Fingerstick Glucose (POCT), , , TID AC, Wallace Garcia DO  •  melatonin tablet 6 mg, 6 mg, Oral, HS, Renetta Munoz DO, 6 mg at 02/22/23 1955  •  metroNIDAZOLE (FLAGYL) IVPB (premix) 500 mg 100 mL, 500 mg, Intravenous, Q8H, Renetta Munoz DO, Last Rate: 200 mL/hr at 02/23/23 0032, 500 mg at 02/23/23 0032  •  midodrine (PROAMATINE) tablet 10 mg, 10 mg, Oral, TID AC, Jessica Muñoz MD, 10 mg at 02/23/23 9250  •  moisture barrier miconazole 2% cream (aka JOYCE MOISTURE BARRIER ANTIFUNGAL CREAM), , Topical, BID, Renetta Munoz DO, Given at 02/22/23 1815  •  norepinephrine (LEVOPHED) 4 mg (STANDARD CONCENTRATION) IV in sodium chloride 0 9% 250 mL, 1-30 mcg/min, Intravenous, Titrated, Renetta Munoz DO, Last Rate: 18 8 mL/hr at 02/23/23 0703, 5 mcg/min at 02/23/23 0703  •  ondansetron (ZOFRAN) injection 4 mg, 4 mg, Intravenous, Q8H PRN, Renetta Munoz DO, 4 mg at 02/22/23 0006  •  oxyCODONE (ROXICODONE) IR tablet 5 mg, 5 mg, Oral, Q4H PRN, Micky Phillips MD  •  pantoprazole (PROTONIX) EC tablet 40 mg, 40 mg, Oral, Early Morning, Renetta Munoz DO, 40 mg at 02/23/23 0549  •  potassium phosphates 30 mmol in sodium chloride 0 9 % 250 mL infusion, 30 mmol, Intravenous, Once, Wallace Garcia DO, Last Rate: 41 7 mL/hr at 02/23/23 0814, 30 mmol at 02/23/23 0814  •  saliva substitute (MOUTH KOTE) mucosal solution 5 spray, 5 spray, Mouth/Throat, 4x Daily PRN, Renetta Munoz DO, 5 spray at 02/23/23 4919  •  sodium chloride 0 9 % infusion, 75 mL/hr, Intravenous, Continuous, Wallace Garcia DO  •  sucralfate (CARAFATE) tablet 1 g, 1 g, Oral, 4x Daily (AC & HS), Micky Phillips MD, 1 g at 02/23/23 0382    Laboratory Results:  Results from last 7 days   Lab Units 02/23/23  0436 02/22/23  1957 02/22/23  1824 02/22/23  1144 02/22/23  0558 02/22/23  0557 02/21/23  2358 02/21/23  1835 02/21/23  1146 02/21/23  0906 02/21/23  0432 02/20/23  2013 02/20/23  1548 02/20/23  1132 02/20/23  0827 02/20/23  0728 02/20/23  0341   WBC Thousand/uL 20 02*  20 02*  --   --  17 77*  --  16 95*  --   --   --   --  21 30*  --   --   --   --   --  31 18*   HEMOGLOBIN g/dL 12 5  12 5  --   --  11 4*  --  11 6*  --   --   --   --  13 3  --  14 6  --   --   --  16 9   I STAT HEMOGLOBIN g/dl  --   --   --   --   --   --   --   --   --   --   --   --   --   --   --  16 7  --    HEMATOCRIT % 36 9  36 9  --   --  32 5*  --  32 9*  --   --   --   -- 38  8  --  41 5  --   --   --  48 2   HEMATOCRIT, ISTAT %  --   --   --   --   --   --   --   --   --   --   --   --   --   --   --  49  --    PLATELETS Thousands/uL 71*  71* 69*  --  63*  --  60*  --   --   --   --  107*  --   --   --   --   --  136*   SODIUM mmol/L 129*  --  125*  --  124*  --  123* 124* 121* 122* 124*   < > 125*   < > 122*  --  118*   POTASSIUM mmol/L 3 8  --  3 4*  --  3 7  --  3 8 3 7 3 9 4 0 4 2   < > 5 1   < > 6 3*  --  6 2*   CHLORIDE mmol/L 103  --  100  --  98  --  95* 96 94* 93* 95*   < > 99   < > 92*  --  91*   CO2 mmol/L 18*  --  18*  --  19*  --  18* 18* 17* 17* 17*   < > 15*   < > 22  --  16*   CO2, I-STAT mmol/L  --   --   --   --   --   --   --   --   --   --   --   --   --   --   --  19*  --    BUN mg/dL 28*  --  35*  --  41*  --  49* 47* 56* 59* 66*   < > 86*   < > 105*  --  107*   CREATININE mg/dL 0 60  --  0 79  --  0 80  --  0 90 0 87 1 06 1 15 1 31*   < > 1 76*   < > 2 60*  --  2 37*   CALCIUM mg/dL 6 7*  --  6 7*  --  6 4*  --  6 4* 6 5* 6 4* 6 6* 6 2*   < > 6 7*   < > 7 1*  --  6 5*   MAGNESIUM mg/dL 2 9*  --   --   --   --  3 0*  --   --   --   --  3 6*  --   --   --  4 5*  --   --    PHOSPHORUS mg/dL 1 2*  --   --   --   --  2 3*  --   --   --   --  3 4  --   --   --  4 9*  --   --    GLUCOSE, ISTAT mg/dl  --   --   --   --   --   --   --   --   --   --   --   --   --   --   --  115  --     < > = values in this interval not displayed  VAS lower limb venous duplex study, complete bilateral   Final Result by Lucio Washington MD (02/22 3308)      XR chest portable ICU   Final Result by Ember Holt MD (02/20 6842)      No acute cardiopulmonary disease  Findings are stable               Workstation performed: [id number]         CT abdomen pelvis wo contrast   Final Result by Clinton Chang MD (02/20 4110)      No acute pathology        Evaluation is limited in the absence of intravenous contrast enhancement, however metastatic disease appears essentially unchanged, and including dominant hepatic lesion, peritoneal carcinomatosis with moderate moderate ascites  Presumed metastatic    right lower lobe nodules also noted  Workstation performed: [id number]             Portions of the record may have been created with voice recognition software  Occasional wrong word or "sound a like" substitutions may have occurred due to the inherent limitations of voice recognition software  Read the chart carefully and recognize, using context, where substitutions have occurred

## 2023-02-23 NOTE — PLAN OF CARE
Problem: OCCUPATIONAL THERAPY ADULT  Goal: Performs self-care activities at highest level of function for planned discharge setting  See evaluation for individualized goals  Description: Treatment Interventions: ADL retraining, Functional transfer training, UE strengthening/ROM, Endurance training, Patient/family training, Compensatory technique education, Continued evaluation, Energy conservation, Activityengagement          See flowsheet documentation for full assessment, interventions and recommendations  Note: Limitation: Decreased ADL status, Decreased UE strength, Decreased Safe judgement during ADL, Decreased endurance, Decreased self-care trans, Decreased high-level ADLs  Prognosis: Good  Assessment: Pt is a 47 y o  male seen for OT evaluation s/p [facility]23 w/ Septic shock (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 [address] ), Rectal bleeding (1/9/2023), and S/P exploratory laparotomy (12/16/2020)  Personal factors affecting pt at time of IE include:steps to enter environment, limited home support, difficulty performing ADLS, difficulty performing IADLS , limited insight into deficits, decreased initiation and engagement  and health management   Prior to admission, pt was I with ADLS and IADLS  Upon evaluation: Pt presents OOB in chair and is agreeable to [address]  All vitals WNL  Pt requires overall Min A 2* the following deficits impacting occupational performance: weakness, decreased strength, decreased balance, decreased tolerance, decreased safety awareness and decreased coping skills  Pt resting in chair at end of session with all needs in reach, alarm on, all lines in place and SCD's on  Pt to benefit from continued skilled OT tx while in the hospital to address deficits as defined above and maximize level of functional independence w ADL's and functional mobility   Occupational Performance areas to address include: grooming, bathing/shower, toilet hygiene, dressing, health maintenance, functional mobility, community mobility, clothing management and social participation  The patient's raw score on the AM-PAC Daily Activity inpatient short form is 21, standardized score is 44 27, greater than 39 4  Patients at this level are likely to benefit from discharge to home  Please refer to the recommendation of the Occupational Therapist for safe discharge planning       OT Discharge Recommendation: No rehabilitation needs

## 2023-02-24 NOTE — UTILIZATION REVIEW
Continued Stay Review    Date: 02/24                     Current Patient Class: IP Current Level of Care: Critical Care    HPI:52 y o  male initially admitted on 02/20    Assessment/Plan: Pt requiring to up levo to 10 for MAP goal >60  Has minimal bloody BM x 1  Appears to have mild hypoalbuminemia  Na 135 today  WBC up to 20 95  start on albumin 25 g every 6 hours to improve MAP  Increase to midodrine 15mg tid to assist in weaning levo  BMP q6h  Cont Na-K Phos 2 packets BID for 2-3 days  HIT panel pending  Trend CBC, transfuse to maintain plts>20,000  Started on 50,000u Vitamin D2 weekly  Continue to monitor calcium and phosphorous levels  Cont IV abx   PPIppx         Vital Signs:   Date/Time Temp Pulse Resp BP MAP (mmHg) Arterial Line BP MAP SpO2 O2 Device Patient Position - Orthostatic VS   02/24/23 1400 98 °F (36 7 °C) 84 17 95/60 70 120/50 68 mmHg 98 % -- --   02/24/23 1300 -- 74 18 107/66 79 140/56 78 mmHg 99 % -- --   02/24/23 1217 -- 80 14 -- -- 128/56 76 mmHg 99 % -- --   02/24/23 1200 -- 84 17 -- -- 120/46 64 mmHg Abnormal  99 % -- --   02/24/23 1115 -- 76 18 99/68 76 132/50 68 mmHg 99 % -- --   02/24/23 1029 -- 88 20 100/66 81 128/52 70 mmHg 99 % -- --   02/24/23 1000 -- 104 30 Abnormal  -- -- 114/56 74 mmHg 99 % -- --   02/24/23 0803 97 9 °F (36 6 °C) 74 26 Abnormal  99/59 71 116/38 58 mmHg Abnormal  99 % None (Room air) --   02/24/23 0700 -- 76 17 105/59 73 -- -- 98 % -- --   02/24/23 0600 -- 72 12 -- -- 124/44 64 mmHg Abnormal  99 % -- --   02/24/23 0500 -- 70 21 -- -- 134/46 66 mmHg 99 % -- --   02/24/23 0410 -- -- -- -- -- 128/46 68 mmHg -- -- --   02/24/23 0400 -- 70 11 Abnormal  -- -- 124/42 62 mmHg Abnormal  99 % None (Room air) --   02/24/23 0320 -- -- -- -- -- 132/44 66 mmHg -- -- --   02/24/23 0300 -- 72 12 -- -- 130/44 64 mmHg Abnormal  99 % -- --   02/24/23 0200 -- 70 10 Abnormal  -- -- 132/42 64 mmHg Abnormal  99 % -- --   02/24/23 0100 -- 72 18 -- -- 130/42 64 mmHg Abnormal  99 % -- -- 02/24/23 0050 -- 72 16 -- -- 132/44 66 mmHg 99 % -- --   02/24/23 0040 -- 72 19 -- -- 140/48 72 mmHg 99 % -- --   02/24/23 0010 -- 74 8 Abnormal  -- -- 120/42 62 mmHg Abnormal  99 % -- --   02/24/23 0000 -- 74 14 -- -- 110/40 58 mmHg Abnormal  98 % None (Room air)        Pertinent Labs/Diagnostic Results:       Results from last 7 days   Lab Units 02/24/23  0435 02/23/23  0436 02/22/23 1957 02/22/23  1144 02/22/23  0557 02/21/23  0432   WBC Thousand/uL 20 95* 20 02*  20 02*  --  17 77* 16 95* 21 30*   HEMOGLOBIN g/dL 12 5 12 5  12 5  --  11 4* 11 6* 13 3   HEMATOCRIT % 36 4* 36 9  36 9  --  32 5* 32 9* 38 8   PLATELETS Thousands/uL 77* 71*  71* 69* 63* 60* 107*   NEUTROS ABS Thousands/µL  --  16 98*  --  15 42* 14 48* 17 40*     Results from last 7 days   Lab Units 02/22/23 1957   RETIC CT ABS  49,500   RETIC CT PCT % 1 25     Results from last 7 days   Lab Units 02/24/23  0435 02/23/23  1238 02/23/23  0436 02/22/23  1824 02/22/23  0558 02/22/23  0557 02/21/23  0906 02/21/23  0432 02/20/23  1132 02/20/23  0916 02/20/23  0827   SODIUM mmol/L 135 129* 129* 125* 124*  --    < > 124*   < >  --  122*   POTASSIUM mmol/L 3 8 4 3 3 8 3 4* 3 7  --    < > 4 2   < >  --  6 3*   CHLORIDE mmol/L 108 105 103 100 98  --    < > 95*   < >  --  92*   CO2 mmol/L 18* 16* 18* 18* 19*  --    < > 17*   < >  --  22   ANION GAP mmol/L 9 8 8 7 7  --    < > 12   < >  --  8   BUN mg/dL 27* 27* 28* 35* 41*  --    < > 66*   < >  --  105*   CREATININE mg/dL 0 55* 0 71 0 60 0 79 0 80  --    < > 1 31*   < >  --  2 60*   EGFR ml/min/1 73sq m 120 108 116 103 103  --    < > 62   < >  --  27   CALCIUM mg/dL 6 7* 7 1* 6 7* 6 7* 6 4*  --    < > 6 2*   < >  --  7 1*   CALCIUM, IONIZED mmol/L 0 95*  --  0 94*  --   --  0 89*  --  0 88*  --  0 97*  --    MAGNESIUM mg/dL 2 8*  --  2 9*  --   --  3 0*  --  3 6*  --   --  4 5*   PHOSPHORUS mg/dL 1 5*  --  1 2*  --   --  2 3*  --  3 4  --   --  4 9*    < > = values in this interval not displayed  Results from last 7 days   Lab Units 02/24/23  0435 02/23/23  0436 02/22/23  0557 02/21/23  0432 02/20/23  0916 02/20/23  0341   AST U/L 52* 58* 65* 73*  --  89*   ALT U/L 48 54 54 71  --  91*   ALK PHOS U/L 115 116 105 137*  --  198*   TOTAL PROTEIN g/dL 4 6* 4 8* 4 9* 4 7*  --  5 1*   ALBUMIN g/dL 3 4* 3 1* 3 5 2 6*  --  1 9*   TOTAL BILIRUBIN mg/dL 2 67* 2 61* 3 03* 1 96*  --  1 18*   BILIRUBIN DIRECT mg/dL  --  1 26* 1 30* 0 92*  --   --    AMMONIA umol/L  --   --   --   --  63*  --      Results from last 7 days   Lab Units 02/24/23  1148 02/24/23  0619 02/23/23  1757 02/23/23  1209 02/23/23  0706 02/22/23  1618 02/22/23  1103 02/22/23  0759 02/22/23  0605 02/22/23  0008 02/21/23  2030 02/21/23  1740   POC GLUCOSE mg/dl 132 93 141* 120 75 135 137 140 119 159* 122 106     Results from last 7 days   Lab Units 02/24/23  0435 02/23/23  1238 02/23/23  0436 02/22/23  1824 02/22/23  0558 02/21/23  2358 02/21/23  1835 02/21/23  1146 02/21/23  0906 02/21/23  0432 02/21/23  0022 02/20/23 2013   GLUCOSE RANDOM mg/dL 121 136 92 133 129 144* 118 173* 202* 254* 225* 190*     Results from last 7 days   Lab Units 02/20/23  1829   OSMOLALITY, SERUM mmol/     Results from last 7 days   Lab Units 02/21/23  0730   PH ART  7 518*   PCO2 ART mm Hg 23 0*   PO2 ART mm Hg 92 5   HCO3 ART mmol/L 18 3*   BASE EXC ART mmol/L -2 6   O2 CONTENT ART mL/dL 19 0   O2 HGB, ARTERIAL % 96 4   ABG SOURCE  Line, Arterial     Results from last 7 days   Lab Units 02/21/23  0929 02/20/23  1548 02/20/23  0916   PH SOLO  7 513* 7 464* 7 324   PCO2 SOLO mm Hg 25 1* 24 9* 29 0*   PO2 SOLO mm Hg 40 3 45 7* 45 4*   HCO3 SOLO mmol/L 19 7* 17 5* 14 7*   BASE EXC SOLO mmol/L -1 6 -4 3 -9 9   O2 CONTENT SOLO ml/dL 15 3 17 6 13 3   O2 HGB, VENOUS % 78 4 81 4* 74 2     Results from last 7 days   Lab Units 02/20/23  0728   PH, SOLO I-STAT  7 359   PCO2, SOLO ISTAT mm HG 31 5*   PO2, SOLO ISTAT mm HG 40 0   HCO3, SOLO ISTAT mmol/L 17 7*   I STAT BASE EXC mmol/L -6* I STAT O2 SAT % 73         Results from last 7 days   Lab Units 02/20/23  1305 02/20/23  1132 02/20/23  0916   HS TNI 0HR ng/L  --   --  17   HS TNI 2HR ng/L  --  17  --    HSTNI D2 ng/L  --  0  --    HS TNI 4HR ng/L 16  --   --    HSTNI D4 ng/L -1  --   --      Results from last 7 days   Lab Units 02/22/23 1957   D-DIMER QUANTITATIVE ug/ml FEU 4 25*     Results from last 7 days   Lab Units 02/24/23  1223 02/24/23  0004 02/23/23  1837 02/22/23  2150 02/22/23 1957 02/22/23  1656 02/20/23  2234 02/20/23  1548   PROTIME seconds  --   --   --   --  53 7* 26 1*  --  19 8*   INR   --   --   --   --  5 99* 2 36*  --  1 66*   PTT seconds 91* 72* 72*   < > 126* 65*   < > 38*    < > = values in this interval not displayed       Results from last 7 days   Lab Units 02/20/23  0827   TSH 3RD GENERATON uIU/mL 3 890     Results from last 7 days   Lab Units 02/20/23  0827 02/20/23  0341   PROCALCITONIN ng/ml 4 53* 4 92*     Results from last 7 days   Lab Units 02/21/23  0729 02/21/23  0432 02/21/23  0022 02/20/23 2013 02/20/23  1548   LACTIC ACID mmol/L 4 0* 3 8* 3 1* 4 6* 3 5*             Results from last 7 days   Lab Units 02/20/23  0827   NT-PRO BNP pg/mL 340*     Results from last 7 days   Lab Units 02/20/23  1829   OSMOLALITY, SERUM mmol/   OSMO UR mmol/     Results from last 7 days   Lab Units 02/20/23  1829 02/20/23 2009   CLARITY UA   --  Turbid   COLOR UA   --  Yellow   SPEC GRAV UA   --  1 015   PH UA   --  5 0   GLUCOSE UA mg/dl  --  Negative   KETONES UA mg/dl  --  Negative   BLOOD UA   --  Negative   PROTEIN UA mg/dl  --  Trace*   NITRITE UA   --  Negative   BILIRUBIN UA   --  Negative   UROBILINOGEN UA (BE) mg/dl  --  <2 0   LEUKOCYTES UA   --  Negative   WBC UA /hpf  --  None Seen   RBC UA /hpf  --  None Seen   BACTERIA UA /hpf  --  None Seen   EPITHELIAL CELLS WET PREP /hpf  --  None Seen   MUCUS THREADS   --  Occasional*   SODIUM UR  <5  --    CREATININE UR mg/dL 68 8  --          Results from last 7 days   Lab Units 02/22/23  1146 02/20/23  0916 02/20/23  0634 02/20/23  0341   BLOOD CULTURE   --   --  No Growth After 4 Days  No Growth After 4 Days  GRAM STAIN RESULT  1+ Polys  No bacteria seen No Polys or Bacteria seen  --   --    BODY FLUID CULTURE, STERILE  No growth No growth  --   --      Results from last 7 days   Lab Units 02/22/23  1957 02/22/23  1144 02/20/23  0916   TOTAL COUNTED  100 10 14   WBC FLUID /ul  --  78 155             Medications:   Scheduled Medications:  cefTRIAXone, 2,000 mg, Intravenous, Q24H  chlorhexidine, 15 mL, Mouth/Throat, Q12H [address]  [START ON 2/25/2023] cosyntropin, 0 25 mg, Intravenous, Once  ergocalciferol, 50,000 Units, Oral, Weekly  fluconazole, 200 mg, Oral, Daily  insulin lispro, 1-6 Units, Subcutaneous, TID AC  melatonin, 6 mg, Oral, HS  metroNIDAZOLE, 500 mg, Intravenous, Q8H  midodrine, 15 mg, Oral, TID AC  JOYCE ANTIFUNGAL, , Topical, BID  pantoprazole, 40 mg, Oral, Early Morning  potassium-sodium phosphates, 2 packet, Oral, BID With Meals  sucralfate, 1 g, Oral, 4x Daily (AC & HS)      Continuous IV Infusions:  argatroban, 0 1-3 mcg/kg/min, Intravenous, Titrated  norepinephrine, 1-30 mcg/min, Intravenous, Titrated      PRN Meds:  al mag oxide-diphenhydramine-lidocaine viscous, 10 mL, Swish & Swallow, Q4H PRN  ondansetron, 4 mg, Intravenous, Q8H PRN  oxyCODONE, 5 mg, Oral, Q4H PRN  saliva substitute, 5 spray, Mouth/Throat, 4x Daily PRN        Discharge Plan: D    Network Utilization Review Department  ATTENTION: Please call with any questions or concerns to [phone] and carefully listen to the prompts so that you are directed to the right person  All voicemails are confidential   Marlene Baldomero all requests for admission clinical reviews, approved or denied determinations and any other requests to dedicated fax number below belonging to the campus where the patient is receiving treatment   List of dedicated fax numbers for the Facilities:  [facility] [place] ADMISSION DENIALS (Administrative/Medical Necessity) [phone]   [address] (Maternity/NICU/Pediatrics) [place] [address] Christine Mcnamara  [phone]   [address] [address] [address] [place] [place] [address] [facility] [address] [facility] [place] [address] [phone]

## 2023-02-24 NOTE — CASE MANAGEMENT
Case Management Discharge Planning Note    Patient name Olvin Tate  Location MICU 04/MICU 61 MRN [id number]  : 1970 Date 2023       Current Admission Date: 2023  Current Admission Diagnosis:Septic shock 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 ([facility] ) 2023   • Thrombocytopenia ([place] [address] ) 2023   • Hypomagnesemia 2023   • BMI 35 0-35 9,adult 2023   • Vitamin D deficiency 2023   • GERD (gastroesophageal reflux disease) 2023   • Nausea and vomiting 2023   • Ascites 2023   • Lytic bone lesion of hip 2023   • Hyponatremia 2023   • Cholangiocarcinoma, stage IV ([place] [address] ) 2020   • Tobacco use disorder 2019   • Spinal accessory neuropathy 2018   • History of tongue cancer 2018   • Squamous cell carcinoma of tongue ([place] [address] ) 2018      LOS (days): 4  Geometric Mean LOS (GMLOS) (days): 5 00  Days to GMLOS:0 6     OBJECTIVE:  Risk of Unplanned Readmission Score: 32 4         Current admission status: Inpatient   Preferred Pharmacy:   [address] [place] [address] [id number] Berenice NORTON  [facility] [address]  Phone: [phone] Fax: [phone]    Primary Care Provider: Shahana Cortez MD    Primary Insurance: BLUE CROSS  Secondary Insurance:     DISCHARGE DETAILS:               Treatment Team Recommendation: Home  Discharge Destination Plan[de-identified] Home               Additional Comments: As per rounds, patient's current recommendation for home health/PT, however he is expected to improve and likely will not be homebound  Received call from Raoul Child at [facility] Case Management, [phone], offering assistance with discharge planning  CM to follow

## 2023-02-24 NOTE — PROGRESS NOTES
Daily Progress Note - Critical Care   Cindy Ashby 47 y o  male MRN: [id number]  Unit/Bed#: MICU 04 Encounter: [id number]        ----------------------------------------------------------------------------------------  HPI: Tico Garvin is a 47 y o  male with past medical history of heavy nicotine dependence currently in remission, SCC of head/neck (tongue) diagnosed in 2018 s/p chemo and partial glossectomy, stage IV cholangiocarcinoma (diagnosed in 2020 s/p resection followed by immuno-chemo therapy with recurrence in 2022 now with mets  to bone, peritoneum, lungs, and liver, currently on second round of pemigatinib complicated by recurrent ascites requiring frequent large volume paracentesis twice a week, with removal of 4-5L of ascitic fluid on a weekly basis, who presented to ED this AM with concerns of 7 days of decreased appetite/po intake and worsening generalized fatigue with occasional eps of non-billous vomiting w/o overt s/s bleeding  Per wife, patient noted to be more confused from baseline, prompting ED evaluation  Patient follows with Dr Alcides Ashraf, Oncology  Was recently on ivositinib and more recently on Pemigatibib  Denies recent NSAID use  Admits to decrease urine output x3-4d  Denies recent ill contacts or long distance travel  Compliant with home meds including PRN Flexeril for and PRN oxy 5 for cancer/chemo induced pain, lasix 20mg prn for ascites, zofran/low dose dexamethason 4mg for nausea, and Protonix 40mg for GERD       On arrival to ED,  VS notable for T36 7, BP80/50, pulse 71, 98% on RA  Patient noted to be acutely encephalopathic on initial exam with abdominal fluid wave  Work up showed leukocytosis on CBC, elevated K 6 2 in setting of DENZEL with Cr 2 37 (baseline   5- 9) and ?given bolus of NS  ECG revealing NSR with peaked T waves, non-ischemic  Patient was initiated on IV calcium gluconate   Repeat iSTAT potassium >8 --> given additional IV calcium gluconate along with IV dextrose with regular insulin and additional 1L bolus NS  Initial Hs Trops 17, repeat 2h/4h pending  Sepsis workup iniated, started on IV vanc and IV cefepime  CT C/A/B without acute pathology and unchanged metastatic disease process including dominant hepatic lesion, peritoneal carcinomatosis with moderate moderate ascites; right lower lobe nodules also noted  Additionally patient underwent diagnostic paracentesis in the ED, though he was initially scheduled for his weekly therapeutic paracentesis today by IR as an outpatient  24 hour events:  Required upwards of 10 of levo  Reportedly minimally blood bowel movement x1  Review of Systems   Constitutional: Negative for chills, fatigue and fever  Respiratory: Negative for cough, chest tightness, shortness of breath and wheezing  Cardiovascular: Negative for chest pain, palpitations and leg swelling  Gastrointestinal: Negative for abdominal pain, constipation, diarrhea, nausea and vomiting  Neurological: Negative for dizziness, weakness, light-headedness, numbness and headaches  Psychiatric/Behavioral: Negative  All other systems reviewed and are negative  Review of systems was reviewed and negative unless stated above in HPI/24-hour events   ---------------------------------------------------------------------------------------  Assessment and Plan:    Neuro:   • Diagnosis: Hepatic encephalopathy, resolved  o Ammonia elevated 63 on admission  o In setting of stage IV cholangiocarcinoma with multiple metastases to the liver  Likely participated by DENZEL and hypovolemia/poor PO intake POA  Hyponatremia on admission possibly contributing  o Continue monitoring  • Diagnosis: GERD  o Known history  o Continue Protonix 40mg PO, will consider switching to IV if cannot tolerate PO    CV:   • Diagnosis: septic shock, uncertain origin vs hypovolemic  o Severe DENZEL with azotemia and hyponatremia on admission     o Does not appear to be volume depleted on recent echocardiogram  o TTE shows EF 65% with normal wall motioin, no signficant valvular dysfunction  o Blood cx without growth; SBP negative based on diagnostic paracentesis fluid analysis  o Appears to have mild hypoalbuminemia, start on albumin 25 g every 6 hours to improve MAP  o On levo 9 mcg for pressure support, MAP goal >60  o Increase to midodrine 15mg tid to assist in weaning levo       Pulm:   No acute issues     GI:   • Diagnosis: Stage IV cholangiocarcinoma cysts with mets metastasis to liver  o Diagnosed in 2020 s/p resection followed by immuno-chemo therapy with recurrence in 2022 now with mets to bone, peritoneum, lungs, and liver  o Currently on second round of pemigatinib as an outpatient under the care of Dr Robert Correia,  o SBP ruled out based on diagnostic paracentesis fluid analysis  o Hold home pemigatibin in setting of severe DENZEL  · Diagnosis: Mesenteric thrombosis   · Most recent outpatient MRI imaging showing new thrombosis in portions of the splenic, superior mesenteric and main portal veins and a segment of right portal vein  · Likely cancer associated thrombosis in setting of stage IV cholangiocarcinoma  · Heparin gtt d/c'd transition to argotrobran in setting of thrombocytopenia and recent exposure to heparin products during prior admission at [facility] 1/2023  · HIT panel pending  · Continue argotrobran gtt adjusted for hepatic dosing as patient meets Child-Philip class C criteria based on LFT parameters and ascites; okay to switch to heparin if HIT panel negative  • Diagnosis: Malignant ascites  o Likely peritoneal carcinomatosis in setting of stage IV cholangiocarcinoma  o S/p bedside therapeutic paracentesis 2/22, 4  L clear yellow fluid removed via RLQ puncture site  - Fluid analysis negative for SBP, gram stain & culture negative     :   • Diagnosis: Severe DENZEL, resolved  o Likely in setting of severe hypotension secondary to hypovolemia/poor p o  intake versus septic shock versus hepatorenal syndrome causing ischemic insult to the kidneys  o Patient's anticancer medications including ivosidenib and pemigatibin so known to cause kidney injury, possibly contributing  • Diagnosis: Metabolic acidosis  o Lactate elevated on admission 3 5, remains elevated at 4  o Likely secondary to hypotension on admission, persistant elevations likely due to decreased hepatic metabolization setting of liver mets   o S/p IVF resuscitation  o Most recent bicarb 18, previously on bicarb gtt but d/c'd on 2/21 2/2 primary respiratory alkolisis  · Diagnosis: Hyponatremia  · BMP every 6 hours  · Sodium 135 today    F/E/N:   • F: None  • Electrolytes: replete K, phos, mag, calcium as indicated  - Replete phosphate with Na-K Phos 2 packets BID for 2-3 days  - Replete Ca with Calcium gluconate 2 g  · Nutrition: full liquids fluid restriction 1 2 L    Heme/Onc:   • Diagnosis:  Thrombocytopenia  o Likely multifactorial etiology given myelosuppression in setting of acute illness, portal hypertension in setting of several metastases to the liver secondary to stage IV cholangiocarcinoma  o Retake index low at 1 0 suggestive of hypoproliferation  o Heme-onc consulted, appreciate recs  - HIT panel pending; though timing of worsening thrombocytopenia not consistent with HIT however patient has had exposure to heparin products during recent admission in January, 2023 at [address] CBC, Transfuse to maintain platelets over 59,560    Endo:   · Diagnosis: Hyperglycemia  · Blood sugars at goal  · Continue SSI, algorithm 3, adjust as appropriate   · Goal blood glucose 140-180  · Vitamin D Deficiency  · Started on 50,000u Vitamin D2 weekly  · Continue to monitor calcium and phosphorous levels    ID:   • Diagnosis: Sepsis/Septic shock, improving  o SIRS criteria on admission with concerns for sepsis given hypotension requiring pressure and evidence of end organ failure with severe DENZEL on admission  o Lactate 3 5, procalc 4 9 on arrival  o Unclear etiology; SBP ruled out; UA negative; CXR negative for PNA; don't suspect cholangitis given clinical presentation and imaging findings  o MRSA negative  o Blood cultures without growth at 48h  o S/p 3d of IV cefepime, de-escalated to IV ceftriaxone 2/22  o Continue IV ceftriaxone day#3, IV Flagyl day#5  - Total Day 5/7      MSK/Skin:   • Diagnosis: Rash on feet  - Bilateral visible petechiae dorsal-medial aspect (L>R)  - Patient endorses prior history of neuropathy of feet  - Etiology likely 2/2 thrombocytopenia given petechaie  Unlikely infectious  ?contact dermatitis vs other allergic rxn  - Per heme-onc, LE doppler obtained, negative for DVT  - Continue monitoring  • Diagnosis: Pressure ulcer prophylaxis  · Topical miconazole cream  · Frequent turning and repositioning  • ICU Care  o Frequent repositioning  o PT/OT as able    LDA:  · Radial A-Line: 3 days  · Harvey: 1 day  · Port-A-Cath Right Subclavian    Patient appropriate for transfer out of the ICU today?: No  Disposition: Admit to Critical Care   Code Status: Level 1 - Full Code  ---------------------------------------------------------------------------------------  ICU CORE MEASURES    Prophylaxis   VTE Pharmacologic Prophylaxis: Argatroban  VTE Mechanical Prophylaxis: sequential compression device  Stress Ulcer Prophylaxis: Pantoprazole PO    ABCDE Protocol (if indicated)  Plan to perform spontaneous awakening trial today? Not applicable  Plan to perform spontaneous breathing trial today? No Not applicable  Obvious barriers to extubation?  Not applicable  CAM-ICU: Negative    Invasive Devices Review  Invasive Devices     Central Venous Catheter Line  Duration           Port A Cath Right Subclavian -- days          Peripheral Intravenous Line  Duration           Peripheral IV 02/23/23 Right;Upper;Ventral (anterior) Arm <1 day          Arterial Line  Duration           Arterial Line 02/20/23 Radial 3 days          Drain  Duration Urethral Catheter Temperature probe 16 Fr  1 day              Can any invasive devices be discontinued today? No  ---------------------------------------------------------------------------------------  OBJECTIVE    Vitals   Vitals:    23 0400 23 0500 23 0600 23 0700   BP:    105/59   BP Location:       Pulse: 70 70 72 76   Resp: (!) 12 17   Temp:       TempSrc:       SpO2: 99% 99% 99% 98%   Weight:       Height:         Temp (24hrs), Av 3 °F (36 8 °C), Min:98 °F (36 7 °C), Max:98 8 °F (37 1 °C)  Current: Temperature: 98 °F (36 7 °C)      Respiratory:  SpO2: SpO2: 98 %       Invasive/non-invasive ventilation settings   Respiratory    Lab Data (Last 4 hours)    None         O2/Vent Data (Last 4 hours)    None                Physical Exam  Vitals reviewed  Constitutional:       Appearance: Normal appearance  HENT:      Head: Normocephalic and atraumatic  Cardiovascular:      Rate and Rhythm: Normal rate and regular rhythm  Pulses: Normal pulses  Pulmonary:      Effort: Pulmonary effort is normal  No respiratory distress  Breath sounds: Normal breath sounds  No wheezing or rales  Abdominal:      General: There is distension  Palpations: There is no shifting dullness or fluid wave  Comments: Leaking from site of paracentesis   Musculoskeletal:      Right lower leg: No edema  Left lower leg: No edema  Skin:     General: Skin is warm  Findings: Rash (L>R foot dorsal) present  Neurological:      Mental Status: He is alert  Mental status is at baseline     Psychiatric:         Mood and Affect: Mood normal          Behavior: Behavior normal               Laboratory and Diagnostics:  Results from last 7 days   Lab Units 23  0435 23  0436 237 23  1144 23  0557 23  0432 23  1548 23  0728 23  0341   WBC Thousand/uL 20 95* 20 02*  20 02*  --  17 77* 16 95* 21 30*  --   --  31 18*   HEMOGLOBIN g/dL 12 5 12 5  12 5  --  11 4* 11 6* 13 3 14 6  --  16 9   I STAT HEMOGLOBIN g/dl  --   --   --   --   --   --   --  16 7  --    HEMATOCRIT % 36 4* 36 9  36 9  --  32 5* 32 9* 38 8 41 5  --  48 2   HEMATOCRIT, ISTAT %  --   --   --   --   --   --   --  49  --    PLATELETS Thousands/uL 77* 71*  71* 69* 63* 60* 107*  --   --  136*   NEUTROS PCT %  --  85*  --  86* 85* 82*  --   --  82*   MONOS PCT %  --  9  --  8 9 11  --   --  13*     Results from last 7 days   Lab Units 02/24/23  0435 02/23/23  1238 02/23/23  0436 02/22/23  1824 02/22/23  0558 02/22/23  0557 02/21/23  2358 02/21/23  1835 02/21/23  0906 02/21/23  0432 02/20/23  0728 02/20/23  0341   SODIUM mmol/L 135 129* 129* 125* 124*  --  123* 124*   < > 124*   < > 118*   POTASSIUM mmol/L 3 8 4 3 3 8 3 4* 3 7  --  3 8 3 7   < > 4 2   < > 6 2*   CHLORIDE mmol/L 108 105 103 100 98  --  95* 96   < > 95*   < > 91*   CO2 mmol/L 18* 16* 18* 18* 19*  --  18* 18*   < > 17*   < > 16*   CO2, I-STAT   --   --   --   --   --   --   --   --   --   --    < >  --    ANION GAP mmol/L 9 8 8 7 7  --  10 10   < > 12   < > 11   BUN mg/dL 27* 27* 28* 35* 41*  --  49* 47*   < > 66*   < > 107*   CREATININE mg/dL 0 55* 0 71 0 60 0 79 0 80  --  0 90 0 87   < > 1 31*   < > 2 37*   CALCIUM mg/dL 6 7* 7 1* 6 7* 6 7* 6 4*  --  6 4* 6 5*   < > 6 2*   < > 6 5*   GLUCOSE RANDOM mg/dL 121 136 92 133 129  --  144* 118   < > 254*   < > 140   ALT U/L 48  --  54  --   --  54  --   --   --  71  --  91*   AST U/L 52*  --  58*  --   --  65*  --   --   --  73*  --  89*   ALK PHOS U/L 115  --  116  --   --  105  --   --   --  137*  --  198*   ALBUMIN g/dL 3 4*  --  3 1*  --   --  3 5  --   --   --  2 6*  --  1 9*   TOTAL BILIRUBIN mg/dL 2 67*  --  2 61*  --   --  3 03*  --   --   --  1 96*  --  1 18*    < > = values in this interval not displayed       Results from last 7 days   Lab Units 02/24/23  0435 02/23/23  0436 02/22/23  0557 02/21/23  0432 02/20/23  0827   MAGNESIUM mg/dL 2 8* 2 9* 3 0* 3 6* 4 5*   PHOSPHORUS mg/dL 1 5* 1 2* 2 3* 3 4 4 9*      Results from last 7 days   Lab Units 02/24/23  0004 02/23/23  1837 02/23/23  1238 02/23/23  0436 02/22/23  2150 02/22/23  1957 02/22/23  1656 02/20/23  2234 02/20/23  1548 02/20/23  0341   INR   --   --   --   --   --  5 99* 2 36*  --  1 66* 1 49*   PTT seconds 72* 72* 102* 105* 118* 126* 65*   < > 38* 47*    < > = values in this interval not displayed  Results from last 7 days   Lab Units 02/21/23  0729 02/21/23  0432 02/21/23  0022 02/20/23  2013 02/20/23  1548 02/20/23  1305 02/20/23  1132   LACTIC ACID mmol/L 4 0* 3 8* 3 1* 4 6* 3 5* 4 1* 3 3*     ABG:  Results from last 7 days   Lab Units 02/21/23  0730   PH ART  7 518*   PCO2 ART mm Hg 23 0*   PO2 ART mm Hg 92 5   HCO3 ART mmol/L 18 3*   BASE EXC ART mmol/L -2 6   ABG SOURCE  Line, Arterial     VBG:  Results from last 7 days   Lab Units 02/21/23  0929 02/21/23  0730   PH SOLO  7 513*  --    PCO2 SOLO mm Hg 25 1*  --    PO2 SOLO mm Hg 40 3  --    HCO3 SOLO mmol/L 19 7*  --    BASE EXC SOLO mmol/L -1 6  --    ABG SOURCE   --  Line, Arterial     Results from last 7 days   Lab Units 02/20/23  0827 02/20/23  0341   PROCALCITONIN ng/ml 4 53* 4 92*       Micro  Results from last 7 days   Lab Units 02/22/23  1146 02/20/23  1305 02/20/23  0916 02/20/23  0634 02/20/23  0341   BLOOD CULTURE   --   --   --  No Growth at 72 hrs  No Growth at 72 hrs     GRAM STAIN RESULT  1+ Polys  No bacteria seen  --  No Polys or Bacteria seen  --   --    BODY FLUID CULTURE, STERILE  No growth  --  No growth  --   --    MRSA CULTURE ONLY   --  No Methicillin Resistant Staphlyococcus aureus (MRSA) isolated  --   --   --        EKG: ECG on arrival to ED revealed normal sinus rhythm, peaked T waves, nonischemic  Imaging:   VAS lower limb venous duplex study, complete bilateral   Final Result by Warren Cabral MD (02/22 1445)      XR chest portable ICU   Final Result by Ondina Sandoval MD (02/20 3966)      No acute cardiopulmonary disease  Findings are stable               Workstation performed: [id number]         CT abdomen pelvis wo contrast   Final Result by Jerry Montgomery MD (02/20 1882)      No acute pathology  Evaluation is limited in the absence of intravenous contrast enhancement, however metastatic disease appears essentially unchanged, and including dominant hepatic lesion, peritoneal carcinomatosis with moderate moderate ascites  Presumed metastatic    right lower lobe nodules also noted  Workstation performed: [id number]            I have personally reviewed pertinent reports  Intake and Output  I/O       02/19 0701 02/20 0700 02/20 0701 02/21 0700    P  O   180    I V  (mL/kg)  1981 3 (19 1)    IV Piggyback  2700    Total Intake(mL/kg)  4861 3 (46 7)    Urine (mL/kg/hr)  2380 (1)    Stool  0    Total Output  2380    Net  +2481 3          Unmeasured Stool Occurrence  1 x        Height and Weights   Height: 6' (182 9 cm)  IBW (Ideal Body Weight): 77 6 kg  Body mass index is 28 64 kg/m²  Weight (last 2 days)     Date/Time Weight    02/22/23 0555 95 8 (211 2)            Nutrition       Diet Orders   (From admission, onward)             Start     Ordered    02/23/23 0925  Diet Regular; Regular House; Fluid Restriction 1200 ML  Diet effective now        References:    Nutrtion Support Algorithm Enteral vs  Parenteral   Question Answer Comment   Diet Type Regular    Regular Regular House    Other Restriction(s): Fluid Restriction 1200 ML    RD to adjust diet per protocol?  Yes        02/23/23 0925                  Active Medications  Scheduled Meds:  Current Facility-Administered Medications   Medication Dose Route Frequency Provider Last Rate   • al mag oxide-diphenhydramine-lidocaine viscous  10 mL Swish & Swallow Q4H PRN Eligio Leary MD     • argatroban  0 1-3 mcg/kg/min Intravenous Titrated Ammar Alam, DO 0 06 mcg/kg/min (02/23/23 1400)   • calcium gluconate  2 g Intravenous Once Dot Mar Neo, DO 2 g (02/24/23 0732)   • cefTRIAXone  2,000 mg Intravenous Q24H Claudia Schafer MD Stopped (02/23/23 1109)   • chlorhexidine  15 mL Mouth/Throat Q12H [facility] Renetta Munoz DO     • ergocalciferol  50,000 Units Oral Weekly Wallace Garcia DO     • fluconazole  200 mg Oral Daily Claudia Schafer MD     • insulin lispro  1-6 Units Subcutaneous TID  Wallace Garcia DO     • melatonin  6 mg Oral HS Renetta Munoz DO     • metroNIDAZOLE  500 mg Intravenous Q8H Renetta Munoz DO Stopped (02/24/23 0400)   • midodrine  10 mg Oral TID  Joel Knapp MD     • JOYCE ANTIFUNGAL   Topical BID Renetta Munoz DO     • norepinephrine  1-30 mcg/min Intravenous Titrated Renetta Munoz DO 9 mcg/min (02/24/23 0434)   • ondansetron  4 mg Intravenous Q8H PRN Renetta Munoz DO     • oxyCODONE  5 mg Oral Q4H PRN Claudia Schafer MD     • pantoprazole  40 mg Oral Early Morning Renetta Munoz DO     • potassium-sodium phosphates  2 packet Oral Once Irving Alonzo, DO     • saliva substitute  5 spray Mouth/Throat 4x Daily PRN Renetta Munoz DO     • sucralfate  1 g Oral 4x Daily (AC & HS) Claudia Schafer MD       Continuous Infusions:  argatroban, 0 1-3 mcg/kg/min, Last Rate: 0 06 mcg/kg/min (02/23/23 1400)  norepinephrine, 1-30 mcg/min, Last Rate: 9 mcg/min (02/24/23 0434)      PRN Meds:   al mag oxide-diphenhydramine-lidocaine viscous, 10 mL, Q4H PRN  ondansetron, 4 mg, Q8H PRN  oxyCODONE, 5 mg, Q4H PRN  saliva substitute, 5 spray, 4x Daily PRN        Allergies   Allergies   Allergen Reactions   • Penicillins Other (See Comments) and Rash     As a child had reaction not sure what it was       ---------------------------------------------------------------------------------------  Advance Directive and Living Will:      Power of :    POLST:    ---------------------------------------------------------------------------------------  Care Time Delivered:   Upon my evaluation, this patient had a high probability of imminent or life-threatening deterioration, which required my direct attention, intervention, and personal management  I have personally provided 30 minutes of critical care time, exclusive of procedures, teaching, family meetings, and any prior time recorded by providers other than myself  Benito Kelly DO      Portions of the record may have been created with voice recognition software  Occasional wrong word or "sound a like" substitutions may have occurred due to the inherent limitations of voice recognition software    Read the chart carefully and recognize, using context, where substitutions have occurred

## 2023-02-24 NOTE — PROGRESS NOTES
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)   • Thrombocytopenia (HCC)   • Hypomagnesemia   • BMI 35 0-35 9,adult   • Vitamin D deficiency   • Malignant ascites   • Septic shock (HCC)     Active issues specifically addressed today include:   Cholangiocarcinoma  Goals of care  Pain management  Nausea/vomitting      Plan:  1  Symptom management - patient continues to be doing well with current regimen   - Magic mouthwash/mouth kote              - Carafate/PPI              - Melatonin 6mg qHS              - Zofran 4mg q8h PRN              - Oxycodone 5mg q4h PRN              - Continue paracenteses PRN    2  Goals -    - Continue treatment focused care              - He wishes to follow up with Dr Jose Alberto Lamar for Oncology care instead of Dr uJlián Mercado              - He wishes to continue IR paracentesis as needed due to the symptom benefit    3  Social Support              - Patient's wife continues to visit him regularly, and he feels well supported   - Patient has wife Dustin Marc and 1 biological son  States he has two other nonbiological children and is very involved in their life  - Mother and father are still alive              - Brother and grandmother     Code Status: Full Code - Level 1   Decisional apparatus:  Patient is competent on my exam today  If competence is lost, patient's substitute decision maker would default to wife [place]     Advance Directive / Living Will / POLST:  On file    Interval history:       BIANKA  Today, he reports feeling fine  He feels much better than when he first came in and a little better than he did yesterday  He understands that his BP is low and that it is keeping him in the ICU, but he states that it has been low since he was treated with chemotherapy  His only complaints are of dry mouth, which has been an issue since radiation years ago, and a sore back from the hospital bed  He states his back pain is tolerable and he does not wish to have medication  He denies any other pain  He denies nausea/vomitting and has been eating well      MEDICATIONS / ALLERGIES:     all current active meds have been reviewed and current meds:   Current Facility-Administered Medications   Medication Dose Route Frequency   • al mag oxide-diphenhydramine-lidocaine viscous (MAGIC MOUTHWASH) suspension 10 mL  10 mL Swish & Swallow Q4H PRN   • argatroban infusion (premix)  0 1-3 mcg/kg/min Intravenous Titrated   • cefTRIAXone (ROCEPHIN) 2,000 mg in dextrose 5 % 50 mL IVPB  2,000 mg Intravenous Q24H   • chlorhexidine (PERIDEX) 0 12 % oral rinse 15 mL  15 mL Mouth/Throat Q12H JEMMA   • ergocalciferol (VITAMIN D2) capsule 50,000 Units  50,000 Units Oral Weekly   • fluconazole (DIFLUCAN) tablet 200 mg  200 mg Oral Daily   • insulin lispro (HumaLOG) 100 units/mL subcutaneous injection 1-6 Units  1-6 Units Subcutaneous TID AC   • melatonin tablet 6 mg  6 mg Oral HS   • metroNIDAZOLE (FLAGYL) IVPB (premix) 500 mg 100 mL  500 mg Intravenous Q8H   • midodrine (PROAMATINE) tablet 10 mg  10 mg Oral TID AC   • moisture barrier miconazole 2% cream (aka JOYCE MOISTURE BARRIER ANTIFUNGAL CREAM)   Topical BID   • norepinephrine (LEVOPHED) 4 mg (STANDARD CONCENTRATION) IV in sodium chloride 0 9% 250 mL  1-30 mcg/min Intravenous Titrated   • ondansetron (ZOFRAN) injection 4 mg  4 mg Intravenous Q8H PRN   • oxyCODONE (ROXICODONE) IR tablet 5 mg  5 mg Oral Q4H PRN   • pantoprazole (PROTONIX) EC tablet 40 mg 40 mg Oral Early Morning   • potassium chloride (K-DUR,KLOR-CON) CR tablet 20 mEq  20 mEq Oral Once   • potassium-sodium phosphates (PHOS-NAK) packet 2 packet  2 packet Oral Once   • potassium-sodium phosphates (PHOS-NAK) packet 2 packet  2 packet Oral BID With Meals   • saliva substitute (MOUTH KOTE) mucosal solution 5 spray  5 spray Mouth/Throat 4x Daily PRN   • sucralfate (CARAFATE) tablet 1 g  1 g Oral 4x Daily (AC & HS)       Allergies   Allergen Reactions   • Penicillins Other (See Comments) and Rash     As a child had reaction not sure what it was  OBJECTIVE:    Physical Exam  Physical Exam  Vitals and nursing note reviewed  Constitutional:       Appearance: He is ill-appearing  HENT:      Head: Normocephalic and atraumatic  Mouth/Throat:      Mouth: Mucous membranes are dry  Pharynx: Oropharynx is clear  Eyes:      General:         Right eye: No discharge  Left eye: No discharge  Extraocular Movements: Extraocular movements intact  Cardiovascular:      Rate and Rhythm: Normal rate  Pulmonary:      Effort: Pulmonary effort is normal  No respiratory distress  Abdominal:      General: There is distension  Genitourinary:     Comments: Harvey in place  Musculoskeletal:      Cervical back: Normal range of motion  Skin:     Coloration: Skin is not pale  Neurological:      General: No focal deficit present  Mental Status: He is alert and oriented to person, place, and time  Psychiatric:         Mood and Affect: Mood normal          Behavior: Behavior normal          Lab Results:   I have personally reviewed pertinent labs  , CBC:   Lab Results   Component Value Date    WBC 20 95 (H) 02/24/2023    HGB 12 5 02/24/2023    HCT 36 4 (L) 02/24/2023    MCV 89 02/24/2023    PLT 77 (L) 02/24/2023    MCH 30 4 02/24/2023    MCHC 34 3 02/24/2023    RDW 14 6 02/24/2023    MPV 10 5 02/24/2023   , CMP:   Lab Results   Component Value Date    SODIUM 135 02/24/2023    K 3 8 02/24/2023     02/24/2023    CO2 18 (L) 02/24/2023    BUN 27 (H) 02/24/2023    CREATININE 0 55 (L) 02/24/2023    CALCIUM 6 7 (L) 02/24/2023    AST 52 (H) 02/24/2023    ALT 48 02/24/2023    ALKPHOS 115 02/24/2023    EGFR 120 02/24/2023   , BMP:  Lab Results   Component Value Date    SODIUM 135 02/24/2023    K 3 8 02/24/2023     02/24/2023    CO2 18 (L) 02/24/2023    BUN 27 (H) 02/24/2023    CREATININE 0 55 (L) 02/24/2023    GLUC 121 02/24/2023    CALCIUM 6 7 (L) 02/24/2023    AGAP 9 02/24/2023    EGFR 120 02/24/2023   , PT/PTT:  Lab Results   Component Value Date    PTT 72 (H) 02/24/2023     Imaging Studies: Reviewed  EKG, Pathology, and Other Studies: Reviewed    Counseling / Coordination of Care    Total floor / unit time spent today 30 minutes  Greater than 50% of total time was spent with the patient and / or family counseling and / or coordination of care   A description of the counseling / coordination of care: goals discussion, symptom management, competency evaluation, chart review

## 2023-02-24 NOTE — PROGRESS NOTES
Pastoral Care Progress Note    2023  Patient: Lawson Beckwith : 1970  Admission Date & Time: 2023 0304  MRN: [id number] [facility]: [id number]         23 1300   Clinical Encounter Type   Visited With Patient     I knocked on the pt's door while doing rounds and introduced myself  The pt was sitting in his chair eating breakfast when I arrived  I asked how he was feeling and he stated that he was feeling much better since Monday  We engaged in brief conversation about his condition and then the pt expressed that he had no needs that Spiritual Care could fulfill at this time  He thanked me for my time  Chaplains still remain available                    Chaplaincy Interventions Utilized:   Empowerment: Encouraged self-care  Exploration: Facilitated story telling  Relationship Building: Cultivated a relationship of care and support and Hospitality  Chaplaincy Outcomes Achieved:  Expressed gratitude, Expressed humor, Expressed peace, Identified priorities, Reported decreased pain, and Verbally processed emotions  Spiritual Coping Strategies Utilized:   No spiritual coping

## 2023-02-24 NOTE — PROGRESS NOTES
NEPHROLOGY PROGRESS NOTE   Mendoza Ashby 47 y o  male MRN: [id number]  Unit/Bed#: [facility] 04 Encounter: [id number]  Reason for Consult: DENZEL hyponatremia    ASSESSMENT AND PLAN:  Patient is 73-year-old male with significant medical issues of metastatic cholangiocarcinoma with metastasis to liver, lung, bone, prior history of right tonsillar squamous cell carcinoma, status posttreatment, recurrent ascites requiring paracentesis, now presents with abdominal pain, nausea, poor p o  intake   We are consulted for DENZEL, hyperkalemia, acidosis management      Initial severe DENZEL POA, baseline serum creatinine 0 6-0 8  -DENZEL resolved  Initial peak creatinine 2 6 now improved and serum creatinine 0 5 today   -Initial DENZEL suspect in the setting of severe hypotension, shock causing ischemic/hemodynamic insult, component of prerenal with poor p o  intake  -Was on ivosidenib and more recently on Pemigatinib, both can cause increase creatinine, less likely HRS with much improved creatinine with albumin challenge  -CT scan shows no hydro  -Patient had provided dialysis consent if needed which is in the chart, no need for dialysis  -Status post IV albumin yesterday  Serum albumin 3 4 today  -UA shows multiple 25-50 hyaline cast, no hematuria or proteinuria   Urine sodium less than 5, urine chloride less than 10 consistent with prerenal      Initial azotemia, overall much improved with improving renal function      Hyperkalemia, resolved  -Suspected in the setting of DENZEL  -Strict low potassium diet recommended      Primary respiratory alkalosis  -Bicarb level overall stable 18   -Compensatory lower bicarb level   -Lactic acid 4 0 suspect in the setting of shock, hypotension and also liver disease     Initial hyponatremia  -Serum sodium 118 on admission initially rapidly improved requiring hypotonic IV fluid   -Now sodium level overall slowly improving to 135 today  Currently remains off IV fluid    Status post salt tablet 2 g yesterday  -BMP in a m  Continue fluid restriction 1 2 L/day-  -Urine sodium less than 5, urine was 506, serum osmolality 288     Shock,? Concern for septic shock/hypovolemic component   -? Exact source   -Blood culture negative so far   Acetic fluid culture negative  Empiric antibiotic as per ICU team   -Status post IV albumin challenge  Still remains on Levophed  -Echocardiogram this admission shows EF 65%, dilated IVC with blunted respiratory phasic changes   No pericardial effusion       Metastatic cholangiocarcinoma with metastasis to liver, lymph node, bone, lungs   -Closely follows with hematology  Recent MRI showing new thrombosis of splenic, superior mesenteric and portal vein   Anticoagulation as per ICU team   Peritoneal carcinomatosis with recurrent ascites requiring paracentesis  Vitamin D deficiency, agree with aggressive vitamin D supplements  Hypophosphatemia, recommend phosphorus supplements 2 packets p o  twice daily for 3 days      Thrombocytopenia, being evaluated for HIT/hemolysis, hematology follow-up  LDH slightly high at 284, haptoglobin pending  HIT, serotonin assay pending  Discussed above plan in detail with ICU team and they agree with above recommendations  SUBJECTIVE:  Patient seen and examined at bedside  Denies any nausea, chest pain or worsening shortness of breath      OBJECTIVE:  Current Weight: Weight - Scale: 95 8 kg (211 lb 3 2 oz)  Vitals:    02/24/23 0803   BP: 99/59   Pulse: 74   Resp: (!) 26   Temp: 97 9 °F (36 6 °C)   SpO2: 99%       Intake/Output Summary (Last 24 hours) at 2/24/2023 0821  Last data filed at 2/24/2023 2494  Gross per 24 hour   Intake 3357 42 ml   Output 813 ml   Net 2544 42 ml     Wt Readings from Last 3 Encounters:   02/22/23 95 8 kg (211 lb 3 2 oz)   02/03/23 109 kg (240 lb 1 3 oz)   01/31/23 112 kg (246 lb)     Temp Readings from Last 3 Encounters:   02/24/23 97 9 °F (36 6 °C) (Oral)   02/03/23 (!) 97 1 °F (36 2 °C) (Temporal) 01/31/23 98 2 °F (36 8 °C) (Oral)     BP Readings from Last 3 Encounters:   02/24/23 99/59   02/16/23 102/62   02/13/23 97/55     Pulse Readings from Last 3 Encounters:   02/24/23 74   02/16/23 75   02/13/23 71        Physical Examination:  Eyes: No conjunctival pallor present  Neck: No obvious lymphadenopathy appreciated  Respiratory: Bilateral air entry present  CVS: No significant edema in legs  GI: Soft, mild distended  CNS: Active alert oriented x3  Skin: No new rash  Musculoskeletal: No obvious new gross deformity noted    Medications:    Current Facility-Administered Medications:   •  al mag oxide-diphenhydramine-lidocaine viscous (MAGIC MOUTHWASH) suspension 10 mL, 10 mL, Swish & Swallow, Q4H PRN, Che Art MD, 10 mL at 02/23/23 2116  •  argatroban infusion (premix), 0 1-3 mcg/kg/min, Intravenous, Titrated, Wallace Garcia DO, Last Rate: 0 3 mL/hr at 02/23/23 1400, 0 06 mcg/kg/min at 02/23/23 1400  •  calcium gluconate 2 g in sodium chloride 0 9% 100 mL (premix), 2 g, Intravenous, Once, Leeanne Mckeon DO, Last Rate: 100 mL/hr at 02/24/23 0732, 2 g at 02/24/23 0732  •  cefTRIAXone (ROCEPHIN) 2,000 mg in dextrose 5 % 50 mL IVPB, 2,000 mg, Intravenous, Q24H, Che Art MD, Stopped at 02/23/23 1109  •  chlorhexidine (PERIDEX) 0 12 % oral rinse 15 mL, 15 mL, Mouth/Throat, Q12H [address], Renetta Munoz DO, 15 mL at 02/23/23 2114  •  ergocalciferol (VITAMIN D2) capsule 50,000 Units, 50,000 Units, Oral, Weekly, Wallace Garcia DO, 50,000 Units at 02/23/23 1845  •  fluconazole (DIFLUCAN) tablet 200 mg, 200 mg, Oral, Daily, Che Art MD, 200 mg at 02/23/23 1312  •  insulin lispro (HumaLOG) 100 units/mL subcutaneous injection 1-6 Units, 1-6 Units, Subcutaneous, TID AC **AND** Fingerstick Glucose (POCT), , , TID AC, Wallace Garcia DO  •  melatonin tablet 6 mg, 6 mg, Oral, HS, Renetta Munoz, , 6 mg at 02/23/23 2114  •  metroNIDAZOLE (FLAGYL) IVPB (premix) 500 mg 100 mL, 500 mg, Intravenous, Q8H, Renetta DO Tammy, Stopped at 02/24/23 0400  •  midodrine (PROAMATINE) tablet 10 mg, 10 mg, Oral, TID AC, Kristina Perkins MD, 10 mg at 02/24/23 0618  •  moisture barrier miconazole 2% cream (aka JOYCE MOISTURE BARRIER ANTIFUNGAL CREAM), , Topical, BID, Renetta Munoz DO, Given at 02/23/23 1841  •  norepinephrine (LEVOPHED) 4 mg (STANDARD CONCENTRATION) IV in sodium chloride 0 9% 250 mL, 1-30 mcg/min, Intravenous, Titrated, Renetta Munoz DO, Last Rate: 33 8 mL/hr at 02/24/23 0434, 9 mcg/min at 02/24/23 0434  •  ondansetron (ZOFRAN) injection 4 mg, 4 mg, Intravenous, Q8H PRN, Renetta Munoz DO, 4 mg at 02/22/23 0006  •  oxyCODONE (ROXICODONE) IR tablet 5 mg, 5 mg, Oral, Q4H PRN, Yogesh Sheikh MD  •  pantoprazole (PROTONIX) EC tablet 40 mg, 40 mg, Oral, Early Morning, Renetta Munoz DO, 40 mg at 02/24/23 7045  •  potassium-sodium phosphates (PHOS-NAK) packet 2 packet, 2 packet, Oral, Once, Shakira Lauren DO  •  saliva substitute (MOUTH KOTE) mucosal solution 5 spray, 5 spray, Mouth/Throat, 4x Daily PRN, Renetta Munoz DO, 5 spray at 02/23/23 0551  •  sucralfate (CARAFATE) tablet 1 g, 1 g, Oral, 4x Daily (AC & HS), Yogesh Sheikh MD, 1 g at 02/24/23 0618    Laboratory Results:  Results from last 7 days   Lab Units 02/24/23  0435 02/23/23  1238 02/23/23  0436 02/22/23  1957 02/22/23  1824 02/22/23  1144 02/22/23  0558 02/22/23  0557 02/21/23  2358 02/21/23  1835 02/21/23  0906 02/21/23  0432 02/20/23  2013 02/20/23  1548 02/20/23  1132 02/20/23  0827 02/20/23  0728 02/20/23  0341   WBC Thousand/uL 20 95*  --  20 02*  20 02*  --   --  17 77*  --  16 95*  --   --   --  21 30*  --   --   --   --   --  31 18*   HEMOGLOBIN g/dL 12 5  --  12 5  12 5  --   --  11 4*  --  11 6*  --   --   --  13 3  --  14 6  --   --   --  16 9   I STAT HEMOGLOBIN g/dl  --   --   --   --   --   --   --   --   --   --   --   --   --   --   --   --  16 7  --    HEMATOCRIT % 36 4*  --  36 9  36 9  --   --  32 5*  --  32 9*  -- --   --  38 8  --  41 5  --   --   --  48 2   HEMATOCRIT, ISTAT %  --   --   --   --   --   --   --   --   --   --   --   --   --   --   --   --  49  --    PLATELETS Thousands/uL 77*  --  71*  71* 69*  --  63*  --  60*  --   --   --  107*  --   --   --   --   --  136*   SODIUM mmol/L 135 129* 129*  --  125*  --  124*  --  123* 124*   < > 124*   < > 125*   < > 122*  --  118*   POTASSIUM mmol/L 3 8 4 3 3 8  --  3 4*  --  3 7  --  3 8 3 7   < > 4 2   < > 5 1   < > 6 3*  --  6 2*   CHLORIDE mmol/L 108 105 103  --  100  --  98  --  95* 96   < > 95*   < > 99   < > 92*  --  91*   CO2 mmol/L 18* 16* 18*  --  18*  --  19*  --  18* 18*   < > 17*   < > 15*   < > 22  --  16*   CO2, I-STAT mmol/L  --   --   --   --   --   --   --   --   --   --   --   --   --   --   --   --  19*  --    BUN mg/dL 27* 27* 28*  --  35*  --  41*  --  49* 47*   < > 66*   < > 86*   < > 105*  --  107*   CREATININE mg/dL 0 55* 0 71 0 60  --  0 79  --  0 80  --  0 90 0 87   < > 1 31*   < > 1 76*   < > 2 60*  --  2 37*   CALCIUM mg/dL 6 7* 7 1* 6 7*  --  6 7*  --  6 4*  --  6 4* 6 5*   < > 6 2*   < > 6 7*   < > 7 1*  --  6 5*   MAGNESIUM mg/dL 2 8*  --  2 9*  --   --   --   --  3 0*  --   --   --  3 6*  --   --   --  4 5*  --   --    PHOSPHORUS mg/dL 1 5*  --  1 2*  --   --   --   --  2 3*  --   --   --  3 4  --   --   --  4 9*  --   --    GLUCOSE, ISTAT mg/dl  --   --   --   --   --   --   --   --   --   --   --   --   --   --   --   --  115  --     < > = values in this interval not displayed  VAS lower limb venous duplex study, complete bilateral   Final Result by Chivo Hdez MD (02/22 7846)      XR chest portable ICU   Final Result by Odalis López MD (02/20 3086)      No acute cardiopulmonary disease  Findings are stable               Workstation performed: [id number]         CT abdomen pelvis wo contrast   Final Result by Xu Hernandez MD (02/20 6460)      No acute pathology        Evaluation is limited in the absence of intravenous contrast enhancement, however metastatic disease appears essentially unchanged, and including dominant hepatic lesion, peritoneal carcinomatosis with moderate moderate ascites  Presumed metastatic    right lower lobe nodules also noted  Workstation performed: [id number]             Portions of the record may have been created with voice recognition software  Occasional wrong word or "sound a like" substitutions may have occurred due to the inherent limitations of voice recognition software  Read the chart carefully and recognize, using context, where substitutions have occurred

## 2023-02-25 NOTE — PROGRESS NOTES
NEPHROLOGY PROGRESS NOTE   Sonal Ashby 47 y o  male MRN: [id number]  Unit/Bed#: MICU 04 Encounter: [id number]  Reason for Consult: DENZEL hyponatremia    ASSESSMENT AND PLAN:  Patient is 27-year-old male with significant medical issues of metastatic cholangiocarcinoma with metastasis to liver, lung, bone, prior history of right tonsillar squamous cell carcinoma, status posttreatment, recurrent ascites requiring paracentesis, now presents with abdominal pain, nausea, poor p o  intake   We are consulted for DENZEL, hyperkalemia, acidosis management      Initial severe DENZEL POA, baseline serum creatinine 0 6-0 8  -DENZEL resolved   Initial peak creatinine 2 6 now improved and serum creatinine 0 9 today   -Initial DENZEL suspect in the setting of severe hypotension, shock causing ischemic/hemodynamic insult, component of prerenal with poor p o  intake  -Was on ivosidenib and more recently on Pemigatinib, both can cause increase creatinine, less likely HRS given DENZEL improved with albumin challenge  -CT scan shows no hydro  -Patient had provided dialysis consent if needed which is in the chart, no need for dialysis  -Serum albumin 3 2, status post IV albumin challenge  -UA shows multiple 25-50 hyaline cast, no hematuria or proteinuria   Urine sodium less than 5, urine chloride less than 10 consistent with prerenal      Hyperkalemia, resolved  -Suspected in the setting of DENZEL  -Strict low potassium diet recommended      Primary respiratory alkalosis  -Bicarb level overall stable 18   -Compensatory lower bicarb level   -Lactic acid 4 0 suspect in the setting of shock, hypotension and also liver disease     Initial hyponatremia  -Serum sodium 118 on admission initially rapidly improved requiring hypotonic IV fluid   -Now sodium level has improved and seems to be overall stable 134 today  - Currently remains off IV fluid  -BMP in a m  Continue fluid restriction 1 2 L/day    Can have a salt liberal diet for the time being   -Urine sodium less than 5, urine was 506, serum osmolality 288     Shock,?  Concern for septic shock/hypovolemic component   -?  Exact source   -Blood culture negative so far   Acetic fluid culture negative   -Now remains off Levophed   Empiric antibiotic as per ICU team   -Echocardiogram this admission shows EF 65%, dilated IVC with blunted respiratory phasic changes   No pericardial effusion       Metastatic cholangiocarcinoma with metastasis to liver, lymph node, bone, lungs   -Closely follows with hematology  Recent MRI showing new thrombosis of splenic, superior mesenteric and portal vein   Anticoagulation as per ICU team   Peritoneal carcinomatosis with recurrent ascites requiring paracentesis      Vitamin D deficiency, agree with aggressive vitamin D supplements  Hypophosphatemia, currently on phosphorus supplements 2 packets p o  twice daily  Phosphorus level slowly improving 2 5      Thrombocytopenia, being evaluated for HIT/hemolysis, hematology follow-up  LDH slightly high at 284, haptoglobin pending  HIT, serotonin assay pending      Discussed above plan in detail with ICU team and they agree with above recommendations  SUBJECTIVE:  Patient seen and examined at bedside  Denies nausea, vomiting  Denies chest pain or worsening shortness of breath      OBJECTIVE:  Current Weight: Weight - Scale: 112 kg (245 lb 13 oz)  Vitals:    02/25/23 0700   BP: 95/64   Pulse: 80   Resp: 17   Temp:    SpO2: 99%       Intake/Output Summary (Last 24 hours) at 2/25/2023 0749  Last data filed at 2/25/2023 6537  Gross per 24 hour   Intake 2563 69 ml   Output 450 ml   Net 2113 69 ml     Wt Readings from Last 3 Encounters:   02/25/23 112 kg (245 lb 13 oz)   02/03/23 109 kg (240 lb 1 3 oz)   01/31/23 112 kg (246 lb)     Temp Readings from Last 3 Encounters:   02/24/23 97 6 °F (36 4 °C) (Oral)   02/03/23 (!) 97 1 °F (36 2 °C) (Temporal)   01/31/23 98 2 °F (36 8 °C) (Oral)     BP Readings from Last 3 Encounters: 02/25/23 95/64   02/16/23 102/62   02/13/23 97/55     Pulse Readings from Last 3 Encounters:   02/25/23 80   02/16/23 75   02/13/23 71        Physical Examination:  Eyes: Mild conjunctival pallor present  Neck: No obvious lymphadenopathy appreciated  Respiratory: Bilateral air entry present  CVS: Trace edema in legs  GI: Soft, mild distended  CNS: Alert oriented x3  Skin: No new rash  Musculoskeletal: No obvious new gross deformity noted    Medications:    Current Facility-Administered Medications:   •  al mag oxide-diphenhydramine-lidocaine viscous (MAGIC MOUTHWASH) suspension 10 mL, 10 mL, Swish & Swallow, Q4H PRN, Shannon Villasenor MD, 10 mL at 02/25/23 4701  •  argatroban infusion (premix), 0 1-3 mcg/kg/min, Intravenous, Titrated, Wallace Garcia DO, Held at 02/25/23 0702  •  calcium gluconate 1 g in sodium chloride 0 9% 50 mL (premix), 1 g, Intravenous, Once, Wallace Garcia DO, Last Rate: 100 mL/hr at 02/25/23 0724, 1 g at 02/25/23 0842  •  cefTRIAXone (ROCEPHIN) 2,000 mg in dextrose 5 % 50 mL IVPB, 2,000 mg, Intravenous, Q24H, Shannon Villasenor MD, Stopped at 02/24/23 1100  •  chlorhexidine (PERIDEX) 0 12 % oral rinse 15 mL, 15 mL, Mouth/Throat, Q12H [address], Renetta Munoz DO, 15 mL at 02/24/23 2200  •  ergocalciferol (VITAMIN D2) capsule 50,000 Units, 50,000 Units, Oral, Weekly, Wallace Garcia DO, 50,000 Units at 02/23/23 1845  •  fluconazole (DIFLUCAN) tablet 200 mg, 200 mg, Oral, Daily, Shannon Villasenor MD, 200 mg at 02/24/23 1014  •  insulin lispro (HumaLOG) 100 units/mL subcutaneous injection 1-6 Units, 1-6 Units, Subcutaneous, TID AC **AND** Fingerstick Glucose (POCT), , , TID AC, Wallace Garcia DO  •  melatonin tablet 6 mg, 6 mg, Oral, HS, Renetta Munoz DO, 6 mg at 02/24/23 2200  •  metroNIDAZOLE (FLAGYL) IVPB (premix) 500 mg 100 mL, 500 mg, Intravenous, Q8H, Renetta Munoz DO, Stopped at 02/25/23 0400  •  midodrine (PROAMATINE) tablet 15 mg, 15 mg, Oral, TID AC, Shannon Villasenor MD, 15 mg at 02/25/23 4404  •  moisture barrier miconazole 2% cream (aka JOYCE MOISTURE BARRIER ANTIFUNGAL CREAM), , Topical, BID, Renetta Munoz DO, Given at 02/24/23 1827  •  norepinephrine (LEVOPHED) 4 mg (STANDARD CONCENTRATION) IV in sodium chloride 0 9% 250 mL, 1-30 mcg/min, Intravenous, Titrated, Bryan Mendosa MD, Stopped at 02/24/23 1559  •  ondansetron (ZOFRAN) injection 4 mg, 4 mg, Intravenous, Q8H PRN, Renetat Munoz DO, 4 mg at 02/22/23 0006  •  oxyCODONE (ROXICODONE) IR tablet 5 mg, 5 mg, Oral, Q4H PRN, Bryan Mendosa MD  •  pantoprazole (PROTONIX) EC tablet 40 mg, 40 mg, Oral, Early Morning, Renetta Munoz DO, 40 mg at 02/25/23 0607  •  potassium-sodium phosphates (PHOS-NAK) packet 2 packet, 2 packet, Oral, BID With Meals, Danita Knox DO, 2 packet at 02/25/23 0724  •  saliva substitute (MOUTH KOTE) mucosal solution 5 spray, 5 spray, Mouth/Throat, 4x Daily PRN, Renetta Munoz DO, 5 spray at 02/23/23 0551  •  sucralfate (CARAFATE) tablet 1 g, 1 g, Oral, 4x Daily (AC & HS), Bryan Mendosa MD, 1 g at 02/25/23 5284    Laboratory Results:  Results from last 7 days   Lab Units 02/25/23  0554 02/24/23  0435 02/23/23  1238 02/23/23  0436 02/22/23  1957 02/22/23  1824 02/22/23  1144 02/22/23  0558 02/22/23  0557 02/21/23  2358 02/21/23  0906 02/21/23  0432 02/20/23  2013 02/20/23  1548 02/20/23  1132 02/20/23  0827 02/20/23  0728 02/20/23  0341   WBC Thousand/uL 16 16* 20 95*  --  20 02*  20 02*  --   --  17 77*  --  16 95*  --   --  21 30*  --   --   --   --   --  31 18*   HEMOGLOBIN g/dL 11 6* 12 5  --  12 5  12 5  --   --  11 4*  --  11 6*  --   --  13 3  --  14 6  --   --   --  16 9   I STAT HEMOGLOBIN g/dl  --   --   --   --   --   --   --   --   --   --   --   --   --   --   --   --  16 7  --    HEMATOCRIT % 34 8* 36 4*  --  36 9  36 9  --   --  32 5*  --  32 9*  --   --  38 8  --  41 5  --   --   --  48 2   HEMATOCRIT, ISTAT %  --   --   --   --   --   --   --   --   --   --   --   --   --   --   --   --  49 --    PLATELETS Thousands/uL 72* 77*  --  71*  71* 69*  --  63*  --  60*  --   --  107*  --   --   --   --   --  136*   SODIUM mmol/L 134* 135 129* 129*  --  125*  --  124*  --  123*   < > 124*   < > 125*   < > 122*  --  118*   POTASSIUM mmol/L 4 0 3 8 4 3 3 8  --  3 4*  --  3 7  --  3 8   < > 4 2   < > 5 1   < > 6 3*  --  6 2*   CHLORIDE mmol/L 109* 108 105 103  --  100  --  98  --  95*   < > 95*   < > 99   < > 92*  --  91*   CO2 mmol/L 18* 18* 16* 18*  --  18*  --  19*  --  18*   < > 17*   < > 15*   < > 22  --  16*   CO2, I-STAT mmol/L  --   --   --   --   --   --   --   --   --   --   --   --   --   --   --   --  19*  --    BUN mg/dL 35* 27* 27* 28*  --  35*  --  41*  --  49*   < > 66*   < > 86*   < > 105*  --  107*   CREATININE mg/dL 0 92 0 55* 0 71 0 60  --  0 79  --  0 80  --  0 90   < > 1 31*   < > 1 76*   < > 2 60*  --  2 37*   CALCIUM mg/dL 7 2* 6 7* 7 1* 6 7*  --  6 7*  --  6 4*  --  6 4*   < > 6 2*   < > 6 7*   < > 7 1*  --  6 5*   MAGNESIUM mg/dL 2 9* 2 8*  --  2 9*  --   --   --   --  3 0*  --   --  3 6*  --   --   --  4 5*  --   --    PHOSPHORUS mg/dL 2 5* 1 5*  --  1 2*  --   --   --   --  2 3*  --   --  3 4  --   --   --  4 9*  --   --    GLUCOSE, ISTAT mg/dl  --   --   --   --   --   --   --   --   --   --   --   --   --   --   --   --  115  --     < > = values in this interval not displayed  VAS lower limb venous duplex study, complete bilateral   Final Result by Saumya Hahn MD (02/22 8720)      XR chest portable ICU   Final Result by Kim Valenzuela MD (02/20 8416)      No acute cardiopulmonary disease  Findings are stable               Workstation performed: [id number]         CT abdomen pelvis wo contrast   Final Result by Dimas Valero MD (02/20 2622)      No acute pathology        Evaluation is limited in the absence of intravenous contrast enhancement, however metastatic disease appears essentially unchanged, and including dominant hepatic lesion, peritoneal carcinomatosis with moderate moderate ascites  Presumed metastatic    right lower lobe nodules also noted  Workstation performed: [id number]             Portions of the record may have been created with voice recognition software  Occasional wrong word or "sound a like" substitutions may have occurred due to the inherent limitations of voice recognition software  Read the chart carefully and recognize, using context, where substitutions have occurred

## 2023-02-25 NOTE — PROGRESS NOTES
Daily Progress Note - Critical Care   Anitra Ashby 47 y o  male MRN: [id number]  Unit/Bed#: MICU 04 Encounter: [id number]        ----------------------------------------------------------------------------------------  HPI/24hr events: Angelina Willis is a 47 y o  male with past medical history of heavy nicotine dependence currently in remission, SCC of head/neck (tongue) diagnosed in 2018 s/p chemo and partial glossectomy, stage IV cholangiocarcinoma (diagnosed in 2020 s/p resection followed by immuno-chemo therapy with recurrence in 2022 now with mets  to bone, peritoneum, lungs, and liver, currently on second round of pemigatinib complicated by recurrent ascites requiring frequent large volume paracentesis twice a week, with removal of 4-5L of ascitic fluid on a weekly basis, who presented to ED this AM with concerns of 7 days of decreased appetite/po intake and worsening generalized fatigue with occasional eps of non-billous vomiting w/o overt s/s bleeding  Per wife, patient noted to be more confused from baseline, prompting ED evaluation  Patient follows with Dr Cinthya Wills, Oncology  Was recently on ivositinib and more recently on Pemigatibib  Denies recent NSAID use  Admits to decrease urine output x3-4d  Denies recent ill contacts or long distance travel  Compliant with home meds including PRN Flexeril for and PRN oxy 5 for cancer/chemo induced pain, lasix 20mg prn for ascites, zofran/low dose dexamethason 4mg for nausea, and Protonix 40mg for GERD       On arrival to ED,  VS notable for T36 7, BP80/50, pulse 71, 98% on RA  Patient noted to be acutely encephalopathic on initial exam with abdominal fluid wave  Work up showed leukocytosis on CBC, elevated K 6 2 in setting of DENZEL with Cr 2 37 (baseline   5- 9) and ?givne bolus of NS  ECG revealing NSR with peaked T waves, non-ischemic  Patient was initiated on IV calcium gluconate   Repeat iSTAT potassium >8 --> given additional IV calcium gluconate along with IV dextrose with regular insulin and additional 1L bolus NS  Initial Hs Trops 17, repeat 2h/4h pending  Sepsis workup iniated, started on IV vanc and IV cefepime  CT C/A/B without acute pathology and unchanged metastatic disease process including dominant hepatic lesion, peritoneal carcinomatosis with moderate moderate ascites; right lower lobe nodules also noted  Additionally patient underwent diagnostic paracentesis in the ED, though he was initially scheduled for his weekly therapeutic paracentesis today by IR as an outpatient  24 hour events:  Off levo  Noted to have decreased UO last night, given 500cc isolyte bolus followed by 500c albumin 25g    Subjective: Patient seen and examined  Without any concerns at this time  Tolerating PO  Review of Systems  Review of systems was reviewed and negative unless stated above in HPI/24-hour events   ---------------------------------------------------------------------------------------  Assessment and Plan:    Neuro:   • Diagnosis: Hepatic encephalopathy, resolved  o Patient endorses fatigue and presented with mild confusion and slurred speech per wife   o Ammonia elevated 63 on admission  o In setting of stage IV cholangiocarcinoma with multiple metastases to the liver  Likely participated by DENZEL and hypovolemia/poor PO intake POA  Hyponatremia on admission possibly contributing  o Continue monitoring  o Can consider rifaxamine to be added on outpatient regimen if ammonia continues to remain elevated    CV:   • Diagnosis: septic shock, uncertain origin vs hypovolemic  o Patient with decreased PO intake, vomiting over the past 4-7d    o Severe DENZEL with azotemia and hyponatremia on admission     o Likely also intravascularly volume depleted in setting of malignant ascites requiring weekly large-volume paracentesis 2/2 stage IV cholangiocarcinoma with mets to the liver  o S/P 4 L of IV fluid resuscitation in the ED prior to admission  o TTE shows EF 65% with normal wall motioin, no signficant valvular dysfunction  o Blood cx without growth; SBP negative based on diastolic diagnostic paracentesis fluid analysis  o Continue on albumin 25 g every 6 hours to improve MAP and treat hypoalbuminemia  o Now off levo and other pressor support   o Midodrine up-titrated from 10 -->15 tid   o MAP further lowered from goal greater than 60 for presumed worsening portal hypertension 2/2 portal system thrombosis      Pulm:   · Diagnosis: Primary respiratory alkalosis  · Based on most recent ABG 2/21 [id number]  5/18 3  · Now off bicarb gtt   · Lactate remains elevated 4 0, suspect in setting of hypotension, but also liver disease likely contributing to decreased clearance      GI:   • Diagnosis: Stage IV cholangiocarcinoma cysts with mets metastasis to liver  o Diagnosed in 2020 s/p resection followed by immuno-chemo therapy with recurrence in 2022 now with mets to bone, peritoneum, lungs, and liver  o Currently on second round of pemigatinib as an outpatient under the care of Dr Alcides Ashraf,  o SBP ruled out based on diagnostic paracentesis fluid analysis  o May have underlying type I hepatorenal syndrome given severe DENZEL and hyponatremia on admission, liver hepatorenal syndrome is a diagnosis of exclusion, further work-up pending to r/o hypovolemic vs septic shock  o Hold home pemigatibin in setting of severe DENZEL  · Diagnosis: Mesenteric thrombosis   · Most recent outpatient MRI imaging showing new thrombosis in portions of the splenic, superior mesenteric and main portal veins and a segment of right portal vein  · Likely cancer associated thrombosis in setting of stage IV cholangiocarcinoma  · Heparin gtt d/c'd transition to argotrobran in setting of thrombocytopenia and recent exposure to heparin products during prior admission at [facility] [address] 1/2023  · HIT panel pending  · Continue argotrobran gtt adjusted for hepatic dosing as patient meets Child-Philip class C criteria based on LFT parameters and ascites; okay to switch to heparin if HIT panel negative  • Diagnosis: Malignant ascites  o Likely peritoneal carcinomatosis in setting of stage IV cholangiocarcinoma  o S/p bedside therapeutic paracentesis 2/22, 4  L clear yellow fluid removed via RLQ puncture site  - Fluid analysis negative for SBP, gram stain & culture negative  • Diagnosis: GERD  o Known history  o Continue Protonix 40mg PO, will consider switching to IV if cannot tolerate PO  · Diagnosis: Oropharyngeal candidiasis  · Known history, patient reportedly gets frequent episodes of oropharyngeal candidiasis s/p partial glossectomy 2/2 SCC of tounge  · Treated with magic mouthwash at home  · Noted to have oral thrush on exam, patient endorses intermittent odynophagia consistent with prior similar episodes  · Continue prn magic mouthwash while inpatient  · Continue 7d course of fluconazole 200mg qd on day#3/7     :   • Diagnosis: Severe DENZEL, resolved  o Cr 2 3 on arrival to ED, increased to 2 6 on admission, baseline serum creatinine around 1 62 8  o Likely in setting of severe hypotension secondary to hypovolemia/poor p o  intake versus septic shock versus hepatorenal syndrome causing ischemic insult to the kidneys  o Patient's anticancer medications including ivosidenib and pemigatibin so known to cause kidney injury, possibly contributing  o Status post aggressive IV fluid resuscitation in the ED  o Status post 75mg sodium bicarb infusion and D5W at 100 cc/h on admission  • Diagnosis: Metabolic acidosis  o Lactate elevated on admission 3 5, remains elevated at 4  o Likely secondary to hypotension on admission, persistant elevations likely due to decreased hepatic metabolization setting of liver mets   o S/p IVF resuscitation  o Most recent bicarb 25, previously on bicarb gtt but d/c'd on 2/21 2/2 primary respiratory alkolisis  · Diagnosis: Severe hyponatremia, resolved  · Sodium 118 on on arrival to ED, imporved to 135 today  · S/P IV with NS, given 2g salt tab x1 on 2/23  · Repeat BMP, will consider NS at 60cc/hr for 5h for goal correction no more than 8 mEq in 24 hours  · BMP every 6 hours    F/E/N:   • F: none  • Electrolytes: replete K, phos, mag, calcium as indicated  - Replete phosphate with K-Phos 30mmol  - Replete Ca with Calcium gluconate 2 g  • PTH elevated, Vit D 25 low consistent with vitamin D defiency   · Nutrition: advanced to regular house diet, fluid rest 1 8L given on-going hyponatremia, imroving    Heme/Onc:   • Diagnosis: Vitamin D Deficiency   - Vitamin D 25hydroxy level low at 18 4, persistently low ionized calcium despite Ca repletion  - Continue weekly vitamin D2 supplemntation 50,000U  • Diagnosis:  Thrombocytopenia  o Likely multifactorial etiology given myelosuppression in setting of acute illness, portal hypertension in setting of several metastases to the liver secondary to stage IV cholangiocarcinoma  o Retake index low at 1 0 suggestive of hypoproliferation  o Heme-onc consulted, appreciate recs  - Obtain hemolysis labs and DIC panel to rule out hemolysis  - Obtain HIT panel; though timing of worsening thrombocytopenia not consistent with HIT however patient has had exposure to heparin products during recent admission in January, 2023 at [address] CBC, Transfuse to maintain platelets over 19,773    Endo:   · Diagnosis: Hyperglycemia  · Blood sugars at goal  · Continue SSI, algorithm 3, adjust as appropriate   · Goal blood glucose 140-180    ID:   • Diagnosis: Sepsis/Septic shock, improving  o SIRS criteria on admission with concerns for sepsis given hypotension requiring pressure and evidence of end organ failure with severe DENZEL on admission  o Lactate 3 5, procalc 4 9 on arrival  o Unclear etiology; SBP ruled out; UA negative; CXR negative for PNA; don't suspect cholangitis given clinical presentation and imaging findings  o MRSA negative  o Blood cultures without growth at 48h  o S/p 3d of IV cefepime, de-escalated to IV ceftriaxone 2/22  o Continue IV ceftriaxone day#5, IV Flagyl day#6        MSK/Skin:   • Diagnosis: Rash on feet  - Bilateral feet are diffusesly erythematous, with visible petechiae dorsal-medial aspect (L>R)  - Patient endorses prior history of neuropathy of feet  - Etiology likely 2/2 thrombocytopenia given petechaie  Unlikely infectious  ?contact dermatitis vs other allergic rxn  - Per heme-onc, LE doppler obtained, negative for DVT  - Continue monitoring  • Diagnosis: Pressure ulcer prophylaxis  · Topical miconazole cream  · Frequent turning and repositioning      Patient appropriate for transfer out of the ICU today?: No  Disposition: Admit to Critical Care   Code Status: Level 1 - Full Code  ---------------------------------------------------------------------------------------  ICU CORE MEASURES    Prophylaxis   VTE Pharmacologic Prophylaxis: Heparin  VTE Mechanical Prophylaxis: sequential compression device  Stress Ulcer Prophylaxis: Pantoprazole PO    ABCDE Protocol (if indicated)  Plan to perform spontaneous awakening trial today? Not applicable  Plan to perform spontaneous breathing trial today? No Not applicable  Obvious barriers to extubation? Not applicable  CAM-ICU: Negative    Invasive Devices Review  Invasive Devices     Central Venous Catheter Line  Duration           Port A Cath Right Subclavian -- days          Peripheral Intravenous Line  Duration           Peripheral IV 02/23/23 Right;Upper;Ventral (anterior) Arm 1 day          Arterial Line  Duration           Arterial Line 02/20/23 Radial 4 days              Can any invasive devices be discontinued today?  No  ---------------------------------------------------------------------------------------  OBJECTIVE    Vitals   Vitals:    02/25/23 0300 02/25/23 0400 02/25/23 0500 02/25/23 0600   BP: (!) 85/52 90/51 92/61 (!) 85/55   BP Location:   Left arm    Pulse: 70 66 64 74   Resp: 14 (!) 11 (!) 11 13   Temp:       TempSrc:       SpO2: 99% 99% 99% 98%   Weight:    112 kg (245 lb 13 oz)   Height:         Temp (24hrs), Av °F (36 7 °C), Min:97 6 °F (36 4 °C), Max:98 3 °F (36 8 °C)  Current: Temperature: 97 6 °F (36 4 °C)      Respiratory:  SpO2: SpO2: 98 %       Invasive/non-invasive ventilation settings   Respiratory    Lab Data (Last 4 hours)    None         O2/Vent Data (Last 4 hours)    None                Physical Exam  Vitals and nursing note reviewed  Constitutional:       General: He is not in acute distress  Appearance: He is well-developed  He is ill-appearing  HENT:      Head: Normocephalic and atraumatic  Eyes:      Conjunctiva/sclera: Conjunctivae normal    Cardiovascular:      Rate and Rhythm: Normal rate and regular rhythm  Heart sounds: No murmur heard  Pulmonary:      Effort: Pulmonary effort is normal  No respiratory distress  Breath sounds: Normal breath sounds  Abdominal:      General: There is distension  Palpations: Abdomen is soft  Tenderness: There is no abdominal tenderness  Comments: Fluid wave positive   Musculoskeletal:         General: No swelling  Cervical back: Neck supple  Skin:     General: Skin is warm and dry  Capillary Refill: Capillary refill takes less than 2 seconds  Findings: Rash (bilateral feet) present  Comments: Bilateral feet are diffusesly erythematous, with visible petechiae dorsal-medial aspect (L>R)   Neurological:      Mental Status: He is alert and oriented to person, place, and time     Psychiatric:         Mood and Affect: Mood normal               Laboratory and Diagnostics:  Results from last 7 days   Lab Units 23  0435 23  0436 23  1957 23  1144 23  0557 23  0432 23  1548 23  0728 23  0341   WBC Thousand/uL 20 95* 20 02*  20 02*  --  17 77* 16 95* 21 30*  --   --  31 18*   HEMOGLOBIN g/dL 12 5 12 5  12 5  --  11 4* 11 6* 13 3 14 6  --  16 9   I STAT HEMOGLOBIN g/dl  --   --   --   --   -- --   --  16 7  --    HEMATOCRIT % 36 4* 36 9  36 9  --  32 5* 32 9* 38 8 41 5  --  48 2   HEMATOCRIT, ISTAT %  --   --   --   --   --   --   --  49  --    PLATELETS Thousands/uL 77* 71*  71* 69* 63* 60* 107*  --   --  136*   NEUTROS PCT %  --  85*  --  86* 85* 82*  --   --  82*   MONOS PCT %  --  9  --  8 9 11  --   --  13*     Results from last 7 days   Lab Units 02/24/23  0435 02/23/23  1238 02/23/23  0436 02/22/23  1824 02/22/23  0558 02/22/23  0557 02/21/23  2358 02/21/23  1835 02/21/23  0906 02/21/23  0432 02/20/23  0728 02/20/23  0341   SODIUM mmol/L 135 129* 129* 125* 124*  --  123* 124*   < > 124*   < > 118*   POTASSIUM mmol/L 3 8 4 3 3 8 3 4* 3 7  --  3 8 3 7   < > 4 2   < > 6 2*   CHLORIDE mmol/L 108 105 103 100 98  --  95* 96   < > 95*   < > 91*   CO2 mmol/L 18* 16* 18* 18* 19*  --  18* 18*   < > 17*   < > 16*   CO2, I-STAT   --   --   --   --   --   --   --   --   --   --    < >  --    ANION GAP mmol/L 9 8 8 7 7  --  10 10   < > 12   < > 11   BUN mg/dL 27* 27* 28* 35* 41*  --  49* 47*   < > 66*   < > 107*   CREATININE mg/dL 0 55* 0 71 0 60 0 79 0 80  --  0 90 0 87   < > 1 31*   < > 2 37*   CALCIUM mg/dL 6 7* 7 1* 6 7* 6 7* 6 4*  --  6 4* 6 5*   < > 6 2*   < > 6 5*   GLUCOSE RANDOM mg/dL 121 136 92 133 129  --  144* 118   < > 254*   < > 140   ALT U/L 48  --  54  --   --  54  --   --   --  71  --  91*   AST U/L 52*  --  58*  --   --  65*  --   --   --  73*  --  89*   ALK PHOS U/L 115  --  116  --   --  105  --   --   --  137*  --  198*   ALBUMIN g/dL 3 4*  --  3 1*  --   --  3 5  --   --   --  2 6*  --  1 9*   TOTAL BILIRUBIN mg/dL 2 67*  --  2 61*  --   --  3 03*  --   --   --  1 96*  --  1 18*    < > = values in this interval not displayed       Results from last 7 days   Lab Units 02/24/23  0435 02/23/23  0436 02/22/23  0557 02/21/23  0432 02/20/23  0827   MAGNESIUM mg/dL 2 8* 2 9* 3 0* 3 6* 4 5*   PHOSPHORUS mg/dL 1 5* 1 2* 2 3* 3 4 4 9*      Results from last 7 days   Lab Units 02/24/23  1806 02/24/23  1223 02/24/23  0004 02/23/23  1837 02/23/23  1238 02/23/23  0436 02/22/23  2150 02/22/23  1957 02/22/23  1656 02/20/23  2234 02/20/23  1548 02/20/23  0341   INR   --   --   --   --   --   --   --  5 99* 2 36*  --  1 66* 1 49*   PTT seconds 83* 91* 72* 72* 102* 105* 118* 126* 65*   < > 38* 47*    < > = values in this interval not displayed  Results from last 7 days   Lab Units 02/21/23  0729 02/21/23  0432 02/21/23  0022 02/20/23 2013 02/20/23  1548 02/20/23  1305 02/20/23  1132   LACTIC ACID mmol/L 4 0* 3 8* 3 1* 4 6* 3 5* 4 1* 3 3*     ABG:  Results from last 7 days   Lab Units 02/21/23  0730   PH ART  7 518*   PCO2 ART mm Hg 23 0*   PO2 ART mm Hg 92 5   HCO3 ART mmol/L 18 3*   BASE EXC ART mmol/L -2 6   ABG SOURCE  Line, Arterial     VBG:  Results from last 7 days   Lab Units 02/21/23  0929 02/21/23  0730   PH SOLO  7 513*  --    PCO2 SOLO mm Hg 25 1*  --    PO2 SOLO mm Hg 40 3  --    HCO3 SOLO mmol/L 19 7*  --    BASE EXC SOLO mmol/L -1 6  --    ABG SOURCE   --  Line, Arterial     Results from last 7 days   Lab Units 02/20/23  0827 02/20/23  0341   PROCALCITONIN ng/ml 4 53* 4 92*       Micro  Results from last 7 days   Lab Units 02/22/23  1146 02/20/23  1305 02/20/23  0916 02/20/23  0634 02/20/23  0341   BLOOD CULTURE   --   --   --  No Growth After 4 Days  No Growth After 4 Days  GRAM STAIN RESULT  1+ Polys  No bacteria seen  --  No Polys or Bacteria seen  --   --    BODY FLUID CULTURE, STERILE  No growth  --  No growth  --   --    MRSA CULTURE ONLY   --  No Methicillin Resistant Staphlyococcus aureus (MRSA) isolated  --   --   --        EKG: ECG on arrival to ED revealed normal sinus rhythm, peaked T waves, nonischemic  Imaging:   VAS lower limb venous duplex study, complete bilateral   Final Result by Jean Paul Mohamud MD (02/22 6482)      XR chest portable ICU   Final Result by Maral Mejia MD (02/20 3876)      No acute cardiopulmonary disease        Findings are stable Workstation performed: [id number]         CT abdomen pelvis wo contrast   Final Result by Jazzy Gil MD (02/20 5094)      No acute pathology  Evaluation is limited in the absence of intravenous contrast enhancement, however metastatic disease appears essentially unchanged, and including dominant hepatic lesion, peritoneal carcinomatosis with moderate moderate ascites  Presumed metastatic    right lower lobe nodules also noted  Workstation performed: [id number]            I have personally reviewed pertinent reports  Intake and Output  I/O       02/19 0701 02/20 0700 02/20 0701 02/21 0700    P  O   180    I V  (mL/kg)  1981 3 (19 1)    IV Piggyback  2700    Total Intake(mL/kg)  4861 3 (46 7)    Urine (mL/kg/hr)  2380 (1)    Stool  0    Total Output  2380    Net  +2481 3          Unmeasured Stool Occurrence  1 x        Height and Weights   Height: 6' (182 9 cm)  IBW (Ideal Body Weight): 77 6 kg  Body mass index is 33 34 kg/m²  Weight (last 2 days)     Date/Time Weight    02/25/23 0600 112 (245 81)            Nutrition       Diet Orders   (From admission, onward)             Start     Ordered    02/24/23 1730  Diet Regular; Regular House; Fluid Restriction 1800 ML  Diet effective now        References:    Nutrtion Support Algorithm Enteral vs  Parenteral   Question Answer Comment   Diet Type Regular    Regular Regular House    Other Restriction(s): Fluid Restriction 1800 ML    RD to adjust diet per protocol?  Yes        02/24/23 1729                  Active Medications  Scheduled Meds:  Current Facility-Administered Medications   Medication Dose Route Frequency Provider Last Rate   • al mag oxide-diphenhydramine-lidocaine viscous  10 mL Swish & Swallow Q4H PRN Rowdy Phillips MD     • argatroban  0 1-3 mcg/kg/min Intravenous Titrated Ammar Alam, DO 0 06 mcg/kg/min (02/24/23 1411)   • cefTRIAXone  2,000 mg Intravenous Q24H Rowdy Phillips MD Stopped (02/24/23 1100)   • chlorhexidine 15 mL Mouth/Throat Q12H [address] Renetta Munoz DO     • ergocalciferol  50,000 Units Oral Weekly Wallace Garcia DO     • fluconazole  200 mg Oral Daily Huber Bauman MD     • insulin lispro  1-6 Units Subcutaneous TID AC Wallace Garcia DO     • melatonin  6 mg Oral HS Renetta Munoz DO     • metroNIDAZOLE  500 mg Intravenous Q8H Renetta Munoz DO Stopped (02/25/23 0400)   • midodrine  15 mg Oral TID AC Huber Bauman MD     • JOYCE ANTIFUNGAL   Topical BID Renetta Munoz DO     • norepinephrine  1-30 mcg/min Intravenous Titrated Huber Bauman MD Stopped (02/24/23 1559)   • ondansetron  4 mg Intravenous Q8H PRN Renetta Munoz DO     • oxyCODONE  5 mg Oral Q4H PRN Huber Bauman MD     • pantoprazole  40 mg Oral Early Morning Renetta Munoz DO     • potassium-sodium phosphates  2 packet Oral BID With Meals Lowell June, DO     • saliva substitute  5 spray Mouth/Throat 4x Daily PRN Renetta Munoz DO     • sucralfate  1 g Oral 4x Daily (AC & HS) Huber Bauman MD       Continuous Infusions:  argatroban, 0 1-3 mcg/kg/min, Last Rate: 0 06 mcg/kg/min (02/24/23 1411)  norepinephrine, 1-30 mcg/min, Last Rate: Stopped (02/24/23 1559)      PRN Meds:   al mag oxide-diphenhydramine-lidocaine viscous, 10 mL, Q4H PRN  ondansetron, 4 mg, Q8H PRN  oxyCODONE, 5 mg, Q4H PRN  saliva substitute, 5 spray, 4x Daily PRN        Allergies   Allergies   Allergen Reactions   • Penicillins Other (See Comments) and Rash     As a child had reaction not sure what it was       ---------------------------------------------------------------------------------------  Advance Directive and Living Will:      Power of :    POLST:    ---------------------------------------------------------------------------------------  Care Time Delivered:   Upon my evaluation, this patient had a high probability of imminent or life-threatening deterioration, which required my direct attention, intervention, and personal management    I have personally provided 30 minutes of critical care time, exclusive of procedures, teaching, family meetings, and any prior time recorded by providers other than myself  Lefty Ennis DO      Portions of the record may have been created with voice recognition software  Occasional wrong word or "sound a like" substitutions may have occurred due to the inherent limitations of voice recognition software    Read the chart carefully and recognize, using context, where substitutions have occurred

## 2023-02-25 NOTE — PROGRESS NOTES
Medical Oncology/Hematology Progress Note  Senia Li, male, 47 y o , 1970,  MICU 04/MICU 04, [id number]     Assessment and Plan      1  Metastatic cholangiocarcinoma with bone mets  2  Splenic vein, superior mesenteric, main portal vein, and right portal vein thrombosis, new  3  Thrombocytopenia  4  Ascites, requiring frequent paracentesis  5  Shock    Ms Ashby is a 47 Y M with hx of metastatic cholangiocarcinoma who was admitted initially for DENZEL with concern for septic shock  Patient was initially requiring multiple pressors, currently still on levo, in the process of attempting to wean off pressors  Infectious workup ongoing, no clear source identified yet  Renal function and mentation improving  Pt's recent MR abdomen had shown splenic vein, superior mesenteric, and main portal vein thrombosis, for which patient was started on hep gtt for [facility] purposes  Patient was switched over to argatroban gtt on 2/22 given suspicion for HIT as a possible cause of worsening thrombocytopenia, although it was a low clinical suspicion  HIT antibody has resulted negative  Argatroban gtt has been discontinued  Would be cautious with DOACs with his liver function - its use should be avoided in those with Child Philip Class C  Lovenox injections (1mg/kg BID dosing) is an alternative form of AC for Mr Ashby  Thrombocytopenia: likely multifactorial in setting of plt consumption 2/2 acute thrombosis, liver involvement with malignancy, and the acute illness with which he presented causing myelosuppression  DIC panel and hemolysis smear were checked on 2/22 - results were not suggestive of a hemolytic process causing pt's thrombocytopenia  Shock: Unclear etiology of pt's shock  No infectious etiology identified on workup - negative blood cultures x 5 days, ascites fluid negative for bacterial growth, UA not suggestive of UTI  Echo showed normal systolic and diastolic function with EF 65%   Adrenal insufficiency 2/2 immunotherapy was in the ddx, but cortisol levels within normal limits  Possibly from underlying advanced liver disease causing splanchnic vasodilation and decreased systemic blood circulation/volume, manifesting as hypotension  Patient may need midodrine going forward to maintain his BP  As for the cholangiocarcinoma management, it's too early to comment on whether pemigatinib is working or not  He has barely gotten about 2 weeks of therapy  Outpatient follow up with medical oncology team for more specifics on treatments for cholangiocarcinoma  If he is noted to have progression of his disease after completing several cycles of pemigatinib, likely best to either reattempt either a  combination of chemo-immunotherapy or try chemotherapy alone with FOLFOX regimen  Outpatient follow up plan: Patient used to f/u with Dr Scott Ponce as his primary oncologist, but saw Dr Sayda Chen in Jan 2023 holly for a 2nd opinion  He prefers to continue follow-up with Dr Chadd Lanza, therefore medical oncology f/u appt has been arranged with Dr Sayda Chen on 3/15/23  Communication with patient/family:  Patient was updated regarding our recs as above    Communication with team:  MICU team was updated regarding the above plan  Case was discussed with hematology/oncology attending, Dr Hailey Marquez      Reason for consultation: hx of cholangiocarcinoma, thrombocytopenia    History of present illness:  Marli Lloyd is a 47 y o  male with metastatic cholangiocarcinoma (initially dx in 2020, started on systemic therapy in 2022, currently on 3rd line of treatment with targeted therapy called pemigatinib)  He is known to have multiple pulmonary mets, liver mets, recurrent abdominal ascites (requiring frequent paracentesis), and bone mets   Patient also with history of SCC of the tongue (dx in 2018, s/p chemo+immunotherapy induction, followed by partial glossectomy and bilateral neck dissection in April 2018, followed by adjuvant RT, which was completed in August 2018, this was managed at [facility]  On his most recent MR abdomen, there was concern for new thrombosis in portions of the splenic, superior mesenteric, main portal veins, and a segment of the portal vein  Intra-abdominal lymphadenopathy, multiple hepatic mets, bone mets (right iliac crest) were noted to be stable, compared to Jan 2023 CT scan findings  Patient presented to the hospital for evaluation of progressively worsening weakness/mentation and decreased PO intake x 4-5 days  ED vitals on admission notable for hypotension with SBPs in the 80s  Patient was noted to be encephalopathic  Labs were notable for Cr 2 37 and leukocytosis  Patient was initiated on broad spectrum abx given concern for septic shock  Infectious workup negative thus far  Patient has required pressors  There was also concern for cardiogenic shock  2D echo from 2/21 shows normal EF of 65% with normal wall normal and normal diastolic function  Patient's plt count has been gradually decreasing with following trend: 136 > 107 > 60 > 63  Patient did have lovenox/heparin exposure during January admissions at [facility] for abdominal distention 2/2 ascites  Review of Systems:   Review of Systems   Constitutional: Negative for activity change, chills, fatigue and fever  HENT: Negative for ear pain, sore throat, tinnitus and trouble swallowing  Eyes: Negative for pain and visual disturbance  Respiratory: Negative for cough and shortness of breath  Cardiovascular: Negative for chest pain and palpitations  Gastrointestinal: Negative for abdominal pain and vomiting  Genitourinary: Negative for dysuria and hematuria  Musculoskeletal: Negative for arthralgias and back pain  Skin: Negative for color change and rash  Neurological: Negative for seizures and syncope  Hematological: Negative for adenopathy  Does not bruise/bleed easily     Psychiatric/Behavioral: Negative for agitation and confusion  All other systems reviewed and are negative  Oncology History:   Cancer Staging   No matching staging information was found for the patient  Oncology History Overview Note   Patient presents today for consult for RT for tongue carcinoma  56 yr old patient current smoker (smokes 2 PPD for over 20 years) presented with left tongue pain and sores in December 2017  He has lost about 30 pounds  He did not have health insurance at the time  He then obtained insurance and saw an oral surgeon on 2/6/18- biopsy was performed confirming p16 negative squamous cell carcinoma  2/23/18- PET  IMPRESSION:  1  There is focally intense activity in the anterior oral cavity, slightly to the left of midline, SUV 27  This corresponds with known malignancy  There are also bilateral hypermetabolic upper cervical nodes posterior to the submandibular glands, most   concerning for [place] metastases  2   There is asymmetric intense activity in the posterior left oral cavity, palate and tonsillar region, SUV 7 9  This may be physiologic, however correlation with clinical findings recommended to exclude tumor involvement  3   Mildly hypermetabolic nodular densities and infiltrates in the right middle and bilateral lower lungs, likely inflammatory/infectious  Short-term CT follow-up in 2-3 months recommended to exclude metastases  4   Subcutaneous density lateral to the left hip is also likely inflammatory/infectious  This may be correlated clinically  5   Otherwise no hypermetabolic metastases in the chest abdomen pelvis      2/23/18- CT chest abdomen and pelvis-  IMPRESSION:   As was seen on concurrent PET/CT, there is clustered small nodular groundglass densities in the right middle lobe and both lower lobes  There distribution suggests an infectious/inflammatory process rather than metastatic disease    Follow-up is   recommended    Otherwise unremarkable chest, abdomen, pelvic CT     Patient was referred to ENT Dr Buster Ivory @ [facility] by Med onc Dr Juan Mullen  3/6/18- Consult with ENT Dr Buster Ivory- discussed surgical excision of the anterior portion of the tongue, along with free flap reconstruction using skin from his forearm       3/6/18- Med onc Dr Carmelo Buck @ [facility]- recommended chemo-nivo surgery trial (Carbo/paclitaxel/nivo)    Chemo started on 3/14/18 and finished 4/10/18 (5 cycles completed)? 4/30/18- patient underwent direct laryngoscopy, esophagoscopy, bronchoscopy, left partial glossectomy and bilateral neck dissections level 1 through 4      5/8/18- Post-op f/u with ENT Dr Buster Ivory- neck incision well healed  Flexible laryngoscopy revealed true vocal cords are mobile  Base of tongue is clear  Pharynx is clear  Discusses treatment moving forward would be adjuvant RT  Follow-up in 3-4 weeks  History of tongue cancer   2/2018 Initial Diagnosis    Tongue cancer ([facility] [address] )     2/6/2018 Biopsy    Left ventral surface of tongue biopsy: Squamous cell carcinoma, moderately well differently  3/14/2018 - 4/10/2018 Chemotherapy    Chemo-nivo surgery trial (Carbo/paclitaxel/nivo)  Patient had 5 cycles of chemo  4/30/2018 Surgery    Direct laryngoscopy, esophagoscopy, bronchoscopy, left partial glossectomy and bilateral neck dissections level 1 through 4              Past Medical History:   Past Medical History:   Diagnosis Date   • 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 [facility])    • Rectal bleeding 1/9/2023   • S/P exploratory laparotomy 12/16/2020       Past Surgical History:   Procedure Laterality Date   • CT NEEDLE BIOPSY LIVER  10/15/2020   • IR BIOPSY LIVER MASS  5/18/2022   • IR PARACENTESIS  1/10/2023   • IR PARACENTESIS  1/24/2023   • IR PARACENTESIS  2/2/2023   • IR PARACENTESIS  1/30/2023   • IR PARACENTESIS  2/6/2023   • IR PARACENTESIS  2/9/2023   • IR PARACENTESIS  2/13/2023   • IR PARACENTESIS  2/16/2023       Family History   Problem Relation Age of Onset   • Prostate cancer Father        Social History     Socioeconomic History   • Marital status: /Civil Union     Spouse name: None   • Number of children: None   • Years of education: None   • Highest education level: None   Occupational History   • None   Tobacco Use   • Smoking status: Former     Packs/day: 2 00     Years: 20 00     Pack years: 40 00     Types: Cigarettes   • Smokeless tobacco: Current   Substance and Sexual Activity   • Alcohol use: Not Currently     Comment: 1-2 drinks per day   • Drug use: None   • Sexual activity: None   Other Topics Concern   • None   Social History Narrative   • None     Social Determinants of Health     Financial Resource Strain: Low Risk    • Difficulty of Paying Living Expenses: Not hard at all   Food Insecurity: No Food Insecurity   • Worried About Running Out of Food in the Last Year: Never true   • Ran Out of Food in the Last Year: Never true   Transportation Needs: No Transportation Needs   • Lack of Transportation (Medical): No   • Lack of Transportation (Non-Medical):  No   Physical Activity: Insufficiently Active   • Days of Exercise per Week: 2 days   • Minutes of Exercise per Session: 30 min   Stress: No Stress Concern Present   • Feeling of Stress : Not at all   Social Connections: Socially Isolated   • Frequency of Communication with Friends and Family: Once a week   • Frequency of Social Gatherings with Friends and Family: Once a week   • Attends Denominational Services: Never   • Active Member of Clubs or Organizations: No   • Attends Club or Organization Meetings: Never   • Marital Status:    Intimate Partner Violence: Not At Risk   • Fear of Current or Ex-Partner: No   • Emotionally Abused: No   • Physically Abused: No   • Sexually Abused: No   Housing Stability: Unknown   • Unable to Pay for Housing in the Last Year: No   • Number of Places Lived in the Last Year: Not on file   • Unstable Housing in the Last Year: No Current Facility-Administered Medications:   •  al mag oxide-diphenhydramine-lidocaine viscous (MAGIC MOUTHWASH) suspension 10 mL, 10 mL, Swish & Swallow, Q4H PRN, Becky Vaca MD, 10 mL at 02/25/23 1148  •  apixaban (ELIQUIS) tablet 10 mg, 10 mg, Oral, BID, Wallace Garcia DO, 10 mg at 02/25/23 1025  •  [START ON 3/4/2023] apixaban (ELIQUIS) tablet 5 mg, 5 mg, Oral, BID, Wallace Garcia DO  •  cefTRIAXone (ROCEPHIN) 2,000 mg in dextrose 5 % 50 mL IVPB, 2,000 mg, Intravenous, Q24H, Becky Vaca MD, Stopped at 02/25/23 1101  •  chlorhexidine (PERIDEX) 0 12 % oral rinse 15 mL, 15 mL, Mouth/Throat, Q12H [facility], Renetta Munoz DO, 15 mL at 02/25/23 1027  •  ergocalciferol (VITAMIN D2) capsule 50,000 Units, 50,000 Units, Oral, Weekly, Wallace Garcia DO, 50,000 Units at 02/23/23 1845  •  fluconazole (DIFLUCAN) tablet 200 mg, 200 mg, Oral, Daily, Becky Vaca MD, 200 mg at 02/25/23 1027  •  insulin lispro (HumaLOG) 100 units/mL subcutaneous injection 1-6 Units, 1-6 Units, Subcutaneous, TID AC, 1 Units at 02/25/23 1151 **AND** Fingerstick Glucose (POCT), , , TID AC, Wallace Garcia DO  •  melatonin tablet 6 mg, 6 mg, Oral, HS, Renetta Munoz DO, 6 mg at 02/24/23 2200  •  metroNIDAZOLE (FLAGYL) IVPB (premix) 500 mg 100 mL, 500 mg, Intravenous, Q8H, Renetta Munoz DO, Last Rate: 200 mL/hr at 02/25/23 1026, 500 mg at 02/25/23 1026  •  midodrine (PROAMATINE) tablet 15 mg, 15 mg, Oral, TID AC, Becky Vaca MD, 15 mg at 02/25/23 1148  •  moisture barrier miconazole 2% cream (aka JOYCE MOISTURE BARRIER ANTIFUNGAL CREAM), , Topical, BID, Renetta Munoz DO, Given at 02/25/23 1028  •  norepinephrine (LEVOPHED) 4 mg (STANDARD CONCENTRATION) IV in sodium chloride 0 9% 250 mL, 1-30 mcg/min, Intravenous, Titrated, Becky Vaca MD, Stopped at 02/24/23 1559  •  ondansetron (ZOFRAN) injection 4 mg, 4 mg, Intravenous, Q8H PRN, Renetta Munoz DO, 4 mg at 02/22/23 0006  •  oxyCODONE (ROXICODONE) IR tablet 5 mg, 5 mg, Oral, Q4H PRN, Gonzalez Batres MD  •  pantoprazole (PROTONIX) EC tablet 40 mg, 40 mg, Oral, Early Morning, Renetta Munoz DO, 40 mg at 02/25/23 0607  •  potassium-sodium phosphates (PHOS-NAK) packet 2 packet, 2 packet, Oral, BID With Meals, Ferna Nissen, DO, 2 packet at 02/25/23 1079  •  saliva substitute (MOUTH KOTE) mucosal solution 5 spray, 5 spray, Mouth/Throat, 4x Daily PRN, Renetta Munoz DO, 5 spray at 02/23/23 0551  •  sucralfate (CARAFATE) tablet 1 g, 1 g, Oral, 4x Daily (AC & HS), Gonzalez Batres MD, 1 g at 02/25/23 1148    Medications Prior to Admission   Medication   • cyclobenzaprine (FLEXERIL) 5 mg tablet   • ergocalciferol (VITAMIN D2) 50,000 units   • furosemide (LASIX) 20 mg tablet   • Ivosidenib 250 MG TABS   • magnesium oxide (MAG-OX) 400 mg tablet   • ondansetron (ZOFRAN) 4 mg tablet   • oxyCODONE (ROXICODONE) 5 immediate release tablet   • pantoprazole (PROTONIX) 40 mg tablet   • potassium chloride (K-DUR,KLOR-CON) 10 mEq tablet       Allergies   Allergen Reactions   • Penicillins Other (See Comments) and Rash     As a child had reaction not sure what it was  Physical Exam:     BP 92/58   Pulse 70   Temp 97 5 °F (36 4 °C) (Oral)   Resp 17   Ht 6' (1 829 m)   Wt 112 kg (245 lb 13 oz)   SpO2 99%   BMI 33 34 kg/m²     Physical Exam  Vitals reviewed  Constitutional:       General: He is not in acute distress  Appearance: He is ill-appearing  He is not toxic-appearing or diaphoretic  Comments: Sitting comfortably in chair   HENT:      Head: Normocephalic and atraumatic  Mouth/Throat:      Mouth: Mucous membranes are moist       Pharynx: No oropharyngeal exudate  Eyes:      Extraocular Movements: Extraocular movements intact  Conjunctiva/sclera: Conjunctivae normal    Cardiovascular:      Rate and Rhythm: Normal rate and regular rhythm  Heart sounds: No murmur heard  No gallop  Pulmonary:      Effort: No respiratory distress        Breath sounds: Normal breath sounds  No wheezing, rhonchi or rales  Abdominal:      General: Bowel sounds are normal  There is distension  Palpations: Abdomen is soft  There is no mass  Musculoskeletal:         General: Swelling (mild swelling of b/l LE) present  No tenderness or deformity  Cervical back: Normal range of motion  Comments: Bilateral LE swelling, left > right   Skin:     General: Skin is warm  Neurological:      General: No focal deficit present  Mental Status: He is alert and oriented to person, place, and time  Motor: Weakness (generalized, improving) present     Psychiatric:         Mood and Affect: Mood normal          Judgment: Judgment normal          Recent Results (from the past 48 hour(s))   Vitamin D 25 hydroxy    Collection Time: 02/23/23 12:38 PM   Result Value Ref Range    Vit D, 25-Hydroxy 18 4 (L) 30 0 - 100 0 ng/mL   APTT    Collection Time: 02/23/23 12:38 PM   Result Value Ref Range     (H) 23 - 37 seconds   Basic metabolic panel    Collection Time: 02/23/23 12:38 PM   Result Value Ref Range    Sodium 129 (L) 135 - 147 mmol/L    Potassium 4 3 3 5 - 5 3 mmol/L    Chloride 105 96 - 108 mmol/L    CO2 16 (L) 21 - 32 mmol/L    ANION GAP 8 4 - 13 mmol/L    BUN 27 (H) 5 - 25 mg/dL    Creatinine 0 71 0 60 - 1 30 mg/dL    Glucose 136 65 - 140 mg/dL    Calcium 7 1 (L) 8 3 - 10 1 mg/dL    eGFR 108 ml/min/1 73sq m   Fingerstick Glucose (POCT)    Collection Time: 02/23/23  5:57 PM   Result Value Ref Range    POC Glucose 141 (H) 65 - 140 mg/dl   APTT    Collection Time: 02/23/23  6:37 PM   Result Value Ref Range    PTT 72 (H) 23 - 37 seconds   APTT    Collection Time: 02/24/23 12:04 AM   Result Value Ref Range    PTT 72 (H) 23 - 37 seconds   CBC and Platelet    Collection Time: 02/24/23  4:35 AM   Result Value Ref Range    WBC 20 95 (H) 4 31 - 10 16 Thousand/uL    RBC 4 11 3 88 - 5 62 Million/uL    Hemoglobin 12 5 12 0 - 17 0 g/dL    Hematocrit 36 4 (L) 36 5 - 49 3 %    MCV 89 82 - 98 fL    MCH 30 4 26 8 - 34 3 pg    MCHC 34 3 31 4 - 37 4 g/dL    RDW 14 6 11 6 - 15 1 %    Platelets 77 (L) 014 - 390 Thousands/uL    MPV 10 5 8 9 - 12 7 fL   Comprehensive metabolic panel    Collection Time: 02/24/23  4:35 AM   Result Value Ref Range    Sodium 135 135 - 147 mmol/L    Potassium 3 8 3 5 - 5 3 mmol/L    Chloride 108 96 - 108 mmol/L    CO2 18 (L) 21 - 32 mmol/L    ANION GAP 9 4 - 13 mmol/L    BUN 27 (H) 5 - 25 mg/dL    Creatinine 0 55 (L) 0 60 - 1 30 mg/dL    Glucose 121 65 - 140 mg/dL    Calcium 6 7 (L) 8 3 - 10 1 mg/dL    Corrected Calcium 7 2 (L) 8 3 - 10 1 mg/dL    AST 52 (H) 5 - 45 U/L    ALT 48 12 - 78 U/L    Alkaline Phosphatase 115 46 - 116 U/L    Total Protein 4 6 (L) 6 4 - 8 4 g/dL    Albumin 3 4 (L) 3 5 - 5 0 g/dL    Total Bilirubin 2 67 (H) 0 20 - 1 00 mg/dL    eGFR 120 ml/min/1 73sq m   Magnesium    Collection Time: 02/24/23  4:35 AM   Result Value Ref Range    Magnesium 2 8 (H) 1 6 - 2 6 mg/dL   Phosphorus    Collection Time: 02/24/23  4:35 AM   Result Value Ref Range    Phosphorus 1 5 (L) 2 7 - 4 5 mg/dL   Calcium, ionized    Collection Time: 02/24/23  4:35 AM   Result Value Ref Range    Calcium, Ionized 0 95 (L) 1 12 - 1 32 mmol/L   Fingerstick Glucose (POCT)    Collection Time: 02/24/23  6:19 AM   Result Value Ref Range    POC Glucose 93 65 - 140 mg/dl   Fingerstick Glucose (POCT)    Collection Time: 02/24/23 11:48 AM   Result Value Ref Range    POC Glucose 132 65 - 140 mg/dl   APTT    Collection Time: 02/24/23 12:23 PM   Result Value Ref Range    PTT 91 (H) 23 - 37 seconds   Fingerstick Glucose (POCT)    Collection Time: 02/24/23  5:10 PM   Result Value Ref Range    POC Glucose 139 65 - 140 mg/dl   APTT    Collection Time: 02/24/23  6:06 PM   Result Value Ref Range    PTT 83 (H) 23 - 37 seconds   Comprehensive metabolic panel    Collection Time: 02/25/23  5:54 AM   Result Value Ref Range    Sodium 134 (L) 135 - 147 mmol/L    Potassium 4 0 3 5 - 5 3 mmol/L    Chloride 109 (H) 96 - 108 mmol/L    CO2 18 (L) 21 - 32 mmol/L    ANION GAP 7 4 - 13 mmol/L    BUN 35 (H) 5 - 25 mg/dL    Creatinine 0 92 0 60 - 1 30 mg/dL    Glucose 105 65 - 140 mg/dL    Calcium 7 2 (L) 8 3 - 10 1 mg/dL    Corrected Calcium 7 8 (L) 8 3 - 10 1 mg/dL    AST 50 (H) 5 - 45 U/L    ALT 44 12 - 78 U/L    Alkaline Phosphatase 113 46 - 116 U/L    Total Protein 4 4 (L) 6 4 - 8 4 g/dL    Albumin 3 2 (L) 3 5 - 5 0 g/dL    Total Bilirubin 3 07 (H) 0 20 - 1 00 mg/dL    eGFR 95 ml/min/1 73sq m   CBC and Platelet    Collection Time: 02/25/23  5:54 AM   Result Value Ref Range    WBC 16 16 (H) 4 31 - 10 16 Thousand/uL    RBC 3 82 (L) 3 88 - 5 62 Million/uL    Hemoglobin 11 6 (L) 12 0 - 17 0 g/dL    Hematocrit 34 8 (L) 36 5 - 49 3 %    MCV 91 82 - 98 fL    MCH 30 4 26 8 - 34 3 pg    MCHC 33 3 31 4 - 37 4 g/dL    RDW 14 8 11 6 - 15 1 %    Platelets 72 (L) 025 - 390 Thousands/uL    MPV 10 4 8 9 - 12 7 fL   Magnesium    Collection Time: 02/25/23  5:54 AM   Result Value Ref Range    Magnesium 2 9 (H) 1 6 - 2 6 mg/dL   Phosphorus    Collection Time: 02/25/23  5:54 AM   Result Value Ref Range    Phosphorus 2 5 (L) 2 7 - 4 5 mg/dL   Calcium, ionized    Collection Time: 02/25/23  5:54 AM   Result Value Ref Range    Calcium, Ionized 0 99 (L) 1 12 - 1 32 mmol/L   Cortisol    Collection Time: 02/25/23  5:54 AM   Result Value Ref Range    Cortisol, Random 10 1 ug/dL   APTT    Collection Time: 02/25/23  5:54 AM   Result Value Ref Range     (H) 23 - 37 seconds   Fingerstick Glucose (POCT)    Collection Time: 02/25/23  6:12 AM   Result Value Ref Range    POC Glucose 89 65 - 140 mg/dl   Cortisol Stimulation Test    Collection Time: 02/25/23  6:32 AM   Result Value Ref Range    Cortisol, Random 25 4 ug/dL   Cortisol Stimulation Test    Collection Time: 02/25/23  7:02 AM   Result Value Ref Range    Cortisol, Random 31 5 ug/dL   Fingerstick Glucose (POCT)    Collection Time: 02/25/23 11:33 AM   Result Value Ref Range    POC Glucose 169 (H) 65 - 140 mg/dl   Blood gas, venous    Collection Time: 02/25/23 11:58 AM   Result Value Ref Range    pH, Cliff 7 365 7 300 - 7 400    pCO2, [place] 30 9 (L) 42 0 - 50 0 mm Hg    pO2, Cliff 37 4 35 0 - 45 0 mm Hg    HCO3, Cliff 17 3 (L) 24 - 30 mmol/L    Base Excess, Cliff -6 9 mmol/L    O2 Content, Cliff 13 1 ml/dL    O2 HGB, VENOUS 71 8 60 0 - 80 0 %   Blood gas, arterial    Collection Time: 02/25/23 11:58 AM   Result Value Ref Range    pH, Arterial 7 430 7 350 - 7 450    pCO2, Arterial 22 5 (LL) 36 0 - 44 0 mm Hg    pO2, Arterial 91 3 75 0 - 129 0 mm Hg    HCO3, Arterial 14 6 (L) 22 0 - 28 0 mmol/L    Base Excess, Arterial -7 7 mmol/L    O2 Content, Arterial 17 7 16 0 - 23 0 mL/dL    O2 HGB,Arterial  96 1 94 0 - 97 0 %    SOURCE Line, Arterial     ROOM AIR FIO2      Other FIO2 Room Air %       CT abdomen pelvis wo contrast    Result Date: 2/20/2023  Narrative: CT ABDOMEN AND PELVIS WITHOUT IV CONTRAST INDICATION:   Abdominal pain, acute, nonlocalized abdominal pain  History of cholangiocarcinoma  COMPARISON:  CT 1/26/2023  TECHNIQUE:  CT examination of the abdomen and pelvis was performed without intravenous contrast  Axial, sagittal, and coronal 2D reformatted images were created from the source data and submitted for interpretation  Radiation dose length product (DLP) for this visit:  1031 92 mGy-cm   This examination, like all CT scans performed in the [facility], was performed utilizing techniques to minimize radiation dose exposure, including the use of iterative reconstruction and automated exposure control  Enteric contrast was not administered  FINDINGS: Absence of intravenous contrast enhancement limits evaluation of the abdominal viscera  ABDOMEN LOWER CHEST:  No significant change in multiple right lower lobe masses, largest measuring 2 3 cm within the medial costophrenic angle  No effusions  LIVER/BILIARY TREE:  Evaluation is limited in the absence of intravenous contrast enhancement    Dominant hypodense mass in the dome of the liver appears unchanged, however multiple other smaller previously seen lesions are not well visualized on this study  No biliary dilatation  GALLBLADDER:  Nonvisualized  SPLEEN:  Unremarkable  PANCREAS:  Unremarkable  ADRENAL GLANDS:  Unremarkable  KIDNEYS/URETERS:  Unremarkable  No hydronephrosis  STOMACH AND BOWEL:  No obstruction or acute inflammatory changes  Colonic diverticulosis without diverticulitis  APPENDIX:  No findings to suggest appendicitis  ABDOMINOPELVIC CAVITY:  Moderate ascites  No pneumoperitoneum  No significant change in omental implant adjacent to the liver, most conspicuous anteriorly, and diffuse infiltration of the omentum compatible with peritoneal carcinomatosis  Unchanged mild gastrohepatic ligament, periportal and portacaval adenopathy  VESSELS:  Atherosclerotic changes are present  No evidence of aneurysm  PELVIS REPRODUCTIVE ORGANS:  Unremarkable for patient's age  URINARY BLADDER:  Unremarkable  ABDOMINAL WALL/INGUINAL REGIONS:  Unremarkable  OSSEOUS STRUCTURES:  No acute fracture or destructive osseous lesion  Impression: No acute pathology  Evaluation is limited in the absence of intravenous contrast enhancement, however metastatic disease appears essentially unchanged, and including dominant hepatic lesion, peritoneal carcinomatosis with moderate moderate ascites  Presumed metastatic right lower lobe nodules also noted  Workstation performed: [id number]     MRI abdomen w wo contrast    Result Date: 2/20/2023  Narrative: MRI OF THE ABDOMEN WITH AND WITHOUT CONTRAST INDICATION: 47 years / Male  C22 8: Malignant neoplasm of liver, primary, unspecified as to type  79-year-old male with metastatic cholangiocarcinoma  COMPARISON: CT of the chest, abdomen and pelvis from 10/24/2022 and January 26, 2023   TECHNIQUE:  Multiplanar/multisequence MRI of the abdomen was performed before and after administration of contrast  IV Contrast:  10 mL of Gadobutrol injection (SINGLE-DOSE) FINDINGS: LOWER CHEST:   1 8 x 2 4 cm nodule in the posterior basilar segment of the right lower lobe (series 12, image 41), not significantly changed since the CT from 1/26/2023  The patient's other known pulmonary metastases are not visible on this MRI  LIVER: Normal size  Multiple hypoenhancing masses, fluid being confluent masses virtually replacing the entire lateral segment of the left lobe  Largest mass located at the junction of segments 4 and 5/8, measuring 2 6 x 2 8 cm (series 14, image 95)  Allowing for differences in modality, slice position and cursor placement, this does not appear significantly changed since the CT from 1/26/2023  2 7 x 4 6 cm nonenhancing structure at the dome of the liver, at the junction of segments 4A and segment 8, with T1 hyperintensity, consistent with chronic hemorrhage, either within the hepatic parenchyma or within a metastasis  This is also unchanged since the last CT  4 5 cm thrombosis in the splenic vein, extending to the origins of the portal vein and SMV, with additional thrombus in the right portal vein and its branches, all developing since 1/26/2023  BILE DUCTS:  No intrahepatic or extrahepatic bile duct dilatation  GALLBLADDER:  Not visualized; ? Prior cholecystectomy  PANCREAS:  Unremarkable  ADRENAL GLANDS:  Normal  SPLEEN:  Normal  KIDNEYS/PROXIMAL URETERS:  No hydroureteronephrosis  Bilateral renal cysts  No solid masses  STOMACH AND BOWEL:   Unremarkable  No dilated loops of bowel  PERITONEAL CAVITY/RETROPERITONEUM:  Small amount of ascites  Generalized thickening and enhancement of the peritoneal reflection  Several enhancing masses arising from the peritoneum, the largest anterior to the right lobe of the liver, measuring 1 6 x  5 8 cm (series 14, image 49) LYMPH NODES:  1 2 x 2 4 cm portacaval lymph node (series 12, image 70)  Necrotic appearing 1 0 x 1 1 cm mindi hepatis node (series 12, image 64)    1 1 x 1 5 cm necrotic appearing gastrohepatic node (series 12, image 57) 1 5 x 1 8 cm mindi hepatis node (series 12, image 70)  These have not significantly changed since the CT from 1/26/2023  VASCULAR STRUCTURES:  Thrombosis seen in portions of the splenic vein, portal vein and superior mesenteric vein as described above  Abdominal aorta unremarkable, without aneurysm  ABDOMINAL WALL:  Small umbilical hernia containing ascites  OSSEOUS STRUCTURES:  1 5 x 2 3 cm enhancing lesion in the right iliac crest, extending into through the cortex into the adjacent portion of the right iliac is muscle (series 12, image 146), similar in appearance to the CT from 1/26/2023  Peripherally enhancing lesion with diffusion restriction in the L1 vertebral body  (series 12, image 86; series 13, image 50; series 9, image 107), with no corresponding abnormality on prior CT  Impression: 1  Multiple hepatic metastases, many of which appear to be at least partially necrotic, most extensive in the lateral segment of the left lobe, not appreciably changed since a CT from 1/26/2023  2   New thrombosis in portions of the splenic, superior mesenteric and main portal veins and a segment of the right portal vein  3   Peritoneal metastases and small amount of ascites  4   Intra-abdominal lymphadenopathy, as described above, not significantly changed since 1/26/2023  5   Lesion in the right iliac crest, possibly metastasis, not appreciably changed since 1/26/2023  6   Indeterminate enhancing lesion in the L1 vertebral body, possibly metastasis  7   1 8 x 2 4 cm pulmonary metastasis in the base of the right lower lobe  The study was marked in [facility] for immediate notification  Workstation performed: [id number]     IR paracentesis    Result Date: 2/17/2023  Narrative: Ultrasound-guided paracentesis Clinical History: Recurrent symptomatic ascites, history of cholangiocarcinoma Procedure: Ultrasound used to localize the left lower quadrant ascites   The overlying skin was prepped and draped in usual sterile fashion and local anesthesia was obtained with the 1% lidocaine solution  A 5 Western Nilda Yueh needle was advanced until fluid was aspirated under live ultrasound guidance  Approximately 4500 cc' s of clear, yellow fluid was aspirated  Labs collected and sent  The patient tolerated the procedure well and left the department in stable condition  Impression: Impression: Ultrasound-guided left-sided paracentesis  Signed, performed, and dictated by Karolyn Dancer, PA-C under the direct supervision of Dr Rosangela Wong  Workstation performed: [id number]     IR paracentesis    Result Date: 2/13/2023  Narrative: PROCEDURE: Paracentesis STAFF: Ketty Sal PA-C COMPLICATIONS: None  MEDICATIONS: 1% lidocaine subcutaneous  INDICATION: Symptomatic ascites  Cholangiocarcinoma PROCEDURE: Using ultrasound guidance, the left paracolic gutter was punctured and a catheter was placed into the peritoneal cavity  A total of 4950 milliliters of fluid was removed  The catheter was then removed  FINDINGS: Massive ascites  Clear yellow ascites was removed  Impression: Therapeutic paracentesis  Procedure was performed, signed and dictated by: Ketty Sal PA-C Supervising Physician: Dr Rosangela Wong Workstation performed: [id number]     IR paracentesis    Result Date: 2/10/2023  Narrative: PROCEDURE: Therapeutic paracentesis MEDICATIONS: 1% lidocaine subcutaneous  INDICATION: Symptomatic ascites  Cholangiocarcinoma  PROCEDURE: Using ultrasound guidance, the left lower quadrant was punctured and a 5f yueh catheter was placed into the abdominal cavity until ascites was aspirated  A total of 5550 milliliters of serous fluid was removed  The catheter was then removed  FINDINGS: 5550 mL of serous ascites was removed  Impression: Therapeutic paracentesis  Signed, performed, and dictated by HARINI Palmer under the supervision of Dr Nimisha Walker   Workstation performed: [id number]     IR paracentesis    Result Date: 2/7/2023  Narrative: Ultrasound-guided paracentesis Clinical History: Recurrent malignant ascites, history of cholangiocarcinoma Procedure: After explaining the risks and benefits of the procedure to the patient, informed consent was obtained  Ultrasound used to localize the left lower quadrant ascites  The overlying skin was prepped and draped in usual sterile fashion and local anesthesia was obtained with the 1% lidocaine solution  A 5 Western Nilda Yueh needle was advanced until fluid was aspirated under live ultrasound guidance  Approximately 6550 cc' s of clear, yellow fluid was aspirated  The patient tolerated the procedure well and left the department in stable condition  Impression: Impression: Ultrasound-guided left-sided paracentesis  Signed, performed, and dictated by Tomasa Domingo PA-C under the direct supervision of Dr Alberto Shoemaker  Workstation performed: [id number]     IR paracentesis    Result Date: 2/3/2023  Narrative: PROCEDURE: Ultrasound-guided paracentesis STAFF: Hamilton Parks PA-C  COMPLICATIONS: None immediate  MEDICATIONS: 1% lidocaine subcutaneous  INDICATION: Symptomatic ascites  Patient with history of cholangiocarcinoma with malignant ascites  PROCEDURE: Using ultrasound guidance, the left paracolic gutter was punctured and a catheter was placed into the peritoneal cavity  A total of 10,350 milliliters of clear yellow fluid was removed  The catheter was then removed  FINDINGS: Massive ascites  10,350cc clear yellow ascites was removed  Impression: Ultrasound-guided paracentesis  Signed, performed, and dictated by Hamilton Parks PA-C under the direct supervision of Dr Adriana Cottrell   Workstation performed: [id number]     IR paracentesis    Result Date: 2/2/2023  Narrative: Ultrasound-guided paracentesis Clinical History: Recurrent malignant Ascites, history of cholangiocarcinoma Procedure: After explaining the risks and benefits of the procedure to the patient, informed consent was obtained  Ultrasound used to localize the left lower quadrant ascites  The overlying skin was prepped and draped in usual sterile fashion and local anesthesia was obtained with the 1% lidocaine solution  A 5 Western Nilda Yueh needle was advanced until fluid was aspirated under live ultrasound guidance  Approximately 6500 cc' s of clear, yellow fluid was aspirated  The patient tolerated the procedure well and left the department in stable condition  Impression: Impression: Ultrasound-guided left-sided paracentesis  Signed, performed, and dictated by Perri He PA-C under the direct supervision of Dr Kelvin Muniz  Workstation performed: [id number]     IR paracentesis    Result Date: 1/25/2023  Narrative: PROCEDURE: Symptomatic paracentesis MEDICATIONS: 1% lidocaine subcutaneous  INDICATION: Symptomatic ascites  Hepatocellular carcinoma  PROCEDURE: Using ultrasound guidance, the right lower quadrant was punctured and a 5f yueh catheter was placed into the abdominal cavity until ascites was aspirated  A total of 7,400 milliliters of serous fluid was removed  The catheter was then removed  FINDINGS: 7400 mL of serous ascites was removed  Impression: Symptomatic paracentesis  Signed, performed, and dictated by HARINI Trivedi under the supervision of Dr Isa Myrick  Workstation performed: [id number]     CT chest abdomen pelvis w contrast    Result Date: 1/31/2023  Narrative: CT CHEST, ABDOMEN AND PELVIS WITH IV CONTRAST INDICATION:   C24 8: Malignant neoplasm of overlapping sites of biliary tract  As per review of electronic medical record, 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 and stage IV cholangiocarcinoma on 3rd line treatment  COMPARISON:  Several prior CTs of the chest, abdomen and pelvis, the most recent from 1/18/2023, and PET/CT from 4/28/2021   TECHNIQUE: CT examination of the chest, abdomen and pelvis was performed  In addition to portal venous phase postcontrast scanning through the abdomen and pelvis, delayed phase postcontrast scanning was performed through the upper abdominal viscera  Axial, sagittal, and coronal 2D reformatted images were created from the source data and submitted for interpretation  Radiation dose length product (DLP) for this visit:  2421 02 mGy-cm   This examination, like all CT scans performed in the [facility], was performed utilizing techniques to minimize radiation dose exposure, including the use of iterative reconstruction and automated exposure control  IV Contrast:  100 mL of iohexol (OMNIPAQUE) Enteric Contrast: Enteric contrast was administered  FINDINGS: CHEST BRONCHOPULMONARY:  Retained secretions are seen in the trachea  Otherwise clear central airways  Mild upper lobe predominant paraseptal emphysematous changes  Enhancing airspace consolidations are seen at the lung bases, left greater than right, likely areas of atelectasis  Multiple pulmonary nodules are seen throughout the lungs  2 necrotic nodules at the right lung base medially are mildly enlarged compared to 1/18/2023  The larger lesion measures 1 9 x 1 8 cm (series 2 image 106 and 107), compared to 1 4 x 1 4 cm on 1/18/2022  The smaller lesion measures 1 1 cm (series 2 image 104) compared to 0 9 cm  These are new from October 2022  A 6 mm left upper lobe nodule laterally (series 3 image 126) is enlarged from 3 mm in October 2022  Other nodules are grossly similar in size  Nodules at the lung bases are somewhat difficult to compare given presence of atelectasis on the current study which displaces some of the previously seen nodules  PLEURA:  No pleural effusions  No pneumothorax  HEART/GREAT VESSELS: The heart is normal in size  No pericardial effusion  Normal caliber thoracic aorta with no dissection   There is suggestion of a filling defect in one of the left lower lobe segmental pulmonary arteries as it is decreased in attenuation compared to other branches at the same level (series 6 image 66-75)  Main pulmonary artery dilated up to 3 3 cm, suggestive of pulmonary arterial hypertension  There is a right-sided Mediport with its tip in the SVC  MEDIASTINUM/LYMPH NODES:  No axillary, mediastinal or hilar lymphadenopathy  CHEST WALL AND LOWER NECK:  Unremarkable  ABDOMEN LIVER/BILIARY TREE: Multiple ill-defined, peripherally enhancing lesions are seen throughout both lobes of the liver  Most of the parenchyma in the left lobe is replaced by these lesions  Overall there appears to be an increase in the number of the lesions compared to October 2022  The largest discrete lesion measures 3 0 x 2 9 cm (series 2 image 116), compared to 2 5 x 2 2 cm in October 2022  There is a 4 5 x 2 5 cm hypodense lesion in segment 8 of the liver (series 2 image 98), similar to the recent studies when remeasured in a similar manner  No biliary ductal dilation  GALLBLADDER:  No calcified gallstones  No pericholecystic inflammatory change  SPLEEN: Unremarkable  PANCREAS:  Unremarkable  Normal caliber main pancreatic duct  ADRENAL GLANDS:  Unremarkable  KIDNEYS/URETERS:  Symmetric renal enhancement  No intrarenal or ureteral calculi  No hydronephrosis  Simple cyst in the upper pole the right kidney  STOMACH AND BOWEL:  Evaluation for bowel inflammation is somewhat limited by the presence of ascites  No bowel obstruction  Oral contrast has reached the descending colon  Scattered colonic diverticuli, however no evidence of diverticulitis  APPENDIX:  Normal appendix  ABDOMINOPELVIC CAVITY: There is moderate to large abdominal and pelvic ascites  No organized fluid collections  There has been mild progression of peritoneal carcinomatosis compared to October 2022    For instance, there is mildly increased nodularity of the omentum in the anterior abdomen (series 2 image 122) and new peritoneal thickening in the right paracolic gutter (series 2 image 183)  There are also multiple soft tissue implants along the right hemidiaphragm, which have increased in size and number  No pneumoperitoneum  LYMPH NODES: There is a necrotic appearing gastrohepatic ligament lymph node measuring 1 3 x 1 0 cm (series 2 image 122)  It is newly enlarged compared to October 2022  An enlarged periportal lymph node measures 2 2 x 1 2 cm (series 2 image 132)  In October 2022 it measured 1 5 x 0 9 cm  A portacaval lymph node measures 2 8 x 1 9 cm (series 2 image 140), compared to 2 4 x 1 5 cm in October 2022  No pelvic lymphadenopathy  VESSELS: Normal caliber aorta  Patent major branch vessels  Incidentally noted is a retroaortic left renal vein, a normal anatomic variant  PELVIS REPRODUCTIVE ORGANS:  The prostate is enlarged, measuring 5 2 cm in transverse dimension  URINARY BLADDER:  Unremarkable  ABDOMINAL WALL/INGUINAL REGIONS: There is mild subcutaneous tissue stranding in the anterior abdominal wall  There is a periumbilical hernia containing fluid  MUSCULOSKELETAL: 2 3 x 1 7 cm lobulated lytic lesion in the right iliac bone near the sacroiliac joint with overlying cortical disruption  It is essentially unchanged from 1/18/2023 and October 2022  This lesion was 1st noted on the CT from April 2022 and has since enlarged  Several subcentimeter densely sclerotic lesions in the right proximal femur most likely represent bone islands as they are unchanged dating back to February 2018  No other lytic or sclerotic lesions  Degenerative changes of the spine  Impression: Questionable pulmonary embolism versus artifact in a left lower lobe segmental pulmonary artery  Recommend further evaluation with CT pulmonary angiogram  Multiple pulmonary metastases, the largest two measuring 1 9 x 1 8 cm and 1 1 cm in the right lower lobe medially, which are mildly increased from 1/18/2023   Metastatic disease in the liver, likely metastatic upper abdominal lymphadenopathy, and peritoneal carcinomatosis similar to studies from earlier in January 2022, however with progression since October 2022, as detailed above  No significant interval change in right iliac bone metastasis compared to 1/18/2023 and October 2022  Moderate to large abdominal and pelvic ascites  Retained secretions in the trachea, which may place patient at risk for aspiration  Atelectasis in both lower lobes  Multiple additional findings as above  I personally discussed the findings of a possible pulmonary embolism with Dr Mascorro on 1/31/2023 at 10:31 AM who saw the patient for a second opinion on day of dictation  The study was marked in EPIC for significant notification to the ordering clinician for the rest of the findings  Workstation performed: [id number]     Paracentesis (Bedside)    Result Date: 2/22/2023  Narrative: Jose Alonzo MD     2/22/2023  3:30 PM Paracentesis (Bedside) Date/Time: 2/22/2023 12:23 PM Performed by: Jose Alonzo MD Authorized by: Jose Alonzo MD Patient location:  ICU Consent:   Consent obtained:  Written   Consent given by:  Patient   Risks discussed:  Bleeding, bowel perforation, infection and pain Universal protocol:   Procedure explained and questions answered to patient or proxy's satisfaction: yes    Relevant documents present and verified: yes    Test results available and properly labeled: yes    Radiology Images displayed and confirmed  If images not available, report reviewed: yes    Site/side marked: yes  Pre-procedure details:   Initial or Subsequent Exam:  Subsequent   Procedure purpose:  Therapeutic   Indications: abdominal discomfort secondary to ascites    Preparation: Patient was prepped and draped in usual sterile fashion  Anesthesia (see MAR for exact dosages):    Anesthesia method:  Local infiltration   Local anesthetic:  Lidocaine 1% w/o epi Procedure details:   Needle gauge:  18   Equipment: Paracentesis kit used    Ultrasound guidance: yes    Ultrasound image availability:  Not saved   Approach:  Percutaneous   Puncture site:  R lower quadrant   Fluid removed amount:  4 5L   Fluid appearance:  Clear and yellow   Dressing:  Adhesive bandage Post-procedure details:   Patient tolerance of procedure: Tolerated well, no immediate complications Post-procedure medication (see MAR for dosing): Albumin replacement: No      XR chest portable ICU    Result Date: 2/20/2023  Narrative: CHEST INDICATION: Shortness of breath COMPARISON:  1/31/2023 EXAM PERFORMED/VIEWS:  XR CHEST PORTABLE ICU Images: 2 FINDINGS: Right-sided Mediport terminating at the cavoatrial junction, unchanged Cardiomediastinal silhouette appears unremarkable  The lungs are clear  No pneumothorax or pleural effusion  Osseous structures appear within normal limits for patient age  Bilateral neck surgical clips     Impression: No acute cardiopulmonary disease  Findings are stable Workstation performed: [id number]     CTA ED chest PE study    Result Date: 1/31/2023  Narrative: CTA - CHEST WITH IV CONTRAST - PULMONARY ANGIOGRAM INDICATION:   recent abd ct scan concerning for PE, pt sob  COMPARISON: None  TECHNIQUE: CTA examination of the chest was performed using angiographic technique according to a protocol specifically tailored to evaluate for pulmonary embolism  Axial, sagittal, and coronal 2D reformatted images were created from the source data and  submitted for interpretation  In addition, coronal 3D MIP postprocessing was performed on the acquisition scanner  Radiation dose length product (DLP) for this visit:  418 mGy-cm   This examination, like all CT scans performed in the [facility], was performed utilizing techniques to minimize radiation dose exposure, including the use of iterative reconstruction and automated exposure control   IV Contrast:  100 mL of iohexol (OMNIPAQUE)  FINDINGS: PULMONARY ARTERIAL TREE:  No evidence of an acute pulmonary thromboembolism  The vessel which was questioned on the recent CT scan is opacified on the current study  LUNGS:  6 mm anterior left upper lobe pulmonary nodule (302, 34) has been slowly increasing in size     Additional scattered subcentimeter nodules are stable to slightly increased in size when comparing to the study from October 24, 2022   2 nodules in the medial right lower lobe are stable from the most recent study from a few days ago, but also increased since the prior exam in October  Changes compatible with paraseptal emphysema  Bibasilar atelectasis  Oanh Nissen PLEURA:  Trace right pleural effusion  HEART/GREAT VESSELS:  Coronary artery calcifications, which are an indicator of coronary artery disease  No thoracic aortic aneurysm  MEDIASTINUM AND ANGEL:  Unremarkable  CHEST WALL AND LOWER NECK:   Right-sided Mediport catheter with the tip at the cavoatrial junction  VISUALIZED STRUCTURES IN THE UPPER ABDOMEN:  Multifocal tumor in the liver is again shown, better characterized on the recent CT scan through the abdomen and pelvis  Malignant-appearing ascites is noted in the upper abdomen with multiple soft tissue nodules along the peritoneal surface and beneath the right hemidiaphragm  Please see report for details  OSSEOUS STRUCTURES:  No acute fracture or destructive osseous lesion  Impression: No acute pulmonary thromboembolism  Slowly enlarging bilateral pulmonary nodules as described above  Multifocal liver tumor, upper abdominal ascites and peritoneal tumor better shown on recent CT scan from 1/26/2023  Workstation performed: [id number]     VAS lower limb venous duplex study, complete bilateral    Result Date: 1/31/2023  Narrative:  [facility] REPORT CLINICAL: Indications: Physician wants to determine patency of the venous system secondary to bilateral lower extremity edema and SOB  Oanh Nissen  Operative History: No cardiovascular surgeries noted Risk Factors The patient has history of smoking (current) 2 ppd  FINDINGS:  Right         Impression           GSV Mid Calf  Chronic Thick Br[place]ila   Left          Impression           GSV Mid Calf  Chronic Thick Br[place]ila     CONCLUSION: Impression:  RIGHT LOWER LIMB: No evidence of acute or chronic deep vein thrombosis  Evidence of chronic superficial phlebitis noted in the great saphenous vein at the mid calf  Doppler evaluation shows a normal response to augmentation maneuvers  Popliteal, posterior tibial and anterior tibial arterial Doppler waveforms are triphasic  LEFT LOWER LIMB: No evidence of acute or chronic deep vein thrombosis  Evidence of chronic superficial phlebitis noted in the great saphenous vein at the mid calf  Doppler evaluation shows a normal response to augmentation maneuvers  Popliteal, posterior tibial and anterior tibial arterial Doppler waveforms are triphasic  Technical findings were given to Dr Rosalba Pope  SIGNATURE: Electronically Signed by: Ye Ballesteros MD, [address] on 2023-01-31 06:53:31 PM    Echo complete w/ contrast if indicated    Result Date: 2/21/2023  Narrative: •  Study quality was poor  This was a technically difficult study •  Left Ventricle: Left ventricular cavity size is normal  Wall thickness is mildly increased  There is mild concentric hypertrophy  The left ventricular ejection fraction is 65%  Systolic function is normal  Wall motion is grossly normal  •  Right Ventricle: Right ventricular cavity size is normal  Systolic function is normal        Labs and pertinent reports reviewed

## 2023-02-26 NOTE — PROGRESS NOTES
Daily Progress Note - Critical Care   Carolynn Ashby 47 y o  male MRN: [id number]  Unit/Bed#: MICU 04 Encounter: [id number]        ----------------------------------------------------------------------------------------  HPI/24hr events: Chris Joshua is a 47 y o  male with past medical history of heavy nicotine dependence currently in remission, SCC of head/neck (tongue) diagnosed in 2018 s/p chemo and partial glossectomy, stage IV cholangiocarcinoma (diagnosed in 2020 s/p resection followed by immuno-chemo therapy with recurrence in 2022 now with mets  to bone, peritoneum, lungs, and liver, currently on second round of pemigatinib complicated by recurrent ascites requiring frequent large volume paracentesis twice a week, with removal of 4-5L of ascitic fluid on a weekly basis, who presented to ED this AM with concerns of 7 days of decreased appetite/po intake and worsening generalized fatigue with occasional eps of non-billous vomiting w/o overt s/s bleeding  Per wife, patient noted to be more confused from baseline, prompting ED evaluation  Patient follows with Dr Char Naylor, Oncology  Was recently on ivositinib and more recently on Pemigatibib  Denies recent NSAID use  Admits to decrease urine output x3-4d  Denies recent ill contacts or long distance travel  Compliant with home meds including PRN Flexeril for and PRN oxy 5 for cancer/chemo induced pain, lasix 20mg prn for ascites, zofran/low dose dexamethason 4mg for nausea, and Protonix 40mg for GERD       On arrival to ED,  VS notable for T36 7, BP80/50, pulse 71, 98% on RA  Patient noted to be acutely encephalopathic on initial exam with abdominal fluid wave  Work up showed leukocytosis on CBC, elevated K 6 2 in setting of DENZEL with Cr 2 37 (baseline   5- 9) and ?givne bolus of NS  ECG revealing NSR with peaked T waves, non-ischemic  Patient was initiated on IV calcium gluconate   Repeat iSTAT potassium >8 --> given additional IV calcium gluconate along with IV dextrose with regular insulin and additional 1L bolus NS  Initial Hs Trops 17, repeat 2h/4h pending  Sepsis workup iniated, started on IV vanc and IV cefepime  CT C/A/B without acute pathology and unchanged metastatic disease process including dominant hepatic lesion, peritoneal carcinomatosis with moderate moderate ascites; right lower lobe nodules also noted  Additionally patient underwent diagnostic paracentesis in the ED, though he was initially scheduled for his weekly therapeutic paracentesis today by IR as an outpatient  24 hour events:  Off levo  Had 3 unmesured UO  1 measured UO noted to be pink, UA obtained   No other events overnight  Subjective: Patient seen and examined  Without any concerns at this time  Denies urinary symptoms  Tolerating PO  Review of Systems  Review of systems was reviewed and negative unless stated above in HPI/24-hour events   ---------------------------------------------------------------------------------------  Assessment and Plan:    Neuro:   • Diagnosis: Hepatic encephalopathy, resolved  o Patient endorses fatigue and presented with mild confusion and slurred speech per wife   o Ammonia elevated 63 on admission  o In setting of stage IV cholangiocarcinoma with multiple metastases to the liver  Likely participated by DENZEL and hypovolemia/poor PO intake POA   Hyponatremia on admission possibly contributing  o Continue monitoring  o Can consider rifaxamine to be added on outpatient regimen if ammonia continues to remain elevated    CV:   • Diagnosis: septic shock, uncertain origin  o Patient with decreased PO intake, vomiting over the past 4-7d    o Severe DENZEL with azotemia and hyponatremia on admission consistent with hypovolemia   o S/P 4 L of IV fluid resuscitation in the ED prior to admission  o TTE shows EF 65% with normal wall motioin, no signficant valvular dysfunction  o Blood cx without growth; SBP negative based on diastolic diagnostic paracentesis fluid analysis  o Initially required pressure support with levo and vasopressin despite no identfiable source   o Likely intravascularly volume depleted in setting of malignant ascites requiring weekly large-volume paracentesis 2/2 stage IV cholangiocarcinoma with mets to the liver; type 1 HRS also possible and contributing   o Continue on albumin 25 g every 6 hours to improve MAP and treat hypoalbuminemia  o Remains off levo and other pressor support   o Continue Midodrine 15mg TID  o MAP>60, increased from goal >55 due to concern for decreased UO       Pulm:   · Diagnosis: Primary respiratory alkalosis  · Most recent ABG 7 42/25 3/94 6/16 1  · Bicarb low, no gap, consistent with inappropriate compensation     GI:   • Diagnosis: Stage IV cholangiocarcinoma cysts with mets metastasis to liver  o Diagnosed in 2020 s/p resection followed by immuno-chemo therapy with recurrence in 2022 now with mets to bone, peritoneum, lungs, and liver  o Currently on second round of pemigatinib as an outpatient under the care of Dr Ruby Love,  o SBP ruled out based on diagnostic paracentesis fluid analysis  o May have underlying type I hepatorenal syndrome given severe DENZEL and hyponatremia on admission, liver hepatorenal syndrome is a diagnosis of exclusion, further work-up pending to r/o hypovolemic vs septic shock  o Hold home pemigatibin in setting of severe DENZEL  · Diagnosis: Mesenteric thrombosis   · Most recent outpatient MRI imaging showing new thrombosis in portions of the splenic, superior mesenteric and main portal veins and a segment of right portal vein  · Likely cancer associated thrombosis in setting of stage IV cholangiocarcinoma  · Initially on heparin gtt but transitioned to argotrobran d/t concern for HIT in setting of thrombocytopenia and recent exposure to heparin products during prior admission at [address] 1/2023  · HIT panel negative  · Eliquis not appropriate given patient meeds Child-Philip class C criteria based on LFT parameters and malignant ascites  · Start heparin gtt   • Diagnosis: Malignant ascites  o Likely peritoneal carcinomatosis in setting of stage IV cholangiocarcinoma  o S/p bedside therapeutic paracentesis 2/22, 4  L clear yellow fluid removed via RLQ puncture site  - Fluid analysis negative for SBP, gram stain & culture negative  • Repeat bedside therapeutic paracentesis today  • Diagnosis: GERD  o Known history  o Continue Protonix 40mg PO, will consider switching to IV if cannot tolerate PO  · Diagnosis: Oropharyngeal candidiasis  · Known history, patient reportedly gets frequent episodes of oropharyngeal candidiasis s/p partial glossectomy 2/2 SCC of tounge  · Treated with magic mouthwash at home  · Noted to have oral thrush on exam, patient endorses intermittent odynophagia consistent with prior similar episodes  · Continue prn magic mouthwash while inpatient  · Continue 7d course of fluconazole 200mg qd on day#4/7     :   • Diagnosis: Severe DENZEL, resolved  o Cr 2 3 on arrival to ED, increased to 2 6 on admission, baseline serum creatinine around 1 62 8  o Likely in setting of severe hypotension secondary to hypovolemia/poor p o  intake versus septic shock versus hepatorenal syndrome causing ischemic insult to the kidneys  o Patient's anticancer medications including ivosidenib and pemigatibin so known to cause kidney injury, possibly contributing  o Status post aggressive IV fluid resuscitation in the ED  o Status post 75mg sodium bicarb infusion and D5W at 100 cc/h on admission  • Diagnosis: Metabolic acidosis  o Lactate elevated on admission 3 5 in setting of hypotension, lactate remained elevated at subsequent re-checks; persistant lactate elevations likely due to decreased hepatic metabolization setting of liver mets   o S/p IVF resuscitation   o Repeat ABG consistent with primary respiratory alkalosis; bicarb persistently and inappropriately low ~17-18   o Will consider bicarb gtt  · Diagnosis: Severe hyponatremia, resolving  · Sodium 118 on on arrival to ED, imporved to 135 yesterady  · Repeat BMP pending  · S/P IV with NS, given 2g salt tab x1 on 2/23  · Repeat BMP, will consider NS at 60cc/hr for 5h for goal correction no more than 8 mEq in 24 hours  · BMP every 6 hours    F/E/N:   • F: none  • Electrolytes: replete K, phos, mag, calcium as indicated  - Replete phosphate with K-Phos 30mmol  - Replete Ca with Calcium gluconate 2 g  • PTH elevated, Vit D 25 low consistent with vitamin D defiency   · Nutrition: advanced to regular house diet, fluid rest 1 8L given on-going hyponatremia, imroving    Heme/Onc:   • Diagnosis:  Thrombocytopenia  o Likely multifactorial etiology given myelosuppression in setting of acute illness, splenic sequestration in setting of portal hypertension 2/2 several metastases to the liver 2/2 stage IV cholangiocarcinoma  o Retake index low at 1 0 suggestive of hypoproliferation  o Heme-onc consulted, appreciate recs  - hemolysis labs negatgive and DIC panel unremarkable; patient noted to have elevated INR on DIC panel while being on argatroban gtt 2/2 concerns for HIT; suspect false elevation in INR from argatroban   - HIT panel negative; though timing of worsening thrombocytopenia not consistent with HIT however patient has had exposure to heparin products during recent admission in January, 2023 at [address] CBC, Transfuse to maintain platelets over 25,778    Endo:   • Diagnosis: Vitamin D Deficiency   - Vitamin D 25hydroxy level low at 18 4, persistently low ionized calcium despite Ca repletion  - Continue weekly vitamin D2 supplemntation 50,000U  · Diagnosis: Hyperglycemia  · Blood sugars at goal  · Continue SSI, algorithm 3, adjust as appropriate   · Goal blood glucose 140-180    ID:   • Diagnosis: Sepsis/Septic shock, improving  o SIRS criteria on admission with concerns for sepsis given hypotension requiring pressure support and evidence of end organ failure with severe DENZEL on admission  o Lactate 3 5, procalc 4 9 on arrival  o Unclear etiology,; SBP ruled out; UA negative on admission; CXR negative for PNA; don't suspect cholangitis given clinical presentation and imaging findings;  intraabdominal bacterial translocation possible in setting of sepsis/hypotension  o MRSA negative  o Blood cultures without growth   o S/p 3d of IV cefepime, de-escalated to IV ceftriaxone 2/22  o Continue IV ceftriaxone day#6, IV Flagyl day#7    MSK/Skin:   • Diagnosis: Pressure ulcer prophylaxis  · Topical miconazole cream  · Frequent turning and repositioning      Patient appropriate for transfer out of the ICU today?: No  Disposition: Admit to Critical Care   Code Status: Level 1 - Full Code  ---------------------------------------------------------------------------------------  ICU CORE MEASURES    Prophylaxis   VTE Pharmacologic Prophylaxis: Heparin  VTE Mechanical Prophylaxis: sequential compression device  Stress Ulcer Prophylaxis: Pantoprazole PO    ABCDE Protocol (if indicated)  Plan to perform spontaneous awakening trial today? Not applicable  Plan to perform spontaneous breathing trial today? No Not applicable  Obvious barriers to extubation? Not applicable  CAM-ICU: Negative    Invasive Devices Review  Invasive Devices     Central Venous Catheter Line  Duration           Port A Cath Right Subclavian -- days          Peripheral Intravenous Line  Duration           Peripheral IV 02/23/23 Right;Upper;Ventral (anterior) Arm 2 days          Arterial Line  Duration           Arterial Line 02/20/23 Radial 5 days              Can any invasive devices be discontinued today?  No  ---------------------------------------------------------------------------------------  OBJECTIVE    Vitals   Vitals:    02/26/23 0300 02/26/23 0400 02/26/23 0500 02/26/23 0600   BP: 97/60 (!) 87/60 96/63 92/60   BP Location:  Left arm     Pulse: 68 70 72 76   Resp: 12 (!) 11 16 17   Temp:  98 °F (36 7 °C)     TempSrc:  Oral     SpO2: 99% 98% 98% 99%   Weight:    109 kg (239 lb 6 7 oz)   Height:         Temp (24hrs), Av 7 °F (36 5 °C), Min:97 5 °F (36 4 °C), Max:98 °F (36 7 °C)  Current: Temperature: 98 °F (36 7 °C)      Respiratory:  SpO2: SpO2: 99 %       Invasive/non-invasive ventilation settings   Respiratory    Lab Data (Last 4 hours)       0559            pH, Arterial       7 422             pCO2, Arterial       25 3             pO2, Arterial       94 6             HCO3, Arterial       16 1             Base Excess, Arterial       -6 5                  O2/Vent Data     None                Physical Exam  Vitals and nursing note reviewed  Constitutional:       General: He is not in acute distress  Appearance: He is well-developed  He is ill-appearing  HENT:      Head: Normocephalic and atraumatic  Eyes:      Conjunctiva/sclera: Conjunctivae normal    Cardiovascular:      Rate and Rhythm: Normal rate and regular rhythm  Heart sounds: No murmur heard  Pulmonary:      Effort: Pulmonary effort is normal  No respiratory distress  Breath sounds: Normal breath sounds  Abdominal:      General: There is distension  Palpations: Abdomen is soft  Tenderness: There is no abdominal tenderness  Comments: Fluid wave positive   Musculoskeletal:         General: No swelling  Cervical back: Neck supple  Skin:     General: Skin is warm and dry  Capillary Refill: Capillary refill takes less than 2 seconds  Findings: Rash (bilateral feet) present  Comments: Bilateral feet are diffusesly erythematous, with visible petechiae dorsal-medial aspect (L>R)   Neurological:      Mental Status: He is alert and oriented to person, place, and time     Psychiatric:         Mood and Affect: Mood normal               Laboratory and Diagnostics:  Results from last 7 days   Lab Units 23  0558 23  0554 23  0435 23  0436 23  1957 23  1144 02/22/23  0557 02/21/23  0432 02/20/23  0728 02/20/23  0341   WBC Thousand/uL 19 65* 16 16* 20 95* 20 02*  20 02*  --  17 77* 16 95* 21 30*  --  31 18*   HEMOGLOBIN g/dL 13 0 11 6* 12 5 12 5  12 5  --  11 4* 11 6* 13 3   < > 16 9   I STAT HEMOGLOBIN   --   --   --   --   --   --   --   --    < >  --    HEMATOCRIT % 36 9 34 8* 36 4* 36 9  36 9  --  32 5* 32 9* 38 8   < > 48 2   HEMATOCRIT, ISTAT   --   --   --   --   --   --   --   --    < >  --    PLATELETS Thousands/uL 75* 72* 77* 71*  71* 69* 63* 60* 107*  --  136*   NEUTROS PCT %  --   --   --  85*  --  86* 85* 82*  --  82*   MONOS PCT %  --   --   --  9  --  8 9 11  --  13*    < > = values in this interval not displayed       Results from last 7 days   Lab Units 02/26/23  0558 02/25/23  0554 02/24/23  0435 02/23/23  1238 02/23/23  0436 02/22/23  1824 02/22/23  0558 02/22/23  0557 02/21/23  0906 02/21/23  0432 02/20/23  0728 02/20/23  0341   SODIUM mmol/L 133* 134* 135 129* 129* 125* 124*  --    < > 124*   < > 118*   POTASSIUM mmol/L 4 1 4 0 3 8 4 3 3 8 3 4* 3 7  --    < > 4 2   < > 6 2*   CHLORIDE mmol/L 108 109* 108 105 103 100 98  --    < > 95*   < > 91*   CO2 mmol/L 16* 18* 18* 16* 18* 18* 19*  --    < > 17*   < > 16*   CO2, I-STAT   --   --   --   --   --   --   --   --   --   --    < >  --    ANION GAP mmol/L 9 7 9 8 8 7 7  --    < > 12   < > 11   BUN mg/dL 47* 35* 27* 27* 28* 35* 41*  --    < > 66*   < > 107*   CREATININE mg/dL 1 43* 0 92 0 55* 0 71 0 60 0 79 0 80  --    < > 1 31*   < > 2 37*   CALCIUM mg/dL 7 2* 7 2* 6 7* 7 1* 6 7* 6 7* 6 4*  --    < > 6 2*   < > 6 5*   GLUCOSE RANDOM mg/dL 111 105 121 136 92 133 129  --    < > 254*   < > 140   ALT U/L 48 44 48  --  54  --   --  54  --  71  --  91*   AST U/L 59* 50* 52*  --  58*  --   --  65*  --  73*  --  89*   ALK PHOS U/L 127* 113 115  --  116  --   --  105  --  137*  --  198*   ALBUMIN g/dL 3 1* 3 2* 3 4*  --  3 1*  --   --  3 5  --  2 6*  --  1 9*   TOTAL BILIRUBIN mg/dL 2 95* 3 07* 2 67*  -- 2 61*  --   --  3 03*  --  1 96*  --  1 18*    < > = values in this interval not displayed  Results from last 7 days   Lab Units 02/26/23  0558 02/25/23  0554 02/24/23  0435 02/23/23  0436 02/22/23  0557 02/21/23  0432 02/20/23  0827   MAGNESIUM mg/dL 2 7* 2 9* 2 8* 2 9* 3 0* 3 6* 4 5*   PHOSPHORUS mg/dL 2 4* 2 5* 1 5* 1 2* 2 3* 3 4 4 9*      Results from last 7 days   Lab Units 02/26/23  0558 02/25/23  1621 02/25/23  0554 02/24/23  1806 02/24/23  1223 02/24/23  0004 02/23/23  1837 02/22/23  2150 02/22/23  1957 02/22/23  1656 02/20/23  2234 02/20/23  1548 02/20/23  0341   INR  4 56* 8 74*  --   --   --   --   --   --  5 99* 2 36*  --  1 66* 1 49*   PTT seconds 83* 103* 107* 83* 91* 72* 72*   < > 126* 65*   < > 38* 47*    < > = values in this interval not displayed  Results from last 7 days   Lab Units 02/21/23  0729 02/21/23  0432 02/21/23  0022 02/20/23  2013 02/20/23  1548 02/20/23  1305 02/20/23  1132   LACTIC ACID mmol/L 4 0* 3 8* 3 1* 4 6* 3 5* 4 1* 3 3*     ABG:  Results from last 7 days   Lab Units 02/26/23  0559   PH ART  7 422   PCO2 ART mm Hg 25 3*   PO2 ART mm Hg 94 6   HCO3 ART mmol/L 16 1*   BASE EXC ART mmol/L -6 5   ABG SOURCE  Line, Arterial     VBG:  Results from last 7 days   Lab Units 02/26/23  0559 02/25/23  1158   PH SOLO   --  7 365   PCO2 SOLO mm Hg  --  30 9*   PO2 SOLO mm Hg  --  37 4   HCO3 SOLO mmol/L  --  17 3*   BASE EXC SOLO mmol/L  --  -6 9   ABG SOURCE  Line, Arterial Line, Arterial     Results from last 7 days   Lab Units 02/20/23  0827 02/20/23  0341   PROCALCITONIN ng/ml 4 53* 4 92*       Micro  Results from last 7 days   Lab Units 02/22/23  1146 02/20/23  1305 02/20/23  0916 02/20/23  0634 02/20/23  0341   BLOOD CULTURE   --   --   --  No Growth After 5 Days  No Growth After 5 Days     GRAM STAIN RESULT  1+ Polys  No bacteria seen  --  No Polys or Bacteria seen  --   --    BODY FLUID CULTURE, STERILE  No growth  --  No growth  --   --    MRSA CULTURE ONLY   --  No Methicillin Resistant Staphlyococcus aureus (MRSA) isolated  --   --   --        EKG: ECG on arrival to ED revealed normal sinus rhythm, peaked T waves, nonischemic  Imaging:   VAS lower limb venous duplex study, complete bilateral   Final Result by Marya Tran MD (02/22 2303)      XR chest portable ICU   Final Result by Nolan White MD (02/20 1416)      No acute cardiopulmonary disease  Findings are stable               Workstation performed: [id number]         CT abdomen pelvis wo contrast   Final Result by Arron Tiwari MD (02/20 3180)      No acute pathology  Evaluation is limited in the absence of intravenous contrast enhancement, however metastatic disease appears essentially unchanged, and including dominant hepatic lesion, peritoneal carcinomatosis with moderate moderate ascites  Presumed metastatic    right lower lobe nodules also noted  Workstation performed: [id number]            I have personally reviewed pertinent reports  Intake and Output  I/O       02/19 0701  02/20 0700 02/20 0701  02/21 0700    P  O   180    I V  (mL/kg)  1981 3 (19 1)    IV Piggyback  2700    Total Intake(mL/kg)  4861 3 (46 7)    Urine (mL/kg/hr)  2380 (1)    Stool  0    Total Output  2380    Net  +2481 3          Unmeasured Stool Occurrence  1 x        Height and Weights   Height: 6' (182 9 cm)  IBW (Ideal Body Weight): 77 6 kg  Body mass index is 32 47 kg/m²  Weight (last 2 days)     Date/Time Weight    02/26/23 0600 109 (239 42)    02/25/23 0600 112 (245 81)            Nutrition       Diet Orders   (From admission, onward)             Start     Ordered    02/25/23 0910  Diet Regular; Regular House  Diet effective now        References:    Nutrtion Support Algorithm Enteral vs  Parenteral   Question Answer Comment   Diet Type Regular    Regular Regular House    RD to adjust diet per protocol?  Yes        02/25/23 3148                  Active Medications  Scheduled Meds:  Current Facility-Administered Medications   Medication Dose Route Frequency Provider Last Rate   • al mag oxide-diphenhydramine-lidocaine viscous  10 mL Swish & Swallow Q4H PRN Bryan Mendosa MD     • cefTRIAXone  2,000 mg Intravenous Q24H Bryan Mendosa MD Stopped (02/25/23 1101)   • chlorhexidine  15 mL Mouth/Throat Q12H [address] Renetta Munoz DO     • ergocalciferol  50,000 Units Oral Weekly Wallace Garcia DO     • fluconazole  200 mg Oral Daily Bryan Mendosa MD     • insulin lispro  1-6 Units Subcutaneous TID AC Wallace Garcia DO     • melatonin  6 mg Oral HS Renetta Munoz DO     • metroNIDAZOLE  500 mg Intravenous Q8H Renetta Munoz DO Stopped (02/26/23 0247)   • midodrine  15 mg Oral TID AC Bryan Mendosa MD     • JOYCE ANTIFUNGAL   Topical BID Mio Mo DO     • norepinephrine  1-30 mcg/min Intravenous Titrated Bryan Mendosa MD Stopped (02/24/23 7709)   • ondansetron  4 mg Intravenous Q8H PRN Renetta Munoz DO     • oxyCODONE  5 mg Oral Q4H PRN Bryan Mendosa MD     • pantoprazole  40 mg Oral Early Morning Renetta Munoz DO     • potassium-sodium phosphates  2 packet Oral BID With Meals Danita Knox DO     • saliva substitute  5 spray Mouth/Throat 4x Daily PRN Renetta Munoz DO     • sucralfate  1 g Oral 4x Daily (AC & HS) Bryan Mendosa MD       Continuous Infusions:  norepinephrine, 1-30 mcg/min, Last Rate: Stopped (02/24/23 1559)      PRN Meds:   al mag oxide-diphenhydramine-lidocaine viscous, 10 mL, Q4H PRN  ondansetron, 4 mg, Q8H PRN  oxyCODONE, 5 mg, Q4H PRN  saliva substitute, 5 spray, 4x Daily PRN        Allergies   Allergies   Allergen Reactions   • Penicillins Other (See Comments) and Rash     As a child had reaction not sure what it was       ---------------------------------------------------------------------------------------  Advance Directive and Living Will:      Power of :    POLST: ---------------------------------------------------------------------------------------  Care Time Delivered:   Upon my evaluation, this patient had a high probability of imminent or life-threatening deterioration, which required my direct attention, intervention, and personal management  I have personally provided 30 minutes of critical care time, exclusive of procedures, teaching, family meetings, and any prior time recorded by providers other than myself  Traci Santos DO      Portions of the record may have been created with voice recognition software  Occasional wrong word or "sound a like" substitutions may have occurred due to the inherent limitations of voice recognition software    Read the chart carefully and recognize, using context, where substitutions have occurred

## 2023-02-26 NOTE — PROGRESS NOTES
NEPHROLOGY PROGRESS NOTE   Lisa Ashby 47 y o  male MRN: [id number]  [facility]: [id number]  Reason for Consult: DENZEL hyponatremia    ASSESSMENT AND PLAN:  Patient is 45-year-old male with significant medical issues of metastatic cholangiocarcinoma with metastasis to liver, lung, bone, prior history of right tonsillar squamous cell carcinoma, status posttreatment, recurrent ascites requiring paracentesis, now presents with abdominal pain, nausea, poor p o  intake   We are consulted for DENZEL, hyperkalemia, acidosis management      Initial severe DENZEL POA, baseline serum creatinine 0 6-0 8  -DENZEL resolved   Initial peak creatinine 2 6 now improved and serum creatinine 0 9 yesterday BMP results to follow today   -Initial DENZEL suspect in the setting of severe hypotension, shock causing ischemic/hemodynamic insult, component of prerenal with poor p o  intake  -Was on ivosidenib and more recently on Pemigatinib, both can cause increase creatinine, less likely HRS given DENZEL improved with albumin challenge  -CT scan shows no hydro  -Patient had provided dialysis consent if needed which is in the chart, no need for dialysis  -Serum albumin 3 2, status post IV albumin challenge  -UA shows multiple 25-50 hyaline cast, no hematuria or proteinuria   Urine sodium less than 5, urine chloride less than 10 consistent with prerenal      Hyperkalemia, resolved  BMP results to follow today  -Suspected in the setting of DENZEL  -Strict low potassium diet recommended      Primary respiratory alkalosis  -Bicarb level overall stable 18 yesterday  BMP results to follow today  -Compensatory lower bicarb level   -Lactic acid 4 0 suspect in the setting of shock, hypotension and also liver disease     Initial hyponatremia  -Serum sodium 118 on admission initially rapidly improved requiring hypotonic IV fluid   -Now sodium level has improved and seems to be overall stable 134 yesterday    Pending BMP results  - Currently remains off IV fluid  -BMP in a m  Continue fluid restriction 1 2 L/day  Can have a salt liberal diet for the time being   -Urine sodium less than 5, urine was 506, serum osmolality 288     Shock,?  Concern for septic shock/hypovolemic component   -?  Exact source   -Blood culture negative so far   Acetic fluid culture negative   -Continue to remain off Levophed   Empiric antibiotic as per ICU team   -Echocardiogram this admission shows EF 65%, dilated IVC with blunted respiratory phasic changes   No pericardial effusion       Metastatic cholangiocarcinoma with metastasis to liver, lymph node, bone, lungs   -Closely follows with hematology  Recent MRI showing new thrombosis of splenic, superior mesenteric and portal vein   Anticoagulation as per ICU team   Peritoneal carcinomatosis with recurrent ascites requiring paracentesis      Vitamin D deficiency, agree with aggressive vitamin D supplements  Hypophosphatemia, currently on phosphorus supplements 2 packets p o  twice daily, to be discontinued today  Phosphorus level slowly improving 2 5 yesterday  Repeat phosphorus in a m      Thrombocytopenia, HIT less likely as per hematology  [address] slightly high at 284, likely secondary to consumptive coagulopathy    Discussed above plan in detail with ICU team and they agree with above recommendations  SUBJECTIVE:  Patient seen and examined at bedside  Denies nausea, worsening shortness of breath      OBJECTIVE:  Current Weight: Weight - Scale: 109 kg (239 lb 6 7 oz)  Vitals:    02/26/23 0600   BP: 92/60   Pulse: 76   Resp: 17   Temp:    SpO2: 99%       Intake/Output Summary (Last 24 hours) at 2/26/2023 0643  Last data filed at 2/26/2023 0247  Gross per 24 hour   Intake 800 63 ml   Output 75 ml   Net 725 63 ml     Wt Readings from Last 3 Encounters:   02/26/23 109 kg (239 lb 6 7 oz)   02/03/23 109 kg (240 lb 1 3 oz)   01/31/23 112 kg (246 lb)     Temp Readings from Last 3 Encounters:   02/26/23 98 °F (36 7 °C) (Oral)   02/03/23 (!) 97 1 °F (36 2 °C) (Temporal)   01/31/23 98 2 °F (36 8 °C) (Oral)     BP Readings from Last 3 Encounters:   02/26/23 92/60   02/16/23 102/62   02/13/23 97/55     Pulse Readings from Last 3 Encounters:   02/26/23 76   02/16/23 75   02/13/23 71        Physical Examination:  Eyes: Mild conjunctival pallor present  Neck: No obvious lymphadenopathy appreciated  Respiratory: Bilateral air entry present  CVS: Trace edema in legs  GI: Soft, mild distended  CNS: Alert oriented x3  Skin: No new rash  Musculoskeletal: No obvious new gross deformity noted    Medications:    Current Facility-Administered Medications:   •  al mag oxide-diphenhydramine-lidocaine viscous (MAGIC MOUTHWASH) suspension 10 mL, 10 mL, Swish & Swallow, Q4H PRN, Becky Vaca MD, 10 mL at 02/25/23 1651  •  cefTRIAXone (ROCEPHIN) 2,000 mg in dextrose 5 % 50 mL IVPB, 2,000 mg, Intravenous, Q24H, Becky Vaca MD, Stopped at 02/25/23 1101  •  chlorhexidine (PERIDEX) 0 12 % oral rinse 15 mL, 15 mL, Mouth/Throat, Q12H [address], Renetta Munoz DO, 15 mL at 02/25/23 2028  •  ergocalciferol (VITAMIN D2) capsule 50,000 Units, 50,000 Units, Oral, Weekly, Wallace Garcia DO, 50,000 Units at 02/23/23 1845  •  fluconazole (DIFLUCAN) tablet 200 mg, 200 mg, Oral, Daily, Becky Vaca MD, 200 mg at 02/25/23 1027  •  insulin lispro (HumaLOG) 100 units/mL subcutaneous injection 1-6 Units, 1-6 Units, Subcutaneous, TID AC, 1 Units at 02/25/23 1813 **AND** Fingerstick Glucose (POCT), , , TID AC, Wallace Garcia DO  •  melatonin tablet 6 mg, 6 mg, Oral, HS, Renetta Munoz DO, 6 mg at 02/25/23 2028  •  metroNIDAZOLE (FLAGYL) IVPB (premix) 500 mg 100 mL, 500 mg, Intravenous, Q8H, Renetta Munoz DO, Stopped at 02/26/23 0247  •  midodrine (PROAMATINE) tablet 15 mg, 15 mg, Oral, TID AC, Becky Vaca MD, 15 mg at 02/26/23 0603  •  moisture barrier miconazole 2% cream (aka JOYCE MOISTURE BARRIER ANTIFUNGAL CREAM), , Topical, BID, Renetta Munoz DO, Given at 02/25/23 1812  •  norepinephrine (LEVOPHED) 4 mg (STANDARD CONCENTRATION) IV in sodium chloride 0 9% 250 mL, 1-30 mcg/min, Intravenous, Titrated, Claudette Mancuso MD, Stopped at 02/24/23 1559  •  ondansetron (ZOFRAN) injection 4 mg, 4 mg, Intravenous, Q8H PRN, Renetta Munoz DO, 4 mg at 02/22/23 0006  •  oxyCODONE (ROXICODONE) IR tablet 5 mg, 5 mg, Oral, Q4H PRN, Claudette Mancuso MD  •  pantoprazole (PROTONIX) EC tablet 40 mg, 40 mg, Oral, Early Morning, Renetta Munoz DO, 40 mg at 02/26/23 0603  •  potassium-sodium phosphates (PHOS-NAK) packet 2 packet, 2 packet, Oral, BID With Meals, Benito Kelly DO, 2 packet at 02/25/23 1622  •  saliva substitute (MOUTH KOTE) mucosal solution 5 spray, 5 spray, Mouth/Throat, 4x Daily PRN, Renetta Munoz DO, 5 spray at 02/25/23 2227  •  sucralfate (CARAFATE) tablet 1 g, 1 g, Oral, 4x Daily (AC & HS), Claudette Mancuso MD, 1 g at 02/26/23 0603    Laboratory Results:  Results from last 7 days   Lab Units 02/25/23  0554 02/24/23  0435 02/23/23  1238 02/23/23  0436 02/22/23  1957 02/22/23  1824 02/22/23  1144 02/22/23  0558 02/22/23  0557 02/21/23  2358 02/21/23  0906 02/21/23  0432 02/20/23  2013 02/20/23  1548 02/20/23  1132 02/20/23  0827 02/20/23  0728 02/20/23  0341   WBC Thousand/uL 16 16* 20 95*  --  20 02*  20 02*  --   --  17 77*  --  16 95*  --   --  21 30*  --   --   --   --   --  31 18*   HEMOGLOBIN g/dL 11 6* 12 5  --  12 5  12 5  --   --  11 4*  --  11 6*  --   --  13 3  --  14 6  --   --   --  16 9   I STAT HEMOGLOBIN g/dl  --   --   --   --   --   --   --   --   --   --   --   --   --   --   --   --  16 7  --    HEMATOCRIT % 34 8* 36 4*  --  36 9  36 9  --   --  32 5*  --  32 9*  --   --  38 8  --  41 5  --   --   --  48 2   HEMATOCRIT, ISTAT %  --   --   --   --   --   --   --   --   --   --   --   --   --   --   --   --  49  --    PLATELETS Thousands/uL 72* 77*  --  71*  71* 69*  --  63*  --  60*  --   --  107*  --   --   --   --   --  136*   SODIUM mmol/L 134* 135 129* 129*  --  125*  --  124*  --  123*   < > 124*   < > 125*   < > 122*  --  118*   POTASSIUM mmol/L 4 0 3 8 4 3 3 8  --  3 4*  --  3 7  --  3 8   < > 4 2   < > 5 1   < > 6 3*  --  6 2*   CHLORIDE mmol/L 109* 108 105 103  --  100  --  98  --  95*   < > 95*   < > 99   < > 92*  --  91*   CO2 mmol/L 18* 18* 16* 18*  --  18*  --  19*  --  18*   < > 17*   < > 15*   < > 22  --  16*   CO2, I-STAT mmol/L  --   --   --   --   --   --   --   --   --   --   --   --   --   --   --   --  19*  --    BUN mg/dL 35* 27* 27* 28*  --  35*  --  41*  --  49*   < > 66*   < > 86*   < > 105*  --  107*   CREATININE mg/dL 0 92 0 55* 0 71 0 60  --  0 79  --  0 80  --  0 90   < > 1 31*   < > 1 76*   < > 2 60*  --  2 37*   CALCIUM mg/dL 7 2* 6 7* 7 1* 6 7*  --  6 7*  --  6 4*  --  6 4*   < > 6 2*   < > 6 7*   < > 7 1*  --  6 5*   MAGNESIUM mg/dL 2 9* 2 8*  --  2 9*  --   --   --   --  3 0*  --   --  3 6*  --   --   --  4 5*  --   --    PHOSPHORUS mg/dL 2 5* 1 5*  --  1 2*  --   --   --   --  2 3*  --   --  3 4  --   --   --  4 9*  --   --    GLUCOSE, ISTAT mg/dl  --   --   --   --   --   --   --   --   --   --   --   --   --   --   --   --  115  --     < > = values in this interval not displayed  VAS lower limb venous duplex study, complete bilateral   Final Result by Sedrick Rico MD (02/22 2309)      XR chest portable ICU   Final Result by Gian Guan MD (02/20 1416)      No acute cardiopulmonary disease  Findings are stable               Workstation performed: [id number]         CT abdomen pelvis wo contrast   Final Result by Maryalice Goltz, MD (02/20 0217)      No acute pathology  Evaluation is limited in the absence of intravenous contrast enhancement, however metastatic disease appears essentially unchanged, and including dominant hepatic lesion, peritoneal carcinomatosis with moderate moderate ascites  Presumed metastatic    right lower lobe nodules also noted                Workstation performed: [id number]             Portions of the record may have been created with voice recognition software  Occasional wrong word or "sound a like" substitutions may have occurred due to the inherent limitations of voice recognition software  Read the chart carefully and recognize, using context, where substitutions have occurred

## 2023-02-26 NOTE — PROGRESS NOTES
Follow up Consultation    Nephrology   Estela Ashby 47 y o  male MRN: [id number]  [facility]: [id number]      Physician Requesting Consult: Perfecto Garner        ASSESSMENT/PLAN:   67-year-old male with multiple comorbidities including metastatic cholangiocarcinoma with mets to the liver, lung, bone, prior history of right tonsillar squamous cell carcinoma with recurrent ascites requiring paracentesis admitted with abdominal pain nausea and decreased p o  intake  Nephrology consulted and following for acute kidney injury and hyperkalemia management  Acute kidney injury (POA):  DENZEL initial resolved  DENZEL multifactorial most likely secondary to prerenal azotemia since DENZEL improved with albumin challenge, now with second episode of acute kidney injury likely secondary to increased intra-abdominal pressures and ischemic injury from hypotension, status post paracentesis 6 6 L on 2/26/2023 + some component of cholemic nephrosis with elevations in bilirubin and some component of obstructive uropathy/urinary retention x2  After review of records [facility] as well as Care everywhere it appears that the patient has a baseline Creatinine of 0 6-0 8 mg/dL  patient was admitted with a creatinine of 2 37 mg/dL on 2/20/2023  patient's creatinine today is at 1 51 mg/dL, slightly elevated from yesterday  During this hospital stay patient had provided dialysis consent which is in the chart in case needed was taken per my prior colleagues  CT with no hydronephrosis  Recommend albumin 25 g IV every 6 x48 hours for albumin challenge hold diuretics  check BMP in a m  Await renal recovery  Optimize hemodynamic status to avoid delay in renal recovery  Place on a renal diet when allowed diet order  Avoid nephrotoxins, adjust meds to appropriate GFR  Strict I/O    Daily weights  Urinary retention protocol if patient does not have a Harvey  will need to set up patient for follow up with Nephrology as an outpatient post hospitalization  Overall poor prognosis    Blood pressure/hypotension:  current medications: Midodrine 15 mg p o  3 times daily  recommendations: Albumin 25 g IV every 6x8 doses  Optimize hemodynamics  Maintain MAP > 65mmHg  Avoid BP fluctuations  H/H/anemia:  most recent hemoglobin at 11 8 g/dL  maintain hemoglobin greater than 8 grams/deciliter    Acid-base electrolytes:    Hypocalcemia:    On ergocalciferol 50,000 units p o  q  Weekly  Corrected calcium 7 5  Recommend placing on calcitriol 1 mcg p o  daily  Check BMP in a m  and ionized calcium    Hyponatremia:   Most recent sodium at 132 mEq stable  Initial serum sodium on admission 118 mEq, rapidly corrected initially and subsequently requiring hypotonic IV fluids  Continue fluid restriction 1 2 L/day  Work-up that revealed urine sodium less than 5, urine osmolality 506 serum osmolality 288  Hypermagnesemia:   Most recent magnesium at 2 7  Hold further magnesium supplements for now    Acid-base:    Most recent bicarb at 17, slowly improving    Other medical problems:  Proteinuria: Most recent UA +1 for protein as of 2/25/2023  Recheck when stable  Metastatic cholangiocarcinoma:  Management per primary team   Recent MRI showing new thrombosis clinic, superior mesenteric and portal vein  Peritoneal carcinomatosis with recurrent ascites requiring paracentesis  Follow-up with hematology oncology  Patient with mets to liver, lymph node, bone, lungs  Prognosis recommend follow-up with palliative care for goals  Shock:  Management per primary team   Cultures negative so far empiric antibiotics per ICU team      Thanks for the consult  Will continue to follow  Please call with questions/ concerns    Above-mentioned orders and Plan in terms of initiating calcitriol 1 mcg p o  daily checking ionized calcium in a m  placing on albumin challenge for 48 hours was discussed with the team in depth with [place] text and they agree    Everardo Bhatti MD, FASN, 2023, 10:57 AM                  Objective :   Patient seen and examined in the ICU earlier this a m  remains afebrile hemodynamically blood pressures remain in the 14H to 99H systolic  Status post paracentesis yesterday with 6 6 L removed urine output 175 cc  PHYSICAL EXAM  BP (!) 81/45   Pulse 68   Temp 97 7 °F (36 5 °C) (Oral)   Resp (!) 36   Ht 6' (1 829 m)   Wt 109 kg (239 lb 6 7 oz)   SpO2 98%   BMI 32 47 kg/m²   Temp (24hrs), Av 8 °F (36 6 °C), Min:97 7 °F (36 5 °C), Max:98 °F (36 7 °C)        Intake/Output Summary (Last 24 hours) at 2023 1714  Last data filed at 2023 1600  Gross per 24 hour   Intake 886 79 ml   Output 6850 ml   Net -5963 21 ml       I/O last 24 hours: In: 1487 4 [P O :900; I V :87 4; IV Piggyback:500]  Out: 4477 [Urine:250; Other:6600]      Current Weight: Weight - Scale: 109 kg (239 lb 6 7 oz)  First Weight: Weight - Scale: 97 8 kg (215 lb 9 6 oz)  Physical Exam  Vitals and nursing note reviewed  Constitutional:       General: He is not in acute distress  Appearance: Normal appearance  He is normal weight  He is ill-appearing  He is not toxic-appearing or diaphoretic  HENT:      Head: Normocephalic and atraumatic  Mouth/Throat:      Mouth: Mucous membranes are moist       Pharynx: Oropharynx is clear  No oropharyngeal exudate  Eyes:      General: No scleral icterus  Conjunctiva/sclera: Conjunctivae normal    Cardiovascular:      Rate and Rhythm: Normal rate  Heart sounds: Normal heart sounds  No friction rub  Pulmonary:      Effort: No respiratory distress  Breath sounds: Normal breath sounds  No stridor  No wheezing  Abdominal:      General: There is no distension  Palpations: Abdomen is soft  There is no mass  Tenderness: There is no abdominal tenderness  Musculoskeletal:         General: Swelling present  Cervical back: Normal range of motion  No rigidity  Comments: +1 edema bilateral lower extremities   Skin:     General: Skin is warm  Coloration: Skin is not jaundiced  Neurological:      General: No focal deficit present  Mental Status: He is alert and oriented to person, place, and time  Psychiatric:         Mood and Affect: Mood normal          Behavior: Behavior normal              Review of Systems   Constitutional: Positive for appetite change and fatigue  Negative for chills  HENT: Negative for congestion  Respiratory: Negative for cough and shortness of breath  Cardiovascular: Positive for leg swelling  Gastrointestinal: Negative for abdominal pain, diarrhea and nausea  Musculoskeletal: Negative for back pain  Neurological: Negative for headaches  Psychiatric/Behavioral: Negative for agitation and confusion  All other systems reviewed and are negative        Scheduled Meds:  Current Facility-Administered Medications   Medication Dose Route Frequency Provider Last Rate   • al mag oxide-diphenhydramine-lidocaine viscous  10 mL Swish & Swallow Q4H PRN Camron Romero MD     • chlorhexidine  15 mL Swish & Spit Q12H [facility] Denice Carmona MD     • ergocalciferol  50,000 Units Oral Weekly Wallace Zelaya DO     • fluconazole  200 mg Oral Daily Camron Romero MD     • heparin (porcine)  3-30 Units/kg/hr (Order-Specific) Intravenous Titrated Stella Thapa MD 15 Units/kg/hr (02/26/23 1512)   • heparin (porcine)  4,400 Units Intravenous Q6H PRN Stella Thapa MD     • heparin (porcine)  8,800 Units Intravenous Q6H PRN Stella Thapa MD     • insulin lispro  1-6 Units Subcutaneous TID AC Wallace Garcia DO     • melatonin  6 mg Oral HS Renetta Munoz DO     • midodrine  15 mg Oral TID AC Camron Romero MD     • JOYCE ANTIFUNGAL   Topical BID Renetta Munoz DO     • ondansetron  4 mg Intravenous Q8H PRN Renetta Munoz DO     • oxyCODONE  5 mg Oral Q4H PRN Camron Romero MD     • pantoprazole  40 mg Oral Early Morning Renetta DO Tammy     • saliva substitute  5 spray Mouth/Throat 4x Daily PRN Renetta Munoz DO     • sucralfate  1 g Oral 4x Daily (AC & HS) Rene Villagran MD         PRN Meds: •  al mag oxide-diphenhydramine-lidocaine viscous  •  heparin (porcine)  •  heparin (porcine)  •  ondansetron  •  oxyCODONE  •  saliva substitute    Continuous Infusions:heparin (porcine), 3-30 Units/kg/hr (Order-Specific), Last Rate: 15 Units/kg/hr (02/26/23 1512)          Invasive Devices:      Invasive Devices     Central Venous Catheter Line  Duration           Port A Cath Right Subclavian -- days          Peripheral Intravenous Line  Duration           Peripheral IV 02/23/23 Right;Upper;Ventral (anterior) Arm 3 days          Arterial Line  Duration           Arterial Line 02/20/23 Radial 6 days                  LABORATORY:    Results from last 7 days   Lab Units 02/26/23  0558 02/25/23  0554 02/24/23  0435 02/23/23  1238 02/23/23  0436 02/22/23  1957 02/22/23  1824 02/22/23  1144 02/22/23  0558 02/22/23  0557 02/21/23  0906 02/21/23  0432 02/20/23  1132 02/20/23  0827 02/20/23  0728   WBC Thousand/uL 19 65* 16 16* 20 95*  --  20 02*  20 02*  --   --  17 77*  --  16 95*  --  21 30*  --   --   --    HEMOGLOBIN g/dL 13 0 11 6* 12 5  --  12 5  12 5  --   --  11 4*  --  11 6*  --  13 3   < >  --   --    I STAT HEMOGLOBIN g/dl  --   --   --   --   --   --   --   --   --   --   --   --   --   --  16 7   HEMATOCRIT % 36 9 34 8* 36 4*  --  36 9  36 9  --   --  32 5*  --  32 9*  --  38 8   < >  --   --    HEMATOCRIT, ISTAT %  --   --   --   --   --   --   --   --   --   --   --   --   --   --  49   PLATELETS Thousands/uL 75* 72* 77*  --  71*  71* 69*  --  63*  --  60*  --  107*  --   --   --    POTASSIUM mmol/L 4 1 4 0 3 8 4 3 3 8  --  3 4*  --  3 7  --    < > 4 2   < > 6 3*  --    CHLORIDE mmol/L 108 109* 108 105 103  --  100  --  98  --    < > 95*   < > 92*  --    CO2 mmol/L 16* 18* 18* 16* 18*  --  18*  --  19*  --    < > 17*   < > 22  -- CO2, I-STAT mmol/L  --   --   --   --   --   --   --   --   --   --   --   --   --   --  19*   BUN mg/dL 47* 35* 27* 27* 28*  --  35*  --  41*  --    < > 66*   < > 105*  --    CREATININE mg/dL 1 43* 0 92 0 55* 0 71 0 60  --  0 79  --  0 80  --    < > 1 31*   < > 2 60*  --    CALCIUM mg/dL 7 2* 7 2* 6 7* 7 1* 6 7*  --  6 7*  --  6 4*  --    < > 6 2*   < > 7 1*  --    MAGNESIUM mg/dL 2 7* 2 9* 2 8*  --  2 9*  --   --   --   --  3 0*  --  3 6*  --  4 5*  --    PHOSPHORUS mg/dL 2 4* 2 5* 1 5*  --  1 2*  --   --   --   --  2 3*  --  3 4  --  4 9*  --    GLUCOSE, ISTAT mg/dl  --   --   --   --   --   --   --   --   --   --   --   --   --   --  115    < > = values in this interval not displayed  rest all reviewed    RADIOLOGY:  VAS lower limb venous duplex study, complete bilateral   Final Result by Mac Campbell MD (02/22 6218)      XR chest portable ICU   Final Result by Jr Salinas MD (02/20 5350)      No acute cardiopulmonary disease  Findings are stable               Workstation performed: [id number]         CT abdomen pelvis wo contrast   Final Result by Toshia Mccullough MD (02/20 4611)      No acute pathology  Evaluation is limited in the absence of intravenous contrast enhancement, however metastatic disease appears essentially unchanged, and including dominant hepatic lesion, peritoneal carcinomatosis with moderate moderate ascites  Presumed metastatic    right lower lobe nodules also noted  Workstation performed: [id number]           Rest all reviewed    Portions of the record may have been created with voice recognition software  Occasional wrong word or "sound a like" substitutions may have occurred due to the inherent limitations of voice recognition software  Read the chart carefully and recognize, using context, where substitutions have occurred  If you have any questions, please contact the dictating provider

## 2023-02-26 NOTE — PROCEDURES
Paracentesis (Bedside)    Date/Time: 2/26/2023 3:59 PM  Performed by: Carmen Gonzales MD  Authorized by: Carmen Gonzales MD     Patient location:  Bedside  Other Assisting Provider: Yes (comment) (Dr Fabiana Morales)    Consent:     Consent obtained:  Written    Consent given by:  Patient    Risks discussed:  Bleeding, bowel perforation, infection and pain    Alternatives discussed:  No treatment, delayed treatment, observation, alternative treatment and referral  Universal protocol:     Procedure explained and questions answered to patient or proxy's satisfaction: yes      Relevant documents present and verified: yes      Test results available and properly labeled: yes      Radiology Images displayed and confirmed  If images not available, report reviewed: yes      Required blood products, implants, devices and special equipment available: yes      Site/side marked: yes      Immediately prior to procedure a time out was called: yes      Patient identity confirmed:  Arm band, hospital-assigned identification number and verbally with patient  Pre-procedure details:     Initial or Subsequent Exam:  Initial    Procedure purpose:  Therapeutic    Indications: abdominal discomfort secondary to ascites      Preparation: Patient was prepped and draped in usual sterile fashion    Anesthesia (see MAR for exact dosages): Anesthesia method:  Local infiltration    Local anesthetic:  Lidocaine 1% w/o epi  Procedure details:     Needle gauge: 8 [place]  Equipment: Paracentesis kit used      Ultrasound guidance: yes      Ultrasound image availability:  Images available in PACS    Sterile ultrasound techniques: Sterile gel and sterile probe covers were used      Approach:  Percutaneous    Puncture site:  L lower quadrant    Fluid removed amount:  6 6L    Fluid appearance:  Yellow    Dressing:  4x4 sterile gauze and adhesive bandage  Post-procedure details:     Patient tolerance of procedure:   Tolerated well, no immediate complications  Post-procedure medication (see MAR for dosing):      Albumin replacement: Yes

## 2023-02-27 PROBLEM — E87.20 METABOLIC ACIDOSIS: Status: ACTIVE | Noted: 2023-01-01

## 2023-02-27 PROBLEM — N17.9 AKI (ACUTE KIDNEY INJURY) (HCC): Status: ACTIVE | Noted: 2023-02-27

## 2023-02-27 PROBLEM — R79.1 ELEVATED INR: Status: ACTIVE | Noted: 2023-01-01

## 2023-02-27 PROBLEM — R73.9 HYPERGLYCEMIA: Status: ACTIVE | Noted: 2023-02-27

## 2023-02-27 PROBLEM — B37.0 OROPHARYNGEAL CANDIDIASIS: Status: ACTIVE | Noted: 2023-02-27

## 2023-02-27 PROBLEM — K55.069 MESENTERIC THROMBOSIS (HCC): Status: ACTIVE | Noted: 2023-01-01

## 2023-02-27 PROBLEM — K76.82 HEPATIC ENCEPHALOPATHY (HCC): Status: ACTIVE | Noted: 2023-01-01

## 2023-02-27 NOTE — ASSESSMENT & PLAN NOTE
? Patient with decreased PO intake, vomiting over the past 4-7d  ? Initial lactate   ? Severe DENZEL with azotemia and hyponatremia on admission consistent with hypovolemia   ? S/P 4 L of IV fluid resuscitation in the ED prior to admission  ? TTE shows EF 65% with normal wall motioin, no signficant valvular dysfunction  ? Blood cx without growth; SBP negative based on diastolic diagnostic paracentesis fluid analysis  ? Initially required pressure support with levo and vasopressin despite no identfiable source  ? S/p ceftriaxone and flagyl 7 days   ? 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 Per Nephro, albumin 25g q6 for 8 doses starting on 2/27  ? Remains off levo and other pressor support   ? Continue Midodrine 15mg TID  ?  MAP>60, increased from goal >55 due to concern for decreased UO

## 2023-02-27 NOTE — PROGRESS NOTES
CRITICAL CARE TRANSFER NOTE     Name: Evelyn Spann   Age & Sex: 47 y o  male   MRN: [id number]  Unit/Bed#: MICU 14   Encounter: [id number]  Hospital Stay Days: 7    Reason for ICU Admission: septic shock  Code Status: Level 1 - Full Code  Condition: fair  Disposition: Patient requires Med/Surg    Accepting Physician: Dr Naz Pearson  I discussed the case with Dr Naz Pearson of [facility] at [phone]  I reviewed the patient's ICU course, results of diagnostic tests, consults, and pending tests/consults  I answered all questions  ASSESSMENT/PLAN     Principal Problem:    Septic shock (HCC)  Active Problems:    Cholangiocarcinoma, stage IV (HCC)    Hyponatremia    GERD (gastroesophageal reflux disease)    Thrombocytopenia (HCC)    Vitamin D deficiency    Malignant ascites    Mesenteric thrombosis (HCC)    Oropharyngeal candidiasis    DENZEL (acute kidney injury) ([facility] [address] )    Metabolic acidosis    Hyperglycemia    Elevated INR    Hepatic encephalopathy    Hepatic encephalopathy  Assessment & Plan  ? Patient endorses fatigue and presented with mild confusion and slurred speech per wife  ? Ammonia elevated 63 on admission  ? In setting of stage IV cholangiocarcinoma with multiple metastases to the liver  Likely participated by DENZEL and hypovolemia/poor PO intake POA  Hyponatremia on admission possibly contributing  ? Continue monitoring  ? Can consider rifaxamine to be added on outpatient regimen if ammonia continues to remain elevated  ? Pain: oxycodone IR 5 mg q4 PRN    Elevated INR  Assessment & Plan  Likely 2/2 to progressive liver disease  INR 4 00 (2/27)  Trend INR    Hyperglycemia  Assessment & Plan  • Blood sugars at goal  • Continue SSI, algorithm 3, adjust as appropriate   • Goal blood glucose [phone]    Metabolic acidosis  Assessment & Plan  ?  Lactate elevated on admission 3 5 in setting of hypotension, lactate remained elevated at subsequent re-checks; persistant lactate elevations likely due to decreased hepatic metabolization setting of liver mets   ? S/p IVF resuscitation   ? Repeat ABG consistent with primary respiratory alkalosis; bicarb persistently and inappropriately low ~17-18     DENZEL (acute kidney injury) [facility])  Assessment & Plan  ? Cr 2 3 on arrival to ED, increased to 2 6 on admission, baseline serum creatinine around 0 55-0 9  ? Likely in setting of severe hypotension secondary to hypovolemia/poor p o  intake versus septic shock versus hepatorenal syndrome causing ischemic insult to the kidneys  ? Patient's anticancer medications including ivosidenib and pemigatibin so known to cause kidney injury, possibly contributing  ? Status post aggressive IV fluid resuscitation in the ED  ? Status post 75mg sodium bicarb infusion and D5W at 100 cc/h on admission  ? Cr 0 92 > 1 43 > 1 51  ? No UOP recorded overnight, bladder scan unable to visualize, bedside bladder US today to assess urinary retention    Oropharyngeal candidiasis  Assessment & Plan  ? 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 Treated with magic mouthwash at home  ? Noted to have oral thrush on exam, patient endorses intermittent odynophagia consistent with prior similar episodes  ? Continue prn magic mouthwash while inpatient  ?  Continue 7d course of fluconazole 200mg qd on day#5/7    Mesenteric thrombosis (HCC)  Assessment & Plan  - Most recent outpatient MRI imaging showing new thrombosis in portions of the splenic, superior mesenteric and main portal veins and a segment of right portal vein  - Likely cancer associated thrombosis in setting of stage IV cholangiocarcinoma  - Initially on heparin gtt but transitioned to argotrobran d/t concern for HIT in setting of thrombocytopenia and recent exposure to heparin products during prior admission at [facility] 1/2023  - HIT panel negative  - Eliquis not appropriate given patient meeds Child-Philip class C criteria based on LFT parameters and malignant ascites  - Heparin gtt     Malignant ascites  Assessment & Plan  ? 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 At baseline undergoes large volume paracentesis 2 times weekly  ? S/p bedside therapeutic paracentesis 2/22, 4  L clear yellow fluid removed via RLQ puncture site  - Fluid analysis negative for SBP, gram stain & culture negative  - S/p bedside paracentesis 2/26: 6 6L yellow fluid removed, gram stain and cultures pending    Vitamin D deficiency  Assessment & Plan  - Vitamin D 25hydroxy level low at 18 4, persistently low ionized calcium despite Ca repletion  - Continue weekly vitamin D2 supplemntation 50,000U    Thrombocytopenia (HCC)  Assessment & Plan  ? Likely multifactorial etiology given myelosuppression in setting of acute illness, splenic sequestration in setting of portal hypertension 2/2 several metastases to the liver 2/2 stage IV cholangiocarcinoma  ? Retake index low at 1 0 suggestive of hypoproliferation  ? Heme-onc consulted, appreciate recs  - hemolysis labs negatgive and DIC panel unremarkable; patient noted to have elevated INR on DIC panel while being on argatroban gtt 2/2 concerns for HIT; suspect false elevation in INR from argatroban   - HIT panel negative; though timing of worsening thrombocytopenia not consistent with HIT however patient has had exposure to heparin products during recent admission in January, 2023 at [address]  ? Trend CBC, Transfuse to maintain platelets over 70,812    GERD (gastroesophageal reflux disease)  Assessment & Plan  ? Known history  ? Continue Protonix 40mg PO, will consider switching to IV if cannot tolerate PO    Hyponatremia  Assessment & Plan  ? Sodium 118 on on arrival to ED  ? Na 134 > 133 > 132 (2/27)  ? Fluid restricted diet  ? S/P IV with NS, given 2g salt tab x1 on 2/23    Cholangiocarcinoma, stage IV [facility])  Assessment & Plan  ?  Diagnosed in 2020 s/p resection followed by immuno-chemo therapy with recurrence in 2022 now with mets to bone, peritoneum, lungs, and liver  ? Currently on second round of pemigatinib as an outpatient under the care of Dr Chilo Jones,  ? SBP ruled out based on diagnostic paracentesis fluid analysis  ? Paracentesis 2/26: 6 6L removed  ? May have underlying type I hepatorenal syndrome given severe DENZEL and hyponatremia on admission, liver hepatorenal syndrome is a diagnosis of exclusion, further work-up pending to r/o hypovolemic vs septic shock  ? Hold home pemigatibin in setting of severe DENZEL    * Septic shock [facility])  Assessment & Plan  ? Patient with decreased PO intake, vomiting over the past 4-7d  ? Initial lactate   ? Severe DENZEL with azotemia and hyponatremia on admission consistent with hypovolemia   ? S/P 4 L of IV fluid resuscitation in the ED prior to admission  ? TTE shows EF 65% with normal wall motioin, no signficant valvular dysfunction  ? Blood cx without growth; SBP negative based on diastolic diagnostic paracentesis fluid analysis  ? Initially required pressure support with levo and vasopressin despite no identfiable source  ? S/p ceftriaxone and flagyl 7 days   ? 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 Per Nephro, albumin 25g q6 for 8 doses starting on 2/27  ? Remains off levo and other pressor support   ? Continue Midodrine 15mg TID  ? 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 and partial glossectomy, stage IV cholangiocarcinoma (diagnosed in 2020 s/p resection followed by immuno-chemo therapy with recurrence in 2022 now with mets to bone, peritoneum, lungs, and liver, currently on second round of pemigatinib complicated by recurrent ascites requiring frequent large volume paracentesis twice a week, who presented to ED with concerns of 7 days of decreased appetite/po intake and worsening generalized fatigue with occasional eps of non-billous vomiting w/o overt s/s bleeding  Per wife, patient noted to be more confused from baseline, prompting ED evaluation   Patient follows with Dr Ludy Pereira, Oncology  Was recently on ivositinib and more recently on Pemigatibib       On arrival to ED,  VS notable for T36 7, BP80/50, pulse 71, 98% on RA  Patient noted to be acutely encephalopathic on initial exam with abdominal fluid wave  Work up showed leukocytosis on CBC, elevated K 6 2 in setting of DENZEL with Cr 2 37 (baseline   5- 9) and given bolus of NS  ECG revealing NSR with peaked T waves, non-ischemic  Patient was initiated on IV calcium gluconate  Repeat iSTAT potassium >8 > given additional IV calcium gluconate along with IV dextrose with regular insulin and additional 1L bolus NS  Initial Hs [address]  Sepsis workup iniated, started on IV vanc and IV cefepime  CT C/A/B without acute pathology and unchanged metastatic disease process including dominant hepatic lesion, peritoneal carcinomatosis with moderate moderate ascites; right lower lobe nodules also noted  Additionally patient underwent diagnostic paracentesis in the ED, though he was initially scheduled for his weekly therapeutic paracentesis today by IR as an outpatient      During hospital course, patient was weaned off pressors to oral midodrine  Initially his MAP goals were 60 but was unable to maintain them so it was decreased to 55; however with this new MAP goal is UOP decreased and his goal was raised back to 60   His septic workup was grossly negative; he completed a 7 day course of ceftriaxone and flagyl He has had paracentesis twice during his stay, drained for 4L clear fluid on 2/22 and 6 6L yellow fluid on 2/26  He has had a severe DENZEL with Cr at admission of 2 3 (baseline 0 6-0 9) which improved but for the past two days has slowly risen to 1 51  Nephro has seen him recommended albumin 25g q6 for 48 hours  He presented in severe hyponatremia on admission (sodium 118) which slowly resolved with NS IV and 2g salt tabs on 2/23  Lastly, he has oropharyngeal candidiasis as a consequence of his glossectomy 2/2 SCC of tongue  He is on fluconazole day 5/7  OBJECTIVE     Vitals:    02/27/23 0853 02/27/23 1000 02/27/23 1200 02/27/23 1400   BP:       BP Location:       Pulse: 90 74 76 74   Resp: (!) 23 17 (!) 31 18   Temp: 97 8 °F (36 6 °C)      TempSrc: Oral      SpO2: 96% 99% 97% 100%   Weight:       Height:         I/O last 24 hours: In: 1083 2 [P O :500; I V :333 2; IV Piggyback:250]  Out: 7810 [Urine:525; Other:6600]    LABORATORY DATA     Labs: I have personally reviewed pertinent reports  Results from last 7 days   Lab Units 02/27/23  0516 02/26/23  0558 02/25/23  0554 02/24/23  0435 02/23/23  0436 02/22/23  1957 02/22/23  1144   WBC Thousand/uL 15 32* 19 65* 16 16*   < > 20 02*  20 02*  --  17 77*   HEMOGLOBIN g/dL 11 8* 13 0 11 6*   < > 12 5  12 5  --  11 4*   HEMATOCRIT % 34 0* 36 9 34 8*   < > 36 9  36 9  --  32 5*   PLATELETS Thousands/uL 50* 75* 72*   < > 71*  71*   < > 63*   NEUTROS PCT % 78*  --   --   --  85*  --  86*   MONOS PCT % 10  --   --   --  9  --  8    < > = values in this interval not displayed        Results from last 7 days   Lab Units 02/27/23  0516 02/26/23  0558 02/25/23  0554   POTASSIUM mmol/L 4 0 4 1 4 0   CHLORIDE mmol/L 106 108 109*   CO2 mmol/L 17* 16* 18*   BUN mg/dL 53* 47* 35*   CREATININE mg/dL 1 51* 1 43* 0 92   CALCIUM mg/dL 6 9* 7 2* 7 2*   ALK PHOS U/L 118* 127* 113   ALT U/L 42 48 44   AST U/L 57* 59* 50*     Results from last 7 days   Lab Units 02/27/23  0516 02/26/23  0558 02/25/23  0554   MAGNESIUM mg/dL 2 7* 2 7* 2 9*     Results from last 7 days   Lab Units 02/27/23  0516 02/26/23  0558 02/25/23  0554   PHOSPHORUS mg/dL 3 1 2 4* 2 5*      Results from last 7 days   Lab Units 02/27/23  1308 02/27/23  0519 02/27/23  0516 02/26/23  2100 02/26/23  1321 02/26/23  0558 02/25/23  1621   INR   --   --  4 00*  --   --  4 56* 8 74*   PTT seconds 182* >210*  --  >210*   < > 83* 103*    < > = values in this interval not displayed  Results from last 7 days   Lab Units 02/21/23  0729   LACTIC ACID mmol/L 4 0*         Micro:  Lab Results   Component Value Date    BLOODCX No Growth After 5 Days  02/20/2023    BLOODCX No Growth After 5 Days  02/20/2023    BLOODCX No Growth After 5 Days  01/18/2023    BLOODCX No Growth After 5 Days  01/18/2023     IMAGING & DIAGNOSTIC TESTING     Imaging: I have personally reviewed pertinent reports  CT abdomen pelvis wo contrast    Result Date: 2/20/2023  Impression: No acute pathology  Evaluation is limited in the absence of intravenous contrast enhancement, however metastatic disease appears essentially unchanged, and including dominant hepatic lesion, peritoneal carcinomatosis with moderate moderate ascites  Presumed metastatic right lower lobe nodules also noted  Workstation performed: [id number]     XR chest portable ICU    Result Date: 2/20/2023  Impression: No acute cardiopulmonary disease  Findings are stable Workstation performed: [id number]     EKG, Pathology, and Other Studies: I have personally reviewed pertinent reports  ALLERGIES     Allergies   Allergen Reactions   • Penicillins Other (See Comments) and Rash     As a child had reaction not sure what it was          MEDICATIONS     Current Facility-Administered Medications   Medication Dose Route Frequency Provider Last Rate   • al mag oxide-diphenhydramine-lidocaine viscous  10 mL Swish & Swallow Q4H PRN Ramsey Harris MD     • Albumin 25%  25 g Intravenous Q6H Lucero Baker MD     • chlorhexidine  15 mL Swish & Spit Q12H [address] Denice Carmona MD     • ergocalciferol  50,000 Units Oral Weekly Wallace Zelaya DO     • fluconazole  200 mg Oral Daily Camron Romero MD     • heparin (porcine)  3-30 Units/kg/hr (Order-Specific) Intravenous Titrated Stella Thapa MD Stopped (02/27/23 1435)   • heparin (porcine)  4,400 Units Intravenous Q6H PRN Stella Thapa MD     • heparin (porcine)  8,800 Units Intravenous Q6H PRN Stella Thapa MD     • insulin lispro  1-6 Units Subcutaneous TID AC Wallace Garcia DO     • melatonin  6 mg Oral HS Renetta Munoz DO     • midodrine  15 mg Oral TID AC Camron Romero MD     • JOYCE ANTIFUNGAL   Topical BID Renetta Munoz DO     • ondansetron  4 mg Intravenous Q8H PRN Renetta Munoz DO     • oxyCODONE  5 mg Oral Q4H PRN Camron Romero MD     • pantoprazole  40 mg Oral Early Morning Renetta Munoz DO     • saliva substitute  5 spray Mouth/Throat 4x Daily PRN Renetta Munoz DO     • sucralfate  1 g Oral 4x Daily (AC & HS) Camron Romero MD       heparin (porcine), 3-30 Units/kg/hr (Order-Specific), Last Rate: Stopped (02/27/23 1435)      al mag oxide-diphenhydramine-lidocaine viscous, 10 mL, Q4H PRN  heparin (porcine), 4,400 Units, Q6H PRN  heparin (porcine), 8,800 Units, Q6H PRN  ondansetron, 4 mg, Q8H PRN  oxyCODONE, 5 mg, Q4H PRN  saliva substitute, 5 spray, 4x Daily PRN      ==  Daisy Foreman MD

## 2023-02-27 NOTE — ASSESSMENT & PLAN NOTE
Assessment   ? Cr 2 3 on arrival to ED, increased to 2 6 on admission, baseline serum creatinine around 0 55-0 9  ? Likely in setting of severe hypotension secondary to hypovolemia/poor p o  iversus hepatorenal syndrome causing ischemic insult to the kidneys  ? Cr 3 4 > 4 32 > 3 76  Plan  · Likely component of hepatorenal syndrome as well as hypovolemia  · Nephrology following, appreciate recs  · Continue to monitor I/O and avoid nephrotoxins     · MAP Goals > 60   · Goals of Care

## 2023-02-27 NOTE — ASSESSMENT & PLAN NOTE
Assessment:  · Hyperglycemia in setting of acute illness  Plan Blood sugars at goal  • Continue SSI, algorithm 3, adjust as appropriate   • Goal blood glucose 140-180

## 2023-02-27 NOTE — PROGRESS NOTES
Daily Progress Note - [facility] [employer] [facility] [place] 47 y o  male MRN: [id number]  Unit/Bed#: MICU 14 Encounter: [id number]        ----------------------------------------------------------------------------------------  HPI/24hr events: Cyndi Li a 47 y  o  male with past medical history of heavy nicotine dependence currently in remission, SCC of head/neck (tongue) diagnosed in 2018 s/p chemo and partial glossectomy, stage IV cholangiocarcinoma (diagnosed in 2020 s/p resection followed by immuno-chemo therapy with recurrence in 2022 now with mets  to bone, peritoneum, lungs, and liver, currently on second round of pemigatinib complicated by recurrent ascites requiring frequent large volume paracentesis twice a week, with removal of 4-5L of ascitic fluid on a weekly basis, who presented to ED this AM with concerns of 7 days of decreased appetite/po intake and worsening generalized fatigue with occasional eps of non-billous vomiting w/o overt s/s bleeding  Per wife, patient noted to be more confused from baseline, prompting ED evaluation   Patient follows with Dr Amrita Villa, Oncology  Was recently on ivositinib and more recently on Pemigatibib  Denies recent NSAID use  Admits to decrease urine output x3-4d  Denies recent ill contacts or long distance travel   Compliant with home meds including PRN Flexeril for and PRN oxy 5 for cancer/chemo induced pain, lasix 20mg prn for ascites, zofran/low dose dexamethason 4mg for nausea, and Protonix 40mg for GERD       On arrival to ED,  VS notable for T36 7, BP80/50, pulse 71, 98% on RA  Patient noted to be acutely encephalopathic on initial exam with abdominal fluid wave  Work up showed leukocytosis on CBC, elevated K 6 2 in setting of DENZEL with Cr 2 37 (baseline   5- 9) and ?givne bolus of NS  ECG revealing NSR with peaked T waves, non-ischemic  Patient was initiated on IV calcium gluconate   Repeat iSTAT potassium >8 --> given additional IV calcium gluconate along with IV dextrose with regular insulin and additional 1L bolus NS  Initial Hs Trops 17, repeat 2h/4h pending  Sepsis workup iniated, started on IV vanc and IV cefepime  CT C/A/B without acute pathology and unchanged metastatic disease process including dominant hepatic lesion, peritoneal carcinomatosis with moderate moderate ascites; right lower lobe nodules also noted  Additionally patient underwent diagnostic paracentesis in the ED, though he was initially scheduled for his weekly therapeutic paracentesis today by IR as an outpatient      Remained off pressors  Patient had low UOP requiring straight cath x 2  Per nurse, he is due to have his single IV replaced but they are unable to establish additional IV access  He still has his port     ---------------------------------------------------------------------------------------  SUBJECTIVE  Patient reports feeling well, states his a-line is starting to bother him  Otherwise no subjective fevers, chest pain, SOB, nausea or vomiting    Review of Systems   Constitutional: Negative for appetite change and fever  Respiratory: Negative for cough and shortness of breath  Cardiovascular: Positive for leg swelling  Negative for chest pain  Gastrointestinal: Negative for abdominal pain, nausea and vomiting  Genitourinary: Positive for decreased urine volume  Negative for dysuria  Skin: Negative for pallor and rash  Neurological: Negative for weakness and headaches  Psychiatric/Behavioral: Negative for behavioral problems and confusion  All other systems reviewed and are negative  Review of systems was reviewed and negative unless stated above in HPI/24-hour events   ---------------------------------------------------------------------------------------  Assessment and Plan:    Neuro:   • Diagnosis: Hepatic encephalopathy, resolved  ? Patient endorses fatigue and presented with mild confusion and slurred speech per wife  ?  Ammonia elevated 63 on admission  ? In setting of stage IV cholangiocarcinoma with multiple metastases to the liver  Likely participated by DENZEL and hypovolemia/poor PO intake POA  Hyponatremia on admission possibly contributing  ? Continue monitoring  ? Can consider rifaxamine to be added on outpatient regimen if ammonia continues to remain elevated  ? Pain: oxycodone IR 5 mg q4 PRN     CV:   • Diagnosis: Shock of uncertain origin  ? Patient with decreased PO intake, vomiting over the past 4-7d  ? Severe DENZEL with azotemia and hyponatremia on admission consistent with hypovolemia   ? S/P 4 L of IV fluid resuscitation in the ED prior to admission  ? TTE shows EF 65% with normal wall motioin, no signficant valvular dysfunction  ? Blood cx without growth; SBP negative based on diastolic diagnostic paracentesis fluid analysis  ? Initially required pressure support with levo and vasopressin despite no identfiable source   ? Likely intravascularly volume depleted in setting of malignant ascites requiring weekly large-volume paracentesis 2/2 stage IV cholangiocarcinoma with mets to the liver; type 1 HRS also possible and contributing   ? Consider albumin 25% 25g today in setting of rising Cr  ? Remains off levo and other pressor support   ? Continue Midodrine 15mg TID  ? MAP>60, increased from goal >55 due to concern for decreased UO         Pulm:   ? Diagnosis: Primary respiratory alkalosis  - Most recent ABG 7 42/25 3/94 6/16 1 (2/26)  - Bicarb low, no gap, consistent with inappropriate compensation      GI:   • Diagnosis: Stage IV cholangiocarcinoma cysts with mets metastasis to liver  ? Diagnosed in 2020 s/p resection followed by immuno-chemo therapy with recurrence in 2022 now with mets to bone, peritoneum, lungs, and liver  ? Currently on second round of pemigatinib as an outpatient under the care of Dr Anthony Zaidi,  ? SBP ruled out based on diagnostic paracentesis fluid analysis  ? Paracentesis 2/26: 6 6L removed  ?  May have underlying type I hepatorenal syndrome given severe DENZEL and hyponatremia on admission, liver hepatorenal syndrome is a diagnosis of exclusion, further work-up pending to r/o hypovolemic vs septic shock  ? Hold home pemigatibin in setting of severe DENZEL  • Diagnosis: Mesenteric thrombosis   - Most recent outpatient MRI imaging showing new thrombosis in portions of the splenic, superior mesenteric and main portal veins and a segment of right portal vein  - Likely cancer associated thrombosis in setting of stage IV cholangiocarcinoma  - Initially on heparin gtt but transitioned to argotrobran d/t concern for HIT in setting of thrombocytopenia and recent exposure to heparin products during prior admission at [facility] 1/2023  - HIT panel negative  - Eliquis not appropriate given patient meeds Child-Philip class C criteria based on LFT parameters and malignant ascites  - Heparin gtt   • Diagnosis: Malignant ascites  ? 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 S/p bedside therapeutic paracentesis 2/22, 4  L clear yellow fluid removed via RLQ puncture site  - Fluid analysis negative for SBP, gram stain & culture negative  - S/p bedside paracentesis 2/26: 6 6L yellow fluid removed, gram stain and cultures pending  • Diagnosis: GERD  ? Known history  ? Continue Protonix 40mg PO, will consider switching to IV if cannot tolerate PO  • Diagnosis: Oropharyngeal candidiasis  ? 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 Treated with magic mouthwash at home  ? Noted to have oral thrush on exam, patient endorses intermittent odynophagia consistent with prior similar episodes  ? Continue prn magic mouthwash while inpatient  ? Continue 7d course of fluconazole 200mg qd on day#5/7      :   • Diagnosis: Severe DENZEL, resolved  ? Cr 2 3 on arrival to ED, increased to 2 6 on admission, baseline serum creatinine around 0 55-0 9  ?  Likely in setting of severe hypotension secondary to hypovolemia/poor p o  intake versus septic shock versus hepatorenal syndrome causing ischemic insult to the kidneys  ? Patient's anticancer medications including ivosidenib and pemigatibin so known to cause kidney injury, possibly contributing  ? Status post aggressive IV fluid resuscitation in the ED  ? Status post 75mg sodium bicarb infusion and D5W at 100 cc/h on admission  ? Cr 0 92 > 1 43 > 1 51  ? No UOP recorded overnight, bladder scan unable to visualize, bedside bladder US today to assess urinary retention  • Diagnosis: Metabolic acidosis  ? Lactate elevated on admission 3 5 in setting of hypotension, lactate remained elevated at subsequent re-checks; persistant lactate elevations likely due to decreased hepatic metabolization setting of liver mets   ? S/p IVF resuscitation   ? Repeat ABG consistent with primary respiratory alkalosis; bicarb persistently and inappropriately low ~17-18   ? Will consider bicarb gtt  • Diagnosis: Severe hyponatremia, resolving  ? Sodium 118 on on arrival to ED  ? Na 134 > 133 > 132 today  ? S/P IV with NS, given 2g salt tab x1 on 2/23     F/E/N:   • F: none  • Electrolytes: replete K, phos, mag, calcium as indicated  - Replete Ca with Calcium gluconate 2 g  - PTH elevated, Vit D 25 low consistent with vitamin D defiency   • Nutrition: advanced to regular house diet, fluid rest 1 8L given on-going hyponatremia     Heme/Onc:   • Diagnosis: Thrombocytopenia  ? Likely multifactorial etiology given myelosuppression in setting of acute illness, splenic sequestration in setting of portal hypertension 2/2 several metastases to the liver 2/2 stage IV cholangiocarcinoma  ? Retake index low at 1 0 suggestive of hypoproliferation  ?  Heme-onc consulted, appreciate recs  - hemolysis labs negatgive and DIC panel unremarkable; patient noted to have elevated INR on DIC panel while being on argatroban gtt 2/2 concerns for HIT; suspect false elevation in INR from argatroban   - HIT panel negative; though timing of worsening thrombocytopenia not consistent with HIT however patient has had exposure to heparin products during recent admission in January, 2023 at [address]  ? Trend CBC, Transfuse to maintain platelets over 01,079  ? Hgb 13 0 > 11 8, Plt 75 > 50     Endo:   • Diagnosis: Vitamin D Deficiency   - Vitamin D 25hydroxy level low at 18 4, persistently low ionized calcium despite Ca repletion  - Continue weekly vitamin D2 supplemntation 50,000U  • Diagnosis: Hyperglycemia  • Blood sugars at goal  • Continue SSI, algorithm 3, adjust as appropriate   • Goal blood glucose 140-180     ID:   • Diagnosis: Sepsis/Septic shock, improving  ? SIRS criteria on admission with concerns for sepsis given hypotension requiring pressure support and evidence of end organ failure with severe DENZEL on admission  ? Lactate 3 5, procalc 4 9 on arrival  ? Unclear etiology; SBP ruled out; UA negative on admission; CXR negative for PNA; don't suspect cholangitis given clinical presentation and imaging findings;  intraabdominal bacterial translocation possible in setting of sepsis/hypotension  ? MRSA negative  ? Blood cultures without growth   ? S/p 3d of IV cefepime, de-escalated to IV ceftriaxone 2/22  ? S/p ceftriaxone and flagyll     MSK/Skin:   • Diagnosis: Pressure ulcer prophylaxis  • Topical miconazole cream  • Frequent turning and repositioning  Patient appropriate for transfer out of the ICU today?: Patient meets criteria for referral to the ICU Follow-up Clinic; referral has been made     Disposition: Transfer to Stepdown Level 2  Code Status: Level 1 - Full Code  ---------------------------------------------------------------------------------------  ICU CORE MEASURES    Prophylaxis   VTE Pharmacologic Prophylaxis: Heparin Drip  VTE Mechanical Prophylaxis: sequential compression device  Stress Ulcer Prophylaxis: Pantoprazole PO    ABCDE Protocol (if indicated)  Plan to perform spontaneous awakening trial today? Not applicable  Plan to perform spontaneous breathing trial today? Not applicable  Obvious barriers to extubation? Not applicable  CAM-ICU: Negative    Invasive Devices Review  Invasive Devices     Central Venous Catheter Line  Duration           Port A Cath Right Subclavian -- days          Peripheral Intravenous Line  Duration           Peripheral IV 23 Right;Upper;Ventral (anterior) Arm 3 days          Arterial Line  Duration           Arterial Line 23 Radial 6 days              Can any invasive devices be discontinued today? No  ---------------------------------------------------------------------------------------  OBJECTIVE    Vitals   Vitals:    23 0300 23 0400 23 0500 23 0600   BP: 91/53 (!) 86/53 (!) 82/54 (!) 78/55   BP Location:  Right arm     Pulse: 72 74 80 86   Resp: 14 (!) 25 14 21   Temp:  98 1 °F (36 7 °C)     TempSrc:  Oral     SpO2: 98% 97% 97% 96%   Weight:       Height:         Temp (24hrs), Av 7 °F (36 5 °C), Min:97 2 °F (36 2 °C), Max:98 1 °F (36 7 °C)  Current: Temperature: 98 1 °F (36 7 °C)  HR:   BP: 68-91/35-56  RR: 14-36  SpO2: %    Respiratory:  SpO2: SpO2: 96 %       Invasive/non-invasive ventilation settings   Respiratory    Lab Data (Last 4 hours)    None         O2/Vent Data (Last 4 hours)    None                Physical Exam  Vitals and nursing note reviewed  Constitutional:       General: He is not in acute distress  Appearance: Normal appearance  He is ill-appearing  He is not toxic-appearing or diaphoretic  HENT:      Head: Normocephalic and atraumatic  Right Ear: External ear normal       Left Ear: External ear normal       Nose: Nose normal    Cardiovascular:      Rate and Rhythm: Normal rate and regular rhythm  Pulses: Normal pulses  Heart sounds: No murmur heard  No friction rub  No gallop     Pulmonary:      Effort: Pulmonary effort is normal  No respiratory distress  Breath sounds: Normal breath sounds  No wheezing or rales  Abdominal:      General: There is distension  Tenderness: There is abdominal tenderness (RLQ tenderness)  There is no guarding  Musculoskeletal:      Right lower leg: Edema present  Left lower leg: Edema present  Skin:     General: Skin is warm and dry  Coloration: Skin is not jaundiced or pale  Neurological:      General: No focal deficit present  Mental Status: He is alert and oriented to person, place, and time  Sensory: No sensory deficit  Motor: No weakness  Psychiatric:         Mood and Affect: Mood normal          Behavior: Behavior normal          Thought Content:  Thought content normal          Judgment: Judgment normal                Laboratory and Diagnostics:  Results from last 7 days   Lab Units 02/27/23  0516 02/26/23  0558 02/25/23  0554 02/24/23  0435 02/23/23  0436 02/22/23  1957 02/22/23  1144 02/22/23  0557 02/21/23  0432   WBC Thousand/uL 15 32* 19 65* 16 16* 20 95* 20 02*  20 02*  --  17 77* 16 95* 21 30*   HEMOGLOBIN g/dL 11 8* 13 0 11 6* 12 5 12 5  12 5  --  11 4* 11 6* 13 3   HEMATOCRIT % 34 0* 36 9 34 8* 36 4* 36 9  36 9  --  32 5* 32 9* 38 8   PLATELETS Thousands/uL 50* 75* 72* 77* 71*  71* 69* 63* 60* 107*   NEUTROS PCT % 78*  --   --   --  85*  --  86* 85* 82*   MONOS PCT % 10  --   --   --  9  --  8 9 11     Results from last 7 days   Lab Units 02/27/23  0516 02/26/23  0558 02/25/23  0554 02/24/23  0435 02/23/23  1238 02/23/23  0436 02/22/23  1824 02/22/23  0558 02/22/23  0557 02/21/23  0906 02/21/23  0432   SODIUM mmol/L 132* 133* 134* 135 129* 129* 125*   < >  --    < > 124*   POTASSIUM mmol/L 4 0 4 1 4 0 3 8 4 3 3 8 3 4*   < >  --    < > 4 2   CHLORIDE mmol/L 106 108 109* 108 105 103 100   < >  --    < > 95*   CO2 mmol/L 17* 16* 18* 18* 16* 18* 18*   < >  --    < > 17*   ANION GAP mmol/L 9 9 7 9 8 8 7   < >  --    < > 12   BUN mg/dL 53* 47* 35* 27* 27* 28* 35*   < > --    < > 66*   CREATININE mg/dL 1 51* 1 43* 0 92 0 55* 0 71 0 60 0 79   < >  --    < > 1 31*   CALCIUM mg/dL 6 9* 7 2* 7 2* 6 7* 7 1* 6 7* 6 7*   < >  --    < > 6 2*   GLUCOSE RANDOM mg/dL 105 111 105 121 136 92 133   < >  --    < > 254*   ALT U/L 42 48 44 48  --  54  --   --  54  --  71   AST U/L 57* 59* 50* 52*  --  58*  --   --  65*  --  73*   ALK PHOS U/L 118* 127* 113 115  --  116  --   --  105  --  137*   ALBUMIN g/dL 3 2* 3 1* 3 2* 3 4*  --  3 1*  --   --  3 5  --  2 6*   TOTAL BILIRUBIN mg/dL 3 25* 2 95* 3 07* 2 67*  --  2 61*  --   --  3 03*  --  1 96*    < > = values in this interval not displayed  Results from last 7 days   Lab Units 02/27/23  0516 02/26/23  0558 02/25/23  0554 02/24/23  0435 02/23/23  0436 02/22/23  0557 02/21/23  0432   MAGNESIUM mg/dL 2 7* 2 7* 2 9* 2 8* 2 9* 3 0* 3 6*   PHOSPHORUS mg/dL 3 1 2 4* 2 5* 1 5* 1 2* 2 3* 3 4      Results from last 7 days   Lab Units 02/27/23  0516 02/26/23  2100 02/26/23  1321 02/26/23  0558 02/25/23  1621 02/25/23  0554 02/24/23  1806 02/24/23  1223 02/22/23  2150 02/22/23  1957 02/22/23  1656 02/20/23 2234 02/20/23  1548   INR  4 00*  --   --  4 56* 8 74*  --   --   --   --  5 99* 2 36*  --  1 66*   PTT seconds  --  >210* >210* 83* 103* 107* 83* 91*   < > 126* 65*   < > 38*    < > = values in this interval not displayed            Results from last 7 days   Lab Units 02/21/23  0729 02/21/23  0432 02/21/23  0022 02/20/23  2013 02/20/23  1548 02/20/23  1305 02/20/23  1132   LACTIC ACID mmol/L 4 0* 3 8* 3 1* 4 6* 3 5* 4 1* 3 3*     ABG:  Results from last 7 days   Lab Units 02/26/23  0559   PH ART  7 422   PCO2 ART mm Hg 25 3*   PO2 ART mm Hg 94 6   HCO3 ART mmol/L 16 1*   BASE EXC ART mmol/L -6 5   ABG SOURCE  Line, Arterial     VBG:  Results from last 7 days   Lab Units 02/26/23  0559 02/25/23  1158   PH SOLO   --  7 365   PCO2 SOLO mm Hg  --  30 9*   PO2 SOLO mm Hg  --  37 4   HCO3 SOLO mmol/L  --  17 3*   BASE EXC SOLO mmol/L  --  -6 9   ABG SOURCE Line, Arterial Line, Arterial     Results from last 7 days   Lab Units 02/20/23  0827   PROCALCITONIN ng/ml 4 53*       Micro  Results from last 7 days   Lab Units 02/22/23  1146 02/20/23  1305 02/20/23  0916   GRAM STAIN RESULT  1+ Polys  No bacteria seen  --  No Polys or Bacteria seen   BODY FLUID CULTURE, STERILE  No growth  --  No growth   MRSA CULTURE ONLY   --  No Methicillin Resistant Staphlyococcus aureus (MRSA) isolated  --          Imaging:  I have personally reviewed pertinent reports  Intake and Output  I/O       02/25 0701 02/26 0700 02/26 0701 02/27 0700 02/27 0701 02/28 0700    P  O  400 500     I V  (mL/kg) 0 6 (0) 333 2 (3 1)     IV Piggyback 400 150     Total Intake(mL/kg) 800 6 (7 3) 983 2 (9)     Urine (mL/kg/hr) 75 (0) 175 (0 1)     Other  6600     Stool 0 0     Total Output 75 6775     Net +725 6 -[phone] 8            Unmeasured Urine Occurrence 3 x      Unmeasured Stool Occurrence 3 x 1 x         UOP: 0 ml/hr     Height and Weights   Height: 6' (182 9 cm)  IBW (Ideal Body Weight): 77 6 kg  Body mass index is 32 47 kg/m²  Weight (last 2 days)     Date/Time Weight    02/26/23 0600 109 (239 42)    02/25/23 0600 112 (245 81)            Nutrition       Diet Orders   (From admission, onward)             Start     Ordered    02/25/23 0910  Diet Regular; Regular House  Diet effective now        References:    Nutrtion Support Algorithm Enteral vs  Parenteral   Question Answer Comment   Diet Type Regular    Regular Regular House    RD to adjust diet per protocol?  Yes        02/25/23 6295                    Active Medications  Scheduled Meds:  Current Facility-Administered Medications   Medication Dose Route Frequency Provider Last Rate   • al mag oxide-diphenhydramine-lidocaine viscous  10 mL Swish & Swallow Q4H PRN Eligio Leary MD     • chlorhexidine  15 mL Swish & Spit Q12H [facility] Shayy Villalobos MD     • ergocalciferol  50,000 Units Oral Weekly Wallace Garcia DO     • fluconazole  200 mg Oral Daily Didi Spencer MD     • heparin (porcine)  3-30 Units/kg/hr (Order-Specific) Intravenous Titrated Sherren Pipes, MD 12 Units/kg/hr (02/26/23 2324)   • heparin (porcine)  4,400 Units Intravenous Q6H PRN Sherren Pipes, MD     • heparin (porcine)  8,800 Units Intravenous Q6H PRN Sherren Pipes, MD     • insulin lispro  1-6 Units Subcutaneous TID AC Wallace Garcia DO     • melatonin  6 mg Oral HS Renetta Munoz DO     • midodrine  15 mg Oral TID AC Didi Spencer MD     • JOYCE ANTIFUNGAL   Topical BID Renetta Munoz DO     • ondansetron  4 mg Intravenous Q8H PRN Renetta Munoz DO     • oxyCODONE  5 mg Oral Q4H PRN Didi Spencer MD     • pantoprazole  40 mg Oral Early Morning Renetta Munoz DO     • saliva substitute  5 spray Mouth/Throat 4x Daily PRN Renetta Munoz DO     • sucralfate  1 g Oral 4x Daily (AC & HS) Didi Spencer MD       Continuous Infusions:  heparin (porcine), 3-30 Units/kg/hr (Order-Specific), Last Rate: 12 Units/kg/hr (02/26/23 2324)      PRN Meds:   al mag oxide-diphenhydramine-lidocaine viscous, 10 mL, Q4H PRN  heparin (porcine), 4,400 Units, Q6H PRN  heparin (porcine), 8,800 Units, Q6H PRN  ondansetron, 4 mg, Q8H PRN  oxyCODONE, 5 mg, Q4H PRN  saliva substitute, 5 spray, 4x Daily PRN        Allergies   Allergies   Allergen Reactions   • Penicillins Other (See Comments) and Rash     As a child had reaction not sure what it was       ---------------------------------------------------------------------------------------  Advance Directive and Living Will:      Power of :    POLST:    ---------------------------------------------------------------------------------------  Care Time Delivered:   No Critical Care time spent     Sol Lara MD      Portions of the record may have been created with voice recognition software    Occasional wrong word or "sound a like" substitutions may have occurred due to the inherent limitations of voice recognition software    Read the chart carefully and recognize, using context, where substitutions have occurred

## 2023-02-27 NOTE — ASSESSMENT & PLAN NOTE
Assessment:   ? Diagnosed in 2020 s/p resection followed by immuno-chemo therapy with recurrence in 2022 now with mets to bone, peritoneum, lungs, and liver  ? Currently on second round of pemigatinib as an outpatient under the care of Dr Belen Thomas  ? Tenckhoff Catheter in Place: 3/16 Drainage of 3 7L  ? May have underlying type I hepatorenal syndrome given severe DENZEL and hyponatremia on admission,   Plan:  ? Hold home pemigatibin  ? Follow-up Heme-Onc as outpatient  ?  Continued GOC discussion, Palliative Consultation Solaraze Counseling:  I discussed with the patient the risks of Solaraze including but not limited to erythema, scaling, itching, weeping, crusting, and pain.

## 2023-02-27 NOTE — ASSESSMENT & PLAN NOTE
Assessment   ? Likely multifactorial etiology given myelosuppression in setting of acute illness, splenic sequestration in setting of portal hypertension 2/2 several metastases to the liver 2/2 stage IV cholangiocarcinoma  Plan:  ? Trend CBC, Transfuse to maintain platelets over 46,027 or for active bleeding  ?  Goals of care discussion

## 2023-02-27 NOTE — ASSESSMENT & PLAN NOTE
Assessment:  - Most recent outpatient MRI imaging showing new thrombosis in portions of the splenic, superior mesenteric and main portal veins and a segment of right portal vein  - Likely cancer associated thrombosis in setting of stage IV cholangiocarcinoma  - Eliquis not appropriate given patient meeds Child-Philip class C criteria based on LFT parameters and malignant ascites  Plan  · Continue Lovenox 100mg SQ Q 12 hrs

## 2023-02-27 NOTE — ASSESSMENT & PLAN NOTE
? 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  At baseline undergoes large volume paracentesis 2 times weekly  - Negative cultures to this point  - Bhupendra PLACED 3/15

## 2023-02-27 NOTE — PLAN OF CARE
Problem: MOBILITY - ADULT  Goal: Maintain or return to baseline ADL function  Description: INTERVENTIONS:  -  Assess patient's ability to carry out ADLs; assess patient's baseline for ADL function and identify physical deficits which impact ability to perform ADLs (bathing, care of mouth/teeth, toileting, grooming, dressing, etc )  - Assess/evaluate cause of self-care deficits   - Assess range of motion  - Assess patient's mobility; develop plan if impaired  - Assess patient's need for assistive devices and provide as appropriate  - Encourage maximum independence but intervene and supervise when necessary  - Involve family in performance of ADLs  - Assess for home care needs following discharge   - Consider OT consult to assist with ADL evaluation and planning for discharge  - Provide patient education as appropriate  Outcome: Progressing  Goal: Maintains/Returns to pre admission functional level  Description: INTERVENTIONS:  - Perform BMAT or MOVE assessment daily    - Set and communicate daily mobility goal to care team and patient/family/caregiver  - Collaborate with rehabilitation services on mobility goals if consulted  - Perform Range of Motion 3 times a day  - Reposition patient every 2 hours    - Dangle patient 3 times a day  - Stand patient 3 times a day  - Ambulate patient 3 times a day  - Out of bed to chair 3 times a day   - Out of bed for meals 3 times a day  - Out of bed for toileting  - Record patient progress and toleration of activity level   Outcome: Progressing     Problem: Prexisting or High Potential for Compromised Skin Integrity  Goal: Skin integrity is maintained or improved  Description: INTERVENTIONS:  - Identify patients at risk for skin breakdown  - Assess and monitor skin integrity  - Assess and monitor nutrition and hydration status  - Monitor labs   - Assess for incontinence   - Turn and reposition patient  - Assist with mobility/ambulation  - Relieve pressure over bony prominences  - Avoid friction and shearing  - Provide appropriate hygiene as needed including keeping skin clean and dry  - Evaluate need for skin moisturizer/barrier cream  - Collaborate with interdisciplinary team   - Patient/family teaching  - Consider wound care consult   Outcome: Progressing     Problem: CARDIOVASCULAR - ADULT  Goal: Maintains optimal cardiac output and hemodynamic stability  Description: INTERVENTIONS:  - Monitor I/O, vital signs and rhythm  - Monitor for S/S and trends of decreased cardiac output  - Administer and titrate ordered vasoactive medications to optimize hemodynamic stability  - Assess quality of pulses, skin color and temperature  - Assess for signs of decreased coronary artery perfusion  - Instruct patient to report change in severity of symptoms  Outcome: Progressing  Goal: Absence of cardiac dysrhythmias or at baseline rhythm  Description: INTERVENTIONS:  - Continuous cardiac monitoring, vital signs, obtain 12 lead EKG if ordered  - Administer antiarrhythmic and heart rate control medications as ordered  - Monitor electrolytes and administer replacement therapy as ordered  Outcome: Progressing     Problem: GASTROINTESTINAL - ADULT  Goal: Minimal or absence of nausea and/or vomiting  Description: INTERVENTIONS:  - Administer IV fluids if ordered to ensure adequate hydration  - Maintain NPO status until nausea and vomiting are resolved  - Nasogastric tube if ordered  - Administer ordered antiemetic medications as needed  - Provide nonpharmacologic comfort measures as appropriate  - Advance diet as tolerated, if ordered  - Consider nutrition services referral to assist patient with adequate nutrition and appropriate food choices  Outcome: Progressing  Goal: Maintains or returns to baseline bowel function  Description: INTERVENTIONS:  - Assess bowel function  - Encourage oral fluids to ensure adequate hydration  - Administer IV fluids if ordered to ensure adequate hydration  - Administer ordered medications as needed  - Encourage mobilization and activity  - Consider nutritional services referral to assist patient with adequate nutrition and appropriate food choices  Outcome: Progressing  Goal: Maintains adequate nutritional intake  Description: INTERVENTIONS:  - Monitor percentage of each meal consumed  - Identify factors contributing to decreased intake, treat as appropriate  - Assist with meals as needed  - Monitor I&O, weight, and lab values if indicated  - Obtain nutrition services referral as needed  Outcome: Progressing  Goal: Oral mucous membranes remain intact  Description: INTERVENTIONS  - Assess oral mucosa and hygiene practices  - Implement preventative oral hygiene regimen  - Implement oral medicated treatments as ordered  - Initiate Nutrition services referral as needed  Outcome: Progressing     Problem: METABOLIC, FLUID AND ELECTROLYTES - ADULT  Goal: Electrolytes maintained within normal limits  Description: INTERVENTIONS:  - Monitor labs and assess patient for signs and symptoms of electrolyte imbalances  - Administer electrolyte replacement as ordered  - Monitor response to electrolyte replacements, including repeat lab results as appropriate  - Instruct patient on fluid and nutrition as appropriate  Outcome: Progressing  Goal: Fluid balance maintained  Description: INTERVENTIONS:  - Monitor labs   - Monitor I/O and WT  - Instruct patient on fluid and nutrition as appropriate  - Assess for signs & symptoms of volume excess or deficit  Outcome: Progressing  Goal: Glucose maintained within target range  Description: INTERVENTIONS:  - Monitor Blood Glucose as ordered  - Assess for signs and symptoms of hyperglycemia and hypoglycemia  - Administer ordered medications to maintain glucose within target range  - Assess nutritional intake and initiate nutrition service referral as needed  Outcome: Progressing     Problem: HEMATOLOGIC - ADULT  Goal: Maintains hematologic stability  Description: INTERVENTIONS  - Assess for signs and symptoms of bleeding or hemorrhage  - Monitor labs  - Administer supportive blood products/factors as ordered and appropriate  Outcome: Progressing     Problem: MUSCULOSKELETAL - ADULT  Goal: Maintain or return mobility to safest level of function  Description: INTERVENTIONS:  - Assess patient's ability to carry out ADLs; assess patient's baseline for ADL function and identify physical deficits which impact ability to perform ADLs (bathing, care of mouth/teeth, toileting, grooming, dressing, etc )  - Assess/evaluate cause of self-care deficits   - Assess range of motion  - Assess patient's mobility  - Assess patient's need for assistive devices and provide as appropriate  - Encourage maximum independence but intervene and supervise when necessary  - Involve family in performance of ADLs  - Assess for home care needs following discharge   - Consider OT consult to assist with ADL evaluation and planning for discharge  - Provide patient education as appropriate  Outcome: Progressing  Goal: Maintain proper alignment of affected body part  Description: INTERVENTIONS:  - Support, maintain and protect limb and body alignment  - Provide patient/ family with appropriate education  Outcome: Progressing

## 2023-02-27 NOTE — PROGRESS NOTES
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)   • Thrombocytopenia (HCC)   • Hypomagnesemia   • BMI 35 0-35 9,adult   • Vitamin D deficiency   • Malignant ascites   • Septic shock (HCC)     Active issues specifically addressed today include:   Cholangiocarcinoma  Goals of care  Pain management  Nausea/vomiting      Plan:  Plan:  1  Symptom management - patient continues to be doing well with current regimen              - Magic mouthwash/mouth kote prn              - Carafate 1 g q4   - protonix 40 mg PO daily               - Melatonin 6mg qHS              - Zofran 4mg q8h PRN              - Oxycodone 5mg q4h PRN              - Continue paracenteses PRN, last performed 2/27     2  Goals -               - Continue treatment focused care              - He wishes to follow up with Dr Xavier Rosario for Oncology care instead of Dr Ludy Pereira              - He wishes to continue IR paracentesis as needed due to the symptom benefit     3  Social Support              - Patient's wife continues to visit him regularly, and he feels well supported              - Patient has wife [facility] and 1 biological son  States he has two other nonbiological         children and is very involved in their life                - [facility]CPDG and father are still alive  [de-identified] and grandmother        Code Status: Level 1 Full Code   Decisional apparatus:  Patient is competent on my exam today    If competence is lost, patient's substitute decision maker would default to wife  by [place]  Advance Directive / Living Will / POLST:  On file    Interval history:       No acute events overnight  Patient seen and examined at bedside  He reports feeling well overall today  Pain and nausea are well controlled on current regimen  He is denying any subjective complaints at this time  MEDICATIONS / ALLERGIES:     all current active meds have been reviewed and current meds:   Current Facility-Administered Medications   Medication Dose Route Frequency   • al mag oxide-diphenhydramine-lidocaine viscous (MAGIC MOUTHWASH) suspension 10 mL  10 mL Swish & Swallow Q4H PRN   • chlorhexidine (PERIDEX) 0 12 % oral rinse 15 mL  15 mL Swish & Spit Q12H [facility]   • ergocalciferol (VITAMIN D2) capsule 50,000 Units  50,000 Units Oral Weekly   • fluconazole (DIFLUCAN) tablet 200 mg  200 mg Oral Daily   • heparin (porcine) 25,000 units in 0 45% NaCl 250 mL infusion (premix)  3-30 Units/kg/hr (Order-Specific) Intravenous Titrated   • heparin (porcine) injection 4,400 Units  4,400 Units Intravenous Q6H PRN   • heparin (porcine) injection 8,800 Units  8,800 Units Intravenous Q6H PRN   • insulin lispro (HumaLOG) 100 units/mL subcutaneous injection 1-6 Units  1-6 Units Subcutaneous TID AC   • melatonin tablet 6 mg  6 mg Oral HS   • midodrine (PROAMATINE) tablet 15 mg  15 mg Oral TID AC   • moisture barrier miconazole 2% cream (aka JOYCE MOISTURE BARRIER ANTIFUNGAL CREAM)   Topical BID   • ondansetron (ZOFRAN) injection 4 mg  4 mg Intravenous Q8H PRN   • oxyCODONE (ROXICODONE) IR tablet 5 mg  5 mg Oral Q4H PRN   • pantoprazole (PROTONIX) EC tablet 40 mg  40 mg Oral Early Morning   • saliva substitute (MOUTH KOTE) mucosal solution 5 spray  5 spray Mouth/Throat 4x Daily PRN   • sucralfate (CARAFATE) tablet 1 g  1 g Oral 4x Daily (AC & HS)       Allergies   Allergen Reactions   • Penicillins Other (See Comments) and Rash     As a child had reaction not sure what it was  OBJECTIVE:    Physical Exam  Physical Exam  Constitutional:       General: He is not in acute distress  Appearance: He is ill-appearing  He is not toxic-appearing or diaphoretic  HENT:      Head: Normocephalic and atraumatic  Eyes:      General: No scleral icterus  Extraocular Movements: Extraocular movements intact  Pulmonary:      Effort: No respiratory distress  Abdominal:      General: There is distension (mildly distended, s/p paracentesis on 2/26)  Skin:     Coloration: Skin is not cyanotic or jaundiced  Comments: Large bruise on left forearm at previous peripheral IV site     Neurological:      General: No focal deficit present  Mental Status: He is alert  Psychiatric:         Mood and Affect: Mood normal  Mood is not anxious  Behavior: Behavior normal          Lab Results:   I have personally reviewed pertinent labs  , CBC:   Lab Results   Component Value Date    WBC 15 32 (H) 02/27/2023    HGB 11 8 (L) 02/27/2023    HCT 34 0 (L) 02/27/2023    MCV 88 02/27/2023    PLT 50 (L) 02/27/2023    MCH 30 5 02/27/2023    MCHC 34 7 02/27/2023    RDW 15 5 (H) 02/27/2023    MPV 11 1 02/27/2023    NRBC 0 02/27/2023   , CMP:   Lab Results   Component Value Date    SODIUM 132 (L) 02/27/2023    K 4 0 02/27/2023     02/27/2023    CO2 17 (L) 02/27/2023    BUN 53 (H) 02/27/2023    CREATININE 1 51 (H) 02/27/2023    CALCIUM 6 9 (L) 02/27/2023    AST 57 (H) 02/27/2023    ALT 42 02/27/2023    ALKPHOS 118 (H) 02/27/2023    EGFR 52 02/27/2023     Imaging Studies: [address] [facility] 2/26 for paracentesis    EKG, Pathology, and Other Studies:     Counseling / Coordination of Care    Total floor / unit time spent today 30  minutes  Greater than 50% of total time was spent with the patient and / or family counseling and / or coordination of care   A description of the counseling / coordination of care: supportive listening

## 2023-02-27 NOTE — ASSESSMENT & PLAN NOTE
? 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 Treated with magic mouthwash at home  ? Noted to have oral thrush on exam, patient endorses intermittent odynophagia consistent with prior similar episodes  ? Continue prn magic mouthwash while inpatient  ?  Continue 7d course of fluconazole 200mg qd on day#5/7

## 2023-02-27 NOTE — ASSESSMENT & PLAN NOTE
Assessment:   ? Elevated Anion Gap Metabolic Acidosis in the setting of hypoperfusion and Lactic Acidosis  Plan  · Continue Bicarb drip until complete correction of acidosis, transition back to Bicarb PO

## 2023-02-27 NOTE — ASSESSMENT & PLAN NOTE
- Vitamin D 25hydroxy level low at 18 4, persistently low ionized calcium despite Ca repletion  - Continue weekly vitamin D2 supplemntation 50,000U

## 2023-02-27 NOTE — ASSESSMENT & PLAN NOTE
· Assessment: Patient endorses chronic fatigue and confusion   · Ammonia elevated 63 on admission  · In setting of stage IV cholangiocarcinoma with multiple metastases to the liver  Likely participated by DENZEL and hypovolemia/poor PO intake POA  · Plan :   · On Lactulose and Rifamixin  · Neuro Checks Every 4 hours   · Palliative Care Consultation   ?  Pain: oxycodone IR 5 mg q4 PRN- will transition to IV medications per Palliative 2/2 difficulty tolerating PO

## 2023-02-28 NOTE — PLAN OF CARE
Problem: OCCUPATIONAL THERAPY ADULT  Goal: Performs self-care activities at highest level of function for planned discharge setting  See evaluation for individualized goals  Description: Treatment Interventions: ADL retraining, Functional transfer training, UE strengthening/ROM, Endurance training, Patient/family training, Compensatory technique education, Continued evaluation, Energy conservation, Activityengagement          See flowsheet documentation for full assessment, interventions and recommendations  Outcome: Progressing  Note: Limitation: Decreased ADL status, Decreased UE strength, Decreased Safe judgement during ADL, Decreased endurance, Decreased self-care trans, Decreased high-level ADLs  Prognosis: Good  Assessment: Pt cooperative and willing to participate in OT treatment session on grooming, LB dressing, functional transfers, and functional mobility  Pt brushed teeth and washed face in bedside chair  Pt completed a sit>stand transfer to don underwear standing at bedside chair  Pt completed functional mobility functional household distances with RW  See flowsheet for (A) levels  From initial evaluation, pt showed improvement with sit><stand transfers from min (A) to (S)  Pt showed improvement with LB dressing from a min (A) to (S) during today's session  Performance deficits affecting pt at time of session are balance, endurance, standing tolerance, and fatigue  Pt would benefit from continued acute skilled OT treatment sessions to address deficits  Recommend discharge home once medically stable       OT Discharge Recommendation: No rehabilitation needs

## 2023-02-28 NOTE — OCCUPATIONAL THERAPY NOTE
Occupational Therapy Progress Note     Patient Name: Mami Toribio  VDCAI'T Date: 2/28/2023  Problem List  Principal Problem:    Septic shock ([facility] [address] )  Active Problems:    Cholangiocarcinoma, stage IV (HCC)    Hyponatremia    GERD (gastroesophageal reflux disease)    Thrombocytopenia (HCC)    Vitamin D deficiency    Malignant ascites    Mesenteric thrombosis (HCC)    Oropharyngeal candidiasis    DENZEL (acute kidney injury) ([facility] [address] )    Metabolic acidosis    Hyperglycemia    Elevated INR    Hepatic encephalopathy          02/28/23 1017   OT Last Visit   OT Visit Date 02/28/23   Note Type   Note Type Treatment   Pain Assessment   Pain Assessment Tool 0-10   Pain Score No Pain   Restrictions/Precautions   Weight Bearing Precautions Per Order No   Other Precautions Fall Risk;Multiple lines   Lifestyle   Autonomy I with ADLs, (I) with IADLs, (+) driving   Reciprocal Relationships supportive wife   Service to Others works full-time as a    Intrinsic Gratification spending time outside in the summer and caring for cat   ADL   Grooming Assistance 5  Supervision/Setup   Grooming Deficit Setup  (pt brushed teeth and washed face sitting in bedside chair)   LB Dressing Assistance 5  Supervision/Setup   LB Dressing Deficit Supervision/safety  (pt donned underwear standing at bedside chair)   Bed Mobility   Additional Comments pt in bedside chair upon arrival   Transfers   Sit to Stand 5  Supervision   Additional items Assist x 1; Increased time required;Verbal cues   Stand to Sit 5  Supervision   Additional items Assist x 1; Increased time required;Verbal cues   Additional Comments use of RW   Functional Mobility   Functional Mobility 4  Minimal assistance   Additional Comments Ax1, CGA, functional household distance   Additional items Rolling walker   Subjective   Subjective "I just put on new socks"   Cognition   Arousal/Participation Alert; Cooperative   Attention Within functional limits   Orientation Level Oriented X4 Memory Within functional limits   Following Commands Follows multistep commands with increased time or repetition   Comments Pt identified via name and   Pt pleasant and cooperative during OT treatment session  Activity Tolerance   Activity Tolerance Patient tolerated treatment well   Medical Staff Made Aware SAMI Saenz   Assessment   Assessment Pt cooperative and willing to participate in OT treatment session on grooming, LB dressing, functional transfers, and functional mobility  Pt brushed teeth and washed face in bedside chair  Pt completed a sit>stand transfer to don underwear standing at bedside chair  Pt completed functional mobility functional household distances with RW  See flowsheet for (A) levels  From initial evaluation, pt showed improvement with sit><stand transfers from min (A) to (S)  Pt showed improvement with LB dressing from a min (A) to (S) during today's session  Performance deficits affecting pt at time of session are balance, endurance, standing tolerance, and fatigue  Pt would benefit from continued acute skilled OT treatment sessions to address deficits  Recommend discharge home once medically stable  Plan   Treatment Interventions ADL retraining;Functional transfer training;UE strengthening/ROM; Endurance training;Patient/family training; Compensatory technique education;Continued evaluation; Energy conservation; Activityengagement   Goal Expiration Date 23   OT Treatment Day 1   OT Frequency 2-3x/wk   Recommendation   OT Discharge Recommendation No rehabilitation needs   AM-PAC Daily Activity Inpatient   Lower Body Dressing 3   Bathing 3   Toileting 3   Upper Body Dressing 4   Grooming 4   Eating 4   Daily Activity Raw Score 21   Daily Activity Standardized Score (Calc for Raw Score >=11) 44 27   AM-PAC Applied Cognition Inpatient   Following a Speech/Presentation 4   Understanding Ordinary Conversation 4   Taking Medications 3   Remembering Where Things Are Placed or Put Away 3   Remembering List of 4-5 Errands 3   Taking Care of Complicated Tasks 3   Applied Cognition Raw Score 20   Applied Cognition Standardized Score 41 76   End of Consult   Patient Position at End of Consult Bedside chair; All needs within reach   Nurse Communication Nurse aware of consult     The patient's raw score on the AM-PAC Daily Activity Inpatient Short Form is 21  A raw score of greater than or equal to 19 suggests the patient may benefit from discharge to home  Please refer to the recommendation of the Occupational Therapist for safe discharge planning       Mera Madera OTS

## 2023-02-28 NOTE — PROGRESS NOTES
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, lung, bone, right tonsillar squamous cell carcinoma prior, recurrent ascites requiring paracentesis, initially presents 1 week prior with abdominal pain, nausea, poor p o  intake  Nephrology is on board for acute kidney injury  1- acute kidney injury    - Baseline creatinine 0 6 2 8 mg/dL  - Creatinine on admission  2 3 mg/dL and peak creatinine 2 6 mg/dL  -Acute kidney injury etiology- hypotension/ATN possibly chemotherapy related (ivosidenib prior and pemigatinib prior)/prerenal etiology, subsequent second injury from increased abdominal pressures/hypotension/large-volume paracentesis/possible biliary nephropathy from bile cast?/Urinary retention  - CT scan without hydronephrosis  - Urinalysis 20-50 hyaline casts, no hematuria, no proteinuria  - Urine sodium less than 5  - Initially Harvey catheter was placed for monitoring urine output and possible retention  - Initially received volume expansion as there was concern for septic shock  - Dialysis consent was obtained by Dr Sis Laguerre on 2/20 but has not needed HD as of yet  - 2/27-was recommended albumin challenge and holding diuretics  - 2/28-creatinine stabilizing 1 5 mg/dL  Maintained on midodrine  Albumin continued today      Plan  - I/os; avoid nephrotoxic agents  - Continue midodrine due to hypotension  - Continue albumin today  - Check BMP and magnesium in a m   - Start low-dose calcitriol for this week  - Continue holding Lasix today  - Evaluation of ascites per primary team  - Check PVR today but may be difficult to interpret given ascite  - I have sent a message through [facility] text to the primary team for renal plan above and primary team attending is in agreement with the renal plan    2 -electrolytes    - Initially with hyperkalemia  Patient was not taking potassium supplements at home  Potassium levels have stabilized appropriately  - Initially received medical management for hyperkalemia    - Hyponatremia-initial sodium 118 with rapid improvement after volume expansion and therefore patient was changed to hypotonic fluids  - Sodium level has stabilized appropriately  - Sodium level slightly decreased 130 on 2/28  Monitoring for now  - Hypophosphatemia- on phosphorus supplements initially phosphorus has improved  - Phosphorus appropriate 2/28    - Hypermagnesemia-repeat magnesium level in a m     - Hypocalcemia-improving on 2/28    3-acid/base    - Initially with lactic acidosis and required volume expansion and bicarbonate based fluids  Also lactic acidosis likely component of liver disease   - Patient also has respiratory alkalosis  - Last pH was not low  Therefore will not start sodium bicarbonate tablets for now  4- hypotension    - Initially started on empiric antibiotics, volume expansion, albumin  - Echocardiogram- EF 65%, mild concentric hypertrophy, study quality was poor  - Source of shock unclear- cultures unrevealing so far  - Initially required vasopressors also  - Currently on midodrine    5- metastatic cholangiocarcinoma-    -MRI with thrombosis of splenic, superior mesenteric and portal vein  - Peritoneal carcinomatosis with recurrent ascites requiring paracentesis-Per primary team  - Last paracentesis on 2/26 with 6 6 L removed  - Thrombosis-management per primary team   On heparin drip  6- vitamin D deficiency- vitamin D supplement recommended  Was also advised for calcitriol 1 mcg daily    7-thrombocytopenia- Per hematology and primary team     Coagulopathy management per primary team    4-phaszpjrjxjsrf-smiet patient appears to be improved  Per prior notes, it does appear there was concern for possible hepatic encephalopathy  Management per primary team     9-oral candidiasis-Per primary team    10- volume- patient is developing a cellulitis on exam   Has lower extremity edema  SUBJECTIVE / 24H INTERVAL HISTORY:    Patient denies complaints with the exception of lower extremity pain at the feet  Denies shortness of breath  Blood pressures 100/59  Afebrile  On room air      OBJECTIVE:  Current Weight: Weight - Scale: 104 kg (228 lb 13 4 oz)  Vitals:    02/28/23 0139 02/28/23 0600 02/28/23 0629 02/28/23 0715   BP: 104/61  101/60 100/59   BP Location:       Pulse: 77  76 76   Resp:    12   Temp:    97 5 °F (36 4 °C)   TempSrc:       SpO2: 95%  97% 98%   Weight:  104 kg (228 lb 13 4 oz)     Height:           Intake/Output Summary (Last 24 hours) at 2/28/2023 1009  Last data filed at 2/28/2023 0601  Gross per 24 hour   Intake 255 44 ml   Output 550 ml   Net -294 56 ml     General: NAD  Skin: no rash  Eyes: anicteric sclera  ENT: moist mucous membrane  Neck: supple  Chest: CTA b/l, no ronchii, no wheeze, no rubs, no rales but diminished intake bases  CVS: s1s2, no murmur, no gallop, no rub  Abdomen: soft, distention, nl sounds, nontender  Extremities: 2+ edema LE b/l, erythema lower extremity bilaterally  : no davison  Neuro: AAOX3  Psych: normal affect    Medications:    Current Facility-Administered Medications:   •  al mag oxide-diphenhydramine-lidocaine viscous (MAGIC MOUTHWASH) suspension 10 mL, 10 mL, Swish & Swallow, Q4H PRN, Lalit Peterson MD, 10 mL at 02/26/23 1232  •  albumin human (FLEXBUMIN) 25 % injection 25 g, 25 g, Intravenous, Q6H, Lalit Peterson MD, 25 g at 02/28/23 0100  •  chlorhexidine (PERIDEX) 0 12 % oral rinse 15 mL, 15 mL, Swish & Spit, Q12H [address], Lalit Peterson MD, 15 mL at 02/28/23 0144  •  ergocalciferol (VITAMIN D2) capsule 50,000 Units, 50,000 Units, Oral, Weekly, Lalit Peterson MD, 50,000 Units at 02/23/23 3139  •  fluconazole (DIFLUCAN) tablet 200 mg, 200 mg, Oral, Daily, Lalit Peterson, MD, 200 mg at 02/27/23 0855  •  heparin (porcine) 25,000 units in 0 45% NaCl 250 mL infusion (premix), 3-30 Units/kg/hr (Order-Specific), Intravenous, Titrated, Zabrina Godfrey MD, Last Rate: 4 4 mL/hr at 02/27/23 2207, 4 Units/kg/hr at 02/27/23 2207  •  heparin (porcine) injection 4,400 Units, 4,400 Units, Intravenous, Q6H PRN, Zabrina Godfrey MD  •  heparin (porcine) injection 8,800 Units, 8,800 Units, Intravenous, Q6H PRN, Zabrina Godfrey MD  •  insulin lispro (HumaLOG) 100 units/mL subcutaneous injection 1-6 Units, 1-6 Units, Subcutaneous, TID AC, 1 Units at 02/27/23 1119 **AND** Fingerstick Glucose (POCT), , , TID AC, Zabrina Godfrey MD  •  melatonin tablet 6 mg, 6 mg, Oral, HS, Zabrina Godfrey MD, 6 mg at 02/27/23 2121  •  midodrine (PROAMATINE) tablet 15 mg, 15 mg, Oral, TID AC, Zabrina Godfrey MD, 15 mg at 02/28/23 3056  •  moisture barrier miconazole 2% cream (aka JOYCE MOISTURE BARRIER ANTIFUNGAL CREAM), , Topical, BID, Zabrina Godfrey MD, Given at 02/27/23 0855  •  ondansetron (ZOFRAN) injection 4 mg, 4 mg, Intravenous, Q8H PRN, Zabrina Godfrey MD, 4 mg at 02/22/23 0006  •  oxyCODONE (ROXICODONE) IR tablet 5 mg, 5 mg, Oral, Q4H PRN, Zabrina Godfrey MD  •  pantoprazole (PROTONIX) EC tablet 40 mg, 40 mg, Oral, Early Morning, Zabrina Godfrey MD, 40 mg at 02/28/23 0540  •  saliva substitute (MOUTH KOTE) mucosal solution 5 spray, 5 spray, Mouth/Throat, 4x Daily PRN, Zabrina Godfrey MD, 5 spray at 02/25/23 2227  •  sucralfate (CARAFATE) tablet 1 g, 1 g, Oral, 4x Daily (AC & HS), Zabrina Godfrey MD, 1 g at 02/28/23 7041    Laboratory Results:  Results from last 7 days   Lab Units 02/28/23  0428 02/28/23  0000 02/27/23  0516 02/26/23  0558 02/25/23  0554 02/24/23  0435 02/23/23  1238 02/23/23  0436 02/22/23  1957 02/22/23  1824 02/22/23  1144 02/22/23  0558 02/22/23  0557   WBC Thousand/uL  --  13 30* 15 32* 19 65* 16 16* 20 95*  --  20 02*  20 02*  --   --  17 77*  --  16 95* HEMOGLOBIN g/dL  --  11 4* 11 8* 13 0 11 6* 12 5  --  12 5  12 5  --   --  11 4*  --  11 6*   HEMATOCRIT %  --  32 7* 34 0* 36 9 34 8* 36 4*  --  36 9  36 9  --   --  32 5*  --  32 9*   PLATELETS Thousands/uL  --  46* 50* 75* 72* 77*  --  71*  71* 69*  --  63*  --  60*   POTASSIUM mmol/L 4 1  --  4 0 4 1 4 0 3 8 4 3 3 8  --    < >  --    < >  --    CHLORIDE mmol/L 103  --  106 108 109* 108 105 103  --    < >  --    < >  --    CO2 mmol/L 17*  --  17* 16* 18* 18* 16* 18*  --    < >  --    < >  --    BUN mg/dL 58*  --  53* 47* 35* 27* 27* 28*  --    < >  --    < >  --    CREATININE mg/dL 1 54*  --  1 51* 1 43* 0 92 0 55* 0 71 0 60  --    < >  --    < >  --    CALCIUM mg/dL 7 4*  --  6 9* 7 2* 7 2* 6 7* 7 1* 6 7*  --    < >  --    < >  --    MAGNESIUM mg/dL 2 8*  --  2 7* 2 7* 2 9* 2 8*  --  2 9*  --   --   --   --  3 0*   PHOSPHORUS mg/dL 2 7  --  3 1 2 4* 2 5* 1 5*  --  1 2*  --   --   --   --  2 3*    < > = values in this interval not displayed

## 2023-02-28 NOTE — PROGRESS NOTES
[address] [facility]  Progress Note Trista Ashby 1970, 47 y o  male MRN: [id number]  Unit/Bed#: [facility] 813-01 Encounter: [id number]  Primary Care Provider: Daniel Hernandez MD   Date and time admitted to hospital: 2/20/2023  3:04 AM    * Septic shock [facility])  Assessment & Plan  Present on admission  Shock resolved  Monitor closely    Hepatic encephalopathy  Assessment & Plan  Monitor    DENZEL (acute kidney injury) ([facility] )  Assessment & Plan  Monitor kidney function closely  Avoid nephrotoxins  Nephrology following    Oropharyngeal candidiasis  Assessment & Plan  Fluconazole to complete 7-day course    Mesenteric thrombosis ([facility] )  Assessment & Plan  Continue IV heparin  Oncology notes for oral anticoagulation recommendations noted  Monitor INR      Malignant ascites  Assessment & Plan  S/p paracentesis  Monitor    Thrombocytopenia (HCC)  Assessment & Plan  Multifactorial  Oncology inputs noted  Monitor counts    GERD (gastroesophageal reflux disease)  Assessment & Plan  Continue PPI    Hyponatremia  Assessment & Plan  Multifactorial  Monitor closely  Nephrology following    Cholangiocarcinoma, stage IV ([facility] )  Assessment & Plan  Oncology inputs noted  Plans for outpatient oncology follow-up noted              VTE Pharmacologic Prophylaxis: VTE Score: 8 High Risk (Score >/= 5) - Pharmacological DVT Prophylaxis Ordered: heparin drip  Sequential Compression Devices Ordered  Patient Centered Rounds: I performed bedside rounds with nursing staff today  Discussions with Specialists or Other Care Team Provider:     Education and Discussions with Family / Patient: Discussed with the patient, updated family questions answered       Total Time Spent on Date of Encounter in care of patient: 35 minutes This time was spent on one or more of the following: performing physical exam; counseling and coordination of care; obtaining or reviewing history; documenting in the medical record; reviewing/ordering tests, medications or procedures; communicating with other healthcare professionals and discussing with patient's family/caregivers  Current Length of Stay: 8 day(s)  Current Patient Status: Inpatient   Certification Statement: The patient will continue to require additional inpatient hospital stay due to As outlined  Discharge Plan: Awaiting clinical and symptomatic improvement    Code Status: Level 1 - Full Code    Subjective:     Sitting up in chair  Reports feeling fatigue  Abdominal distention  Reports lower extremity swelling  History chart/medications reviewed    Objective:     Vitals:   Temp (24hrs), Av 4 °F (36 3 °C), Min:97 2 °F (36 2 °C), Max:97 5 °F (36 4 °C)    Temp:  [97 2 °F (36 2 °C)-97 5 °F (36 4 °C)] 97 5 °F (36 4 °C)  HR:  [76-85] 80  Resp:  [12-21] 12  BP: ()/(48-61) 97/59  SpO2:  [95 %-98 %] 97 %  Body mass index is 31 04 kg/m²  Input and Output Summary (last 24 hours):      Intake/Output Summary (Last 24 hours) at 2023 1718  Last data filed at 2023 1501  Gross per 24 hour   Intake 105 44 ml   Output 475 ml   Net -369 56 ml       Physical Exam:   Physical Exam       Comfortably sitting up in chair  Features of protein calorie malnutrition noted  Neck supple  Lungs diminished breath sounds bilateral  Heart sounds S1 and S2 noted  Abdomen soft nontender  Awake obey simple commands  Edema noted  No rash    Additional Data:     Labs:  Results from last 7 days   Lab Units 23  0000   WBC Thousand/uL 13 30*   HEMOGLOBIN g/dL 11 4*   HEMATOCRIT % 32 7*   PLATELETS Thousands/uL 46*   NEUTROS PCT % 80*   LYMPHS PCT % 8*   MONOS PCT % 9   EOS PCT % 2     Results from last 7 days   Lab Units 23  0428   SODIUM mmol/L 130*   POTASSIUM mmol/L 4 1   CHLORIDE mmol/L 103   CO2 mmol/L 17*   BUN mg/dL 58*   CREATININE mg/dL 1 54*   ANION GAP mmol/L 10   CALCIUM mg/dL 7 4*   ALBUMIN g/dL 3 4*   TOTAL BILIRUBIN mg/dL 3 95*   ALK PHOS U/L 115   ALT U/L 39   AST U/L 62*   GLUCOSE RANDOM mg/dL 111     Results from last 7 days   Lab Units 02/28/23  0439   INR  3 64*     Results from last 7 days   Lab Units 02/28/23  1613 02/28/23  1054 02/28/23  0716 02/27/23  2034 02/27/23  1601 02/27/23  1118 02/26/23  1710 02/26/23  1230 02/26/23  0736 02/25/23  1722 02/25/23  1133 02/25/23  0612   POC GLUCOSE mg/dl 142* 108 106 129 135 166* 149* 164* 85 160* 169* 89               Lines/Drains:  Invasive Devices     Central Venous Catheter Line  Duration           Port A Cath Right Subclavian -- days          Peripheral Intravenous Line  Duration           Peripheral IV 02/27/23 Left Antecubital <1 day                Central Line:  Goal for removal: Cholangiocarcinoma             Imaging: Reviewed radiology reports from this admission including: abdominal/pelvic CT and ultrasound(s)    Recent Cultures (last 7 days):   Results from last 7 days   Lab Units 02/22/23  1146   GRAM STAIN RESULT  1+ Polys  No bacteria seen   BODY FLUID CULTURE, STERILE  No growth       Last 24 Hours Medication List:   Current Facility-Administered Medications   Medication Dose Route Frequency Provider Last Rate   • al mag oxide-diphenhydramine-lidocaine viscous  10 mL Swish & Swallow Q4H PRN Vicenta Cruz MD     • Albumin 25%  25 g Intravenous Q6H Nayely Shaw MD     • calcitriol  0 25 mcg Oral Daily Nayely Shaw MD     • chlorhexidine  15 mL Swish & Spit Q12H [address] Vicenta Cruz MD     • ergocalciferol  50,000 Units Oral Weekly Vicenta Cruz MD     • fluconazole  200 mg Oral Daily Vicenta Cruz MD     • heparin (porcine)  3-30 Units/kg/hr (Order-Specific) Intravenous Titrated Vicenta Cruz MD 4 Units/kg/hr (02/27/23 2207)   • heparin (porcine)  4,400 Units Intravenous Q6H PRN Vicenta Cruz MD     • heparin (porcine)  8,800 Units Intravenous Q6H PRN Vicenta Cruz MD     • insulin lispro  1-6 Units Subcutaneous TID AC Vicenta Cruz MD     • melatonin  6 mg Oral HS Reema Lockwood MD     • midodrine  15 mg Oral TID AC Reema Lockwood MD     • JOYCE ANTIFUNGAL   Topical BID Reema Lockwood MD     • ondansetron  4 mg Intravenous Q8H PRN Reema Lockwood MD     • oxyCODONE  5 mg Oral Q4H PRN Reema Lockwood MD     • pantoprazole  40 mg Oral Early Morning Reema Lockwood MD     • saliva substitute  5 spray Mouth/Throat 4x Daily PRN Reema Lockwood MD     • sucralfate  1 g Oral 4x Daily (AC & HS) Reema Lockwood MD          Today, Patient Was Seen By: Nilam Teixeira MD    **Please Note: This note may have been constructed using a voice recognition system  **

## 2023-02-28 NOTE — PROGRESS NOTES
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)   • Thrombocytopenia (HCC)   • Hypomagnesemia   • BMI 35 0-35 9,adult   • Vitamin D deficiency   • Malignant ascites   • Septic shock (HCC)   • Mesenteric thrombosis (HCC)   • Oropharyngeal candidiasis   • DENZEL (acute kidney injury) ([facility] )   • Metabolic acidosis   • Hyperglycemia   • Elevated INR   • Hepatic encephalopathy     Active issues specifically addressed today include:   Cholangiocarcinoma  Goals of care  Pain management  Nausea/vomiting    Plan:  1  Symptom management - patient continues to be doing well with current regimen              - Magic mouthwash/mouth kote prn              - Carafate 1 g q4              - protonix 40 mg PO daily               - Melatonin 6mg qHS              - Zofran 4mg q8h PRN              - Oxycodone 5mg q4h PRN              - Continue paracenteses PRN, last performed 2/27     2  Goals -               - Continue treatment focused care              - He wishes to follow up with Dr Luis Garber for Oncology care instead of Dr Grazyna Malik              - He wishes to continue IR paracentesis as needed due to the symptom benefit     3   Social Support              - Patient's wife continues to visit him regularly, and he feels well supported              - Patient has 1 biological son and two other nonbiological children and is very involved in their life                - KOFDRD and father are still alive                  24h OME: 0      Code Status: Full - Level 1  Decisional apparatus:  Patient is competent on my exam today  If competence is lost, patient's substitute decision maker would default to wife by [place]  Advance Directive / Living Will / POLST:  No ACP documents on file      Interval history:   No acute events overnight  Patient continues to endorse feeling well overall  He feels better s/p paracentesis on 2/26 but anticipates he will need another soon as ascites typically reaccumulates within a few days  He states nausea is under control with current regimen and he has been able to tolerate PO intake  He states his legs and feet are edematous today and he has neuropathic pain in his feet 2/2 chemotherapy regimen  He does not currently take any medications for neuropathic pain and does not wish to start at this time  He remains treatment-focused and plans to follow-up outpatient with his medical team  He did not have any requests from the palliative team today       MEDICATIONS / ALLERGIES:     all current active meds have been reviewed and current meds:   Current Facility-Administered Medications   Medication Dose Route Frequency   • al mag oxide-diphenhydramine-lidocaine viscous (MAGIC MOUTHWASH) suspension 10 mL  10 mL Swish & Swallow Q4H PRN   • albumin human (FLEXBUMIN) 25 % injection 25 g  25 g Intravenous Q6H   • chlorhexidine (PERIDEX) 0 12 % oral rinse 15 mL  15 mL Swish & Spit Q12H [address]   • ergocalciferol (VITAMIN D2) capsule 50,000 Units  50,000 Units Oral Weekly   • fluconazole (DIFLUCAN) tablet 200 mg  200 mg Oral Daily   • heparin (porcine) 25,000 units in 0 45% NaCl 250 mL infusion (premix)  3-30 Units/kg/hr (Order-Specific) Intravenous Titrated   • heparin (porcine) injection 4,400 Units  4,400 Units Intravenous Q6H PRN   • heparin (porcine) injection 8,800 Units  8,800 Units Intravenous Q6H PRN   • insulin lispro (HumaLOG) 100 units/mL subcutaneous injection 1-6 Units  1-6 Units Subcutaneous TID AC   • melatonin tablet 6 mg  6 mg Oral HS   • midodrine (PROAMATINE) tablet 15 mg  15 mg Oral TID AC   • moisture barrier miconazole 2% cream (aka JOYCE MOISTURE BARRIER ANTIFUNGAL CREAM)   Topical BID   • ondansetron (ZOFRAN) injection 4 mg  4 mg Intravenous Q8H PRN   • oxyCODONE (ROXICODONE) IR tablet 5 mg  5 mg Oral Q4H PRN   • pantoprazole (PROTONIX) EC tablet 40 mg  40 mg Oral Early Morning   • saliva substitute (MOUTH KOTE) mucosal solution 5 spray  5 spray Mouth/Throat 4x Daily PRN   • sucralfate (CARAFATE) tablet 1 g  1 g Oral 4x Daily (AC & HS)       Allergies   Allergen Reactions   • Penicillins Other (See Comments) and Rash     As a child had reaction not sure what it was  OBJECTIVE:    Physical Exam  Physical Exam  Constitutional:       General: He is not in acute distress  Appearance: He is ill-appearing  He is not toxic-appearing or diaphoretic  Comments: Body mass index is 31 04 kg/m²  HENT:      Head: Normocephalic and atraumatic  Eyes:      General: No scleral icterus  Extraocular Movements: Extraocular movements intact  Pulmonary:      Effort: Pulmonary effort is normal  No respiratory distress  Abdominal:      General: There is distension  Musculoskeletal:      Right lower leg: Edema (2+ pitting bilaterally up to mid-thigh) present  Left lower leg: Edema present  Skin:     General: Skin is warm and dry  Coloration: Skin is not jaundiced or pale  Neurological:      Mental Status: He is alert  Lab Results:   I have personally reviewed pertinent labs  , CBC:   Lab Results   Component Value Date    WBC 13 30 (H) 02/28/2023    HGB 11 4 (L) 02/28/2023    HCT 32 7 (L) 02/28/2023    MCV 88 02/28/2023    PLT 46 (LL) 02/28/2023    MCH 30 6 02/28/2023    MCHC 34 9 02/28/2023    RDW 15 7 (H) 02/28/2023    MPV 11 5 02/28/2023    NRBC 0 02/28/2023   , CMP:   Lab Results   Component Value Date    SODIUM 130 (L) 02/28/2023    K 4 1 02/28/2023     02/28/2023    CO2 17 (L) 02/28/2023    BUN 58 (H) 02/28/2023    CREATININE 1 54 (H) 02/28/2023    CALCIUM 7 4 (L) 02/28/2023    AST 62 (H) 02/28/2023    ALT 39 02/28/2023    ALKPHOS 115 02/28/2023    EGFR 51 02/28/2023     Imaging Studies: No new imaging over past 24 hours  EKG, Pathology, and Other Studies: No new studies over past 24 hours    Counseling / Coordination of Care    Total floor / unit time spent today 30 minutes  Greater than 50% of total time was spent with the patient and / or family counseling and / or coordination of care  A description of the counseling / coordination of care: symptom assessment and management and supportive listening

## 2023-03-01 NOTE — UTILIZATION REVIEW
Continued Stay Review    Date: 3/1/23                          Current Patient Class: inpatient  Current Level of Care: med surg    HPI:52 y o  male initially admitted on 2/20/23 Septic Shock    Assessment/Plan:  C/o fatigue, abdominal distention, lower extremity swelling  Diminished breath sounds BL and edema noted  Continue to monitor encephalopathy  Fluconazole to complete 7-day course dt Oropharyngeal candidiasis  Monitor INR dt mesenteric thrombosis, continue heparin drip  Monitor ascites, thrombocytopenia  Monitor electrolytes and replete prn  Vital Signs:   Date/Time Temp Pulse Resp BP MAP (mmHg) Arterial Line BP MAP SpO2 O2 Device   03/01/23 07:11:13 97 5 °F (36 4 °C) 79 12 96/59 71 -- -- 99 % --   02/28/23 22:21:21 97 3 °F (36 3 °C) Abnormal  -- 16 96/59 71 -- -- -- --   02/28/23 14:36:54 97 5 °F (36 4 °C) 80 12 97/59 72 -- -- 97 % --   02/28/23 07:15:40 97 5 °F (36 4 °C) 76 12 100/59 73 -- -- 98 % --   02/28/23 06:29:04 -- 76 -- 101/60 74 -- -- 97 % --   02/28/23 01:39:32 -- 77 -- 104/61 75 -- -- 95 % --   02/27/23 22:16:17 97 3 °F (36 3 °C) Abnormal  83 16 81/48 Abnormal  59 Abnormal   -- -- 98 % --   MAP (mmHg): A  Della HUFF made aware, will monitor   at 02/27/23 2216   02/27/23 19:06:15 97 2 °F (36 2 °C) Abnormal  85 14 82/53 Abnormal  63 Abnormal  -- -- 97 % --   02/27/23 1758 -- 84 21 86/60 Abnormal  66 -- -- 97 % --   02/27/23 1600 -- 86 20 82/54 Abnormal  61 Abnormal  -- -- 97 % --   02/27/23 1400 -- 74 18 -- -- 96/46 64 mmHg Abnormal  100 % None (Room air)   02/27/23 1200 -- 76 31 Abnormal  -- -- 98/50 66 mmHg 97 % --   02/27/23 1000 -- 74 17 -- -- 100/46 64 mmHg Abnormal  99 % None (Room air)   02/27/23 0853 97 8 °F (36 6 °C) 90 23 Abnormal  -- -- 104/56 72 mmHg 96 % None (Room air)   02/27/23 0600 -- 86 21 78/55 Abnormal  73 100/56 72 mmHg 96 % --   02/27/23 0500 -- 80 14 82/54 Abnormal  59 Abnormal  100/54 70 mmHg 97 % --   02/27/23 0400 98 1 °F (36 7 °C) 74 25 Abnormal  86/53 Abnormal  60 Abnormal  98/44 62 mmHg Abnormal  97 % None (Room air)   02/27/23 0300 -- 72 14 91/53 63 Abnormal  94/42 60 mmHg Abnormal  98 % --   02/27/23 0200 -- 74 21 83/51 Abnormal  61 Abnormal  96/42 58 mmHg Abnormal  100 % --   02/27/23 0100 -- 72 24 Abnormal  81/51 Abnormal  59 Abnormal  90/48 64 mmHg Abnormal  100 % --   02/27/23 0000 97 6 °F (36 4 °C) 72 23 Abnormal  80/47 Abnormal  58 Abnormal  100/48 64 mmHg Abnormal  100 % None (Room air)       Pertinent Labs/Diagnostic Results:       Results from last 7 days   Lab Units 03/01/23  0547 02/28/23  0000 02/27/23  0516 02/26/23  0558 02/25/23  0554   WBC Thousand/uL 14 63* 13 30* 15 32* 19 65* 16 16*   HEMOGLOBIN g/dL 12 2 11 4* 11 8* 13 0 11 6*   HEMATOCRIT % 36 9 32 7* 34 0* 36 9 34 8*   PLATELETS Thousands/uL 50* 46* 50* 75* 72*   NEUTROS ABS Thousands/µL 11 91* 10 66* 11 89*  --   --      Results from last 7 days   Lab Units 02/22/23  1957   RETIC CT ABS  49,500   RETIC CT PCT % 1 25     Results from last 7 days   Lab Units 03/01/23  0547 02/28/23  0430 02/28/23  0428 02/27/23  0516 02/26/23  0558 02/25/23  0554 02/24/23  0435   SODIUM mmol/L 130*  --  130* 132* 133* 134* 135   POTASSIUM mmol/L 4 4  --  4 1 4 0 4 1 4 0 3 8   CHLORIDE mmol/L 102  --  103 106 108 109* 108   CO2 mmol/L 18*  --  17* 17* 16* 18* 18*   ANION GAP mmol/L 10  --  10 9 9 7 9   BUN mg/dL 62*  --  58* 53* 47* 35* 27*   CREATININE mg/dL 1 50*  --  1 54* 1 51* 1 43* 0 92 0 55*   EGFR ml/min/1 73sq m 53  --  51 52 56 95 120   CALCIUM mg/dL 7 8*  --  7 4* 6 9* 7 2* 7 2* 6 7*   CALCIUM, IONIZED mmol/L  --  0 99*  --  0 96* 0 98* 0 99* 0 95*   MAGNESIUM mg/dL 3 0*  --  2 8* 2 7* 2 7* 2 9* 2 8*   PHOSPHORUS mg/dL  --   --  2 7 3 1 2 4* 2 5* 1 5*     Results from last 7 days   Lab Units 03/01/23  0547 02/28/23  0428 02/27/23  0516 02/26/23  0558 02/25/23  0554 02/24/23  0435 02/23/23  0436   AST U/L 76* 62* 57* 59* 50*   < > 58*   ALT U/L 41 39 42 48 44   < > 54   ALK PHOS U/L 129* 115 118* 127* 113   < > 116   TOTAL PROTEIN g/dL 5 3* 5 0* 4 5* 4 7* 4 4*   < > 4 8*   ALBUMIN g/dL 3 8 3 4* 3 2* 3 1* 3 2*   < > 3 1*   TOTAL BILIRUBIN mg/dL 5 01* 3 95* 3 25* 2 95* 3 07*   < > 2 61*   BILIRUBIN DIRECT mg/dL  --   --   --   --   --   --  1 26*    < > = values in this interval not displayed  Results from last 7 days   Lab Units 03/01/23  0710 02/28/23  2106 02/28/23  1613 02/28/23  1054 02/28/23  0716 02/27/23  2034 02/27/23  1601 02/27/23  1118 02/26/23  1710 02/26/23  1230 02/26/23  0736 02/25/23  1722   POC GLUCOSE mg/dl 123 131 142* 108 106 129 135 166* 149* 164* 85 160*     Results from last 7 days   Lab Units 03/01/23  0547 02/28/23  0428 02/27/23  0516 02/26/23  0558 02/25/23  0554 02/24/23  0435 02/23/23  1238 02/23/23  0436 02/22/23  1824   GLUCOSE RANDOM mg/dL 116 111 105 111 105 121 136 92 133      Results from last 7 days   Lab Units 02/26/23  0559 02/25/23  1158   PH ART  7 422 7 430   PCO2 ART mm Hg 25 3* 22 5*   PO2 ART mm Hg 94 6 91 3   HCO3 ART mmol/L 16 1* 14 6*   BASE EXC ART mmol/L -6 5 -7 7   O2 CONTENT ART mL/dL 18 7 17 7   O2 HGB, ARTERIAL % 96 5 96 1   ABG SOURCE  Line, Arterial Line, Arterial     Results from last 7 days   Lab Units 02/25/23  1158   PH SOLO  7 365   PCO2 SOLO mm Hg 30 9*   PO2 SOLO mm Hg 37 4   HCO3 SOLO mmol/L 17 3*   BASE EXC SOLO mmol/L -6 9   O2 CONTENT SOLO ml/dL 13 1   O2 HGB, VENOUS % 71 8     Results from last 7 days   Lab Units 02/22/23  1957   D-DIMER QUANTITATIVE ug/ml FEU 4 25*     Results from last 7 days   Lab Units 03/01/23  0547 02/28/23  1549 02/28/23  0945 02/28/23  0439 02/27/23  0519 02/27/23  0516   PROTIME seconds 30 2*  --   --  36 5*  --  39 3*   INR  2 86*  --   --  3 64*  --  4 00*   PTT seconds 72* 79* 87*  --    < >  --     < > = values in this interval not displayed       Results from last 7 days   Lab Units 02/25/23  2242   CLARITY UA  Turbid   COLOR UA  [place]   SPEC GRAV UA  1 022   PH UA  5 0   GLUCOSE UA mg/dl Negative   KETONES UA mg/dl Trace*   BLOOD UA  Large*   PROTEIN UA mg/dl 50 (1+)*   NITRITE UA  Negative   BILIRUBIN UA  Small*   UROBILINOGEN UA (BE) mg/dl <2 0   LEUKOCYTES UA  Moderate*   WBC UA /hpf 30-50*   RBC UA /hpf Innumerable*   BACTERIA UA /hpf None Seen   EPITHELIAL CELLS WET PREP /hpf Occasional   MUCUS THREADS  Occasional*     Results from last 7 days   Lab Units 02/22/23  1146   GRAM STAIN RESULT  1+ Polys  No bacteria seen   BODY FLUID CULTURE, STERILE  No growth     Results from last 7 days   Lab Units 02/22/23 1957 02/22/23  1144   TOTAL COUNTED  100 10   WBC FLUID /ul  --  78     Medications:   Scheduled Medications: Albumin 25%, 25 g, Intravenous, Q6H  calcitriol, 0 25 mcg, Oral, Daily  chlorhexidine, 15 mL, Swish & Spit, Q12H [address]  ergocalciferol, 50,000 Units, Oral, Weekly  fluconazole, 200 mg, Oral, Daily  insulin lispro, 1-6 Units, Subcutaneous, TID AC  melatonin, 6 mg, Oral, HS  midodrine, 15 mg, Oral, TID AC  JOYCE ANTIFUNGAL, , Topical, BID  pantoprazole, 40 mg, Oral, Early Morning  sucralfate, 1 g, Oral, 4x Daily (AC & HS)      Continuous IV Infusions:  heparin (porcine), 3-30 Units/kg/hr (Order-Specific), Intravenous, Titrated      PRN Meds:  al mag oxide-diphenhydramine-lidocaine viscous, 10 mL, Swish & Swallow, Q4H PRN  heparin (porcine), 4,400 Units, Intravenous, Q6H PRN  heparin (porcine), 8,800 Units, Intravenous, Q6H PRN  ondansetron, 4 mg, Intravenous, Q8H PRN  oxyCODONE, 5 mg, Oral, Q4H PRN  saliva substitute, 5 spray, Mouth/Throat, 4x Daily PRN        Discharge Plan: d    Network Utilization Review Department  ATTENTION: Please call with any questions or concerns to [phone] and carefully listen to the prompts so that you are directed to the right person  All voicemails are confidential   Rosa Wolf all requests for admission clinical reviews, approved or denied determinations and any other requests to dedicated fax number below belonging to the campus where the patient is receiving treatment   List of dedicated fax numbers for the Facilities:  [address] [facility] [address] DENIALS (Administrative/Medical Necessity) [phone]   [address] (Maternity/NICU/Pediatrics) [phone]   [address] Rama King [phone]   Yanci Mcnamara 77 [phone]   [address] [place] [address] [facility] [phone]   [address] [facility] Christina Ayala [facility] 134 815 [facility] [phone]

## 2023-03-01 NOTE — QUICK NOTE
3/1/2023 10:22 AM -  Mendoza Ashby's chart and case were reviewed by Teddy Buchanan DO  Mode of review included electronic chart check  Per review, symptoms remain controlled on current regimen and no changes are made at this time  Please continue the regimen in place, and review our last note for details  For dispo plan, please review Case Management notes  >patient dispo plan for tomorrow (3/2) or Friday (3/3)    >needs to transition off heparin gtt    >possible therapeutic paracentesis 3/2      Palliative care will return on 3/2/23  Patient is appropriate for outpatient follow-up  We will make arrangements through our office  >He has appt scheduled Fri 3/3/23 at [address] [place] [address] - we will reschedule this for patient as he  Will likely still be hospitalized or just discharged  The day prior  For urgent issues or any questions/concerns, please notify on-call provider via [address] [place] [address]  You may also call our answering service 24/7 at   Teddy Buchanan DO  Palliative and Supportive Care  Clinic/Answering Service: [phone]  You can find me on Teena!

## 2023-03-01 NOTE — PLAN OF CARE
Problem: Prexisting or High Potential for Compromised Skin Integrity  Goal: Skin integrity is maintained or improved  Description: INTERVENTIONS:  - Identify patients at risk for skin breakdown  - Assess and monitor skin integrity  - Assess and monitor nutrition and hydration status  - Monitor labs   - Assess for incontinence   - Turn and reposition patient  - Assist with mobility/ambulation  - Relieve pressure over bony prominences  - Avoid friction and shearing  - Provide appropriate hygiene as needed including keeping skin clean and dry  - Evaluate need for skin moisturizer/barrier cream  - Collaborate with interdisciplinary team   - Patient/family teaching  - Consider wound care consult   Outcome: Progressing     Problem: CARDIOVASCULAR - ADULT  Goal: Maintains optimal cardiac output and hemodynamic stability  Description: INTERVENTIONS:  - Monitor I/O, vital signs and rhythm  - Monitor for S/S and trends of decreased cardiac output  - Administer and titrate ordered vasoactive medications to optimize hemodynamic stability  - Assess quality of pulses, skin color and temperature  - Assess for signs of decreased coronary artery perfusion  - Instruct patient to report change in severity of symptoms  Outcome: Progressing  Goal: Absence of cardiac dysrhythmias or at baseline rhythm  Description: INTERVENTIONS:  - Continuous cardiac monitoring, vital signs, obtain 12 lead EKG if ordered  - Administer antiarrhythmic and heart rate control medications as ordered  - Monitor electrolytes and administer replacement therapy as ordered  Outcome: Progressing     Problem: GASTROINTESTINAL - ADULT  Goal: Minimal or absence of nausea and/or vomiting  Description: INTERVENTIONS:  - Administer IV fluids if ordered to ensure adequate hydration  - Maintain NPO status until nausea and vomiting are resolved  - Nasogastric tube if ordered  - Administer ordered antiemetic medications as needed  - Provide nonpharmacologic comfort measures as appropriate  - Advance diet as tolerated, if ordered  - Consider nutrition services referral to assist patient with adequate nutrition and appropriate food choices  Outcome: Progressing  Goal: Maintains or returns to baseline bowel function  Description: INTERVENTIONS:  - Assess bowel function  - Encourage oral fluids to ensure adequate hydration  - Administer IV fluids if ordered to ensure adequate hydration  - Administer ordered medications as needed  - Encourage mobilization and activity  - Consider nutritional services referral to assist patient with adequate nutrition and appropriate food choices  Outcome: Progressing  Goal: Maintains adequate nutritional intake  Description: INTERVENTIONS:  - Monitor percentage of each meal consumed  - Identify factors contributing to decreased intake, treat as appropriate  - Assist with meals as needed  - Monitor I&O, weight, and lab values if indicated  - Obtain nutrition services referral as needed  Outcome: Progressing     Problem: METABOLIC, FLUID AND ELECTROLYTES - ADULT  Goal: Electrolytes maintained within normal limits  Description: INTERVENTIONS:  - Monitor labs and assess patient for signs and symptoms of electrolyte imbalances  - Administer electrolyte replacement as ordered  - Monitor response to electrolyte replacements, including repeat lab results as appropriate  - Instruct patient on fluid and nutrition as appropriate  Outcome: Progressing  Goal: Fluid balance maintained  Description: INTERVENTIONS:  - Monitor labs   - Monitor I/O and WT  - Instruct patient on fluid and nutrition as appropriate  - Assess for signs & symptoms of volume excess or deficit  Outcome: Progressing  Goal: Glucose maintained within target range  Description: INTERVENTIONS:  - Monitor Blood Glucose as ordered  - Assess for signs and symptoms of hyperglycemia and hypoglycemia  - Administer ordered medications to maintain glucose within target range  - Assess nutritional intake and initiate nutrition service referral as needed  Outcome: Progressing     Problem: HEMATOLOGIC - ADULT  Goal: Maintains hematologic stability  Description: INTERVENTIONS  - Assess for signs and symptoms of bleeding or hemorrhage  - Monitor labs  - Administer supportive blood products/factors as ordered and appropriate  Outcome: Progressing     Problem: MUSCULOSKELETAL - ADULT  Goal: Maintain or return mobility to safest level of function  Description: INTERVENTIONS:  - Assess patient's ability to carry out ADLs; assess patient's baseline for ADL function and identify physical deficits which impact ability to perform ADLs (bathing, care of mouth/teeth, toileting, grooming, dressing, etc )  - Assess/evaluate cause of self-care deficits   - Assess range of motion  - Assess patient's mobility  - Assess patient's need for assistive devices and provide as appropriate  - Encourage maximum independence but intervene and supervise when necessary  - Involve family in performance of ADLs  - Assess for home care needs following discharge   - Consider OT consult to assist with ADL evaluation and planning for discharge  - Provide patient education as appropriate  Outcome: Progressing  Goal: Maintain proper alignment of affected body part  Description: INTERVENTIONS:  - Support, maintain and protect limb and body alignment  - Provide patient/ family with appropriate education  Outcome: Progressing     Problem: PAIN - ADULT  Goal: Verbalizes/displays adequate comfort level or baseline comfort level  Description: Interventions:  - Encourage patient to monitor pain and request assistance  - Assess pain using appropriate pain scale  - Administer analgesics based on type and severity of pain and evaluate response  - Implement non-pharmacological measures as appropriate and evaluate response  - Consider cultural and social influences on pain and pain management  - Notify physician/advanced practitioner if interventions unsuccessful or patient reports new pain  Outcome: Progressing     Problem: INFECTION - ADULT  Goal: Absence or prevention of progression during hospitalization  Description: INTERVENTIONS:  - Assess and monitor for signs and symptoms of infection  - Monitor lab/diagnostic results  - Monitor all insertion sites, i e  indwelling lines, tubes, and drains  - Monitor endotracheal if appropriate and nasal secretions for changes in amount and color  - Gilbert appropriate cooling/warming therapies per order  - Administer medications as ordered  - Instruct and encourage patient and family to use good hand hygiene technique  - Identify and instruct in appropriate isolation precautions for identified infection/condition  Outcome: Progressing     Problem: DISCHARGE PLANNING  Goal: Discharge to home or other facility with appropriate resources  Description: INTERVENTIONS:  - Identify barriers to discharge w/patient and caregiver  - Arrange for needed discharge resources and transportation as appropriate  - Identify discharge learning needs (meds, wound care, etc )  - Arrange for interpretive services to assist at discharge as needed  - Refer to Case Management Department for coordinating discharge planning if the patient needs post-hospital services based on physician/advanced practitioner order or complex needs related to functional status, cognitive ability, or social support system  Outcome: Progressing     Problem: Knowledge Deficit  Goal: Patient/family/caregiver demonstrates understanding of disease process, treatment plan, medications, and discharge instructions  Description: Complete learning assessment and assess knowledge base    Interventions:  - Provide teaching at level of understanding  - Provide teaching via preferred learning methods  Outcome: Progressing

## 2023-03-01 NOTE — PROGRESS NOTES
[address] [facility]  Progress Note Paul Ashby 1970, 47 y o  male MRN: [id number]  Unit/Bed#: [address] 813-01 Encounter: [id number]  Primary Care Provider: Abhilash Flores MD   Date and time admitted to hospital: 2/20/2023  3:04 AM    * Septic shock [facility])  Assessment & Plan  Present on admission  Shock resolved  Monitor closely    DENZEL (acute kidney injury) [facility])  Assessment & Plan  Monitor kidney function closely  Avoid nephrotoxins  Nephrology following    Oropharyngeal candidiasis  Assessment & Plan  Fluconazole to complete 7-day course    Mesenteric thrombosis ([facility] [address] )  Assessment & Plan  Receiving IV heparin GTT  discussed with oncology - Patient is recommended Lovenox 1 mg/kg body weight every 12 hours  Transition to Lovenox 1 mg/kg body at twice daily  Outpatient oncology follow-up      Malignant ascites  Assessment & Plan  S/p paracentesis  Monitor    Thrombocytopenia ([facility] [address] )  Assessment & Plan  Multifactorial  Oncology inputs noted  Monitor counts    GERD (gastroesophageal reflux disease)  Assessment & Plan  Continue PPI    Hyponatremia  Assessment & Plan  Multifactorial  Monitor closely  Nephrology following    Cholangiocarcinoma, stage IV ([facility] [address] )  Assessment & Plan  Oncology inputs noted  Plans for outpatient oncology follow-up noted              VTE Pharmacologic Prophylaxis: VTE Score: 8 High Risk (Score >/= 5) - Pharmacological DVT Prophylaxis Ordered: enoxaparin (Lovenox)  Sequential Compression Devices Ordered  Patient Centered Rounds: I performed bedside rounds with nursing staff today  Discussions with Specialists or Other Care Team Provider: Oncology    Education and Discussions with Family / Patient: Patient, called and left a message for spouse over phone       Total Time Spent on Date of Encounter in care of patient: 35 minutes This time was spent on one or more of the following: performing physical exam; counseling and coordination of care; obtaining or reviewing history; documenting in the medical record; reviewing/ordering tests, medications or procedures; communicating with other healthcare professionals and discussing with patient's family/caregivers  Current Length of Stay: 9 day(s)  Current Patient Status: Inpatient   Certification Statement: The patient will continue to require additional inpatient hospital stay due to as outlined  Discharge Plan: awaiting clinical and symptomatic improvement     Code Status: Level 1 - Full Code    Subjective:     Comfortably sitting up in chair  Reports feeling tired  Appetite fair  Encouraged incentive spirometry   Encouraged ambulation as able    Objective:     Vitals:   Temp (24hrs), Av 4 °F (36 3 °C), Min:97 3 °F (36 3 °C), Max:97 5 °F (36 4 °C)    Temp:  [97 3 °F (36 3 °C)-97 5 °F (36 4 °C)] 97 5 °F (36 4 °C)  HR:  [79-86] 86  Resp:  [12-16] 16  BP: (96)/(59) 96/59  SpO2:  [96 %-99 %] 96 %  Body mass index is 31 16 kg/m²  Input and Output Summary (last 24 hours):      Intake/Output Summary (Last 24 hours) at 3/1/2023 1621  Last data filed at 3/1/2023 1101  Gross per 24 hour   Intake 290 74 ml   Output 300 ml   Net -9 26 ml       Physical Exam:   Physical Exam     Comfortably in chair  Anasarca noted   Neck supple   Lungs diminished breath sounds bilaterally  Heart sounds S1 and S2 noted  Heart sounds are distant  Abdomen soft  Abdominal distention noted  Awake obey simple commands  Edema noted  No rash    Additional Data:     Labs:  Results from last 7 days   Lab Units 23  0547   WBC Thousand/uL 14 63*   HEMOGLOBIN g/dL 12 2   HEMATOCRIT % 36 9   PLATELETS Thousands/uL 50*   NEUTROS PCT % 80*   LYMPHS PCT % 9*   MONOS PCT % 8   EOS PCT % 2     Results from last 7 days   Lab Units 23  0547   SODIUM mmol/L 130*   POTASSIUM mmol/L 4 4   CHLORIDE mmol/L 102   CO2 mmol/L 18*   BUN mg/dL 62*   CREATININE mg/dL 1 50*   ANION GAP mmol/L 10   CALCIUM mg/dL 7 8*   ALBUMIN g/dL 3 8   TOTAL BILIRUBIN mg/dL 5 01*   ALK PHOS U/L 129*   ALT U/L 41   AST U/L 76*   GLUCOSE RANDOM mg/dL 116     Results from last 7 days   Lab Units 03/01/23  0547   INR  2 86*     Results from last 7 days   Lab Units 03/01/23  1139 03/01/23  0710 02/28/23  2106 02/28/23  1613 02/28/23  1054 02/28/23  0716 02/27/23  2034 02/27/23  1601 02/27/23  1118 02/26/23  1710 02/26/23  1230 02/26/23  0736   POC GLUCOSE mg/dl 112 123 131 142* 108 106 129 135 166* 149* 164* 85               Lines/Drains:  Invasive Devices     Central Venous Catheter Line  Duration           Port A Cath Right Subclavian -- days          Peripheral Intravenous Line  Duration           Peripheral IV 02/27/23 Left Antecubital 1 day                Central Line:  Goal for removal: Cholangiocarcinoma             Imaging: No pertinent imaging reviewed      Recent Cultures (last 7 days):         Last 24 Hours Medication List:   Current Facility-Administered Medications   Medication Dose Route Frequency Provider Last Rate   • al mag oxide-diphenhydramine-lidocaine viscous  10 mL Swish & Swallow Q4H PRN Ruby Soler MD     • Albumin 25%  25 g Intravenous Q6H Philippe Hatfield MD Stopped (03/01/23 0601)   • calcitriol  0 25 mcg Oral Daily Philippe Hatfield MD     • chlorhexidine  15 mL Swish & Spit Q12H [facility] Ruby Soler MD     • enoxaparin  1 mg/kg Subcutaneous Q12H [facility] Mathew Schuster MD     • ergocalciferol  50,000 Units Oral Weekly Ruby Soler MD     • fluconazole  200 mg Oral Daily Ruby Soler MD     • insulin lispro  1-6 Units Subcutaneous TID AC Ruby Soler MD     • melatonin  6 mg Oral HS Ruby Soler MD     • midodrine  15 mg Oral TID AC Ruby Soler MD     • JOYCE ANTIFUNGAL   Topical BID Ruby Soler MD     • ondansetron  4 mg Intravenous Q8H PRN Ruby Soler MD     • oxyCODONE  5 mg Oral Q4H PRN Ruby Soler MD     • pantoprazole  40 mg Oral Early Morning Ruby Soler MD     • saliva substitute 5 spray Mouth/Throat 4x Daily PRN Pelon Guzman MD     • sucralfate  1 g Oral 4x Daily (AC & HS) Pelon Guzman MD          Today, Patient Was Seen By: Caryle Figures, MD    **Please Note: This note may have been constructed using a voice recognition system  **

## 2023-03-01 NOTE — PROGRESS NOTES
[address] [place] 47 y o  male MRN: [id number]  Unit/Bed#: [facility] 813-01 Encounter: [id number]  Reason for Consult: DENZEL    ASSESSMENT and PLAN:    50-year-old male with past medical history of metastatic cholangiocarcinoma with metastatic disease to the liver, lung, bone, right tonsillar squamous cell carcinoma prior, recurrent ascites requiring paracentesis, initially presents 1 week prior with abdominal pain, nausea, poor p o  intake  Nephrology is on board for acute kidney injury     1- acute kidney injury     - Baseline creatinine 0 6 2 8 mg/dL  - Creatinine on admission  2 3 mg/dL and peak creatinine 2 6 mg/dL  -Acute kidney injury etiology- hypotension/ATN possibly chemotherapy related (ivosidenib prior and pemigatinib prior)/prerenal etiology, subsequent second injury from increased abdominal pressures/hypotension/large-volume paracentesis/possible biliary nephropathy from bile cast?/Urinary retention  - CT scan without hydronephrosis  - Urinalysis 20-50 hyaline casts, no hematuria, no proteinuria  - Urine sodium less than 5  - Initially Harvey catheter was placed for monitoring urine output and possible retention  - Initially received volume expansion as there was concern for septic shock  - Dialysis consent was obtained by Dr Panchito Bentley on 2/20 but has not needed HD as of yet  - 2/27-was recommended albumin challenge and holding diuretics  - 2/28-creatinine stabilizing 1 5 mg/dL  Maintained on midodrine  Albumin continued today  - 3/1-creatinine stable 1 5 mg/dL  Bicarbonate improving 18  Sodium level stable 130  Magnesium slightly higher  Calcium slowly improving 7 8      Plan  - I/os; avoid nephrotoxic agents  - Continue midodrine due to hypotension  - Check BMP and magnesium in a m   - Continue low-dose calcitriol for this week  - Continue holding Lasix today but if magnesium rises, may consider albumin withlow-dose Lasix tomorrow  -no urgent indication for diuretics today but anticipate restarting in 24-48 hours with albumin initially  - Evaluation of ascites per primary team     2 -electrolytes     - Initially with hyperkalemia  Patient was not taking potassium supplements at home  Potassium levels have stabilized appropriately  - Initially received medical management for hyperkalemia  - Hyperkalemia resolved     - Hyponatremia-initial sodium 118 with rapid improvement after volume expansion and therefore patient was changed to hypotonic fluids  - Sodium level has stabilized appropriately  - Sodium level slightly decreased 130 on 2/28  Monitoring for now  - Hyponatremia stable     - Hypophosphatemia- on phosphorus supplements initially phosphorus has improved  - Phosphorus appropriate 2/28     - Hypermagnesemia-repeat magnesium level in a m  and see plan above     - Hypocalcemia-improving on  3/1     3-acid/base     - Initially with lactic acidosis and required volume expansion and bicarbonate based fluids  Also lactic acidosis likely component of liver disease   - Patient also has respiratory alkalosis  - Last pH was not low  Therefore will not start sodium bicarbonate tablets for now  - Bicarbonate slightly improved 3/1     4- hypotension     - Initially started on empiric antibiotics, volume expansion, albumin  - Echocardiogram- EF 65%, mild concentric hypertrophy, study quality was poor  - Source of shock unclear- cultures unrevealing so far  - Initially required vasopressors also  - Currently on midodrine high-dose     5- metastatic cholangiocarcinoma-     -MRI with thrombosis of splenic, superior mesenteric and portal vein  - Peritoneal carcinomatosis with recurrent ascites requiring paracentesis-Per primary team  - Last paracentesis on 2/26 with 6 6 L removed  - Thrombosis-management per primary team   On heparin drip      6- vitamin D deficiency- vitamin D supplement recommended    Was also advised for calcitriol 1 mcg daily     7-thrombocytopenia- Per hematology and primary team     Coagulopathy management per primary team     2-shrlpiawygpmpi-lxxuo patient appears to be improved  Per prior notes, it does appear there was concern for possible hepatic encephalopathy  Management per primary team      9-oral candidiasis-Per primary team     10- volume- error in yest note about 'cellulitis'     - pt is with edema LE  Lungs clear  See above  SUBJECTIVE / 24H INTERVAL HISTORY:  Pt denies complaints with exception of edema LE  No SOB       OBJECTIVE:  Current Weight: Weight - Scale: 104 kg (229 lb 11 5 oz)  Vitals:    02/28/23 1436 02/28/23 2221 03/01/23 0600 03/01/23 0711   BP: 97/59 96/59  96/59   Pulse: 80   79   Resp: 12 16  12   Temp: 97 5 °F (36 4 °C) (!) 97 3 °F (36 3 °C)  97 5 °F (36 4 °C)   TempSrc:       SpO2: 97%   99%   Weight:   104 kg (229 lb 11 5 oz)    Height:           Intake/Output Summary (Last 24 hours) at 3/1/2023 1217  Last data filed at 3/1/2023 1101  Gross per 24 hour   Intake 290 74 ml   Output 575 ml   Net -284 26 ml     General: NAD  Skin: no rash  Eyes: anicteric sclera  ENT: moist mucous membrane  Neck: supple  Chest: CTA b/l, no ronchii, no wheeze, no rubs, no rales  CVS: s1s2, no murmur, no gallop, no rub  Abdomen: soft, nontender, nl sounds  Extremities: 2+ edema LE b/l unchanged  : no davison  Neuro: AAOX3  Psych: normal affect    Medications:    Current Facility-Administered Medications:   •  al mag oxide-diphenhydramine-lidocaine viscous (MAGIC MOUTHWASH) suspension 10 mL, 10 mL, Swish & Swallow, Q4H PRN, Graciela Berry MD, 10 mL at 02/26/23 1232  •  albumin human (FLEXBUMIN) 25 % injection 25 g, 25 g, Intravenous, Q6H, Emeka Clay MD, Stopped at 03/01/23 0601  •  calcitriol (ROCALTROL) capsule 0 25 mcg, 0 25 mcg, Oral, Daily, Emeka Clay MD, 0 25 mcg at 03/01/23 0817  •  chlorhexidine (PERIDEX) 0 12 % oral rinse 15 mL, 15 mL, Swish & [place], Q12H [address], Graciela Berry MD, 15 mL at 03/01/23 0817  • ergocalciferol (VITAMIN D2) capsule 50,000 Units, 50,000 Units, Oral, Weekly, Paulina Friedman MD, 50,000 Units at 02/23/23 1845  •  fluconazole (DIFLUCAN) tablet 200 mg, 200 mg, Oral, Daily, Paulina Friedman MD, 200 mg at 03/01/23 2548  •  heparin (porcine) 25,000 units in 0 45% NaCl 250 mL infusion (premix), 3-30 Units/kg/hr (Order-Specific), Intravenous, Titrated, Paulina Friedman MD, Last Rate: 4 4 mL/hr at 02/27/23 2207, 4 Units/kg/hr at 02/27/23 2207  •  heparin (porcine) injection 4,400 Units, 4,400 Units, Intravenous, Q6H PRN, Paulina Friedman MD  •  heparin (porcine) injection 8,800 Units, 8,800 Units, Intravenous, Q6H PRN, Paulina Friedman MD  •  insulin lispro (HumaLOG) 100 units/mL subcutaneous injection 1-6 Units, 1-6 Units, Subcutaneous, TID AC, 1 Units at 02/27/23 1119 **AND** Fingerstick Glucose (POCT), , , TID AC, Paulina Friedman MD  •  melatonin tablet 6 mg, 6 mg, Oral, HS, Paulina Friedman MD, 6 mg at 02/28/23 2105  •  midodrine (PROAMATINE) tablet 15 mg, 15 mg, Oral, TID AC, Paulina Friedman MD, 15 mg at 03/01/23 1156  •  moisture barrier miconazole 2% cream (aka JOYCE MOISTURE BARRIER ANTIFUNGAL CREAM), , Topical, BID, Paulina Friedman MD, Given at 03/01/23 0818  •  ondansetron (ZOFRAN) injection 4 mg, 4 mg, Intravenous, Q8H PRN, Paulina Friedman MD, 4 mg at 02/22/23 0006  •  oxyCODONE (ROXICODONE) IR tablet 5 mg, 5 mg, Oral, Q4H PRN, Paulina Friedman MD  •  pantoprazole (PROTONIX) EC tablet 40 mg, 40 mg, Oral, Early Morning, Paulina Friedman MD, 40 mg at 03/01/23 0600  •  saliva substitute (MOUTH KOTE) mucosal solution 5 spray, 5 spray, Mouth/Throat, 4x Daily PRN, Paulina Friedman MD, 5 spray at 02/25/23 2227  •  sucralfate (CARAFATE) tablet 1 g, 1 g, Oral, 4x Daily (AC & HS), Paulina Friedman MD, 1 g at 03/01/23 1156    Laboratory Results:  Results from last 7 days   Lab Units 03/01/23  0547 02/28/23  0428 02/28/23  0000 02/27/23  5928 02/26/23  0558 02/25/23  4503 02/24/23  0435 02/23/23  1238 02/23/23  0436   WBC Thousand/uL 14 63*  --  13 30* 15 32* 19 65* 16 16* 20 95*  --  20 02*  20 02*   HEMOGLOBIN g/dL 12 2  --  11 4* 11 8* 13 0 11 6* 12 5  --  12 5  12 5   HEMATOCRIT % 36 9  --  32 7* 34 0* 36 9 34 8* 36 4*  --  36 9  36 9   PLATELETS Thousands/uL 50*  --  46* 50* 75* 72* 77*  --  71*  71*   POTASSIUM mmol/L 4 4 4 1  --  4 0 4 1 4 0 3 8 4 3 3 8   CHLORIDE mmol/L 102 103  --  106 108 109* 108 105 103   CO2 mmol/L 18* 17*  --  17* 16* 18* 18* 16* 18*   BUN mg/dL 62* 58*  --  53* 47* 35* 27* 27* 28*   CREATININE mg/dL 1 50* 1 54*  --  1 51* 1 43* 0 92 0 55* 0 71 0 60   CALCIUM mg/dL 7 8* 7 4*  --  6 9* 7 2* 7 2* 6 7* 7 1* 6 7*   MAGNESIUM mg/dL 3 0* 2 8*  --  2 7* 2 7* 2 9* 2 8*  --  2 9*   PHOSPHORUS mg/dL  --  2 7  --  3 1 2 4* 2 5* 1 5*  --  1 2*

## 2023-03-01 NOTE — ASSESSMENT & PLAN NOTE
Receiving IV heparin GTT  discussed with oncology - Patient is recommended Lovenox 1 mg/kg body weight every 12 hours  Transition to Lovenox 1 mg/kg body at twice daily  Outpatient oncology follow-up

## 2023-03-02 ENCOUNTER — TELEPHONE (OUTPATIENT)
Dept: HEMATOLOGY ONCOLOGY | Facility: CLINIC | Age: 53
End: 2023-03-02

## 2023-03-02 NOTE — WOUND OSTOMY CARE
Consult Note - Wound   Alice Ashby 47 y o  male MRN: [id number]  Unit/Bed#: [facility] 813-01 Encounter: [id number]        History and Present Illness:  Patient is 48 yo male admitted to [facility] with septic shock  Patient has history of metastatic cancer  Patient is continent of bowel and bladder  Patient is independent with ADL's  Patient is independent with meals  Assessment Findings:     1  MASD sacral/buttocks- intact, blanchable hyperpigmentation, no drainage  JOYCE cream ordered and applied  2  Posterior leg- no opening noted however leg is weeping from significant edema  3  Paracentesis site B/L abdomen- area is opened and on left side draining consistently  Applied new ABD with calcium alginate for drainage management  No induration, fluctuance, odor, warmth/temperature differences, redness, or purulence noted to the above noted wounds and skin areas assessed  New dressings applied per orders listed below  Patient tolerated well- no s/s of non-verbal pain or discomfort observed during the encounter  Bedside nurse aware of plan of care  See flow sheets for more detailed assessment findings  Wound care will continue to follow  Skin care plans:  1-Apply JOYCE to sacrum/buttocks/groin BID   2-Cleanse B/L abdominal and right posterior leg opening with normal saline, apply calcium aginate, cover with dry dressing  Change every other day and PRN soilage/displacement  3-Elevate heels to offload pressure  4-Ehob cushion when out of bed  5-Turn/repoisiton q2h or when medically stable for pressure re-distribution on skin    6-Moisturize skin daily with skin nourishing cream        Wounds:  Wound 02/22/23 Perineum Mid (Active)   Wound Image   03/02/23 0926   Wound Description Epithelialization 03/02/23 0926   Pressure Injury Stage DTPI 02/25/23 2000   Britt-wound Assessment Hyperpigmented;Rash;Scaly 03/02/23 0926   Wound Length (cm) 0 cm 03/02/23 0926   Wound Width (cm) 0 cm 03/02/23 0926 Wound Depth (cm) 0 cm 03/02/23 0926   Wound Surface Area (cm^2) 0 cm^2 03/02/23 0926   Wound Volume (cm^3) 0 cm^3 03/02/23 0926   Calculated Wound Volume (cm^3) 0 cm^3 03/02/23 0926   Tunneling 0 cm 03/02/23 0926   Tunneling in depth located at 0 03/02/23 0926   Undermining 0 03/02/23 0926   Undermining is depth extending from 0 03/02/23 0926   Wound Site Closure DEANN 03/02/23 0926   Drainage Amount None 03/02/23 0926   Non-staged Wound Description Not applicable 88/67/19 4256   Treatments Cleansed 03/02/23 0926   Dressing Moisture barrier 03/02/23 0926   Wound packed? No 03/02/23 0926   Packing- # removed 0 03/02/23 0926   Packing- # inserted 0 03/02/23 0926   Dressing Changed New 03/02/23 0926   Patient Tolerance Tolerated well 03/02/23 0926   Dressing Status Clean;Dry; Intact 03/02/23 0926           Eli Stewart BSN, RN, [facility] [employer]

## 2023-03-02 NOTE — OCCUPATIONAL THERAPY NOTE
Occupational Therapy Progress Note     Patient Name: Gustabo Alatorre  IVOKC'J Date: 3/2/2023  Problem List  Principal Problem:    Septic shock ([facility] [address] )  Active Problems:    Cholangiocarcinoma, stage IV (HCC)    Hyponatremia    GERD (gastroesophageal reflux disease)    Thrombocytopenia (HCC)    Vitamin D deficiency    Malignant ascites    Mesenteric thrombosis (HCC)    Oropharyngeal candidiasis    DENZEL (acute kidney injury) ([facility] [address] )    Metabolic acidosis    Hyperglycemia    Elevated INR    Hepatic encephalopathy            03/02/23 1357   OT Last Visit   OT Visit Date 03/02/23   Note Type   Note Type Treatment   Pain Assessment   Pain Assessment Tool 0-10   Pain Score No Pain   Restrictions/Precautions   Weight Bearing Precautions Per Order No   Other Precautions Fall Risk   Lifestyle   Autonomy I with ADLs, (I) with IADLs, (+) driving   Reciprocal Relationships supportive wife   Service to Others works full-time as a    Intrinsic Gratification spending time outside in the summer and caring for cat   ADL   Where Assessed Edge of bed   Grooming Assistance 5  Supervision/Setup   Grooming Deficit Brushing hair   LB Bathing Comments Pt educated on use of SC with assistance with bathing v  use of transfer tub bench  Educated Pt on where to purchase and safe transfer techniques  Pt understanding and agreeable to have spouse assist  Pt with use of LH brush for LB bathing  LB Dressing Assistance 3  Moderate Assistance   LB Dressing Deficit Setup;Don/doff L sock; Don/doff R sock   LB Dressing Comments Pt S with other LB dressing tasks, however, requires mod A with donning of socks  Pt agreeable to have spouse assist upon DC  Pt educated on compensatory techniques with LB ADLs and Pt agreeable to keep practicing  Pt denied demonstration/practice with LHAE  Toileting Comments Pt declined need of BSC and owns one at home  Bed Mobility   Additional Comments Pt greeted OOB in recliner chair     Transfers   Sit to Stand 5  Supervision   Additional items Increased time required   Stand to Sit 5  Supervision   Additional items Increased time required   Additional Comments with Rw   Functional Mobility   Functional Mobility 5  Supervision   Additional Comments Pt S with functional mobility with RW during Item retrieval task for ADLs  Pt completes household distances during activity  Additional items Rolling walker   Cognition   Overall Cognitive Status WFL   Arousal/Participation Alert; Cooperative   Attention Within functional limits   Orientation Level Oriented X4   Memory Within functional limits   Following Commands Follows all commands and directions without difficulty   Comments Pt very pleasant and cooperative during OT session  Activity Tolerance   Activity Tolerance Patient tolerated treatment well   Medical Staff Made Aware RN cleared/updated  SAMI Jara  and SAMI Silveira [employer]  Assessment   Assessment Pt greeted bedside for OT treatment on 3/2/2023 focusing on maximizing independence with ADLs  Pt mod A with LB dressing and educated on compensatory techniques  Pt agreeable to have spouse to assist upon DC and Pt states he will regain more LE flexibility when fluid in LE decrease  Pt S with functional transfers and S with functional mobility with RW  Pt provided on home safety education and fall prevention upon DC home  Pt educated on use of SC upon DC  Pt encouraged to participate in ADLs/functional mobility with nursing/restorative staff during hospitalization  Pt with no further acute OT concerns  Pt would benefit from returning home with increased social support upon DC to maximize safety and independence with ADLs and functional tasks of choice  DC skilled OT services  Plan   Treatment Interventions ADL retraining;Functional transfer training; Endurance training;UE strengthening/ROM; Cognitive reorientation;Patient/family training;Equipment evaluation/education; Compensatory technique education; Energy conservation; Activityengagement   Goal Expiration Date 03/09/23  (DC SKILLED OT SERVICES S/P TODAY'S TX)   OT Treatment Day 2   OT Frequency 2-3x/wk   Recommendation   OT Discharge Recommendation No rehabilitation needs   Equipment Recommended Shower/Tub chair with back ($);Shower transfer bench ($$)  (Pt educated on where to purchase)   Additional Comments  The patient's raw score on the AM-PAC Daily Activity Inpatient Short Form is 20  A raw score of greater than or equal to 19 suggests the patient may benefit from discharge to home  Please refer to the recommendation of the Occupational Therapist for safe discharge planning  AM-PAC Daily Activity Inpatient   Lower Body Dressing 2   Bathing 3   Toileting 3   Upper Body Dressing 4   Grooming 4   Eating 4   Daily Activity Raw Score 20   Daily Activity Standardized Score (Calc for Raw Score >=11) 42 03   AM-PAC Applied Cognition Inpatient   Following a Speech/Presentation 4   Understanding Ordinary Conversation 4   Taking Medications 4   Remembering Where Things Are Placed or Put Away 4   Remembering List of 4-5 Errands 4   Taking Care of Complicated Tasks 4   Applied Cognition Raw Score 24   Applied Cognition Standardized Score 62 21   End of Consult   Education Provided Yes   Patient Position at End of Consult Bedside chair; All needs within reach   Nurse Communication Nurse aware of consult       Donnie Padilla MS, OTR/L

## 2023-03-02 NOTE — ASSESSMENT & PLAN NOTE
S/p paracentesis  We will request IR for abdominal paracentesis given worsening abdominal distention  Communicated with IR via [employer] connect  Monitor

## 2023-03-02 NOTE — PROGRESS NOTES
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 admitted with abdominal pain nausea and decreased p o  intake  Nephrology consulted and following for acute kidney injury and hyperkalemia management  Acute kidney injury (POA):  DENZEL initial resolved  DENZEL multifactorial most likely secondary to prerenal azotemia since DENZEL improved with albumin challenge, now with second episode of acute kidney injury likely secondary to increased intra-abdominal pressures and ischemic injury from hypotension, status post paracentesis 6 6 L on 2/26/2023 + some component of cholemic nephrosis with elevations in bilirubin and some component of obstructive uropathy/urinary retention x2  After review of records In [facility] as well as Care everywhere it appears that the patient has a baseline Creatinine of 0 6-0 8 mg/dL  patient was admitted with a creatinine of 2 37 mg/dL on 2/20/2023  patient's creatinine today is at 2 06 mg/dL, unfortunately continues to rise  During this hospital stay patient had provided dialysis consent which is in the chart in case needed was taken per my prior colleagues  CT with no hydronephrosis  Recommend albumin 25 g IV every 6 x24 hours and Bumex 2 mg IV x1 today check BMP in a m  Await renal recovery  Optimize hemodynamic status to avoid delay in renal recovery  Place on a renal diet when allowed diet order  Avoid nephrotoxins, adjust meds to appropriate GFR  Strict I/O    Daily weights  Urinary retention protocol if patient does not have a Harvey  will need to set up patient for follow up with Nephrology as an outpatient post hospitalization  Overall poor prognosis  Follow-up with palliative care consider home with hospice  Not an ideal candidate for dialysis based on his comorbidities    Blood pressure/hypotension:  current medications: Midodrine 15 mg p o  3 times daily  recommendations: Albumin 25 g IV every 6x4 and Bumex 2 mg IV x1  Optimize hemodynamics  Maintain MAP > 65mmHg  Avoid BP fluctuations  H/H/anemia:  most recent hemoglobin at 11 4g/dL  maintain hemoglobin greater than 8 grams/deciliter    Acid-base electrolytes:    Hypocalcemia:    On ergocalciferol 50,000 units p o  q  Weekly  Corrected calcium 7 5  Recommend placing on calcitriol 1 mcg p o  daily  Check BMP in a m  and ionized calcium    Hyponatremia:   Most recent sodium at 128 mEq slightly decreased  Initial serum sodium on admission 118 mEq, rapidly corrected initially and subsequently requiring hypotonic IV fluids  Continue fluid restriction 1 2 L/day  Work-up that revealed urine sodium less than 5, urine osmolality 506 serum osmolality 288  We will give albumin and Bumex today    Hypermagnesemia:   Most recent magnesium at 3 0  Hold further magnesium supplements for now    Acid-base:    Most recent bicarb at 17, stable for now    Other medical problems:  Proteinuria: Most recent UA +1 for protein as of 2/25/2023  Recheck when stable  Metastatic cholangiocarcinoma:  Management per primary team   Recent MRI showing new thrombosis clinic, superior mesenteric and portal vein  Peritoneal carcinomatosis with recurrent ascites requiring paracentesis  Follow-up with hematology oncology  Patient with mets to liver, lymph node, bone, lungs  Prognosis recommend follow-up with palliative care for goals  Status post paracentesis 3/3/2023 with 6 L removed    Shock:  Management per primary team   Cultures negative so far empiric antibiotics per ICU team      Thanks for the consult  Will continue to follow  Please call with questions/ concerns  Above-mentioned orders and Plan in terms of placing on albumin x4 doses and Bumex 2 mg IV x1 and follow-up with palliative care and possible plans to get patient home and comfortable was discussed with the team in depth with [facility] text and they agree      Jesús Tabares MD, TIMA, 3/3/2023, 12:10 PM            Objective :   Patient seen and examined in his room blood pressure is borderline status post paracentesis was approximately 6 L removal today  Discussed plan from renal standpoint  Patient reports he wishes he could go home and be comfortable does not want further intervention at this time  I advised him that I will be in touch with the primary team and reach out to palliative care  PHYSICAL EXAM  BP 95/50   Pulse 82   Temp (!) 97 2 °F (36 2 °C)   Resp 18   Ht 6' (1 829 m)   Wt 104 kg (229 lb 0 9 oz)   SpO2 100%   BMI 31 07 kg/m²   Temp (24hrs), Av 4 °F (36 3 °C), Min:97 2 °F (36 2 °C), Max:97 5 °F (36 4 °C)        Intake/Output Summary (Last 24 hours) at 3/3/2023 1210  Last data filed at 3/3/2023 1019  Gross per 24 hour   Intake 200 ml   Output 6080 ml   Net -5880 ml       I/O last 24 hours: In: 440 [P O :440]  Out: 6080 [Urine:30; Other:6050]      Current Weight: Weight - Scale: 104 kg (229 lb 0 9 oz)  First Weight: Weight - Scale: 97 8 kg (215 lb 9 6 oz)  Physical Exam  Vitals and nursing note reviewed  Constitutional:       General: He is not in acute distress  Appearance: Normal appearance  He is obese  He is not ill-appearing, toxic-appearing or diaphoretic  HENT:      Head: Normocephalic and atraumatic  Mouth/Throat:      Mouth: Mucous membranes are moist       Pharynx: Oropharynx is clear  No oropharyngeal exudate  Eyes:      General: No scleral icterus  Conjunctiva/sclera: Conjunctivae normal    Cardiovascular:      Rate and Rhythm: Normal rate  Heart sounds: Normal heart sounds  No friction rub     Pulmonary:      Breath sounds: Normal breath sounds  No stridor  Abdominal:      General: There is no distension  Palpations: Abdomen is soft  There is no mass  Tenderness: There is no abdominal tenderness  Musculoskeletal:         General: Swelling present  Cervical back: Normal range of motion  No rigidity  Comments: +2 edema bilateral lower extremities   Skin:     General: Skin is warm  Coloration: Skin is not jaundiced  Neurological:      General: No focal deficit present  Mental Status: He is alert and oriented to person, place, and time  Psychiatric:         Mood and Affect: Mood normal          Behavior: Behavior normal              Review of Systems   Constitutional: Positive for fatigue  HENT: Negative for congestion  Respiratory: Negative for cough and shortness of breath  Cardiovascular: Positive for leg swelling  Gastrointestinal: Negative for abdominal pain and diarrhea  Genitourinary: Negative for flank pain  Musculoskeletal: Negative for back pain  Neurological: Negative for headaches  Psychiatric/Behavioral: Negative for agitation  All other systems reviewed and are negative        Scheduled Meds:  Current Facility-Administered Medications   Medication Dose Route Frequency Provider Last Rate   • al mag oxide-diphenhydramine-lidocaine viscous  10 mL Swish & Swallow Q4H PRN Fidel Browning MD     • Albumin 25%  25 g Intravenous Q6H Lawanda Canchola MD     • bumetanide  2 mg Intravenous Once Judy Roth MD     • chlorhexidine  15 mL Swish & Spit Q12H [facility] Fidel Browning MD     • enoxaparin  1 mg/kg Subcutaneous Q12H [facility] Kj Cohen MD     • ergocalciferol  50,000 Units Oral Weekly Fidel Browning MD     • gabapentin  200 mg Oral BID Tony Hardin MD     • insulin lispro  1-6 Units Subcutaneous TID AC Fidel Browning MD     • melatonin  6 mg Oral HS Fidel Browning MD     • midodrine  15 mg Oral TID AC Fidel Browning MD     • JOYCE ANTIFUNGAL   Topical BID Lorenzo James MD     • ondansetron  4 mg Intravenous Q8H PRN Lorenzo James MD     • oxyCODONE  5 mg Oral Q4H PRN Lorenzo James MD     • pantoprazole  40 mg Oral Early Morning Lorenzo James MD     • saliva substitute  5 spray Mouth/Throat 4x Daily PRN Lorenzo James MD     • sucralfate  1 g Oral 4x Daily (AC & HS) Lorenzo James MD         PRN Meds: •  al mag oxide-diphenhydramine-lidocaine viscous  •  ondansetron  •  oxyCODONE  •  saliva substitute    Continuous Infusions:       Invasive Devices: Invasive Devices     Central Venous Catheter Line  Duration           Port A Cath Right Subclavian -- days                  LABORATORY:    Results from last 7 days   Lab Units 03/03/23  0820 03/03/23  2852 03/02/23  0601 03/01/23  0547 02/28/23  0428 02/28/23  0000 02/27/23  0516 02/26/23  0558 02/25/23  0554   WBC Thousand/uL 12 31*  --   --  14 63*  --  13 30* 15 32* 19 65* 16 16*   HEMOGLOBIN g/dL 11 4*  --   --  12 2  --  11 4* 11 8* 13 0 11 6*   HEMATOCRIT % 34 8*  --   --  36 9  --  32 7* 34 0* 36 9 34 8*   PLATELETS Thousands/uL 48*  --   --  50*  --  46* 50* 75* 72*   POTASSIUM mmol/L  --  4 5 4 6 4 4 4 1  --  4 0 4 1 4 0   CHLORIDE mmol/L  --  103 104 102 103  --  106 108 109*   CO2 mmol/L  --  17* 18* 18* 17*  --  17* 16* 18*   BUN mg/dL  --  74* 64* 62* 58*  --  53* 47* 35*   CREATININE mg/dL  --  2 06* 1 76* 1 50* 1 54*  --  1 51* 1 43* 0 92   CALCIUM mg/dL  --  8 2* 8 2* 7 8* 7 4*  --  6 9* 7 2* 7 2*   MAGNESIUM mg/dL  --  3 0* 3 1* 3 0* 2 8*  --  2 7* 2 7* 2 9*   PHOSPHORUS mg/dL  --  3 6 3 8  --  2 7  --  3 1 2 4* 2 5*      rest all reviewed    RADIOLOGY:  VAS lower limb venous duplex study, complete bilateral   Final Result by Lucio Washington MD (02/22 2309)      XR chest portable ICU   Final Result by Ember Holt MD (02/20 1416)      No acute cardiopulmonary disease        Findings are stable               Workstation performed: [id number]         CT abdomen pelvis wo contrast   Final Result by Luke Guzman MD (02/20 4720)      No acute pathology  Evaluation is limited in the absence of intravenous contrast enhancement, however metastatic disease appears essentially unchanged, and including dominant hepatic lesion, peritoneal carcinomatosis with moderate moderate ascites  Presumed metastatic    right lower lobe nodules also noted  Workstation performed: [id number]         IR INPATIENT Paracentesis    (Results Pending)     Rest all reviewed    Portions of the record may have been created with voice recognition software  Occasional wrong word or "sound a like" substitutions may have occurred due to the inherent limitations of voice recognition software  Read the chart carefully and recognize, using context, where substitutions have occurred  If you have any questions, please contact the dictating provider

## 2023-03-02 NOTE — PROGRESS NOTES
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)   • Thrombocytopenia (HCC)   • Hypomagnesemia   • BMI 35 0-35 9,adult   • Vitamin D deficiency   • Malignant ascites   • Septic shock (HCC)   • Mesenteric thrombosis (HCC)   • Oropharyngeal candidiasis   • DENZEL (acute kidney injury) ([facility] [address] )   • Metabolic acidosis   • Hyperglycemia   • Elevated INR   • Hepatic encephalopathy     Active issues specifically addressed today include:  Cholangiocarcinoma  Goals of care  Pain management  Nausea/vomiting     Plan:  1  Symptom management - patient continues to be doing well with current regimen              - Magic mouthwash/mouth kote prn              - Carafate 1 g q4              - protonix 40 mg PO daily               - Melatonin 6mg qHS              - Zofran 4mg q8h PRN              - Oxycodone 5mg q4h PRN              - Continue paracenteses PRN, last performed 2/27   -patient experiencing neuropathic pain of bilateral LE's- is interested in trying gabapentin; will discuss with palliative and primary      2  Goals -               - Continue treatment focused care              - He wishes to follow up with Dr Fili Painting for Oncology care instead of Dr Tramaine Mi              - He wishes to continue IR paracentesis as needed due to the symptom benefit     3   Social Support              - Patient's wife continues to visit him regularly, and he feels well supported              - Patient has 1 biological son and two other nonbiological children and is very involved in their life                - NWFLWM and father are still alive                   24h OME: 0        Code Status: Full - Level 1  Decisional apparatus:  Patient is competent on my exam today  If competence is lost, patient's substitute decision maker would default to wife by [place]  Advance Directive / Living Will / POLST:  No ACP documents on file    3  Psychosocial   • Supportive listening provided      Interval history:  Patient seen and examined at bedside  Wife was not present during discussion but continues to visit daily  No acute events overnight  He continues to endorse neuropathic pain of bilateral LE's and is interested in starting gabapentin at this time  Plan to discuss with palliative and primary teams  Patient is eager to return home  He understands, per case management, that he will be discharged to home with home health  Per primary team, patient has been switched from heparin to lovenox  Anticipated discharge date 3/3  Per chart review, nephrology following and recommending continued inpatient treatment today for DENZEL as creatinine remains above patient's baseline  Plan for lasix and albumin today; considering paracentesis       MEDICATIONS / ALLERGIES:     all current active meds have been reviewed and current meds:   Current Facility-Administered Medications   Medication Dose Route Frequency   • al mag oxide-diphenhydramine-lidocaine viscous (MAGIC MOUTHWASH) suspension 10 mL  10 mL Swish & Swallow Q4H PRN   • albumin human (FLEXBUMIN) 25 % injection 12 5 g  12 5 g Intravenous Once   • calcitriol (ROCALTROL) capsule 0 25 mcg  0 25 mcg Oral Daily   • chlorhexidine (PERIDEX) 0 12 % oral rinse 15 mL  15 mL Swish & Spit Q12H [address]   • enoxaparin (LOVENOX) subcutaneous injection 100 mg  1 mg/kg Subcutaneous Q12H [address]   • ergocalciferol (VITAMIN D2) capsule 50,000 Units  50,000 Units Oral Weekly   • furosemide (LASIX) injection 20 mg  20 mg Intravenous Once   • insulin lispro (HumaLOG) 100 units/mL subcutaneous injection 1-6 Units  1-6 Units Subcutaneous TID AC   • melatonin tablet 6 mg  6 mg Oral HS   • midodrine (PROAMATINE) tablet 15 mg  15 mg Oral TID AC   • moisture barrier miconazole 2% cream (aka JOYCE MOISTURE BARRIER ANTIFUNGAL CREAM)   Topical BID   • ondansetron (ZOFRAN) injection 4 mg  4 mg Intravenous Q8H PRN   • oxyCODONE (ROXICODONE) IR tablet 5 mg  5 mg Oral Q4H PRN   • pantoprazole (PROTONIX) EC tablet 40 mg  40 mg Oral Early Morning   • saliva substitute (MOUTH KOTE) mucosal solution 5 spray  5 spray Mouth/Throat 4x Daily PRN   • sucralfate (CARAFATE) tablet 1 g  1 g Oral 4x Daily (AC & HS)       Allergies   Allergen Reactions   • Penicillins Other (See Comments) and Rash     As a child had reaction not sure what it was  OBJECTIVE:    Physical Exam  Physical Exam  Constitutional:       General: He is not in acute distress  Appearance: He is not toxic-appearing or diaphoretic  Comments: Patient lying in chair, A&Ox3     HENT:      Head: Normocephalic and atraumatic  Mouth/Throat:      Mouth: Mucous membranes are moist       Pharynx: Oropharynx is clear  Eyes:      General: No scleral icterus  Right eye: No discharge  Left eye: No discharge  Extraocular Movements: Extraocular movements intact  Conjunctiva/sclera: Conjunctivae normal    Pulmonary:      Effort: Pulmonary effort is normal  No respiratory distress  Abdominal:      General: There is distension  Skin:     Coloration: Skin is not jaundiced  Findings: Bruising (left forearm at previous peripheral IV site) present  Neurological:      General: No focal deficit present  Mental Status: He is alert and oriented to person, place, and time  Psychiatric:         Mood and Affect: Mood normal          Behavior: Behavior normal          Lab Results:   I have personally reviewed pertinent labs  , CBC: No results found for: WBC, HGB, HCT, MCV, PLT, ADJUSTEDWBC, MCH, MCHC, RDW, MPV, NRBC, CMP:   Lab Results   Component Value Date    SODIUM 129 (L) 03/02/2023    K 4 6 03/02/2023     03/02/2023    CO2 18 (L) 03/02/2023    BUN 64 (H) 03/02/2023    CREATININE 1 76 (H) 03/02/2023    CALCIUM 8 2 (L) 03/02/2023    AST 71 (H) 03/02/2023    ALT 37 03/02/2023    ALKPHOS 141 (H) 03/02/2023    EGFR 43 03/02/2023     Imaging Studies: No new imaging since 2/26  EKG, Pathology, and Other Studies: No new studies since 2/21    Counseling / Coordination of Care    Total floor / unit time spent today 15 minutes  Greater than 50% of total time was spent with the patient and / or family counseling and / or coordination of care  A description of the counseling / coordination of care: symptom assessment and management, supportive listening and anticipatory guidance

## 2023-03-02 NOTE — PROGRESS NOTES
[facility]  Progress Note Aixa Ashby 1970, 47 y o  male MRN: [id number]  [address]Bed#: [address] 813-01 Encounter: [id number]  Primary Care Provider: Socorro Vergara MD   Date and time admitted to hospital: 2/20/2023  3:04 AM    * Septic shock [facility])  Assessment & Plan  Present on admission  Shock resolved  Monitor closely    Hepatic encephalopathy  Assessment & Plan  Monitor    Elevated INR  Assessment & Plan  Likely due to metastatic liver disease  Vitamin K supplementation  Monitor INR    DENZEL (acute kidney injury) ([facility] [address] )  Assessment & Plan  Monitor kidney function closely  Avoid nephrotoxins  Nephrology following    Oropharyngeal candidiasis  Assessment & Plan  Received 7 days fluconazole treatment      Mesenteric thrombosis (HCC)  Assessment & Plan  Received IV heparin GTT  discussed with oncology - Patient is recommended Lovenox 1 mg/kg body weight every 12 hours  Lovenox 1 mg/kg body at twice daily  Outpatient oncology follow-up      Malignant ascites  Assessment & Plan  S/p paracentesis  We will request IR for abdominal paracentesis given worsening abdominal distention  Communicated with IR via Tiger connect  Monitor    Thrombocytopenia ([facility] [address] )  Assessment & Plan  Multifactorial  Oncology inputs noted  Monitor counts    GERD (gastroesophageal reflux disease)  Assessment & Plan  Continue PPI    Hyponatremia  Assessment & Plan  Multifactorial  Monitor closely  Nephrology following    Cholangiocarcinoma, stage IV ([facility] [address] )  Assessment & Plan  Oncology inputs noted  Plans for outpatient oncology follow-up noted              VTE Pharmacologic Prophylaxis: VTE Score: 8 High Risk (Score >/= 5) - Pharmacological DVT Prophylaxis Ordered: enoxaparin (Lovenox)  Sequential Compression Devices Ordered  Patient Centered Rounds: I performed bedside rounds with nursing staff today    Discussions with Specialists or Other Care Team Provider: Nephrology, IR    Education and Discussions with Family / Patient: Discussed with the patient, called and updated spouse Luciano Sauceda  Total Time Spent on Date of Encounter in care of patient: 35 minutes This time was spent on one or more of the following: performing physical exam; counseling and coordination of care; obtaining or reviewing history; documenting in the medical record; reviewing/ordering tests, medications or procedures; communicating with other healthcare professionals and discussing with patient's family/caregivers  Current Length of Stay: 10 day(s)  Current Patient Status: Inpatient   Certification Statement: The patient will continue to require additional inpatient hospital stay due to as outlined  Discharge Plan: Awaiting clinical and symptomatic improvement    Code Status: Level 1 - Full Code    Subjective:     Comfortably sitting up in chair  Reports abdominal discomfort and distention  Reports feeling tired and fatigue  Appetite fair to poor      Objective:     Vitals:   Temp (24hrs), Av 4 °F (36 3 °C), Min:97 3 °F (36 3 °C), Max:97 5 °F (36 4 °C)    Temp:  [97 3 °F (36 3 °C)-97 5 °F (36 4 °C)] 97 3 °F (36 3 °C)  HR:  [] 81  Resp:  [16-20] 16  BP: ()/(55-64) 94/55  SpO2:  [96 %-98 %] 98 %  Body mass index is 31 16 kg/m²  Input and Output Summary (last 24 hours):      Intake/Output Summary (Last 24 hours) at 3/2/2023 1518  Last data filed at 3/2/2023 0920  Gross per 24 hour   Intake 240 ml   Output --   Net 240 ml       Physical Exam:   Physical Exam       Comfortably sitting up in chair  Anasarca noted  Neck supple  Lungs diminished breath sounds bilateral  Heart sounds S1 and S2 noted  Fluids soft nontender  Abdominal distention noted  Awake obey simple commands  Edema noted  No rash    Additional Data:     Labs:  Results from last 7 days   Lab Units 23  0547   WBC Thousand/uL 14 63*   HEMOGLOBIN g/dL 12 2   HEMATOCRIT % 36 9   PLATELETS Thousands/uL 50*   NEUTROS PCT % 80*   LYMPHS PCT % 9*   MONOS PCT % 8 EOS PCT % 2     Results from last 7 days   Lab Units 03/02/23  0624   SODIUM mmol/L 129*   POTASSIUM mmol/L 4 6   CHLORIDE mmol/L 104   CO2 mmol/L 18*   BUN mg/dL 64*   CREATININE mg/dL 1 76*   ANION GAP mmol/L 7   CALCIUM mg/dL 8 2*   ALBUMIN g/dL 3 5   TOTAL BILIRUBIN mg/dL 4 77*   ALK PHOS U/L 141*   ALT U/L 37   AST U/L 71*   GLUCOSE RANDOM mg/dL 152*     Results from last 7 days   Lab Units 03/02/23  0624   INR  3 38*     Results from last 7 days   Lab Units 03/02/23  1119 03/02/23  0720 03/01/23  2055 03/01/23  1638 03/01/23  1139 03/01/23  0710 02/28/23  2106 02/28/23  1613 02/28/23  1054 02/28/23  0716 02/27/23  2034 02/27/23  1601   POC GLUCOSE mg/dl 126 109 124 143* 112 123 131 142* 108 106 129 135               Lines/Drains:  Invasive Devices     Central Venous Catheter Line  Duration           Port A Cath Right Subclavian -- days          Peripheral Intravenous Line  Duration           Peripheral IV 02/27/23 Left Antecubital 2 days                Central Line:  Goal for removal: Cholangiocarcinoma             Imaging: Reviewed radiology reports from this admission including: abdominal/pelvic CT    Recent Cultures (last 7 days):         Last 24 Hours Medication List:   Current Facility-Administered Medications   Medication Dose Route Frequency Provider Last Rate   • al mag oxide-diphenhydramine-lidocaine viscous  10 mL Swish & Swallow Q4H PRN Reema Lockwood MD     • calcitriol  0 25 mcg Oral Daily Rachid Gtuhrie MD     • chlorhexidine  15 mL Swish & Spit Q12H [address] Reema Lockwood MD     • enoxaparin  1 mg/kg Subcutaneous Q12H [address] Nilam Teixeira MD     • ergocalciferol  50,000 Units Oral Weekly Reema Lockwood MD     • insulin lispro  1-6 Units Subcutaneous TID Zeeshan Murrell MD     • melatonin  6 mg Oral HS Reema Lockwood MD     • midodrine  15 mg Oral TID AC Reema Lockwood MD     • JOYCE ANTIFUNGAL   Topical BID Reema Lockwood MD     • ondansetron  4 mg Intravenous Q8H PRN Sol Lara MD     • oxyCODONE  5 mg Oral Q4H PRN Sol Lara MD     • pantoprazole  40 mg Oral Early Morning Sol Lara MD     • phytonadione  10 mg Intravenous Once Charlette Adams MD     • saliva substitute  5 spray Mouth/Throat 4x Daily PRN Sol Lara MD     • sucralfate  1 g Oral 4x Daily (AC & HS) Sol Lara MD          Today, Patient Was Seen By: Charlette Adams MD    **Please Note: This note may have been constructed using a voice recognition system  **

## 2023-03-02 NOTE — QUICK NOTE
IR consulted for inpatient paracentesis  INR is currently greater than 3, this needs to be corrected prior to paracentesis to below 3  Will plan for paracentesis hopefully tomorrow once INR is corrected

## 2023-03-02 NOTE — TELEPHONE ENCOUNTER
Appointment Confirmation (to confirm pre existing appointments - ONLY)  No need to route   Who is calling to confirm? Spouse   If someone other than patient is calling, are they listed on the communication consent form?  Yes   Appointment with  Dr Eileen Sr   Appointment date & time  03/15/23 11AM   Appointment location [facility]   Patient verbilized understanding Yes

## 2023-03-02 NOTE — ASSESSMENT & PLAN NOTE
Received IV heparin GTT  discussed with oncology - Patient is recommended Lovenox 1 mg/kg body weight every 12 hours  Lovenox 1 mg/kg body at twice daily  Outpatient oncology follow-up

## 2023-03-02 NOTE — DISCHARGE INSTR - OTHER ORDERS
Skin care plans:  1-Apply JOYCE to sacrum/buttocks/groin BID   2-Moisturize skin daily with skin nourishing cream    3-Elevate heels to offload pressure  4-Ehob cushion when out of bed  5-Turn/repoisiton q2h or when medically stable for pressure re-distribution on skin

## 2023-03-02 NOTE — PROGRESS NOTES
[address] [place] 47 y o  male MRN: [id number]  Unit/Bed#: [facility] 813-01 Encounter: [id number]  Reason for Consult: DENZEL    ASSESSMENT and PLAN:    50-year-old male with past medical history of metastatic cholangiocarcinoma with metastatic disease to the liver, lung, bone, right tonsillar squamous cell carcinoma prior, recurrent ascites requiring paracentesis, initially presents 1 week prior with abdominal pain, nausea, poor p o  intake   Nephrology is on board for acute kidney injury     1- acute kidney injury     - Baseline creatinine 0 6 - 0 8 mg/dL  - Creatinine on admission  2 3 mg/dL and peak creatinine 2 6 mg/dL  -Acute kidney injury etiology- hypotension/ATN possibly chemotherapy related (ivosidenib prior and pemigatinib prior)/prerenal etiology, subsequent second injury from increased abdominal pressures/hypotension/large-volume paracentesis/possible biliary nephropathy from bile cast?/Urinary retention  - CT scan without hydronephrosis  - Urinalysis 20-50 hyaline casts, no hematuria, no proteinuria  - Urine sodium less than 5  - Initially Harvey catheter was placed for monitoring urine output and possible retention  - Initially received volume expansion as there was concern for septic shock  - Dialysis consent was obtained by Chloe Pearson on 2/20 but has not needed HD as of yet  - 2/27-was recommended albumin challenge and holding diuretics  - 2/28-creatinine stabilizing 1 5 mg/dL   Maintained on midodrine   Albumin continued today  - 3/1-creatinine stable 1 5 mg/dL  Bicarbonate improving 18  Sodium level stable 130  Magnesium slightly higher  Calcium slowly improving 7 8   - 3/2-calcium improving 8 2  Sodium slightly lower 129  Creatinine slightly higher 1 7  Magnesium slightly higher at 3 1  At this juncture, patient's creatinine is still higher than baseline and is appearing to have worsening volume state  Will give albumin and Lasix    May have worsening edema which could be contributing to renal failure also and hyponatremia likely due to volume overload  Rising creatinine may also be due to abdominal distention and increased ascites     Plan  - I/os; avoid nephrotoxic agents  - Continue midodrine due to hypotension  - Check BMP and magnesium in a m   - Continue low-dose calcitriol for this week  - Give Lasix and albumin   - Evaluation of ascites per primary team  - Reviewed with primary team attending regarding Lasix and albumin today  Paracentesis evaluation-defer to primary team and reviewed recommendation regarding albumin with high-dose paracentesis  INR slowly rising, per primary team     - Albumin given and blood pressure still remain marginal 94 systolic  We will give another 12 5 g of albumin  Hold Lasix for now  Reviewed with nursing team and also with primary team attending      2 -electrolytes     - Initially with hyperkalemia   Patient was not taking potassium supplements at home   Potassium levels have stabilized appropriately  - Initially received medical management for hyperkalemia  - Hyperkalemia resolved     - Hyponatremia-initial sodium 118 with rapid improvement after volume expansion and therefore patient was changed to hypotonic fluids  - Sodium level has stabilized appropriately  - Sodium level slightly decreased 130 on 2/28   Monitoring for now  - Hyponatremia slowly decreasing sodium 129 3/2  Is also volume overloaded  Give Lasix and albumin      - Hypophosphatemia- on phosphorus supplements initially phosphorus has improved  - Phosphorus appropriate 2/28     - Hypermagnesemia-repeat magnesium level in a m  and see plan above start Lasix and albumin today        - Hypocalcemia-improving on  3/1     3-acid/base     - Initially with lactic acidosis and required volume expansion and bicarbonate based fluids   Also lactic acidosis likely component of liver disease   - Patient also has respiratory alkalosis  - Last pH was not low   Therefore will not start sodium bicarbonate tablets for now  - Bicarbonate stable 18 on 3/2     4- hypotension     - Initially started on empiric antibiotics, volume expansion, albumin  - Echocardiogram- EF 65%, mild concentric hypertrophy, study quality was poor  - Source of shock unclear- cultures unrevealing so far  - Initially required vasopressors also  - Currently on midodrine high-dose     5- metastatic cholangiocarcinoma-     -MRI with thrombosis of splenic, superior mesenteric and portal vein  - Peritoneal carcinomatosis with recurrent ascites requiring paracentesis-Per primary team  - Last paracentesis on 2/26 with 6 6 L removed  - Thrombosis-management per primary team   On heparin drip      6- vitamin D deficiency- vitamin D supplement recommended   Was also advised for calcitriol 1 mcg daily     7-thrombocytopenia- Per hematology and primary team     Coagulopathy management per primary team     7-dnhozaxvpcgaow-yswuv patient appears to be improved   Per prior notes, it does appear there was concern for possible hepatic encephalopathy   Management per primary team      9-oral candidiasis-Per primary team     10- volume- error in yest note about 'cellulitis'      - pt is with edema LE  Lungs clear  See above  -Give Lasix and albumin 3/2  -May need to consider paracentesis this week       SUBJECTIVE / 24H INTERVAL HISTORY:    Blood pressure is 06-0 10 systolic  Afebrile  Weight is unchanged on standing scale 104 kg  Patient denies new complaints  Still with edema lower extremities      OBJECTIVE:  Current Weight: Weight - Scale: 104 kg (229 lb 11 5 oz)  Vitals:    03/01/23 1617 03/01/23 2207 03/02/23 0600 03/02/23 0712   BP: 96/59 110/64  101/58   Pulse: 86 101  81   Resp: 16 20  16   Temp: 97 5 °F (36 4 °C) 97 5 °F (36 4 °C)  (!) 97 3 °F (36 3 °C)   TempSrc:       SpO2: 96% 97%  98%   Weight:   104 kg (229 lb 11 5 oz)    Height:           Intake/Output Summary (Last 24 hours) at 3/2/2023 1034  Last data filed at 3/2/2023 0920  Gross per 24 hour   Intake 259 73 ml   Output --   Net 259 73 ml     General: NAD  Skin: no rash  Eyes: anicteric sclera  ENT: Dry mucous membrane  Neck: supple  Chest: CTA b/l, no ronchii, no wheeze, no rubs, no rales  CVS: s1s2, no murmur, no gallop, no rub  Abdomen: soft, distended, nl sounds  Extremities: 2+ edema LE b/l with erythema lower extremities unchanged  : no davison  Neuro: AAOX3  Psych: normal affect    Medications:    Current Facility-Administered Medications:   •  al mag oxide-diphenhydramine-lidocaine viscous (MAGIC MOUTHWASH) suspension 10 mL, 10 mL, Swish & Swallow, Q4H PRN, Reema Lockwood MD, 10 mL at 02/26/23 1232  •  calcitriol (ROCALTROL) capsule 0 25 mcg, 0 25 mcg, Oral, Daily, Stanaly Spray, MD, 0 25 mcg at 03/02/23 0804  •  chlorhexidine (PERIDEX) 0 12 % oral rinse 15 mL, 15 mL, Swish & Spit, Q12H [address], Reema Lockwood MD, 15 mL at 03/02/23 0804  •  enoxaparin (LOVENOX) subcutaneous injection 100 mg, 1 mg/kg, Subcutaneous, Q12H [address], Nilam Teixeira MD, 100 mg at 03/02/23 5785  •  ergocalciferol (VITAMIN D2) capsule 50,000 Units, 50,000 Units, Oral, Weekly, Reema Lockwood MD, 50,000 Units at 03/02/23 0804  •  insulin lispro (HumaLOG) 100 units/mL subcutaneous injection 1-6 Units, 1-6 Units, Subcutaneous, TID AC, 1 Units at 02/27/23 1119 **AND** Fingerstick Glucose (POCT), , , TID Reema HERNANDEZ MD  •  melatonin tablet 6 mg, 6 mg, Oral, HS, Reema Lockwood MD, 6 mg at 03/01/23 2109  •  midodrine (PROAMATINE) tablet 15 mg, 15 mg, Oral, TID Reema HERNANDEZ MD, 15 mg at 03/02/23 0606  •  moisture barrier miconazole 2% cream (aka JOYCE MOISTURE BARRIER ANTIFUNGAL CREAM), , Topical, BID, Reema Lockwood MD, Given at 03/02/23 0805  •  ondansetron (ZOFRAN) injection 4 mg, 4 mg, Intravenous, Q8H PRN, Reema Lockwood MD, 4 mg at 02/22/23 0006  •  oxyCODONE (ROXICODONE) IR tablet 5 mg, 5 mg, Oral, Q4H PRN, Reema Lockwood MD  • pantoprazole (PROTONIX) EC tablet 40 mg, 40 mg, Oral, Early Morning, Rere Lauren MD, 40 mg at 03/02/23 0606  •  saliva substitute (MOUTH KOTE) mucosal solution 5 spray, 5 spray, Mouth/Throat, 4x Daily PRN, Rere Lauren MD, 5 spray at 02/25/23 2227  •  sucralfate (CARAFATE) tablet 1 g, 1 g, Oral, 4x Daily (AC & HS), Rere Lauren MD, 1 g at 03/02/23 0606    Laboratory Results:  Results from last 7 days   Lab Units 03/02/23  0624 03/01/23  0547 02/28/23  0428 02/28/23  0000 02/27/23  0516 02/26/23  0558 02/25/23  0554 02/24/23  0435   WBC Thousand/uL  --  14 63*  --  13 30* 15 32* 19 65* 16 16* 20 95*   HEMOGLOBIN g/dL  --  12 2  --  11 4* 11 8* 13 0 11 6* 12 5   HEMATOCRIT %  --  36 9  --  32 7* 34 0* 36 9 34 8* 36 4*   PLATELETS Thousands/uL  --  50*  --  46* 50* 75* 72* 77*   POTASSIUM mmol/L 4 6 4 4 4 1  --  4 0 4 1 4 0 3 8   CHLORIDE mmol/L 104 102 103  --  106 108 109* 108   CO2 mmol/L 18* 18* 17*  --  17* 16* 18* 18*   BUN mg/dL 64* 62* 58*  --  53* 47* 35* 27*   CREATININE mg/dL 1 76* 1 50* 1 54*  --  1 51* 1 43* 0 92 0 55*   CALCIUM mg/dL 8 2* 7 8* 7 4*  --  6 9* 7 2* 7 2* 6 7*   MAGNESIUM mg/dL 3 1* 3 0* 2 8*  --  2 7* 2 7* 2 9* 2 8*   PHOSPHORUS mg/dL 3 8  --  2 7  --  3 1 2 4* 2 5* 1 5*

## 2023-03-02 NOTE — CASE MANAGEMENT
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   • Oropharyngeal candidiasis 2023   • DENZEL (acute kidney injury) ([address] )    • Metabolic acidosis    • Hyperglycemia 2023   • Elevated INR 2023   • Hepatic encephalopathy 2023   • Septic shock ([address] ) 2023   • Malignant ascites 2023   • Pulmonary nodules 2023   • Malignant neoplasm metastatic to both lungs ([address] ) 2023   • 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 ([facility] ) 2023   • Thrombocytopenia ([facility] ) 2023   • Hypomagnesemia 2023   • BMI 35 0-35 9,adult 2023   • Vitamin D deficiency 2023   • GERD (gastroesophageal reflux disease) 2023   • Nausea and vomiting 2023   • Ascites 2023   • Lytic bone lesion of hip 2023   • Hyponatremia 2023   • Cholangiocarcinoma, stage IV ([facility] ) 2020   • Tobacco use disorder 2019   • Spinal accessory neuropathy 2018   • History of tongue cancer 2018   • Squamous cell carcinoma of tongue ([address] ) 2018      LOS (days): 10  Geometric Mean LOS (GMLOS) (days): 5 00  Days to GMLOS:-5 1     OBJECTIVE:  Risk of Unplanned Readmission Score: 37 18         Current admission status: Inpatient   Preferred Pharmacy:   [address] [place] [address] #[id number] Ryan Cooper [address] [place] [address]  Phone: [phone] Fax: [phone]    Primary Care Provider: Bryan Glasgow MD    Primary Insurance: BLUE CROSS  Secondary Insurance:     DISCHARGE DETAILS:    Discharge planning discussed with[de-identified] Pt  Freedom of Choice: Yes  Comments - Freedom of Choice: Pt agreeable to home PT referral being made to [facility]  Requested [facility] [employer]         Is the patient interested in [facility] at discharge?: Yes  [address] requested[de-identified] Physical [address] Name[de-identified] [address] [place] [address] Provider[de-identified] PCP  Home Health Services Needed[de-identified] Evaluate Functional Status and Safety, Strengthening/Theraputic Exercises to Improve Function  Homebound Criteria Met[de-identified] Requires the Assistance of Another Person for Safe Ambulation or to Leave the Home  Supporting Clincal Findings[de-identified] Limited Endurance    DME Referral Provided  Referral made for DME?: Yes  DME referral completed for the following items[de-identified] Memo Menendez  DME Supplier Name[de-identified] [employer]              Treatment Team Recommendation: Home with [address] [employer]  Discharge Destination Plan[de-identified] Home with [address] [employer]

## 2023-03-02 NOTE — PLAN OF CARE
Problem: Prexisting or High Potential for Compromised Skin Integrity  Goal: Skin integrity is maintained or improved  Description: INTERVENTIONS:  - Identify patients at risk for skin breakdown  - Assess and monitor skin integrity  - Assess and monitor nutrition and hydration status  - Monitor labs   - Assess for incontinence   - Turn and reposition patient  - Assist with mobility/ambulation  - Relieve pressure over bony prominences  - Avoid friction and shearing  - Provide appropriate hygiene as needed including keeping skin clean and dry  - Evaluate need for skin moisturizer/barrier cream  - Collaborate with interdisciplinary team   - Patient/family teaching  - Consider wound care consult   3/2/2023 1042 by Duran Ignacio RN  Outcome: Progressing  3/2/2023 1042 by Duran Ignacio RN  Outcome: Progressing     Problem: CARDIOVASCULAR - ADULT  Goal: Maintains optimal cardiac output and hemodynamic stability  Description: INTERVENTIONS:  - Monitor I/O, vital signs and rhythm  - Monitor for S/S and trends of decreased cardiac output  - Administer and titrate ordered vasoactive medications to optimize hemodynamic stability  - Assess quality of pulses, skin color and temperature  - Assess for signs of decreased coronary artery perfusion  - Instruct patient to report change in severity of symptoms  3/2/2023 1042 by Duran Ignacio RN  Outcome: Progressing  3/2/2023 1042 by Duran Ignacio RN  Outcome: Progressing  Goal: Absence of cardiac dysrhythmias or at baseline rhythm  Description: INTERVENTIONS:  - Continuous cardiac monitoring, vital signs, obtain 12 lead EKG if ordered  - Administer antiarrhythmic and heart rate control medications as ordered  - Monitor electrolytes and administer replacement therapy as ordered  3/2/2023 1042 by Duran Ignacio RN  Outcome: Progressing  3/2/2023 1042 by Duran Ignacio RN  Outcome: Progressing

## 2023-03-03 PROBLEM — Z71.89 GOALS OF CARE, COUNSELING/DISCUSSION: Status: ACTIVE | Noted: 2023-03-03

## 2023-03-03 NOTE — ASSESSMENT & PLAN NOTE
Patient is progressing metastatic cholangiocarcinoma  Discussed with patient and spouse at bedside overall guarded prognosis  Discussed with oncology  Oncology has reiterated poor prognosis, patient reports he understands his guarded prognosis but would like to pursue active treatment at this time and plans to follow-up with oncology on discharge for further cares  Oncology inputs noted

## 2023-03-03 NOTE — UTILIZATION REVIEW
Continued Stay Review    Date: 03/03             Current Patient Class: IP Current Level of Care: MS    HPI:52 y o  male initially admitted on 02/20    Assessment/Plan:   Med Onc Notes: pt off pressors, on Midodrine tid  BP on the lower side w/ MAPs in the 60s  S/p paracentesis this am w/ removal of 6L of ascitic fld  Pt's T bili trending up  He has no intrahepatic biliary duct dilatation  Therefore not stentable  He has persistent thrombocytopenia and rapid accumulation of ascitic fld requiring frequent  Paracentesis, concerning for worsening tumor burden in the liver  Discussed concern for por prognosis  Prt wants to pursue cont anticancer tx  Has an appt w/ OP  Nephrology Notes: pt's Creatinine up to 2 06 today  albumin 25 g IV every 6 x24 hours and Bumex 2 mg IV x1 today check BMP in a m  Karrie Nissen Await renal recovery  Not an ideal candidate for dialysis based on his comorbidities  Vital Signs: BP (!) 96/48   Pulse 83   Temp (!) 97 3 °F (36 3 °C)   Resp 16   Ht 6' (1 829 m)   Wt 104 kg (229 lb 0 9 oz)   SpO2 98%   BMI 31 07 kg/m²       Pertinent Labs/Diagnostic Results:       Results from last 7 days   Lab Units 03/03/23  0820 03/01/23  0547 02/28/23  0000 02/27/23  0516 02/26/23  0558 02/26/23  0558   WBC Thousand/uL 12 31* 14 63* 13 30* 15 32*  --  19 65*   HEMOGLOBIN g/dL 11 4* 12 2 11 4* 11 8*  --  13 0   HEMATOCRIT % 34 8* 36 9 32 7* 34 0*  --  36 9   PLATELETS Thousands/uL 48* 50* 46* 50*  --  75*   NEUTROS ABS Thousands/µL 10 41* 11 91* 10 66* 11 89*   < >  --     < > = values in this interval not displayed           Results from last 7 days   Lab Units 03/03/23  0608 03/02/23  6690 03/01/23  0547 02/28/23  0430 02/28/23  0428 02/27/23  0516 02/26/23  0558 02/25/23  0554   SODIUM mmol/L 128* 129* 130*  --  130* 132* 133* 134*   POTASSIUM mmol/L 4 5 4 6 4 4  --  4 1 4 0 4 1 4 0   CHLORIDE mmol/L 103 104 102  --  103 106 108 109*   CO2 mmol/L 17* 18* 18*  --  17* 17* 16* 18*   ANION GAP mmol/L 8 7 10  --  10 9 9 7   BUN mg/dL 74* 64* 62*  --  58* 53* 47* 35*   CREATININE mg/dL 2 06* 1 76* 1 50*  --  1 54* 1 51* 1 43* 0 92   EGFR ml/min/1 73sq m 36 43 53  --  51 52 56 95   CALCIUM mg/dL 8 2* 8 2* 7 8*  --  7 4* 6 9* 7 2* 7 2*   CALCIUM, IONIZED mmol/L  --   --   --  0 99*  --  0 96* 0 98* 0 99*   MAGNESIUM mg/dL 3 0* 3 1* 3 0*  --  2 8* 2 7* 2 7* 2 9*   PHOSPHORUS mg/dL 3 6 3 8  --   --  2 7 3 1 2 4* 2 5*     Results from last 7 days   Lab Units 03/03/23  0608 03/02/23  0624 03/01/23  0547 02/28/23  0428 02/27/23  0516   AST U/L 98* 71* 76* 62* 57*   ALT U/L 39 37 41 39 42   ALK PHOS U/L 110 141* 129* 115 118*   TOTAL PROTEIN g/dL 5 0* 5 2* 5 3* 5 0* 4 5*   ALBUMIN g/dL 3 3* 3 5 3 8 3 4* 3 2*   TOTAL BILIRUBIN mg/dL 6 15* 4 77* 5 01* 3 95* 3 25*     Results from last 7 days   Lab Units 03/03/23  1104 03/03/23  0753 03/02/23  2108 03/02/23  1642 03/02/23  1119 03/02/23  0720 03/01/23  2055 03/01/23  1638 03/01/23  1139 03/01/23  0710 02/28/23  2106 02/28/23  1613   POC GLUCOSE mg/dl 129 228* 115 121 126 109 124 143* 112 123 131 142*     Results from last 7 days   Lab Units 03/03/23  0608 03/02/23  0624 03/01/23  0547 02/28/23  0428 02/27/23  0516 02/26/23  0558 02/25/23  0554   GLUCOSE RANDOM mg/dL 101 152* 116 111 105 111 105     Results from last 7 days   Lab Units 02/26/23  0559 02/25/23  1158   PH ART  7 422 7 430   PCO2 ART mm Hg 25 3* 22 5*   PO2 ART mm Hg 94 6 91 3   HCO3 ART mmol/L 16 1* 14 6*   BASE EXC ART mmol/L -6 5 -7 7   O2 CONTENT ART mL/dL 18 7 17 7   O2 HGB, ARTERIAL % 96 5 96 1   ABG SOURCE  Line, Arterial Line, Arterial     Results from last 7 days   Lab Units 02/25/23  1158   PH SOLO  7 365   PCO2 SOLO mm Hg 30 9*   PO2 SOLO mm Hg 37 4   HCO3 SOLO mmol/L 17 3*   BASE EXC SOLO mmol/L -6 9   O2 CONTENT SOLO ml/dL 13 1   O2 HGB, VENOUS % 71 8     Results from last 7 days   Lab Units 03/03/23  0608 03/02/23  0624 03/01/23  0547 02/28/23  1549 02/28/23  0945   PROTIME seconds 28 3* 34 4* 30 2*  --   -- INR  2 62* 3 38* 2 86*  --   --    PTT seconds  --   --  72* 79* 87*       Results from last 7 days   Lab Units 02/25/23  2242   CLARITY UA  Turbid   COLOR UA  [place]   SPEC GRAV UA  1 022   PH UA  5 0   GLUCOSE UA mg/dl Negative   KETONES UA mg/dl Trace*   BLOOD UA  Large*   PROTEIN UA mg/dl 50 (1+)*   NITRITE UA  Negative   BILIRUBIN UA  Small*   UROBILINOGEN UA (BE) mg/dl <2 0   LEUKOCYTES UA  Moderate*   WBC UA /hpf 30-50*   RBC UA /hpf Innumerable*   BACTERIA UA /hpf None Seen   EPITHELIAL CELLS WET PREP /hpf Occasional   MUCUS THREADS  Occasional*           Medications:   Scheduled Medications: Albumin 25%, 25 g, Intravenous, Q6H  bumetanide, 2 mg, Intravenous, Once  chlorhexidine, 15 mL, Swish & Spit, Q12H JEMMA  enoxaparin, 1 mg/kg, Subcutaneous, Q12H JEMMA  ergocalciferol, 50,000 Units, Oral, Weekly  gabapentin, 200 mg, Oral, BID  insulin lispro, 1-6 Units, Subcutaneous, TID AC  melatonin, 6 mg, Oral, HS  midodrine, 15 mg, Oral, TID AC  JOYCE ANTIFUNGAL, , Topical, BID  pantoprazole, 40 mg, Oral, Early Morning  sucralfate, 1 g, Oral, 4x Daily (AC & HS)      Continuous IV Infusions: none     PRN Meds:  al mag oxide-diphenhydramine-lidocaine viscous, 10 mL, Swish & Swallow, Q4H PRN  ondansetron, 4 mg, Intravenous, Q8H PRN  oxyCODONE, 5 mg, Oral, Q4H PRN  saliva substitute, 5 spray, Mouth/Throat, 4x Daily PRN        Discharge Plan: D    Network Utilization Review Department  ATTENTION: Please call with any questions or concerns to [phone] and carefully listen to the prompts so that you are directed to the right person  All voicemails are confidential   Rosa Wolf all requests for admission clinical reviews, approved or denied determinations and any other requests to dedicated fax number below belonging to the campus where the patient is receiving treatment   List of dedicated fax numbers for the Facilities:  FACILITY NAME UR FAX NUMBER   ADMISSION DENIALS (Administrative/Medical Necessity) [phone]   [facility] [address] (Maternity/NICU/Pediatrics) [address] [facility] [place] [address] [phone]   [address] [place] [address] [facility] 310 [facility] 134 [address] [place] [phone]

## 2023-03-03 NOTE — QUICK NOTE
I called Mr Ashby's wife, Ms Chanda Sommers, to provide an update from oncology standpoint  I relayed my concern regarding rapid progression of his tumor burden  I explained that we discussed our concern with Mr Ashby earlier today; although he understands his poor prognosis, he would like to continue to pursue any potential systemic therapies  When patient sees Dr Tino Smith in the OP setting on 3/15, FOLFOX can be considered, atlhough it may require dose reductions based on his renal function, etc  As for continuing the pemigatinib, he would require dose reduction to safely continue - I advise that the patient hold off on taking it till he meets with Dr Tino Smith on 3/15   Wife voiced understanding of the overall poor prognosis; she would like to continue supporting her 's decision of pursuing next line of systemic therapy once he is discharged from the hospital

## 2023-03-03 NOTE — SEDATION DOCUMENTATION
Left side paracentesis performed by Garima Esparza PA-C  3542 of clear eric fluid drained  Pt tolerated well  Ultrasound guided  No labs ordered  Puncture site is CDI and covered with exofin glue  Report called to receiving RN

## 2023-03-03 NOTE — PLAN OF CARE
Problem: PHYSICAL THERAPY ADULT  Goal: Performs mobility at highest level of function for planned discharge setting  See evaluation for individualized goals  Description: Treatment/Interventions: Functional transfer training, Elevations, LE strengthening/ROM, Therapeutic exercise, Endurance training, Patient/family training, Bed mobility, Equipment eval/education, Gait training, OT, Spoke to nursing  Equipment Recommended:  (will cont to assess)       See flowsheet documentation for full assessment, interventions and recommendations  Outcome: Progressing  Note: Prognosis: Fair  Problem List: Decreased endurance  Assessment: Pt seen for PT treatment session this date  Therapy session focused on functional transfers, and ambulation in order to improve overall mobility and independence  Pt requires supervision for ambulation, able to tolerate 90' using RW, slow but steady gait  Pt also negotiated up and down stairs with 1HR, HHA on other side, has 2 HR at home  Pt states spouse will be able to assist as needed  Pt making steady progress toward goals  Pt was left in room at the end of PT session with all needs in reach  Pt would benefit from continued PT services while in hospital to address remaining limitations  The patient's AM-PAC Basic Mobility Inpatient Short Form Raw Score is 23  A Raw score of greater than 16 suggests the patient may benefit from discharge to home  Please also refer to the recommendation of the Physical Therapist for safe discharge planning  PT Discharge Recommendation: Home with home health rehabilitation    See flowsheet documentation for full assessment

## 2023-03-03 NOTE — PROGRESS NOTES
[address] [facility]  Progress Note Radha Ashby 1970, 47 y o  male MRN: [id number]  Unit/Bed#: [facility] 813-01 Encounter: [id number]  Primary Care Provider: Yovani Damon MD   Date and time admitted to hospital: 2/20/2023  3:04 AM    * Septic shock [facility])  Assessment & Plan  Present on admission  Shock resolved  Monitor closely    Cholangiocarcinoma, stage IV [facility])  Assessment & Plan  Patient is progressing metastatic cholangiocarcinoma  Discussed with patient and spouse at bedside overall guarded prognosis  Discussed with oncology  Oncology has reiterated poor prognosis, patient reports he understands his guarded prognosis but would like to pursue active treatment at this time and plans to follow-up with oncology on discharge for further cares  Oncology inputs noted    Goals of care, counseling/discussion  Assessment & Plan  Patient with metastatic coronary carcinoma worsening kidney function liver function tests  Overall guarded prognosis  Patient and spouse at bedside    Patient understands overall guarded prognosis however he would like to pursue active treatment and plans on following oncology outpatient for further cares    Hepatic encephalopathy  Assessment & Plan  Monitor    Elevated INR  Assessment & Plan  Likely due to metastatic liver disease  Vitamin K supplementation  Monitor INR    DENZEL (acute kidney injury) ([facility] [address] )  Assessment & Plan  Monitor kidney function closely  Avoid nephrotoxins  Nephrology following    Oropharyngeal candidiasis  Assessment & Plan  Received 7 days fluconazole treatment      Mesenteric thrombosis (HCC)  Assessment & Plan  Received IV heparin GTT  discussed with oncology - Patient is recommended Lovenox 1 mg/kg body weight every 12 hours  Lovenox 1 mg/kg body at twice daily  Outpatient oncology follow-up      Malignant ascites  Assessment & Plan  S/p paracentesis  Monitor    Thrombocytopenia [facility])  Assessment & Plan  Multifactorial  Oncology inputs noted  Monitor counts    GERD (gastroesophageal reflux disease)  Assessment & Plan  Continue PPI    Hyponatremia  Assessment & Plan  Multifactorial  Monitor closely  Nephrology following          VTE Pharmacologic Prophylaxis: VTE Score: 8 High Risk (Score >/= 5) - Pharmacological DVT Prophylaxis Ordered: enoxaparin (Lovenox)  Sequential Compression Devices Ordered  Patient Centered Rounds: I performed bedside rounds with nursing staff today  Discussions with Specialists or Other Care Team Provider: Medical oncology, nephrology    Education and Discussions with Family / Patient: Discussed with the patient, spouse at bedside updated in detail questions answered  Total Time Spent on Date of Encounter in care of patient: 45 minutes This time was spent on one or more of the following: performing physical exam; counseling and coordination of care; obtaining or reviewing history; documenting in the medical record; reviewing/ordering tests, medications or procedures; communicating with other healthcare professionals and discussing with patient's family/caregivers  Current Length of Stay: 11 day(s)  Current Patient Status: Inpatient   Certification Statement: The patient will continue to require additional inpatient hospital stay due to As outlined  Discharge Plan: Awaiting clinical and symptomatic improvement    Code Status: Level 1 - Full Code    Subjective:     Sitting up in chair  Reports feeling tired and fatigue  Poor appetite  Asthenia      Objective:     Vitals:   Temp (24hrs), Av 3 °F (36 3 °C), Min:97 2 °F (36 2 °C), Max:97 5 °F (36 4 °C)    Temp:  [97 2 °F (36 2 °C)-97 5 °F (36 4 °C)] 97 3 °F (36 3 °C)  HR:  [80-96] 82  Resp:  [16-18] 16  BP: ()/(48-55) 93/49  SpO2:  [97 %-100 %] 99 %  Body mass index is 31 07 kg/m²  Input and Output Summary (last 24 hours):      Intake/Output Summary (Last 24 hours) at 3/3/2023 2080  Last data filed at 3/3/2023 1019  Gross per 24 hour   Intake -- Output 6050 ml   Net -6050 ml       Physical Exam:   Physical Exam     Sitting up in chair  Anasarca noted  Features of protein calorie malnutrition noted  Neck supple  Lungs diminished breath sounds bilaterally  Heart sounds S1 and S2 noted  Abdomen soft, distended  Nontender  Awake obey simple commands  Pedal edema noted  No rash      Additional Data:     Labs:  Results from last 7 days   Lab Units 03/03/23  0820   WBC Thousand/uL 12 31*   HEMOGLOBIN g/dL 11 4*   HEMATOCRIT % 34 8*   PLATELETS Thousands/uL 48*   NEUTROS PCT % 85*   LYMPHS PCT % 5*   MONOS PCT % 7   EOS PCT % 2     Results from last 7 days   Lab Units 03/03/23  0608   SODIUM mmol/L 128*   POTASSIUM mmol/L 4 5   CHLORIDE mmol/L 103   CO2 mmol/L 17*   BUN mg/dL 74*   CREATININE mg/dL 2 06*   ANION GAP mmol/L 8   CALCIUM mg/dL 8 2*   ALBUMIN g/dL 3 3*   TOTAL BILIRUBIN mg/dL 6 15*   ALK PHOS U/L 110   ALT U/L 39   AST U/L 98*   GLUCOSE RANDOM mg/dL 101     Results from last 7 days   Lab Units 03/03/23  0608   INR  2 62*     Results from last 7 days   Lab Units 03/03/23  1541 03/03/23  1104 03/03/23  0753 03/02/23  2108 03/02/23  1642 03/02/23  1119 03/02/23  0720 03/01/23  2055 03/01/23  1638 03/01/23  1139 03/01/23  0710 02/28/23  2106   POC GLUCOSE mg/dl 132 129 228* 115 121 126 109 124 143* 112 123 131               Lines/Drains:  Invasive Devices     Central Venous Catheter Line  Duration           Port A Cath Right Subclavian -- days                Central Line:  Goal for removal: Metastatic cholangiocarcinoma             Imaging: Reviewed radiology reports from this admission including: abdominal/pelvic CT and MRI abdomen/MRCP    Recent Cultures (last 7 days):         Last 24 Hours Medication List:   Current Facility-Administered Medications   Medication Dose Route Frequency Provider Last Rate   • al mag oxide-diphenhydramine-lidocaine viscous  10 mL Swish & Swallow Q4H PRN Lucero Baker MD     • Albumin 25%  25 g Intravenous Q6H Lawanda Rab Tammy Lackey MD     • bumetanide  2 mg Intravenous Once Freda Madera MD     • chlorhexidine  15 mL Swish & Spit Q12H [facility] Danni Morales MD     • enoxaparin  1 mg/kg Subcutaneous Q12H [facility] Tonny Chacon MD     • ergocalciferol  50,000 Units Oral Weekly Danni Morales MD     • gabapentin  200 mg Oral BID Caroline Johnston MD     • insulin lispro  1-6 Units Subcutaneous TID AC Danni Morales MD     • melatonin  6 mg Oral HS Danni Morales MD     • midodrine  15 mg Oral TID AC Danni Morales MD     • JOYCE ANTIFUNGAL   Topical BID Danni Morales MD     • ondansetron  4 mg Intravenous Q8H PRN [place]juven[place] Morales MD     • oxyCODONE  5 mg Oral Q4H PRN [place] MD Andrew     • pantoprazole  40 mg Oral Early Morning Danni Morales MD     • saliva substitute  5 spray Mouth/Throat 4x Daily PRN Dannijuvencio Morales MD     • sucralfate  1 g Oral 4x Daily (AC & HS) Danni Morales MD          Today, Patient Was Seen By: Tonny Chacon MD    **Please Note: This note may have been constructed using a voice recognition system  **

## 2023-03-03 NOTE — PLAN OF CARE
Problem: Prexisting or High Potential for Compromised Skin Integrity  Goal: Skin integrity is maintained or improved  Description: INTERVENTIONS:  - Identify patients at risk for skin breakdown  - Assess and monitor skin integrity  - Assess and monitor nutrition and hydration status  - Monitor labs   - Assess for incontinence   - Turn and reposition patient  - Assist with mobility/ambulation  - Relieve pressure over bony prominences  - Avoid friction and shearing  - Provide appropriate hygiene as needed including keeping skin clean and dry  - Evaluate need for skin moisturizer/barrier cream  - Collaborate with interdisciplinary team   - Patient/family teaching  - Consider wound care consult   Outcome: Progressing     Problem: CARDIOVASCULAR - ADULT  Goal: Maintains optimal cardiac output and hemodynamic stability  Description: INTERVENTIONS:  - Monitor I/O, vital signs and rhythm  - Monitor for S/S and trends of decreased cardiac output  - Administer and titrate ordered vasoactive medications to optimize hemodynamic stability  - Assess quality of pulses, skin color and temperature  - Assess for signs of decreased coronary artery perfusion  - Instruct patient to report change in severity of symptoms  Outcome: Progressing  Goal: Absence of cardiac dysrhythmias or at baseline rhythm  Description: INTERVENTIONS:  - Continuous cardiac monitoring, vital signs, obtain 12 lead EKG if ordered  - Administer antiarrhythmic and heart rate control medications as ordered  - Monitor electrolytes and administer replacement therapy as ordered  Outcome: Progressing     Problem: GASTROINTESTINAL - ADULT  Goal: Minimal or absence of nausea and/or vomiting  Description: INTERVENTIONS:  - Administer IV fluids if ordered to ensure adequate hydration  - Maintain NPO status until nausea and vomiting are resolved  - Nasogastric tube if ordered  - Administer ordered antiemetic medications as needed  - Provide nonpharmacologic comfort measures as appropriate  - Advance diet as tolerated, if ordered  - Consider nutrition services referral to assist patient with adequate nutrition and appropriate food choices  Outcome: Progressing  Goal: Maintains or returns to baseline bowel function  Description: INTERVENTIONS:  - Assess bowel function  - Encourage oral fluids to ensure adequate hydration  - Administer IV fluids if ordered to ensure adequate hydration  - Administer ordered medications as needed  - Encourage mobilization and activity  - Consider nutritional services referral to assist patient with adequate nutrition and appropriate food choices  Outcome: Progressing  Goal: Maintains adequate nutritional intake  Description: INTERVENTIONS:  - Monitor percentage of each meal consumed  - Identify factors contributing to decreased intake, treat as appropriate  - Assist with meals as needed  - Monitor I&O, weight, and lab values if indicated  - Obtain nutrition services referral as needed  Outcome: Progressing     Problem: METABOLIC, FLUID AND ELECTROLYTES - ADULT  Goal: Electrolytes maintained within normal limits  Description: INTERVENTIONS:  - Monitor labs and assess patient for signs and symptoms of electrolyte imbalances  - Administer electrolyte replacement as ordered  - Monitor response to electrolyte replacements, including repeat lab results as appropriate  - Instruct patient on fluid and nutrition as appropriate  Outcome: Progressing  Goal: Fluid balance maintained  Description: INTERVENTIONS:  - Monitor labs   - Monitor I/O and WT  - Instruct patient on fluid and nutrition as appropriate  - Assess for signs & symptoms of volume excess or deficit  Outcome: Progressing  Goal: Glucose maintained within target range  Description: INTERVENTIONS:  - Monitor Blood Glucose as ordered  - Assess for signs and symptoms of hyperglycemia and hypoglycemia  - Administer ordered medications to maintain glucose within target range  - Assess nutritional intake and initiate nutrition service referral as needed  Outcome: Progressing

## 2023-03-03 NOTE — PLAN OF CARE
Problem: Prexisting or High Potential for Compromised Skin Integrity  Goal: Skin integrity is maintained or improved  Description: INTERVENTIONS:  - Identify patients at risk for skin breakdown  - Assess and monitor skin integrity  - Assess and monitor nutrition and hydration status  - Monitor labs   - Assess for incontinence   - Turn and reposition patient  - Assist with mobility/ambulation  - Relieve pressure over bony prominences  - Avoid friction and shearing  - Provide appropriate hygiene as needed including keeping skin clean and dry  - Evaluate need for skin moisturizer/barrier cream  - Collaborate with interdisciplinary team   - Patient/family teaching  - Consider wound care consult   Outcome: Progressing     Problem: CARDIOVASCULAR - ADULT  Goal: Maintains optimal cardiac output and hemodynamic stability  Description: INTERVENTIONS:  - Monitor I/O, vital signs and rhythm  - Monitor for S/S and trends of decreased cardiac output  - Administer and titrate ordered vasoactive medications to optimize hemodynamic stability  - Assess quality of pulses, skin color and temperature  - Assess for signs of decreased coronary artery perfusion  - Instruct patient to report change in severity of symptoms  Outcome: Progressing  Goal: Absence of cardiac dysrhythmias or at baseline rhythm  Description: INTERVENTIONS:  - Continuous cardiac monitoring, vital signs, obtain 12 lead EKG if ordered  - Administer antiarrhythmic and heart rate control medications as ordered  - Monitor electrolytes and administer replacement therapy as ordered  Outcome: Progressing     Problem: GASTROINTESTINAL - ADULT  Goal: Minimal or absence of nausea and/or vomiting  Description: INTERVENTIONS:  - Administer IV fluids if ordered to ensure adequate hydration  - Maintain NPO status until nausea and vomiting are resolved  - Nasogastric tube if ordered  - Administer ordered antiemetic medications as needed  - Provide nonpharmacologic comfort measures as appropriate  - Advance diet as tolerated, if ordered  - Consider nutrition services referral to assist patient with adequate nutrition and appropriate food choices  Outcome: Progressing  Goal: Maintains or returns to baseline bowel function  Description: INTERVENTIONS:  - Assess bowel function  - Encourage oral fluids to ensure adequate hydration  - Administer IV fluids if ordered to ensure adequate hydration  - Administer ordered medications as needed  - Encourage mobilization and activity  - Consider nutritional services referral to assist patient with adequate nutrition and appropriate food choices  Outcome: Progressing  Goal: Maintains adequate nutritional intake  Description: INTERVENTIONS:  - Monitor percentage of each meal consumed  - Identify factors contributing to decreased intake, treat as appropriate  - Assist with meals as needed  - Monitor I&O, weight, and lab values if indicated  - Obtain nutrition services referral as needed  Outcome: Progressing

## 2023-03-03 NOTE — ASSESSMENT & PLAN NOTE
Patient with metastatic coronary carcinoma worsening kidney function liver function tests  Overall guarded prognosis  Patient and spouse at bedside    Patient understands overall guarded prognosis however he would like to pursue active treatment and plans on following oncology outpatient for further cares

## 2023-03-03 NOTE — BRIEF OP NOTE (RAD/CATH)
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)    14  Squamous cell carcinoma of tongue (HCC)    15  Mesenteric thrombosis (HCC)      Post-op Diagnosis:   1  Sepsis ([facility] [address] )    2  Nausea and vomiting    3  Abdominal pain    4  Fatigue    5  Septic shock (HCC)    6  Dehydration    7  Hypernatremia    8  Hyponatremia    9  Cholangiocarcinoma, stage IV ([facility] [address] )    10  DENZEL (acute kidney injury) ([facility] [address] )    11  Malignant ascites    12  Malignant neoplasm metastatic to both lungs ([facility] [address] )    13  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)        Provider:  Sarahy Perez PA-C    No qualified resident was available, Resident is only observing    Estimated Blood Loss: minimal    Findings: left sided paracentesis  6050cc clear eric fluid removed       Specimens: none    Complications:  None immediate    Anesthesia: local    Sarahy Perez PA-C     Date: 3/3/2023  Time: 11:24 AM

## 2023-03-03 NOTE — PROGRESS NOTES
Medical Oncology/Hematology Progress Note  Alexandre Banuelos, male, 47 y o , 1970,  PPHP 813/PPHP [id number], [id number]     Assessment and Plan    1  Metastatic cholangiocarcinoma with bone mets  2  Splenic vein, superior mesenteric, main portal vein, and right portal vein thrombosis, new  3  Thrombocytopenia  4  Ascites, requiring frequent paracentesis    Ms Ashby is a 47 Y M with hx of metastatic cholangiocarcinoma who was admitted initially for DENZEL with concern for septic shock  Patient was initially requiring multiple pressors  Extensive workup for septic shock, cardiogenic shock, and adrenal insufficiency completed with no clear source identified  Hypotensive episodes likely due to advanced liver disease causing splanchnic vasodilation and decreased systemic blood circulation/volume, manifesting as hypotension  Renal function and mentation has improved  Pt's recent MR abdomen showed splenic vein, superior mesenteric, and main portal vein thrombosis, for which patient was initially started on hep gtt, now on Lovenox 1mg/kg BID dosing  Hematology team was initially consulted for worsening thrombocytopenia - workup for HIT was negative  Thrombocytopenia was initially thought to be due to consumptive coagulopathy from acute thrombotic events superimposed on the acute illness/shock causing some level of myelosuppression  However, the rising T bili level, persistent thrombocytopenia, and rapid accumulation of ascitic fluid requiring frequent paracentesis are concerning for worsening tumor burden in the liver  Although it's too early to comment on whether his most recent therapy with pemigatinib is working or not (he has barely completed 2-3 weeks of pemigatinib prior to this hospitalization), we discussed with him our concern for overall poor prognosis  Patient would like to continue pursuing any potential systemic therapies at this time   He has an OP appt scheduled with Dr Kiel Killian on 3/15 - during which decisions regarding whether to continue pemigatinib or to switch to a different chemo regimen like FOLFOX can be pursued  Continue Lovenox for [facility] purposes  Oncology team will sign off at this time, but please feel free to reach out with any questions  Outpatient follow up plan: Patient used to f/u with Dr Grazyna Malik as his primary oncologist, but saw Dr Elisabet Arias in Jan 2023 moreso for a 2nd opinion  He prefers to continue follow-up with Dr Luis Garber, therefore medical oncology f/u appt has been arranged with Dr Elisabet Arias on 3/15/23 at [facility]  Communication with patient/family:  Patient was updated regarding our recs as above    Communication with team:  Primary team attending was messaged regarding the above plan via Tiger[employer]  Case was discussed with hematology/oncology attending, Dr Deanna Kat      Reason for consultation: hx of cholangiocarcinoma, thrombocytopenia    History of present illness:  Frieda Alexander is a 47 y o  male with metastatic cholangiocarcinoma (initially dx in 2020, started on systemic therapy in 2022, currently on 3rd line of treatment with targeted therapy called pemigatinib)  He is known to have multiple pulmonary mets, liver mets, recurrent abdominal ascites (requiring frequent paracentesis), and bone mets  Patient also with history of SCC of the tongue (dx in 2018, s/p chemo+immunotherapy induction, followed by partial glossectomy and bilateral neck dissection in April 2018, followed by adjuvant RT, which was completed in August 2018, this was managed at [place]  On his most recent MR abdomen, there was concern for new thrombosis in portions of the splenic, superior mesenteric, main portal veins, and a segment of the portal vein  Intra-abdominal lymphadenopathy, multiple hepatic mets, bone mets (right iliac crest) were noted to be stable, compared to Jan 2023 CT scan findings       Patient presented to the hospital for evaluation of progressively worsening weakness/mentation and decreased PO intake x 4-5 days  ED vitals on admission notable for hypotension with SBPs in the 80s  Patient was noted to be encephalopathic  Labs were notable for Cr 2 37 and leukocytosis  Patient was initiated on broad spectrum abx given concern for septic shock  Infectious workup negative thus far  Patient has required pressors  There was also concern for cardiogenic shock  2D echo from 2/21 shows normal EF of 65% with normal wall normal and normal diastolic function  Patient's plt count has been gradually decreasing with following trend: 136 > 107 > 60 > 63  Patient did have lovenox/heparin exposure during January admissions at [facility] for abdominal distention 2/2 ascites  Interval History:     Patient was seen at bedside, recently transferred out from the ICU 3 days ago  Off of pressors, on midodrine 15mg TID  BP on the lower side with MAPs in the 60s  Most recent paracentesis was this morning with removal of 6L of ascitic fluid  Review of Systems:   Review of Systems   Constitutional: Negative for activity change, chills, fatigue and fever  HENT: Negative for ear pain, sore throat, tinnitus and trouble swallowing  Eyes: Negative for pain and visual disturbance  Respiratory: Negative for cough and shortness of breath  Cardiovascular: Positive for leg swelling (bilateral LE swelling)  Negative for chest pain and palpitations  Gastrointestinal: Positive for abdominal distention  Negative for abdominal pain, constipation, nausea and vomiting  Genitourinary: Negative for dysuria and hematuria  Musculoskeletal: Negative for arthralgias and back pain  Skin: Negative for color change and rash  Neurological: Negative for seizures and syncope  Hematological: Negative for adenopathy  Does not bruise/bleed easily  Psychiatric/Behavioral: Negative for agitation and confusion  All other systems reviewed and are negative        Oncology History: Cancer Staging   No matching staging information was found for the patient  Oncology History Overview Note   Patient presents today for consult for RT for tongue carcinoma  56 yr old patient current smoker (smokes 2 PPD for over 20 years) presented with left tongue pain and sores in December 2017  He has lost about 30 pounds  He did not have health insurance at the time  He then obtained insurance and saw an oral surgeon on 2/6/18- biopsy was performed confirming p16 negative squamous cell carcinoma  2/23/18- PET  IMPRESSION:  1  There is focally intense activity in the anterior oral cavity, slightly to the left of midline, SUV 27  This corresponds with known malignancy  There are also bilateral hypermetabolic upper cervical nodes posterior to the submandibular glands, most   concerning for [place] metastases  2   There is asymmetric intense activity in the posterior left oral cavity, palate and tonsillar region, SUV 7 9  This may be physiologic, however correlation with clinical findings recommended to exclude tumor involvement  3   Mildly hypermetabolic nodular densities and infiltrates in the right middle and bilateral lower lungs, likely inflammatory/infectious  Short-term CT follow-up in 2-3 months recommended to exclude metastases  4   Subcutaneous density lateral to the left hip is also likely inflammatory/infectious  This may be correlated clinically  5   Otherwise no hypermetabolic metastases in the chest abdomen pelvis      2/23/18- CT chest abdomen and pelvis-  IMPRESSION:   As was seen on concurrent PET/CT, there is clustered small nodular groundglass densities in the right middle lobe and both lower lobes  There distribution suggests an infectious/inflammatory process rather than metastatic disease  Follow-up is   recommended    Otherwise unremarkable chest, abdomen, pelvic CT  Patient was referred to ENT Dr Hernán Munguia @ [facility] by Med onc Dr Robert Correia      3/6/18- Consult with ENT Dr Paige Hurd- discussed surgical excision of the anterior portion of the tongue, along with free flap reconstruction using skin from his forearm       3/6/18- Med onc Dr Latoya Villanueva @ [facility]- recommended chemo-nivo surgery trial (Carbo/paclitaxel/nivo)    Chemo started on 3/14/18 and finished 4/10/18 (5 cycles completed)? 4/30/18- patient underwent direct laryngoscopy, esophagoscopy, bronchoscopy, left partial glossectomy and bilateral neck dissections level 1 through 4      5/8/18- Post-op f/u with ENT Dr Paige Hurd- neck incision well healed  Flexible laryngoscopy revealed true vocal cords are mobile  Base of tongue is clear  Pharynx is clear  Discusses treatment moving forward would be adjuvant RT  Follow-up in 3-4 weeks  History of tongue cancer   2/2018 Initial Diagnosis    Tongue cancer ([facility] [address] )     2/6/2018 Biopsy    Left ventral surface of tongue biopsy: Squamous cell carcinoma, moderately well differently  3/14/2018 - 4/10/2018 Chemotherapy    Chemo-nivo surgery trial (Carbo/paclitaxel/nivo)  Patient had 5 cycles of chemo  4/30/2018 Surgery    Direct laryngoscopy, esophagoscopy, bronchoscopy, left partial glossectomy and bilateral neck dissections level 1 through 4              Past Medical History:   Past Medical History:   Diagnosis Date   • 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 [facility])    • Rectal bleeding 1/9/2023   • S/P exploratory laparotomy 12/16/2020       Past Surgical History:   Procedure Laterality Date   • CT NEEDLE BIOPSY LIVER  10/15/2020   • IR BIOPSY LIVER MASS  5/18/2022   • IR PARACENTESIS  1/10/2023   • IR PARACENTESIS  1/24/2023   • IR PARACENTESIS  2/2/2023   • IR PARACENTESIS  1/30/2023   • IR PARACENTESIS  2/6/2023   • IR PARACENTESIS  2/9/2023   • IR PARACENTESIS  2/13/2023   • IR PARACENTESIS  2/16/2023       Family History   Problem Relation Age of Onset   • Prostate cancer Father        Social History     Socioeconomic History   • Marital status: /Civil Union     Spouse name: None   • Number of children: None   • Years of education: None   • Highest education level: None   Occupational History   • None   Tobacco Use   • Smoking status: Former     Packs/day: 2 00     Years: 20 00     Pack years: 40 00     Types: Cigarettes   • Smokeless tobacco: Current   Substance and Sexual Activity   • Alcohol use: Not Currently     Comment: 1-2 drinks per day   • Drug use: None   • Sexual activity: None   Other Topics Concern   • None   Social History Narrative   • None     Social Determinants of Health     Financial Resource Strain: Low Risk    • Difficulty of Paying Living Expenses: Not hard at all   Food Insecurity: No Food Insecurity   • Worried About Running Out of Food in the Last Year: Never true   • Ran Out of Food in the Last Year: Never true   Transportation Needs: No Transportation Needs   • Lack of Transportation (Medical): No   • Lack of Transportation (Non-Medical):  No   Physical Activity: Insufficiently Active   • Days of Exercise per Week: 2 days   • Minutes of Exercise per Session: 30 min   Stress: No Stress Concern Present   • Feeling of Stress : Not at all   Social Connections: Socially Isolated   • Frequency of Communication with Friends and Family: Once a week   • Frequency of Social Gatherings with Friends and Family: Once a week   • Attends Hindu Services: Never   • Active Member of Clubs or Organizations: No   • Attends Club or Organization Meetings: Never   • Marital Status:    Intimate Partner Violence: Not At Risk   • Fear of Current or Ex-Partner: No   • Emotionally Abused: No   • Physically Abused: No   • Sexually Abused: No   Housing Stability: Unknown   • Unable to Pay for Housing in the Last Year: No   • Number of Places Lived in the Last Year: Not on file   • Unstable Housing in the Last Year: No         Current Facility-Administered Medications:   •  al mag oxide-diphenhydramine-lidocaine viscous (MAGIC MOUTHWASH) suspension 10 mL, 10 mL, Swish & Swallow, Q4H PRN, Siobhan Haddad MD, 10 mL at 02/26/23 1232  •  chlorhexidine (PERIDEX) 0 12 % oral rinse 15 mL, 15 mL, Swish & Gamaliel, Q12H [address], Siobhan Haddad MD, 15 mL at 03/03/23 0813  •  enoxaparin (LOVENOX) subcutaneous injection 100 mg, 1 mg/kg, Subcutaneous, Q12H [address], Tiny Gonzalez MD, 100 mg at 03/03/23 5686  •  ergocalciferol (VITAMIN D2) capsule 50,000 Units, 50,000 Units, Oral, Weekly, Siobhan Haddad MD, 50,000 Units at 03/02/23 7264  •  gabapentin (NEURONTIN) capsule 200 mg, 200 mg, Oral, BID, Trisha Ac MD, 200 mg at 03/03/23 0813  •  insulin lispro (HumaLOG) 100 units/mL subcutaneous injection 1-6 Units, 1-6 Units, Subcutaneous, TID AC, 2 Units at 03/03/23 0813 **AND** Fingerstick Glucose (POCT), , , TID AC, Siobhan Haddad MD  •  melatonin tablet 6 mg, 6 mg, Oral, HS, Siobhan aHddad MD, 6 mg at 03/02/23 2109  •  midodrine (PROAMATINE) tablet 15 mg, 15 mg, Oral, TID AC, Siobhan Haddad MD, 15 mg at 03/03/23 1117  •  moisture barrier miconazole 2% cream (aka JOYCE MOISTURE BARRIER ANTIFUNGAL CREAM), , Topical, BID, Siobhan Haddad MD, Given at 03/03/23 0813  •  ondansetron (ZOFRAN) injection 4 mg, 4 mg, Intravenous, Q8H PRN, Siobhan Haddad MD, 4 mg at 02/22/23 0006  •  oxyCODONE (ROXICODONE) IR tablet 5 mg, 5 mg, Oral, Q4H PRN, Siobhan Haddad MD  •  pantoprazole (PROTONIX) EC tablet 40 mg, 40 mg, Oral, Early Morning, Siobhan Haddad MD, 40 mg at 03/03/23 0608  •  saliva substitute (MOUTH KOTE) mucosal solution 5 spray, 5 spray, Mouth/Throat, 4x Daily PRN, Siobhan Haddad MD, 5 spray at 02/25/23 2227  •  sucralfate (CARAFATE) tablet 1 g, 1 g, Oral, 4x Daily (AC & HS), Siobhan Haddad MD, 1 g at 03/03/23 1117    Medications Prior to Admission   Medication   • cyclobenzaprine (FLEXERIL) 5 mg tablet   • ergocalciferol (VITAMIN D2) 50,000 units   • furosemide (LASIX) 20 mg tablet   • Ivosidenib 250 MG TABS   • magnesium oxide (MAG-OX) 400 mg tablet   • ondansetron (ZOFRAN) 4 mg tablet   • oxyCODONE (ROXICODONE) 5 immediate release tablet   • pantoprazole (PROTONIX) 40 mg tablet   • potassium chloride (K-DUR,KLOR-CON) 10 mEq tablet       Allergies   Allergen Reactions   • Penicillins Other (See Comments) and Rash     As a child had reaction not sure what it was  Physical Exam:     BP 95/50   Pulse 82   Temp (!) 97 2 °F (36 2 °C)   Resp 18   Ht 6' (1 829 m)   Wt 104 kg (229 lb 0 9 oz)   SpO2 100%   BMI 31 07 kg/m²     Physical Exam  Vitals reviewed  Constitutional:       General: He is not in acute distress  Appearance: He is not ill-appearing, toxic-appearing or diaphoretic  Comments: Sitting comfortably in chair   HENT:      Head: Normocephalic and atraumatic  Mouth/Throat:      Mouth: Mucous membranes are moist       Pharynx: No oropharyngeal exudate  Eyes:      General: Scleral icterus (mild scleral icterus noted) present  Extraocular Movements: Extraocular movements intact  Conjunctiva/sclera: Conjunctivae normal    Cardiovascular:      Rate and Rhythm: Normal rate and regular rhythm  Heart sounds: No murmur heard  No gallop  Pulmonary:      Effort: No respiratory distress  Breath sounds: Normal breath sounds  No wheezing, rhonchi or rales  Abdominal:      General: Bowel sounds are normal  There is distension  Palpations: Abdomen is soft  There is no mass  Musculoskeletal:         General: Swelling (pitting edema of b/l LE swelling) present  No tenderness or deformity  Cervical back: Normal range of motion  Comments: Bilateral LE swelling, left > right   Skin:     General: Skin is warm  Neurological:      General: No focal deficit present  Mental Status: He is alert and oriented to person, place, and time  Motor: Weakness (generalized) present     Psychiatric:         Mood and Affect: Mood normal          Judgment: Judgment normal  Recent Results (from the past 48 hour(s))   Fingerstick Glucose (POCT)    Collection Time: 03/01/23  4:38 PM   Result Value Ref Range    POC Glucose 143 (H) 65 - 140 mg/dl   Fingerstick Glucose (POCT)    Collection Time: 03/01/23  8:55 PM   Result Value Ref Range    POC Glucose 124 65 - 140 mg/dl   Protime-INR    Collection Time: 03/02/23  6:24 AM   Result Value Ref Range    Protime 34 4 (H) 11 6 - 14 5 seconds    INR 3 38 (H) 0 84 - 1 19   Comprehensive metabolic panel    Collection Time: 03/02/23  6:24 AM   Result Value Ref Range    Sodium 129 (L) 135 - 147 mmol/L    Potassium 4 6 3 5 - 5 3 mmol/L    Chloride 104 96 - 108 mmol/L    CO2 18 (L) 21 - 32 mmol/L    ANION GAP 7 4 - 13 mmol/L    BUN 64 (H) 5 - 25 mg/dL    Creatinine 1 76 (H) 0 60 - 1 30 mg/dL    Glucose 152 (H) 65 - 140 mg/dL    Calcium 8 2 (L) 8 3 - 10 1 mg/dL    AST 71 (H) 5 - 45 U/L    ALT 37 12 - 78 U/L    Alkaline Phosphatase 141 (H) 46 - 116 U/L    Total Protein 5 2 (L) 6 4 - 8 4 g/dL    Albumin 3 5 3 5 - 5 0 g/dL    Total Bilirubin 4 77 (H) 0 20 - 1 00 mg/dL    eGFR 43 ml/min/1 73sq m   Magnesium    Collection Time: 03/02/23  6:24 AM   Result Value Ref Range    Magnesium 3 1 (H) 1 6 - 2 6 mg/dL   Phosphorus    Collection Time: 03/02/23  6:24 AM   Result Value Ref Range    Phosphorus 3 8 2 7 - 4 5 mg/dL   Fingerstick Glucose (POCT)    Collection Time: 03/02/23  7:20 AM   Result Value Ref Range    POC Glucose 109 65 - 140 mg/dl   [place]    Collection Time: 03/02/23 10:07 AM   Result Value Ref Range    Supplier Name [facility]/[address]     Supplier Phone Number [phone]     Order Status Completed     Delivery Note      Delivery Request Date 03/02/2023     Item Description Wheeled Walker, Adult    Fingerstick Glucose (POCT)    Collection Time: 03/02/23 11:19 AM   Result Value Ref Range    POC Glucose 126 65 - 140 mg/dl   Fingerstick Glucose (POCT)    Collection Time: 03/02/23  4:42 PM   Result Value Ref Range POC Glucose 121 65 - 140 mg/dl   Fingerstick Glucose (POCT)    Collection Time: 03/02/23  9:08 PM   Result Value Ref Range    POC Glucose 115 65 - 140 mg/dl   Protime-INR    Collection Time: 03/03/23  6:08 AM   Result Value Ref Range    Protime 28 3 (H) 11 6 - 14 5 seconds    INR 2 62 (H) 0 84 - 1 19   Comprehensive metabolic panel    Collection Time: 03/03/23  6:08 AM   Result Value Ref Range    Sodium 128 (L) 135 - 147 mmol/L    Potassium 4 5 3 5 - 5 3 mmol/L    Chloride 103 96 - 108 mmol/L    CO2 17 (L) 21 - 32 mmol/L    ANION GAP 8 4 - 13 mmol/L    BUN 74 (H) 5 - 25 mg/dL    Creatinine 2 06 (H) 0 60 - 1 30 mg/dL    Glucose 101 65 - 140 mg/dL    Calcium 8 2 (L) 8 3 - 10 1 mg/dL    Corrected Calcium 8 8 8 3 - 10 1 mg/dL    AST 98 (H) 5 - 45 U/L    ALT 39 12 - 78 U/L    Alkaline Phosphatase 110 46 - 116 U/L    Total Protein 5 0 (L) 6 4 - 8 4 g/dL    Albumin 3 3 (L) 3 5 - 5 0 g/dL    Total Bilirubin 6 15 (H) 0 20 - 1 00 mg/dL    eGFR 36 ml/min/1 73sq m   Magnesium    Collection Time: 03/03/23  6:08 AM   Result Value Ref Range    Magnesium 3 0 (H) 1 6 - 2 6 mg/dL   Phosphorus    Collection Time: 03/03/23  6:08 AM   Result Value Ref Range    Phosphorus 3 6 2 7 - 4 5 mg/dL   Fingerstick Glucose (POCT)    Collection Time: 03/03/23  7:53 AM   Result Value Ref Range    POC Glucose 228 (H) 65 - 140 mg/dl   CBC and differential    Collection Time: 03/03/23  8:20 AM   Result Value Ref Range    WBC 12 31 (H) 4 31 - 10 16 Thousand/uL    RBC 3 78 (L) 3 88 - 5 62 Million/uL    Hemoglobin 11 4 (L) 12 0 - 17 0 g/dL    Hematocrit 34 8 (L) 36 5 - 49 3 %    MCV 92 82 - 98 fL    MCH 30 2 26 8 - 34 3 pg    MCHC 32 8 31 4 - 37 4 g/dL    RDW 17 4 (H) 11 6 - 15 1 %    MPV 11 5 8 9 - 12 7 fL    Platelets 48 (LL) 192 - 390 Thousands/uL    nRBC 0 /100 WBCs    Neutrophils Relative 85 (H) 43 - 75 %    Immat GRANS % 1 0 - 2 %    Lymphocytes Relative 5 (L) 14 - 44 %    Monocytes Relative 7 4 - 12 %    Eosinophils Relative 2 0 - 6 %    Basophils Relative 0 0 - 1 %    Neutrophils Absolute 10 41 (H) 1 85 - 7 62 Thousands/µL    Immature Grans Absolute 0 08 0 00 - 0 20 Thousand/uL    Lymphocytes Absolute 0 66 0 60 - 4 47 Thousands/µL    Monocytes Absolute 0 87 0 17 - 1 22 Thousand/µL    Eosinophils Absolute 0 25 0 00 - 0 61 Thousand/µL    Basophils Absolute 0 04 0 00 - 0 10 Thousands/µL   Fingerstick Glucose (POCT)    Collection Time: 03/03/23 11:04 AM   Result Value Ref Range    POC Glucose 129 65 - 140 mg/dl       CT abdomen pelvis wo contrast    Result Date: 2/20/2023  Narrative: CT ABDOMEN AND PELVIS WITHOUT IV CONTRAST INDICATION:   Abdominal pain, acute, nonlocalized abdominal pain  History of cholangiocarcinoma  COMPARISON:  CT 1/26/2023  TECHNIQUE:  CT examination of the abdomen and pelvis was performed without intravenous contrast  Axial, sagittal, and coronal 2D reformatted images were created from the source data and submitted for interpretation  Radiation dose length product (DLP) for this visit:  1031 92 mGy-cm   This examination, like all CT scans performed in the [facility], was performed utilizing techniques to minimize radiation dose exposure, including the use of iterative reconstruction and automated exposure control  Enteric contrast was not administered  FINDINGS: Absence of intravenous contrast enhancement limits evaluation of the abdominal viscera  ABDOMEN LOWER CHEST:  No significant change in multiple right lower lobe masses, largest measuring 2 3 cm within the medial costophrenic angle  No effusions  LIVER/BILIARY TREE:  Evaluation is limited in the absence of intravenous contrast enhancement  Dominant hypodense mass in the dome of the liver appears unchanged, however multiple other smaller previously seen lesions are not well visualized on this study  No biliary dilatation  GALLBLADDER:  Nonvisualized  SPLEEN:  Unremarkable  PANCREAS:  Unremarkable  ADRENAL GLANDS:  Unremarkable  KIDNEYS/URETERS:  Unremarkable  No hydronephrosis  STOMACH AND BOWEL:  No obstruction or acute inflammatory changes  Colonic diverticulosis without diverticulitis  APPENDIX:  No findings to suggest appendicitis  ABDOMINOPELVIC CAVITY:  Moderate ascites  No pneumoperitoneum  No significant change in omental implant adjacent to the liver, most conspicuous anteriorly, and diffuse infiltration of the omentum compatible with peritoneal carcinomatosis  Unchanged mild gastrohepatic ligament, periportal and portacaval adenopathy  VESSELS:  Atherosclerotic changes are present  No evidence of aneurysm  PELVIS REPRODUCTIVE ORGANS:  Unremarkable for patient's age  URINARY BLADDER:  Unremarkable  ABDOMINAL WALL/INGUINAL REGIONS:  Unremarkable  OSSEOUS STRUCTURES:  No acute fracture or destructive osseous lesion  Impression: No acute pathology  Evaluation is limited in the absence of intravenous contrast enhancement, however metastatic disease appears essentially unchanged, and including dominant hepatic lesion, peritoneal carcinomatosis with moderate moderate ascites  Presumed metastatic right lower lobe nodules also noted  Workstation performed: [id number]     MRI abdomen w wo contrast    Result Date: 2/20/2023  Narrative: MRI OF THE ABDOMEN WITH AND WITHOUT CONTRAST INDICATION: 47 years / Male  C22 8: Malignant neoplasm of liver, primary, unspecified as to type  27-year-old male with metastatic cholangiocarcinoma  COMPARISON: CT of the chest, abdomen and pelvis from 10/24/2022 and January 26, 2023  TECHNIQUE:  Multiplanar/multisequence MRI of the abdomen was performed before and after administration of contrast  IV Contrast:  10 mL of Gadobutrol injection (SINGLE-DOSE) FINDINGS: LOWER CHEST:   1 8 x 2 4 cm nodule in the posterior basilar segment of the right lower lobe (series 12, image 41), not significantly changed since the CT from 1/26/2023  The patient's other known pulmonary metastases are not visible on this MRI  LIVER: Normal size  Multiple hypoenhancing masses, fluid being confluent masses virtually replacing the entire lateral segment of the left lobe  Largest mass located at the junction of segments 4 and 5/8, measuring 2 6 x 2 8 cm (series 14, image 95)  Allowing for differences in modality, slice position and cursor placement, this does not appear significantly changed since the CT from 1/26/2023  2 7 x 4 6 cm nonenhancing structure at the dome of the liver, at the junction of segments 4A and segment 8, with T1 hyperintensity, consistent with chronic hemorrhage, either within the hepatic parenchyma or within a metastasis  This is also unchanged since the last CT  4 5 cm thrombosis in the splenic vein, extending to the origins of the portal vein and SMV, with additional thrombus in the right portal vein and its branches, all developing since 1/26/2023  BILE DUCTS:  No intrahepatic or extrahepatic bile duct dilatation  GALLBLADDER:  Not visualized; ? Prior cholecystectomy  PANCREAS:  Unremarkable  ADRENAL GLANDS:  Normal  SPLEEN:  Normal  KIDNEYS/PROXIMAL URETERS:  No hydroureteronephrosis  Bilateral renal cysts  No solid masses  STOMACH AND BOWEL:   Unremarkable  No dilated loops of bowel  PERITONEAL CAVITY/RETROPERITONEUM:  Small amount of ascites  Generalized thickening and enhancement of the peritoneal reflection  Several enhancing masses arising from the peritoneum, the largest anterior to the right lobe of the liver, measuring 1 6 x  5 8 cm (series 14, image 49) LYMPH NODES:  1 2 x 2 4 cm portacaval lymph node (series 12, image 70)  Necrotic appearing 1 0 x 1 1 cm mindi hepatis node (series 12, image 64)  1 1 x 1 5 cm necrotic appearing gastrohepatic node (series 12, image 57) 1 5 x 1 8 cm mindi hepatis node (series 12, image 70)  These have not significantly changed since the CT from 1/26/2023   VASCULAR STRUCTURES:  Thrombosis seen in portions of the splenic vein, portal vein and superior mesenteric vein as described above   Abdominal aorta unremarkable, without aneurysm  ABDOMINAL WALL:  Small umbilical hernia containing ascites  OSSEOUS STRUCTURES:  1 5 x 2 3 cm enhancing lesion in the right iliac crest, extending into through the cortex into the adjacent portion of the right iliac is muscle (series 12, image 146), similar in appearance to the CT from 1/26/2023  Peripherally enhancing lesion with diffusion restriction in the L1 vertebral body  (series 12, image 86; series 13, image 50; series 9, image 107), with no corresponding abnormality on prior CT  Impression: 1  Multiple hepatic metastases, many of which appear to be at least partially necrotic, most extensive in the lateral segment of the left lobe, not appreciably changed since a CT from 1/26/2023  2   New thrombosis in portions of the splenic, superior mesenteric and main portal veins and a segment of the right portal vein  3   Peritoneal metastases and small amount of ascites  4   Intra-abdominal lymphadenopathy, as described above, not significantly changed since 1/26/2023  5   Lesion in the right iliac crest, possibly metastasis, not appreciably changed since 1/26/2023  6   Indeterminate enhancing lesion in the L1 vertebral body, possibly metastasis  7   1 8 x 2 4 cm pulmonary metastasis in the base of the right lower lobe  The study was marked in [facility] for immediate notification  Workstation performed: [id number]     IR paracentesis    Result Date: 2/17/2023  Narrative: Ultrasound-guided paracentesis Clinical History: Recurrent symptomatic ascites, history of cholangiocarcinoma Procedure: Ultrasound used to localize the left lower quadrant ascites  The overlying skin was prepped and draped in usual sterile fashion and local anesthesia was obtained with the 1% lidocaine solution  A 5 Western Nilda Yueh needle was advanced until fluid was aspirated under live ultrasound guidance  Approximately 4500 cc' s of clear, yellow fluid was aspirated  Labs collected and sent  The patient tolerated the procedure well and left the department in stable condition  Impression: Impression: Ultrasound-guided left-sided paracentesis  Signed, performed, and dictated by Alondra Barclay PA-C under the direct supervision of Dr Hiro Garcia  Workstation performed: [id number]     IR paracentesis    Result Date: 2/13/2023  Narrative: PROCEDURE: Paracentesis STAFF: Mariaelena Gamble PA-C COMPLICATIONS: None  MEDICATIONS: 1% lidocaine subcutaneous  INDICATION: Symptomatic ascites  Cholangiocarcinoma PROCEDURE: Using ultrasound guidance, the left paracolic gutter was punctured and a catheter was placed into the peritoneal cavity  A total of 4950 milliliters of fluid was removed  The catheter was then removed  FINDINGS: Massive ascites  Clear yellow ascites was removed  Impression: Therapeutic paracentesis  Procedure was performed, signed and dictated by: Mariaelena Gamble PA-C Supervising Physician: Dr Hiro Garcia Workstation performed: [id number]     IR paracentesis    Result Date: 2/10/2023  Narrative: PROCEDURE: Therapeutic paracentesis MEDICATIONS: 1% lidocaine subcutaneous  INDICATION: Symptomatic ascites  Cholangiocarcinoma  PROCEDURE: Using ultrasound guidance, the left lower quadrant was punctured and a 5f yueh catheter was placed into the abdominal cavity until ascites was aspirated  A total of 5550 milliliters of serous fluid was removed  The catheter was then removed  FINDINGS: 5550 mL of serous ascites was removed  Impression: Therapeutic paracentesis  Signed, performed, and dictated by HARINI Christensen under the supervision of Dr Lesa Moore  Workstation performed: [id number]     IR paracentesis    Result Date: 2/7/2023  Narrative: Ultrasound-guided paracentesis Clinical History: Recurrent malignant ascites, history of cholangiocarcinoma Procedure: After explaining the risks and benefits of the procedure to the patient, informed consent was obtained   Ultrasound used to localize the left lower quadrant ascites  The overlying skin was prepped and draped in usual sterile fashion and local anesthesia was obtained with the 1% lidocaine solution  A 5 Western Nilda Yueh needle was advanced until fluid was aspirated under live ultrasound guidance  Approximately 6550 cc' s of clear, yellow fluid was aspirated  The patient tolerated the procedure well and left the department in stable condition  Impression: Impression: Ultrasound-guided left-sided paracentesis  Signed, performed, and dictated by Ebenezer Hugo PA-C under the direct supervision of Dr Chayo Whitney  Workstation performed: [id number]     IR paracentesis    Result Date: 2/3/2023  Narrative: PROCEDURE: Ultrasound-guided paracentesis STAFF: Ludivina Perez PA-C  COMPLICATIONS: None immediate  MEDICATIONS: 1% lidocaine subcutaneous  INDICATION: Symptomatic ascites  Patient with history of cholangiocarcinoma with malignant ascites  PROCEDURE: Using ultrasound guidance, the left paracolic gutter was punctured and a catheter was placed into the peritoneal cavity  A total of 10,350 milliliters of clear yellow fluid was removed  The catheter was then removed  FINDINGS: Massive ascites  10,350cc clear yellow ascites was removed  Impression: Ultrasound-guided paracentesis  Signed, performed, and dictated by Ludivina Perez PA-C under the direct supervision of Dr Shelli Boas  Workstation performed: [id number]     IR paracentesis    Result Date: 2/2/2023  Narrative: Ultrasound-guided paracentesis Clinical History: Recurrent malignant Ascites, history of cholangiocarcinoma Procedure: After explaining the risks and benefits of the procedure to the patient, informed consent was obtained  Ultrasound used to localize the left lower quadrant ascites  The overlying skin was prepped and draped in usual sterile fashion and local anesthesia was obtained with the 1% lidocaine solution   A 5 Western Nilda Yueh needle was advanced until fluid was aspirated under live ultrasound guidance  Approximately 6500 cc' s of clear, yellow fluid was aspirated  The patient tolerated the procedure well and left the department in stable condition  Impression: Impression: Ultrasound-guided left-sided paracentesis  Signed, performed, and dictated by Isra Wall PA-C under the direct supervision of Dr Adriana Barrera  Workstation performed: [id number]     IR paracentesis    Result Date: 1/25/2023  Narrative: PROCEDURE: Symptomatic paracentesis MEDICATIONS: 1% lidocaine subcutaneous  INDICATION: Symptomatic ascites  Hepatocellular carcinoma  PROCEDURE: Using ultrasound guidance, the right lower quadrant was punctured and a 5f yueh catheter was placed into the abdominal cavity until ascites was aspirated  A total of 7,400 milliliters of serous fluid was removed  The catheter was then removed  FINDINGS: 7400 mL of serous ascites was removed  Impression: Symptomatic paracentesis  Signed, performed, and dictated by HARINI Brown under the supervision of Dr Cady Kern  Workstation performed: [id number]     CT chest abdomen pelvis w contrast    Result Date: 1/31/2023  Narrative: CT CHEST, ABDOMEN AND PELVIS WITH IV CONTRAST INDICATION:   C24 8: Malignant neoplasm of overlapping sites of biliary tract  As per review of electronic medical record, 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 and stage IV cholangiocarcinoma on 3rd line treatment  COMPARISON:  Several prior CTs of the chest, abdomen and pelvis, the most recent from 1/18/2023, and PET/CT from 4/28/2021  TECHNIQUE: CT examination of the chest, abdomen and pelvis was performed  In addition to portal venous phase postcontrast scanning through the abdomen and pelvis, delayed phase postcontrast scanning was performed through the upper abdominal viscera    Axial, sagittal, and coronal 2D reformatted images were created from the source data and submitted for interpretation  Radiation dose length product (DLP) for this visit:  2421 02 mGy-cm   This examination, like all CT scans performed in the [facility], was performed utilizing techniques to minimize radiation dose exposure, including the use of iterative reconstruction and automated exposure control  IV Contrast:  100 mL of iohexol (OMNIPAQUE) Enteric Contrast: Enteric contrast was administered  FINDINGS: CHEST BRONCHOPULMONARY:  Retained secretions are seen in the trachea  Otherwise clear central airways  Mild upper lobe predominant paraseptal emphysematous changes  Enhancing airspace consolidations are seen at the lung bases, left greater than right, likely areas of atelectasis  Multiple pulmonary nodules are seen throughout the lungs  2 necrotic nodules at the right lung base medially are mildly enlarged compared to 1/18/2023  The larger lesion measures 1 9 x 1 8 cm (series 2 image 106 and 107), compared to 1 4 x 1 4 cm on 1/18/2022  The smaller lesion measures 1 1 cm (series 2 image 104) compared to 0 9 cm  These are new from October 2022  A 6 mm left upper lobe nodule laterally (series 3 image 126) is enlarged from 3 mm in October 2022  Other nodules are grossly similar in size  Nodules at the lung bases are somewhat difficult to compare given presence of atelectasis on the current study which displaces some of the previously seen nodules  PLEURA:  No pleural effusions  No pneumothorax  HEART/GREAT VESSELS: The heart is normal in size  No pericardial effusion  Normal caliber thoracic aorta with no dissection  There is suggestion of a filling defect in one of the left lower lobe segmental pulmonary arteries as it is decreased in attenuation compared to other branches at the same level (series 6 image 66-75)  Main pulmonary artery dilated up to 3 3 cm, suggestive of pulmonary arterial hypertension  There is a right-sided Mediport with its tip in the SVC  MEDIASTINUM/LYMPH NODES:  No axillary, mediastinal or hilar lymphadenopathy  CHEST WALL AND LOWER NECK:  Unremarkable  ABDOMEN LIVER/BILIARY TREE: Multiple ill-defined, peripherally enhancing lesions are seen throughout both lobes of the liver  Most of the parenchyma in the left lobe is replaced by these lesions  Overall there appears to be an increase in the number of the lesions compared to October 2022  The largest discrete lesion measures 3 0 x 2 9 cm (series 2 image 116), compared to 2 5 x 2 2 cm in October 2022  There is a 4 5 x 2 5 cm hypodense lesion in segment 8 of the liver (series 2 image 98), similar to the recent studies when remeasured in a similar manner  No biliary ductal dilation  GALLBLADDER:  No calcified gallstones  No pericholecystic inflammatory change  SPLEEN: Unremarkable  PANCREAS:  Unremarkable  Normal caliber main pancreatic duct  ADRENAL GLANDS:  Unremarkable  KIDNEYS/URETERS:  Symmetric renal enhancement  No intrarenal or ureteral calculi  No hydronephrosis  Simple cyst in the upper pole the right kidney  STOMACH AND BOWEL:  Evaluation for bowel inflammation is somewhat limited by the presence of ascites  No bowel obstruction  Oral contrast has reached the descending colon  Scattered colonic diverticuli, however no evidence of diverticulitis  APPENDIX:  Normal appendix  ABDOMINOPELVIC CAVITY: There is moderate to large abdominal and pelvic ascites  No organized fluid collections  There has been mild progression of peritoneal carcinomatosis compared to October 2022  For instance, there is mildly increased nodularity of the omentum in the anterior abdomen (series 2 image 122) and new peritoneal thickening in the right paracolic gutter (series 2 image 183)  There are also multiple soft tissue implants along the right hemidiaphragm, which have increased in size and number  No pneumoperitoneum   LYMPH NODES: There is a necrotic appearing gastrohepatic ligament lymph node measuring 1 3 x 1 0 cm (series 2 image 122)  It is newly enlarged compared to October 2022  An enlarged periportal lymph node measures 2 2 x 1 2 cm (series 2 image 132)  In October 2022 it measured 1 5 x 0 9 cm  A portacaval lymph node measures 2 8 x 1 9 cm (series 2 image 140), compared to 2 4 x 1 5 cm in October 2022  No pelvic lymphadenopathy  VESSELS: Normal caliber aorta  Patent major branch vessels  Incidentally noted is a retroaortic left renal vein, a normal anatomic variant  PELVIS REPRODUCTIVE ORGANS:  The prostate is enlarged, measuring 5 2 cm in transverse dimension  URINARY BLADDER:  Unremarkable  ABDOMINAL WALL/INGUINAL REGIONS: There is mild subcutaneous tissue stranding in the anterior abdominal wall  There is a periumbilical hernia containing fluid  MUSCULOSKELETAL: 2 3 x 1 7 cm lobulated lytic lesion in the right iliac bone near the sacroiliac joint with overlying cortical disruption  It is essentially unchanged from 1/18/2023 and October 2022  This lesion was 1st noted on the CT from April 2022 and has since enlarged  Several subcentimeter densely sclerotic lesions in the right proximal femur most likely represent bone islands as they are unchanged dating back to February 2018  No other lytic or sclerotic lesions  Degenerative changes of the spine  Impression: Questionable pulmonary embolism versus artifact in a left lower lobe segmental pulmonary artery  Recommend further evaluation with CT pulmonary angiogram  Multiple pulmonary metastases, the largest two measuring 1 9 x 1 8 cm and 1 1 cm in the right lower lobe medially, which are mildly increased from 1/18/2023  Metastatic disease in the liver, likely metastatic upper abdominal lymphadenopathy, and peritoneal carcinomatosis similar to studies from earlier in January 2022, however with progression since October 2022, as detailed above  No significant interval change in right iliac bone metastasis compared to 1/18/2023 and October 2022   Moderate to large abdominal and pelvic ascites  Retained secretions in the trachea, which may place patient at risk for aspiration  Atelectasis in both lower lobes  Multiple additional findings as above  I personally discussed the findings of a possible pulmonary embolism with Dr Mascorro on 1/31/2023 at 10:31 AM who saw the patient for a second opinion on day of dictation  The study was marked in EPIC for significant notification to the ordering clinician for the rest of the findings  Workstation performed: [id number]     Paracentesis (Bedside)    Result Date: 2/22/2023  Narrative: Vannesa Stiles MD     2/22/2023  3:30 PM Paracentesis (Bedside) Date/Time: 2/22/2023 12:23 PM Performed by: Vannesa Stiles MD Authorized by: Vannesa Stiles MD Patient location:  ICU Consent:   Consent obtained:  Written   Consent given by:  Patient   Risks discussed:  Bleeding, bowel perforation, infection and pain Universal protocol:   Procedure explained and questions answered to patient or proxy's satisfaction: yes    Relevant documents present and verified: yes    Test results available and properly labeled: yes    Radiology Images displayed and confirmed  If images not available, report reviewed: yes    Site/side marked: yes  Pre-procedure details:   Initial or Subsequent Exam:  Subsequent   Procedure purpose:  Therapeutic   Indications: abdominal discomfort secondary to ascites    Preparation: Patient was prepped and draped in usual sterile fashion  Anesthesia (see MAR for exact dosages):    Anesthesia method:  Local infiltration   Local anesthetic:  Lidocaine 1% w/o epi Procedure details:   Needle gauge:  18   Equipment: Paracentesis kit used    Ultrasound guidance: yes    Ultrasound image availability:  Not saved   Approach:  Percutaneous   Puncture site:  R lower quadrant   Fluid removed amount:  4 5L   Fluid appearance:  Clear and yellow   Dressing:  Adhesive bandage Post-procedure details:   Patient tolerance of procedure: Tolerated well, no immediate complications Post-procedure medication (see MAR for dosing): Albumin replacement: No      XR chest portable ICU    Result Date: 2/20/2023  Narrative: CHEST INDICATION: Shortness of breath COMPARISON:  1/31/2023 EXAM PERFORMED/VIEWS:  XR CHEST PORTABLE ICU Images: 2 FINDINGS: Right-sided Mediport terminating at the cavoatrial junction, unchanged Cardiomediastinal silhouette appears unremarkable  The lungs are clear  No pneumothorax or pleural effusion  Osseous structures appear within normal limits for patient age  Bilateral neck surgical clips     Impression: No acute cardiopulmonary disease  Findings are stable Workstation performed: [id number]     CTA ED chest PE study    Result Date: 1/31/2023  Narrative: CTA - CHEST WITH IV CONTRAST - PULMONARY ANGIOGRAM INDICATION:   recent abd ct scan concerning for PE, pt sob  COMPARISON: None  TECHNIQUE: CTA examination of the chest was performed using angiographic technique according to a protocol specifically tailored to evaluate for pulmonary embolism  Axial, sagittal, and coronal 2D reformatted images were created from the source data and  submitted for interpretation  In addition, coronal 3D MIP postprocessing was performed on the acquisition scanner  Radiation dose length product (DLP) for this visit:  418 mGy-cm   This examination, like all CT scans performed in the [facility], was performed utilizing techniques to minimize radiation dose exposure, including the use of iterative reconstruction and automated exposure control  IV Contrast:  100 mL of iohexol (OMNIPAQUE)  FINDINGS: PULMONARY ARTERIAL TREE:  No evidence of an acute pulmonary thromboembolism  The vessel which was questioned on the recent CT scan is opacified on the current study  LUNGS:  6 mm anterior left upper lobe pulmonary nodule (302, 34) has been slowly increasing in size     Additional scattered subcentimeter nodules are stable to slightly increased in size when comparing to the study from October 24, 2022   2 nodules in the medial right lower lobe are stable from the most recent study from a few days ago, but also increased since the prior exam in October  Changes compatible with paraseptal emphysema  Bibasilar atelectasis  Megan Strickland PLEURA:  Trace right pleural effusion  HEART/GREAT VESSELS:  Coronary artery calcifications, which are an indicator of coronary artery disease  No thoracic aortic aneurysm  MEDIASTINUM AND ANGEL:  Unremarkable  CHEST WALL AND LOWER NECK:   Right-sided Mediport catheter with the tip at the cavoatrial junction  VISUALIZED STRUCTURES IN THE UPPER ABDOMEN:  Multifocal tumor in the liver is again shown, better characterized on the recent CT scan through the abdomen and pelvis  Malignant-appearing ascites is noted in the upper abdomen with multiple soft tissue nodules along the peritoneal surface and beneath the right hemidiaphragm  Please see report for details  OSSEOUS STRUCTURES:  No acute fracture or destructive osseous lesion  Impression: No acute pulmonary thromboembolism  Slowly enlarging bilateral pulmonary nodules as described above  Multifocal liver tumor, upper abdominal ascites and peritoneal tumor better shown on recent CT scan from 1/26/2023  Workstation performed: [id number]     VAS lower limb venous duplex study, complete bilateral    Result Date: 1/31/2023  Narrative:  [facility] REPORT CLINICAL: Indications: Physician wants to determine patency of the venous system secondary to bilateral lower extremity edema and SOB  Megan Strickland Operative History: No cardiovascular surgeries noted Risk Factors The patient has history of smoking (current) 2 ppd  FINDINGS:  Right         Impression           GSV Mid Calf  Chronic Thick [place]ila   Left          Impression           GSV Mid Calf  Chronic Thick Br[place]ila     CONCLUSION: Impression:  RIGHT LOWER LIMB: No evidence of acute or chronic deep vein thrombosis   Evidence of chronic superficial phlebitis noted in the great saphenous vein at the mid calf  Doppler evaluation shows a normal response to augmentation maneuvers  Popliteal, posterior tibial and anterior tibial arterial Doppler waveforms are triphasic  LEFT LOWER LIMB: No evidence of acute or chronic deep vein thrombosis  Evidence of chronic superficial phlebitis noted in the great saphenous vein at the mid calf  Doppler evaluation shows a normal response to augmentation maneuvers  Popliteal, posterior tibial and anterior tibial arterial Doppler waveforms are triphasic  Technical findings were given to Dr Dora Castaneda  SIGNATURE: Electronically Signed by: Linette Hernandez MD, [address] on 2023-01-31 06:53:31 PM    Echo complete w/ contrast if indicated    Result Date: 2/21/2023  Narrative: •  Study quality was poor  This was a technically difficult study •  Left Ventricle: Left ventricular cavity size is normal  Wall thickness is mildly increased  There is mild concentric hypertrophy  The left ventricular ejection fraction is 65%  Systolic function is normal  Wall motion is grossly normal  •  Right Ventricle: Right ventricular cavity size is normal  Systolic function is normal        Labs and pertinent reports reviewed

## 2023-03-03 NOTE — PHYSICAL THERAPY NOTE
PHYSICAL THERAPY NOTE          Patient Name: lCinton MCKEON Date: 3/3/2023     03/03/23 1320   PT Last Visit   PT Visit Date 03/03/23   Note Type   Note Type Treatment   Pain Assessment   Pain Assessment Tool 0-10   Pain Score No Pain   Restrictions/Precautions   Weight Bearing Precautions Per Order No   General   Chart Reviewed Yes   Cognition   Overall Cognitive Status WFL   Arousal/Participation Alert   Attention Within functional limits   Orientation Level Oriented X4   Following Commands Follows all commands and directions without difficulty   Transfers   Sit to Stand Unable to assess   Additional Comments Pt up walking in room upon arrival    Ambulation/Elevation   Gait pattern Wide DANIEL   Gait Assistance 5  Supervision   Assistive Device Rolling walker   Distance 90'   Stair Management Assistance 5  Supervision   Additional items Verbal cues   Stair Management Technique One rail L;Nonreciprocal  (HHA on other side , pt has 2 HR at home)   Number of Stairs 7   Balance   Static Standing Fair   Dynamic Standing Fair   Ambulatory Fair -   Endurance Deficit   Endurance Deficit Yes   Activity Tolerance   Activity Tolerance Patient tolerated treatment well   Nurse Made Aware RN cleared pt for thearpy   Assessment   Prognosis Fair   Problem List Decreased endurance   Assessment Pt seen for PT treatment session this date  Therapy session focused on functional transfers, and ambulation in order to improve overall mobility and independence  Pt requires supervision for ambulation, able to tolerate 90' using RW, slow but steady gait  Pt also negotiated up and down stairs with 1HR, HHA on other side, has 2 HR at home  Pt states spouse will be able to assist as needed  Pt making steady progress toward goals  Pt was left in room at the end of PT session with all needs in reach   Pt would benefit from continued PT services while in hospital to address remaining limitations  The patient's AM-PAC Basic Mobility Inpatient Short Form Raw Score is 23  A Raw score of greater than 16 suggests the patient may benefit from discharge to home  Please also refer to the recommendation of the Physical Therapist for safe discharge planning     Goals   STG Expiration Date 03/09/23   PT Treatment Day 0   Plan   Treatment/Interventions Gait training   Progress Progressing toward goals   PT Frequency 1-2x/wk   Recommendation   PT Discharge Recommendation Home with home health rehabilitation   Equipment Recommended [address] Mobility Inpatient   Turning in Flat Bed Without Bedrails 3   Lying on Back to Sitting on Edge of Flat Bed Without Bedrails 3   Moving Bed to Chair 4   Standing Up From Chair Using Arms 4   Walk in Room 4   Climb 3-5 Stairs With Railing 3   Basic Mobility Inpatient Raw Score 21   Basic Mobility Standardized Score 45 55   Highest Level Of Mobility   JH-HLM Goal 6: Walk 10 steps or more   JH-HLM Achieved 7: Walk 25 feet or more   Education   Education Provided Mobility training   Patient Demonstrates acceptance/verbal understanding     Chloe Keys PT DPT

## 2023-03-03 NOTE — PROGRESS NOTES
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 admitted with abdominal pain nausea and decreased p o  intake  Nephrology consulted and following for acute kidney injury and hyperkalemia management  Acute kidney injury (POA):  DENZEL initial resolved  DENZEL multifactorial most likely secondary to prerenal azotemia since DENZEL improved with albumin challenge, now with second episode of acute kidney injury likely secondary to increased intra-abdominal pressures and ischemic injury from hypotension, status post paracentesis 6 6 L on 2/26/2023 + some component of cholemic nephrosis with elevations in bilirubin and some component of obstructive uropathy/urinary retention x2 plus third spacing intravascular volume depletion  After review of records In [facility] as well as Care everywhere it appears that the patient has a baseline Creatinine of 0 6-0 8 mg/dL  patient was admitted with a creatinine of 2 37 mg/dL on 2/20/2023  patient's creatinine today is at 2 54 mg/dL, unfortunately still continues to rise  During this hospital stay patient had provided dialysis consent which is in the chart in case needed was taken per my prior colleagues  CT with no hydronephrosis  Continue albumin 25 g IV every 6 x24 hours and try to give Bumex 2 mg IV x1 today if hemodynamically stable check BMP in a m  Await renal recovery  Optimize hemodynamic status to avoid delay in renal recovery  Place on a renal diet when allowed diet order  Avoid nephrotoxins, adjust meds to appropriate GFR  Strict I/O    Daily weights  Urinary retention protocol if patient does not have a Harvey  will need to set up patient for follow up with Nephrology as an outpatient post hospitalization  Overall poor prognosis  Follow-up with palliative care consider home with hospice  Not an ideal candidate for dialysis based on his comorbidities    Blood pressure/hypotension:  current medications: Midodrine 15 mg p o  3 times daily  recommendations: Albumin 25 g IV every 6x24 and Bumex 2 mg IV x1 if hemodynamically stable  Optimize hemodynamics  Maintain MAP > 65mmHg  Avoid BP fluctuations  H/H/anemia:  most recent hemoglobin at 10 7g/dL  maintain hemoglobin greater than 8 grams/deciliter    Acid-base electrolytes:    Hypocalcemia:    On ergocalciferol 50,000 units p o  q  Weekly  Most recent serum calcium 8 5  Resolved    Hyponatremia:   Most recent sodium at 126 mEq slightly decreased, and continues to deteriorate likely needs diuresis however unable to tolerate Bumex  Initial serum sodium on admission 118 mEq, rapidly corrected initially and subsequently requiring hypotonic IV fluids  Continue fluid restriction 1 2 L/day  Work-up that revealed urine sodium less than 5, urine osmolality 506 serum osmolality 288  We will give albumin and Bumex today    Hypermagnesemia:   Most recent magnesium at 3 0  Hold further magnesium supplements for now    Acid-base:    Most recent bicarb at 17, stable for now    Other medical problems:  Proteinuria: Most recent UA +1 for protein as of 2/25/2023  Recheck when stable  Metastatic cholangiocarcinoma:  Management per primary team   Recent MRI showing new thrombosis clinic, superior mesenteric and portal vein  Peritoneal carcinomatosis with recurrent ascites requiring paracentesis  Follow-up with hematology oncology  Patient with mets to liver, lymph node, bone, lungs  Prognosis recommend follow-up with palliative care for goals  Status post paracentesis 3/3/2023 with 6 L removed    Shock:  Management per primary team   Cultures negative so far empiric antibiotics per ICU team      Thanks for the consult  Will continue to follow  Please call with questions/ concerns  Above-mentioned orders and Plan in terms of we will try again to give Bumex today if hemodynamically stable and tolerable and continue albumin to help with diuresis was discussed with the team in depth with [facility] text and they agree      Radha Kelly MD, TIMA, 3/4/2023, 9:25 AM                Objective :   Patient seen and examined in his room blood pressures continue remain low remains afebrile unable to get Bumex yesterday due to low blood pressures  Followed up by hematology oncology follow-up outpatient  Urine output not adequately documented however patient reports he has been having good and increased urine output in last 24 hours      PHYSICAL EXAM  BP 95/50   Pulse 82   Temp (!) 97 2 °F (36 2 °C)   Resp 18   Ht 6' (1 829 m)   Wt 104 kg (229 lb 0 9 oz)   SpO2 100%   BMI 31 07 kg/m²   Temp (24hrs), Av 4 °F (36 3 °C), Min:97 2 °F (36 2 °C), Max:97 5 °F (36 4 °C)        Intake/Output Summary (Last 24 hours) at 3/3/2023 1359  Last data filed at 3/3/2023 1019  Gross per 24 hour   Intake 200 ml   Output 6080 ml   Net -5880 ml       I/O last 24 hours: In: 440 [P O :440]  Out: 6080 [Urine:30; Other:6050]      Current Weight: Weight - Scale: 104 kg (229 lb 0 9 oz)  First Weight: Weight - Scale: 97 8 kg (215 lb 9 6 oz)  Physical Exam  Vitals and nursing note reviewed  Constitutional:       General: He is not in acute distress  Appearance: Normal appearance  He is obese  He is ill-appearing  He is not toxic-appearing or diaphoretic  HENT:      Head: Normocephalic and atraumatic  Mouth/Throat:      Mouth: Mucous membranes are moist       Pharynx: Oropharynx is clear  No oropharyngeal exudate  Eyes:      General: No scleral icterus  Conjunctiva/sclera: Conjunctivae normal    Cardiovascular:      Rate and Rhythm: Normal rate        Heart sounds: Normal heart sounds  No friction rub  Pulmonary:      Effort: No respiratory distress  Breath sounds: Normal breath sounds  No stridor  Abdominal:      General: There is no distension  Palpations: Abdomen is soft  There is no mass  Tenderness: There is no abdominal tenderness  Comments: Pitting edema   Musculoskeletal:         General: Swelling present  Cervical back: Normal range of motion  No rigidity  Comments: +2 edema bilateral lower extremities   Skin:     General: Skin is warm  Coloration: Skin is not jaundiced  Neurological:      General: No focal deficit present  Mental Status: He is alert and oriented to person, place, and time  Mental status is at baseline  Psychiatric:         Behavior: Behavior normal              Review of Systems   Constitutional: Negative for chills and fatigue  HENT: Negative for congestion  Respiratory: Negative for cough and shortness of breath  Cardiovascular: Positive for leg swelling  Gastrointestinal: Negative for constipation and diarrhea  Genitourinary: Negative for decreased urine volume and dysuria  Musculoskeletal: Negative for back pain  Neurological: Negative for headaches  Psychiatric/Behavioral: Negative for agitation and confusion  All other systems reviewed and are negative        Scheduled Meds:  Current Facility-Administered Medications   Medication Dose Route Frequency Provider Last Rate   • al mag oxide-diphenhydramine-lidocaine viscous  10 mL Swish & Swallow Q4H PRN Laura Ashraf MD     • Albumin 25%  25 g Intravenous Q6H Lawandaaly Toney MD     • bumetanide  2 mg Intravenous Once Arianna Henriquez MD     • chlorhexidine  15 mL Swish & Spit Q12H [address] Laura Ashraf MD     • enoxaparin  1 mg/kg Subcutaneous Q12H [address] Loyda Lea MD     • ergocalciferol  50,000 Units Oral Weekly Laura Ashraf MD     • gabapentin  200 mg Oral BID Lashanda Nixon MD     • insulin lispro  1-6 Units Subcutaneous TID AC Laura Ashraf MD     • melatonin  6 mg Oral HS Laura Ashraf MD     • midodrine  15 mg Oral TID AC Laura Ashraf MD     • JOYCE ANTIFUNGAL   Topical BID Laura Ashraf MD     • ondansetron  4 mg Intravenous Q8H PRN Laura Ashraf MD     • oxyCODONE  5 mg Oral Q4H PRN Laura Ashraf MD     • pantoprazole  40 mg Oral Early Morning Laura Ashraf MD     • saliva substitute  5 spray Mouth/Throat 4x Daily PRN Laura Ashraf MD     • sucralfate  1 g Oral 4x Daily (AC & HS) Laura Ashraf MD         PRN Meds: •  al mag oxide-diphenhydramine-lidocaine viscous  •  ondansetron  •  oxyCODONE  •  saliva substitute    Continuous Infusions:       Invasive Devices:      Invasive Devices     Central Venous Catheter Line  Duration           Port A Cath Right Subclavian -- days                  LABORATORY:    Results from last 7 days   Lab Units 03/03/23  0820 03/03/23  5857 03/02/23  0132 03/01/23  0547 02/28/23  0428 02/28/23  0000 02/27/23  0516 02/26/23  0558 02/25/23  0554   WBC Thousand/uL 12 31*  --   --  14 63*  --  13 30* 15 32* 19 65* 16 16*   HEMOGLOBIN g/dL 11 4*  --   --  12 2  --  11 4* 11 8* 13 0 11 6*   HEMATOCRIT % 34 8*  --   --  36 9  --  32 7* 34 0* 36 9 34 8*   PLATELETS Thousands/uL 48*  --   --  50*  --  46* 50* 75* 72*   POTASSIUM mmol/L  --  4 5 4 6 4 4 4 1  --  4 0 4 1 4 0   CHLORIDE mmol/L  --  103 104 102 103  --  106 108 109*   CO2 mmol/L  --  17* 18* 18* 17*  --  17* 16* 18*   BUN mg/dL  --  74* 64* 62* 58*  --  53* 47* 35*   CREATININE mg/dL  --  2 06* 1 76* 1 50* 1 54*  --  1 51* 1 43* 0 92   CALCIUM mg/dL  --  8 2* 8 2* 7 8* 7 4*  --  6 9* 7 2* 7 2*   MAGNESIUM mg/dL  --  3 0* 3 1* 3 0* 2 8*  --  2 7* 2 7* 2 9*   PHOSPHORUS mg/dL  --  3 6 3 8  --  2 7  --  3 1 2 4* 2 5*      rest all reviewed    RADIOLOGY:  VAS lower limb venous duplex study, complete bilateral   Final Result by Homar Villanueva MD (02/22 2728)      XR chest portable ICU Final Result by Lizz Nation MD (02/20 1416)      No acute cardiopulmonary disease  Findings are stable               Workstation performed: [id number]         CT abdomen pelvis wo contrast   Final Result by Jojo Sandhu MD (02/20 1522)      No acute pathology  Evaluation is limited in the absence of intravenous contrast enhancement, however metastatic disease appears essentially unchanged, and including dominant hepatic lesion, peritoneal carcinomatosis with moderate moderate ascites  Presumed metastatic    right lower lobe nodules also noted  Workstation performed: [id number]         IR INPATIENT Paracentesis    (Results Pending)     Rest all reviewed    Portions of the record may have been created with voice recognition software  Occasional wrong word or "sound a like" substitutions may have occurred due to the inherent limitations of voice recognition software  Read the chart carefully and recognize, using context, where substitutions have occurred  If you have any questions, please contact the dictating provider

## 2023-03-04 NOTE — ASSESSMENT & PLAN NOTE
Patient with metastatic cholangiocarcinoma, worsening kidney function, worsening liver function   Overall guarded prognosis  Patient understands overall guarded prognosis however he would like to pursue active treatment and plans on following oncology outpatient for further cares  Discussed with patient and spouse in detail

## 2023-03-04 NOTE — PROGRESS NOTES
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 admitted with abdominal pain nausea and decreased p o  intake  Nephrology consulted and following for acute kidney injury and hyperkalemia management  Acute kidney injury (POA):  DENZEL initial resolved  DENZEL multifactorial most likely secondary to prerenal azotemia since DENZEL improved with albumin challenge, now with second episode of acute kidney injury likely secondary to increased intra-abdominal pressures and ischemic injury from hypotension, status post paracentesis 6 6 L on 2/26/2023 + some component of cholemic nephrosis with elevations in bilirubin and some component of obstructive uropathy/urinary retention x2 plus third spacing intravascular volume depletion  After review of records In [facility] as well as Care everywhere it appears that the patient has a baseline Creatinine of 0 6-0 8 mg/dL  patient was admitted with a creatinine of 2 37 mg/dL on 2/20/2023  patient's creatinine today is at 1 80 mg/dL, stable and improved from yesterday with diuresis  During this hospital stay patient had provided dialysis consent which is in the chart in case needed was taken per my prior colleagues  However overall looking at current condition and comorbidities patient does not appear to be a good candidate for renal replacement therapy this was discussed with the patient and his spouse  CT with no hydronephrosis  Will give albumin 25 g IV every 6x2 doses and Bumex 2 mg IV x1 today  Await renal recovery  Optimize hemodynamic status to avoid delay in renal recovery    Place on a renal diet when allowed diet order  Avoid nephrotoxins, adjust meds to appropriate GFR  Strict I/O  Daily weights  Urinary retention protocol if patient does not have a Harvey  will need to set up patient for follow up with Nephrology as an outpatient post hospitalization  Overall poor prognosis  Follow-up with palliative care consider home with hospice  Not an ideal candidate for dialysis based on his comorbidities    Blood pressure/hypotension:  current medications: Midodrine 15 mg p o  3 times daily  recommendations: We will give additional albumin 25 g IV every 6x22 and Bumex 2 mg IV x1 if hemodynamically stable  Optimize hemodynamics  Maintain MAP > 65mmHg  Avoid BP fluctuations  H/H/anemia:  most recent hemoglobin at 9 9 g/dL  maintain hemoglobin greater than 8 grams/deciliter    Acid-base electrolytes:    Hypocalcemia:    On ergocalciferol 50,000 units p o  q  Weekly  Most recent serum calcium 8 5  Resolved    Hyponatremia:   Most recent sodium at 131 mEq improved with diuresis  Initial serum sodium on admission 118 mEq, rapidly corrected initially and subsequently requiring hypotonic IV fluids  Continue fluid restriction 1 2 L/day  Work-up that revealed urine sodium less than 5, urine osmolality 506 serum osmolality 288  We will give albumin and Bumex today again    Hypermagnesemia:   Most recent magnesium at 3 0  Hold further magnesium supplements for now    Acid-base:    Most recent bicarb at 19, stable for now, improving    Other medical problems:  Proteinuria: Most recent UA +1 for protein as of 2/25/2023  Recheck when stable  Metastatic cholangiocarcinoma:  Management per primary team   Recent MRI showing new thrombosis clinic, superior mesenteric and portal vein  Peritoneal carcinomatosis with recurrent ascites requiring paracentesis  Follow-up with hematology oncology  Patient with mets to liver, lymph node, bone, lungs  Prognosis recommend follow-up with palliative care for goals  Status post paracentesis 3/3/2023 with 6 L removed  Shock:  Management per primary team   Cultures negative so far empiric antibiotics per ICU team      Thanks for the consult  Will continue to follow  Please call with questions/ concerns  Above-mentioned orders and Plan in terms of retry of albumin with Bumex today was discussed with the team in depth with Tiger text and they agree      Anupama Kapoor MD, FASN, 3/5/2023, 10:11 AM                    Objective :   Patient seen and examined in his room blood pressures continue to remain low was able to get a dose of Bumex 2 mg IV x1 yesterday  I spoke with patient's spouse yesterday with regards to his renal status and poor prognosis from a renal standpoint and not being a renal replacement therapy candidate due to underlying comorbidities  PHYSICAL EXAM  BP (!) 96/49   Pulse 89   Temp 97 5 °F (36 4 °C)   Resp 16   Ht 6' (1 829 m)   Wt 104 kg (229 lb 0 9 oz)   SpO2 97%   BMI 31 07 kg/m²   Temp (24hrs), Av 5 °F (36 4 °C), Min:97 3 °F (36 3 °C), Max:97 7 °F (36 5 °C)        Intake/Output Summary (Last 24 hours) at 3/4/2023 1417  Last data filed at 3/4/2023 1253  Gross per 24 hour   Intake 120 ml   Output 300 ml   Net -180 ml       I/O last 24 hours: In: 120 [P O :120]  Out: 6350 [Urine:300; Other:6050]      Current Weight: Weight - Scale: 104 kg (229 lb 0 9 oz)  First Weight: Weight - Scale: 97 8 kg (215 lb 9 6 oz)  Physical Exam  Vitals and nursing note reviewed  Constitutional:       General: He is not in acute distress  Appearance: Normal appearance  He is obese  He is ill-appearing  He is not toxic-appearing or diaphoretic  HENT:      Head: Normocephalic and atraumatic  Mouth/Throat:      Mouth: Mucous membranes are moist       Pharynx: Oropharynx is clear  No oropharyngeal exudate  Eyes:      General: No scleral icterus  Conjunctiva/sclera: Conjunctivae normal    Cardiovascular:      Rate and Rhythm: Normal rate        Heart sounds: Normal heart sounds  No friction rub  Pulmonary:      Breath sounds: Normal breath sounds  No stridor  Abdominal:      General: There is distension  Palpations: Abdomen is soft  Tenderness: There is no abdominal tenderness  Comments: Positive pitting edema   Musculoskeletal:         General: Swelling present  Cervical back: Normal range of motion  Comments: +2 edema bilateral lower extremities   Skin:     General: Skin is warm  Coloration: Skin is not jaundiced  Neurological:      General: No focal deficit present  Mental Status: He is alert and oriented to person, place, and time  Psychiatric:         Mood and Affect: Mood normal          Behavior: Behavior normal              Review of Systems   Constitutional: Positive for fatigue  Negative for chills  HENT: Negative for congestion  Respiratory: Negative for cough and shortness of breath  Cardiovascular: Positive for leg swelling  Gastrointestinal: Positive for abdominal distention  Negative for abdominal pain and nausea  Genitourinary:        Reports slight increase in urine output   Musculoskeletal: Negative for back pain  Neurological: Negative for headaches  Psychiatric/Behavioral: Negative for agitation and confusion  All other systems reviewed and are negative        Scheduled Meds:  Current Facility-Administered Medications   Medication Dose Route Frequency Provider Last Rate   • al mag oxide-diphenhydramine-lidocaine viscous  10 mL Swish & Swallow Q4H PRN Matt Gar MD     • Albumin 25%  25 g Intravenous Q6H Lawanda Smith MD     • bumetanide  2 mg Intravenous Once Radha Kelly MD     • chlorhexidine  15 mL Swish & Spit Q12H [facility] Matt Gar MD     • enoxaparin  1 mg/kg Subcutaneous Q12H [facility] Jaylon Travis MD     • ergocalciferol  50,000 Units Oral Weekly Matt Gar MD     • gabapentin  200 mg Oral BID Idris Long MD     • insulin lispro  1-6 Units Subcutaneous TID AC Olga Prado MD     • melatonin  6 mg Oral HS Olga Prado MD     • midodrine  15 mg Oral TID AC Olga Prado MD     • JOYCE ANTIFUNGAL   Topical BID Olga Prado MD     • ondansetron  4 mg Intravenous Q8H PRN Olga Prado MD     • oxyCODONE  5 mg Oral Q4H PRN Olga Prado MD     • pantoprazole  40 mg Oral Early Morning Olga Prado MD     • saliva substitute  5 spray Mouth/Throat 4x Daily PRN Olga Prado MD     • sucralfate  1 g Oral 4x Daily (AC & HS) Olga Prado MD         PRN Meds: •  al mag oxide-diphenhydramine-lidocaine viscous  •  ondansetron  •  oxyCODONE  •  saliva substitute    Continuous Infusions:       Invasive Devices:      Invasive Devices     Central Venous Catheter Line  Duration           Port A Cath Right Subclavian -- days                  LABORATORY:    Results from last 7 days   Lab Units 03/04/23  0510 03/03/23  0820 03/03/23  9319 03/02/23  1500 03/01/23  0547 02/28/23  0428 02/28/23  0000 02/27/23  0516 02/26/23  0558   WBC Thousand/uL 11 52* 12 31*  --   --  14 63*  --  13 30* 15 32* 19 65*   HEMOGLOBIN g/dL 10 7* 11 4*  --   --  12 2  --  11 4* 11 8* 13 0   HEMATOCRIT % 31 0* 34 8*  --   --  36 9  --  32 7* 34 0* 36 9   PLATELETS Thousands/uL 43* 48*  --   --  50*  --  46* 50* 75*   POTASSIUM mmol/L 4 6  --  4 5 4 6 4 4 4 1  --  4 0 4 1   CHLORIDE mmol/L 101  --  103 104 102 103  --  106 108   CO2 mmol/L 17*  --  17* 18* 18* 17*  --  17* 16*   BUN mg/dL 83*  --  74* 64* 62* 58*  --  53* 47*   CREATININE mg/dL 2 54*  --  2 06* 1 76* 1 50* 1 54*  --  1 51* 1 43*   CALCIUM mg/dL 8 5  --  8 2* 8 2* 7 8* 7 4*  --  6 9* 7 2*   MAGNESIUM mg/dL  --   --  3 0* 3 1* 3 0* 2 8*  --  2 7* 2 7*   PHOSPHORUS mg/dL  --   --  3 6 3 8  --  2 7  --  3 1 2 4*      rest all reviewed    RADIOLOGY:  VAS lower limb venous duplex study, complete bilateral   Final Result by Warren Cabral MD (02/22 7599)      XR chest portable ICU   Final Result by Savana Erazo MD (02/20 8096)      No acute cardiopulmonary disease  Findings are stable               Workstation performed: [id number]         CT abdomen pelvis wo contrast   Final Result by Vivek Benito MD (02/20 9101)      No acute pathology  Evaluation is limited in the absence of intravenous contrast enhancement, however metastatic disease appears essentially unchanged, and including dominant hepatic lesion, peritoneal carcinomatosis with moderate moderate ascites  Presumed metastatic    right lower lobe nodules also noted  Workstation performed: [id number]         IR INPATIENT Paracentesis    (Results Pending)     Rest all reviewed    Portions of the record may have been created with voice recognition software  Occasional wrong word or "sound a like" substitutions may have occurred due to the inherent limitations of voice recognition software  Read the chart carefully and recognize, using context, where substitutions have occurred  If you have any questions, please contact the dictating provider

## 2023-03-04 NOTE — PROGRESS NOTES
[address] [facility]  Progress Note Missy Ashby 1970, 47 y o  male MRN: [id number]  Unit/Bed#: [facility] 813-01 Encounter: [id number]  Primary Care Provider: Ginny Valdivia MD   Date and time admitted to hospital: 2/20/2023  3:04 AM    * Septic shock [facility])  Assessment & Plan  Present on admission  Shock resolved  Monitor closely    Cholangiocarcinoma, stage IV [facility])  Assessment & Plan  Patient is progressing metastatic cholangiocarcinoma  Discussed with patient and spouse at bedside overall guarded prognosis  Discussed with oncology  Oncology has reiterated poor prognosis, patient reports he understands his guarded prognosis but would like to pursue active treatment at this time and plans to follow-up with oncology on discharge for further cares  Oncology inputs noted    Goals of care, counseling/discussion  Assessment & Plan  Patient with metastatic cholangiocarcinoma, worsening kidney function, worsening liver function   Overall guarded prognosis  Patient understands overall guarded prognosis however he would like to pursue active treatment and plans on following oncology outpatient for further cares  Discussed with patient and spouse in detail    Hepatic encephalopathy  Assessment & Plan  Monitor    Elevated INR  Assessment & Plan  Likely due to metastatic liver disease  Vitamin K supplementation  Monitor INR    DENZEL (acute kidney injury) ([address] )  Assessment & Plan  Monitor kidney function closely  Avoid nephrotoxins  Nephrology following    Oropharyngeal candidiasis  Assessment & Plan  Received 7 days fluconazole treatment      Mesenteric thrombosis (HCC)  Assessment & Plan  Received IV heparin GTT  discussed with oncology - Patient is recommended Lovenox 1 mg/kg body weight every 12 hours  Lovenox 1 mg/kg body at twice daily  Outpatient oncology follow-up      Malignant ascites  Assessment & Plan  S/p paracentesis  Monitor    Thrombocytopenia ([address] )  Assessment & Plan  Multifactorial  Oncology inputs noted  Monitor counts    GERD (gastroesophageal reflux disease)  Assessment & Plan  Continue PPI    Hyponatremia  Assessment & Plan  Multifactorial  Monitor closely  Nephrology following          VTE Pharmacologic Prophylaxis: VTE Score: 8 High Risk (Score >/= 5) - Pharmacological DVT Prophylaxis Ordered: enoxaparin (Lovenox)  Sequential Compression Devices Ordered  Patient Centered Rounds: I performed bedside rounds with nursing staff today  Discussions with Specialists or Other Care Team Provider: Nephrology    Education and Discussions with Family / Patient: Discussed with the patient, updated spouse Zoraida Rey detail questions answered  Total Time Spent on Date of Encounter in care of patient: 35 minutes This time was spent on one or more of the following: performing physical exam; counseling and coordination of care; obtaining or reviewing history; documenting in the medical record; reviewing/ordering tests, medications or procedures; communicating with other healthcare professionals and discussing with patient's family/caregivers  Current Length of Stay: 12 day(s)  Current Patient Status: Inpatient   Certification Statement: The patient will continue to require additional inpatient hospital stay due to As outlined  Discharge Plan: Awaiting clinical and symptomatic improvement    Code Status: Level 1 - Full Code    Subjective:     Reports feeling tired  Appetite fair to poor  Encouraged out of bed into chair as able  Encouraged incentive spirometry    Objective:     Vitals:   Temp (24hrs), Av 5 °F (36 4 °C), Min:97 3 °F (36 3 °C), Max:97 7 °F (36 5 °C)    Temp:  [97 3 °F (36 3 °C)-97 7 °F (36 5 °C)] 97 5 °F (36 4 °C)  HR:  [82-99] 89  Resp:  [16-18] 16  BP: (88-96)/(48-50) 96/49  SpO2:  [94 %-99 %] 97 %  Body mass index is 31 07 kg/m²  Input and Output Summary (last 24 hours):      Intake/Output Summary (Last 24 hours) at 3/4/2023 1312  Last data filed at 3/4/2023 1144  Gross per 24 hour   Intake --   Output 300 ml   Net -300 ml       Physical Exam:   Physical Exam       Comfortably in bed  Anasarca noted  Neck supple  Lungs diminished breath sounds bilaterally  Heart sounds S1 and S2 noted  Abdomen soft, nontender  Abdominal distention noted  Awake and alert obey simple commands  Pedal edema noted  No rash    Additional Data:     Labs:  Results from last 7 days   Lab Units 03/04/23  0510   WBC Thousand/uL 11 52*   HEMOGLOBIN g/dL 10 7*   HEMATOCRIT % 31 0*   PLATELETS Thousands/uL 43*   NEUTROS PCT % 78*   LYMPHS PCT % 7*   MONOS PCT % 12   EOS PCT % 2     Results from last 7 days   Lab Units 03/04/23  0510   SODIUM mmol/L 126*   POTASSIUM mmol/L 4 6   CHLORIDE mmol/L 101   CO2 mmol/L 17*   BUN mg/dL 83*   CREATININE mg/dL 2 54*   ANION GAP mmol/L 8   CALCIUM mg/dL 8 5   ALBUMIN g/dL 3 5   TOTAL BILIRUBIN mg/dL 5 64*   ALK PHOS U/L 115   ALT U/L 34   AST U/L 94*   GLUCOSE RANDOM mg/dL 114     Results from last 7 days   Lab Units 03/04/23  0510   INR  2 60*     Results from last 7 days   Lab Units 03/04/23  1119 03/04/23  0736 03/03/23  2050 03/03/23  1541 03/03/23  1104 03/03/23  0753 03/02/23  2108 03/02/23  1642 03/02/23  1119 03/02/23  0720 03/01/23  2055 03/01/23  1638   POC GLUCOSE mg/dl 123 156* 158* 132 129 228* 115 121 126 109 124 143*               Lines/Drains:  Invasive Devices     Central Venous Catheter Line  Duration           Port A Cath Right Subclavian -- days                Central Line:  Goal for removal: Cholangiocarcinoma             Imaging: No pertinent imaging reviewed      Recent Cultures (last 7 days):         Last 24 Hours Medication List:   Current Facility-Administered Medications   Medication Dose Route Frequency Provider Last Rate   • al mag oxide-diphenhydramine-lidocaine viscous  10 mL Swish & Swallow Q4H PRN Laura Ashraf MD     • Albumin 25%  25 g Intravenous Q6H Lawanda Toney MD     • bumetanide  2 mg Intravenous Once Lawanda Ifeoma Martinez MD     • chlorhexidine  15 mL Swish & Spit Q12H [facility] Ryan Nix MD     • enoxaparin  1 mg/kg Subcutaneous Q12H [facility] Curtis Mcwilliams MD     • ergocalciferol  50,000 Units Oral Weekly Ryan Nix MD     • gabapentin  200 mg Oral BID Kitty Parnell MD     • insulin lispro  1-6 Units Subcutaneous TID AC Ryan Nix MD     • melatonin  6 mg Oral HS Ryan Nix MD     • midodrine  15 mg Oral TID AC Ryan Nix MD     • JOYCE ANTIFUNGAL   Topical BID Ryan Nix MD     • ondansetron  4 mg Intravenous Q8H PRN Ryan Nix MD     • oxyCODONE  5 mg Oral Q4H PRN Ryan Nix MD     • pantoprazole  40 mg Oral Early Morning Ryan Nix MD     • saliva substitute  5 spray Mouth/Throat 4x Daily PRN Ryan Nix MD     • sucralfate  1 g Oral 4x Daily (AC & HS) Ryan Nix MD          Today, Patient Was Seen By: Curtis Mcwilliams MD    **Please Note: This note may have been constructed using a voice recognition system  **

## 2023-03-04 NOTE — PLAN OF CARE
Problem: Prexisting or High Potential for Compromised Skin Integrity  Goal: Skin integrity is maintained or improved  Description: INTERVENTIONS:  - Identify patients at risk for skin breakdown  - Assess and monitor skin integrity  - Assess and monitor nutrition and hydration status  - Monitor labs   - Assess for incontinence   - Turn and reposition patient  - Assist with mobility/ambulation  - Relieve pressure over bony prominences  - Avoid friction and shearing  - Provide appropriate hygiene as needed including keeping skin clean and dry  - Evaluate need for skin moisturizer/barrier cream  - Collaborate with interdisciplinary team   - Patient/family teaching  - Consider wound care consult   Outcome: Progressing     Problem: CARDIOVASCULAR - ADULT  Goal: Maintains optimal cardiac output and hemodynamic stability  Description: INTERVENTIONS:  - Monitor I/O, vital signs and rhythm  - Monitor for S/S and trends of decreased cardiac output  - Administer and titrate ordered vasoactive medications to optimize hemodynamic stability  - Assess quality of pulses, skin color and temperature  - Assess for signs of decreased coronary artery perfusion  - Instruct patient to report change in severity of symptoms  Outcome: Progressing  Goal: Absence of cardiac dysrhythmias or at baseline rhythm  Description: INTERVENTIONS:  - Continuous cardiac monitoring, vital signs, obtain 12 lead EKG if ordered  - Administer antiarrhythmic and heart rate control medications as ordered  - Monitor electrolytes and administer replacement therapy as ordered  Outcome: Progressing     Problem: GASTROINTESTINAL - ADULT  Goal: Minimal or absence of nausea and/or vomiting  Description: INTERVENTIONS:  - Administer IV fluids if ordered to ensure adequate hydration  - Maintain NPO status until nausea and vomiting are resolved  - Nasogastric tube if ordered  - Administer ordered antiemetic medications as needed  - Provide nonpharmacologic comfort measures as appropriate  - Advance diet as tolerated, if ordered  - Consider nutrition services referral to assist patient with adequate nutrition and appropriate food choices  Outcome: Progressing  Goal: Maintains or returns to baseline bowel function  Description: INTERVENTIONS:  - Assess bowel function  - Encourage oral fluids to ensure adequate hydration  - Administer IV fluids if ordered to ensure adequate hydration  - Administer ordered medications as needed  - Encourage mobilization and activity  - Consider nutritional services referral to assist patient with adequate nutrition and appropriate food choices  Outcome: Progressing  Goal: Maintains adequate nutritional intake  Description: INTERVENTIONS:  - Monitor percentage of each meal consumed  - Identify factors contributing to decreased intake, treat as appropriate  - Assist with meals as needed  - Monitor I&O, weight, and lab values if indicated  - Obtain nutrition services referral as needed  Outcome: Progressing     Problem: METABOLIC, FLUID AND ELECTROLYTES - ADULT  Goal: Electrolytes maintained within normal limits  Description: INTERVENTIONS:  - Monitor labs and assess patient for signs and symptoms of electrolyte imbalances  - Administer electrolyte replacement as ordered  - Monitor response to electrolyte replacements, including repeat lab results as appropriate  - Instruct patient on fluid and nutrition as appropriate  Outcome: Progressing  Goal: Fluid balance maintained  Description: INTERVENTIONS:  - Monitor labs   - Monitor I/O and WT  - Instruct patient on fluid and nutrition as appropriate  - Assess for signs & symptoms of volume excess or deficit  Outcome: Progressing  Goal: Glucose maintained within target range  Description: INTERVENTIONS:  - Monitor Blood Glucose as ordered  - Assess for signs and symptoms of hyperglycemia and hypoglycemia  - Administer ordered medications to maintain glucose within target range  - Assess nutritional intake and initiate nutrition service referral as needed  Outcome: Progressing     Problem: PAIN - ADULT  Goal: Verbalizes/displays adequate comfort level or baseline comfort level  Description: Interventions:  - Encourage patient to monitor pain and request assistance  - Assess pain using appropriate pain scale  - Administer analgesics based on type and severity of pain and evaluate response  - Implement non-pharmacological measures as appropriate and evaluate response  - Consider cultural and social influences on pain and pain management  - Notify physician/advanced practitioner if interventions unsuccessful or patient reports new pain  Outcome: Progressing     Problem: INFECTION - ADULT  Goal: Absence or prevention of progression during hospitalization  Description: INTERVENTIONS:  - Assess and monitor for signs and symptoms of infection  - Monitor lab/diagnostic results  - Monitor all insertion sites, i e  indwelling lines, tubes, and drains  - Monitor endotracheal if appropriate and nasal secretions for changes in amount and color  - [place] appropriate cooling/warming therapies per order  - Administer medications as ordered  - Instruct and encourage patient and family to use good hand hygiene technique  - Identify and instruct in appropriate isolation precautions for identified infection/condition  Outcome: Progressing

## 2023-03-04 NOTE — RESTORATIVE TECHNICIAN NOTE
Restorative Technician Note      Patient Name: Josh Flanagan     Restorative Tech Visit Date: 03/04/23  Note Type: Mobility  Patient Position Upon Consult: Seated edge of bed  Activity Performed: Ambulated  Assistive Device: Standard walker  Education Provided: Yes  Patient Position at End of Consult: Bedside chair;  All needs within reach    Barbara DE JESUST, Restorative Technician

## 2023-03-05 NOTE — ASSESSMENT & PLAN NOTE
Patient is progressing metastatic cholangiocarcinoma  Discussed with patient overall guarded prognosis  Oncology has reiterated poor prognosis, patient reports he understands his guarded prognosis but would like to pursue active treatment at this time and plans to follow-up with oncology on discharge for further cares  Oncology inputs noted

## 2023-03-05 NOTE — ASSESSMENT & PLAN NOTE
Received IV heparin GTT  discussed with oncology - Patient is recommended Lovenox 1 mg/kg body weight every 12 hours  Lovenox 1 mg/kg body wt twice daily  Outpatient oncology follow-up

## 2023-03-05 NOTE — PLAN OF CARE
Problem: Prexisting or High Potential for Compromised Skin Integrity  Goal: Skin integrity is maintained or improved  Description: INTERVENTIONS:  - Identify patients at risk for skin breakdown  - Assess and monitor skin integrity  - Assess and monitor nutrition and hydration status  - Monitor labs   - Assess for incontinence   - Turn and reposition patient  - Assist with mobility/ambulation  - Relieve pressure over bony prominences  - Avoid friction and shearing  - Provide appropriate hygiene as needed including keeping skin clean and dry  - Evaluate need for skin moisturizer/barrier cream  - Collaborate with interdisciplinary team   - Patient/family teaching  - Consider wound care consult   Outcome: Progressing     Problem: CARDIOVASCULAR - ADULT  Goal: Maintains optimal cardiac output and hemodynamic stability  Description: INTERVENTIONS:  - Monitor I/O, vital signs and rhythm  - Monitor for S/S and trends of decreased cardiac output  - Administer and titrate ordered vasoactive medications to optimize hemodynamic stability  - Assess quality of pulses, skin color and temperature  - Assess for signs of decreased coronary artery perfusion  - Instruct patient to report change in severity of symptoms  Outcome: Progressing  Goal: Absence of cardiac dysrhythmias or at baseline rhythm  Description: INTERVENTIONS:  - Continuous cardiac monitoring, vital signs, obtain 12 lead EKG if ordered  - Administer antiarrhythmic and heart rate control medications as ordered  - Monitor electrolytes and administer replacement therapy as ordered  Outcome: Progressing     Problem: GASTROINTESTINAL - ADULT  Goal: Minimal or absence of nausea and/or vomiting  Description: INTERVENTIONS:  - Administer IV fluids if ordered to ensure adequate hydration  - Maintain NPO status until nausea and vomiting are resolved  - Nasogastric tube if ordered  - Administer ordered antiemetic medications as needed  - Provide nonpharmacologic comfort measures as appropriate  - Advance diet as tolerated, if ordered  - Consider nutrition services referral to assist patient with adequate nutrition and appropriate food choices  Outcome: Progressing  Goal: Maintains or returns to baseline bowel function  Description: INTERVENTIONS:  - Assess bowel function  - Encourage oral fluids to ensure adequate hydration  - Administer IV fluids if ordered to ensure adequate hydration  - Administer ordered medications as needed  - Encourage mobilization and activity  - Consider nutritional services referral to assist patient with adequate nutrition and appropriate food choices  Outcome: Progressing  Goal: Maintains adequate nutritional intake  Description: INTERVENTIONS:  - Monitor percentage of each meal consumed  - Identify factors contributing to decreased intake, treat as appropriate  - Assist with meals as needed  - Monitor I&O, weight, and lab values if indicated  - Obtain nutrition services referral as needed  Outcome: Progressing     Problem: HEMATOLOGIC - ADULT  Goal: Maintains hematologic stability  Description: INTERVENTIONS  - Assess for signs and symptoms of bleeding or hemorrhage  - Monitor labs  - Administer supportive blood products/factors as ordered and appropriate  Outcome: Progressing     Problem: MUSCULOSKELETAL - ADULT  Goal: Maintain or return mobility to safest level of function  Description: INTERVENTIONS:  - Assess patient's ability to carry out ADLs; assess patient's baseline for ADL function and identify physical deficits which impact ability to perform ADLs (bathing, care of mouth/teeth, toileting, grooming, dressing, etc )  - Assess/evaluate cause of self-care deficits   - Assess range of motion  - Assess patient's mobility  - Assess patient's need for assistive devices and provide as appropriate  - Encourage maximum independence but intervene and supervise when necessary  - Involve family in performance of ADLs  - Assess for home care needs following discharge   - Consider OT consult to assist with ADL evaluation and planning for discharge  - Provide patient education as appropriate  Outcome: Progressing  Goal: Maintain proper alignment of affected body part  Description: INTERVENTIONS:  - Support, maintain and protect limb and body alignment  - Provide patient/ family with appropriate education  Outcome: Progressing     Problem: DISCHARGE PLANNING  Goal: Discharge to home or other facility with appropriate resources  Description: INTERVENTIONS:  - Identify barriers to discharge w/patient and caregiver  - Arrange for needed discharge resources and transportation as appropriate  - Identify discharge learning needs (meds, wound care, etc )  - Arrange for interpretive services to assist at discharge as needed  - Refer to Case Management Department for coordinating discharge planning if the patient needs post-hospital services based on physician/advanced practitioner order or complex needs related to functional status, cognitive ability, or social support system  Outcome: Progressing     Problem: Knowledge Deficit  Goal: Patient/family/caregiver demonstrates understanding of disease process, treatment plan, medications, and discharge instructions  Description: Complete learning assessment and assess knowledge base    Interventions:  - Provide teaching at level of understanding  - Provide teaching via preferred learning methods  Outcome: Progressing

## 2023-03-06 NOTE — UTILIZATION REVIEW
Continued Stay Review    Date: 3/5                          Current Patient Class: Inpatient  Current Level of Care: MEd/surg    HPI:52 y o  male initially admitted on 2/20     Assessment/Plan:   Septic shock resolved  Has metastatic cholangiocarcinoma with worsening kidney function  Continue with active treatments and continued goals of care discussion  Lungs diminished  ANasarca and ascites  As per NEPRHROLOGY, initial DENZEL resolved  IMproved with albumin therapy  Give IV albumin and Bumex  Await renal recovery     Vital Signs:   Date/Time Temp Pulse Resp BP MAP (mmHg) SpO2 O2 Device   03/05/23 22:25:43 97 5 °F (36 4 °C) 93 20 94/53 67 96 % --   03/05/23 2100 -- -- -- -- -- -- None (Room air)   03/05/23 1547 -- -- -- 90/50 -- -- --   03/05/23 15:43:39 97 5 °F (36 4 °C) 83 16 94/53 67 95 % --   03/05/23 12:46:35 -- 87 -- 95/52 66 97 % --   03/05/23 1038 -- -- -- -- -- 96 % None (Room air)   03/05/23 07:31:07 97 3 °F (36 3 °C) Abnormal  78 20 99/52 68 97 % --         Pertinent Labs/Diagnostic Results:       Results from last 7 days   Lab Units 03/05/23  0607 03/04/23  0510 03/03/23  0820 03/01/23  0547   WBC Thousand/uL 9 05 11 52* 12 31* 14 63*   HEMOGLOBIN g/dL 9 9* 10 7* 11 4* 12 2   HEMATOCRIT % 29 3* 31 0* 34 8* 36 9   PLATELETS Thousands/uL 35* 43* 48* 50*   NEUTROS ABS Thousands/µL 6 94 8 95* 10 41* 11 91*         Results from last 7 days   Lab Units 03/05/23  0607 03/04/23  0510 03/03/23  0608 03/02/23  0624 03/01/23  0547 02/28/23  0430 02/28/23  0428   SODIUM mmol/L 131* 126* 128* 129* 130*  --  130*   POTASSIUM mmol/L 4 3 4 6 4 5 4 6 4 4  --  4 1   CHLORIDE mmol/L 104 101 103 104 102  --  103   CO2 mmol/L 19* 17* 17* 18* 18*  --  17*   ANION GAP mmol/L 8 8 8 7 10  --  10   BUN mg/dL 81* 83* 74* 64* 62*  --  58*   CREATININE mg/dL 1 80* 2 54* 2 06* 1 76* 1 50*  --  1 54*   EGFR ml/min/1 73sq m 42 28 36 43 53  --  51   CALCIUM mg/dL 8 5 8 5 8 2* 8 2* 7 8*  --  7 4*   CALCIUM, IONIZED mmol/L  --   --   -- --   --  0 99*  --    MAGNESIUM mg/dL  --   --  3 0* 3 1* 3 0*  --  2 8*   PHOSPHORUS mg/dL  --   --  3 6 3 8  --   --  2 7     Results from last 7 days   Lab Units 03/05/23  0607 03/04/23  0510 03/03/23  0608 03/02/23  0624 03/01/23  0547   AST U/L 80* 94* 98* 71* 76*   ALT U/L 27 34 39 37 41   ALK PHOS U/L 109 115 110 141* 129*   TOTAL PROTEIN g/dL 5 2* 5 1* 5 0* 5 2* 5 3*   ALBUMIN g/dL 3 6 3 5 3 3* 3 5 3 8   TOTAL BILIRUBIN mg/dL 5 94* 5 64* 6 15* 4 77* 5 01*     Results from last 7 days   Lab Units 03/05/23  2049 03/05/23  1545 03/05/23  1117 03/05/23  0805 03/05/23  0710 03/04/23  2101 03/04/23  1600 03/04/23  1119 03/04/23  0736 03/03/23  2050 03/03/23  1541   POC GLUCOSE mg/dl 144* 147* 125 113 111 117 153* 123 156* 158* 132     Results from last 7 days   Lab Units 03/06/23  0529 03/05/23  0607 03/04/23  0510 03/03/23  0608 03/02/23  0624 03/01/23  0547 02/28/23  0428   GLUCOSE RANDOM mg/dL 108 106 114 101 152* 116 111         Results from last 7 days   Lab Units 03/05/23  0607 03/04/23  0510 03/02/23  0624 03/01/23  0547 02/28/23  1549 02/28/23  0945   PROTIME seconds 28 7* 28 1*   < > 30 2*  --   --    INR  2 67* 2 60*   < > 2 86*  --   --    PTT seconds  --   --   --  72* 79* 87*    < > = values in this interval not displayed           Medications:   Scheduled Medications:  bumetanide, 1 mg, Intravenous, Once  chlorhexidine, 15 mL, Swish & Spit, Q12H [facility]  enoxaparin, 1 mg/kg, Subcutaneous, Q12H [facility]  ergocalciferol, 50,000 Units, Oral, Weekly  gabapentin, 200 mg, Oral, BID  insulin lispro, 1-6 Units, Subcutaneous, TID AC  melatonin, 6 mg, Oral, HS  midodrine, 15 mg, Oral, TID AC  JOYCE ANTIFUNGAL, , Topical, BID  pantoprazole, 40 mg, Oral, Early Morning  sucralfate, 1 g, Oral, 4x Daily (AC & HS)      Continuous IV Infusions:     PRN Meds:  al mag oxide-diphenhydramine-lidocaine viscous, 10 mL, Swish & Swallow, Q4H PRN  ondansetron, 4 mg, Intravenous, Q8H PRN  oxyCODONE, 5 mg, Oral, Q4H PRN  saliva substitute, 5 spray, Mouth/Throat, 4x Daily PRN        Discharge Plan: TBD    Network Utilization Review Department  ATTENTION: Please call with any questions or concerns to [phone] and carefully listen to the prompts so that you are directed to the right person  All voicemails are confidential   Albert Socorro all requests for admission clinical reviews, approved or denied determinations and any other requests to dedicated fax number below belonging to the campus where the patient is receiving treatment   List of dedicated fax numbers for the Facilities:  [address] DENIALS (Administrative/Medical Necessity) [phone]   [address] (Maternity/NICU/Pediatrics) [phone]   917 Rama King [phone]   [facility] 77 [phone]   [address] [place] [id number] [place] [address] [facility] Hospital[facility] 28 [phone]   155 [facility] 134 815 [facility] [phone]

## 2023-03-06 NOTE — PROGRESS NOTES
[address] [place] 47 y o  male MRN: [id number]  Unit/Bed#: [facility] 813-01 Encounter: [id number]  Reason for Consult: DENZEL    ASSESSMENT and PLAN:  1  Acute kidney injury (POA)  · Baseline creatinine is around 0 5-0 7  · Admitted with a creatinine of 2 37 on February 20, 2023  · Renal function improved and creatinine was down to 0 55 on February 24, 2023  · Had recurrence of DENZEL on February 26, 2023 and creatinine peaked at 2 54 on March 4, 2023  Etiology for secondary DENZEL felt to be multifactorial -likely has component of ATN  · Renal function is improving  · Creatinine is now down to 1 68  · Currently examines fluid overloaded  Give Bumex 1 mg IV x 1 today  2  Hypotension:  · BP remains relatively low but stable  · Continue midodrine 15 mg TID  3  Hyponatremia:  · Sodium slightly lower at 130 today  · Continue to monitor with diuretics  · Add fluid restriction 1500 cc/24 hours  4  Anemia: Hgb is stable  5  Metastatic cholangiocarcinoma  6  S/p septic shock    DISPOSITION:  · Bumex 1 mg IV x 1 today  · Add fluid restriction 1500 cc/24 hours  · Keep O>I      SUBJECTIVE / 24H INTERVAL HISTORY:  Had chinese food yesterday brought from outside  I noted the presence of chips in his tray  Reports that leg edema is better  Appetite is good  No CP or SOB       OBJECTIVE:  Current Weight: Weight - Scale: 104 kg (229 lb 0 9 oz)  Vitals:    03/05/23 1543 03/05/23 1547 03/05/23 2225 03/06/23 0735   BP: 94/53 90/50 94/53 92/52   Pulse: 83  93 97   Resp: 16  20 18   Temp: 97 5 °F (36 4 °C)  97 5 °F (36 4 °C) 97 5 °F (36 4 °C)   TempSrc:       SpO2: 95%  96% 96%   Weight:       Height:           Intake/Output Summary (Last 24 hours) at 3/6/2023 1157  Last data filed at 3/6/2023 0530  Gross per 24 hour   Intake --   Output 1025 ml   Net -1025 ml     General: conscious, cooperative, no distress  Skin: dry  Eyes: pink conjunctivae  ENT: moist mucous membranes  Respiratory: equal chest expansion, crackles in both lower lung fields  Cardiovascular: distinct heart sounds, normal rate, regular rhythm  Abdomen: soft, non tender, non distended, normal bowel sounds  Extremities: (+) bilateral LE edema  Genitourinary: no davison catheter  Neuro: awake, alert     Psych: appropriate affect    Medications:    Current Facility-Administered Medications:   •  al mag oxide-diphenhydramine-lidocaine viscous (MAGIC MOUTHWASH) suspension 10 mL, 10 mL, Swish & Swallow, Q4H PRN, Vicente Major MD, 10 mL at 02/26/23 1232  •  bumetanide (BUMEX) injection 2 mg, 2 mg, Intravenous, Once, Rachid Martinez MD  •  chlorhexidine (PERIDEX) 0 12 % oral rinse 15 mL, 15 mL, Swish & Spit, Q12H [address], Vicente Major MD, 15 mL at 03/06/23 0902  •  enoxaparin (LOVENOX) subcutaneous injection 100 mg, 1 mg/kg, Subcutaneous, Q12H [address], Kourtney Gamboa MD, 100 mg at 03/06/23 8502  •  ergocalciferol (VITAMIN D2) capsule 50,000 Units, 50,000 Units, Oral, Weekly, Vicente Major MD, 50,000 Units at 03/02/23 0804  •  gabapentin (NEURONTIN) capsule 200 mg, 200 mg, Oral, BID, Kervin Kim MD, 200 mg at 03/06/23 8217  •  insulin lispro (HumaLOG) 100 units/mL subcutaneous injection 1-6 Units, 1-6 Units, Subcutaneous, TID AC, 1 Units at 03/04/23 1706 **AND** Fingerstick Glucose (POCT), , , TID AC, Vicente Major MD  •  melatonin tablet 6 mg, 6 mg, Oral, HS, Vicente Major MD, 6 mg at 03/05/23 2055  •  midodrine (PROAMATINE) tablet 15 mg, 15 mg, Oral, TID AC, Vicente Major MD, 15 mg at 03/06/23 1141  •  moisture barrier miconazole 2% cream (aka JOYCE MOISTURE BARRIER ANTIFUNGAL CREAM), , Topical, BID, Vicente Major MD, Given at 03/06/23 0903  •  ondansetron (ZOFRAN) injection 4 mg, 4 mg, Intravenous, Q8H PRN, Vicente Major MD, 4 mg at 02/22/23 0006  •  oxyCODONE (ROXICODONE) IR tablet 5 mg, 5 mg, Oral, Q4H PRN, Vicente Major MD  •  pantoprazole (PROTONIX) EC tablet 40 mg, 40 mg, Oral, Early Morning, Sol Lara MD, 40 mg at 03/06/23 0525  •  saliva substitute (MOUTH KOTE) mucosal solution 5 spray, 5 spray, Mouth/Throat, 4x Daily PRN, Sol Lara MD, 5 spray at 02/25/23 2227  •  sucralfate (CARAFATE) tablet 1 g, 1 g, Oral, 4x Daily (AC & HS), Sol Lara MD, 1 g at 03/06/23 1141    Laboratory Results:  Results from last 7 days   Lab Units 03/06/23  0529 03/05/23  0607 03/04/23  0510 03/03/23  0820 03/03/23  1850 03/02/23  0624 03/01/23  0547 02/28/23  0428 02/28/23  0000   WBC Thousand/uL 9 21 9 05 11 52* 12 31*  --   --  14 63*  --  13 30*   HEMOGLOBIN g/dL 10 0* 9 9* 10 7* 11 4*  --   --  12 2  --  11 4*   HEMATOCRIT % 29 5* 29 3* 31 0* 34 8*  --   --  36 9  --  32 7*   PLATELETS Thousands/uL 47* 35* 43* 48*  --   --  50*  --  46*   POTASSIUM mmol/L 4 3 4 3 4 6  --  4 5 4 6 4 4 4 1  --    CHLORIDE mmol/L 102 104 101  --  103 104 102 103  --    CO2 mmol/L 18* 19* 17*  --  17* 18* 18* 17*  --    BUN mg/dL 79* 81* 83*  --  74* 64* 62* 58*  --    CREATININE mg/dL 1 68* 1 80* 2 54*  --  2 06* 1 76* 1 50* 1 54*  --    CALCIUM mg/dL 8 7 8 5 8 5  --  8 2* 8 2* 7 8* 7 4*  --    MAGNESIUM mg/dL  --   --   --   --  3 0* 3 1* 3 0* 2 8*  --    PHOSPHORUS mg/dL  --   --   --   --  3 6 3 8  --  2 7  --

## 2023-03-06 NOTE — ASSESSMENT & PLAN NOTE
Encounter Date: 9/2/2022       History     Chief Complaint   Patient presents with    Back Pain     Pt c/o worsening mid-lower back pain x 4 days. Hx of arthritis and bulging disks. Denies injury, urine/bowel incontinence, saddle anesthesia.      ASHLEY Herbert is a 53 y.o. M with PMH of asthma, arthritis, avascular necrosis, spondylolisthesis who presents with complaint of worsening low and mid back pain for the past month.  He thinks it is related to a 12 foot fall that he had from scaffolding last month.  He states he did not initially get seen by a doctor for that.  The past two weeks his back pain has been getting worse and he has recently started having sciatica with pain radiating down his right leg.  He has had similar sciatica in the past.  He states he has slight toe numbness bilaterally but no other numbness.  Denies weakness, denies incontinence.  Denies fever or chills.  States he has had some sweats for a few days.    Review of patient's allergies indicates:   Allergen Reactions    Toradol [ketorolac] Hives and Nausea And Vomiting    Ibuprofen Other (See Comments)     States GI BLEED     Past Medical History:   Diagnosis Date    Anxiety     Benzodiazepine dependence    Arthritis     Asthma     Avascular necrosis     Carpal tunnel syndrome     Chronic pain     Depression     Diverticulitis     Gout     Other injury of other sites of trunk 12/28/2011    Pneumonia     Spondylolisthesis     lumbar; myofascial pain    Staph infection     Ulnar neuropathy     left and rt arm     Past Surgical History:   Procedure Laterality Date    COLONOSCOPY N/A 7/6/2020    Procedure: COLONOSCOPY;  Surgeon: Broderick Moise MD;  Location: [facility];  Service: Endoscopy;  Laterality: N/A;    HIP SURGERY  3/06; 6/06    hip arthroplasty    HIP SURGERY      bilateral hip replacement (titanium)    JOINT REPLACEMENT      SHOULDER SURGERY  2012    right shoulder    SHOULDER SURGERY       Family History   Problem Relation Age of Onset  Patient with extensive hepatic metastasis  Lactulose  Monitor    Cancer Mother     Cancer Sister      Social History     Tobacco Use    Smoking status: Former     Packs/day: 1.00     Years: 10.00     Pack years: 10.00     Types: Cigarettes     Quit date: 10/13/2012     Years since quittin.8    Smokeless tobacco: Never   Substance Use Topics    Alcohol use: Yes     Alcohol/week: 12.0 standard drinks     Types: 12 Cans of beer per week     Comment: 2x/week    Drug use: No     Review of Systems  Constitutional: Denies fever  HENT: Denies sore throat  Eyes: Denies eye pain  Respiratory: Denies shortness of breath  CV: Denies chest pain  GI: Denies abdominal pain  : Denies urinary incontinence  MSK: + back pain  Skin: Denies rash  Neuro: Denies headache    Physical Exam     Initial Vitals [22 0521]   BP Pulse Resp Temp SpO2   (!) 184/103 74 18 98.1 °F (36.7 °C) 95 %      MAP       --         Physical Exam  General: Awake and alert, well-nourished  HENT: moist mucous membranes  Eyes: No conjunctival injection  Pulm: CTAB, no increased work of breathing  CV: Regular rate and rhythm, no murmur noted  Abdomen: Nondistended, non-tender to palpation  MSK: No LE edema, + mild tenderness to T and L spine  Skin: No rash noted  Neuro: GCS 15, normal mentation, no nystagmus, CN II-XII intact, normal sensation to fine touch in arms and legs other than slightly decreased toe sensation bilaterally, 5/5 strength bilaterally with elbow flexion/extension, hip flexion, knee flexion/extension, and ankle plantar/dorsiflexion.    Psychiatric: Cooperative    ED Course   Procedures  Labs Reviewed   CBC W/ AUTO DIFFERENTIAL - Abnormal; Notable for the following components:       Result Value    RBC 4.05 (*)     Hemoglobin 12.7 (*)     Hematocrit 38.0 (*)     MCH 31.4 (*)     MPV 8.9 (*)     Immature Granulocytes 1.1 (*)     Immature Grans (Abs) 0.10 (*)     All other components within normal limits   COMPREHENSIVE METABOLIC PANEL - Abnormal; Notable for the following components:    Anion Gap 7  (*)     All other components within normal limits          Imaging Results              CT Abdomen Pelvis  Without Contrast (Final result)  Result time 09/02/22 07:36:55      Final result by Josué Musa MD (09/02/22 07:36:55)                   Impression:      No acute noncontrast abnormality identified in the abdomen or pelvis.    Additional findings discussed in the body of the report.      Electronically signed by: Josué Musa MD  Date:    09/02/2022  Time:    07:36               Narrative:    EXAMINATION:  CT ABDOMEN PELVIS WITHOUT CONTRAST    CLINICAL HISTORY:  Back pain, assess L spine and aorta and kidneys for acute findings;    TECHNIQUE:  Low dose axial images, sagittal and coronal reformations were obtained from the lung bases to the pubic symphysis.  Oral contrast was not administered.    COMPARISON:  CT abdomen pelvis, 06/03/2022.    FINDINGS:  Lower chest: Heart size is normal. Lung bases are essentially clear. No pleural or pericardial effusion.    Abdomen:    Evaluation of the solid abdominal organs and bowel is limited in the absence of IV contrast.    Liver is enlarged and overall stable in appearance.  Gallbladder is contracted but otherwise unremarkable.  No intra-or extrahepatic biliary ductal dilatation.    Spleen, adrenals, and pancreas are unremarkable.    Kidneys are symmetric.  No renal or ureteral stones. No hydronephrosis.    No distended loops of small bowel. Multiple diverticula in the descending and sigmoid colon without findings of diverticulitis.  The appendix is normal.    Abdominal aorta is normal in caliber with mild calcific atherosclerosis.  No retroperitoneal fat stranding or hematoma.    No abdominal lymphadenopathy.    No abdominal free fluid or pneumoperitoneum.    Pelvis: Urinary bladder and pelvic organs are obscured by extensive artifact related to bilateral hip prostheses.  Rectum is unremarkable.  No significant pelvic free fluid.    Bones and soft tissues: No  aggressive osseous lesions. Stable degenerative changes in the lumbar spine.  Postoperative changes of bilateral hip prostheses.  Extraperitoneal soft tissues are negative for acute finding.                                       CT Thoracic Spine Without Contrast (Final result)  Result time 09/02/22 07:43:04      Final result by Josué Musa MD (09/02/22 07:43:04)                   Impression:      No acute abnormality identified in the thoracic spine.      Electronically signed by: Josué Musa MD  Date:    09/02/2022  Time:    07:43               Narrative:    EXAMINATION:  CT THORACIC SPINE WITHOUT CONTRAST    CLINICAL HISTORY:  Mid-back pain;    TECHNIQUE:  Low-dose axial, sagittal and coronal reformations are obtained through the lumbar spine.  Contrast was not administered.    COMPARISON:  CT chest, 09/26/2020.    FINDINGS:  Normal curvature and alignment.  Vertebral body heights are well maintained.  No acute fracture identified.    Moderate multilevel degenerative changes throughout the thoracic spine with mild loss of disc space height at multiple levels.  Multiple prominent anterior bridging osteophytes noted throughout the mid and lower thoracic spine similar but more pronounced when compared with the prior study in 2020.    No severe central canal stenosis.    Mildly patulous appearance of the esophagus.  Coronary artery calcifications noted.    Visualized heart and lungs are unremarkable.                                       Medications   oxyCODONE immediate release tablet 5 mg (5 mg Oral Given 9/2/22 0719)   acetaminophen tablet 1,000 mg (1,000 mg Oral Given 9/2/22 0702)     Medical Decision Making:   Initial Assessment:   Well appearing overall though does appear to be in discomfort with movements.  Vitals reassuring.  Differential Diagnosis:   Fracture, dislocation, subluxation, muscle strain, herniated disc, malignancy  Osteomyelitis, diskitis, epidural abscess/hematoma, cauda equina  syndrome  Pyelonephritis, nephrolithiasis, aortic pathology (AAA or dissection)  ED Management:  No major red flags for back pain other than recent trauma.  Minimal neurologic symptoms may be due to peripheral neuropathy rather than spinal pathology.  CT T spine and CT abdomen/pelvis done which showed no acute spinal or other abnormalities.  I do not think MRI is indicated at this time.  CBC and CMP pretty unremarkable, no signs of leukemia.  Ok for outpatient follow up with orthopedics and symptomatic treatment at home.  Gave return precautions for incontinence, worsening symptoms, numbness or weakness or other new concerning symtpoms.  Discharged in stable condition.                    Clinical Impression:   Final diagnoses:  [M54.41] Midline low back pain with right-sided sciatica, unspecified chronicity (Primary)  [M54.30] Sciatica, unspecified laterality        ED Disposition Condition    Discharge Stable          ED Prescriptions    None       Follow-up Information       Follow up With Specialties Details Why Contact Info Additional Information    Johnie Gutierrez MD Family Medicine In 1 day  [address] [facility] [address]  [phone]       Jon[facility] - Orthopedics [facility] Call   [address], [address]  [facility] [address] [phone] [facility] - Main Building, 5th Floor Please park in [facility] and take [address] elevator             Edilson Russell MD  09/03/22 3686

## 2023-03-06 NOTE — PROGRESS NOTES
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)   • Thrombocytopenia (HCC)   • Hypomagnesemia   • BMI 35 0-35 9,adult   • Vitamin D deficiency   • Malignant ascites   • Septic shock (HCC)   • Mesenteric thrombosis (HCC)   • Oropharyngeal candidiasis   • DENZEL (acute kidney injury) ([facility] [address] )   • Metabolic acidosis   • Hyperglycemia   • Elevated INR   • Hepatic encephalopathy   • Goals of care, counseling/discussion     Active issues specifically addressed today include:   Cholangiocarcinoma  Goals of care  Pain management  Nausea/vomiting     Plan:  1  Symptom management - patient continues to be doing well with current regimen; nausea and vomiting has resolved, patient has good appetite and PO intake; only complaining of neuropathic pain in B/L LE's              - Magic mouthwash/mouth kote prn              - Carafate 1 g q4              - protonix 40 mg PO daily               - Melatonin 6mg qHS              - Zofran 4mg q8h PRN              - Oxycodone 5mg q4h PRN              - Continue paracenteses PRN, last performed 3/3/23              -continue gabapentin 200 mg BID, consider increasing dose for improved  neuropathic pain control      2   Goals -               - Continue treatment focused care              - He wishes to follow up with Dr Petey Watters for Oncology care instead of Dr Mahogany Ha              - He wishes to continue IR paracentesis as needed due to the symptom  benefit   -Plan to follow up outpatient with palliative for symptom management, next appointment 3/10     3  Social Support              - Patient's wife continues to visit him regularly; he feels well supported              - Patient has 1 biological son and two other nonbiological children and is very involved in their life                 - JXNYMX and father are still alive  [de-identified]     24h OME: 0         Code Status: Full - Level 1  Decisional apparatus:  Patient is competent on my exam today  If competence is lost, patient's substitute decision maker would default to wife by [place]  Advance Directive / Living Will / POLST:  No ACP documents on file      Interval history:  No acute events overnight  Per chart review, patient continues to receive inpatient treatment for DENZEL and hypotension  Creatinine has been improving over past two days from 2 54 on 3/4 to 1 68 today  Nephrology is following; gave albumin 25 g IV q6 x2 doses and bumex 2 mg IV x1 dose on 3/5  Goal is to trend renal function and optimize hemodynamic status  He is currently taking midodrine 15 mg TID for hypotension  Patient not determined to be an ideal candidate for RRT based on comorbidities  During my interaction with patient, he is denying nausea/vomiting  Reports adequate PO intake but dissatisfied with hospital food and low sodium diet  He is frustrated by prolonged inpatient treatment and is eager to go home, but remains pleasant during encounter  He is starting to feel increased abdominal fullness but reports discomfort is mild  He states last paracentesis was on 3/3 and he is due for it again tomorrow based on pre-hospital schedule  Patient reports mild improvement of neuropathic foot pain on gabapentin, reporting pain is now 6/10 only worsened upon standing  He feels this pain could be better controlled  Otherwise denying any subjective complaints   Understands he is scheduled for outpatient follow-up with palliative tentatively on 3/10; appointment date subject to change based on discharge date  MEDICATIONS / ALLERGIES:     all current active meds have been reviewed and current meds:   Current Facility-Administered Medications   Medication Dose Route Frequency   • al mag oxide-diphenhydramine-lidocaine viscous (MAGIC MOUTHWASH) suspension 10 mL  10 mL Swish & Swallow Q4H PRN   • bumetanide (BUMEX) injection 2 mg  2 mg Intravenous Once   • chlorhexidine (PERIDEX) 0 12 % oral rinse 15 mL  15 mL Swish & Spit Q12H [address]   • enoxaparin (LOVENOX) subcutaneous injection 100 mg  1 mg/kg Subcutaneous Q12H [address]   • ergocalciferol (VITAMIN D2) capsule 50,000 Units  50,000 Units Oral Weekly   • gabapentin (NEURONTIN) capsule 200 mg  200 mg Oral BID   • insulin lispro (HumaLOG) 100 units/mL subcutaneous injection 1-6 Units  1-6 Units Subcutaneous TID AC   • melatonin tablet 6 mg  6 mg Oral HS   • midodrine (PROAMATINE) tablet 15 mg  15 mg Oral TID AC   • moisture barrier miconazole 2% cream (aka JOYCE MOISTURE BARRIER ANTIFUNGAL CREAM)   Topical BID   • ondansetron (ZOFRAN) injection 4 mg  4 mg Intravenous Q8H PRN   • oxyCODONE (ROXICODONE) IR tablet 5 mg  5 mg Oral Q4H PRN   • pantoprazole (PROTONIX) EC tablet 40 mg  40 mg Oral Early Morning   • saliva substitute (MOUTH KOTE) mucosal solution 5 spray  5 spray Mouth/Throat 4x Daily PRN   • sucralfate (CARAFATE) tablet 1 g  1 g Oral 4x Daily (AC & HS)       Allergies   Allergen Reactions   • Penicillins Other (See Comments) and Rash     As a child had reaction not sure what it was  OBJECTIVE:    Physical Exam  Physical Exam  Constitutional:       General: He is not in acute distress  Appearance: He is not toxic-appearing or diaphoretic  Comments: Body mass index is 31 07 kg/m²  HENT:      Head: Normocephalic and atraumatic  Right Ear: External ear normal       Left Ear: External ear normal       Nose: Nose normal  No congestion or rhinorrhea        Mouth/Throat:      Mouth: Mucous membranes are moist       Pharynx: Oropharynx is clear  Eyes:      General: No scleral icterus  Right eye: No discharge  Left eye: No discharge  Extraocular Movements: Extraocular movements intact  Pulmonary:      Effort: Pulmonary effort is normal  No respiratory distress  Abdominal:      General: There is distension  Musculoskeletal:         General: Swelling present  Cervical back: Normal range of motion  Right lower leg: Edema present  Left lower leg: Edema present  Skin:     General: Skin is warm and dry  Comments: Large area of petechiae on anterior right lower leg    Neurological:      General: No focal deficit present  Mental Status: He is alert and oriented to person, place, and time  Psychiatric:         Mood and Affect: Mood normal          Behavior: Behavior normal          Thought Content: Thought content normal          Judgment: Judgment normal          Lab Results:   I have personally reviewed pertinent labs  , CBC:   Lab Results   Component Value Date    WBC 9 21 03/06/2023    HGB 10 0 (L) 03/06/2023    HCT 29 5 (L) 03/06/2023    MCV 89 03/06/2023    PLT 47 (LL) 03/06/2023    MCH 30 2 03/06/2023    MCHC 33 9 03/06/2023    RDW 18 4 (H) 03/06/2023    MPV 12 9 (H) 03/06/2023    NRBC 0 03/06/2023   , CMP:   Lab Results   Component Value Date    SODIUM 130 (L) 03/06/2023    K 4 3 03/06/2023     03/06/2023    CO2 18 (L) 03/06/2023    BUN 79 (H) 03/06/2023    CREATININE 1 68 (H) 03/06/2023    CALCIUM 8 7 03/06/2023    EGFR 46 03/06/2023     Imaging Studies: IR paracentesis 3/3/23  EKG, Pathology, and Other Studies: No new studies since 2/21    Counseling / Coordination of Care    Total floor / unit time spent today 30 minutes  Greater than 50% of total time was spent with the patient and / or family counseling and / or coordination of care   A description of the counseling / coordination of care: symptom assessment and management, psychosocial support, chart review, supportive listening and anticipatory guidance

## 2023-03-06 NOTE — ASSESSMENT & PLAN NOTE
Patient with metastatic cholangiocarcinoma  Discussed with patient overall guarded prognosis  Oncology has reiterated poor prognosis, patient reports he understands his guarded prognosis but would like to pursue active treatment at this time and plans to follow-up with oncology on discharge for further cares  Oncology inputs noted

## 2023-03-06 NOTE — PROGRESS NOTES
[address] [facility]  Progress Note Trista Ashby 1970, 47 y o  male MRN: [id number]  Unit/Bed#: [facility] 813-01 Encounter: [id number]  Primary Care Provider: Daniel Hernandez MD   Date and time admitted to hospital: 2/20/2023  3:04 AM    * Septic shock [facility])  Assessment & Plan  Present on admission  Shock resolved  Monitor closely    Cholangiocarcinoma, stage IV [facility])  Assessment & Plan  Patient with metastatic cholangiocarcinoma  Discussed with patient overall guarded prognosis  Oncology has reiterated poor prognosis, patient reports he understands his guarded prognosis but would like to pursue active treatment at this time and plans to follow-up with oncology on discharge for further cares  Oncology inputs noted    Goals of care, counseling/discussion  Assessment & Plan  Patient with metastatic cholangiocarcinoma, worsening kidney function, worsening liver function   Overall guarded prognosis  Patient understands overall guarded prognosis however he would like to pursue active treatment and plans on following oncology outpatient for further cares  Discussed with patient and spouse in detail    Hepatic encephalopathy  Assessment & Plan  Patient with extensive hepatic metastasis  Lactulose  Monitor    Elevated INR  Assessment & Plan  Likely due to metastatic liver disease  S/p vitamin K  Monitor INR    DENZEL (acute kidney injury) ([address] )  Assessment & Plan  Monitor kidney function closely  Avoid nephrotoxins  Nephrology following    Oropharyngeal candidiasis  Assessment & Plan  Received 7 days fluconazole treatment      Mesenteric thrombosis ([address] )  Assessment & Plan  Received IV heparin GTT  discussed with oncology - Patient is recommended Lovenox 1 mg/kg body weight every 12 hours  Lovenox 1 mg/kg body wt twice daily  Outpatient oncology follow-up      Malignant ascites  Assessment & Plan  S/p paracentesis  Monitor    Thrombocytopenia ([address] )  Assessment & Plan  Multifactorial  Oncology inputs noted  Monitor counts    GERD (gastroesophageal reflux disease)  Assessment & Plan  Continue PPI    Hyponatremia  Assessment & Plan  Multifactorial  Monitor closely  Nephrology following          VTE Pharmacologic Prophylaxis: VTE Score: 8 High Risk (Score >/= 5) - Pharmacological DVT Prophylaxis Ordered: enoxaparin (Lovenox)  Sequential Compression Devices Ordered  Patient Centered Rounds: I performed bedside rounds with nursing staff today  Discussions with Specialists or Other Care Team Provider: IR    Education and Discussions with Family / Patient: Patient, called and left a message for spouse Johnathon over phone  Total Time Spent on Date of Encounter in care of patient: 35 minutes This time was spent on one or more of the following: performing physical exam; counseling and coordination of care; obtaining or reviewing history; documenting in the medical record; reviewing/ordering tests, medications or procedures; communicating with other healthcare professionals and discussing with patient's family/caregivers  Current Length of Stay: 14 day(s)  Current Patient Status: Inpatient   Certification Statement: The patient will continue to require additional inpatient hospital stay due to As outlined  Discharge Plan: Awaiting clinical and symptomatic improvement    Code Status: Level 1 - Full Code    Subjective:     Comfortably in bed  Reports feeling tired  Appetite fair  Encourage out of bed into a chair  Reports abdominal distention and discomfort    Objective:     Vitals:   Temp (24hrs), Av 5 °F (36 4 °C), Min:97 5 °F (36 4 °C), Max:97 5 °F (36 4 °C)    Temp:  [97 5 °F (36 4 °C)] 97 5 °F (36 4 °C)  HR:  [83-97] 97  Resp:  [16-20] 18  BP: (90-95)/(50-53) 92/52  SpO2:  [95 %-97 %] 96 %  Body mass index is 31 07 kg/m²  Input and Output Summary (last 24 hours):      Intake/Output Summary (Last 24 hours) at 3/6/2023 1245  Last data filed at 3/6/2023 0530  Gross per 24 hour   Intake --   Output 1025 ml   Net -1025 ml       Physical Exam:   Physical Exam     Comfortably in bed  Anasarca noted  Features of protein calorie malnutrition noted  Asthenia noted  Neck supple  Lungs diminished breath sounds bilaterally  Heart sounds S1 and S2 noted  Heart sounds are distant  Abdomen soft  Ascites noted  Awake and alert obey simple commands  Pedal edema noted      Additional Data:     Labs:  Results from last 7 days   Lab Units 03/06/23  0529   WBC Thousand/uL 9 21   HEMOGLOBIN g/dL 10 0*   HEMATOCRIT % 29 5*   PLATELETS Thousands/uL 47*   NEUTROS PCT % 75   LYMPHS PCT % 10*   MONOS PCT % 13*   EOS PCT % 2     Results from last 7 days   Lab Units 03/06/23  0529 03/05/23  0607   SODIUM mmol/L 130* 131*   POTASSIUM mmol/L 4 3 4 3   CHLORIDE mmol/L 102 104   CO2 mmol/L 18* 19*   BUN mg/dL 79* 81*   CREATININE mg/dL 1 68* 1 80*   ANION GAP mmol/L 10 8   CALCIUM mg/dL 8 7 8 5   ALBUMIN g/dL  --  3 6   TOTAL BILIRUBIN mg/dL  --  5 94*   ALK PHOS U/L  --  109   ALT U/L  --  27   AST U/L  --  80*   GLUCOSE RANDOM mg/dL 108 106     Results from last 7 days   Lab Units 03/06/23  0529   INR  2 46*     Results from last 7 days   Lab Units 03/06/23  1140 03/06/23  0818 03/05/23  2049 03/05/23  1545 03/05/23  1117 03/05/23  0805 03/05/23  0710 03/04/23  2101 03/04/23  1600 03/04/23  1119 03/04/23  0736 03/03/23  2050   POC GLUCOSE mg/dl 135 116 144* 147* 125 113 111 117 153* 123 156* 158*               Lines/Drains:  Invasive Devices     Central Venous Catheter Line  Duration           Port A Cath Right Subclavian -- days                Central Line:  Goal for removal: Cholangiocarcinoma             Imaging: Reviewed radiology reports from this admission including: abdominal/pelvic CT    Recent Cultures (last 7 days):         Last 24 Hours Medication List:   Current Facility-Administered Medications   Medication Dose Route Frequency Provider Last Rate   • al mag oxide-diphenhydramine-lidocaine viscous 10 mL Swish & Swallow Q4H PRN Paulina Friedman MD     • bumetanide  1 mg Intravenous Once Chris Weber MD     • chlorhexidine  15 mL Swish & Spit Q12H [address] Paulina Friedman MD     • enoxaparin  1 mg/kg Subcutaneous Q12H [address] Arlyn Koyanagi, MD     • ergocalciferol  50,000 Units Oral Weekly Paulina Friedman MD     • gabapentin  200 mg Oral BID Nabeel Mcnair MD     • insulin lispro  1-6 Units Subcutaneous TID AC Paulina Friedman MD     • lactulose  10 g Oral Daily Arlyn Koyanagi, MD     • melatonin  6 mg Oral HS Paulina Friedman MD     • midodrine  15 mg Oral TID AC Paulina Friedman MD     • JOYCE ANTIFUNGAL   Topical BID Paulina Friedman MD     • ondansetron  4 mg Intravenous Q8H PRN Paulina Friedman MD     • oxyCODONE  5 mg Oral Q4H PRN Paulina Friedman MD     • pantoprazole  40 mg Oral Early Morning Paulina Friedman MD     • saliva substitute  5 spray Mouth/Throat 4x Daily PRN Paulina Friedman MD     • sucralfate  1 g Oral 4x Daily (AC & HS) Paulina Friedman MD          Today, Patient Was Seen By: Arlyn Koyanagi, MD    **Please Note: This note may have been constructed using a voice recognition system  **

## 2023-03-06 NOTE — PLAN OF CARE
Problem: Prexisting or High Potential for Compromised Skin Integrity  Goal: Skin integrity is maintained or improved  Description: INTERVENTIONS:  - Identify patients at risk for skin breakdown  - Assess and monitor skin integrity  - Assess and monitor nutrition and hydration status  - Monitor labs   - Assess for incontinence   - Turn and reposition patient  - Assist with mobility/ambulation  - Relieve pressure over bony prominences  - Avoid friction and shearing  - Provide appropriate hygiene as needed including keeping skin clean and dry  - Evaluate need for skin moisturizer/barrier cream  - Collaborate with interdisciplinary team   - Patient/family teaching  - Consider wound care consult   Outcome: Progressing     Problem: CARDIOVASCULAR - ADULT  Goal: Maintains optimal cardiac output and hemodynamic stability  Description: INTERVENTIONS:  - Monitor I/O, vital signs and rhythm  - Monitor for S/S and trends of decreased cardiac output  - Administer and titrate ordered vasoactive medications to optimize hemodynamic stability  - Assess quality of pulses, skin color and temperature  - Assess for signs of decreased coronary artery perfusion  - Instruct patient to report change in severity of symptoms  Outcome: Progressing  Goal: Absence of cardiac dysrhythmias or at baseline rhythm  Description: INTERVENTIONS:  - Continuous cardiac monitoring, vital signs, obtain 12 lead EKG if ordered  - Administer antiarrhythmic and heart rate control medications as ordered  - Monitor electrolytes and administer replacement therapy as ordered  Outcome: Progressing     Problem: GASTROINTESTINAL - ADULT  Goal: Minimal or absence of nausea and/or vomiting  Description: INTERVENTIONS:  - Administer IV fluids if ordered to ensure adequate hydration  - Maintain NPO status until nausea and vomiting are resolved  - Nasogastric tube if ordered  - Administer ordered antiemetic medications as needed  - Provide nonpharmacologic comfort measures as appropriate  - Advance diet as tolerated, if ordered  - Consider nutrition services referral to assist patient with adequate nutrition and appropriate food choices  Outcome: Progressing  Goal: Maintains or returns to baseline bowel function  Description: INTERVENTIONS:  - Assess bowel function  - Encourage oral fluids to ensure adequate hydration  - Administer IV fluids if ordered to ensure adequate hydration  - Administer ordered medications as needed  - Encourage mobilization and activity  - Consider nutritional services referral to assist patient with adequate nutrition and appropriate food choices  Outcome: Progressing  Goal: Maintains adequate nutritional intake  Description: INTERVENTIONS:  - Monitor percentage of each meal consumed  - Identify factors contributing to decreased intake, treat as appropriate  - Assist with meals as needed  - Monitor I&O, weight, and lab values if indicated  - Obtain nutrition services referral as needed  Outcome: Progressing     Problem: HEMATOLOGIC - ADULT  Goal: Maintains hematologic stability  Description: INTERVENTIONS  - Assess for signs and symptoms of bleeding or hemorrhage  - Monitor labs  - Administer supportive blood products/factors as ordered and appropriate  Outcome: Progressing     Problem: MUSCULOSKELETAL - ADULT  Goal: Maintain or return mobility to safest level of function  Description: INTERVENTIONS:  - Assess patient's ability to carry out ADLs; assess patient's baseline for ADL function and identify physical deficits which impact ability to perform ADLs (bathing, care of mouth/teeth, toileting, grooming, dressing, etc )  - Assess/evaluate cause of self-care deficits   - Assess range of motion  - Assess patient's mobility  - Assess patient's need for assistive devices and provide as appropriate  - Encourage maximum independence but intervene and supervise when necessary  - Involve family in performance of ADLs  - Assess for home care needs following discharge   - Consider OT consult to assist with ADL evaluation and planning for discharge  - Provide patient education as appropriate  Outcome: Progressing  Goal: Maintain proper alignment of affected body part  Description: INTERVENTIONS:  - Support, maintain and protect limb and body alignment  - Provide patient/ family with appropriate education  Outcome: Progressing     Problem: INFECTION - ADULT  Goal: Absence or prevention of progression during hospitalization  Description: INTERVENTIONS:  - Assess and monitor for signs and symptoms of infection  - Monitor lab/diagnostic results  - Monitor all insertion sites, i e  indwelling lines, tubes, and drains  - Monitor endotracheal if appropriate and nasal secretions for changes in amount and color  - [place] appropriate cooling/warming therapies per order  - Administer medications as ordered  - Instruct and encourage patient and family to use good hand hygiene technique  - Identify and instruct in appropriate isolation precautions for identified infection/condition  Outcome: Progressing     Problem: DISCHARGE PLANNING  Goal: Discharge to home or other facility with appropriate resources  Description: INTERVENTIONS:  - Identify barriers to discharge w/patient and caregiver  - Arrange for needed discharge resources and transportation as appropriate  - Identify discharge learning needs (meds, wound care, etc )  - Arrange for interpretive services to assist at discharge as needed  - Refer to Case Management Department for coordinating discharge planning if the patient needs post-hospital services based on physician/advanced practitioner order or complex needs related to functional status, cognitive ability, or social support system  Outcome: Progressing     Problem: Knowledge Deficit  Goal: Patient/family/caregiver demonstrates understanding of disease process, treatment plan, medications, and discharge instructions  Description: Complete learning assessment and assess knowledge base    Interventions:  - Provide teaching at level of understanding  - Provide teaching via preferred learning methods  Outcome: Progressing

## 2023-03-07 PROBLEM — R60.0 BILATERAL LOWER EXTREMITY EDEMA: Status: ACTIVE | Noted: 2023-01-01

## 2023-03-07 NOTE — PHYSICAL THERAPY NOTE
PHYSICAL THERAPY NOTE          Patient Name: Marli Lloyd  PXRFE'U Date: 3/7/2023     03/07/23 1035   PT Last Visit   PT Visit Date 03/07/23   Note Type   Note Type Treatment   Education   Education Provided Yes   End of Consult   Patient Position at End of Consult Bedside chair; All needs within reach   Pain Assessment   Pain Assessment Tool 0-10   Pain Score 3   Hospital Pain Intervention(s) Repositioned; Ambulation/increased activity   Restrictions/Precautions   Weight Bearing Precautions Per Order No   Other Precautions Pain   General   Chart Reviewed Yes   Cognition   Overall Cognitive Status WFL   Arousal/Participation Alert   Attention Within functional limits   Orientation Level Oriented X4   Following Commands Follows all commands and directions without difficulty   Subjective   Subjective I can walk   Bed Mobility   Supine to Sit Unable to assess   Additional Comments Pt noted to be OOB in chair upon arrival    Transfers   Sit to Stand 5  Supervision   Stand to Sit 5  Supervision   Additional Comments with RW   Ambulation/Elevation   Gait pattern Wide DANIEL; Forward Flexion; Step to;Excessively slow   Gait Assistance 5  Supervision   Assistive Device Rolling walker   Distance 120 ft   Balance   Static Sitting Good   Dynamic Sitting Fair +   Static Standing Fair   Dynamic Standing Fair -   Ambulatory Fair -   Endurance Deficit   Endurance Deficit Yes   Activity Tolerance   Activity Tolerance Patient limited by pain; Patient tolerated treatment well   Nurse Made Aware RN cleared pt for therapy   Exercises   Knee AROM Long Arc Quad 10 reps;AROM; Bilateral;Sitting   Ankle Pumps 15 reps;Bilateral;AROM; Sitting   Assessment   Prognosis Fair   Problem List Decreased mobility;Pain;Decreased endurance   Assessment Pt seen for PT treatment session this date   Therapy session focused on functional transfers, and ambulation in order to improve overall mobility and independence  Pt c/o R LE pain 7/10 at rest , improving to 3/10 with activity  Pt completes STS transfer from chair with supervision using RW  Pt able to ambulate in hallway with Standby assist, tolerates ambulation ~ 120'  Limited ambulation distance 2* fatigue, R LE discomfort  Pt performs LE therapeutic exercise in chair, to improve LE strength and functional level  Pt making steady progress toward goals  Pt was left in recliner at the end of PT session with all needs in reach  Pt would benefit from continued PT services while in hospital to address remaining limitations  The patient's AM-PAC Basic Mobility Inpatient Short Form Raw Score is 21  A Raw score of greater than 16 suggests the patient may benefit from discharge to home  Please also refer to the recommendation of the Physical Therapist for safe discharge planning  Post d/c rec HHPT with family assist    Goals   STG Expiration Date 03/09/23   Plan   Treatment/Interventions Elevations;Gait training   Progress Progressing toward goals   PT Frequency 1-2x/wk   Recommendation   PT Discharge Recommendation Home with home health rehabilitation  (with careigiver support)   Equipment Recommended Walker   AM-PAC Basic Mobility Inpatient   Turning in Flat Bed Without Bedrails 3   Lying on Back to Sitting on Edge of Flat Bed Without Bedrails 3   Moving Bed to Chair 4   Standing Up From Chair Using Arms 4   Walk in Room 4   Climb 3-5 Stairs With Railing 3   Basic Mobility Inpatient Raw Score 21   Basic Mobility Standardized Score 45 55   Highest Level Of Mobility   JH-HLM Goal 6: Walk 10 steps or more   JH-HLM Achieved 7: Walk 25 feet or more   Education   Education Provided Mobility training   Patient Demonstrates acceptance/verbal understanding   End of Consult   Patient Position at End of Consult Bedside chair; All needs within reach     Burnice Congress PT DPT

## 2023-03-07 NOTE — PLAN OF CARE
Problem: PHYSICAL THERAPY ADULT  Goal: Performs mobility at highest level of function for planned discharge setting  See evaluation for individualized goals  Description: Treatment/Interventions: Functional transfer training, Elevations, LE strengthening/ROM, Therapeutic exercise, Endurance training, Patient/family training, Bed mobility, Equipment eval/education, Gait training, OT, Spoke to nursing  Equipment Recommended:  (will cont to assess)       See flowsheet documentation for full assessment, interventions and recommendations  Outcome: Progressing  Note: Prognosis: Fair  Problem List: Decreased mobility, Pain, Decreased endurance  Assessment: Pt seen for PT treatment session this date  Therapy session focused on functional transfers, and ambulation in order to improve overall mobility and independence  Pt c/o R LE pain 7/10 at rest , improving to 3/10 with activity  Pt completes STS transfer from chair with supervision using RW  Pt able to ambulate in hallway with Standby assist, tolerates ambulation ~ 120'  Limited ambulation distance 2* fatigue, R LE discomfort  Pt performs LE therapeutic exercise in chair, to improve LE strength and functional level  Pt making steady progress toward goals  Pt was left in recliner at the end of PT session with all needs in reach  Pt would benefit from continued PT services while in hospital to address remaining limitations  The patient's AM-PAC Basic Mobility Inpatient Short Form Raw Score is 21  A Raw score of greater than 16 suggests the patient may benefit from discharge to home  Please also refer to the recommendation of the Physical Therapist for safe discharge planning  Post d/c rec HHPT with family assist         PT Discharge Recommendation: Home with home health rehabilitation (with careigiver support)    See flowsheet documentation for full assessment

## 2023-03-07 NOTE — SEDATION DOCUMENTATION
Left paracentesis completed by BEN Reyes NP  Tolerated well, 6050ml cloudy eric fluid removed  Closed with exofin, occlusive dry dressing  Report called to Killian Rodas RN

## 2023-03-07 NOTE — PLAN OF CARE
Problem: Prexisting or High Potential for Compromised Skin Integrity  Goal: Skin integrity is maintained or improved  Description: INTERVENTIONS:  - Identify patients at risk for skin breakdown  - Assess and monitor skin integrity  - Assess and monitor nutrition and hydration status  - Monitor labs   - Assess for incontinence   - Turn and reposition patient  - Assist with mobility/ambulation  - Relieve pressure over bony prominences  - Avoid friction and shearing  - Provide appropriate hygiene as needed including keeping skin clean and dry  - Evaluate need for skin moisturizer/barrier cream  - Collaborate with interdisciplinary team   - Patient/family teaching  - Consider wound care consult   Outcome: Progressing     Problem: CARDIOVASCULAR - ADULT  Goal: Maintains optimal cardiac output and hemodynamic stability  Description: INTERVENTIONS:  - Monitor I/O, vital signs and rhythm  - Monitor for S/S and trends of decreased cardiac output  - Administer and titrate ordered vasoactive medications to optimize hemodynamic stability  - Assess quality of pulses, skin color and temperature  - Assess for signs of decreased coronary artery perfusion  - Instruct patient to report change in severity of symptoms  Outcome: Progressing  Goal: Absence of cardiac dysrhythmias or at baseline rhythm  Description: INTERVENTIONS:  - Continuous cardiac monitoring, vital signs, obtain 12 lead EKG if ordered  - Administer antiarrhythmic and heart rate control medications as ordered  - Monitor electrolytes and administer replacement therapy as ordered  Outcome: Progressing

## 2023-03-07 NOTE — DISCHARGE INSTRUCTIONS
Abdominal Paracentesis     WHAT YOU NEED TO KNOW:   Abdominal paracentesis is a procedure to remove abnormal fluid buildup in your abdomen  Fluid builds up because of liver problems, such as swelling and scarring  Heart failure, kidney disease, a mass, or problems with your pancreas may also cause fluid buildup  DISCHARGE INSTRUCTIONS:     Follow up with your healthcare provider as directed: Write down your questions so you remember to ask them during your visits  Wound care: Remove dressing after 24 hours  Leave glue in place  Return to your normal activities    Contact Interventional Radiology at [phone] [place] PATIENTS: Contact Interventional Radiology at [phone]) Jessica Reyes PATIENTS: Contact Interventional Radiology at [phone]) if:  You have a fever and your wound is red and swollen  You have yellow, green, or bad-smelling discharge coming from your wound  You have pain or swelling in your abdomen  You have an upset stomach or you vomit  You have sudden, sharp pain in your abdomen  You urinate very little or not at all  You feel confused and more tired than usual    Your arm or leg feels warm, tender, and painful  It may look swollen and red  You suddenly feel lightheaded and have trouble breathing

## 2023-03-07 NOTE — PLAN OF CARE
Problem: Prexisting or High Potential for Compromised Skin Integrity  Goal: Skin integrity is maintained or improved  Description: INTERVENTIONS:  - Identify patients at risk for skin breakdown  - Assess and monitor skin integrity  - Assess and monitor nutrition and hydration status  - Monitor labs   - Assess for incontinence   - Turn and reposition patient  - Assist with mobility/ambulation  - Relieve pressure over bony prominences  - Avoid friction and shearing  - Provide appropriate hygiene as needed including keeping skin clean and dry  - Evaluate need for skin moisturizer/barrier cream  - Collaborate with interdisciplinary team   - Patient/family teaching  - Consider wound care consult   Outcome: Progressing     Problem: CARDIOVASCULAR - ADULT  Goal: Maintains optimal cardiac output and hemodynamic stability  Description: INTERVENTIONS:  - Monitor I/O, vital signs and rhythm  - Monitor for S/S and trends of decreased cardiac output  - Administer and titrate ordered vasoactive medications to optimize hemodynamic stability  - Assess quality of pulses, skin color and temperature  - Assess for signs of decreased coronary artery perfusion  - Instruct patient to report change in severity of symptoms  Outcome: Progressing  Goal: Absence of cardiac dysrhythmias or at baseline rhythm  Description: INTERVENTIONS:  - Continuous cardiac monitoring, vital signs, obtain 12 lead EKG if ordered  - Administer antiarrhythmic and heart rate control medications as ordered  - Monitor electrolytes and administer replacement therapy as ordered  Outcome: Progressing     Problem: GASTROINTESTINAL - ADULT  Goal: Minimal or absence of nausea and/or vomiting  Description: INTERVENTIONS:  - Administer IV fluids if ordered to ensure adequate hydration  - Maintain NPO status until nausea and vomiting are resolved  - Nasogastric tube if ordered  - Administer ordered antiemetic medications as needed  - Provide nonpharmacologic comfort measures as appropriate  - Advance diet as tolerated, if ordered  - Consider nutrition services referral to assist patient with adequate nutrition and appropriate food choices  Outcome: Progressing     Problem: METABOLIC, FLUID AND ELECTROLYTES - ADULT  Goal: Electrolytes maintained within normal limits  Description: INTERVENTIONS:  - Monitor labs and assess patient for signs and symptoms of electrolyte imbalances  - Administer electrolyte replacement as ordered  - Monitor response to electrolyte replacements, including repeat lab results as appropriate  - Instruct patient on fluid and nutrition as appropriate  Outcome: Progressing  Goal: Fluid balance maintained  Description: INTERVENTIONS:  - Monitor labs   - Monitor I/O and WT  - Instruct patient on fluid and nutrition as appropriate  - Assess for signs & symptoms of volume excess or deficit  Outcome: Progressing  Goal: Glucose maintained within target range  Description: INTERVENTIONS:  - Monitor Blood Glucose as ordered  - Assess for signs and symptoms of hyperglycemia and hypoglycemia  - Administer ordered medications to maintain glucose within target range  - Assess nutritional intake and initiate nutrition service referral as needed  Outcome: Progressing

## 2023-03-07 NOTE — PROGRESS NOTES
Progress note - Palliative and Supportive Care   Adelaide Ashby 47 y o  male [id number]    Patient Active Problem List   Diagnosis   • History of tongue cancer   • Cholangiocarcinoma, stage IV (HCC)   • Nausea and vomiting   • Ascites   • Lytic bone lesion of hip   • Hyponatremia   • GERD (gastroesophageal reflux disease)   • Spinal accessory neuropathy   • Squamous cell carcinoma of tongue (HCC)   • Tobacco use disorder   • Pulmonary nodules   • Malignant neoplasm metastatic to both lungs (HCC)   • 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)   • Thrombocytopenia (HCC)   • Hypomagnesemia   • BMI 35 0-35 9,adult   • Vitamin D deficiency   • Malignant ascites   • Septic shock (HCC)   • Mesenteric thrombosis (HCC)   • Oropharyngeal candidiasis   • DENZEL (acute kidney injury) ([facility] [address] )   • Metabolic acidosis   • Hyperglycemia   • Elevated INR   • Hepatic encephalopathy   • Goals of care, counseling/discussion     Active issues specifically addressed today include:  Cholangiocarcinoma  Goals of care  Pain management  Nausea/vomiting         Plan:  1  Symptom management - patient continues to be doing well with current regimen; nausea and vomiting has resolved, patient has good appetite and PO intake; only complaining of neuropathic pain in B/L LE's              - Magic mouthwash/mouth kote prn              - Carafate 1 g q4              - protonix 40 mg PO daily               - Melatonin 6mg qHS              - Zofran 4mg q8h PRN              - Oxycodone 5mg q4h PRN              - Continue paracenteses PRN, last performed 3/3/23, next planned for 3/9    -will reach out to primary team to inquire about sooner date              -increased gabapentin to 300 mg BID today, 3/7    2   Goals -               - Continue treatment focused care              - He wishes to follow up with Dr Anushka Laughlin for Oncology care instead of Dr Luisa Pinto              - He wishes to continue IR paracentesis as needed due to the symptom benefit; plan to        reach out to [facility] today to inquire about next paracentesis sooner than scheduled (3/9)              -Plan to follow up outpatient with palliative for symptom management, next appointment 3/10     3  Social Support              - Patient's wife continues to visit him regularly; he feels well  supported              - Patient has 1 biological son and two other nonbiological children and is very involved in their                 life                 - OXRFUB and father are still alive  [de-identified]     24h OME: 0     Code Status: Full - Level 1  Decisional apparatus:  Patient is competent on my exam today  If competence is lost, patient's substitute decision maker would default to wife [place]  Advance Directive / Living Will / POLST:  No ACP documents on file      Interval history:   Patient seen and examined at bedside  No acute events overnight  He was resting comfortably and watching TV prior to encounter  He remains eager to go home  He states appetite has been adequate and nausea has resolved  He states abdominal fullness has been increasing and is currently uncomfortable  He expected paracentesis today based on his outpatient schedule  However, next scheduled per chart review is 3/9  He says that neuropathic foot pain has been steadily improving since starting gabapentin last week; he experiences less pain in his feet upon standing       MEDICATIONS / ALLERGIES:     all current active meds have been reviewed and current meds:   Current Facility-Administered Medications   Medication Dose Route Frequency   • al mag oxide-diphenhydramine-lidocaine viscous (MAGIC MOUTHWASH) suspension 10 mL  10 mL Swish & Swallow Q4H PRN   • chlorhexidine (PERIDEX) 0 12 % oral rinse 15 mL  15 mL Swish & Spit Q12H [address]   • enoxaparin (LOVENOX) subcutaneous injection 100 mg  1 mg/kg Subcutaneous Q12H [address]   • ergocalciferol (VITAMIN D2) capsule 50,000 Units  50,000 Units Oral Weekly   • gabapentin (NEURONTIN) capsule 300 mg  300 mg Oral BID   • insulin lispro (HumaLOG) 100 units/mL subcutaneous injection 1-6 Units  1-6 Units Subcutaneous TID AC   • lactulose oral solution 10 g  10 g Oral Daily   • melatonin tablet 6 mg  6 mg Oral HS   • midodrine (PROAMATINE) tablet 15 mg  15 mg Oral TID AC   • moisture barrier miconazole 2% cream (aka JOYCE MOISTURE BARRIER ANTIFUNGAL CREAM)   Topical BID   • ondansetron (ZOFRAN) injection 4 mg  4 mg Intravenous Q8H PRN   • oxyCODONE (ROXICODONE) IR tablet 5 mg  5 mg Oral Q4H PRN   • pantoprazole (PROTONIX) EC tablet 40 mg  40 mg Oral Early Morning   • saliva substitute (MOUTH KOTE) mucosal solution 5 spray  5 spray Mouth/Throat 4x Daily PRN   • sucralfate (CARAFATE) tablet 1 g  1 g Oral 4x Daily (AC & HS)       Allergies   Allergen Reactions   • Penicillins Other (See Comments) and Rash     As a child had reaction not sure what it was  OBJECTIVE:    Physical Exam  Physical Exam  Constitutional:       General: He is not in acute distress  Appearance: He is not ill-appearing, toxic-appearing or diaphoretic  Comments: Body mass index is 31 07 kg/m²  HENT:      Head: Normocephalic and atraumatic  Right Ear: External ear normal       Left Ear: External ear normal       Mouth/Throat:      Mouth: Mucous membranes are moist       Pharynx: Oropharynx is clear  Eyes:      General: No scleral icterus  Right eye: No discharge  Left eye: No discharge  Extraocular Movements: Extraocular movements intact  Conjunctiva/sclera: Conjunctivae normal    Pulmonary:      Effort: Pulmonary effort is normal  No respiratory distress  Abdominal:      General: There is distension  Musculoskeletal:         General: Swelling present  Cervical back: Normal range of motion  Right lower leg: Edema present  Left lower leg: Edema present  Skin:     General: Skin is dry  Coloration: Skin is not jaundiced     Neurological:      General: No focal deficit present  Mental Status: He is alert and oriented to person, place, and time  Psychiatric:         Mood and Affect: Mood normal          Behavior: Behavior normal          Thought Content: Thought content normal          Judgment: Judgment normal          Lab Results:   I have personally reviewed pertinent labs  , CBC:   Lab Results   Component Value Date    WBC 10 03 03/07/2023    HGB 10 5 (L) 03/07/2023    HCT 30 6 (L) 03/07/2023    MCV 90 03/07/2023    PLT 58 (L) 03/07/2023    MCH 30 7 03/07/2023    MCHC 34 3 03/07/2023    RDW 18 7 (H) 03/07/2023    MPV 12 1 03/07/2023    NRBC 0 03/07/2023   , CMP:   Lab Results   Component Value Date    SODIUM 128 (L) 03/07/2023    K 4 5 03/07/2023     03/07/2023    CO2 17 (L) 03/07/2023    BUN 89 (H) 03/07/2023    CREATININE 2 05 (H) 03/07/2023    CALCIUM 8 4 03/07/2023    AST 64 (H) 03/07/2023    ALT 27 03/07/2023    ALKPHOS 95 03/07/2023    EGFR 36 03/07/2023     Imaging Studies: Last IR paracentesis 3/3/23  EKG, Pathology, and Other Studies: Last EKG 2/21, no new interim studies    Counseling / Coordination of Care   Total floor / unit time spent today 15 minutes  Greater than 50% of total time was spent with the patient and / or family counseling and / or coordination of care  A description of the counseling / coordination of care: symptom assessment and management and supportive listening

## 2023-03-07 NOTE — BRIEF OP NOTE (RAD/CATH)
IR PARACENTESIS Procedure Note    PATIENT NAME: Adrian Ashby  : 1970  MRN: [id number]    Pre-op Diagnosis:   1  Sepsis ([facility] [address] )    2  Nausea and vomiting    3  Abdominal pain    4  Fatigue    5  Septic shock (HCC)    6  Dehydration    7  Hypernatremia    8  Hyponatremia    9  Cholangiocarcinoma, stage IV ([facility] [address] )    10  DENZEL (acute kidney injury) ([facility] [address] )    11  Malignant ascites    12  Malignant neoplasm metastatic to both lungs ([facility] [address] )    13  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)    14  Squamous cell carcinoma of tongue (HCC)    15  Mesenteric thrombosis (HCC)      Post-op Diagnosis:   1  Sepsis ([facility] [address] )    2  Nausea and vomiting    3  Abdominal pain    4  Fatigue    5  Septic shock (HCC)    6  Dehydration    7  Hypernatremia    8  Hyponatremia    9  Cholangiocarcinoma, stage IV ([facility] [address] )    10  DENZEL (acute kidney injury) ([facility] [address] )    11  Malignant ascites    12  Malignant neoplasm metastatic to both lungs ([facility] [address] )    13  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

## 2023-03-07 NOTE — PROGRESS NOTES
[address] [place] 47 y o  male MRN: [id number]  Unit/Bed#: [facility] 813-01 Encounter: [id number]  Reason for Consult: DENZEL    ASSESSMENT and PLAN:  1  Acute kidney injury (POA)  · Baseline creatinine is around 0 5-0 7  · Admitted with a creatinine of 2 37 on February 20, 2023  · Renal function improved and creatinine was down to 0 55 on February 24, 2023  · Had recurrence of DENZEL on February 26, 2023 and creatinine peaked at 2 54 on March 4, 2023  Etiology for secondary DENZEL felt to be multifactorial -likely has component of ATN  · Creatinine was down to 1 68 yesterday but up to 2 05 today - likely effect of Bumex  · For now, would simply monitor off diuretics or IVF  2  Hypotension:  · BP remains relatively low but stable  · Continue midodrine 15 mg TID  · No changes  3  Hyponatremia:  · Na worse at 128  · Continue fluid restriction 1500 cc/24 hours  4  Metabolic acidosis:  · Add sodium bicarbonate 1300 mg BID  · Check VBG  5  Anemia:   · Hgb is stable  10 5 today  6  Metastatic cholangiocarcinoma  7  S/p septic shock    DISPOSITION:  · Monitor off IVF or diuretics today  · Add sodium bicarbonate 1300 mg BID  · Check VBG  The highlighted and/or bolded points in my assessment, plan, and disposition were discussed with the primary team and they agree with those points and the plan  SUBJECTIVE / 24H INTERVAL HISTORY:  Sitting in recliner chair  No CP or SOB  Appetite is okay  No improvement in edema   cc yesterday evening       OBJECTIVE:  Current Weight: Weight - Scale: 104 kg (229 lb 0 9 oz)  Vitals:    03/06/23 1441 03/06/23 1814 03/06/23 2251 03/07/23 0711   BP: 91/53 91/53 92/53 90/53   Pulse: 88 88 89 81   Resp: 16  20 18   Temp:   (!) 97 3 °F (36 3 °C) 97 5 °F (36 4 °C)   TempSrc:       SpO2: 96% 97% 97% 96%   Weight:       Height:           Intake/Output Summary (Last 24 hours) at 3/7/2023 1141  Last data filed at 3/7/2023 0609  Gross per 24 hour   Intake 180 ml   Output 550 ml   Net -370 ml     General: conscious, cooperative, no distress  Skin: dry, rash on R leg seems to have progressed  Eyes: pink conjunctivae  ENT: moist mucous membranes  Respiratory: equal chest expansion, clear breath sounds  Slightly decreased in bases  Cardiovascular: distinct heart sounds, normal rate, regular rhythm, no rub  Abdomen: soft, non tender, non distended, normal bowel sounds  Extremities: (+) bilateral LE edema  Genitourinary: no davison catheter  Neuro: awake, alert     Psych: appropriate affect    Medications:    Current Facility-Administered Medications:   •  al mag oxide-diphenhydramine-lidocaine viscous (MAGIC MOUTHWASH) suspension 10 mL, 10 mL, Swish & Swallow, Q4H PRN, Denise Villavicencio MD, 10 mL at 02/26/23 1232  •  chlorhexidine (PERIDEX) 0 12 % oral rinse 15 mL, 15 mL, Swish & Gamaliel, Q12H [facility], Denise Villavicencio MD, 15 mL at 03/07/23 0817  •  enoxaparin (LOVENOX) subcutaneous injection 100 mg, 1 mg/kg, Subcutaneous, Q12H [facility], Windy Ramirez MD, 100 mg at 03/07/23 9077  •  ergocalciferol (VITAMIN D2) capsule 50,000 Units, 50,000 Units, Oral, Weekly, Denise Villavicencio MD, 50,000 Units at 03/02/23 7614  •  gabapentin (NEURONTIN) capsule 300 mg, 300 mg, Oral, BID, Dianne Caldera DO  •  insulin lispro (HumaLOG) 100 units/mL subcutaneous injection 1-6 Units, 1-6 Units, Subcutaneous, TID AC, 1 Units at 03/04/23 1706 **AND** Fingerstick Glucose (POCT), , , TID AC, Denise Villavicencio MD  •  lactulose oral solution 10 g, 10 g, Oral, Daily, Windy Ramirez MD, 10 g at 03/07/23 8409  •  melatonin tablet 6 mg, 6 mg, Oral, HS, Denise Villavicencio MD, 6 mg at 03/06/23 2239  •  midodrine (PROAMATINE) tablet 15 mg, 15 mg, Oral, TID AC, Denise Villavicencio MD, 15 mg at 03/07/23 1130  •  moisture barrier miconazole 2% cream (aka JOYCE MOISTURE BARRIER ANTIFUNGAL CREAM), , Topical, BID, Denise Villavicencio MD, Given at 03/07/23 9024  •  ondansetron (ZOFRAN) injection 4 mg, 4 mg, Intravenous, Q8H PRN, Olga Prado MD, 4 mg at 02/22/23 0006  •  oxyCODONE (ROXICODONE) IR tablet 5 mg, 5 mg, Oral, Q4H PRN, Olga Prado MD  •  pantoprazole (PROTONIX) EC tablet 40 mg, 40 mg, Oral, Early Morning, Olga Prado MD, 40 mg at 03/07/23 0603  •  saliva substitute (MOUTH KOTE) mucosal solution 5 spray, 5 spray, Mouth/Throat, 4x Daily PRN, Olga Prado MD, 5 spray at 02/25/23 2227  •  sucralfate (CARAFATE) tablet 1 g, 1 g, Oral, 4x Daily (AC & HS), Olga Prado MD, 1 g at 03/07/23 1130    Laboratory Results:  Results from last 7 days   Lab Units 03/07/23  0608 03/06/23  0529 03/05/23  3187 03/04/23  0510 03/03/23  0820 03/03/23  0608 03/02/23  0624 03/01/23  0547   WBC Thousand/uL 10 03 9 21 9 05 11 52* 12 31*  --   --  14 63*   HEMOGLOBIN g/dL 10 5* 10 0* 9 9* 10 7* 11 4*  --   --  12 2   HEMATOCRIT % 30 6* 29 5* 29 3* 31 0* 34 8*  --   --  36 9   PLATELETS Thousands/uL 58* 47* 35* 43* 48*  --   --  50*   POTASSIUM mmol/L 4 5 4 3 4 3 4 6  --  4 5 4 6 4 4   CHLORIDE mmol/L 102 102 104 101  --  103 104 102   CO2 mmol/L 17* 18* 19* 17*  --  17* 18* 18*   BUN mg/dL 89* 79* 81* 83*  --  74* 64* 62*   CREATININE mg/dL 2 05* 1 68* 1 80* 2 54*  --  2 06* 1 76* 1 50*   CALCIUM mg/dL 8 4 8 7 8 5 8 5  --  8 2* 8 2* 7 8*   MAGNESIUM mg/dL 2 7*  --   --   --   --  3 0* 3 1* 3 0*   PHOSPHORUS mg/dL  --   --   --   --   --  3 6 3 8  --

## 2023-03-08 NOTE — UTILIZATION REVIEW
Continued Stay Review    Date: 3/7/23                          Current Patient Class: inpatient  Current Level of Care: med surg    HPI:52 y o  male initially admitted on 2/20/23     Assessment/Plan:   S/p paracentesis today, removed 6 L, continue to monitor  Patient reported that his lower extremity swelling and redness is much worse  Discussed with nephrology-plan to hold on Bumex today  Abd distention noted, musculoskeletal tenderness and edema BL LE noted  Septic shock resolved, continue to monitor closely  IV ABX for worsening BL LE erythema  Continue to monitor hepatic encephalopathy, continue lactulose  Monitor INR s/p vit K   Cr 2 05, continue to hold bumex today  FU with oncology on discharge      Vital Signs:   Date/Time Temp Pulse Resp BP MAP (mmHg) SpO2 O2 Device   03/08/23 06:51:24 97 3 °F (36 3 °C) Abnormal  82 14 89/49 Abnormal  62 Abnormal  98 % --   03/07/23 21:54:51 97 3 °F (36 3 °C) Abnormal  81 17 95/51 63 Abnormal  97 % --   03/07/23 1946 -- -- -- -- -- -- None (Room air)   03/07/23 15:28:50 -- 82 18 94/52 -- 100 % --   03/07/23 14:36:09 -- 82 20 98/57 -- 100 % --   03/07/23 07:11:16 97 5 °F (36 4 °C) 81 18 90/53 65 96 % --   03/06/23 22:51:01 97 3 °F (36 3 °C) Abnormal  89 20 92/53 66 97 % --   03/06/23 1955 -- -- -- -- -- -- None (Room air)   03/06/23 18:14:51 -- 88 -- 91/53 66 97 % --   03/06/23 14:41:55 -- 88 16 91/53 66 96 % --   03/06/23 0904 -- -- -- -- -- 96 % None (Room air)   03/06/23 07:35:47 97 5 °F (36 4 °C) 97 18 92/52 65 96 % --       Pertinent Labs/Diagnostic Results:       Results from last 7 days   Lab Units 03/08/23  0556 03/07/23  0608 03/06/23  0529 03/05/23  0607 03/04/23  0510   WBC Thousand/uL 9 21 10 03 9 21 9 05 11 52*   HEMOGLOBIN g/dL 10 7* 10 5* 10 0* 9 9* 10 7*   HEMATOCRIT % 30 4* 30 6* 29 5* 29 3* 31 0*   PLATELETS Thousands/uL 70* 58* 47* 35* 43*   NEUTROS ABS Thousands/µL 6 51 7 32 6 85 6 94 8 95*         Results from last 7 days   Lab Units 03/08/23  0556 03/07/23  4841 03/06/23  0529 03/05/23  7751 03/04/23  0510 03/03/23  0608 03/02/23  0624   SODIUM mmol/L 127* 128* 130* 131* 126* 128* 129*   POTASSIUM mmol/L 4 3 4 5 4 3 4 3 4 6 4 5 4 6   CHLORIDE mmol/L 100 102 102 104 101 103 104   CO2 mmol/L 16* 17* 18* 19* 17* 17* 18*   ANION GAP mmol/L 11 9 10 8 8 8 7   BUN mg/dL 96* 89* 79* 81* 83* 74* 64*   CREATININE mg/dL 2 49* 2 05* 1 68* 1 80* 2 54* 2 06* 1 76*   EGFR ml/min/1 73sq m 28 36 46 42 28 36 43   CALCIUM mg/dL 8 0* 8 4 8 7 8 5 8 5 8 2* 8 2*   MAGNESIUM mg/dL  --  2 7*  --   --   --  3 0* 3 1*   PHOSPHORUS mg/dL 4 8*  --   --   --   --  3 6 3 8     Results from last 7 days   Lab Units 03/08/23  0556 03/07/23  0608 03/05/23  0607 03/04/23  0510 03/03/23  0608   AST U/L 62* 64* 80* 94* 98*   ALT U/L 25 27 27 34 39   ALK PHOS U/L 98 95 109 115 110   TOTAL PROTEIN g/dL 5 1* 5 2* 5 2* 5 1* 5 0*   ALBUMIN g/dL 3 3* 3 5 3 6 3 5 3 3*   TOTAL BILIRUBIN mg/dL 6 15* 6 78* 5 94* 5 64* 6 15*     Results from last 7 days   Lab Units 03/08/23  1035 03/08/23  0709 03/07/23  2048 03/07/23  1619 03/07/23  1116 03/07/23  0708 03/06/23  2117 03/06/23  1645 03/06/23  1554 03/06/23  1140 03/06/23  0818 03/05/23  2049   POC GLUCOSE mg/dl 187* 141* 184* 125 140 101 129 149* 141* 135 116 144*     Results from last 7 days   Lab Units 03/08/23  0556 03/07/23  0608 03/06/23  0529 03/05/23  0607 03/04/23  0510 03/03/23  0608 03/02/23  0624   GLUCOSE RANDOM mg/dL 118 101 108 106 114 101 152*     Results from last 7 days   Lab Units 03/07/23  1258   PH SOLO  7 395   PCO2 SOLO mm Hg 26 4*   PO2 SOLO mm Hg 120 8*   HCO3 SOLO mmol/L 15 8*   BASE EXC SOLO mmol/L -7 6   O2 CONTENT SOLO ml/dL 16 6   O2 HGB, VENOUS % 94 8*     Results from last 7 days   Lab Units 03/08/23  0556 03/07/23  0608 03/06/23  0529   PROTIME seconds 25 4* 25 6* 26 9*   INR  2 28* 2 31* 2 46*         Medications:   Scheduled Medications:   Albumin 25%, 25 g, Intravenous, Q6H  cefazolin, 1,000 mg, Intravenous, Q8H  chlorhexidine, 15 mL, Swish & Spit, Q12H [address]  enoxaparin, 1 mg/kg, Subcutaneous, Q12H [address]  ergocalciferol, 50,000 Units, Oral, Weekly  gabapentin, 300 mg, Oral, BID  insulin lispro, 1-6 Units, Subcutaneous, TID AC  lactulose, 10 g, Oral, Daily  melatonin, 6 mg, Oral, HS  midodrine, 15 mg, Oral, TID AC  JOYCE ANTIFUNGAL, , Topical, BID  pantoprazole, 40 mg, Oral, Early Morning  sodium bicarbonate, 1,300 mg, Oral, BID (diuretic)  sucralfate, 1 g, Oral, 4x Daily (AC & HS)      Continuous IV Infusions: none     PRN Meds:  al mag oxide-diphenhydramine-lidocaine viscous, 10 mL, Swish & Swallow, Q4H PRN  ondansetron, 4 mg, Intravenous, Q8H PRN  oxyCODONE, 5 mg, Oral, Q4H PRN  saliva substitute, 5 spray, Mouth/Throat, 4x Daily PRN        Discharge Plan: [facility]    Network Utilization Review Department  ATTENTION: Please call with any questions or concerns to [phone] and carefully listen to the prompts so that you are directed to the right person  All voicemails are confidential   Harlene Pair all requests for admission clinical reviews, approved or denied determinations and any other requests to dedicated fax number below belonging to the campus where the patient is receiving treatment   List of dedicated fax numbers for the Facilities:  [address] DENIALS (Administrative/Medical Necessity) [phone]   [address] (Maternity/NICU/Pediatrics) [phone]   7 Rama King [phone]   [facility] [phone]   [facility] [phone]   [address] [address] [facility] [place] [place] [address] Barbara Fish 28 [phone]   [address] [address] Mercedes Dawn [facility] [phone]

## 2023-03-08 NOTE — PROGRESS NOTES
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)   • Thrombocytopenia (HCC)   • Hypomagnesemia   • BMI 35 0-35 9,adult   • Vitamin D deficiency   • Malignant ascites   • Septic shock (HCC)   • Mesenteric thrombosis (HCC)   • Oropharyngeal candidiasis   • DENZEL (acute kidney injury) ([facility] [address] )   • Metabolic acidosis   • Hyperglycemia   • Elevated INR   • Hepatic encephalopathy   • Goals of care, counseling/discussion   • Bilateral lower extremity edema     Active issues specifically addressed today include:   Cholangiocarcinoma  Goals of care  Pain management  Nausea/vomiting       Plan:  1  Symptom management - patient continues to be doing well with current regimen; nausea and vomiting has resolved, patient has adequate appetite and PO intake;  neuropathic pain in B/L LE's has been improving                - Magic mouthwash/mouth kote prn              - Carafate 1 g q4              - protonix 40 mg PO daily               - Melatonin 6mg qHS              - Zofran 4mg q8h PRN              - Oxycodone 5mg q4h PRN              - Continue paracenteses PRN, last performed 3/7/23              -increased gabapentin to 300 mg BID 3/8     2   Goals -               - Continue treatment focused care              - He wishes to follow up with Dr Melburn Canavan for Oncology care    instead of Dr Cinthya Wills              - He wishes to continue IR paracentesis as needed due to the symptom benefit              -Plan to follow up outpatient with palliative for symptom management, next appointment 3/10     3  Social Support              - Patient's wife continues to visit him regularly; he feels well  supported              - Patient has 1 biological son and two other nonbiological children and is very involved in their life                 - WPIUEN and father are still alive  [de-identified]     24h OME: 0     Code Status: Full - Level 1  Decisional apparatus:  Patient is competent on my exam today  If competence is lost, patient's substitute decision maker would default to wife [place]  Advance Directive / Living Will / POLST:  No ACP documents on file    Interval history:  Patient seen and examined at bedside  No acute events overnight  He said he underwent IR paracentesis yesterday, which improved discomfort from abdominal distension  He had finished most of his breakfast; tray at bedside visualized to be mostly empty  He denies any nausea/vomiting  He says neuropathic foot pain has been steadily improving since starting gabapentin  He says it is less painful for him to stand up and walk around  He does not have any other complaints at this time  He expresses a desire to go home but is understanding of the medical care he is receiving while inpatient  He remains pleasant, treatment-focused  He does not desire any counseling or  services at this time  Nephrology continues to follow  Patient remains inpatient for treatment of DENZEL  Per chart review, diuretics were discontinued yesterday 2/2 increased creatinine (from 1 68 to 2 05 yesterday)  IR paracentesis performed yesterday; 6L of cloudy eric ascites was drained  Per primary team, BL lower extremity edema and erythema have not improved  Patient remains on lovenox  VAS bilateral LE's on 3/7 did not reveal DVT's  Ancef started on 3/7       MEDICATIONS / ALLERGIES:     all current active meds have been reviewed and current meds:   Current Facility-Administered Medications   Medication Dose Route Frequency   • al mag oxide-diphenhydramine-lidocaine viscous (MAGIC MOUTHWASH) suspension 10 mL  10 mL Swish & Swallow Q4H PRN   • albumin human (FLEXBUMIN) 25 % injection 25 g  25 g Intravenous Q6H   • ceFAZolin (ANCEF) IVPB (premix in dextrose) 1,000 mg 50 mL  1,000 mg Intravenous Q8H   • chlorhexidine (PERIDEX) 0 12 % oral rinse 15 mL  15 mL Swish & Spit Q12H [address]   • enoxaparin (LOVENOX) subcutaneous injection 100 mg  1 mg/kg Subcutaneous Q12H [address]   • ergocalciferol (VITAMIN D2) capsule 50,000 Units  50,000 Units Oral Weekly   • gabapentin (NEURONTIN) capsule 300 mg  300 mg Oral BID   • insulin lispro (HumaLOG) 100 units/mL subcutaneous injection 1-6 Units  1-6 Units Subcutaneous TID AC   • lactulose oral solution 10 g  10 g Oral Daily   • melatonin tablet 6 mg  6 mg Oral HS   • midodrine (PROAMATINE) tablet 15 mg  15 mg Oral TID AC   • moisture barrier miconazole 2% cream (aka JOYCE MOISTURE BARRIER ANTIFUNGAL CREAM)   Topical BID   • ondansetron (ZOFRAN) injection 4 mg  4 mg Intravenous Q8H PRN   • oxyCODONE (ROXICODONE) IR tablet 5 mg  5 mg Oral Q4H PRN   • pantoprazole (PROTONIX) EC tablet 40 mg  40 mg Oral Early Morning   • saliva substitute (MOUTH KOTE) mucosal solution 5 spray  5 spray Mouth/Throat 4x Daily PRN   • sodium bicarbonate tablet 1,300 mg  1,300 mg Oral BID (diuretic)   • sucralfate (CARAFATE) tablet 1 g  1 g Oral 4x Daily (AC & HS)       Allergies   Allergen Reactions   • Latex Swelling     If long term usage/application   • Penicillins Other (See Comments) and Rash     As a child had reaction not sure what it was  OBJECTIVE:    Physical Exam  Physical Exam  Constitutional:       General: He is not in acute distress  Appearance: He is not ill-appearing, toxic-appearing or diaphoretic  HENT:      Head: Normocephalic and atraumatic  Right Ear: External ear normal       Left Ear: External ear normal       Nose: Nose normal  No congestion or rhinorrhea        Mouth/Throat: Mouth: Mucous membranes are moist       Pharynx: Oropharynx is clear  No oropharyngeal exudate or posterior oropharyngeal erythema  Eyes:      General: No scleral icterus  Right eye: No discharge  Left eye: No discharge  Extraocular Movements: Extraocular movements intact  Conjunctiva/sclera: Conjunctivae normal    Pulmonary:      Effort: Pulmonary effort is normal  No respiratory distress  Abdominal:      General: There is distension  Musculoskeletal:      Right lower leg: Edema present  Left lower leg: Edema present  Skin:     Findings: Rash (Petechial rash overlying bilateral lower extremities from knees down) present  Neurological:      General: No focal deficit present  Mental Status: He is alert and oriented to person, place, and time  Psychiatric:         Mood and Affect: Mood normal          Behavior: Behavior normal          Thought Content: Thought content normal          Judgment: Judgment normal          Lab Results:   I have personally reviewed pertinent labs  , CBC:   Lab Results   Component Value Date    WBC 9 21 03/08/2023    HGB 10 7 (L) 03/08/2023    HCT 30 4 (L) 03/08/2023    MCV 88 03/08/2023    PLT 70 (L) 03/08/2023    MCH 31 0 03/08/2023    MCHC 35 2 03/08/2023    RDW 19 3 (H) 03/08/2023    MPV 11 4 03/08/2023    NRBC 0 03/08/2023   , CMP:   Lab Results   Component Value Date    SODIUM 127 (L) 03/08/2023    K 4 3 03/08/2023     03/08/2023    CO2 16 (L) 03/08/2023    BUN 96 (H) 03/08/2023    CREATININE 2 49 (H) 03/08/2023    CALCIUM 8 0 (L) 03/08/2023    AST 62 (H) 03/08/2023    ALT 25 03/08/2023    ALKPHOS 98 03/08/2023    EGFR 28 03/08/2023     Imaging Studies:   VAS LL venous duplex, bilateral 3/7-  No evidence of acute or chronic deep vein thrombosis bilaterally  No evidence of superficial thrombophlebitis noted bilaterally  No evidence of valvular incompetence noted in the deep veins bilaterally  Popliteal, posterior tibial and anterior tibial arterial Doppler waveforms are  Triphasic/hyperemic bilaterally        EKG, Pathology, and Other Studies: 3/7 IR paracentesis    Counseling / Coordination of Care    Total floor / unit time spent today 15 minutes  Greater than 50% of total time was spent with the patient and / or family counseling and / or coordination of care  A description of the counseling / coordination of care: symptom assessment and management and supportive listening

## 2023-03-08 NOTE — PROGRESS NOTES
[address] [facility]  Progress Note Christina Ashby 1970, 47 y o  male MRN: [id number]  Unit/Bed#: [facility] 813-01 Encounter: [id number]  Primary Care Provider: Evangelista Velasquez MD   Date and time admitted to hospital: 2/20/2023  3:04 AM    * Septic shock [facility])  Assessment & Plan  Present on admission  Shock resolved  Monitor closely-  Currently on cefazolin    Bilateral lower extremity edema  Assessment & Plan  · Patient noted to have bilateral lower extremity edema which is likely multifactorial given his ascites and diffuse hepatic metastasis  · Lower extremity Doppler is negative for DVT  · Since there is worsening of erythema will start on antibiotics-currently on cefazolin  · Erythema edema and pain is improving    Goals of care, counseling/discussion  Assessment & Plan  Patient with metastatic cholangiocarcinoma, worsening kidney function, worsening liver function   Overall guarded prognosis  Patient understands overall guarded prognosis however he would like to pursue active treatment and plans on following oncology outpatient for further cares  Discussed with patient and spouse in detail by previous provider  No limitation of care  Palliative on board    Hepatic encephalopathy  Assessment & Plan  Patient with extensive hepatic metastasis  Lactulose  Monitor    Elevated INR  Assessment & Plan  Likely due to metastatic liver disease  S/p vitamin K  Monitor INR    DENZEL (acute kidney injury) ([facility] [address] )  Assessment & Plan  Monitor kidney function closely  Avoid nephrotoxins  Nephrology following  Creatinine 2 45    Holding Bumex  Nephrology is adding albumin    Oropharyngeal candidiasis  Assessment & Plan  Received 7 days fluconazole treatment      Mesenteric thrombosis (HCC)  Assessment & Plan  Received IV heparin GTT  discussed with oncology - Patient is recommended Lovenox 1 mg/kg body weight every 12 hours  Lovenox 1 mg/kg body wt twice daily  Outpatient oncology follow-up      Malignant ascites  Assessment & Plan  Requires frequent paracentesis  Status post paracentesis on 3/7 removing 6 L      Thrombocytopenia (HCC)  Assessment & Plan  Multifactorial  Oncology inputs noted  Monitor counts-platelets 70 uptrending    GERD (gastroesophageal reflux disease)  Assessment & Plan  Continue PPI    Hyponatremia  Assessment & Plan  Multifactorial  Monitor closely  Sodium 127 today  Holding Bumex and was 1 one-time dose of albumin    Cholangiocarcinoma, stage IV [facility])  Assessment & Plan  Patient with metastatic cholangiocarcinoma  Discussed with patient overall guarded prognosis  Oncology has reiterated poor prognosis, patient reports he understands his guarded prognosis but would like to pursue active treatment at this time and plans to follow-up with oncology on discharge for further cares  Oncology inputs noted  Follow-up with oncology as an outpatient   Elevated bilirubin noted - trending down          VTE Pharmacologic Prophylaxis: VTE Score: 8 Moderate Risk (Score 3-4) - Pharmacological DVT Prophylaxis Ordered: enoxaparin (Lovenox)  Patient Centered Rounds: I performed bedside rounds with nursing staff today  Discussions with Specialists or Other Care Team Provider:     Education and Discussions with Family / Patient: Updated  (wife) via phone  Total Time Spent on Date of Encounter in care of patient: 35 minutes This time was spent on one or more of the following: performing physical exam; counseling and coordination of care; obtaining or reviewing history; documenting in the medical record; reviewing/ordering tests, medications or procedures; communicating with other healthcare professionals and discussing with patient's family/caregivers  Current Length of Stay: 16 day(s)  Current Patient Status: Inpatient   Certification Statement: The patient will continue to require additional inpatient hospital stay due to The antibiotics    DENZEL  Discharge Plan: Anticipate discharge in 48-72 hrs to home with home services  Code Status: Level 1 - Full Code    Subjective:   Patient seen and examined  No events overnight  Creatinine is uptrending  Patient reported that he is feeling better  Discussed with nephrology  Plan for IV albumin  Objective:     Vitals:   Temp (24hrs), Av 3 °F (36 3 °C), Min:97 3 °F (36 3 °C), Max:97 3 °F (36 3 °C)    Temp:  [97 3 °F (36 3 °C)] 97 3 °F (36 3 °C)  HR:  [81-83] 83  Resp:  [14-18] 18  BP: (87-95)/(47-51) 87/47  SpO2:  [96 %-98 %] 96 %  Body mass index is 31 07 kg/m²  Input and Output Summary (last 24 hours): Intake/Output Summary (Last 24 hours) at 3/8/2023 1740  Last data filed at 3/8/2023 1401  Gross per 24 hour   Intake 360 ml   Output 550 ml   Net -190 ml       Physical Exam:   Physical Exam  Constitutional:       General: He is not in acute distress  HENT:      Head: Normocephalic and atraumatic  Nose: Nose normal    Eyes:      Conjunctiva/sclera: Conjunctivae normal    Cardiovascular:      Rate and Rhythm: Normal rate and regular rhythm  Abdominal:      General: There is distension  Tenderness: There is no abdominal tenderness  Musculoskeletal:      Right lower leg: Edema present  Left lower leg: Edema present  Comments: Bilateral lower extremity edema and erythema right worse than left   Skin:     Findings: Erythema present  Neurological:      Mental Status: He is alert and oriented to person, place, and time  Mental status is at baseline           Additional Data:     Labs:  Results from last 7 days   Lab Units 23  0556   WBC Thousand/uL 9 21   HEMOGLOBIN g/dL 10 7*   HEMATOCRIT % 30 4*   PLATELETS Thousands/uL 70*   NEUTROS PCT % 71   LYMPHS PCT % 11*   MONOS PCT % 16*   EOS PCT % 2     Results from last 7 days   Lab Units 23  0556   SODIUM mmol/L 127*   POTASSIUM mmol/L 4 3   CHLORIDE mmol/L 100   CO2 mmol/L 16*   BUN mg/dL 96*   CREATININE mg/dL 2 49*   ANION GAP mmol/L 11   CALCIUM mg/dL 8 0*   ALBUMIN g/dL 3 3*   TOTAL BILIRUBIN mg/dL 6 15*   ALK PHOS U/L 98   ALT U/L 25   AST U/L 62*   GLUCOSE RANDOM mg/dL 118     Results from last 7 days   Lab Units 03/08/23  0556   INR  2 28*     Results from last 7 days   Lab Units 03/08/23  1718 03/08/23  1035 03/08/23  0709 03/07/23  2048 03/07/23  1619 03/07/23  1116 03/07/23  0708 03/06/23  2117 03/06/23  1645 03/06/23  1554 03/06/23  1140 03/06/23  0818   POC GLUCOSE mg/dl 140 187* 141* 184* 125 140 101 129 149* 141* 135 116               Lines/Drains:  Invasive Devices     Central Venous Catheter Line  Duration           Port A Cath Right Subclavian -- days                Central Line:  Goal for removal: Port-A-Cath accessed             Imaging: No pertinent imaging reviewed      Recent Cultures (last 7 days):         Last 24 Hours Medication List:   Current Facility-Administered Medications   Medication Dose Route Frequency Provider Last Rate   • al mag oxide-diphenhydramine-lidocaine viscous  10 mL Swish & Swallow Q4H PRN Anamaria Huang MD     • Albumin 25%  25 g Intravenous Q6H Carolina Newton MD     • cefazolin  1,000 mg Intravenous Kira Robb MD 1,000 mg (03/08/23 1355)   • chlorhexidine  15 mL Swish & Spit Q12H [facility] Anamaria Huang MD     • enoxaparin  1 mg/kg Subcutaneous Q12H [facility] Ousmane Naik MD     • ergocalciferol  50,000 Units Oral Weekly Anamaria Huang MD     • gabapentin  300 mg Oral BID Alec Caldera DO     • insulin lispro  1-6 Units Subcutaneous TID AC Anamaria Huang MD     • lactulose  10 g Oral Daily Ousmane Naik MD     • melatonin  6 mg Oral HS Anamaria Huang MD     • midodrine  15 mg Oral TID AC Anamaria Huang MD     • JOYCE ANTIFUNGAL   Topical BID Anamaria Huang MD     • ondansetron  4 mg Intravenous Q8H PRN Anamaria Huang MD     • oxyCODONE  5 mg Oral Q4H PRN Anamaria Huang MD     • pantoprazole  40 mg Oral Early Morning Anamaria Huang MD • saliva substitute  5 spray Mouth/Throat 4x Daily PRN Stanislaw White MD     • sodium bicarbonate  1,300 mg Oral BID (diuretic) Rolando Ken MD     • sucralfate  1 g Oral 4x Daily [facility]) Stanislaw White MD          Today, Patient Was Seen By: Lesley Nichols MD    **Please Note: This note may have been constructed using a voice recognition system  **

## 2023-03-08 NOTE — ASSESSMENT & PLAN NOTE
Patient with metastatic cholangiocarcinoma, worsening kidney function, worsening liver function   Overall guarded prognosis  Patient understands overall guarded prognosis however he would like to pursue active treatment and plans on following oncology outpatient for further cares  Discussed with patient and spouse in detail by previous provider  No limitation of care  Palliative on board

## 2023-03-08 NOTE — ASSESSMENT & PLAN NOTE
Patient with metastatic cholangiocarcinoma, worsening kidney function, worsening liver function   Overall guarded prognosis  Patient understands overall guarded prognosis however he would like to pursue active treatment and plans on following oncology outpatient for further cares  Discussed with patient and spouse in detail by previous provider

## 2023-03-08 NOTE — ASSESSMENT & PLAN NOTE
Multifactorial  Monitor closely  Sodium 127 today    Holding Bumex and was 1 one-time dose of albumin

## 2023-03-08 NOTE — CASE MANAGEMENT
Case Management Discharge Planning Note    Patient name Tamie Ulricher  [facility] [address] [facility] [id number]  : 1970 Date 3/8/2023       Current Admission Date: 2023  Current Admission Diagnosis:Septic shock [facility])   Patient Active Problem List    Diagnosis Date Noted   • Bilateral lower extremity edema 2023   • Goals of care, counseling/discussion 2023   • [facility] 2023   • Oropharyngeal candidiasis 2023   • DENZEL (acute kidney injury) ([facility] ) 5190   • Metabolic acidosis    • Hyperglycemia 2023   • Elevated INR 2023   • Hepatic encephalopathy 2023   • Septic shock ([facility] ) 2023   • Malignant ascites 2023   • Pulmonary nodules 2023   • Malignant neoplasm metastatic to both lungs ([facility] ) 2023   • 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 ([facility] ) 2023   • Thrombocytopenia ([facility] ) 2023   • Hypomagnesemia 2023   • BMI 35 0-35 9,adult 2023   • Vitamin D deficiency 2023   • GERD (gastroesophageal reflux disease) 2023   • Nausea and vomiting 2023   • Ascites 2023   • Lytic bone lesion of hip 2023   • Hyponatremia 2023   • Cholangiocarcinoma, stage IV ([facility] ) 2020   • Tobacco use disorder 2019   • Spinal accessory neuropathy 2018   • History of tongue cancer 2018   • Squamous cell carcinoma of tongue ([facility] ) 2018      LOS (days): 16  Geometric Mean LOS (GMLOS) (days): 5 00  Days to GMLOS:-11 5     OBJECTIVE:  Risk of Unplanned Readmission Score: 43 66         Current admission status: Inpatient   Preferred Pharmacy:   [address] [place] [address] [facility][id number] [place] [address] Berenice NORTON  [address] [address]  Phone: [phone] Fax: [phone]    Primary Care Provider: Gerard Cloud MD    Primary Insurance: BLUE CROSS  Secondary Insurance:     DISCHARGE DETAILS:  [address] meeting: pt wishes to continue to pursue active treatment   Pt is not medically cleared for d/c for another 24-48h  Plan is for DC home with [facility] f/u  CM continues to follow

## 2023-03-08 NOTE — ASSESSMENT & PLAN NOTE
Monitor kidney function closely  Avoid nephrotoxins  Nephrology following  Creatinine 2 45    Holding Bumex  Nephrology is adding albumin

## 2023-03-08 NOTE — ASSESSMENT & PLAN NOTE
Monitor kidney function closely  Avoid nephrotoxins  Nephrology following  Creatinine 2 05    Plan is to hold Bumex today

## 2023-03-08 NOTE — PROGRESS NOTES
[facility]  Progress Note Carmen Ashby 1970, 47 y o  male MRN: [id number]  Unit/Bed#: [facility] 813-01 Encounter: [id number]  Primary Care Provider: Bryan Glasgow MD   Date and time admitted to hospital: 2/20/2023  3:04 AM    * Septic shock [facility])  Assessment & Plan  Present on admission  Shock resolved  Monitor closely    Bilateral lower extremity edema  Assessment & Plan  · Patient noted to have bilateral lower extremity edema which is likely multifactorial given his ascites and diffuse hepatic metastasis  · Rule out DVT-patient is already on Lovenox  · Since there is worsening of erythema will start on antibiotics    Goals of care, counseling/discussion  Assessment & Plan  Patient with metastatic cholangiocarcinoma, worsening kidney function, worsening liver function   Overall guarded prognosis  Patient understands overall guarded prognosis however he would like to pursue active treatment and plans on following oncology outpatient for further cares  Discussed with patient and spouse in detail by previous provider    Hepatic encephalopathy  Assessment & Plan  Patient with extensive hepatic metastasis  Lactulose  Monitor    Elevated INR  Assessment & Plan  Likely due to metastatic liver disease  S/p vitamin K  Monitor INR    DENZEL (acute kidney injury) ([facility] [address] )  Assessment & Plan  Monitor kidney function closely  Avoid nephrotoxins  Nephrology following  Creatinine 2 05    Plan is to hold Bumex today    Oropharyngeal candidiasis  Assessment & Plan  Received 7 days fluconazole treatment      Mesenteric thrombosis (HCC)  Assessment & Plan  Received IV heparin GTT  discussed with oncology - Patient is recommended Lovenox 1 mg/kg body weight every 12 hours  Lovenox 1 mg/kg body wt twice daily  Outpatient oncology follow-up      Malignant ascites  Assessment & Plan  S/p paracentesis today removing 6 L  Monitor      Thrombocytopenia (HCC)  Assessment & Plan  Multifactorial  Oncology inputs noted  Monitor counts    GERD (gastroesophageal reflux disease)  Assessment & Plan  Continue PPI    Hyponatremia  Assessment & Plan  Multifactorial  Monitor closely  Nephrology following-hold Bumex today    Cholangiocarcinoma, stage IV [facility])  Assessment & Plan  Patient with metastatic cholangiocarcinoma  Discussed with patient overall guarded prognosis  Oncology has reiterated poor prognosis, patient reports he understands his guarded prognosis but would like to pursue active treatment at this time and plans to follow-up with oncology on discharge for further cares  Oncology inputs noted      VTE Pharmacologic Prophylaxis: VTE Score: 8 Moderate Risk (Score 3-4) - Pharmacological DVT Prophylaxis Ordered: enoxaparin (Lovenox)  Patient Centered Rounds: I performed bedside rounds with nursing staff today  Discussions with Specialists or Other Care Team Provider:     Education and Discussions with Family / Patient: Updated  (wife) at bedside  Total Time Spent on Date of Encounter in care of patient: 35 minutes This time was spent on one or more of the following: performing physical exam; counseling and coordination of care; obtaining or reviewing history; documenting in the medical record; reviewing/ordering tests, medications or procedures; communicating with other healthcare professionals and discussing with patient's family/caregivers  Current Length of Stay: 15 day(s)  Current Patient Status: Inpatient   Certification Statement: The patient will continue to require additional inpatient hospital stay due to DENZEL/hyponatremia  Discharge Plan: Code Status: Level 1 - Full Code    Subjective:   Patient seen and examined  Status post paracentesis today    Patient reported that his lower extremity swelling and redness is much worse  Discussed with nephrology-plan to hold on Bumex today    Objective:     Vitals:   Temp (24hrs), Av 4 °F (36 3 °C), Min:97 3 °F (36 3 °C), Max:97 5 °F (36 4 °C)    Temp:  [97 3 °F (36 3 °C)-97 5 °F (36 4 °C)] 97 5 °F (36 4 °C)  HR:  [81-89] 82  Resp:  [18-20] 18  BP: (90-98)/(52-57) 94/52  SpO2:  [96 %-100 %] 100 %  Body mass index is 31 07 kg/m²  Input and Output Summary (last 24 hours): Intake/Output Summary (Last 24 hours) at 3/7/2023 1951  Last data filed at 3/7/2023 1801  Gross per 24 hour   Intake --   Output 6650 ml   Net -6650 ml       Physical Exam:   Physical Exam  Constitutional:       General: He is not in acute distress  HENT:      Head: Normocephalic and atraumatic  Nose: Nose normal    Eyes:      General: No scleral icterus  Cardiovascular:      Rate and Rhythm: Normal rate and regular rhythm  Heart sounds: Normal heart sounds  Pulmonary:      Comments: Breath sounds bilateral  Abdominal:      General: There is distension  Musculoskeletal:         General: Tenderness present  Right lower leg: Edema present  Left lower leg: Edema present  Comments: Lateral lower extremity erythema and edema right worse than left   Skin:     Findings: Erythema present  Neurological:      General: No focal deficit present  Mental Status: He is alert            Additional Data:     Labs:  Results from last 7 days   Lab Units 03/07/23  0608   WBC Thousand/uL 10 03   HEMOGLOBIN g/dL 10 5*   HEMATOCRIT % 30 6*   PLATELETS Thousands/uL 58*   NEUTROS PCT % 73   LYMPHS PCT % 10*   MONOS PCT % 15*   EOS PCT % 2     Results from last 7 days   Lab Units 03/07/23  0608   SODIUM mmol/L 128*   POTASSIUM mmol/L 4 5   CHLORIDE mmol/L 102   CO2 mmol/L 17*   BUN mg/dL 89*   CREATININE mg/dL 2 05*   ANION GAP mmol/L 9   CALCIUM mg/dL 8 4   ALBUMIN g/dL 3 5   TOTAL BILIRUBIN mg/dL 6 78*   ALK PHOS U/L 95   ALT U/L 27   AST U/L 64*   GLUCOSE RANDOM mg/dL 101     Results from last 7 days   Lab Units 03/07/23  0608   INR  2 31*     Results from last 7 days   Lab Units 03/07/23  1619 03/07/23  1116 03/07/23  0708 03/06/23  2117 03/06/23  1645 03/06/23  1554 03/06/23  1140 03/06/23  0818 03/05/23  2049 03/05/23  1545 03/05/23  1117 03/05/23  0805   POC GLUCOSE mg/dl 125 140 101 129 149* 141* 135 116 144* 147* 125 113               Lines/Drains:  Invasive Devices     Central Venous Catheter Line  Duration           Port A Cath Right Subclavian -- days                Central Line:  Goal for removal:             Imaging CT abdomen    Recent Cultures (last 7 days):         Last 24 Hours Medication List:   Current Facility-Administered Medications   Medication Dose Route Frequency Provider Last Rate   • al mag oxide-diphenhydramine-lidocaine viscous  10 mL Swish & Swallow Q4H PRN Alena Banuelos MD     • cefazolin  1,000 mg Intravenous Camron Dillard MD 1,000 mg (03/07/23 1411)   • chlorhexidine  15 mL Swish & Spit Q12H [address] Alena Banuelos MD     • enoxaparin  1 mg/kg Subcutaneous Q12H [address] Glen Metzger MD     • ergocalciferol  50,000 Units Oral Weekly Alena Banuelos MD     • gabapentin  300 mg Oral BID Margarito Caldera DO     • insulin lispro  1-6 Units Subcutaneous TID AC Alena Banuelos MD     • lactulose  10 g Oral Daily Glen Metzger MD     • melatonin  6 mg Oral HS Alena Banuelos MD     • midodrine  15 mg Oral TID AC Alena Banuelos MD     • JOYCE ANTIFUNGAL   Topical BID Alena Banuelos MD     • ondansetron  4 mg Intravenous Q8H PRN Alena Banuelos MD     • oxyCODONE  5 mg Oral Q4H PRN Alena Banuelos MD     • pantoprazole  40 mg Oral Early Morning Alena Banuelos MD     • saliva substitute  5 spray Mouth/Throat 4x Daily PRN Alena Banuelos MD     • sodium bicarbonate  1,300 mg Oral BID (diuretic) Jordon Bryant MD     • sucralfate  1 g Oral 4x Daily (AC & HS) Alena Banuelos MD          Today, Patient Was Seen By: David Godinez MD    **Please Note: This note may have been constructed using a voice recognition system  **

## 2023-03-08 NOTE — PROGRESS NOTES
[address] Symone 47 y o  male MRN: [id number]  Unit/Bed#: [facility] 813-01 Encounter: [id number]  Reason for Consult: DENZEL    ASSESSMENT and PLAN:  1  Acute kidney injury (POA)  · Baseline creatinine is around 0 5-0 7  · Admitted with a creatinine of 2 37 on February 20, 2023  · Renal function improved and creatinine was down to 0 55 on February 24, 2023  · Had recurrence of DENZEL on February 26, 2023 and creatinine peaked at 2 54 on March 4, 2023  Etiology for secondary DENZEL felt to be multifactorial -likely has component of ATN  · Creatinine improved to 1 68 on March 6 and now back up again to 2 49  · Suspect that current rise in creatinine is due to hemodynamic changes from paracentesis yesterday  · Will give albumin 25 gm IV q6h x 6 doses  2  Hypotension:  · BP remains relatively low but stable  · Continue midodrine 15 mg TID  · No changes  3  Hyponatremia:  · Na is worsening at 127  · Continue fluid restriction 1500 cc/24 hours  · Recheck serum osm, urine studies  4  Metabolic acidosis:  · CO2 down to 16    · PH okay  · Continue sodium bicarbonate 1300 mg BID  5  Anemia:   · Hgb is stable  10 7 today  6  Metastatic cholangiocarcinoma  7  S/p septic shock    DISPOSITION:  · Renal function is worse  · Continue sodium bicarb at 1300 mg twice a day  · Give albumin 25 g IV q 6 hours x 4 doses  · Recheck serum osm and urine studies  The highlighted and/or bolded points in my assessment, plan, and disposition were discussed with the primary team and they agree with those points and the plan  SUBJECTIVE / 24H INTERVAL HISTORY:  Had paracentesis yesterday - 6050 cc drained  Appetite is okay  No CP or SOB  Received albumin 12 5 gm IV x 1 yesterday       OBJECTIVE:  Current Weight: Weight - Scale: 104 kg (229 lb 0 9 oz)  Vitals:    03/07/23 1436 03/07/23 1528 03/07/23 2154 03/08/23 0651   BP: 98/57 94/52 95/51 (!) 89/49   Pulse: 82 82 81 82   Resp: 20 18 17 14   Temp:   (!) 97 3 °F (36 3 °C) (!) 97 3 °F (36 3 °C)   TempSrc:       SpO2: 100% 100% 97% 98%   Weight:       Height:           Intake/Output Summary (Last 24 hours) at 3/8/2023 1121  Last data filed at 3/8/2023 0559  Gross per 24 hour   Intake --   Output 6300 ml   Net -6300 ml     General: conscious, cooperative, no distress  Skin: dry, redness on R > L leg  Eyes: pink conjunctivae  ENT: moist mucous membranes  Respiratory: equal chest expansion, clear breath sounds  Cardiovascular: distinct heart sounds, normal rate, regular rhythm, no rub  Abdomen: soft, non tender, non distended, normal bowel sounds  Extremities: (+) bilateral LE edema  Genitourinary: no davison catheter  Neuro: awake, alert     Psych: appropriate affect    Medications:    Current Facility-Administered Medications:   •  al mag oxide-diphenhydramine-lidocaine viscous (MAGIC MOUTHWASH) suspension 10 mL, 10 mL, Swish & Swallow, Q4H PRN, Erin Gaytan MD, 10 mL at 02/26/23 1232  •  albumin human (FLEXBUMIN) 25 % injection 25 g, 25 g, Intravenous, Q6H, Jenny Wang MD, 25 g at 03/08/23 1039  •  ceFAZolin (ANCEF) IVPB (premix in dextrose) 1,000 mg 50 mL, 1,000 mg, Intravenous, Q8H, Ismael Aguiar MD, Last Rate: 100 mL/hr at 03/08/23 0549, 1,000 mg at 03/08/23 0549  •  chlorhexidine (PERIDEX) 0 12 % oral rinse 15 mL, 15 mL, Swish & Spit, Q12H [facility], Erin Gaytan MD, 15 mL at 03/08/23 0836  •  enoxaparin (LOVENOX) subcutaneous injection 100 mg, 1 mg/kg, Subcutaneous, Q12H [facility], Jillian Shah MD, 100 mg at 03/08/23 8757  •  ergocalciferol (VITAMIN D2) capsule 50,000 Units, 50,000 Units, Oral, Weekly, Erin Gaytan MD, 50,000 Units at 03/02/23 0804  •  gabapentin (NEURONTIN) capsule 300 mg, 300 mg, Oral, BID, Dianne Caldera, DO, 300 mg at 03/08/23 7497  •  insulin lispro (HumaLOG) 100 units/mL subcutaneous injection 1-6 Units, 1-6 Units, Subcutaneous, TID AC, 1 Units at 03/04/23 1706 **AND** Fingerstick Glucose (POCT), , , TID AC, Heidy Guzmán MD  •  lactulose oral solution 10 g, 10 g, Oral, Daily, Zoraida Power MD, 10 g at 03/08/23 0836  •  melatonin tablet 6 mg, 6 mg, Oral, HS, Heidy Guzmán MD, 6 mg at 03/07/23 2141  •  midodrine (PROAMATINE) tablet 15 mg, 15 mg, Oral, TID AC, Heidy Guzmán MD, 15 mg at 03/08/23 0549  •  moisture barrier miconazole 2% cream (aka JOYCE MOISTURE BARRIER ANTIFUNGAL CREAM), , Topical, BID, Heidy Guzmán MD, Given at 03/08/23 0840  •  ondansetron [facility]) injection 4 mg, 4 mg, Intravenous, Q8H PRN, Heidy Guzmán MD, 4 mg at 02/22/23 0006  •  oxyCODONE (ROXICODONE) IR tablet 5 mg, 5 mg, Oral, Q4H PRN, Heidy Guzmán MD  •  pantoprazole (PROTONIX) EC tablet 40 mg, 40 mg, Oral, Early Morning, Heidy Guzmán MD, 40 mg at 03/08/23 0549  •  saliva substitute (MOUTH KOTE) mucosal solution 5 spray, 5 spray, Mouth/Throat, 4x Daily PRN, Heidy Guzmán MD, 5 spray at 02/25/23 2227  •  sodium bicarbonate tablet 1,300 mg, 1,300 mg, Oral, BID (diuretic), Donya Patel MD, 1,300 mg at 03/08/23 0836  •  sucralfate (CARAFATE) tablet 1 g, 1 g, Oral, 4x Daily (AC & HS), Heidy Guzmán MD, 1 g at 03/08/23 0549    Laboratory Results:  Results from last 7 days   Lab Units 03/08/23  0556 03/07/23  5210 03/06/23  0529 03/05/23  0607 03/04/23  0510 03/03/23  0820 03/03/23  0608 03/02/23  0624   WBC Thousand/uL 9 21 10 03 9 21 9 05 11 52* 12 31*  --   --    HEMOGLOBIN g/dL 10 7* 10 5* 10 0* 9 9* 10 7* 11 4*  --   --    HEMATOCRIT % 30 4* 30 6* 29 5* 29 3* 31 0* 34 8*  --   --    PLATELETS Thousands/uL 70* 58* 47* 35* 43* 48*  --   --    POTASSIUM mmol/L 4 3 4 5 4 3 4 3 4 6  --  4 5 4 6   CHLORIDE mmol/L 100 102 102 104 101  --  103 104   CO2 mmol/L 16* 17* 18* 19* 17*  --  17* 18*   BUN mg/dL 96* 89* 79* 81* 83*  --  74* 64*   CREATININE mg/dL 2 49* 2 05* 1 68* 1 80* 2 54*  --  2 06* 1 76*   CALCIUM mg/dL 8 0* 8 4 8 7 8 5 8 5  --  8 2* 8 2* MAGNESIUM mg/dL  --  2 7*  --   --   --   --  3 0* 3 1*   PHOSPHORUS mg/dL 4 8*  --   --   --   --   --  3 6 3 8

## 2023-03-08 NOTE — RESTORATIVE TECHNICIAN NOTE
Restorative Technician Note      Patient Name: Priscilla Zurita     Restorative Tech Visit Date: 03/08/23  Note Type: Mobility  Patient Position Upon Consult: Supine  Activity Performed: Ambulated; [id number]; Other (Comment) (ADLs EOB)  Assistive Device: Standard walker; Other (Comment) (Ambulated with and without RW)  Education Provided: Yes  Patient Position at End of Consult: Bedside chair;  All needs within reach    Ayanna Sample  DPT, Restorative Technician

## 2023-03-08 NOTE — ASSESSMENT & PLAN NOTE
· Patient noted to have bilateral lower extremity edema which is likely multifactorial given his ascites and diffuse hepatic metastasis    · Rule out DVT-patient is already on Lovenox  · Since there is worsening of erythema will start on antibiotics

## 2023-03-08 NOTE — ASSESSMENT & PLAN NOTE
· Patient noted to have bilateral lower extremity edema which is likely multifactorial given his ascites and diffuse hepatic metastasis    · Lower extremity Doppler is negative for DVT  · Since there is worsening of erythema will start on antibiotics-currently on cefazolin  · Erythema edema and pain is improving

## 2023-03-09 NOTE — ASSESSMENT & PLAN NOTE
Patient with metastatic cholangiocarcinoma  Discussed with patient overall guarded prognosis  Oncology has reiterated poor prognosis, patient reports he understands his guarded prognosis but would like to pursue active treatment at this time and plans to follow-up with oncology on discharge for further cares  Oncology inputs noted  Follow-up with oncology as an outpatient   Elevated bilirubin noted - trending down

## 2023-03-09 NOTE — ASSESSMENT & PLAN NOTE
· Patient noted to have bilateral lower extremity edema which is likely multifactorial given his ascites and diffuse hepatic metastasis  · Lower extremity Doppler is negative for DVT  · Patient was started on antibiotics  Even after starting on antibiotics there was no significant improvement in the erythema even though the edema and erythema improved slightly  Discussed with infectious disease    Unlikely this is cellulitis antibiotics discontinued  · Discussed with hematology will-elevate tomorrow

## 2023-03-09 NOTE — WOUND OSTOMY CARE
Progress Note - Wound   Lear Edvni Ashby 47 y o  male MRN: [id number]  Unit/Bed#: [facility] 813-01 Encounter: [id number]        Assessment Findings:   Patient seen today for wound care follow up visit  Patient is continent of bowel and bladder  Patient is independent with turning and repositioning  Patient is independent with meals  1  MASD sacral/buttocks- some skin loss noted with yellow tissue, no drainage  2  Posterior leg- RESOLVED      3  Paracentesis site B/L abdomen- not currently draining  No induration, fluctuance, odor, warmth/temperature differences, redness, or purulence noted to the above noted wounds and skin areas assessed  New dressings applied per orders listed below  Patient tolerated well- no s/s of non-verbal pain or discomfort observed during the encounter  Bedside nurse aware of plan of care  See flow sheets for more detailed assessment findings  Wound care will continue to follow  Skin care plans:  1-Apply JOYCE to sacrum/buttocks/groin BID   2-Moisturize skin daily with skin nourishing cream    3-Elevate heels to offload pressure  4-Ehob cushion when out of bed  5-Turn/repoisiton q2h or when medically stable for pressure re-distribution on skin  Wounds:  Wound 02/22/23 Perineum Mid (Active)   Wound Image   03/09/23 1119   Wound Description Epithelialization;Fragile; Yellow 03/09/23 1119   Pressure Injury Stage DTPI 03/08/23 1124   Britt-wound Assessment Hyperpigmented;Scaly 03/09/23 1119   Wound Length (cm) 4 cm 03/09/23 1119   Wound Width (cm) 3 cm 03/09/23 1119   Wound Depth (cm) 0 1 cm 03/09/23 1119   Wound Surface Area (cm^2) 12 cm^2 03/09/23 1119   Wound Volume (cm^3) 1 2 cm^3 03/09/23 1119   Calculated Wound Volume (cm^3) 1 2 cm^3 03/09/23 1119   Tunneling 0 cm 03/09/23 1119   Tunneling in depth located at 0 03/09/23 1119   Undermining 0 03/09/23 1119   Undermining is depth extending from 0 03/09/23 1119   Wound Site Closure DEANN 03/09/23 1119   Drainage Amount None 03/09/23 1119   Non-staged Wound Description Not applicable 91/88/96 5870   Treatments Cleansed 03/09/23 1119   Dressing Moisture barrier 03/09/23 1119   Wound packed? No 03/09/23 1119   Packing- # removed 0 03/09/23 1119   Packing- # inserted 0 03/09/23 1119   Dressing Changed New 03/09/23 1119   Patient Tolerance Tolerated well 03/09/23 1119   Dressing Status Clean;Dry; Intact 03/09/23 1119           Eli TAMAYON, RN, [facility]

## 2023-03-09 NOTE — CONSULTS
Consultation - Infectious Disease   Deloris Ashby 47 y o  male MRN: [id number]  [facility] Encounter: [id number]      IMPRESSION & RECOMMENDATIONS:   Impression/Recommendations: This is a 47 y o  male, with multiple medical homes including metastatic stage IV cholangiocarcinoma with cirrhosis, ascites and leg edema, developed bilateral leg erythema over the last few days  1   Bilateral leg erythema over the last few days  Despite erythema, there is not significant warmth or tenderness  In addition, patient has no fever or leukocytosis  Overall, physical exam and clinical picture is not consistent with cellulitis  No improvement with discomfort and erythema is most likely secondary to leg elevation and improvement of edema  At this point, it is best to observe patient off antibiotic, to avoid potential antibiotic toxicities  Discontinue antibiotic  Keep legs elevated  Serial exams  Monitor temperature/WBC  2   Ascites, likely malignant  Patient is status post paracentesis  Peritoneal fluid parameters not consistent with infectious peritonitis  3   Hypotension, present on admission  This has resolved  Patient completed empiric antibiotic course for possible septic shock, although there was no obvious active infection  All cultures have had no growth  Monitor hemodynamics  4   DENZEL  Creatinine up and down, overall improved  5   Stage IV metastatic cholangiocarcinoma  Patient's prognosis is poor  His performance status is also quite poor  Doubt that he can tolerate chemotherapy  Oncology follow-up  Hospitalization records reviewed in detail  Discussed with patient in detail regarding the above plan  Discussed with Dr Elva Schuster from [facility] service  Thank you for this consultation  We will follow along with you  HISTORY OF PRESENT ILLNESS:  Reason for Consult: Bilateral leg erythema  Suspected cellulitis      HPI: Frandy Gaines is a 47 y o  male, with multiple medical problems including metastatic stage IV cholangiocarcinoma with cirrhosis, came to ER on 2/20 with profound fatigue with dehydration  He was hypotensive at presentation, requiring admission to ICU  Was started on broad-spectrum antibiotic  He had paracentesis, with fluid findings not consistent with peritonitis  Regardless, patient completed 1 week course of antibiotic  He subsequently improved and was transferred to the floor  Over the last few days, patient was noted to have erythema of bilateral lower legs, with persistent edema  Cefazolin was started on 3/7  Patient was also placed on bedrest with leg elevation  Leg erythema and edema improved  We are asked to evaluate the patient  At present, he is comfortable  Patient states that he has some discomfort in the legs from swelling but no sharp pain  No fever or chills  He has been keeping his legs elevated in bed for the last few days  REVIEW OF SYSTEMS:  A complete system-based review was done  Except for what is noted in HPI above, ROS of systems is otherwise negative  PAST MEDICAL HISTORY:  Past Medical History:   Diagnosis Date   • 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 [facility])    • Rectal bleeding 1/9/2023   • S/P exploratory laparotomy 12/16/2020     Past Surgical History:   Procedure Laterality Date   • CT NEEDLE BIOPSY LIVER  10/15/2020   • IR BIOPSY LIVER MASS  5/18/2022   • IR PARACENTESIS  1/10/2023   • IR PARACENTESIS  1/24/2023   • IR PARACENTESIS  2/2/2023   • IR PARACENTESIS  1/30/2023   • IR PARACENTESIS  2/6/2023   • IR PARACENTESIS  2/9/2023   • IR PARACENTESIS  2/13/2023   • IR PARACENTESIS  2/16/2023     Problem list reviewed      FAMILY HISTORY:  Non-contributory    SOCIAL HISTORY:  Social History     Substance and Sexual Activity   Alcohol Use Not Currently    Comment: 1-2 drinks per day     Social History     Substance and Sexual Activity   Drug Use Not on file     Social History Tobacco Use   Smoking Status Former   • Packs/day: 2 00   • Years: 20 00   • Pack years: 40 00   • Types: Cigarettes   Smokeless Tobacco Current       ALLERGIES:  Allergies   Allergen Reactions   • Latex Swelling     If long term usage/application   • Penicillins Other (See Comments) and Rash     As a child had reaction not sure what it was  MEDICATIONS:  All current active medications have been reviewed  Patient is currently on IV cefazolin  PHYSICAL EXAM:  Vitals:  Temp:  [97 2 °F (36 2 °C)-97 5 °F (36 4 °C)] 97 5 °F (36 4 °C)  HR:  [77-84] 77  Resp:  [16-18] 18  BP: (85-91)/(48-52) 88/49  SpO2:  [97 %-98 %] 97 %  Temp (24hrs), Av 4 °F (36 3 °C), Min:97 2 °F (36 2 °C), Max:97 5 °F (36 4 °C)  Current: Temperature: 97 5 °F (36 4 °C)     Physical Exam:  General: Acute and chronically ill-appearing, nontoxic, in no acute distress  Awake, alert and oriented x 3  Eyes: Sclera anicteric, no hemorrhages or effusions  Oropharynx:  No ulcers, no lesions, pharynx benign, no tonsillitis  Neck:  Supple, no lymphadenopathy, no mass, nontender  Lungs:  Expansion symmetric, no rales, no wheezing, no accessory muscle use  Cardiac:  Regular rate and rhythm, normal S1, normal S2, no murmurs  Abdomen:  Soft, nondistended, non-tender, no HSM  Extremities: 2+ leg edema, erythema bilaterally, not symmetric  No significant warmth  Scattered petechiae  No significant tenderness  No draining ulcer  Skin:  No rashes, no ulcers  Neurological:  Moves all four extremities spontaneously, sensation grossly intact    LABS, IMAGING, & OTHER STUDIES:  Lab Results:  I have personally reviewed pertinent labs    Results from last 7 days   Lab Units 23  0606 23  0556 23  0071 23  0529 23  0607   POTASSIUM mmol/L 3 9 4 3 4 5   < > 4 3   CHLORIDE mmol/L 100 100 102   < > 104   CO2 mmol/L 17* 16* 17*   < > 19*   BUN mg/dL 94* 96* 89*   < > 81*   CREATININE mg/dL 2 14* 2 49* 2 05*   < > 1 80*   EGFR ml/min/1 73sq m 34 28 36   < > 42   CALCIUM mg/dL 8 0* 8 0* 8 4   < > 8 5   AST U/L  --  62* 64*  --  80*   ALT U/L  --  25 27  --  27   ALK PHOS U/L  --  98 95  --  109    < > = values in this interval not displayed  Results from last 7 days   Lab Units 03/09/23  0606 03/08/23  0556 03/07/23  0608   WBC Thousand/uL 7 10 9 21 10 03   HEMOGLOBIN g/dL 9 9* 10 7* 10 5*   PLATELETS Thousands/uL 66* 70* 58*           Imaging Studies:   I have personally reviewed pertinent imaging study reports and images in PACS  EKG, Pathology, and Other Studies:   I have personally reviewed pertinent reports

## 2023-03-09 NOTE — PROGRESS NOTES
[facility]  Progress Note Nathan Ashby 1970, 47 y o  male MRN: [id number]  Unit/Bed#: [facility] 813-01 Encounter: [id number]  Primary Care Provider: Jamari Cortez MD   Date and time admitted to hospital: 2/20/2023  3:04 AM    * Septic shock [facility])  Assessment & Plan  Present on admission  Shock resolved  Monitor closely-  Currently on cefazolin    Bilateral lower extremity edema  Assessment & Plan  · Patient noted to have bilateral lower extremity edema which is likely multifactorial given his ascites and diffuse hepatic metastasis  · Lower extremity Doppler is negative for DVT  · Patient was started on antibiotics  Even after starting on antibiotics there was no significant improvement in the erythema even though the edema and erythema improved slightly  Discussed with infectious disease  Unlikely this is cellulitis antibiotics discontinued  · Discussed with hematology will-elevate tomorrow    Goals of care, counseling/discussion  Assessment & Plan  Patient with metastatic cholangiocarcinoma, worsening kidney function, worsening liver function   Overall guarded prognosis  Patient understands overall guarded prognosis however he would like to pursue active treatment and plans on following oncology outpatient for further cares  Discussed with patient and spouse in detail by previous provider  No limitation of care  Palliative on board-discussed with palliative care  If the patient is not improving as expected may need to revisit the goals of care    Hepatic encephalopathy  Assessment & Plan  Patient with extensive hepatic metastasis  Lactulose  Monitor    Elevated INR  Assessment & Plan  Likely due to metastatic liver disease  S/p vitamin K  Monitor INR    DENZEL (acute kidney injury) [facility])  Assessment & Plan  Monitor kidney function closely  Avoid nephrotoxins  Nephrology following  Creatinine 2 14    Holding Bumex  Nephrology on board    Oropharyngeal candidiasis  Assessment & Plan  Received 7 days fluconazole treatment      Mesenteric thrombosis (HCC)  Assessment & Plan  Received IV heparin GTT  Discussed with oncology - Patient is recommended Lovenox 1 mg/kg body weight every 12 hours  Lovenox 1 mg/kg body wt twice daily-monitor Eliquis use with creatinine  If the patient creatinine is worsening he may not be a candidate for Eliquis  Patient did not have much treatment options either due to his liver failure with baseline elevated pro time  Outpatient oncology follow-up      Malignant ascites  Assessment & Plan  Requires frequent paracentesis  Status post paracentesis on 3/7 removing 6 L  Patient has having paracentesis twice a week Monday and Thursday as outpatient      Thrombocytopenia [facility])  Assessment & Plan  Multifactorial  Oncology inputs noted  Monitor counts-platelets 70 uptrending    GERD (gastroesophageal reflux disease)  Assessment & Plan  Continue PPI    Hyponatremia  Assessment & Plan  Multifactorial  Monitor closely  Sodium 127 today  Holding Bumex and was 1 one-time dose of albumin    Cholangiocarcinoma, stage IV [facility])  Assessment & Plan  Patient with metastatic cholangiocarcinoma  Discussed with patient overall guarded prognosis  Oncology has reiterated poor prognosis, patient reports he understands his guarded prognosis but would like to pursue active treatment at this time and plans to follow-up with oncology on discharge for further cares  Oncology inputs noted  Follow-up with oncology as an outpatient   Elevated bilirubin noted - trending down              VTE Pharmacologic Prophylaxis: VTE Score: 8 Moderate Risk (Score 3-4) - Pharmacological DVT Prophylaxis Ordered: enoxaparin (Lovenox)  Patient Centered Rounds: I performed bedside rounds with nursing staff today  Discussions with Specialists or Other Care Team Provider: Infectious disease    Hematology and palliative care    Education and Discussions with Family / Patient: Updated  (wife) at bedside  Total Time Spent on Date of Encounter in care of patient: 35 minutes This time was spent on one or more of the following: performing physical exam; counseling and coordination of care; obtaining or reviewing history; documenting in the medical record; reviewing/ordering tests, medications or procedures; communicating with other healthcare professionals and discussing with patient's family/caregivers  Current Length of Stay: 17 day(s)  Current Patient Status: Inpatient   Certification Statement: The [facility]  Discharge Plan:     Code Status: Level 1 - Full Code    Subjective:   Patient seen and examined  Sitting up in chair  Lower extremity erythema is not improving significantly  Discussed with ID and antibiotics discontinued  Patient with petechial rash on the lower extremity  Discussed with heme-onc they will reevaluate the patient tomorrow  Discussed with palliative care regarding goals of care  Currently no limitation of care  Objective:     Vitals:   Temp (24hrs), Av 4 °F (36 3 °C), Min:97 2 °F (36 2 °C), Max:97 5 °F (36 4 °C)    Temp:  [97 2 °F (36 2 °C)-97 5 °F (36 4 °C)] 97 5 °F (36 4 °C)  HR:  [77-84] 77  Resp:  [16-18] 18  BP: (85-91)/(48-52) 88/49  SpO2:  [97 %-98 %] 97 %  Body mass index is 31 07 kg/m²  Input and Output Summary (last 24 hours): Intake/Output Summary (Last 24 hours) at 3/9/2023 1929  Last data filed at 3/9/2023 0901  Gross per 24 hour   Intake 120 ml   Output 850 ml   Net -730 ml       Physical Exam:   Physical Exam  HENT:      Head: Normocephalic  Nose: Nose normal    Eyes:      General: Scleral icterus present  Cardiovascular:      Rate and Rhythm: Regular rhythm  Heart sounds: Normal heart sounds  Pulmonary:      Comments: Decreased breath sounds bilateral  Abdominal:      General: There is distension     Musculoskeletal:      Right lower leg: Edema present  Left lower leg: Edema present  Comments: Bilateral lower extremity edema with erythema  Petechial rash noted   Neurological:      Mental Status: He is alert and oriented to person, place, and time  Mental status is at baseline  Additional Data:     Labs:  Results from last 7 days   Lab Units 03/09/23  0606 03/08/23  0556   WBC Thousand/uL 7 10 9 21   HEMOGLOBIN g/dL 9 9* 10 7*   HEMATOCRIT % 29 3* 30 4*   PLATELETS Thousands/uL 66* 70*   NEUTROS PCT %  --  71   LYMPHS PCT %  --  11*   MONOS PCT %  --  16*   EOS PCT %  --  2     Results from last 7 days   Lab Units 03/09/23  0606 03/08/23  0556   SODIUM mmol/L 128* 127*   POTASSIUM mmol/L 3 9 4 3   CHLORIDE mmol/L 100 100   CO2 mmol/L 17* 16*   BUN mg/dL 94* 96*   CREATININE mg/dL 2 14* 2 49*   ANION GAP mmol/L 11 11   CALCIUM mg/dL 8 0* 8 0*   ALBUMIN g/dL  --  3 3*   TOTAL BILIRUBIN mg/dL  --  6 15*   ALK PHOS U/L  --  98   ALT U/L  --  25   AST U/L  --  62*   GLUCOSE RANDOM mg/dL 96 118     Results from last 7 days   Lab Units 03/09/23  0606   INR  2 42*     Results from last 7 days   Lab Units 03/09/23  1633 03/09/23  1135 03/09/23  0706 03/08/23  2048 03/08/23  1718 03/08/23  1035 03/08/23  0709 03/07/23  2048 03/07/23  1619 03/07/23  1116 03/07/23  0708 03/06/23  2117   POC GLUCOSE mg/dl 158* 160* 109 155* 140 187* 141* 184* 125 140 101 129               Lines/Drains:  Invasive Devices     Central Venous Catheter Line  Duration           Port A Cath Right Subclavian -- days                Central Line:  Goal for removal: Port accessed  Will de-access as appropriate  Imaging: No pertinent imaging reviewed      Recent Cultures (last 7 days):         Last 24 Hours Medication List:   Current Facility-Administered Medications   Medication Dose Route Frequency Provider Last Rate   • al mag oxide-diphenhydramine-lidocaine viscous  10 mL Swish & Swallow Q4H PRN Vicenta Cruz MD     • Albumin 25%  25 g Intravenous Q6H Nathalia Montenegro MD     • chlorhexidine  15 mL Swish & Spit Q12H 6300 Enzo Kessler MD     • enoxaparin  1 mg/kg Subcutaneous Q12H [facility] Azeb Stewart MD     • ergocalciferol  50,000 Units Oral Weekly Olga Praod MD     • gabapentin  300 mg Oral BID Adia Caldera, DO     • insulin lispro  1-6 Units Subcutaneous TID AC Olga Prado MD     • lactulose  10 g Oral Daily Azeb Stewart MD     • melatonin  6 mg Oral HS Olga Prado MD     • midodrine  15 mg Oral TID AC Olga Prado MD     • JOYCE ANTIFUNGAL   Topical BID Olga Prado MD     • ondansetron  4 mg Intravenous Q8H PRN Olga Prado MD     • oxyCODONE  5 mg Oral Q4H PRN Olga Prado MD     • pantoprazole  40 mg Oral Early Morning Olga Prado MD     • saliva substitute  5 spray Mouth/Throat 4x Daily PRN Olga Prado MD     • sodium bicarbonate  1,300 mg Oral BID (diuretic) Nathalia Montenegro MD     • sucralfate  1 g Oral 4x Daily [facility]) Olga Prado MD          Today, Patient Was Seen By: Eusebio Allison MD    **Please Note: This note may have been constructed using a voice recognition system  **

## 2023-03-09 NOTE — ASSESSMENT & PLAN NOTE
Received IV heparin GTT  Discussed with oncology - Patient is recommended Lovenox 1 mg/kg body weight every 12 hours  Lovenox 1 mg/kg body wt twice daily-monitor Eliquis use with creatinine  If the patient creatinine is worsening he may not be a candidate for Eliquis    Patient did not have much treatment options either due to his liver failure with baseline elevated pro time  Outpatient oncology follow-up

## 2023-03-09 NOTE — ASSESSMENT & PLAN NOTE
Monitor kidney function closely  Avoid nephrotoxins  Nephrology following  Creatinine 2 14    Holding Bumex  Nephrology on board

## 2023-03-09 NOTE — ASSESSMENT & PLAN NOTE
Requires frequent paracentesis    Status post paracentesis on 3/7 removing 6 L  Patient has having paracentesis twice a week Monday and Thursday as outpatient

## 2023-03-09 NOTE — ASSESSMENT & PLAN NOTE
Patient with metastatic cholangiocarcinoma, worsening kidney function, worsening liver function   Overall guarded prognosis  Patient understands overall guarded prognosis however he would like to pursue active treatment and plans on following oncology outpatient for further cares  Discussed with patient and spouse in detail by previous provider  No limitation of care  Palliative on board-discussed with palliative care    If the patient is not improving as expected may need to revisit the goals of care

## 2023-03-09 NOTE — PROGRESS NOTES
[address] [place] 47 y o  male MRN: [id number]  Unit/Bed#: [facility] 813-01 Encounter: [id number]  Reason for Consult: DENZEL    ASSESSMENT and PLAN:  1  Acute kidney injury (POA)  · Baseline creatinine is around 0 5-0 7  · Admitted with a creatinine of 2 37 on February 20, 2023  · Renal function improved and creatinine was down to 0 55 on February 24, 2023  · Had recurrence of DENZEL on February 26, 2023 and creatinine peaked at 2 54 on March 4, 2023  Etiology for secondary DENZEL felt to be multifactorial -likely has component of ATN  · Creatinine improved to 1 68 on March 6 and back up again to 2 49 on 3/8/23  · Suspect that most recent rise in creatinine is due to hemodynamic changes from paracentesis on 3/7/23  · Renal function improved today  Creatinine down to 2  14   · Continue albumin through today  2  Hypotension:  · BP remains relatively low but stable  · Continue midodrine 15 mg TID  · No change today    3  Hyponatremia:  · Na stable at 128  · Continue fluid restriction 1500 cc/24 hours  · Serum osm was 288 and 296 - both normal    · Check SPEP, UPEP, KL ratio, lipid panel to eval for isoosmolar hyponatremia  4  Metabolic acidosis:  · CO2 up to 17  · Continue sodium bicarbonate 1300 mg BID  5  Anemia:   · Hgb 9 9 today  · Monitor  6  Metastatic cholangiocarcinoma  7  S/p septic shock    DISPOSITION:  · Improved renal function today  · Give albumin 25 gm IV q6h x 3 more doses today  · Check SPEP, UPEP, KL ratio, lipid panel  SUBJECTIVE / 24H INTERVAL HISTORY:  No new complaints  Eating okay  No CP or SOB       OBJECTIVE:  Current Weight: Weight - Scale: 104 kg (229 lb 0 9 oz)  Vitals:    03/08/23 1436 03/08/23 2329 03/08/23 2330 03/09/23 0706   BP: (!) 87/47 (!) 85/48 (!) 85/48 91/52   Pulse: 83 82 84 82   Resp: 18  18 16   Temp: (!) 97 3 °F (36 3 °C) (!) 97 2 °F (36 2 °C) (!) 97 2 °F (36 2 °C) 97 5 °F (36 4 °C)   TempSrc:       SpO2: 96% 98% 98% 97%   Weight:       Height:           Intake/Output Summary (Last 24 hours) at 3/9/2023 1123  Last data filed at 3/9/2023 0901  Gross per 24 hour   Intake 418 ml   Output 1150 ml   Net -732 ml     General: conscious, cooperative, no distress  Skin: dry, redness on R > L leg is better  Eyes: pink conjunctivae  ENT: moist mucous membranes  Respiratory: equal chest expansion, clear breath sounds  Cardiovascular: distinct heart sounds, normal rate, regular rhythm, no rub  Abdomen: soft, non tender, non distended, normal bowel sounds  Extremities: (+) bilateral LE edema  Genitourinary: no davison catheter  Neuro: awake, alert     Psych: appropriate affect    Medications:    Current Facility-Administered Medications:   •  al mag oxide-diphenhydramine-lidocaine viscous (MAGIC MOUTHWASH) suspension 10 mL, 10 mL, Swish & Swallow, Q4H PRN, Siobhan Haddad MD, 10 mL at 02/26/23 1232  •  ceFAZolin (ANCEF) IVPB (premix in dextrose) 1,000 mg 50 mL, 1,000 mg, Intravenous, Q8H, Francie Sullivan MD, Last Rate: 100 mL/hr at 03/09/23 0605, 1,000 mg at 03/09/23 0605  •  chlorhexidine (PERIDEX) 0 12 % oral rinse 15 mL, 15 mL, Swish & Spit, Q12H [address], Siobhan Haddad MD, 15 mL at 03/09/23 0363  •  enoxaparin (LOVENOX) subcutaneous injection 100 mg, 1 mg/kg, Subcutaneous, Q12H [address], Tiny Gonzalez MD, 100 mg at 03/09/23 9661  •  ergocalciferol (VITAMIN D2) capsule 50,000 Units, 50,000 Units, Oral, Weekly, Siobhan Haddad MD, 50,000 Units at 03/09/23 2333  •  gabapentin (NEURONTIN) capsule 300 mg, 300 mg, Oral, BID, Dianne Caldera DO, 300 mg at 03/09/23 7136  •  insulin lispro (HumaLOG) 100 units/mL subcutaneous injection 1-6 Units, 1-6 Units, Subcutaneous, TID AC, 1 Units at 03/08/23 1154 **AND** Fingerstick Glucose (POCT), , , TID AC, Siobhan Haddad MD  •  lactulose oral solution 10 g, 10 g, Oral, Daily, Tiny Gonzalez MD, 10 g at 03/09/23 3786  •  melatonin tablet 6 mg, 6 mg, Oral, HS, Froilan P Sabrina De La Torre MD, 6 mg at 03/08/23 2123  •  midodrine (PROAMATINE) tablet 15 mg, 15 mg, Oral, TID AC, Lorenzo James MD, 15 mg at 03/09/23 0604  •  moisture barrier miconazole 2% cream (aka JOYCE MOISTURE BARRIER ANTIFUNGAL CREAM), , Topical, BID, Lorenzo James MD, Given at 03/09/23 0940  •  ondansetron [facility]) injection 4 mg, 4 mg, Intravenous, Q8H PRN, Lorenzo James MD, 4 mg at 02/22/23 0006  •  oxyCODONE (ROXICODONE) IR tablet 5 mg, 5 mg, Oral, Q4H PRN, Lorenzo James MD  •  pantoprazole (PROTONIX) EC tablet 40 mg, 40 mg, Oral, Early Morning, Lorenzo James MD, 40 mg at 03/09/23 0604  •  saliva substitute (MOUTH KOTE) mucosal solution 5 spray, 5 spray, Mouth/Throat, 4x Daily PRN, Lorenzo James MD, 5 spray at 02/25/23 2227  •  sodium bicarbonate tablet 1,300 mg, 1,300 mg, Oral, BID (diuretic), Foreign Gann MD, 1,300 mg at 03/09/23 0250  •  sucralfate (CARAFATE) tablet 1 g, 1 g, Oral, 4x Daily (AC & HS), Lorenzo James MD, 1 g at 03/09/23 0604    Laboratory Results:  Results from last 7 days   Lab Units 03/09/23  0606 03/08/23  0556 03/07/23  8541 03/06/23  0529 03/05/23  0607 03/04/23  0510 03/03/23  0820 03/03/23  0608   WBC Thousand/uL 7 10 9 21 10 03 9 21 9 05 11 52* 12 31*  --    HEMOGLOBIN g/dL 9 9* 10 7* 10 5* 10 0* 9 9* 10 7* 11 4*  --    HEMATOCRIT % 29 3* 30 4* 30 6* 29 5* 29 3* 31 0* 34 8*  --    PLATELETS Thousands/uL 66* 70* 58* 47* 35* 43* 48*  --    POTASSIUM mmol/L 3 9 4 3 4 5 4 3 4 3 4 6  --  4 5   CHLORIDE mmol/L 100 100 102 102 104 101  --  103   CO2 mmol/L 17* 16* 17* 18* 19* 17*  --  17*   BUN mg/dL 94* 96* 89* 79* 81* 83*  --  74*   CREATININE mg/dL 2 14* 2 49* 2 05* 1 68* 1 80* 2 54*  --  2 06*   CALCIUM mg/dL 8 0* 8 0* 8 4 8 7 8 5 8 5  --  8 2*   MAGNESIUM mg/dL  --   --  2 7*  --   --   --   --  3 0*   PHOSPHORUS mg/dL  --  4 8*  --   --   --   --   --  3 6

## 2023-03-09 NOTE — PLAN OF CARE
Problem: Prexisting or High Potential for Compromised Skin Integrity  Goal: Skin integrity is maintained or improved  Description: INTERVENTIONS:  - Identify patients at risk for skin breakdown  - Assess and monitor skin integrity  - Assess and monitor nutrition and hydration status  - Monitor labs   - Assess for incontinence   - Turn and reposition patient  - Assist with mobility/ambulation  - Relieve pressure over bony prominences  - Avoid friction and shearing  - Provide appropriate hygiene as needed including keeping skin clean and dry  - Evaluate need for skin moisturizer/barrier cream  - Collaborate with interdisciplinary team   - Patient/family teaching  - Consider wound care consult   Outcome: Progressing     Problem: CARDIOVASCULAR - ADULT  Goal: Maintains optimal cardiac output and hemodynamic stability  Description: INTERVENTIONS:  - Monitor I/O, vital signs and rhythm  - Monitor for S/S and trends of decreased cardiac output  - Administer and titrate ordered vasoactive medications to optimize hemodynamic stability  - Assess quality of pulses, skin color and temperature  - Assess for signs of decreased coronary artery perfusion  - Instruct patient to report change in severity of symptoms  Outcome: Progressing  Goal: Absence of cardiac dysrhythmias or at baseline rhythm  Description: INTERVENTIONS:  - Continuous cardiac monitoring, vital signs, obtain 12 lead EKG if ordered  - Administer antiarrhythmic and heart rate control medications as ordered  - Monitor electrolytes and administer replacement therapy as ordered  Outcome: Progressing     Problem: GASTROINTESTINAL - ADULT  Goal: Minimal or absence of nausea and/or vomiting  Description: INTERVENTIONS:  - Administer IV fluids if ordered to ensure adequate hydration  - Maintain NPO status until nausea and vomiting are resolved  - Nasogastric tube if ordered  - Administer ordered antiemetic medications as needed  - Provide nonpharmacologic comfort measures as appropriate  - Advance diet as tolerated, if ordered  - Consider nutrition services referral to assist patient with adequate nutrition and appropriate food choices  Outcome: Progressing  Goal: Maintains or returns to baseline bowel function  Description: INTERVENTIONS:  - Assess bowel function  - Encourage oral fluids to ensure adequate hydration  - Administer IV fluids if ordered to ensure adequate hydration  - Administer ordered medications as needed  - Encourage mobilization and activity  - Consider nutritional services referral to assist patient with adequate nutrition and appropriate food choices  Outcome: Progressing  Goal: Maintains adequate nutritional intake  Description: INTERVENTIONS:  - Monitor percentage of each meal consumed  - Identify factors contributing to decreased intake, treat as appropriate  - Assist with meals as needed  - Monitor I&O, weight, and lab values if indicated  - Obtain nutrition services referral as needed  Outcome: Progressing     Problem: METABOLIC, FLUID AND ELECTROLYTES - ADULT  Goal: Electrolytes maintained within normal limits  Description: INTERVENTIONS:  - Monitor labs and assess patient for signs and symptoms of electrolyte imbalances  - Administer electrolyte replacement as ordered  - Monitor response to electrolyte replacements, including repeat lab results as appropriate  - Instruct patient on fluid and nutrition as appropriate  Outcome: Progressing  Goal: Fluid balance maintained  Description: INTERVENTIONS:  - Monitor labs   - Monitor I/O and WT  - Instruct patient on fluid and nutrition as appropriate  - Assess for signs & symptoms of volume excess or deficit  Outcome: Progressing  Goal: Glucose maintained within target range  Description: INTERVENTIONS:  - Monitor Blood Glucose as ordered  - Assess for signs and symptoms of hyperglycemia and hypoglycemia  - Administer ordered medications to maintain glucose within target range  - Assess nutritional intake and initiate nutrition service referral as needed  Outcome: Progressing     Problem: HEMATOLOGIC - ADULT  Goal: Maintains hematologic stability  Description: INTERVENTIONS  - Assess for signs and symptoms of bleeding or hemorrhage  - Monitor labs  - Administer supportive blood products/factors as ordered and appropriate  Outcome: Progressing     Problem: MUSCULOSKELETAL - ADULT  Goal: Maintain or return mobility to safest level of function  Description: INTERVENTIONS:  - Assess patient's ability to carry out ADLs; assess patient's baseline for ADL function and identify physical deficits which impact ability to perform ADLs (bathing, care of mouth/teeth, toileting, grooming, dressing, etc )  - Assess/evaluate cause of self-care deficits   - Assess range of motion  - Assess patient's mobility  - Assess patient's need for assistive devices and provide as appropriate  - Encourage maximum independence but intervene and supervise when necessary  - Involve family in performance of ADLs  - Assess for home care needs following discharge   - Consider OT consult to assist with ADL evaluation and planning for discharge  - Provide patient education as appropriate  Outcome: Progressing  Goal: Maintain proper alignment of affected body part  Description: INTERVENTIONS:  - Support, maintain and protect limb and body alignment  - Provide patient/ family with appropriate education  Outcome: Progressing     Problem: PAIN - ADULT  Goal: Verbalizes/displays adequate comfort level or baseline comfort level  Description: Interventions:  - Encourage patient to monitor pain and request assistance  - Assess pain using appropriate pain scale  - Administer analgesics based on type and severity of pain and evaluate response  - Implement non-pharmacological measures as appropriate and evaluate response  - Consider cultural and social influences on pain and pain management  - Notify physician/advanced practitioner if interventions unsuccessful or patient reports new pain  Outcome: Progressing     Problem: INFECTION - ADULT  Goal: Absence or prevention of progression during hospitalization  Description: INTERVENTIONS:  - Assess and monitor for signs and symptoms of infection  - Monitor lab/diagnostic results  - Monitor all insertion sites, i e  indwelling lines, tubes, and drains  - Monitor endotracheal if appropriate and nasal secretions for changes in amount and color  - [place] appropriate cooling/warming therapies per order  - Administer medications as ordered  - Instruct and encourage patient and family to use good hand hygiene technique  - Identify and instruct in appropriate isolation precautions for identified infection/condition  Outcome: Progressing     Problem: DISCHARGE PLANNING  Goal: Discharge to home or other facility with appropriate resources  Description: INTERVENTIONS:  - Identify barriers to discharge w/patient and caregiver  - Arrange for needed discharge resources and transportation as appropriate  - Identify discharge learning needs (meds, wound care, etc )  - Arrange for interpretive services to assist at discharge as needed  - Refer to Case Management Department for coordinating discharge planning if the patient needs post-hospital services based on physician/advanced practitioner order or complex needs related to functional status, cognitive ability, or social support system  Outcome: Progressing     Problem: Knowledge Deficit  Goal: Patient/family/caregiver demonstrates understanding of disease process, treatment plan, medications, and discharge instructions  Description: Complete learning assessment and assess knowledge base    Interventions:  - Provide teaching at level of understanding  - Provide teaching via preferred learning methods  Outcome: Progressing     Problem: Nutrition/Hydration-ADULT  Goal: Nutrient/Hydration intake appropriate for improving, restoring or maintaining nutritional needs  Description: Monitor and assess patient's nutrition/hydration status for malnutrition  Collaborate with interdisciplinary team and initiate plan and interventions as ordered  Monitor patient's weight and dietary intake as ordered or per policy  Utilize nutrition screening tool and intervene as necessary  Determine patient's food preferences and provide high-protein, high-caloric foods as appropriate       INTERVENTIONS:  - Monitor oral intake, urinary output, labs, and treatment plans  - Assess nutrition and hydration status and recommend course of action  - Evaluate amount of meals eaten  - Assist patient with eating if necessary   - Allow adequate time for meals  - Recommend/ encourage appropriate diets, oral nutritional supplements, and vitamin/mineral supplements  - Order, calculate, and assess calorie counts as needed  - Recommend, monitor, and adjust tube feedings and TPN/PPN based on assessed needs  - Assess need for intravenous fluids  - Provide specific nutrition/hydration education as appropriate  - Include patient/family/caregiver in decisions related to nutrition  Outcome: Progressing

## 2023-03-09 NOTE — PROGRESS NOTES
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)   • Thrombocytopenia (HCC)   • Hypomagnesemia   • BMI 35 0-35 9,adult   • Vitamin D deficiency   • Malignant ascites   • Septic shock (HCC)   • Mesenteric thrombosis (HCC)   • Oropharyngeal candidiasis   • DENZEL (acute kidney injury) ([facility] [address] )   • Metabolic acidosis   • Hyperglycemia   • Elevated INR   • Hepatic encephalopathy   • Goals of care, counseling/discussion   • Bilateral lower extremity edema     Active issues specifically addressed today include:   Cholangiocarcinoma  Goals of care  Pain management  Nausea/vomiting        Plan:  1   Symptom management - patient continues to be doing well with current regimen; nausea and vomiting has resolved, patient has adequate appetite and PO intake;  neuropathic pain in B/L LE's has been improving                 - Magic mouthwash/mouth kote prn              - Carafate 1 g q4              - protonix 40 mg PO daily               - Melatonin 6mg qHS              - Zofran 4mg q8h PRN              - Oxycodone 5mg q4h PRN              - Continue paracenteses PRN, last performed 3/7/23              -continue gabapentin to 300 mg BID      2  Goals -               - Continue treatment focused care              - He wishes to follow up with Dr Chadd Lanza for Oncology care instead of Dr Scott Ponce              - He wishes to continue IR paracentesis as needed due to the symptom benefit                         -Plan to follow up outpatient with palliative for symptom management, next appointment 3/17     3  Social Support              - Patient's wife continues to visit him regularly; he feels well  supported              - Patient has 1 biological son and two other nonbiological children and is very involved in      their life     [de-identified] and father are still alive  [de-identified]     24h OME: 0      Code Status: Full - Level 1  Decisional apparatus:  Patient is competent on my exam today  If competence is lost, patient's substitute decision maker would default to wife [place]  Advance Directive / Living Will / POLST:  No ACP documents on file      Interval history:   Patient seen and examined at bedside  No acute events overnight  He was resting comfortably in bed watching TV prior to encounter  He has no subjective complaints today  He says appetite has been good without nausea or vomiting  He also says that neuropathic pain of BL LE's has significantly improved  He denies any significant discomfort from abdominal distension since paracentesis on 3/7  He remains eager to return home  Per chart review, nephrology has been trending creatinine in setting of DENZEL  Creatinine has improved from 2 49 on 3/8 to 2 14 today  They plan to give patient albumin 25 mg IV q6 for 3 more doses today  They are also checking SPEP, UPEP, KL ratio and lipid panel  Our team will continue to follow patient while in the hospital  Outpatient follow up with palliative has been rescheduled to 3/17 at 10 am  Patient has been made aware of this change       MEDICATIONS / ALLERGIES:     all current active meds have been reviewed and current meds:   Current Facility-Administered Medications   Medication Dose Route Frequency   • al mag oxide-diphenhydramine-lidocaine viscous (MAGIC MOUTHWASH) suspension 10 mL  10 mL Swish & Swallow Q4H PRN   • albumin human (FLEXBUMIN) 25 % injection 25 g  25 g Intravenous Q6H   • ceFAZolin (ANCEF) IVPB (premix in dextrose) 1,000 mg 50 mL  1,000 mg Intravenous Q8H   • chlorhexidine (PERIDEX) 0 12 % oral rinse 15 mL  15 mL Swish & Spit Q12H [address]   • enoxaparin (LOVENOX) subcutaneous injection 100 mg  1 mg/kg Subcutaneous Q12H [address]   • ergocalciferol (VITAMIN D2) capsule 50,000 Units  50,000 Units Oral Weekly   • gabapentin (NEURONTIN) capsule 300 mg  300 mg Oral BID   • insulin lispro (HumaLOG) 100 units/mL subcutaneous injection 1-6 Units  1-6 Units Subcutaneous TID AC   • lactulose oral solution 10 g  10 g Oral Daily   • melatonin tablet 6 mg  6 mg Oral HS   • midodrine (PROAMATINE) tablet 15 mg  15 mg Oral TID AC   • moisture barrier miconazole 2% cream (aka JOYCE MOISTURE BARRIER ANTIFUNGAL CREAM)   Topical BID   • ondansetron (ZOFRAN) injection 4 mg  4 mg Intravenous Q8H PRN   • oxyCODONE (ROXICODONE) IR tablet 5 mg  5 mg Oral Q4H PRN   • pantoprazole (PROTONIX) EC tablet 40 mg  40 mg Oral Early Morning   • saliva substitute (MOUTH KOTE) mucosal solution 5 spray  5 spray Mouth/Throat 4x Daily PRN   • sodium bicarbonate tablet 1,300 mg  1,300 mg Oral BID (diuretic)   • sucralfate (CARAFATE) tablet 1 g  1 g Oral 4x Daily (AC & HS)       Allergies   Allergen Reactions   • Latex Swelling     If long term usage/application   • Penicillins Other (See Comments) and Rash     As a child had reaction not sure what it was  OBJECTIVE:    Physical Exam  Physical Exam  Constitutional:       General: He is not in acute distress  Appearance: He is not ill-appearing, toxic-appearing or diaphoretic  HENT:      Head: Normocephalic and atraumatic  Right Ear: External ear normal       Left Ear: External ear normal       Nose: Nose normal  No congestion or rhinorrhea  Mouth/Throat:      Mouth: Mucous membranes are moist       Pharynx: Oropharynx is clear  Eyes:      General: No scleral icterus  Right eye: No discharge  Left eye: No discharge  Extraocular Movements: Extraocular movements intact        Conjunctiva/sclera: Conjunctivae normal    Pulmonary:      Effort: Pulmonary effort is normal  No respiratory distress  Abdominal:      General: There is distension  Musculoskeletal:      Cervical back: Normal range of motion  Skin:     Comments: Erythema of bilateral LE's below the knee, appears petechial    Neurological:      General: No focal deficit present  Mental Status: He is alert and oriented to person, place, and time  Psychiatric:         Mood and Affect: Mood normal          Behavior: Behavior normal          Thought Content: Thought content normal          Judgment: Judgment normal          Lab Results:   I have personally reviewed pertinent labs  , CBC:   Lab Results   Component Value Date    WBC 7 10 03/09/2023    HGB 9 9 (L) 03/09/2023    HCT 29 3 (L) 03/09/2023    MCV 90 03/09/2023    PLT 66 (L) 03/09/2023    MCH 30 5 03/09/2023    MCHC 33 8 03/09/2023    RDW 19 4 (H) 03/09/2023    MPV 11 8 03/09/2023   , CMP:   Lab Results   Component Value Date    SODIUM 128 (L) 03/09/2023    K 3 9 03/09/2023     03/09/2023    CO2 17 (L) 03/09/2023    BUN 94 (H) 03/09/2023    CREATININE 2 14 (H) 03/09/2023    CALCIUM 8 0 (L) 03/09/2023    EGFR 34 03/09/2023     Imaging Studies: 3/7 VAS LL venous duplex study- reviewed in MUSE    EKG, Pathology, and Other Studies: No new EKG since 2/20; urine protein electrophoresis, immunoglobulin free LT chains blood, & serum protein electrophoresis PENDING      Counseling / Coordination of Care    Total floor / unit time spent today 15 minutes  Greater than 50% of total time was spent with the patient and / or family counseling and / or coordination of care  A description of the counseling / coordination of care: symptom assessment and management and supportive listening

## 2023-03-10 NOTE — PROGRESS NOTES
[address] [place] 47 y o  male MRN: [id number]  Unit/Bed#: [facility] 813-01 Encounter: [id number]  Reason for Consult: DENZEL    ASSESSMENT and PLAN:  1  Acute kidney injury (POA)  · Baseline creatinine is around 0 5-0 7  · 3 bouts of DENZEL this admission:  · 1st - Admitted with a creatinine of 2 37 on February 20, 2023  Resolved and SCr down to 0 55 on 2/24/23  Etiology prerenal    · 2nd - DENZEL recurred on 2/26/23 and SCr peaked at 2 54 on 3/4/23  Etiology multifactorial -likely has component of ATN  SCr down to 1 68 on 3/6/23  · 3rd - SCr worsened on 3/7/23 and peaked at 2 49 on 3/8/23  Etiology hemodynamic changes from paracentesis on 3/7/23  Improved with albumin  · Renal function improving  SCr down to 1 88 today  · Monitor off albumin    2  Hypotension:  · BP remains relatively low but patient is asymptomatic  · Increase Midodrine to 15 mg QID  3  Hyponatremia:  · Na stable at 130  · Continue fluid restriction 1500 cc/24 hours  · Serum osm was 288 and 296 - both normal    · SPEP, UPEP, KL ratio - pending  Results unlikely to affect overall outcome  4  Metabolic acidosis:  · CO2 up to 18  · Continue sodium bicarbonate 1300 mg BID  5  Anemia:   · Stable Hgb     6  Metastatic cholangiocarcinoma  7  S/p septic shock    DISPOSITION:  · Improving renal function  · Monitor off Albumin  · Increase Midodrine to 15 mg QID  · Overall prognosis does not seem favorable but patient still planning for disease focused care  SUBJECTIVE / 24H INTERVAL HISTORY:  Sitting up in bed  No CP or SOB  Reports good appetite       OBJECTIVE:  Current Weight: Weight - Scale: 104 kg (229 lb 0 9 oz)  Vitals:    03/09/23 0706 03/09/23 1552 03/09/23 2114 03/10/23 0807   BP: 91/52 (!) 88/49 99/54 (!) 87/49   Pulse: 82 77 77 79   Resp: 16 18 20 16   Temp: 97 5 °F (36 4 °C) 97 5 °F (36 4 °C) 97 5 °F (36 4 °C) 97 5 °F (36 4 °C)   TempSrc:       SpO2: 97% 97% 98% 96%   Weight: Height:         No intake or output data in the 24 hours ending 03/10/23 1117  General: conscious, cooperative, no distress  Skin: dry, (+) redness of both legs - seems same as yesterday  Eyes: pink conjunctivae  ENT: moist mucous membranes  Respiratory: equal chest expansion, clear breath sounds  Cardiovascular: distinct heart sounds, normal rate, regular rhythm, no rub  Abdomen: soft, non tender, non distended, normal bowel sounds  Extremities: (+) LE edema  Genitourinary: no davison catheter  Neuro: awake, alert     Psych: appropriate affect    Medications:    Current Facility-Administered Medications:   •  al mag oxide-diphenhydramine-lidocaine viscous (MAGIC MOUTHWASH) suspension 10 mL, 10 mL, Swish & Swallow, Q4H PRN, Graciela Berry MD, 10 mL at 02/26/23 1232  •  chlorhexidine (PERIDEX) 0 12 % oral rinse 15 mL, 15 mL, Swish & Spit, Q12H [address], Graciela Berry MD, 15 mL at 03/10/23 0858  •  enoxaparin (LOVENOX) subcutaneous injection 100 mg, 1 mg/kg, Subcutaneous, Q12H [address], Charis Wiley MD, 100 mg at 03/10/23 3621  •  ergocalciferol (VITAMIN D2) capsule 50,000 Units, 50,000 Units, Oral, Weekly, Graciela eBrry MD, 50,000 Units at 03/09/23 7096  •  gabapentin (NEURONTIN) capsule 300 mg, 300 mg, Oral, BID, Dianne Caldera DO, 300 mg at 03/10/23 4655  •  insulin lispro (HumaLOG) 100 units/mL subcutaneous injection 1-6 Units, 1-6 Units, Subcutaneous, TID AC, 1 Units at 03/09/23 1721 **AND** Fingerstick Glucose (POCT), , , TID Graciela HERNANDEZ MD  •  lactulose oral solution 20 g, 20 g, Oral, TID, Romi Cox MD  •  melatonin tablet 6 mg, 6 mg, Oral, HS, Graciela Berry MD, 6 mg at 03/09/23 2115  •  midodrine (PROAMATINE) tablet 15 mg, 15 mg, Oral, TID AC, Graciela Berry MD, 15 mg at 03/10/23 0548  •  moisture barrier miconazole 2% cream (aka JOYCE MOISTURE BARRIER ANTIFUNGAL CREAM), , Topical, BID, Graciela Berry MD, Given at 03/10/23 0902  •  ondansetron [facility]) injection 4 mg, 4 mg, Intravenous, Q8H PRN, Laura Jett MD, 4 mg at 02/22/23 0006  •  oxyCODONE (ROXICODONE) IR tablet 5 mg, 5 mg, Oral, Q4H PRN, Laura Jett MD  •  pantoprazole (PROTONIX) EC tablet 40 mg, 40 mg, Oral, Early Morning, Laura Jett MD, 40 mg at 03/10/23 0540  •  saliva substitute (MOUTH KOTE) mucosal solution 5 spray, 5 spray, Mouth/Throat, 4x Daily PRN, Laura Jett MD, 5 spray at 02/25/23 2227  •  sodium bicarbonate tablet 1,300 mg, 1,300 mg, Oral, BID (diuretic), Frandy Grubbs MD, 1,300 mg at 03/10/23 0858  •  sucralfate (CARAFATE) tablet 1 g, 1 g, Oral, 4x Daily (AC & HS), Laura Jett MD, 1 g at 03/10/23 0540    Laboratory Results:  Results from last 7 days   Lab Units 03/10/23  0535 03/09/23  0606 03/08/23  0556 03/07/23  0608 03/06/23  0529 03/05/23  0607 03/04/23  0510   WBC Thousand/uL 6 72 7 10 9 21 10 03 9 21 9 05 11 52*   HEMOGLOBIN g/dL 9 6* 9 9* 10 7* 10 5* 10 0* 9 9* 10 7*   HEMATOCRIT % 27 0* 29 3* 30 4* 30 6* 29 5* 29 3* 31 0*   PLATELETS Thousands/uL 76* 66* 70* 58* 47* 35* 43*   POTASSIUM mmol/L 3 7 3 9 4 3 4 5 4 3 4 3 4 6   CHLORIDE mmol/L 99 100 100 102 102 104 101   CO2 mmol/L 18* 17* 16* 17* 18* 19* 17*   BUN mg/dL 98* 94* 96* 89* 79* 81* 83*   CREATININE mg/dL 1 88* 2 14* 2 49* 2 05* 1 68* 1 80* 2 54*   CALCIUM mg/dL 7 5* 8 0* 8 0* 8 4 8 7 8 5 8 5   MAGNESIUM mg/dL 2 8*  --   --  2 7*  --   --   --    PHOSPHORUS mg/dL  --   --  4 8*  --   --   --   --

## 2023-03-10 NOTE — PHYSICAL THERAPY NOTE
PHYSICAL THERAPY NOTE          Patient Name: Tico Garvin  QNUTQ'E Date: 3/10/2023     03/10/23 5717   PT Last Visit   PT Visit Date 03/10/23   Note Type   Note Type Treatment   Education   Education Provided Yes   End of Consult   Patient Position at End of Consult Seated edge of bed; All needs within reach   Pain Assessment   Pain Assessment Tool 0-10   Pain Score 3   Pain Location/Orientation Location: Leg;Orientation: Bilateral   Restrictions/Precautions   Weight Bearing Precautions Per Order No   Other Precautions Pain   General   Chart Reviewed Yes   Cognition   Overall Cognitive Status WFL   Arousal/Participation Alert; Responsive   Attention Within functional limits   Orientation Level Oriented X4   Following Commands Follows all commands and directions without difficulty   Comments Pt pleasant and cooperative during session  Bed Mobility   Supine to Sit Unable to assess   Additional Comments Pt found sitting EOB upon arrival   Transfers   Sit to Stand 6  Modified independent   Stand to Sit 6  Modified independent   Additional Comments with RW   Ambulation/Elevation   Gait pattern Wide DANIEL   Gait Assistance 5  Supervision   Assistive Device   (Rw initially then no A D )   Distance 150'   Stair Management Assistance 5  Supervision   Stair Management Technique Nonreciprocal;One rail L   Number of Stairs 4   Ambulation/Elevation Additional Comments Pt ambulates in hallway with steady gait   Balance   Static Sitting Good   Dynamic Sitting Good   Static Standing Fair +   Dynamic Standing Fair   Ambulatory Fair   Endurance Deficit   Endurance Deficit Yes   Activity Tolerance   Activity Tolerance Patient tolerated treatment well   Nurse Made Aware RN cleared pt for therapy   Exercises   Hip Flexion 10 reps; Sitting;AROM; Bilateral   Knee AROM Long Arc Quad 15 reps; Sitting;Bilateral;AROM   Ankle Pumps AROM; Sitting;Bilateral Assessment   Prognosis Fair   Problem List Pain;Decreased endurance   Assessment Pt seen for PT treatment session this date  Therapy session focused on  functional transfers, and ambulation in order to improve overall mobility and independence  Pt states has been independent for mobility in room and ambulating in hallway with spouse  Pt able to transfer with Mod Ind, ambulates with standby assist 150', initially with RW, then no A D, noted to have wide DANIEL, side to side sway without RW  Pt to use RW for ambulation at this time  Pt also able to negotiate up and down stairs, limits number of stairs at this time 2* mild dizziness, but does not need physical assist performing them  Pt states spouse supportive and will assist as needed  Pt with good mobility , encourage continued mobility with staff/ restorative team/family, no further skilled PT services indicated in this setting  Pt was left seated EOB at the end of PT session with all needs within reach  The patient's AM-PAC Basic Mobility Inpatient Short Form Raw Score is 21  A Raw score of greater than 16 suggests the patient may benefit from discharge to home  Please also refer to the recommendation of the Physical Therapist for safe discharge planning     Goals   Patient Goals to walk   Plan   Treatment/Interventions Spoke to nursing   Progress Discontinue PT   Recommendation   PT Discharge Recommendation Home with home health rehabilitation   [facility] Mobility Inpatient   Turning in Flat Bed Without Bedrails 3   Lying on Back to Sitting on Edge of Flat Bed Without Bedrails 3   Moving Bed to Chair 4   Standing Up From Chair Using Arms 4   Walk in Room 4   Climb 3-5 Stairs With Railing 3   Basic Mobility Inpatient Raw Score 21   Basic Mobility Standardized Score 45 55   Highest Level Of Mobility   JH-HLM Goal 6: Walk 10 steps or more   JH-HLM Achieved 7: Walk 25 feet or more   Education   Education Provided Mobility training Patient Demonstrates acceptance/verbal understanding     Mary Purvis PT DPT

## 2023-03-10 NOTE — PLAN OF CARE
Problem: Prexisting or High Potential for Compromised Skin Integrity  Goal: Skin integrity is maintained or improved  Description: INTERVENTIONS:  - Identify patients at risk for skin breakdown  - Assess and monitor skin integrity  - Assess and monitor nutrition and hydration status  - Monitor labs   - Assess for incontinence   - Turn and reposition patient  - Assist with mobility/ambulation  - Relieve pressure over bony prominences  - Avoid friction and shearing  - Provide appropriate hygiene as needed including keeping skin clean and dry  - Evaluate need for skin moisturizer/barrier cream  - Collaborate with interdisciplinary team   - Patient/family teaching  - Consider wound care consult   Outcome: Progressing     Problem: CARDIOVASCULAR - ADULT  Goal: Maintains optimal cardiac output and hemodynamic stability  Description: INTERVENTIONS:  - Monitor I/O, vital signs and rhythm  - Monitor for S/S and trends of decreased cardiac output  - Administer and titrate ordered vasoactive medications to optimize hemodynamic stability  - Assess quality of pulses, skin color and temperature  - Assess for signs of decreased coronary artery perfusion  - Instruct patient to report change in severity of symptoms  Outcome: Progressing  Goal: Absence of cardiac dysrhythmias or at baseline rhythm  Description: INTERVENTIONS:  - Continuous cardiac monitoring, vital signs, obtain 12 lead EKG if ordered  - Administer antiarrhythmic and heart rate control medications as ordered  - Monitor electrolytes and administer replacement therapy as ordered  Outcome: Progressing     Problem: GASTROINTESTINAL - ADULT  Goal: Minimal or absence of nausea and/or vomiting  Description: INTERVENTIONS:  - Administer IV fluids if ordered to ensure adequate hydration  - Maintain NPO status until nausea and vomiting are resolved  - Nasogastric tube if ordered  - Administer ordered antiemetic medications as needed  - Provide nonpharmacologic comfort measures as appropriate  - Advance diet as tolerated, if ordered  - Consider nutrition services referral to assist patient with adequate nutrition and appropriate food choices  Outcome: Progressing  Goal: Maintains or returns to baseline bowel function  Description: INTERVENTIONS:  - Assess bowel function  - Encourage oral fluids to ensure adequate hydration  - Administer IV fluids if ordered to ensure adequate hydration  - Administer ordered medications as needed  - Encourage mobilization and activity  - Consider nutritional services referral to assist patient with adequate nutrition and appropriate food choices  Outcome: Progressing  Goal: Maintains adequate nutritional intake  Description: INTERVENTIONS:  - Monitor percentage of each meal consumed  - Identify factors contributing to decreased intake, treat as appropriate  - Assist with meals as needed  - Monitor I&O, weight, and lab values if indicated  - Obtain nutrition services referral as needed  Outcome: Progressing     Problem: METABOLIC, FLUID AND ELECTROLYTES - ADULT  Goal: Electrolytes maintained within normal limits  Description: INTERVENTIONS:  - Monitor labs and assess patient for signs and symptoms of electrolyte imbalances  - Administer electrolyte replacement as ordered  - Monitor response to electrolyte replacements, including repeat lab results as appropriate  - Instruct patient on fluid and nutrition as appropriate  Outcome: Progressing  Goal: Fluid balance maintained  Description: INTERVENTIONS:  - Monitor labs   - Monitor I/O and WT  - Instruct patient on fluid and nutrition as appropriate  - Assess for signs & symptoms of volume excess or deficit  Outcome: Progressing  Goal: Glucose maintained within target range  Description: INTERVENTIONS:  - Monitor Blood Glucose as ordered  - Assess for signs and symptoms of hyperglycemia and hypoglycemia  - Administer ordered medications to maintain glucose within target range  - Assess nutritional intake and initiate nutrition service referral as needed  Outcome: Progressing     Problem: HEMATOLOGIC - ADULT  Goal: Maintains hematologic stability  Description: INTERVENTIONS  - Assess for signs and symptoms of bleeding or hemorrhage  - Monitor labs  - Administer supportive blood products/factors as ordered and appropriate  Outcome: Progressing     Problem: MUSCULOSKELETAL - ADULT  Goal: Maintain or return mobility to safest level of function  Description: INTERVENTIONS:  - Assess patient's ability to carry out ADLs; assess patient's baseline for ADL function and identify physical deficits which impact ability to perform ADLs (bathing, care of mouth/teeth, toileting, grooming, dressing, etc )  - Assess/evaluate cause of self-care deficits   - Assess range of motion  - Assess patient's mobility  - Assess patient's need for assistive devices and provide as appropriate  - Encourage maximum independence but intervene and supervise when necessary  - Involve family in performance of ADLs  - Assess for home care needs following discharge   - Consider OT consult to assist with ADL evaluation and planning for discharge  - Provide patient education as appropriate  Outcome: Progressing  Goal: Maintain proper alignment of affected body part  Description: INTERVENTIONS:  - Support, maintain and protect limb and body alignment  - Provide patient/ family with appropriate education  Outcome: Progressing     Problem: PAIN - ADULT  Goal: Verbalizes/displays adequate comfort level or baseline comfort level  Description: Interventions:  - Encourage patient to monitor pain and request assistance  - Assess pain using appropriate pain scale  - Administer analgesics based on type and severity of pain and evaluate response  - Implement non-pharmacological measures as appropriate and evaluate response  - Consider cultural and social influences on pain and pain management  - Notify physician/advanced practitioner if interventions unsuccessful or patient reports new pain  Outcome: Progressing     Problem: INFECTION - ADULT  Goal: Absence or prevention of progression during hospitalization  Description: INTERVENTIONS:  - Assess and monitor for signs and symptoms of infection  - Monitor lab/diagnostic results  - Monitor all insertion sites, i e  indwelling lines, tubes, and drains  - Monitor endotracheal if appropriate and nasal secretions for changes in amount and color  - [place] appropriate cooling/warming therapies per order  - Administer medications as ordered  - Instruct and encourage patient and family to use good hand hygiene technique  - Identify and instruct in appropriate isolation precautions for identified infection/condition  Outcome: Progressing     Problem: DISCHARGE PLANNING  Goal: Discharge to home or other facility with appropriate resources  Description: INTERVENTIONS:  - Identify barriers to discharge w/patient and caregiver  - Arrange for needed discharge resources and transportation as appropriate  - Identify discharge learning needs (meds, wound care, etc )  - Arrange for interpretive services to assist at discharge as needed  - Refer to Case Management Department for coordinating discharge planning if the patient needs post-hospital services based on physician/advanced practitioner order or complex needs related to functional status, cognitive ability, or social support system  Outcome: Progressing     Problem: Knowledge Deficit  Goal: Patient/family/caregiver demonstrates understanding of disease process, treatment plan, medications, and discharge instructions  Description: Complete learning assessment and assess knowledge base    Interventions:  - Provide teaching at level of understanding  - Provide teaching via preferred learning methods  Outcome: Progressing     Problem: Nutrition/Hydration-ADULT  Goal: Nutrient/Hydration intake appropriate for improving, restoring or maintaining nutritional needs  Description: Monitor and assess patient's nutrition/hydration status for malnutrition  Collaborate with interdisciplinary team and initiate plan and interventions as ordered  Monitor patient's weight and dietary intake as ordered or per policy  Utilize nutrition screening tool and intervene as necessary  Determine patient's food preferences and provide high-protein, high-caloric foods as appropriate       INTERVENTIONS:  - Monitor oral intake, urinary output, labs, and treatment plans  - Assess nutrition and hydration status and recommend course of action  - Evaluate amount of meals eaten  - Assist patient with eating if necessary   - Allow adequate time for meals  - Recommend/ encourage appropriate diets, oral nutritional supplements, and vitamin/mineral supplements  - Order, calculate, and assess calorie counts as needed  - Recommend, monitor, and adjust tube feedings and TPN/PPN based on assessed needs  - Assess need for intravenous fluids  - Provide specific nutrition/hydration education as appropriate  - Include patient/family/caregiver in decisions related to nutrition  Outcome: Progressing

## 2023-03-10 NOTE — ASSESSMENT & PLAN NOTE
Patient with extensive hepatic metastasis  Lactulose-ammonia level is on the higher side will increase the lactulose dose and monitor response  Do not appear to be encephalopathy  Tremor present

## 2023-03-10 NOTE — PROGRESS NOTES
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  Lytic bone lesion of hip  Cancer-related pain  Nausea/vomiting  Goals of care counseling  Palliative care encounter    Plan  1  Symptom Management  · Pain  · Gabapentin 300 mg BID  · Oxycodone 5 mg PO q4 hrs PRN moderate pain  · Nausea/vomiting  · Pantoprazole 40 mg PO daily  · Sucralfate 1 g four times daily (before meals at bedtime)  · Zofran 4 mg IV q8 hrs PRN nausea  · Bowel Regimen  · Lactulose oral solution 10 g daily  · Mouth discomfort  · Magic mouthwash 10 mL q4 hrs PRN mouth pain or discomfort  · Mouth Kote 5 sprays four times daily PRN dry mouth  · Insomnia  · Melatonin 6 mg qHS    2  Goals of Care  • Level 1 code status  • Patient desires to continue disease-directed therapies without limits  • He desires to continue paracentesis as needed for symptom relief  • Per oncology note, drain placement being considered as patient is requiring frequent paracentesis  • He is hopeful for discharge home from hospital soon and that there will be further treatment offered for his disease, although he appears realistic about poor prognosis  • He has outpatient appointment with oncology Dr Chadd Lanza on 3/15/23 during which decisions regarding whether to continue pemigatinib or switch to different chemotherapy regimen can be pursued  Code Status: Full - Level 1  Decisional apparatus:  Patient is competent on my exam today  If competence is lost, patient's substitute decision maker would default to wife [place]  Advance Directive / Living Will / POLST:  None on file    3  Social Support  • Patient is well-supported by his wife who visits regularly  • He has 1 biological son and two other nonbiological children and is very involved in their life    4   Follow-up  • Palliative care will continue to follow and goals of care conversations will be ongoing  Please reach out with any questions or concerns  • Patient scheduled for outpatient follow-up on 3/17/23    24 Hour History  Chart reviewed before visit  Per chart review, patient has not required any PRN medications in the past 24 hrs  His blood pressure has remained low, but other vitals are stable  No acute events overnight  Nephrology trending creatinine in setting of DENZEL  Creatinine 1 88 today which is trending downward from creatinine of 2 14 yesterday and 2 49 two days ago  Currently receiving albumin and SPEP, UPEP, KL ratio, lipid panel pending to check for isoosmolar hyponatremia  Upon my encounter today, patient seen and examined at bedside with no family present  He discusses extended hospital stay and is hopeful to be discharged soon  He denies any symptoms at this time including pain, nausea, vomiting, SOB  He reports that lower extremity neuropathy is well-controlled with gabapentin  He has decreased appetite, but did eat partial portion of breakfast this morning  He is aware of palliative care follow up appointment next Friday 3/17/23 and also has outpatient oncology appointment on 3/15/23  Review of Systems   Constitutional: Positive for appetite change and fatigue  Negative for diaphoresis  Respiratory: Negative for cough and shortness of breath  Cardiovascular: Negative for leg swelling  Gastrointestinal: Negative for abdominal pain, constipation, diarrhea, nausea and vomiting  Genitourinary: Negative for dysuria  Neurological: Negative for numbness and headaches         Medications    Current Facility-Administered Medications:   •  al mag oxide-diphenhydramine-lidocaine viscous (MAGIC MOUTHWASH) suspension 10 mL, 10 mL, Swish & Swallow, Q4H PRN, Sol Lara MD, 10 mL at 02/26/23 1232  •  chlorhexidine (PERIDEX) 0 12 % oral rinse 15 mL, 15 mL, Swish & Gamaliel, Q12H [facility], Sol Lara MD, 15 mL at 03/10/23 3070  •  enoxaparin (LOVENOX) subcutaneous injection 100 mg, 1 mg/kg, Subcutaneous, Q12H [address], Naveen Buck MD, 100 mg at 03/10/23 9227  •  ergocalciferol (VITAMIN D2) capsule 50,000 Units, 50,000 Units, Oral, Weekly, Rere Lauren MD, 50,000 Units at 03/09/23 1696  •  gabapentin (NEURONTIN) capsule 300 mg, 300 mg, Oral, BID, Dianne Caldera DO, 300 mg at 03/10/23 2114  •  insulin lispro (HumaLOG) 100 units/mL subcutaneous injection 1-6 Units, 1-6 Units, Subcutaneous, TID AC, 1 Units at 03/09/23 1721 **AND** Fingerstick Glucose (POCT), , , TID AC, Rere Luaren MD  •  lactulose oral solution 10 g, 10 g, Oral, Daily, Naveen Buck MD, 10 g at 03/10/23 0858  •  melatonin tablet 6 mg, 6 mg, Oral, HS, Rere Lauren MD, 6 mg at 03/09/23 2115  •  midodrine (PROAMATINE) tablet 15 mg, 15 mg, Oral, TID AC, Rere Lauren MD, 15 mg at 03/10/23 0542  •  moisture barrier miconazole 2% cream (aka JOYCE MOISTURE BARRIER ANTIFUNGAL CREAM), , Topical, BID, Rere Lauren MD, Given at 03/10/23 0902  •  ondansetron (ZOFRAN) injection 4 mg, 4 mg, Intravenous, Q8H PRN, Rere Lauren MD, 4 mg at 02/22/23 0006  •  oxyCODONE (ROXICODONE) IR tablet 5 mg, 5 mg, Oral, Q4H PRN, Rere Lauren MD  •  pantoprazole (PROTONIX) EC tablet 40 mg, 40 mg, Oral, Early Morning, Rere Lauren MD, 40 mg at 03/10/23 0540  •  saliva substitute (MOUTH KOTE) mucosal solution 5 spray, 5 spray, Mouth/Throat, 4x Daily PRN, Rere Lauren MD, 5 spray at 02/25/23 2227  •  sodium bicarbonate tablet 1,300 mg, 1,300 mg, Oral, BID (diuretic), Krysten Adame MD, 1,300 mg at 03/10/23 0858  •  sucralfate (CARAFATE) tablet 1 g, 1 g, Oral, 4x Daily (AC & HS), Rere Lauren MD, 1 g at 03/10/23 0540    Objective  BP (!) 87/49   Pulse 79   Temp 97 5 °F (36 4 °C)   Resp 16   Ht 6' (1 829 m)   Wt 104 kg (229 lb 0 9 oz)   SpO2 96%   BMI 31 07 kg/m²   Physical Exam:   Constitutional: Appears chronically ill  Comfortable and in no acute distress  Head: Normocephalic and atraumatic  Eyes: EOM are normal  No ocular discharge  No scleral icterus  Neck: no visible adenopathy or masses  Cardiac: Normal rate   Respiratory: Effort normal  No stridor  No respiratory distress  No cough  Gastrointestinal: Abdominal distension present  Musculoskeletal: No edema  Neurological: Alert, oriented and appropriately conversant  Skin: Dry, no diaphoresis  Pale  Psychiatric: Displays a normal mood and affect  Behavior, judgement and thought content appear normal      Lab Results:   I have personally reviewed pertinent labs  , CBC:   Lab Results   Component Value Date    WBC 6 72 03/10/2023    HGB 9 6 (L) 03/10/2023    HCT 27 0 (L) 03/10/2023    MCV 87 03/10/2023    PLT 76 (L) 03/10/2023    MCH 31 1 03/10/2023    MCHC 35 6 03/10/2023    RDW 19 9 (H) 03/10/2023    MPV 11 5 03/10/2023   , CMP:   Lab Results   Component Value Date    SODIUM 130 (L) 03/10/2023    K 3 7 03/10/2023    CL 99 03/10/2023    CO2 18 (L) 03/10/2023    BUN 98 (H) 03/10/2023    CREATININE 1 88 (H) 03/10/2023    CALCIUM 7 5 (L) 03/10/2023    AST 54 (H) 03/10/2023    ALT 15 03/10/2023    ALKPHOS 88 03/10/2023    EGFR 40 03/10/2023     Imaging Studies: I have personally reviewed pertinent reports  EKG, Pathology, and Other Studies: I have personally reviewed pertinent reports  Counseling / Coordination of Care  Total floor / unit time spent today 30 minutes  Greater than 50% of total time was spent with the patient and / or family counseling and / or coordinating of care  A description of the counseling / coordination of care: Chart reviewed,  provided medical updates, determined goals of care, discussed palliative care and symptom management, determined competency and POA/HCA, determined social/family support, provided psychosocial support       Kristin Espinosa PA-C  Palliative & Supportive Care    Portions of this document may have been created using dictation software and as such some "sound alike" terms may have been generated by the system  Do not hesitate to contact me with any questions or clarifications

## 2023-03-10 NOTE — PLAN OF CARE
Problem: Prexisting or High Potential for Compromised Skin Integrity  Goal: Skin integrity is maintained or improved  Description: INTERVENTIONS:  - Identify patients at risk for skin breakdown  - Assess and monitor skin integrity  - Assess and monitor nutrition and hydration status  - Monitor labs   - Assess for incontinence   - Turn and reposition patient  - Assist with mobility/ambulation  - Relieve pressure over bony prominences  - Avoid friction and shearing  - Provide appropriate hygiene as needed including keeping skin clean and dry  - Evaluate need for skin moisturizer/barrier cream  - Collaborate with interdisciplinary team   - Patient/family teaching  - Consider wound care consult   Outcome: Progressing     Problem: METABOLIC, FLUID AND ELECTROLYTES - ADULT  Goal: Electrolytes maintained within normal limits  Description: INTERVENTIONS:  - Monitor labs and assess patient for signs and symptoms of electrolyte imbalances  - Administer electrolyte replacement as ordered  - Monitor response to electrolyte replacements, including repeat lab results as appropriate  - Instruct patient on fluid and nutrition as appropriate  Outcome: Progressing  Goal: Fluid balance maintained  Description: INTERVENTIONS:  - Monitor labs   - Monitor I/O and WT  - Instruct patient on fluid and nutrition as appropriate  - Assess for signs & symptoms of volume excess or deficit  Outcome: Progressing  Goal: Glucose maintained within target range  Description: INTERVENTIONS:  - Monitor Blood Glucose as ordered  - Assess for signs and symptoms of hyperglycemia and hypoglycemia  - Administer ordered medications to maintain glucose within target range  - Assess nutritional intake and initiate nutrition service referral as needed  Outcome: Progressing     Problem: MUSCULOSKELETAL - ADULT  Goal: Maintain or return mobility to safest level of function  Description: INTERVENTIONS:  - Assess patient's ability to carry out ADLs; assess patient's baseline for ADL function and identify physical deficits which impact ability to perform ADLs (bathing, care of mouth/teeth, toileting, grooming, dressing, etc )  - Assess/evaluate cause of self-care deficits   - Assess range of motion  - Assess patient's mobility  - Assess patient's need for assistive devices and provide as appropriate  - Encourage maximum independence but intervene and supervise when necessary  - Involve family in performance of ADLs  - Assess for home care needs following discharge   - Consider OT consult to assist with ADL evaluation and planning for discharge  - Provide patient education as appropriate  Outcome: Progressing  Goal: Maintain proper alignment of affected body part  Description: INTERVENTIONS:  - Support, maintain and protect limb and body alignment  - Provide patient/ family with appropriate education  Outcome: Progressing     Problem: Knowledge Deficit  Goal: Patient/family/caregiver demonstrates understanding of disease process, treatment plan, medications, and discharge instructions  Description: Complete learning assessment and assess knowledge base    Interventions:  - Provide teaching at level of understanding  - Provide teaching via preferred learning methods  Outcome: Progressing

## 2023-03-10 NOTE — PLAN OF CARE
Problem: PHYSICAL THERAPY ADULT  Goal: Performs mobility at highest level of function for planned discharge setting  See evaluation for individualized goals  Description: Treatment/Interventions: Functional transfer training, Elevations, LE strengthening/ROM, Therapeutic exercise, Endurance training, Patient/family training, Bed mobility, Equipment eval/education, Gait training, OT, Spoke to nursing  Equipment Recommended:  (will cont to assess)       See flowsheet documentation for full assessment, interventions and recommendations  Outcome: Adequate for Discharge  Note: Prognosis: Fair  Problem List: Pain, Decreased endurance  Assessment: Pt seen for PT treatment session this date  Therapy session focused on  functional transfers, and ambulation in order to improve overall mobility and independence  Pt states has been independent for mobility in room and ambulating in hallway with spouse  Pt able to transfer with Mod Ind, ambulates with standby assist 150', initially with RW, then no A D, noted to have wide DANIEL, side to side sway without RW  Pt to use RW for ambulation at this time  Pt also able to negotiate up and down stairs, limits number of stairs at this time 2* mild dizziness, but does not need physical assist performing them  Pt states spouse supportive and will assist as needed  Pt with good mobility , encourage continued mobility with staff/ restorative team/family, no further skilled PT services indicated in this setting  Pt was left seated EOB at the end of PT session with all needs within reach  The patient's AM-PAC Basic Mobility Inpatient Short Form Raw Score is 21  A Raw score of greater than 16 suggests the patient may benefit from discharge to home  Please also refer to the recommendation of the Physical Therapist for safe discharge planning  PT Discharge Recommendation: Home with home health rehabilitation    See flowsheet documentation for full assessment

## 2023-03-10 NOTE — PROGRESS NOTES
[address] [facility]  Progress Note Ana Ashby 1970, 47 y o  male MRN: [id number]  Unit/Bed#: [facility] 813-01 Encounter: [id number]  Primary Care Provider: Maite Aranda MD   Date and time admitted to hospital: 2/20/2023  3:04 AM    Bilateral lower extremity edema  Assessment & Plan  · Patient noted to have bilateral lower extremity edema which is likely multifactorial given his ascites and diffuse hepatic metastasis  · Lower extremity Doppler is negative for DVT  · Patient was started on antibiotics  Even after starting on antibiotics there was no significant improvement in the erythema even though the edema and erythema improved slightly  Discussed with infectious disease  Unlikely this is cellulitis antibiotics discontinued  · Hematology evaluation appreciated    Goals of care, counseling/discussion  Assessment & Plan  Patient with metastatic cholangiocarcinoma, worsening kidney function, worsening liver function   Overall guarded prognosis  Patient understands overall guarded prognosis however he would like to pursue active treatment and plans on following oncology outpatient for further cares  Discussed with patient and spouse in detail by previous provider  No limitation of care  Palliative on board-discussed with palliative care  If the patient is not improving as expected may need to revisit the goals of care    Hepatic encephalopathy  Assessment & Plan  Patient with extensive hepatic metastasis  Lactulose-ammonia level is on the higher side will increase the lactulose dose and monitor response  Do not appear to be encephalopathy  Tremor present    Elevated INR  Assessment & Plan  Likely due to metastatic liver disease  S/p vitamin K  Monitor INR    DENZEL (acute kidney injury) [facility])  Assessment & Plan  Monitor kidney function closely  Avoid nephrotoxins  Nephrology following  Creatinine 1 88    Holding Bumex  Nephrology on board    Mesenteric thrombosis [facility])  Assessment & Plan  Received IV heparin GTT  Discussed with oncology - Patient is recommended Lovenox 1 mg/kg body weight every 12 hours  Lovenox 1 mg/kg body wt twice daily-monitor Eliquis use with creatinine  If the patient creatinine is worsening he may not be a candidate for Eliquis  Patient did not have much treatment options either due to his liver failure with baseline elevated pro time  Outpatient oncology follow-up      Malignant ascites  Assessment & Plan  Requires frequent paracentesis  Status post paracentesis on 3/7 removing 6 L  Patient has having paracentesis twice a week Monday and Thursday as outpatient  Discussed with the patient about Tenckhoff catheter-patient and family is considering Tenckhoff catheter placement      Thrombocytopenia [facility])  Assessment & Plan  Multifactorial  Oncology inputs noted  Monitor counts-platelets- 70     GERD (gastroesophageal reflux disease)  Assessment & Plan  Continue PPI    Hyponatremia  Assessment & Plan  Multifactorial  Monitor closely  Sodium is 130 today  Monitor    Cholangiocarcinoma, stage IV [facility])  Assessment & Plan  Patient with metastatic cholangiocarcinoma  Discussed with patient overall guarded prognosis  Oncology has reiterated poor prognosis, patient reports he understands his guarded prognosis but would like to pursue active treatment at this time and plans to follow-up with oncology on discharge for further cares  Oncology inputs noted  Follow-up with oncology as an outpatient   Elevated bilirubin noted - trending down            VTE Pharmacologic Prophylaxis: VTE Score: 8 Moderate Risk (Score 3-4) - Pharmacological DVT Prophylaxis Ordered: enoxaparin (Lovenox)  Patient Centered Rounds: I performed bedside rounds with nursing staff today  Discussions with Specialists or Other Care Team Provider:     Education and Discussions with Family / Patient: Updated  (wife) via phone      Total Time Spent on Date of Encounter in care of patient: 35 minutes This time was spent on one or more of the following: performing physical exam; counseling and coordination of care; obtaining or reviewing history; documenting in the medical record; reviewing/ordering tests, medications or procedures; communicating with other healthcare professionals and discussing with patient's family/caregivers  Current Length of Stay: 18 day(s)  Current Patient Status: Inpatient   Certification Statement: The patient will continue to require additional inpatient hospital stay [facility]   Discharge Plan: Anticipate discharge in 48 hrs to home with home services  Code Status: Level 1 - Full Code    Subjective:   Patient seen and examined  Objective:     Vitals:   Temp (24hrs), Av 4 °F (36 3 °C), Min:97 2 °F (36 2 °C), Max:97 5 °F (36 4 °C)    Temp:  [97 2 °F (36 2 °C)-97 5 °F (36 4 °C)] 97 2 °F (36 2 °C)  HR:  [77-89] 89  Resp:  [14-20] 14  BP: (87-99)/(49-54) 89/49  SpO2:  [96 %-98 %] 97 %  Body mass index is 31 07 kg/m²  Input and Output Summary (last 24 hours):   No intake or output data in the 24 hours ending 03/10/23 8858    Physical Exam:   Physical Exam  Constitutional:       General: He is not in acute distress  Appearance: He is not ill-appearing  HENT:      Head: Normocephalic  Nose: Nose normal    Eyes:      General: Scleral icterus present  Cardiovascular:      Rate and Rhythm: Normal rate and regular rhythm  Pulmonary:      Comments: Decreased breath sounds bilateral  Abdominal:      General: There is distension  Palpations: There is no mass  Hernia: No hernia is present  Musculoskeletal:         General: Normal range of motion  Right lower leg: Edema present  Left lower leg: Edema present  Skin:     General: Skin is warm  Findings: Erythema present  Neurological:      Mental Status: He is alert  Mental status is at baseline        Comments: Tremor present         Additional Data: Labs:  Results from last 7 days   Lab Units 03/10/23  0535 03/09/23  0606 03/08/23  0556   WBC Thousand/uL 6 72   < > 9 21   HEMOGLOBIN g/dL 9 6*   < > 10 7*   HEMATOCRIT % 27 0*   < > 30 4*   PLATELETS Thousands/uL 76*   < > 70*   NEUTROS PCT %  --   --  71   LYMPHS PCT %  --   --  11*   MONOS PCT %  --   --  16*   EOS PCT %  --   --  2    < > = values in this interval not displayed  Results from last 7 days   Lab Units 03/10/23  0535   SODIUM mmol/L 130*   POTASSIUM mmol/L 3 7   CHLORIDE mmol/L 99   CO2 mmol/L 18*   BUN mg/dL 98*   CREATININE mg/dL 1 88*   ANION GAP mmol/L 13   CALCIUM mg/dL 7 5*   ALBUMIN g/dL 4 1   TOTAL BILIRUBIN mg/dL 5 35*   ALK PHOS U/L 88   ALT U/L 15   AST U/L 54*   GLUCOSE RANDOM mg/dL 132     Results from last 7 days   Lab Units 03/10/23  0535   INR  2 36*     Results from last 7 days   Lab Units 03/10/23  1559 03/10/23  1136 03/10/23  0738 03/09/23  2126 03/09/23  1633 03/09/23  1135 03/09/23  0706 03/08/23  2048 03/08/23  1718 03/08/23  1035 03/08/23  0709 03/07/23  2048   POC GLUCOSE mg/dl 122 168* 140 130 158* 160* 109 155* 140 187* 141* 184*               Lines/Drains:  Invasive Devices     Central Venous Catheter Line  Duration           Port A Cath Right Subclavian -- days                Central Line:  Goal for removal: Port accessed  Will de-access as appropriate  Imaging: No pertinent imaging reviewed      Recent Cultures (last 7 days):         Last 24 Hours Medication List:   Current Facility-Administered Medications   Medication Dose Route Frequency Provider Last Rate   • al mag oxide-diphenhydramine-lidocaine viscous  10 mL Swish & Swallow Q4H PRN Jan Bess MD     • chlorhexidine  15 mL Swish & Spit Q12H [facility] Jan Bess MD     • enoxaparin  1 mg/kg Subcutaneous Q12H [facility] Fortino Trevino MD     • ergocalciferol  50,000 Units Oral Weekly Jan Bess MD     • gabapentin  300 mg Oral BID Dudley Caldera DO     • insulin lispro 1-6 Units Subcutaneous TID AC Jan Bess MD     • lactulose  20 g Oral TID John Reynolds MD     • melatonin  6 mg Oral HS Jan Bess MD     • midodrine  15 mg Oral 4x Daily Miguel Angel Stone MD     • JOYCE ANTIFUNGAL   Topical BID Jan Bess MD     • ondansetron  4 mg Intravenous Q8H PRN Jan Bess MD     • oxyCODONE  5 mg Oral Q4H PRN Jan Bess MD     • pantoprazole  40 mg Oral Early Morning Jan Bess MD     • saliva substitute  5 spray Mouth/Throat 4x Daily PRN Jan Bess MD     • sodium bicarbonate  1,300 mg Oral BID (diuretic) Miguel Angel Stone MD     • sucralfate  1 g Oral 4x Daily [facility]) Jan Bess MD          Today, Patient Was Seen By: John Reynolds MD    **Please Note: This note may have been constructed using a voice recognition system  **

## 2023-03-10 NOTE — UTILIZATION REVIEW
Continued Stay Review    Date: 3/10/23                          Current Patient Class: inpatient  Current Level of Care: med surg    HPI:52 y o  male initially admitted on 2/20/23     Assessment/Plan:  Pt states of good appetite and offers no new concerns  BL LE swelling and erythema noted, abdominal distention  Renal function improving, monitor off Albumin  Increase midodrine to 15 mg qd  Continue sodium bicab  Hgb is stable  Continue PT OT and recommend home with home health rehab  Palliative care following  Per Oncology: Consider drain placement prior to discharge given that patient is requiring frequent paracentesis (almost twice each week)       Vital Signs:   Date/Time Temp Pulse Resp BP MAP (mmHg) SpO2 O2 Device   03/10/23 0810 -- -- -- -- -- -- None (Room air)   03/10/23 08:07:08 97 5 °F (36 4 °C) 79 16 87/49 Abnormal  62 Abnormal  96 % --   03/09/23 21:14:35 97 5 °F (36 4 °C) 77 20 99/54 69 98 % --   03/09/23 1943 -- -- -- -- -- -- None (Room air)   03/09/23 15:52:58 97 5 °F (36 4 °C) 77 18 88/49 Abnormal  62 Abnormal  97 % --   03/09/23 0945 -- -- -- -- -- -- None (Room air)   03/09/23 07:06:42 97 5 °F (36 4 °C) 82 16 91/52 65 97 % --   03/08/23 23:30:05 97 2 °F (36 2 °C) Abnormal  84 18 85/48 Abnormal  60 Abnormal  98 % --   03/08/23 23:29:29 97 2 °F (36 2 °C) Abnormal  82 -- 85/48 Abnormal  60 Abnormal  98 % --   03/08/23 2013 -- -- -- -- -- -- None (Room air)   03/08/23 14:36:22 97 3 °F (36 3 °C) Abnormal  83 18 87/47 Abnormal  60 Abnormal  96 % --   03/08/23 06:51:24 97 3 °F (36 3 °C) Abnormal  82 14 89/49 Abnormal  62 Abnormal  98 % --       Pertinent Labs/Diagnostic Results:       Results from last 7 days   Lab Units 03/10/23  0535 03/09/23  0606 03/08/23  0556 03/07/23  0608 03/06/23  0529   WBC Thousand/uL 6 72 7 10 9 21 10 03 9 21   HEMOGLOBIN g/dL 9 6* 9 9* 10 7* 10 5* 10 0*   HEMATOCRIT % 27 0* 29 3* 30 4* 30 6* 29 5*   PLATELETS Thousands/uL 76* 66* 70* 58* 47*   NEUTROS ABS Thousands/µL  -- --  6 51 7 32 6 85         Results from last 7 days   Lab Units 03/10/23  0535 03/09/23  0606 03/08/23  0556 03/07/23  0608 03/06/23  0529   SODIUM mmol/L 130* 128* 127* 128* 130*   POTASSIUM mmol/L 3 7 3 9 4 3 4 5 4 3   CHLORIDE mmol/L 99 100 100 102 102   CO2 mmol/L 18* 17* 16* 17* 18*   ANION GAP mmol/L 13 11 11 9 10   BUN mg/dL 98* 94* 96* 89* 79*   CREATININE mg/dL 1 88* 2 14* 2 49* 2 05* 1 68*   EGFR ml/min/1 73sq m 40 34 28 36 46   CALCIUM mg/dL 7 5* 8 0* 8 0* 8 4 8 7   MAGNESIUM mg/dL 2 8*  --   --  2 7*  --    PHOSPHORUS mg/dL  --   --  4 8*  --   --      Results from last 7 days   Lab Units 03/10/23  0535 03/08/23  0556 03/07/23  0608 03/05/23  0607 03/04/23  0510   AST U/L 54* 62* 64* 80* 94*   ALT U/L 15 25 27 27 34   ALK PHOS U/L 88 98 95 109 115   TOTAL PROTEIN g/dL 5 6* 5 1* 5 2* 5 2* 5 1*   ALBUMIN g/dL 4 1 3 3* 3 5 3 6 3 5   TOTAL BILIRUBIN mg/dL 5 35* 6 15* 6 78* 5 94* 5 64*   AMMONIA umol/L 106*  --   --   --   --      Results from last 7 days   Lab Units 03/10/23  1136 03/10/23  0738 03/09/23  2126 03/09/23  1633 03/09/23  1135 03/09/23  0706 03/08/23 2048 03/08/23  1718 03/08/23  1035 03/08/23  0709 03/07/23 2048 03/07/23  1619   POC GLUCOSE mg/dl 168* 140 130 158* 160* 109 155* 140 187* 141* 184* 125     Results from last 7 days   Lab Units 03/10/23  0535 03/09/23  0606 03/08/23  0556 03/07/23  0608 03/06/23  0529 03/05/23  0607 03/04/23  0510   GLUCOSE RANDOM mg/dL 132 96 118 101 108 106 114     Results from last 7 days   Lab Units 03/09/23  0606   OSMOLALITY, SERUM mmol/     Results from last 7 days   Lab Units 03/07/23  1258   PH SOLO  7 395   PCO2 SOLO mm Hg 26 4*   PO2 SOLO mm Hg 120 8*   HCO3 SOLO mmol/L 15 8*   BASE EXC SOLO mmol/L -7 6   O2 CONTENT SOLO ml/dL 16 6   O2 HGB, VENOUS % 94 8*     Results from last 7 days   Lab Units 03/10/23  0535 03/09/23  0606 03/08/23  0556   PROTIME seconds 26 1* 26 6* 25 4*   INR  2 36* 2 42* 2 28*     Results from last 7 days   Lab Units 03/09/23  0606 03/08/23  1455   OSMOLALITY, SERUM mmol/  --    OSMO UR mmol/KG  --  373     Results from last 7 days   Lab Units 03/08/23  1455   SODIUM UR  <5     Medications:   Scheduled Medications:  chlorhexidine, 15 mL, Swish & Spit, Q12H [facility]  enoxaparin, 1 mg/kg, Subcutaneous, Q12H [facility]  ergocalciferol, 50,000 Units, Oral, Weekly  gabapentin, 300 mg, Oral, BID  insulin lispro, 1-6 Units, Subcutaneous, TID AC  lactulose, 20 g, Oral, TID  melatonin, 6 mg, Oral, HS  midodrine, 15 mg, Oral, 4x Daily  JOYCE ANTIFUNGAL, , Topical, BID  pantoprazole, 40 mg, Oral, Early Morning  sodium bicarbonate, 1,300 mg, Oral, BID (diuretic)  sucralfate, 1 g, Oral, 4x Daily (AC & HS)      Continuous IV Infusions: none     PRN Meds:  al mag oxide-diphenhydramine-lidocaine viscous, 10 mL, Swish & Swallow, Q4H PRN  ondansetron, 4 mg, Intravenous, Q8H PRN  oxyCODONE, 5 mg, Oral, Q4H PRN  saliva substitute, 5 spray, Mouth/Throat, 4x Daily PRN        Discharge Plan: d    Network Utilization Review Department  ATTENTION: Please call with any questions or concerns to [phone] and carefully listen to the prompts so that you are directed to the right person  All voicemails are confidential   Ty Limb all requests for admission clinical reviews, approved or denied determinations and any other requests to dedicated fax number below belonging to the campus where the patient is receiving treatment   List of dedicated fax numbers for the Facilities:  [address] [facility] DENIALS (Administrative/Medical Necessity) [phone]   [address] (Maternity/NICU/Pediatrics) [phone]    Rama King [phone]   [facility] [phone]   [place] [phone]   [address] [place]  41  [phone]   [address] [place] [address] [phone]   [address] [phone]

## 2023-03-10 NOTE — ASSESSMENT & PLAN NOTE
Requires frequent paracentesis    Status post paracentesis on 3/7 removing 6 L  Patient has having paracentesis twice a week Monday and Thursday as outpatient  Discussed with the patient about Tenckhoff catheter-patient and family is considering Tenckhoff catheter placement

## 2023-03-10 NOTE — PROGRESS NOTES
Progress Note - Infectious Disease   Patricio Ashby 47 y o  male MRN: [id number]  Unit/Bed#: [facility] 813-01 Encounter: [id number]      Impression/Recommendations:  1  Bilateral leg erythema over the last few days  Despite erythema, there is not significant warmth or tenderness  In addition, patient has no fever or leukocytosis  Overall, physical exam and clinical picture is still not consistent with cellulitis  Patient had been on antibiotic but now off it  Symptoms and findings stable off antibiotic  Observe off further antibiotic  Keep legs elevated  Serial exams  Monitor temperature/WBC      2  Ascites, likely malignant  Patient is status post paracentesis  Peritoneal fluid parameters not consistent with infectious peritonitis      3  Hypotension, present on admission  This has resolved  Patient completed empiric antibiotic course for possible septic shock, although there was no obvious active infection  All cultures have had no growth  Monitor hemodynamics      4  DENZEL  Creatinine up and down, overall improved      5   Stage IV metastatic cholangiocarcinoma  Patient's prognosis is poor  His performance status is also quite poor  Doubt that he can tolerate chemotherapy  Oncology follow-up      Discussed with patient and his wife in detail regarding the above plan      Antibiotics:  Off antibiotic    Subjective:  Patient is stable and comfortable  Legs remain edematous with stable mild discomfort  Temperature stays down  No chills  No diarrhea  Objective:  Vitals:  Temp:  [97 2 °F (36 2 °C)-97 5 °F (36 4 °C)] 97 2 °F (36 2 °C)  HR:  [77-89] 89  Resp:  [14-20] 14  BP: (87-99)/(49-54) 89/49  SpO2:  [96 %-98 %] 97 %  Temp (24hrs), Av 4 °F (36 3 °C), Min:97 2 °F (36 2 °C), Max:97 5 °F (36 4 °C)  Current: Temperature: (!) 97 2 °F (36 2 °C)    Physical Exam:     General: Awake, alert, cooperative, no distress  Neck:  Supple  No mass  No lymphadenopathy     Lungs: Expansion symmetric, no rales, no wheezing, respirations unlabored  Heart:  Regular rate and rhythm, S1 and S2 normal, no murmur  Abdomen: Soft, nondistended, non-tender, bowel sounds active all four quadrants, no masses, no organomegaly  Extremities: Stable to 3+ leg edema  Stable bilateral erythema/warmth  No warmth  No draining ulcer/wound  Stable mild tenderness      Skin:  No rash  Neuro: Moves all extremities  Invasive Devices     Central Venous Catheter Line  Duration           Port A Cath Right Subclavian -- days                Labs studies:   I have personally reviewed pertinent labs  Results from last 7 days   Lab Units 03/10/23  0535 03/09/23  0606 03/08/23  0556 03/07/23  0608   POTASSIUM mmol/L 3 7 3 9 4 3 4 5   CHLORIDE mmol/L 99 100 100 102   CO2 mmol/L 18* 17* 16* 17*   BUN mg/dL 98* 94* 96* 89*   CREATININE mg/dL 1 88* 2 14* 2 49* 2 05*   EGFR ml/min/1 73sq m 40 34 28 36   CALCIUM mg/dL 7 5* 8 0* 8 0* 8 4   AST U/L 54*  --  62* 64*   ALT U/L 15  --  25 27   ALK PHOS U/L 88  --  98 95     Results from last 7 days   Lab Units 03/10/23  0535 03/09/23  0606 03/08/23  0556   WBC Thousand/uL 6 72 7 10 9 21   HEMOGLOBIN g/dL 9 6* 9 9* 10 7*   PLATELETS Thousands/uL 76* 66* 70*           Imaging Studies:   I have personally reviewed pertinent imaging study reports and images in PACS  EKG, Pathology, and Other Studies:   I have personally reviewed pertinent reports

## 2023-03-10 NOTE — CONSULTS
Medical Oncology/Hematology Progress Note  Juan Gallagher, male, 47 y o , 1970,  PPHP 813/PPHP [id number], [id number]     Assessment and Plan    1  Metastatic cholangiocarcinoma with lung and bone mets  2  Splenic vein, superior mesenteric, main portal vein, and right portal vein thrombosis, new  3  Thrombocytopenia  4  Ascites, requiring frequent paracentesis    Ms Ashby is a 47 Y M with hx of metastatic cholangiocarcinoma who was admitted initially for DENZEL with concern for septic shock  Patient was initially requiring multiple pressors  Extensive workup for septic shock, cardiogenic shock, and adrenal insufficiency completed with no clear source identified  Hypotensive episodes likely due to advanced liver disease causing splanchnic vasodilation and decreased systemic blood circulation/volume, manifesting as hypotension  Hematology team was initially consulted for worsening thrombocytopenia - workup for HIT was negative  Pt's recent MR abdomen showed splenic vein, superior mesenteric, and main portal vein thrombosis, for which patient was initially started on hep gtt, now on Lovenox 1mg/kg BID dosing  Patient was noted to have worsening thrombocytopenia this admission, lowest 35 on 3/5/2023  Initially, thrombocytopenia was thought to be due to consumptive coagulopathy from acute thrombotic events superimposed on the acute illness/shock causing some level of myelosuppression  However, the rising T bili level, persistent thrombocytopenia, and rapid accumulation of ascitic fluid requiring frequent paracentesis are concerning for worsening tumor burden in the liver  Plt counts are gradually improving over last several days  Continue Lovenox for anticoagulation purposes, creatinine clearance in the 60s based on renal function this morning      Although it's too early to comment on whether his most recent therapy with pemigatinib is working or not (he has barely completed 2-3 weeks of pemigatinib prior to this hospitalization), we discussed with him our concern for overall poor prognosis  There is concern for hepatorenal syndrome also given persistent renal insufficiency 2/2 third spacing of fluids in the peritoneal cavity from ascites leading to decreased renal perfusion  Patient would like to continue pursuing any potential systemic therapies at this time  He has an OP appt scheduled with Dr Luis Garber on 3/15 - during which decisions regarding whether to continue pemigatinib or to switch to a different chemo regimen like FOLFOX can be pursued  Consider drain placement prior to discharge given that patient is requiring frequent paracentesis (almost twice each week)  Outpatient follow up plan: Patient used to f/u with Dr Grazyna Malik as his primary oncologist, but saw Dr Elisabet Arias in Jan 2023 moreso for a 2nd opinion  He prefers to continue follow-up with Dr Luis Garber, therefore medical oncology f/u appt has been arranged with Dr Elisabet Arias on 3/15/23 at [place]  Communication with patient/family:  Patient was updated regarding our recs as above    Communication with team:  Primary team attending was informed regarding the above recs  Case was discussed with hematology/oncology attending, Dr Bre King    Reason for consultation: hx of cholangiocarcinoma, thrombocytopenia, splenic vein thrombosis  History of present illness:  Frieda Alexander is a 47 y o  male with metastatic cholangiocarcinoma (initially dx in 2020, started on systemic therapy in 2022, currently on 3rd line of treatment with targeted therapy called pemigatinib)  He is known to have multiple pulmonary mets, liver mets, recurrent abdominal ascites (requiring frequent paracentesis), and bone mets   Patient also with history of SCC of the tongue (dx in 2018, s/p chemo+immunotherapy induction, followed by partial glossectomy and bilateral neck dissection in April 2018, followed by adjuvant RT, which was completed in August 2018, this was managed at [facility]  On his most recent MR abdomen, there was concern for new thrombosis in portions of the splenic, superior mesenteric, main portal veins, and a segment of the portal vein  Intra-abdominal lymphadenopathy, multiple hepatic mets, bone mets (right iliac crest) were noted to be stable, compared to Jan 2023 CT scan findings  Patient presented to the hospital for evaluation of progressively worsening weakness/mentation and decreased PO intake x 4-5 days  ED vitals on admission notable for hypotension with SBPs in the 80s  Patient was noted to be encephalopathic  Labs were notable for Cr 2 37 and leukocytosis  Patient was initiated on broad spectrum abx given concern for septic shock  Infectious workup negative thus far  Patient has required pressors  There was also concern for cardiogenic shock  2D echo from 2/21 shows normal EF of 65% with normal wall normal and normal diastolic function  Patient's plt count has been gradually decreasing with following trend: 136 > 107 > 60 > 63  Patient did have lovenox/heparin exposure during January admissions at [facility] for abdominal distention 2/2 ascites  Interval History:     Patient was transferred out of the ICU 1 week ago  His renal function has been up and down with Cr ranging from 1 6-2 5, depending on when paracentesis is done  He continues to require paracentesis twice weekly with most recent one on 3/7, with removal of 6L of fluid  Review of Systems:   Review of Systems   Constitutional: Negative for activity change, chills, fatigue and fever  HENT: Negative for ear pain, sore throat, tinnitus and trouble swallowing  Eyes: Negative for pain and visual disturbance  Respiratory: Negative for cough and shortness of breath  Cardiovascular: Positive for leg swelling (bilateral LE swelling with erythematous appearance)  Negative for chest pain and palpitations     Gastrointestinal: Positive for abdominal distention  Negative for abdominal pain, constipation, nausea and vomiting  Genitourinary: Negative for dysuria and hematuria  Musculoskeletal: Negative for arthralgias and back pain  Skin: Negative for color change and rash  Neurological: Negative for dizziness, tremors, seizures, syncope and facial asymmetry  Hematological: Negative for adenopathy  Does not bruise/bleed easily  Psychiatric/Behavioral: Negative for agitation, behavioral problems and confusion  All other systems reviewed and are negative  Oncology History:   Cancer Staging   No matching staging information was found for the patient  Oncology History Overview Note   Patient presents today for consult for RT for tongue carcinoma  56 yr old patient current smoker (smokes 2 PPD for over 20 years) presented with left tongue pain and sores in December 2017  He has lost about 30 pounds  He did not have health insurance at the time  He then obtained insurance and saw an oral surgeon on 2/6/18- biopsy was performed confirming p16 negative squamous cell carcinoma  2/23/18- PET  IMPRESSION:  1  There is focally intense activity in the anterior oral cavity, slightly to the left of midline, SUV 27  This corresponds with known malignancy  There are also bilateral hypermetabolic upper cervical nodes posterior to the submandibular glands, most   concerning for [place] metastases  2   There is asymmetric intense activity in the posterior left oral cavity, palate and tonsillar region, SUV 7 9  This may be physiologic, however correlation with clinical findings recommended to exclude tumor involvement  3   Mildly hypermetabolic nodular densities and infiltrates in the right middle and bilateral lower lungs, likely inflammatory/infectious  Short-term CT follow-up in 2-3 months recommended to exclude metastases  4   Subcutaneous density lateral to the left hip is also likely inflammatory/infectious    This may be correlated clinically  5   Otherwise no hypermetabolic metastases in the chest abdomen pelvis      2/23/18- CT chest abdomen and pelvis-  IMPRESSION:   As was seen on concurrent PET/CT, there is clustered small nodular groundglass densities in the right middle lobe and both lower lobes  There distribution suggests an infectious/inflammatory process rather than metastatic disease  Follow-up is   recommended    Otherwise unremarkable chest, abdomen, pelvic CT  Patient was referred to ENT Dr Natalia Candelario @ [facility] by Med onc Dr Nickolas Puri  3/6/18- Consult with ENT Dr Natalia Candelario- discussed surgical excision of the anterior portion of the tongue, along with free flap reconstruction using skin from his forearm       3/6/18- Med onc Dr Tray Martin @ [facility]- recommended chemo-nivo surgery trial (Carbo/paclitaxel/nivo)    Chemo started on 3/14/18 and finished 4/10/18 (5 cycles completed)? 4/30/18- patient underwent direct laryngoscopy, esophagoscopy, bronchoscopy, left partial glossectomy and bilateral neck dissections level 1 through 4      5/8/18- Post-op f/u with ENT Dr Natalia Candelario- neck incision well healed  Flexible laryngoscopy revealed true vocal cords are mobile  Base of tongue is clear  Pharynx is clear  Discusses treatment moving forward would be adjuvant RT  Follow-up in 3-4 weeks  History of tongue cancer   2/2018 Initial Diagnosis    Tongue cancer ([facility] [address] )     2/6/2018 Biopsy    Left ventral surface of tongue biopsy: Squamous cell carcinoma, moderately well differently  3/14/2018 - 4/10/2018 Chemotherapy    Chemo-nivo surgery trial (Carbo/paclitaxel/nivo)  Patient had 5 cycles of chemo  4/30/2018 Surgery    Direct laryngoscopy, esophagoscopy, bronchoscopy, left partial glossectomy and bilateral neck dissections level 1 through 4              Past Medical History:   Past Medical History:   Diagnosis Date   • 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 [facility])    • Rectal bleeding 1/9/2023 • S/P exploratory laparotomy 12/16/2020       Past Surgical History:   Procedure Laterality Date   • CT NEEDLE BIOPSY LIVER  10/15/2020   • IR BIOPSY LIVER MASS  5/18/2022   • IR PARACENTESIS  1/10/2023   • IR PARACENTESIS  1/24/2023   • IR PARACENTESIS  2/2/2023   • IR PARACENTESIS  1/30/2023   • IR PARACENTESIS  2/6/2023   • IR PARACENTESIS  2/9/2023   • IR PARACENTESIS  2/13/2023   • IR PARACENTESIS  2/16/2023       Family History   Problem Relation Age of Onset   • Prostate cancer Father        Social History     Socioeconomic History   • Marital status: /Civil Union     Spouse name: None   • Number of children: None   • Years of education: None   • Highest education level: None   Occupational History   • None   Tobacco Use   • Smoking status: Former     Packs/day: 2 00     Years: 20 00     Pack years: 40 00     Types: Cigarettes   • Smokeless tobacco: Current   Substance and Sexual Activity   • Alcohol use: Not Currently     Comment: 1-2 drinks per day   • Drug use: None   • Sexual activity: None   Other Topics Concern   • None   Social History Narrative   • None     Social Determinants of Health     Financial Resource Strain: Low Risk    • Difficulty of Paying Living Expenses: Not hard at all   Food Insecurity: No Food Insecurity   • Worried About Running Out of Food in the Last Year: Never true   • Ran Out of Food in the Last Year: Never true   Transportation Needs: No Transportation Needs   • Lack of Transportation (Medical): No   • Lack of Transportation (Non-Medical):  No   Physical Activity: Insufficiently Active   • Days of Exercise per Week: 2 days   • Minutes of Exercise per Session: 30 min   Stress: No Stress Concern Present   • Feeling of Stress : Not at all   Social Connections: Socially Isolated   • Frequency of Communication with Friends and Family: Once a week   • Frequency of Social Gatherings with Friends and Family: Once a week   • Attends Zoroastrian Services: Never   • Active Member of Clubs or Organizations: No   • Attends Club or Organization Meetings: Never   • Marital Status:    Intimate Partner Violence: Not At Risk   • Fear of Current or Ex-Partner: No   • Emotionally Abused: No   • Physically Abused: No   • Sexually Abused: No   Housing Stability: Unknown   • Unable to Pay for Housing in the Last Year: No   • Number of Places Lived in the Last Year: Not on file   • Unstable Housing in the Last Year: No         Current Facility-Administered Medications:   •  al mag oxide-diphenhydramine-lidocaine viscous (MAGIC MOUTHWASH) suspension 10 mL, 10 mL, Swish & Swallow, Q4H PRN, Olga Prado MD, 10 mL at 02/26/23 1232  •  chlorhexidine (PERIDEX) 0 12 % oral rinse 15 mL, 15 mL, Swish & Spit, Q12H [address], Olga Prado MD, 15 mL at 03/10/23 0858  •  enoxaparin (LOVENOX) subcutaneous injection 100 mg, 1 mg/kg, Subcutaneous, Q12H [address], Azeb Stewart MD, 100 mg at 03/10/23 0650  •  ergocalciferol (VITAMIN D2) capsule 50,000 Units, 50,000 Units, Oral, Weekly, Olga Prado MD, 50,000 Units at 03/09/23 8803  •  gabapentin (NEURONTIN) capsule 300 mg, 300 mg, Oral, BID, Román Caldera DO, 300 mg at 03/10/23 9204  •  insulin lispro (HumaLOG) 100 units/mL subcutaneous injection 1-6 Units, 1-6 Units, Subcutaneous, TID AC, 1 Units at 03/09/23 1721 **AND** Fingerstick Glucose (POCT), , , TID AC, Olga Prado MD  •  lactulose oral solution 10 g, 10 g, Oral, Daily, Azeb Stewart MD, 10 g at 03/10/23 0858  •  melatonin tablet 6 mg, 6 mg, Oral, HS, Olga Prado MD, 6 mg at 03/09/23 2115  •  midodrine (PROAMATINE) tablet 15 mg, 15 mg, Oral, TID AC, Olga Prado MD, 15 mg at 03/10/23 0542  •  moisture barrier miconazole 2% cream (aka JOYCE MOISTURE BARRIER ANTIFUNGAL CREAM), , Topical, BID, Olga Prado MD, Given at 03/10/23 0902  •  ondansetron (ZOFRAN) injection 4 mg, 4 mg, Intravenous, Q8H PRN, Olga Prado MD, 4 mg at 02/22/23 0006  •  oxyCODONE (ROXICODONE) IR tablet 5 mg, 5 mg, Oral, Q4H PRN, Rere Lauren MD  •  pantoprazole (PROTONIX) EC tablet 40 mg, 40 mg, Oral, Early Morning, Rere Lauren MD, 40 mg at 03/10/23 0540  •  saliva substitute (MOUTH KOTE) mucosal solution 5 spray, 5 spray, Mouth/Throat, 4x Daily PRN, Rere Lauren MD, 5 spray at 02/25/23 2227  •  sodium bicarbonate tablet 1,300 mg, 1,300 mg, Oral, BID (diuretic), Krysten Adame MD, 1,300 mg at 03/10/23 8700  •  sucralfate (CARAFATE) tablet 1 g, 1 g, Oral, 4x Daily (AC & HS), Rere Lauren MD, 1 g at 03/10/23 0540    Medications Prior to Admission   Medication   • cyclobenzaprine (FLEXERIL) 5 mg tablet   • ergocalciferol (VITAMIN D2) 50,000 units   • furosemide (LASIX) 20 mg tablet   • Ivosidenib 250 MG TABS   • magnesium oxide (MAG-OX) 400 mg tablet   • ondansetron (ZOFRAN) 4 mg tablet   • oxyCODONE (ROXICODONE) 5 immediate release tablet   • pantoprazole (PROTONIX) 40 mg tablet   • potassium chloride (K-DUR,KLOR-CON) 10 mEq tablet       Allergies   Allergen Reactions   • Latex Swelling     If long term usage/application   • Penicillins Other (See Comments) and Rash     As a child had reaction not sure what it was  Physical Exam:     BP (!) 87/49   Pulse 79   Temp 97 5 °F (36 4 °C)   Resp 16   Ht 6' (1 829 m)   Wt 104 kg (229 lb 0 9 oz)   SpO2 96%   BMI 31 07 kg/m²     Physical Exam  Vitals reviewed  Constitutional:       General: He is not in acute distress  Appearance: He is not ill-appearing, toxic-appearing or diaphoretic  Comments: Sitting comfortably in chair   HENT:      Head: Normocephalic and atraumatic  Mouth/Throat:      Mouth: Mucous membranes are moist       Pharynx: No oropharyngeal exudate  Eyes:      General: Scleral icterus (mild scleral icterus noted) present  Extraocular Movements: Extraocular movements intact        Conjunctiva/sclera: Conjunctivae normal    Cardiovascular:      Rate and Rhythm: Normal rate and regular rhythm  Heart sounds: No murmur heard  No gallop  Pulmonary:      Effort: No respiratory distress  Breath sounds: Normal breath sounds  No wheezing, rhonchi or rales  Abdominal:      General: Bowel sounds are normal  There is distension  Palpations: Abdomen is soft  There is no mass  Musculoskeletal:         General: Swelling (pitting edema of b/l LE swelling) present  No tenderness or deformity  Cervical back: Normal range of motion  No rigidity  Comments: Bilateral LE swelling with erythematous appearance noted  Muscle wasting noted   Lymphadenopathy:      Cervical: No cervical adenopathy  Skin:     General: Skin is warm  Findings: Erythema (bilateral LE ) present  Neurological:      General: No focal deficit present  Mental Status: He is alert and oriented to person, place, and time  Motor: Weakness (generalized) present     Psychiatric:         Mood and Affect: Mood normal          Judgment: Judgment normal          Recent Results (from the past 48 hour(s))   Fingerstick Glucose (POCT)    Collection Time: 03/08/23 10:35 AM   Result Value Ref Range    POC Glucose 187 (H) 65 - 140 mg/dl   Sodium, urine, random    Collection Time: 03/08/23  2:55 PM   Result Value Ref Range    Sodium, Ur <5    Osmolality, urine    Collection Time: 03/08/23  2:55 PM   Result Value Ref Range    Osmolality, Ur 373 250 - 900 mmol/KG   Fingerstick Glucose (POCT)    Collection Time: 03/08/23  5:18 PM   Result Value Ref Range    POC Glucose 140 65 - 140 mg/dl   Fingerstick Glucose (POCT)    Collection Time: 03/08/23  8:48 PM   Result Value Ref Range    POC Glucose 155 (H) 65 - 140 mg/dl   Basic metabolic panel    Collection Time: 03/09/23  6:06 AM   Result Value Ref Range    Sodium 128 (L) 135 - 147 mmol/L    Potassium 3 9 3 5 - 5 3 mmol/L    Chloride 100 96 - 108 mmol/L    CO2 17 (L) 21 - 32 mmol/L    ANION GAP 11 4 - 13 mmol/L    BUN 94 (H) 5 - 25 mg/dL    Creatinine 2 14 (H) 0 60 - 1 30 mg/dL    Glucose 96 65 - 140 mg/dL    Calcium 8 0 (L) 8 3 - 10 1 mg/dL    eGFR 34 ml/min/1 73sq m   Osmolality-"If this is regarding a toxic alcohol, STOP  Test is not routinely indicated   Please consult medical  on call for further guidance "    Collection Time: 03/09/23  6:06 AM   Result Value Ref Range    Osmolality Serum 296 282 - 298 mmol/KG   CBC and Platelet    Collection Time: 03/09/23  6:06 AM   Result Value Ref Range    WBC 7 10 4 31 - 10 16 Thousand/uL    RBC 3 25 (L) 3 88 - 5 62 Million/uL    Hemoglobin 9 9 (L) 12 0 - 17 0 g/dL    Hematocrit 29 3 (L) 36 5 - 49 3 %    MCV 90 82 - 98 fL    MCH 30 5 26 8 - 34 3 pg    MCHC 33 8 31 4 - 37 4 g/dL    RDW 19 4 (H) 11 6 - 15 1 %    Platelets 66 (L) 773 - 390 Thousands/uL    MPV 11 8 8 9 - 12 7 fL   Protime-INR    Collection Time: 03/09/23  6:06 AM   Result Value Ref Range    Protime 26 6 (H) 11 6 - 14 5 seconds    INR 2 42 (H) 0 84 - 1 19   Fingerstick Glucose (POCT)    Collection Time: 03/09/23  7:06 AM   Result Value Ref Range    POC Glucose 109 65 - 140 mg/dl   Fingerstick Glucose (POCT)    Collection Time: 03/09/23 11:35 AM   Result Value Ref Range    POC Glucose 160 (H) 65 - 140 mg/dl   Fingerstick Glucose (POCT)    Collection Time: 03/09/23  4:33 PM   Result Value Ref Range    POC Glucose 158 (H) 65 - 140 mg/dl   Fingerstick Glucose (POCT)    Collection Time: 03/09/23  9:26 PM   Result Value Ref Range    POC Glucose 130 65 - 140 mg/dl   Lipid panel    Collection Time: 03/10/23  5:35 AM   Result Value Ref Range    Cholesterol <50 (L) See Comment mg/dL    Triglycerides 39 See Comment mg/dL    HDL, Direct 16 (L) >=40 mg/dL    LDL Calculated      Non-HDL-Chol (CHOL-HDL) <34 mg/dl   CBC and Platelet    Collection Time: 03/10/23  5:35 AM   Result Value Ref Range    WBC 6 72 4 31 - 10 16 Thousand/uL    RBC 3 09 (L) 3 88 - 5 62 Million/uL    Hemoglobin 9 6 (L) 12 0 - 17 0 g/dL    Hematocrit 27 0 (L) 36 5 - 49 3 %    MCV 87 82 - 98 fL MCH 31 1 26 8 - 34 3 pg    MCHC 35 6 31 4 - 37 4 g/dL    RDW 19 9 (H) 11 6 - 15 1 %    Platelets 76 (L) 990 - 390 Thousands/uL    MPV 11 5 8 9 - 12 7 fL   Comprehensive metabolic panel    Collection Time: 03/10/23  5:35 AM   Result Value Ref Range    Sodium 130 (L) 135 - 147 mmol/L    Potassium 3 7 3 5 - 5 3 mmol/L    Chloride 99 96 - 108 mmol/L    CO2 18 (L) 21 - 32 mmol/L    ANION GAP 13 4 - 13 mmol/L    BUN 98 (H) 5 - 25 mg/dL    Creatinine 1 88 (H) 0 60 - 1 30 mg/dL    Glucose 132 65 - 140 mg/dL    Calcium 7 5 (L) 8 3 - 10 1 mg/dL    AST 54 (H) 5 - 45 U/L    ALT 15 12 - 78 U/L    Alkaline Phosphatase 88 46 - 116 U/L    Total Protein 5 6 (L) 6 4 - 8 4 g/dL    Albumin 4 1 3 5 - 5 0 g/dL    Total Bilirubin 5 35 (H) 0 20 - 1 00 mg/dL    eGFR 40 ml/min/1 73sq m   Magnesium    Collection Time: 03/10/23  5:35 AM   Result Value Ref Range    Magnesium 2 8 (H) 1 6 - 2 6 mg/dL   Protime-INR    Collection Time: 03/10/23  5:35 AM   Result Value Ref Range    Protime 26 1 (H) 11 6 - 14 5 seconds    INR 2 36 (H) 0 84 - 1 19   Ammonia    Collection Time: 03/10/23  5:35 AM   Result Value Ref Range    Ammonia 106 (H) 11 - 35 umol/L   Fingerstick Glucose (POCT)    Collection Time: 03/10/23  7:38 AM   Result Value Ref Range    POC Glucose 140 65 - 140 mg/dl       CT abdomen pelvis wo contrast    Result Date: 2/20/2023  Narrative: CT ABDOMEN AND PELVIS WITHOUT IV CONTRAST INDICATION:   Abdominal pain, acute, nonlocalized abdominal pain  History of cholangiocarcinoma  COMPARISON:  CT 1/26/2023  TECHNIQUE:  CT examination of the abdomen and pelvis was performed without intravenous contrast  Axial, sagittal, and coronal 2D reformatted images were created from the source data and submitted for interpretation  Radiation dose length product (DLP) for this visit:  1031 92 mGy-cm     This examination, like all CT scans performed in the [facility], was performed utilizing techniques to minimize radiation dose exposure, including the use of iterative reconstruction and automated exposure control  Enteric contrast was not administered  FINDINGS: Absence of intravenous contrast enhancement limits evaluation of the abdominal viscera  ABDOMEN LOWER CHEST:  No significant change in multiple right lower lobe masses, largest measuring 2 3 cm within the medial costophrenic angle  No effusions  LIVER/BILIARY TREE:  Evaluation is limited in the absence of intravenous contrast enhancement  Dominant hypodense mass in the dome of the liver appears unchanged, however multiple other smaller previously seen lesions are not well visualized on this study  No biliary dilatation  GALLBLADDER:  Nonvisualized  SPLEEN:  Unremarkable  PANCREAS:  Unremarkable  ADRENAL GLANDS:  Unremarkable  KIDNEYS/URETERS:  Unremarkable  No hydronephrosis  STOMACH AND BOWEL:  No obstruction or acute inflammatory changes  Colonic diverticulosis without diverticulitis  APPENDIX:  No findings to suggest appendicitis  ABDOMINOPELVIC CAVITY:  Moderate ascites  No pneumoperitoneum  No significant change in omental implant adjacent to the liver, most conspicuous anteriorly, and diffuse infiltration of the omentum compatible with peritoneal carcinomatosis  Unchanged mild gastrohepatic ligament, periportal and portacaval adenopathy  VESSELS:  Atherosclerotic changes are present  No evidence of aneurysm  PELVIS REPRODUCTIVE ORGANS:  Unremarkable for patient's age  URINARY BLADDER:  Unremarkable  ABDOMINAL WALL/INGUINAL REGIONS:  Unremarkable  OSSEOUS STRUCTURES:  No acute fracture or destructive osseous lesion  Impression: No acute pathology  Evaluation is limited in the absence of intravenous contrast enhancement, however metastatic disease appears essentially unchanged, and including dominant hepatic lesion, peritoneal carcinomatosis with moderate moderate ascites  Presumed metastatic right lower lobe nodules also noted    Workstation performed: [id number]     MRI abdomen w wo contrast    Result Date: 2/20/2023  Narrative: MRI OF THE ABDOMEN WITH AND WITHOUT CONTRAST INDICATION: 47 years / Male  C22 8: Malignant neoplasm of liver, primary, unspecified as to type  80-year-old male with metastatic cholangiocarcinoma  COMPARISON: CT of the chest, abdomen and pelvis from 10/24/2022 and January 26, 2023  TECHNIQUE:  Multiplanar/multisequence MRI of the abdomen was performed before and after administration of contrast  IV Contrast:  10 mL of Gadobutrol injection (SINGLE-DOSE) FINDINGS: LOWER CHEST:   1 8 x 2 4 cm nodule in the posterior basilar segment of the right lower lobe (series 12, image 41), not significantly changed since the CT from 1/26/2023  The patient's other known pulmonary metastases are not visible on this MRI  LIVER: Normal size  Multiple hypoenhancing masses, fluid being confluent masses virtually replacing the entire lateral segment of the left lobe  Largest mass located at the junction of segments 4 and 5/8, measuring 2 6 x 2 8 cm (series 14, image 95)  Allowing for differences in modality, slice position and cursor placement, this does not appear significantly changed since the CT from 1/26/2023  2 7 x 4 6 cm nonenhancing structure at the dome of the liver, at the junction of segments 4A and segment 8, with T1 hyperintensity, consistent with chronic hemorrhage, either within the hepatic parenchyma or within a metastasis  This is also unchanged since the last CT  4 5 cm thrombosis in the splenic vein, extending to the origins of the portal vein and SMV, with additional thrombus in the right portal vein and its branches, all developing since 1/26/2023  BILE DUCTS:  No intrahepatic or extrahepatic bile duct dilatation  GALLBLADDER:  Not visualized; ? Prior cholecystectomy  PANCREAS:  Unremarkable  ADRENAL GLANDS:  Normal  SPLEEN:  Normal  KIDNEYS/PROXIMAL URETERS:  No hydroureteronephrosis  Bilateral renal cysts  No solid masses   STOMACH AND BOWEL: Unremarkable  No dilated loops of bowel  PERITONEAL CAVITY/RETROPERITONEUM:  Small amount of ascites  Generalized thickening and enhancement of the peritoneal reflection  Several enhancing masses arising from the peritoneum, the largest anterior to the right lobe of the liver, measuring 1 6 x  5 8 cm (series 14, image 49) LYMPH NODES:  1 2 x 2 4 cm portacaval lymph node (series 12, image 70)  Necrotic appearing 1 0 x 1 1 cm mindi hepatis node (series 12, image 64)  1 1 x 1 5 cm necrotic appearing gastrohepatic node (series 12, image 57) 1 5 x 1 8 cm mindi hepatis node (series 12, image 70)  These have not significantly changed since the CT from 1/26/2023  VASCULAR STRUCTURES:  Thrombosis seen in portions of the splenic vein, portal vein and superior mesenteric vein as described above  Abdominal aorta unremarkable, without aneurysm  ABDOMINAL WALL:  Small umbilical hernia containing ascites  OSSEOUS STRUCTURES:  1 5 x 2 3 cm enhancing lesion in the right iliac crest, extending into through the cortex into the adjacent portion of the right iliac is muscle (series 12, image 146), similar in appearance to the CT from 1/26/2023  Peripherally enhancing lesion with diffusion restriction in the L1 vertebral body  (series 12, image 86; series 13, image 50; series 9, image 107), with no corresponding abnormality on prior CT  Impression: 1  Multiple hepatic metastases, many of which appear to be at least partially necrotic, most extensive in the lateral segment of the left lobe, not appreciably changed since a CT from 1/26/2023  2   New thrombosis in portions of the splenic, superior mesenteric and main portal veins and a segment of the right portal vein  3   Peritoneal metastases and small amount of ascites  4   Intra-abdominal lymphadenopathy, as described above, not significantly changed since 1/26/2023  5   Lesion in the right iliac crest, possibly metastasis, not appreciably changed since 1/26/2023   6  Indeterminate enhancing lesion in the L1 vertebral body, possibly metastasis  7   1 8 x 2 4 cm pulmonary metastasis in the base of the right lower lobe  The study was marked in [facility] for immediate notification  Workstation performed: [id number]     IR paracentesis    Result Date: 2/17/2023  Narrative: Ultrasound-guided paracentesis Clinical History: Recurrent symptomatic ascites, history of cholangiocarcinoma Procedure: Ultrasound used to localize the left lower quadrant ascites  The overlying skin was prepped and draped in usual sterile fashion and local anesthesia was obtained with the 1% lidocaine solution  A 5 Western Nilda Yueh needle was advanced until fluid was aspirated under live ultrasound guidance  Approximately 4500 cc' s of clear, yellow fluid was aspirated  Labs collected and sent  The patient tolerated the procedure well and left the department in stable condition  Impression: Impression: Ultrasound-guided left-sided paracentesis  Signed, performed, and dictated by Ebenezer Hugo PA-C under the direct supervision of Dr Rebecca Parra  Workstation performed: [id number]     IR paracentesis    Result Date: 2/13/2023  Narrative: PROCEDURE: Paracentesis STAFF: Claudell Charon, PA-C COMPLICATIONS: None  MEDICATIONS: 1% lidocaine subcutaneous  INDICATION: Symptomatic ascites  Cholangiocarcinoma PROCEDURE: Using ultrasound guidance, the left paracolic gutter was punctured and a catheter was placed into the peritoneal cavity  A total of 4950 milliliters of fluid was removed  The catheter was then removed  FINDINGS: Massive ascites  Clear yellow ascites was removed  Impression: Therapeutic paracentesis  Procedure was performed, signed and dictated by: Claudell Charon, PA-C Supervising Physician: Dr Rebecca Parra Workstation performed: [id number]     IR paracentesis    Result Date: 2/10/2023  Narrative: PROCEDURE: Therapeutic paracentesis MEDICATIONS: 1% lidocaine subcutaneous  INDICATION: Symptomatic ascites  Cholangiocarcinoma  PROCEDURE: Using ultrasound guidance, the left lower quadrant was punctured and a 5f yueh catheter was placed into the abdominal cavity until ascites was aspirated  A total of 5550 milliliters of serous fluid was removed  The catheter was then removed  FINDINGS: 5550 mL of serous ascites was removed  Impression: Therapeutic paracentesis  Signed, performed, and dictated by HARINI Caldera under the supervision of Dr Janet Shah  Workstation performed: [id number]     IR paracentesis    Result Date: 2/7/2023  Narrative: Ultrasound-guided paracentesis Clinical History: Recurrent malignant ascites, history of cholangiocarcinoma Procedure: After explaining the risks and benefits of the procedure to the patient, informed consent was obtained  Ultrasound used to localize the left lower quadrant ascites  The overlying skin was prepped and draped in usual sterile fashion and local anesthesia was obtained with the 1% lidocaine solution  A 5 Western Nilda Yueh needle was advanced until fluid was aspirated under live ultrasound guidance  Approximately 6550 cc' s of clear, yellow fluid was aspirated  The patient tolerated the procedure well and left the department in stable condition  Impression: Impression: Ultrasound-guided left-sided paracentesis  Signed, performed, and dictated by Yahir Sebastian PA-C under the direct supervision of Dr Alena Kaplan  Workstation performed: [id number]     IR paracentesis    Result Date: 2/3/2023  Narrative: PROCEDURE: Ultrasound-guided paracentesis STAFF: Lopez Pickett PA-C  COMPLICATIONS: None immediate  MEDICATIONS: 1% lidocaine subcutaneous  INDICATION: Symptomatic ascites  Patient with history of cholangiocarcinoma with malignant ascites  PROCEDURE: Using ultrasound guidance, the left paracolic gutter was punctured and a catheter was placed into the peritoneal cavity  A total of 10,350 milliliters of clear yellow fluid was removed   The catheter was then removed  FINDINGS: Massive ascites  10,350cc clear yellow ascites was removed  Impression: Ultrasound-guided paracentesis  Signed, performed, and dictated by Garima Esparza PA-C under the direct supervision of Dr Jeanette Guy  Workstation performed: [id number]     IR paracentesis    Result Date: 2/2/2023  Narrative: Ultrasound-guided paracentesis Clinical History: Recurrent malignant Ascites, history of cholangiocarcinoma Procedure: After explaining the risks and benefits of the procedure to the patient, informed consent was obtained  Ultrasound used to localize the left lower quadrant ascites  The overlying skin was prepped and draped in usual sterile fashion and local anesthesia was obtained with the 1% lidocaine solution  A 5 Western Nilda Yueh needle was advanced until fluid was aspirated under live ultrasound guidance  Approximately 6500 cc' s of clear, yellow fluid was aspirated  The patient tolerated the procedure well and left the department in stable condition  Impression: Impression: Ultrasound-guided left-sided paracentesis  Signed, performed, and dictated by Fang Dubon PA-C under the direct supervision of Dr Alessandra Jurado  Workstation performed: [id number]     IR paracentesis    Result Date: 1/25/2023  Narrative: PROCEDURE: Symptomatic paracentesis MEDICATIONS: 1% lidocaine subcutaneous  INDICATION: Symptomatic ascites  Hepatocellular carcinoma  PROCEDURE: Using ultrasound guidance, the right lower quadrant was punctured and a 5f yueh catheter was placed into the abdominal cavity until ascites was aspirated  A total of 7,400 milliliters of serous fluid was removed  The catheter was then removed  FINDINGS: 7400 mL of serous ascites was removed  Impression: Symptomatic paracentesis  Signed, performed, and dictated by HARINI Rai under the supervision of Dr Cass Griggs   Workstation performed: [id number]     CT chest abdomen pelvis w contrast    Result Date: 1/31/2023  Narrative: CT CHEST, ABDOMEN AND PELVIS WITH IV CONTRAST INDICATION:   C24 8: Malignant neoplasm of overlapping sites of biliary tract  As per review of electronic medical record, 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 and stage IV cholangiocarcinoma on 3rd line treatment  COMPARISON:  Several prior CTs of the chest, abdomen and pelvis, the most recent from 1/18/2023, and PET/CT from 4/28/2021  TECHNIQUE: CT examination of the chest, abdomen and pelvis was performed  In addition to portal venous phase postcontrast scanning through the abdomen and pelvis, delayed phase postcontrast scanning was performed through the upper abdominal viscera  Axial, sagittal, and coronal 2D reformatted images were created from the source data and submitted for interpretation  Radiation dose length product (DLP) for this visit:  2421 02 mGy-cm   This examination, like all CT scans performed in the [facility], was performed utilizing techniques to minimize radiation dose exposure, including the use of iterative reconstruction and automated exposure control  IV Contrast:  100 mL of iohexol (OMNIPAQUE) Enteric Contrast: Enteric contrast was administered  FINDINGS: CHEST BRONCHOPULMONARY:  Retained secretions are seen in the trachea  Otherwise clear central airways  Mild upper lobe predominant paraseptal emphysematous changes  Enhancing airspace consolidations are seen at the lung bases, left greater than right, likely areas of atelectasis  Multiple pulmonary nodules are seen throughout the lungs  2 necrotic nodules at the right lung base medially are mildly enlarged compared to 1/18/2023  The larger lesion measures 1 9 x 1 8 cm (series 2 image 106 and 107), compared to 1 4 x 1 4 cm on 1/18/2022  The smaller lesion measures 1 1 cm (series 2 image 104) compared to 0 9 cm  These are new from October 2022   A 6 mm left upper lobe nodule laterally (series 3 image 126) is enlarged from 3 mm in October 2022  Other nodules are grossly similar in size  Nodules at the lung bases are somewhat difficult to compare given presence of atelectasis on the current study which displaces some of the previously seen nodules  PLEURA:  No pleural effusions  No pneumothorax  HEART/GREAT VESSELS: The heart is normal in size  No pericardial effusion  Normal caliber thoracic aorta with no dissection  There is suggestion of a filling defect in one of the left lower lobe segmental pulmonary arteries as it is decreased in attenuation compared to other branches at the same level (series 6 image 66-75)  Main pulmonary artery dilated up to 3 3 cm, suggestive of pulmonary arterial hypertension  There is a right-sided Mediport with its tip in the SVC  MEDIASTINUM/LYMPH NODES:  No axillary, mediastinal or hilar lymphadenopathy  CHEST WALL AND LOWER NECK:  Unremarkable  ABDOMEN LIVER/BILIARY TREE: Multiple ill-defined, peripherally enhancing lesions are seen throughout both lobes of the liver  Most of the parenchyma in the left lobe is replaced by these lesions  Overall there appears to be an increase in the number of the lesions compared to October 2022  The largest discrete lesion measures 3 0 x 2 9 cm (series 2 image 116), compared to 2 5 x 2 2 cm in October 2022  There is a 4 5 x 2 5 cm hypodense lesion in segment 8 of the liver (series 2 image 98), similar to the recent studies when remeasured in a similar manner  No biliary ductal dilation  GALLBLADDER:  No calcified gallstones  No pericholecystic inflammatory change  SPLEEN: Unremarkable  PANCREAS:  Unremarkable  Normal caliber main pancreatic duct  ADRENAL GLANDS:  Unremarkable  KIDNEYS/URETERS:  Symmetric renal enhancement  No intrarenal or ureteral calculi  No hydronephrosis  Simple cyst in the upper pole the right kidney   STOMACH AND BOWEL:  Evaluation for bowel inflammation is somewhat limited by the presence of ascites  No bowel obstruction  Oral contrast has reached the descending colon  Scattered colonic diverticuli, however no evidence of diverticulitis  APPENDIX:  Normal appendix  ABDOMINOPELVIC CAVITY: There is moderate to large abdominal and pelvic ascites  No organized fluid collections  There has been mild progression of peritoneal carcinomatosis compared to October 2022  For instance, there is mildly increased nodularity of the omentum in the anterior abdomen (series 2 image 122) and new peritoneal thickening in the right paracolic gutter (series 2 image 183)  There are also multiple soft tissue implants along the right hemidiaphragm, which have increased in size and number  No pneumoperitoneum  LYMPH NODES: There is a necrotic appearing gastrohepatic ligament lymph node measuring 1 3 x 1 0 cm (series 2 image 122)  It is newly enlarged compared to October 2022  An enlarged periportal lymph node measures 2 2 x 1 2 cm (series 2 image 132)  In October 2022 it measured 1 5 x 0 9 cm  A portacaval lymph node measures 2 8 x 1 9 cm (series 2 image 140), compared to 2 4 x 1 5 cm in October 2022  No pelvic lymphadenopathy  VESSELS: Normal caliber aorta  Patent major branch vessels  Incidentally noted is a retroaortic left renal vein, a normal anatomic variant  PELVIS REPRODUCTIVE ORGANS:  The prostate is enlarged, measuring 5 2 cm in transverse dimension  URINARY BLADDER:  Unremarkable  ABDOMINAL WALL/INGUINAL REGIONS: There is mild subcutaneous tissue stranding in the anterior abdominal wall  There is a periumbilical hernia containing fluid  MUSCULOSKELETAL: 2 3 x 1 7 cm lobulated lytic lesion in the right iliac bone near the sacroiliac joint with overlying cortical disruption  It is essentially unchanged from 1/18/2023 and October 2022  This lesion was 1st noted on the CT from April 2022 and has since enlarged    Several subcentimeter densely sclerotic lesions in the right proximal femur most likely represent bone islands as they are unchanged dating back to February 2018  No other lytic or sclerotic lesions  Degenerative changes of the spine  Impression: Questionable pulmonary embolism versus artifact in a left lower lobe segmental pulmonary artery  Recommend further evaluation with CT pulmonary angiogram  Multiple pulmonary metastases, the largest two measuring 1 9 x 1 8 cm and 1 1 cm in the right lower lobe medially, which are mildly increased from 1/18/2023  Metastatic disease in the liver, likely metastatic upper abdominal lymphadenopathy, and peritoneal carcinomatosis similar to studies from earlier in January 2022, however with progression since October 2022, as detailed above  No significant interval change in right iliac bone metastasis compared to 1/18/2023 and October 2022  Moderate to large abdominal and pelvic ascites  Retained secretions in the trachea, which may place patient at risk for aspiration  Atelectasis in both lower lobes  Multiple additional findings as above  I personally discussed the findings of a possible pulmonary embolism with Dr Mascorro on 1/31/2023 at 10:31 AM who saw the patient for a second opinion on day of dictation  The study was marked in [facility] for significant notification to the ordering clinician for the rest of the findings   Workstation performed: [id number]     Paracentesis (Bedside)    Result Date: 2/22/2023  Narrative: Joel Knapp MD     2/22/2023  3:30 PM Paracentesis (Bedside) Date/Time: 2/22/2023 12:23 PM Performed by: Joel Knapp MD Authorized by: Joel Knapp MD Patient location:  ICU Consent:   Consent obtained:  Written   Consent given by:  Patient   Risks discussed:  Bleeding, bowel perforation, infection and pain Universal protocol:   Procedure explained and questions answered to patient or proxy's satisfaction: yes    Relevant documents present and verified: yes    Test results available and properly labeled: yes    Radiology Images displayed and confirmed  If images not available, report reviewed: yes    Site/side marked: yes  Pre-procedure details:   Initial or Subsequent Exam:  Subsequent   Procedure purpose:  Therapeutic   Indications: abdominal discomfort secondary to ascites    Preparation: Patient was prepped and draped in usual sterile fashion  Anesthesia (see MAR for exact dosages): Anesthesia method:  Local infiltration   Local anesthetic:  Lidocaine 1% w/o epi Procedure details:   Needle gauge:  18   Equipment: Paracentesis kit used    Ultrasound guidance: yes    Ultrasound image availability:  Not saved   Approach:  Percutaneous   Puncture site:  R lower quadrant   Fluid removed amount:  4 5L   Fluid appearance:  Clear and yellow   Dressing:  Adhesive bandage Post-procedure details:   Patient tolerance of procedure: Tolerated well, no immediate complications Post-procedure medication (see MAR for dosing): Albumin replacement: No      XR chest portable ICU    Result Date: 2/20/2023  Narrative: CHEST INDICATION: Shortness of breath COMPARISON:  1/31/2023 EXAM PERFORMED/VIEWS:  XR CHEST PORTABLE ICU Images: 2 FINDINGS: Right-sided Mediport terminating at the cavoatrial junction, unchanged Cardiomediastinal silhouette appears unremarkable  The lungs are clear  No pneumothorax or pleural effusion  Osseous structures appear within normal limits for patient age  Bilateral neck surgical clips     Impression: No acute cardiopulmonary disease  Findings are stable Workstation performed: [id number]     CTA ED chest PE study    Result Date: 1/31/2023  Narrative: CTA - CHEST WITH IV CONTRAST - PULMONARY ANGIOGRAM INDICATION:   recent abd ct scan concerning for PE, pt sob  COMPARISON: None  TECHNIQUE: CTA examination of the chest was performed using angiographic technique according to a protocol specifically tailored to evaluate for pulmonary embolism    Axial, sagittal, and coronal 2D reformatted images were created from the source data and  submitted for interpretation  In addition, coronal 3D MIP postprocessing was performed on the acquisition scanner  Radiation dose length product (DLP) for this visit:  418 mGy-cm   This examination, like all CT scans performed in the [facility], was performed utilizing techniques to minimize radiation dose exposure, including the use of iterative reconstruction and automated exposure control  IV Contrast:  100 mL of iohexol (OMNIPAQUE)  FINDINGS: PULMONARY ARTERIAL TREE:  No evidence of an acute pulmonary thromboembolism  The vessel which was questioned on the recent CT scan is opacified on the current study  LUNGS:  6 mm anterior left upper lobe pulmonary nodule (302, 34) has been slowly increasing in size     Additional scattered subcentimeter nodules are stable to slightly increased in size when comparing to the study from October 24, 2022   2 nodules in the medial right lower lobe are stable from the most recent study from a few days ago, but also increased since the prior exam in October  Changes compatible with paraseptal emphysema  Bibasilar atelectasis  [place] Side PLEURA:  Trace right pleural effusion  HEART/GREAT VESSELS:  Coronary artery calcifications, which are an indicator of coronary artery disease  No thoracic aortic aneurysm  MEDIASTINUM AND ANGEL:  Unremarkable  CHEST WALL AND LOWER NECK:   Right-sided Mediport catheter with the tip at the cavoatrial junction  VISUALIZED STRUCTURES IN THE UPPER ABDOMEN:  Multifocal tumor in the liver is again shown, better characterized on the recent CT scan through the abdomen and pelvis  Malignant-appearing ascites is noted in the upper abdomen with multiple soft tissue nodules along the peritoneal surface and beneath the right hemidiaphragm  Please see report for details  OSSEOUS STRUCTURES:  No acute fracture or destructive osseous lesion  Impression: No acute pulmonary thromboembolism   Slowly enlarging bilateral pulmonary nodules as described above  Multifocal liver tumor, upper abdominal ascites and peritoneal tumor better shown on recent CT scan from 1/26/2023  Workstation performed: [id number]     VAS lower limb venous duplex study, complete bilateral    Result Date: 1/31/2023  Narrative:  [facility] REPORT CLINICAL: Indications: Physician wants to determine patency of the venous system secondary to bilateral lower extremity edema and SOB  Sarah Cruel Operative History: No cardiovascular surgeries noted Risk Factors The patient has history of smoking (current) 2 ppd  FINDINGS:  Right         Impression           GSV Mid Calf  Chronic Thick [place]   Left          Impression           GSV Mid Calf  Chronic Thick [place]ila     CONCLUSION: Impression:  RIGHT LOWER LIMB: No evidence of acute or chronic deep vein thrombosis  Evidence of chronic superficial phlebitis noted in the great saphenous vein at the mid calf  Doppler evaluation shows a normal response to augmentation maneuvers  Popliteal, posterior tibial and anterior tibial arterial Doppler waveforms are triphasic  LEFT LOWER LIMB: No evidence of acute or chronic deep vein thrombosis  Evidence of chronic superficial phlebitis noted in the great saphenous vein at the mid calf  Doppler evaluation shows a normal response to augmentation maneuvers  Popliteal, posterior tibial and anterior tibial arterial Doppler waveforms are triphasic  Technical findings were given to Dr Dinesh Berman  SIGNATURE: Electronically Signed by: Keyla Lamas MD, [address] on 2023-01-31 06:53:31 PM    Echo complete w/ contrast if indicated    Result Date: 2/21/2023  Narrative: •  Study quality was poor  This was a technically difficult study •  Left Ventricle: Left ventricular cavity size is normal  Wall thickness is mildly increased  There is mild concentric hypertrophy  The left ventricular ejection fraction is 65%   Systolic function is normal  Wall motion is grossly normal  •  Right Ventricle: Right ventricular cavity size is normal  Systolic function is normal        Labs and pertinent reports reviewed

## 2023-03-10 NOTE — RESTORATIVE TECHNICIAN NOTE
Restorative Technician Note      Patient Name: [facility] Tech Visit Date: 03/10/23  Note Type: Mobility  Education Provided: Yes  Patient Position at End of Consult: Bedside chair;  All needs within reach    Provided pt with stress ball per PT request     Marisol Johnson  DPT, Restorative Technician

## 2023-03-10 NOTE — ASSESSMENT & PLAN NOTE
· Patient noted to have bilateral lower extremity edema which is likely multifactorial given his ascites and diffuse hepatic metastasis  · Lower extremity Doppler is negative for DVT  · Patient was started on antibiotics  Even after starting on antibiotics there was no significant improvement in the erythema even though the edema and erythema improved slightly  Discussed with infectious disease    Unlikely this is cellulitis antibiotics discontinued  · Hematology evaluation appreciated

## 2023-03-10 NOTE — ASSESSMENT & PLAN NOTE
Monitor kidney function closely  Avoid nephrotoxins  Nephrology following  Creatinine 1 88    Holding Bumex  Nephrology on board

## 2023-03-11 NOTE — PROGRESS NOTES
NEPHROLOGY PROGRESS NOTE   Marlys Ashby 47 y o  male MRN: [id number]  Unit/Bed#: [facility] 813-01 Encounter: [id number]  Reason for Consult: DENZEL (POA)    ASSESSMENT/PLAN:  Acute kidney injury (POA): Has had recurrent DENZEL this admission  Originally presented with creatinine of 2 3 which resolved to normal suspected due to prerenal etiology  Secondary rise on 2/26 suspected ATN with improvement to 1 6  Third insult on 3/7 with peak creatinine of 2 49 suspected due to hemodynamic changes from paracentesis and improved with albumin to 1 8   -Baseline creatinine 0 5-0 7   -4th episode of DENZEL today with creatinine of 2 38  Suspect due to hypotension and need for additional paracentesis  -Currently off of IV albumin  Will give dose today  Consult IR for paracentesis  -Recommend avoiding nephrotoxins, hypotension, IV contrast   -Strict I/O's  Hypotension: Blood pressure remains overall low   -Continues on midodrine 15 mg 4 times per day  -Currently not on albumin but may give as needed, will provide IV x 1 today  Hyponatremia: Suspect SIADH in the setting of malignancy  -Monitoring on 1 5 L fluid restriction   -Work-up: Serum osmolality 288, 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  Metabolic acidosis: stable  -Maintained on sodium bicarbonate 1300 mg twice daily  Stage IV metastatic cholangiocarcinoma: Overall has poor prognosis  -Hematology/oncology team is following   -Ongoing goals of care discussion, palliative care team following, overall poor prognosis, patient directed toward disease directed therapies  Malignant ascites: Status post previous paracentesis 03/03 which was negative for peritonitis   -With worsening abdominal distention today, will consult IR for additional paracentesis  -Recommend giving albumin for additional large-volume paracentesis  Bilateral leg erythema: Infectious disease following and monitoring off of antibiotics      Disposition: Requiring additional stay due to medical needs  SUBJECTIVE:  Patient is sitting out of bed in his chair  His family is at the bedside  He denies chest discomfort or shortness of breath  He denies nausea, vomiting, diarrhea  He reports having worsening abdominal distention  He reports having fair appetite      OBJECTIVE:  Current Weight: Weight - Scale: 104 kg (229 lb 0 9 oz)  Vitals:    03/10/23 0807 03/10/23 1504 03/10/23 2123 03/11/23 0700   BP: (!) 87/49 (!) 89/49 (!) 86/49 96/53   BP Location:   Right arm Right arm   Pulse: 79 89 87 76   Resp: 16 14 16    Temp: 97 5 °F (36 4 °C) (!) 97 2 °F (36 2 °C) 97 5 °F (36 4 °C)    TempSrc:   Oral    SpO2: 96% 97% 97%    Weight:       Height:         No intake or output data in the 24 hours ending 03/11/23 1246  General: NAD, ill appearance  Skin: warm, dry, intact  HEENT: Moist mucous membranes, sclera anicteric, normocephalic, atraumatic  Neck: No apparent JVD appreciated  Chest: lung sounds clear B/L, on RA   CVS:Regular rate and rhythm, no murmer   Abdomen: Distended, round  Extremities: B/L LE edema present, bilateral lower extremity erythema  Neuro: alert and oriented  Psych: appropriate mood and affect     Medications:    Current Facility-Administered Medications:   •  al mag oxide-diphenhydramine-lidocaine viscous (MAGIC MOUTHWASH) suspension 10 mL, 10 mL, Swish & Swallow, Q4H PRN, Anamaria Huang MD, 10 mL at 02/26/23 1232  •  chlorhexidine (PERIDEX) 0 12 % oral rinse 15 mL, 15 mL, Swish & Spit, Q12H [facility], Anamaria Huang MD, 15 mL at 03/11/23 0815  •  enoxaparin (LOVENOX) subcutaneous injection 100 mg, 1 mg/kg, Subcutaneous, Q12H [facility], Ousmane Naik MD, 100 mg at 03/11/23 4066  •  ergocalciferol (VITAMIN D2) capsule 50,000 Units, 50,000 Units, Oral, Weekly, Anamaria Huang MD, 50,000 Units at 03/09/23 2086  •  gabapentin (NEURONTIN) capsule 300 mg, 300 mg, Oral, BID, Alec Caldera DO, 300 mg at 03/11/23 0815  •  insulin lispro (HumaLOG) 100 units/mL subcutaneous injection 1-6 Units, 1-6 Units, Subcutaneous, TID AC, 1 Units at 03/10/23 1138 **AND** Fingerstick Glucose (POCT), , , TID AC, Mary Swenson MD  •  lactulose oral solution 20 g, 20 g, Oral, TID, Alberto Eason MD, 20 g at 03/11/23 0815  •  melatonin tablet 6 mg, 6 mg, Oral, HS, Mary Swenson MD, 6 mg at 03/10/23 2132  •  midodrine (PROAMATINE) tablet 15 mg, 15 mg, Oral, 4x Daily, Asia Marte MD, 15 mg at 03/11/23 1136  •  moisture barrier miconazole 2% cream (aka JOYCE MOISTURE BARRIER ANTIFUNGAL CREAM), , Topical, BID, Mary Swenson MD, Given at 03/11/23 0818  •  ondansetron (ZOFRAN) injection 4 mg, 4 mg, Intravenous, Q8H PRN, Mary Swenson MD, 4 mg at 02/22/23 0006  •  oxyCODONE (ROXICODONE) IR tablet 5 mg, 5 mg, Oral, Q4H PRN, Mary Swenson MD  •  pantoprazole (PROTONIX) EC tablet 40 mg, 40 mg, Oral, Early Morning, Mary Swenson MD, 40 mg at 03/11/23 3137  •  saliva substitute (MOUTH KOTE) mucosal solution 5 spray, 5 spray, Mouth/Throat, 4x Daily PRN, Mary Swenson MD, 5 spray at 02/25/23 2227  •  sodium bicarbonate tablet 1,300 mg, 1,300 mg, Oral, BID (diuretic), Asia Marte MD, 1,300 mg at 03/11/23 0815  •  sucralfate (CARAFATE) tablet 1 g, 1 g, Oral, 4x Daily (AC & HS), Mary Swenson MD, 1 g at 03/11/23 1136    Laboratory Results:  Results from last 7 days   Lab Units 03/11/23  0628 03/10/23  0535 03/09/23  0606 03/08/23  0556 03/07/23  0608   WBC Thousand/uL 8 19 6 72 7 10 9 21 10 03   HEMOGLOBIN g/dL 10 3* 9 6* 9 9* 10 7* 10 5*   HEMATOCRIT % 29 1* 27 0* 29 3* 30 4* 30 6*   PLATELETS Thousands/uL 94* 76* 66* 70* 58*   SODIUM mmol/L 129* 130* 128* 127* 128*   POTASSIUM mmol/L 3 8 3 7 3 9 4 3 4 5   CHLORIDE mmol/L 98 99 100 100 102   CO2 mmol/L 18* 18* 17* 16* 17*   BUN mg/dL 104* 98* 94* 96* 89*   CREATININE mg/dL 2 38* 1 88* 2 14* 2 49* 2 05*   CALCIUM mg/dL 7 8* 7 5* 8 0* 8 0* 8 4   MAGNESIUM mg/dL  --  2 8*  --   --  2 7* PHOSPHORUS mg/dL  --   --   --  4 8*  --    ALK PHOS U/L 103 88  --  98 95   ALT U/L 16 15  --  25 27   AST U/L 65* 54*  --  62* 64*

## 2023-03-11 NOTE — PLAN OF CARE
Problem: Prexisting or High Potential for Compromised Skin Integrity  Goal: Skin integrity is maintained or improved  Description: INTERVENTIONS:  - Identify patients at risk for skin breakdown  - Assess and monitor skin integrity  - Assess and monitor nutrition and hydration status  - Monitor labs   - Assess for incontinence   - Turn and reposition patient  - Assist with mobility/ambulation  - Relieve pressure over bony prominences  - Avoid friction and shearing  - Provide appropriate hygiene as needed including keeping skin clean and dry  - Evaluate need for skin moisturizer/barrier cream  - Collaborate with interdisciplinary team   - Patient/family teaching  - Consider wound care consult   Outcome: Progressing     Problem: MUSCULOSKELETAL - ADULT  Goal: Maintain or return mobility to safest level of function  Description: INTERVENTIONS:  - Assess patient's ability to carry out ADLs; assess patient's baseline for ADL function and identify physical deficits which impact ability to perform ADLs (bathing, care of mouth/teeth, toileting, grooming, dressing, etc )  - Assess/evaluate cause of self-care deficits   - Assess range of motion  - Assess patient's mobility  - Assess patient's need for assistive devices and provide as appropriate  - Encourage maximum independence but intervene and supervise when necessary  - Involve family in performance of ADLs  - Assess for home care needs following discharge   - Consider OT consult to assist with ADL evaluation and planning for discharge  - Provide patient education as appropriate  Outcome: Progressing  Goal: Maintain proper alignment of affected body part  Description: INTERVENTIONS:  - Support, maintain and protect limb and body alignment  - Provide patient/ family with appropriate education  Outcome: Progressing     Problem: PAIN - ADULT  Goal: Verbalizes/displays adequate comfort level or baseline comfort level  Description: Interventions:  - Encourage patient to monitor pain and request assistance  - Assess pain using appropriate pain scale  - Administer analgesics based on type and severity of pain and evaluate response  - Implement non-pharmacological measures as appropriate and evaluate response  - Consider cultural and social influences on pain and pain management  - Notify physician/advanced practitioner if interventions unsuccessful or patient reports new pain  Outcome: Progressing     Problem: Knowledge Deficit  Goal: Patient/family/caregiver demonstrates understanding of disease process, treatment plan, medications, and discharge instructions  Description: Complete learning assessment and assess knowledge base    Interventions:  - Provide teaching at level of understanding  - Provide teaching via preferred learning methods  Outcome: Progressing

## 2023-03-11 NOTE — SEDATION DOCUMENTATION
Paracentesis performed by Dr Soledad Keys  1600 mL of eric fluid removed and specimens sent to lab  Band-aid in place  Report called to Luis Mendez

## 2023-03-11 NOTE — PROGRESS NOTES
Progress Note - Infectious Disease   Anitra Ashby 47 y o  male MRN: [id number]  Unit/Bed#: [facility] 813-01 Encounter: [id number]      Impression/Recommendations:  1   Bilateral leg erythema over the last few days   Despite erythema, there is not significant warmth or tenderness   In addition, patient has no fever or leukocytosis   Overall, physical exam and clinical picture is still not consistent with cellulitis     Patient had been on antibiotic but now off it  Symptoms and findings stable, and actually slowly improving, off antibiotic  Observe off further antibiotic  Keep legs elevated     Serial exams  Monitor temperature/WBC      2   Ascites, likely malignant   Patient is status post paracentesis   Peritoneal fluid parameters not consistent with infectious peritonitis      3   Hypotension, present on admission   This has resolved   Patient completed empiric antibiotic course for possible septic shock, although there was no obvious active infection   All cultures have had no growth  Monitor hemodynamics      4   DENZEL   Creatinine up and down, overall improved      5   Stage IV metastatic cholangiocarcinoma   Patient's prognosis is poor   His performance status is also quite poor   Doubt that he can tolerate chemotherapy  Oncology follow-up      Discussed with patient and his wife in detail regarding the above plan      Antibiotics:  Off antibiotic     Subjective:  Patient is stable and comfortable  Legs  swelling and redness improved  Patient ambulating well, with minimal leg pain  Temperature stays down  No chills    No diarrhea      Objective:  Vitals:  Temp:  [97 2 °F (36 2 °C)-97 5 °F (36 4 °C)] 97 2 °F (36 2 °C)  HR:  [76-87] 83  Resp:  [16-20] 18  BP: ()/(49-58) 89/56  SpO2:  [97 %-98 %] 98 %  Temp (24hrs), Av 4 °F (36 3 °C), Min:97 2 °F (36 2 °C), Max:97 5 °F (36 4 °C)  Current: Temperature: (!) 97 2 °F (36 2 °C)    Physical Exam:     General: Awake, alert, cooperative, no distress  Neck:  Supple  No mass  No lymphadenopathy  Lungs: Expansion symmetric, no rales, no wheezing, respirations unlabored  Heart:  Regular rate and rhythm, S1 and S2 normal, no murmur  Abdomen: Soft, nondistended, non-tender, bowel sounds active all four quadrants, no masses, no organomegaly  Extremities: Improved leg edema  Improved erythema/warmth  No draining ulcer  Stable very mild tenderness  Skin:  No rash  Neuro: Moves all extremities  Invasive Devices     Central Venous Catheter Line  Duration           Port A Cath Right Subclavian -- days                Labs studies:   I have personally reviewed pertinent labs  Results from last 7 days   Lab Units 03/11/23  0628 03/10/23  0535 03/09/23  0606 03/08/23  0556   POTASSIUM mmol/L 3 8 3 7 3 9 4 3   CHLORIDE mmol/L 98 99 100 100   CO2 mmol/L 18* 18* 17* 16*   BUN mg/dL 104* 98* 94* 96*   CREATININE mg/dL 2 38* 1 88* 2 14* 2 49*   EGFR ml/min/1 73sq m 30 40 34 28   CALCIUM mg/dL 7 8* 7 5* 8 0* 8 0*   AST U/L 65* 54*  --  62*   ALT U/L 16 15  --  25   ALK PHOS U/L 103 88  --  98     Results from last 7 days   Lab Units 03/11/23  0628 03/10/23  0535 03/09/23  0606   WBC Thousand/uL 8 19 6 72 7 10   HEMOGLOBIN g/dL 10 3* 9 6* 9 9*   PLATELETS Thousands/uL 94* 76* 66*           Imaging Studies:   I have personally reviewed pertinent imaging study reports and images in PACS  EKG, Pathology, and Other Studies:   I have personally reviewed pertinent reports

## 2023-03-12 NOTE — ASSESSMENT & PLAN NOTE
Received IV heparin GTT  Discussed with oncology - Patient is recommended Lovenox 1 mg/kg body weight every 12 hours  Lovenox 1 mg/kg body wt twice daily-monitor lovenox  use with creatinine     Patient is not a candidate for NOACs due to  Child fuentes score, patient with baseline abnormal pro time   Outpatient oncology follow-up

## 2023-03-12 NOTE — ASSESSMENT & PLAN NOTE
Patient with metastatic cholangiocarcinoma  Discussed with patient overall guarded prognosis  Oncology has reiterated poor prognosis, patient reports he understands his guarded prognosis but would like to pursue active treatment at this time and plans to follow-up with oncology on discharge for further cares  Oncology inputs noted  Follow-up with oncology as an outpatient  Trend LFTs

## 2023-03-12 NOTE — PROGRESS NOTES
[facility]  Progress Note Radha Ashby 1970, 47 y o  male MRN: [id number]  Unit/Bed#: [facility] 813-01 Encounter: [id number]  Primary Care Provider: Maria Luisa Duran MD   Date and time admitted to hospital: 2/20/2023  3:04 AM    * Septic shock [facility])  Assessment & Plan  Present on admission  Shock resolved  Monitor closely-  Currently off of antibiotics    Bilateral lower extremity edema  Assessment & Plan  · Patient noted to have bilateral lower extremity edema which is likely multifactorial given his ascites and diffuse hepatic metastasis  · Lower extremity Doppler is negative for DVT  · Patient was started on antibiotics  Even after starting on antibiotics there was no significant improvement in the erythema even though the edema and erythema improved slightly  Discussed with infectious disease  Unlikely this is cellulitis antibiotics discontinued  · Hematology evaluation appreciated    Goals of care, counseling/discussion  Assessment & Plan  Patient with metastatic cholangiocarcinoma, worsening kidney function, worsening liver function   Overall guarded prognosis  Patient understands overall guarded prognosis however he would like to pursue active treatment and plans on following oncology outpatient for further cares  Discussed with patient and spouse in detail by previous provider  No limitation of care  Palliative on board-discussed with palliative care  If the patient is not improving as expected may need to revisit the goals of care-patient with extremely poor prognosis family and patient is aware of the poor prognosis but would want to continue with treatment oriented approach    Hepatic encephalopathy  Assessment & Plan  Patient with extensive hepatic metastasis  Lactulose-ammonia level is on the higher side will increase the lactulose dose and monitor response  Tremor present  Patient with worsening encephalopathy today  Increasing the dose of lactulose  Patient had 1 bowel movement today  Rule out infection  Had paracentesis today therapeutic and diagnostic-do not appear to have high enough PMN count for SBP  No fever  Obtain UA and blood culture to rule out infectious process  Obtain GI evaluate  Discussed with GI  Started on rifaximin    Elevated INR  Assessment & Plan  Likely due to metastatic liver disease  S/p vitamin K  Monitor INR-has been stable around 2 4    DENZEL (acute kidney injury) [facility])  Assessment & Plan  Monitor kidney function closely  Avoid nephrotoxins  Nephrology following  Creatinine 2 8 today  Nephrology ordered every 6 hours albumin  Patient is creatinine is fluctuating    Oropharyngeal candidiasis  Assessment & Plan  Received 7 days fluconazole treatment      Mesenteric thrombosis (HCC)  Assessment & Plan  Received IV heparin GTT  Discussed with oncology - Patient is recommended Lovenox 1 mg/kg body weight every 12 hours  Lovenox 1 mg/kg body wt twice daily-monitor lovenox  use with creatinine  Patient is not a candidate for NOACs due to  Child fuentes score, patient with baseline abnormal pro time   Outpatient oncology follow-up        Malignant ascites  Assessment & Plan  Requires frequent paracentesis  Status post paracentesis on 3/7 removing 6 L  Patient has having paracentesis twice a week Monday and Thursday as outpatient  Discussed with the patient about Tenckhoff catheter-patient and family is considering Tenckhoff catheter placement  Patient needed paracentesis today removed 1600 mL-we will give one-time dose of albumin after the paracentesis even though it is a low volume tap due to concern for low blood pressure        Thrombocytopenia (HCC)  Assessment & Plan  Multifactorial  Oncology inputs noted  Monitor counts-platelets- 76    GERD (gastroesophageal reflux disease)  Assessment & Plan  Continue PPI    Hyponatremia  Assessment & Plan  Multifactorial  Monitor closely  Sodium is 129 today    Monitor    Cholangiocarcinoma, stage IV [facility])  Assessment & Plan  Patient with metastatic cholangiocarcinoma  Discussed with patient overall guarded prognosis  Oncology has reiterated poor prognosis, patient reports he understands his guarded prognosis but would like to pursue active treatment at this time and plans to follow-up with oncology on discharge for further cares  Oncology inputs noted  Follow-up with oncology as an outpatient  Trend LFTs          VTE Pharmacologic Prophylaxis: VTE Score: 8 therapeutic Lovenox    Patient Centered Rounds: I performed bedside rounds with nursing staff today  Discussions with Specialists or Other Care Team Provider:  nephrology    Education and Discussions with Family / Patient: Updated  (wife) at bedside  Total Time Spent on Date of Encounter in care of patient: 35 minutes This time was spent on one or more of the following: performing physical exam; counseling and coordination of care; obtaining or reviewing history; documenting in the medical record; reviewing/ordering tests, medications or procedures; communicating with other healthcare professionals and discussing with patient's family/caregivers  Current Length of Stay: 20 day(s)  Current Patient Status: Inpatient   Certification Statement: The patient will continue to require additional inpatient hospital stay due to Hepatic encephalopathy  Discharge Plan:    Code Status: Level 1 - Full Code    Subjective:   Seen and examined  Still with encephalopathy  Infectious disease work-up has been negative so far  Discussed with nephrology  Plan for albumin noted  Status post Harvey catheter placement  Patient had 2 bowel movements as per nursing      Plan is to continue with the current dose of lactulose  Poor prognosis explained to the wife and wife understands the poor prognosis-continue with the current treatment   Objective:     Vitals:   Temp (24hrs), Av 7 °F (36 5 °C), Min:97 5 °F (36 4 °C), Max:97 9 °F (36 6 °C)    Temp:  [97 5 °F (36 4 °C)-97 9 °F (36 6 °C)] 97 9 °F (36 6 °C)  HR:  [81-86] 86  Resp:  [14-20] 16  BP: ()/(56-58) 91/56  SpO2:  [96 %-100 %] 100 %  Body mass index is 28 79 kg/m²  Input and Output Summary (last 24 hours): Intake/Output Summary (Last 24 hours) at 3/12/2023 1513  Last data filed at 3/11/2023 1901  Gross per 24 hour   Intake --   Output 1900 ml   Net -1900 ml       Physical Exam:   Physical Exam  HENT:      Head: Atraumatic  Nose: Nose normal    Eyes:      General: Scleral icterus present  Cardiovascular:      Rate and Rhythm: Normal rate and regular rhythm  Heart sounds: Normal heart sounds  Pulmonary:      Breath sounds: Normal breath sounds  Abdominal:      General: There is distension  Tenderness: There is no abdominal tenderness  Hernia: No hernia is present  Musculoskeletal:         General: No swelling or tenderness  Right lower leg: Edema present  Left lower leg: Edema present  Skin:     Findings: Erythema present  Neurological:      Mental Status: He is alert  Comments: Asterixis present          Additional Data:     Labs:  Results from last 7 days   Lab Units 03/12/23  0907 03/09/23  0606 03/08/23  0556   WBC Thousand/uL 9 97   < > 9 21   HEMOGLOBIN g/dL 9 8*   < > 10 7*   HEMATOCRIT % 28 6*   < > 30 4*   PLATELETS Thousands/uL 93*   < > 70*   NEUTROS PCT %  --   --  71   LYMPHS PCT %  --   --  11*   MONOS PCT %  --   --  16*   EOS PCT %  --   --  2    < > = values in this interval not displayed       Results from last 7 days   Lab Units 03/12/23  0907   SODIUM mmol/L 129*   POTASSIUM mmol/L 3 7   CHLORIDE mmol/L 98   CO2 mmol/L 18*   BUN mg/dL 108*   CREATININE mg/dL 2 76*   ANION GAP mmol/L 13   CALCIUM mg/dL 7 6*   ALBUMIN g/dL 3 6   TOTAL BILIRUBIN mg/dL 6 49*   ALK PHOS U/L 105   ALT U/L 14   AST U/L 61*   GLUCOSE RANDOM mg/dL 124     Results from last 7 days   Lab Units 03/12/23  0907   INR  2 45*     Results from last 7 days   Lab Units 03/12/23  1051 03/12/23  0616 03/11/23  1736 03/11/23  1117 03/11/23  0727 03/10/23  2120 03/10/23  1559 03/10/23  1136 03/10/23  0738 03/09/23 2126 03/09/23  1633 03/09/23  1135   POC GLUCOSE mg/dl 136 187* 143* 144* 111 153* 122 168* 140 130 158* 160*               Lines/Drains:  Invasive Devices     Central Venous Catheter Line  Duration           Port A Cath Right Subclavian -- days          Drain  Duration           Urethral Catheter 18 Fr  <1 day              Urinary Catheter:  Goal for removal: I's and O's         Central Line:  Goal for removal: Port accessed  Will de-access as appropriate  Imaging: Reviewed radiology reports from this admission including: CT head    Recent Cultures (last 7 days):   Results from last 7 days   Lab Units 03/11/23 2129 03/11/23 2128 03/11/23  1635   BLOOD CULTURE  Received in Microbiology Lab  Culture in Progress  Received in Microbiology Lab  Culture in Progress    --    GRAM STAIN RESULT   --   --  Rare Polys  No organisms seen   BODY FLUID CULTURE, STERILE   --   --  No growth       Last 24 Hours Medication List:   Current Facility-Administered Medications   Medication Dose Route Frequency Provider Last Rate   • al mag oxide-diphenhydramine-lidocaine viscous  10 mL Swish & Swallow Q4H PRN Alena Banuelos MD     • Albumin 25%  25 g Intravenous Q6H Clint Gonzáles DO     • chlorhexidine  15 mL Swish & Spit Q12H [address] Alena Banuelos MD     • enoxaparin  1 mg/kg Subcutaneous Q12H [address] Glen Metzger MD     • ergocalciferol  50,000 Units Oral Weekly Alena Banuelos MD     • insulin lispro  1-6 Units Subcutaneous TID AC Alena Banuelos MD     • lactulose  30 g Oral TID David Godinez MD     • lactulose  200 g Rectal Once Oralia Jarvis MD     • midodrine  15 mg Oral 4x Daily Jordon Bryant MD     • JOYCE ANTIFUNGAL   Topical BID Alena Banuelos MD     • ondansetron  4 mg Intravenous Q8H PRN Alena Banuelos MD     • oxyCODONE  5 mg Oral Q4H PRN Zabrina Godfrey MD     • pantoprazole  40 mg Oral Early Morning Zabrina Godfrey MD     • rifaximin  550 mg Oral Q12H Amelia Soares MD     • saliva substitute  5 spray Mouth/Throat 4x Daily PRN Zabrina Godfrey MD     • sodium bicarbonate  1,300 mg Oral BID (diuretic) Veronica Caal MD     • sucralfate  1 g Oral 4x Daily [facility]) Zabrina Godfrey MD          Today, Patient Was Seen By: Asia Guerrero MD    **Please Note: This note may have been constructed using a voice recognition system  **

## 2023-03-12 NOTE — CONSULTS
Consult Service: Gastroenterology      PATIENT INFORMATION      Adelaide Ashby 47 y o  male MRN: [id number]  Unit/Bed#: [facility] 813-01 Encounter: [id number]  PCP: Thu Rios MD  Date of Admission:  2/20/2023  Date of Consultation: 03/12/23  Requesting Physician: David Godinez MD       ASSESSMENTS & PLAN   Juan Gallagher is a 47 y o  old male with PMH of metastatic cholangiocarcinoma with malignant ascites previously on chemotherapy with disease progression    Gastroenterology has been consulted for assistance with management of encephalopathy    Encephalopathy  · Unclear etiology, could be liver related in the setting of extensive metastatic disease of the liver   · Will need to rule out infectious etiologies in the setting of prolonged hospitalizations, worsening renal function and immunocompromised status  · Reasonable to continue lactulose and rifaximin  · Patient has not been drinking lactulose so will give an enema x1  · Peritonitis was ruled out on paracentesis yesterday  · Will follow along    Metastatic cholangiocarcinoma  · Overall poor prognosis with disease progression  · Likely will not tolerate more chemotherapy  · Goals of care discussion encouraged    HISTORY OF PRESENT ILLNESS      Juan Gallagher is a 47 y o  male who is originally admitted for dehydration and is being consulted for encephalopathy  Patient unable to provide history       REVIEW OF SYSTEMS     A thorough 12-point review of systems has been conducted  Pertinent positives and negatives are mentioned in the history of present illness         PAST MEDICAL & SURGICAL HISTORY      Past Medical History:   Diagnosis Date   • 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 [facility])    • Rectal bleeding 1/9/2023   • S/P exploratory laparotomy 12/16/2020       Past Surgical History:   Procedure Laterality Date   • CT NEEDLE BIOPSY LIVER  10/15/2020   • IR BIOPSY LIVER MASS  5/18/2022   • IR PARACENTESIS  1/10/2023 • IR PARACENTESIS  1/24/2023   • IR PARACENTESIS  2/2/2023   • IR PARACENTESIS  1/30/2023   • IR PARACENTESIS  2/6/2023   • IR PARACENTESIS  2/9/2023   • IR PARACENTESIS  2/13/2023   • IR PARACENTESIS  2/16/2023   • IR PARACENTESIS  3/7/2023   • IR PARACENTESIS  3/11/2023         MEDICATIONS & ALLERGIES       Medications:   Prior to Admission medications    Medication Sig Start Date End Date Taking? Authorizing Provider   enoxaparin (LOVENOX) 100 mg/mL Inject 1 mL (100 mg total) under the skin every 12 (twelve) hours 3/2/23 4/1/23 Yes Loyda Lea MD   cyclobenzaprine (FLEXERIL) 5 mg tablet take 1 tablet by mouth every 8 hours (3 TIMES A DAY)  Patient not taking: Reported on 1/23/2023 12/22/22   Historical Provider, MD   ergocalciferol (VITAMIN D2) 50,000 units Take 50,000 Units by mouth once a week 12/29/22   Historical Provider, MD   furosemide (LASIX) 20 mg tablet Take 1 tablet (20 mg total) by mouth daily 1/19/23   Julianna Acuna DO   Ivosidenib 250 MG TABS Take by mouth  Patient not taking: Reported on 2/3/2023    Historical Provider, MD   magnesium oxide (MAG-OX) 400 mg tablet Take 1 tablet (400 mg total) by mouth daily 1/23/23   Viv Hall MD   ondansetron [facility]) 4 mg tablet Take 0 5 tablets (2 mg total) by mouth every 8 (eight) hours as needed for nausea or vomiting 1/20/23   Shelly Bermudez PA-C   oxyCODONE (ROXICODONE) 5 immediate release tablet Take 5 mg by mouth every 6 (six) hours as needed 1/14/23   Historical Provider, MD   pantoprazole (PROTONIX) 40 mg tablet take 1 tablet by mouth once daily 2/13/23   Shelly Bermudez PA-C   potassium chloride (K-DUR,KLOR-CON) 10 mEq tablet Take 1 tablet (10 mEq total) by mouth daily 1/19/23   Julianna Acuna DO       Allergies: Allergies   Allergen Reactions   • Latex Swelling     If long term usage/application   • Penicillins Other (See Comments) and Rash     As a child had reaction not sure what it was            SOCIAL HISTORY      Marital Status: /Civil Union    Substance Use History:   Social History     Substance and Sexual Activity   Alcohol Use Not Currently    Comment: 1-2 drinks per day     Social History     Tobacco Use   Smoking Status Former   • Packs/day: 2 00   • Years: 20 00   • Pack years: 40 00   • Types: Cigarettes   Smokeless Tobacco Current     Social History     Substance and Sexual Activity   Drug Use Not on file         FAMILY HISTORY      Non-Contributory      PHYSICAL EXAM     Vitals:   Blood Pressure: 91/56 (03/12/23 0900)  Pulse: 86 (03/12/23 0900)  Temperature: 97 9 °F (36 6 °C) (03/12/23 0900)  Temp Source: Temporal (03/11/23 2238)  Respirations: 16 (03/12/23 0900)  Height: 6' (182 9 cm) (02/21/23 0705)  Weight - Scale: 96 3 kg (212 lb 4 9 oz) (03/12/23 0600)  SpO2: 100 % (03/12/23 0900)    Physical Exam:   GENERAL: NAD  HEENT:  NC/AT, MMM  CARDIAC:  RRR, +S1/S2, no S3/S4 heard  PULMONARY:  CTA B/L, no wheezing/rales/rhonci, non-labored breathing  ABDOMEN:  Soft, NT/ND, +BS, no rebound/guarding/rigidity  DIGITAL RECTAL EXAM: not indicated   NEUROLOGIC:  Alert/oriented x1  SKIN:  No rashes or erythema       ADDITIONAL DATA     Lab Results:     Results from last 7 days   Lab Units 03/12/23  0907 03/09/23  0606 03/08/23  0556   WBC Thousand/uL 9 97   < > 9 21   HEMOGLOBIN g/dL 9 8*   < > 10 7*   HEMATOCRIT % 28 6*   < > 30 4*   PLATELETS Thousands/uL 93*   < > 70*   NEUTROS PCT %  --   --  71   LYMPHS PCT %  --   --  11*   MONOS PCT %  --   --  16*   EOS PCT %  --   --  2    < > = values in this interval not displayed       Results from last 7 days   Lab Units 03/12/23  0907   POTASSIUM mmol/L 3 7   CHLORIDE mmol/L 98   CO2 mmol/L 18*   BUN mg/dL 108*   CREATININE mg/dL 2 76*   CALCIUM mg/dL 7 6*   ALK PHOS U/L 105   ALT U/L 14   AST U/L 61*     Results from last 7 days   Lab Units 03/12/23  0907   INR  2 45*       Imaging:    CT abdomen pelvis wo contrast    Result Date: 2/20/2023  Narrative: CT ABDOMEN AND PELVIS WITHOUT IV CONTRAST INDICATION:   Abdominal pain, acute, nonlocalized abdominal pain  History of cholangiocarcinoma  COMPARISON:  CT 1/26/2023  TECHNIQUE:  CT examination of the abdomen and pelvis was performed without intravenous contrast  Axial, sagittal, and coronal 2D reformatted images were created from the source data and submitted for interpretation  Radiation dose length product (DLP) for this visit:  1031 92 mGy-cm   This examination, like all CT scans performed in the [facility], was performed utilizing techniques to minimize radiation dose exposure, including the use of iterative reconstruction and automated exposure control  Enteric contrast was not administered  FINDINGS: Absence of intravenous contrast enhancement limits evaluation of the abdominal viscera  ABDOMEN LOWER CHEST:  No significant change in multiple right lower lobe masses, largest measuring 2 3 cm within the medial costophrenic angle  No effusions  LIVER/BILIARY TREE:  Evaluation is limited in the absence of intravenous contrast enhancement  Dominant hypodense mass in the dome of the liver appears unchanged, however multiple other smaller previously seen lesions are not well visualized on this study  No biliary dilatation  GALLBLADDER:  Nonvisualized  SPLEEN:  Unremarkable  PANCREAS:  Unremarkable  ADRENAL GLANDS:  Unremarkable  KIDNEYS/URETERS:  Unremarkable  No hydronephrosis  STOMACH AND BOWEL:  No obstruction or acute inflammatory changes  Colonic diverticulosis without diverticulitis  APPENDIX:  No findings to suggest appendicitis  ABDOMINOPELVIC CAVITY:  Moderate ascites  No pneumoperitoneum  No significant change in omental implant adjacent to the liver, most conspicuous anteriorly, and diffuse infiltration of the omentum compatible with peritoneal carcinomatosis  Unchanged mild gastrohepatic ligament, periportal and portacaval adenopathy  VESSELS:  Atherosclerotic changes are present  No evidence of aneurysm  PELVIS REPRODUCTIVE ORGANS:  Unremarkable for patient's age  URINARY BLADDER:  Unremarkable  ABDOMINAL WALL/INGUINAL REGIONS:  Unremarkable  OSSEOUS STRUCTURES:  No acute fracture or destructive osseous lesion  Impression: No acute pathology  Evaluation is limited in the absence of intravenous contrast enhancement, however metastatic disease appears essentially unchanged, and including dominant hepatic lesion, peritoneal carcinomatosis with moderate moderate ascites  Presumed metastatic right lower lobe nodules also noted  Workstation performed: [id number]     CT head wo contrast    Result Date: 3/11/2023  Narrative: CT BRAIN - WITHOUT CONTRAST INDICATION:   Delirium encephalopathy  COMPARISON:  None  TECHNIQUE:  CT examination of the brain was performed  In addition to axial images, sagittal and coronal 2D reformatted images were created and submitted for interpretation  Radiation dose length product (DLP) for this visit:  904 32 mGy-cm   This examination, like all CT scans performed in the [facility], was performed utilizing techniques to minimize radiation dose exposure, including the use of iterative  reconstruction and automated exposure control  IMAGE QUALITY:  Diagnostic  FINDINGS: PARENCHYMA:  No intracranial mass, mass effect or midline shift  No CT signs of acute infarction  No acute parenchymal hemorrhage  VENTRICLES AND EXTRA-AXIAL SPACES:  Normal for the patient's age  VISUALIZED ORBITS: Normal visualized orbits  PARANASAL SINUSES: Normal visualized paranasal sinuses  CALVARIUM AND EXTRACRANIAL SOFT TISSUES:  Normal      Impression: No acute intracranial abnormality  Workstation performed: [id number]     MRI abdomen w wo contrast    Result Date: 2/20/2023  Narrative: MRI OF THE ABDOMEN WITH AND WITHOUT CONTRAST INDICATION: 47 years / Male  C22 8: Malignant neoplasm of liver, primary, unspecified as to type    61-year-old male with metastatic cholangiocarcinoma  COMPARISON: CT of the chest, abdomen and pelvis from 10/24/2022 and January 26, 2023  TECHNIQUE:  Multiplanar/multisequence MRI of the abdomen was performed before and after administration of contrast  IV Contrast:  10 mL of Gadobutrol injection (SINGLE-DOSE) FINDINGS: LOWER CHEST:   1 8 x 2 4 cm nodule in the posterior basilar segment of the right lower lobe (series 12, image 41), not significantly changed since the CT from 1/26/2023  The patient's other known pulmonary metastases are not visible on this MRI  LIVER: Normal size  Multiple hypoenhancing masses, fluid being confluent masses virtually replacing the entire lateral segment of the left lobe  Largest mass located at the junction of segments 4 and 5/8, measuring 2 6 x 2 8 cm (series 14, image 95)  Allowing for differences in modality, slice position and cursor placement, this does not appear significantly changed since the CT from 1/26/2023  2 7 x 4 6 cm nonenhancing structure at the dome of the liver, at the junction of segments 4A and segment 8, with T1 hyperintensity, consistent with chronic hemorrhage, either within the hepatic parenchyma or within a metastasis  This is also unchanged since the last CT  4 5 cm thrombosis in the splenic vein, extending to the origins of the portal vein and SMV, with additional thrombus in the right portal vein and its branches, all developing since 1/26/2023  BILE DUCTS:  No intrahepatic or extrahepatic bile duct dilatation  GALLBLADDER:  Not visualized; ? Prior cholecystectomy  PANCREAS:  Unremarkable  ADRENAL GLANDS:  Normal  SPLEEN:  Normal  KIDNEYS/PROXIMAL URETERS:  No hydroureteronephrosis  Bilateral renal cysts  No solid masses  STOMACH AND BOWEL:   Unremarkable  No dilated loops of bowel  PERITONEAL CAVITY/RETROPERITONEUM:  Small amount of ascites  Generalized thickening and enhancement of the peritoneal reflection    Several enhancing masses arising from the peritoneum, the largest anterior to the right lobe of the liver, measuring 1 6 x  5 8 cm (series 14, image 49) LYMPH NODES:  1 2 x 2 4 cm portacaval lymph node (series 12, image 70)  Necrotic appearing 1 0 x 1 1 cm mindi hepatis node (series 12, image 64)  1 1 x 1 5 cm necrotic appearing gastrohepatic node (series 12, image 57) 1 5 x 1 8 cm mindi hepatis node (series 12, image 70)  These have not significantly changed since the CT from 1/26/2023  VASCULAR STRUCTURES:  Thrombosis seen in portions of the splenic vein, portal vein and superior mesenteric vein as described above  Abdominal aorta unremarkable, without aneurysm  ABDOMINAL WALL:  Small umbilical hernia containing ascites  OSSEOUS STRUCTURES:  1 5 x 2 3 cm enhancing lesion in the right iliac crest, extending into through the cortex into the adjacent portion of the right iliac is muscle (series 12, image 146), similar in appearance to the CT from 1/26/2023  Peripherally enhancing lesion with diffusion restriction in the L1 vertebral body  (series 12, image 86; series 13, image 50; series 9, image 107), with no corresponding abnormality on prior CT  Impression: 1  Multiple hepatic metastases, many of which appear to be at least partially necrotic, most extensive in the lateral segment of the left lobe, not appreciably changed since a CT from 1/26/2023  2   New thrombosis in portions of the splenic, superior mesenteric and main portal veins and a segment of the right portal vein  3   Peritoneal metastases and small amount of ascites  4   Intra-abdominal lymphadenopathy, as described above, not significantly changed since 1/26/2023  5   Lesion in the right iliac crest, possibly metastasis, not appreciably changed since 1/26/2023  6   Indeterminate enhancing lesion in the L1 vertebral body, possibly metastasis  7   1 8 x 2 4 cm pulmonary metastasis in the base of the right lower lobe  The study was marked in [facility] for immediate notification   Workstation performed: [id number] IR INPATIENT Paracentesis    Result Date: 3/12/2023  Narrative: PROCEDURE: Therapeutic paracentesis STAFF: TASHA Back  [address] [employer] [place]: Multiple  COMPLICATIONS: None  MEDICATIONS: 1% lidocaine subcutaneous  INDICATION: Symptomatic ascites  PROCEDURE: Using ultrasound guidance, the left paracolic gutter was punctured and a catheter was placed into the peritoneal cavity  A total of 1600 milliliters of clear slightly reddish fluid was removed  The catheter was then removed  The catheter had become dislodged, it was not replaced as the patient's blood pressure was in the 80-90 range  He was asymptomatic  A repeat paracentesis can be performed as needed in the future  FINDINGS: Massive ascites  1600 ascites was removed  Impression: Therapeutic paracentesis  Workstation performed: [id number]     IR INPATIENT Paracentesis    Result Date: 3/10/2023  Narrative: PROCEDURE: Therapeutic paracentesis MEDICATIONS: 1% lidocaine subcutaneous  Immediately following the procedure, the patient received an infusion of intravenous albumin  INDICATION: Symptomatic ascites  Metastasis to the liver with liver cirrhosis  PROCEDURE: Using ultrasound guidance, the left lower quadrant was punctured and a 5f yueh catheter was placed into the abdominal cavity until ascites was aspirated  A total of 6050 milliliters of cloudy eric fluid was removed  The catheter was then removed  FINDINGS: 6050 mL of cloudy eric ascites was removed  Impression: Therapeutic paracentesis  Signed, performed, and dictated by HARINI Orlando under the supervision of Dr Chago De Oliveira  Workstation performed: [id number]     IR paracentesis    Result Date: 2/17/2023  Narrative: Ultrasound-guided paracentesis Clinical History: Recurrent symptomatic ascites, history of cholangiocarcinoma Procedure: Ultrasound used to localize the left lower quadrant ascites   The overlying skin was prepped and draped in usual sterile fashion and local anesthesia was obtained with the 1% lidocaine solution  A 5 [place] Yueh needle was advanced until fluid was aspirated under live ultrasound guidance  Approximately 4500 cc' s of clear, yellow fluid was aspirated  Labs collected and sent  The patient tolerated the procedure well and left the department in stable condition  Impression: Impression: Ultrasound-guided left-sided paracentesis  Signed, performed, and dictated by Ebenezer Hugo PA-C under the direct supervision of Dr Rebecca Parra  Workstation performed: [id number]     IR paracentesis    Result Date: 2/13/2023  Narrative: PROCEDURE: Paracentesis STAFF: Claudell Charon, PA-C COMPLICATIONS: None  MEDICATIONS: 1% lidocaine subcutaneous  INDICATION: Symptomatic ascites  Cholangiocarcinoma PROCEDURE: Using ultrasound guidance, the left paracolic gutter was punctured and a catheter was placed into the peritoneal cavity  A total of 4950 milliliters of fluid was removed  The catheter was then removed  FINDINGS: Massive ascites  Clear yellow ascites was removed  Impression: Therapeutic paracentesis   Procedure was performed, signed and dictated by: Claudell Charon, PA-C Supervising Physician: Dr Gretchen Akbar performed: [id number]     Paracentesis (Bedside)    Result Date: 2/26/2023  Narrative: Oziel Foreman DO     2/26/2023  4:08 PM Paracentesis (Bedside) Date/Time: 2/26/2023 3:59 PM Performed by: Erika Saldana MD Authorized by: Erika Saldana MD Patient location:  Bedside Other Assisting Provider: Yes (comment) (Dr Angela Berrios)  Consent:   Consent obtained:  Written   Consent given by:  Patient   Risks discussed:  Bleeding, bowel perforation, infection and pain   Alternatives discussed:  No treatment, delayed treatment, observation, alternative treatment and referral Universal protocol:   Procedure explained and questions answered to patient or proxy's satisfaction: yes    Relevant documents present and verified: yes    Test results available and properly labeled: yes    Radiology Images displayed and confirmed  If images not available, report reviewed: yes    Required blood products, implants, devices and special equipment available: yes    Site/side marked: yes    Immediately prior to procedure a time out was called: yes    Patient identity confirmed:  Arm band, hospital-assigned identification number and verbally with patient Pre-procedure details:   Initial or Subsequent Exam:  Initial   Procedure purpose:  Therapeutic   Indications: abdominal discomfort secondary to ascites    Preparation: Patient was prepped and draped in usual sterile fashion  Anesthesia (see MAR for exact dosages): Anesthesia method:  Local infiltration   Local anesthetic:  Lidocaine 1% w/o epi Procedure details:   Needle gauge: 8 Maltese  Equipment: Paracentesis kit used    Ultrasound guidance: yes    Ultrasound image availability:  Images available in PACS   Sterile ultrasound techniques: Sterile gel and sterile probe covers were used    Approach:  Percutaneous   Puncture site:  L lower quadrant   Fluid removed amount:  6 6L   Fluid appearance:  Yellow   Dressing:  4x4 sterile gauze and adhesive bandage Post-procedure details:   Patient tolerance of procedure: Tolerated well, no immediate complications Post-procedure medication (see MAR for dosing):    Albumin replacement: Yes      Paracentesis (Bedside)    Result Date: 2/22/2023  Narrative: Klaus Sandhu MD     2/22/2023  3:30 PM Paracentesis (Bedside) Date/Time: 2/22/2023 12:23 PM Performed by: Klaus Sandhu MD Authorized by: Klaus Sandhu MD Patient location:  ICU Consent:   Consent obtained:  Written   Consent given by:  Patient   Risks discussed:  Bleeding, bowel perforation, infection and pain Universal protocol:   Procedure explained and questions answered to patient or proxy's satisfaction: yes    Relevant documents present and verified: yes    Test results available and properly labeled: yes Radiology Images displayed and confirmed  If images not available, report reviewed: yes    Site/side marked: yes  Pre-procedure details:   Initial or Subsequent Exam:  Subsequent   Procedure purpose:  Therapeutic   Indications: abdominal discomfort secondary to ascites    Preparation: Patient was prepped and draped in usual sterile fashion  Anesthesia (see MAR for exact dosages): Anesthesia method:  Local infiltration   Local anesthetic:  Lidocaine 1% w/o epi Procedure details:   Needle gauge:  18   Equipment: Paracentesis kit used    Ultrasound guidance: yes    Ultrasound image availability:  Not saved   Approach:  Percutaneous   Puncture site:  R lower quadrant   Fluid removed amount:  4 5L   Fluid appearance:  Clear and yellow   Dressing:  Adhesive bandage Post-procedure details:   Patient tolerance of procedure: Tolerated well, no immediate complications Post-procedure medication (see MAR for dosing): Albumin replacement: No      XR chest portable ICU    Result Date: 2/20/2023  Narrative: CHEST INDICATION: Shortness of breath COMPARISON:  1/31/2023 EXAM PERFORMED/VIEWS:  XR CHEST PORTABLE ICU Images: 2 FINDINGS: Right-sided Mediport terminating at the cavoatrial junction, unchanged Cardiomediastinal silhouette appears unremarkable  The lungs are clear  No pneumothorax or pleural effusion  Osseous structures appear within normal limits for patient age  Bilateral neck surgical clips     Impression: No acute cardiopulmonary disease  Findings are stable Workstation performed: [id number]     VAS lower limb venous duplex study, complete bilateral    Result Date: 3/7/2023  Narrative:  [facility] REPORT CLINICAL: Indications: Patient presents with bilateral lower extremity pain and swelling x over 2 weeks  Operative History: No cardiovascular surgeries Risk Factors The patient has history of cancer (current) smoking (current) 2 ppd     CONCLUSION:  Impression:  RIGHT LOWER LIMB: No evidence of acute or chronic deep vein thrombosis  No evidence of superficial thrombophlebitis noted  No evidence of valvular incompetence noted in the deep veins  Popliteal, posterior tibial and anterior tibial arterial Doppler waveforms are triphasic/hyperemic  LEFT LOWER LIMB: No evidence of acute or chronic deep vein thrombosis  No evidence of superficial thrombophlebitis noted  No evidence of valvular incompetence noted in the deep veins  Popliteal, posterior tibial and anterior tibial arterial Doppler waveforms are triphasic/hyperemic  Technically difficult study  SIGNATURE: Electronically Signed by: Renee Roman MD on 2023-03-07 09:45:06 PM    VAS lower limb venous duplex study, complete bilateral    Result Date: 2/22/2023  Narrative:  [facility] REPORT CLINICAL: Indications: Patient presents with swelling in his feet x 1 day  Operative History: No cardiovascular surgeries noted Risk Factors The patient has history of smoking (current) 2 ppd  CONCLUSION: Impression: RIGHT LOWER LIMB: No evidence of acute or chronic deep vein thrombosis  No evidence of superficial thrombophlebitis noted  No evidence of Valvular Incompetence noted in the deep or superficial veins    Popliteal, posterior tibial and anterior tibial arterial Doppler waveforms are hyperemic  LEFT LOWER LIMB: No evidence of acute or chronic deep vein thrombosis  No evidence of superficial thrombophlebitis noted  No evidence of Valvular Incompetence noted in the deep or superficial veins    Popliteal, posterior tibial and anterior tibial arterial Doppler waveforms are hyperemic    SIGNATURE: Electronically Signed by: Levi Ni MD, [id number]  on 2023-02-22 11:09:54 PM    US bedside procedure    Result Date: 2/27/2023  Narrative: 1 2 840 [id number] 2 323 [id number] 2    US bedside procedure    Result Date: 2/27/2023  Narrative: 1 2 840 [id number] 2 323 [id number] 3    Echo complete w/ contrast if indicated    Result Date: 2/21/2023  Narrative: •  Study quality was poor  This was a technically difficult study •  Left Ventricle: Left ventricular cavity size is normal  Wall thickness is mildly increased  There is mild concentric hypertrophy  The left ventricular ejection fraction is 65%  Systolic function is normal  Wall motion is grossly normal  •  Right Ventricle: Right ventricular cavity size is normal  Systolic function is normal        EKG, Pathology, and Other Studies Reviewed on Admission:   · EKG: Reviewed      Counseling / Coordination of Care Time: 30 total mins spent n consult  Greater than 50% of total time spent on patient counseling and coordination of care  Ty Denson MD   PGY-5,   Gastroenterology         ** Please Note: This note is constructed using a voice recognition dictation system   **

## 2023-03-12 NOTE — ASSESSMENT & PLAN NOTE
Monitor kidney function closely  Avoid nephrotoxins  Nephrology following  Creatinine 2 38 -is getting another dose of albumin today  Patient is creatinine is fluctuating

## 2023-03-12 NOTE — PROGRESS NOTES
NEPHROLOGY PROGRESS NOTE   Deloris Ashby 47 y o  male MRN: [id number]  Unit/Bed#: [facility] 813-01 Encounter: [id number]  Reason for Consult: DENZEL (POA)    ASSESSMENT/PLAN:  Acute kidney injury (POA): Has had recurrent DENZEL this admission  Originally presented with creatinine of 2 3 which resolved to normal suspected due to prerenal etiology  Secondary rise on 2/26 suspected ATN with improvement to 1 6  Third insult on 3/7 with peak creatinine of 2 49 suspected due to hemodynamic changes from paracentesis and improved with albumin to 1 8   -Baseline creatinine 0 5-0 7   -4th episode of DENZEL today with creatinine increasing further to 2 76  Suspect due to volume shifts with paracentesis as well as hypotension   -Will repeat 2 doses of IV albumin today and continue on midodrine   -Poor urine output, discussed placing Harvey catheter  Will hold off on diuretic as blood pressure is low  -Recommend avoiding nephrotoxins, hypotension, IV contrast   -Strict I/O's   -Patient would be poor candidate for dialysis      Hypotension: Blood pressure remains overall low in the 80s to 90s  -Continues on midodrine 15 mg 4 times per day  -Will give additional IV albumin x2 today      Hyponatremia: Suspect SIADH in the setting of malignancy  -A m  sodium level improved to 129   -Monitoring on 1 5 L fluid restriction   -Continue with albumin as needed and paracentesis as needed, joe lest two times per week  -Work-up: Serum osmolality 288, 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      Metabolic acidosis: stable  -Maintained on sodium bicarbonate 1300 mg twice daily      Stage IV metastatic cholangiocarcinoma: Overall has poor prognosis  -Hematology/oncology team is following   -Ongoing goals of care discussion, palliative care team following, overall poor prognosis      Malignant ascites: Status post previous paracentesis 03/03 which was negative for peritonitis    -Status post paracentesis 3/11 for 1 6 L of fluid removal   Planning on additional paracentesis tomorrow   -Recommend giving albumin for additional large-volume paracentesis      Bilateral leg erythema: Infectious disease following and monitoring off of antibiotics  Disposition: Requiring additional stay due to medical needs  SUBJECTIVE:  Discussion held with patient's wife and Dr Thor Aase as well as myself  Patient with overall poor prognosis  She is requesting Harvey catheter placement due to patient with low urine output  Also requesting additional paracentesis tomorrow  Patient with overall decline  Palliative care team continues to follow      OBJECTIVE:  Current Weight: Weight - Scale: 96 3 kg (212 lb 4 9 oz)  Vitals:    03/11/23 1630 03/11/23 2238 03/12/23 0600 03/12/23 0900   BP: 93/56 97/57  91/56   BP Location:  Right arm     Pulse: 85 86  86   Resp: 18 14  16   Temp:  97 5 °F (36 4 °C)  97 9 °F (36 6 °C)   TempSrc:  Temporal     SpO2: 98% 96%  100%   Weight:   96 3 kg (212 lb 4 9 oz)    Height:           Intake/Output Summary (Last 24 hours) at 3/12/2023 1148  Last data filed at 3/11/2023 1901  Gross per 24 hour   Intake --   Output 1900 ml   Net -1900 ml     General: NAD, ill-appearing  Skin: warm, dry, lower extremity erythema  HEENT: Moist mucous membranes, sclera anicteric, normocephalic, atraumatic  Neck: No apparent JVD appreciated  Chest: lung sounds decreased bilaterally  CVS:Regular rate and rhythm, no murmer   Abdomen: Round, slightly firm, nontender  Extremities:  B/L LE edema present  Neuro: alert  Psych: appropriate mood and affect     Medications:    Current Facility-Administered Medications:   •  al mag oxide-diphenhydramine-lidocaine viscous (MAGIC MOUTHWASH) suspension 10 mL, 10 mL, Swish & Swallow, Q4H PRN, Swati Weaver MD, 10 mL at 02/26/23 1232  •  albumin human (FLEXBUMIN) 25 % injection 12 5 g, 12 5 g, Intravenous, BID, Kurtis Franc, CRNP  •  chlorhexidine (PERIDEX) 0 12 % oral rinse 15 mL, 15 mL, Swish & [place], Q12H [address], Governor Rinku MD, 15 mL at 03/12/23 0746  •  enoxaparin (LOVENOX) subcutaneous injection 100 mg, 1 mg/kg, Subcutaneous, Q12H [address], Obey Camacho MD, 100 mg at 03/12/23 0746  •  ergocalciferol (VITAMIN D2) capsule 50,000 Units, 50,000 Units, Oral, Weekly, Governor Rinku MD, 50,000 Units at 03/09/23 7738  •  insulin lispro (HumaLOG) 100 units/mL subcutaneous injection 1-6 Units, 1-6 Units, Subcutaneous, TID AC, 1 Units at 03/12/23 0747 **AND** Fingerstick Glucose (POCT), , , TID AC, Governor Rinku MD  •  lactulose oral solution 30 g, 30 g, Oral, TID, Last Vogt MD, 30 g at 03/12/23 0746  •  midodrine (PROAMATINE) tablet 15 mg, 15 mg, Oral, 4x Daily, Ab Barron MD, 15 mg at 03/12/23 0746  •  moisture barrier miconazole 2% cream (aka JOYCE MOISTURE BARRIER ANTIFUNGAL CREAM), , Topical, BID, Governor Rinku MD, Given at 03/12/23 0747  •  ondansetron (ZOFRAN) injection 4 mg, 4 mg, Intravenous, Q8H PRN, Governor Rinku MD, 4 mg at 02/22/23 0006  •  oxyCODONE (ROXICODONE) IR tablet 5 mg, 5 mg, Oral, Q4H PRN, Governor Rinku MD  •  pantoprazole (PROTONIX) EC tablet 40 mg, 40 mg, Oral, Early Morning, Governor Rinku MD, 40 mg at 03/12/23 0601  •  rifaximin (XIFAXAN) tablet 550 mg, 550 mg, Oral, Q12H [address], Last Vogt MD, 550 mg at 03/12/23 0746  •  saliva substitute (MOUTH KOTE) mucosal solution 5 spray, 5 spray, Mouth/Throat, 4x Daily PRN, Governor Rinku MD, 5 spray at 02/25/23 2227  •  sodium bicarbonate tablet 1,300 mg, 1,300 mg, Oral, BID (diuretic), Ab Barron MD, 1,300 mg at 03/12/23 0746  •  sucralfate (CARAFATE) tablet 1 g, 1 g, Oral, 4x Daily (AC & HS), Governor Rinku MD, 1 g at 03/12/23 0601    Laboratory Results:  Results from last 7 days   Lab Units 03/12/23  0907 03/12/23  0740 03/11/23  0628 03/10/23  0535 03/09/23  0606 03/08/23  0556 03/07/23  0608   WBC Thousand/uL 9 97  --  8 19 6 72   < > 9 21 10 03   HEMOGLOBIN g/dL 9 8*  -- 10 3* 9 6*   < > 10 7* 10 5*   HEMATOCRIT % 28 6*  --  29 1* 27 0*   < > 30 4* 30 6*   PLATELETS Thousands/uL 93*  --  94* 76*   < > 70* 58*   SODIUM mmol/L 129* 127* 129* 130*   < > 127* 128*   POTASSIUM mmol/L 3 7 3 6 3 8 3 7   < > 4 3 4 5   CHLORIDE mmol/L 98 98 98 99   < > 100 102   CO2 mmol/L 18* 17* 18* 18*   < > 16* 17*   BUN mg/dL 108* 108* 104* 98*   < > 96* 89*   CREATININE mg/dL 2 76* 2 80* 2 38* 1 88*   < > 2 49* 2 05*   CALCIUM mg/dL 7 6* 7 5* 7 8* 7 5*   < > 8 0* 8 4   MAGNESIUM mg/dL  --   --   --  2 8*  --   --  2 7*   PHOSPHORUS mg/dL  --  4 4  --   --   --  4 8*  --    ALK PHOS U/L 105  --  103 88  --  98 95   ALT U/L 14  --  16 15  --  25 27   AST U/L 61*  --  65* 54*  --  62* 64*    < > = values in this interval not displayed

## 2023-03-12 NOTE — ASSESSMENT & PLAN NOTE
Patient with extensive hepatic metastasis  Lactulose-ammonia level is on the higher side will increase the lactulose dose and monitor response  Tremor present  Patient with worsening encephalopathy today  Increasing the dose of lactulose  Patient had 1 bowel movement today  Rule out infection  Had paracentesis today therapeutic and diagnostic-do not appear to have high enough PMN count for SBP  No fever  Obtain UA and blood culture to rule out infectious process  Obtain GI evaluate  Discussed with GI    Started on rifaximin

## 2023-03-12 NOTE — ASSESSMENT & PLAN NOTE
Requires frequent paracentesis    Status post paracentesis on 3/7 removing 6 L  Patient has having paracentesis twice a week Monday and Thursday as outpatient  Discussed with the patient about Tenckhoff catheter-patient and family is considering Tenckhoff catheter placement  Patient needed paracentesis today removed 1600 mL-we will give one-time dose of albumin after the paracentesis even though it is a low volume tap due to concern for low blood pressure

## 2023-03-12 NOTE — ASSESSMENT & PLAN NOTE
Monitor kidney function closely  Avoid nephrotoxins  Nephrology following  Creatinine 2 8 today    Nephrology ordered every 6 hours albumin  Patient is creatinine is fluctuating

## 2023-03-12 NOTE — ASSESSMENT & PLAN NOTE
Patient with metastatic cholangiocarcinoma, worsening kidney function, worsening liver function   Overall guarded prognosis  Patient understands overall guarded prognosis however he would like to pursue active treatment and plans on following oncology outpatient for further cares  Discussed with patient and spouse in detail by previous provider  No limitation of care  Palliative on board-discussed with palliative care    If the patient is not improving as expected may need to revisit the goals of care-patient with extremely poor prognosis family and patient is aware of the poor prognosis but would want to continue with treatment oriented approach

## 2023-03-12 NOTE — PROGRESS NOTES
Progress Note - Infectious Disease   Anitra Ashby 47 y o  male MRN: [id number]  Unit/Bed#: [facility] 813-01 Encounter: [id number]      Impression/Recommendations:  1   Bilateral leg erythema over the last few days   Despite erythema, there is not significant warmth or tenderness   In addition, patient has no fever or leukocytosis   Overall, physical exam and clinical picture is still not consistent with cellulitis      Patient had been on antibiotic but now off it   Symptoms and findings stable, and actually slowly improving, off antibiotic  Observe off further antibiotic  Keep legs elevated     Serial exams  Monitor temperature/WBC      2   Ascites, likely malignant   Patient is status post paracentesis   Peritoneal fluid parameters not consistent with infectious peritonitis  Patient is status post repeat paracentesis yesterday for symptomatic relief      3   Hypotension, present on admission   This has resolved   Patient completed empiric antibiotic course for possible septic shock, although there was no obvious active infection   All cultures have had no growth  Monitor hemodynamics      4   DENZEL   Creatinine up and down, overall improved      5   Encephalopathy, with slightly worsening mental status  Blood cultures were obtained rule out bacteremia  At present, there is no obvious active infection  Monitor mental status  Follow-up on pending blood cultures  6  Stage IV metastatic cholangiocarcinoma   Patient's prognosis is poor   His performance status is also quite poor   Doubt that he can tolerate chemotherapy  Oncology follow-up      Discussed with patient and his wife in detail regarding the above plan      Antibiotics:  Off antibiotic     Subjective:  Patient is stable and comfortable, a little more confused  Leg swelling and redness improved  Temperature stays down   No chills    No diarrhea      Objective:  Vitals:  Temp:  [97 3 °F (36 3 °C)-97 9 °F (36 6 °C)] 97 3 °F (36 3 °C)  HR: [83-86] 86  Resp:  [14-18] 16  BP: (89-97)/(53-57) 91/53  SpO2:  [96 %-100 %] 100 %  Temp (24hrs), Av 6 °F (36 4 °C), Min:97 3 °F (36 3 °C), Max:97 9 °F (36 6 °C)  Current: Temperature: (!) 97 3 °F (36 3 °C)    Physical Exam:     General: Awake, alert, cooperative, no distress  Neck:  Supple  No mass  No lymphadenopathy  Lungs: Expansion symmetric, no rales, no wheezing, respirations unlabored  Heart:  Regular rate and rhythm, S1 and S2 normal, no murmur  Abdomen: Soft, stable distention, non-tender, bowel sounds active all four quadrants, no masses, no organomegaly  Extremities: Stable leg edema  Slightly improved erythema/warmth  No draining ulcer  Minimal tenderness  Skin:  No rash  Neuro: Moves all extremities  Invasive Devices     Central Venous Catheter Line  Duration           Port A Cath Right Subclavian -- days          Drain  Duration           Urethral Catheter 18 Fr  <1 day                Labs studies:   I have personally reviewed pertinent labs  Results from last 7 days   Lab Units 23  0907 23  0740 23  0628 03/10/23  0535   POTASSIUM mmol/L 3 7 3 6 3 8 3 7   CHLORIDE mmol/L 98 98 98 99   CO2 mmol/L 18* 17* 18* 18*   BUN mg/dL 108* 108* 104* 98*   CREATININE mg/dL 2 76* 2 80* 2 38* 1 88*   EGFR ml/min/1 73sq m 25 24 30 40   CALCIUM mg/dL 7 6* 7 5* 7 8* 7 5*   AST U/L 61*  --  65* 54*   ALT U/L 14  --  16 15   ALK PHOS U/L 105  --  103 88     Results from last 7 days   Lab Units 23  0907 23  0628 03/10/23  0535   WBC Thousand/uL 9 97 8 19 6 72   HEMOGLOBIN g/dL 9 8* 10 3* 9 6*   PLATELETS Thousands/uL 93* 94* 76*     Results from last 7 days   Lab Units 23  1635   BLOOD CULTURE  Received in Microbiology Lab  Culture in Progress  Received in Microbiology Lab  Culture in Progress    --    GRAM STAIN RESULT   --   --  Rare Polys  No organisms seen   BODY FLUID CULTURE, STERILE   --   --  No growth       Imaging Studies:   I have personally reviewed pertinent imaging study reports and images in PACS  EKG, Pathology, and Other Studies:   I have personally reviewed pertinent reports

## 2023-03-12 NOTE — PROGRESS NOTES
[facility]  Progress Note Liliane Ashby 1970, 47 y o  male MRN: [id number]  Unit/Bed#: [facility] 813-01 Encounter: [id number]  Primary Care Provider: Ike Plasencia MD   Date and time admitted to hospital: 2/20/2023  3:04 AM    * Septic shock [facility])  Assessment & Plan  Present on admission  Shock resolved  Monitor closely-  Currently off of antibiotics    Bilateral lower extremity edema  Assessment & Plan  · Patient noted to have bilateral lower extremity edema which is likely multifactorial given his ascites and diffuse hepatic metastasis  · Lower extremity Doppler is negative for DVT  · Patient was started on antibiotics  Even after starting on antibiotics there was no significant improvement in the erythema even though the edema and erythema improved slightly  Discussed with infectious disease  Unlikely this is cellulitis antibiotics discontinued  · Hematology evaluation appreciated    Goals of care, counseling/discussion  Assessment & Plan  Patient with metastatic cholangiocarcinoma, worsening kidney function, worsening liver function   Overall guarded prognosis  Patient understands overall guarded prognosis however he would like to pursue active treatment and plans on following oncology outpatient for further cares  Discussed with patient and spouse in detail by previous provider  No limitation of care  Palliative on board-discussed with palliative care  If the patient is not improving as expected may need to revisit the goals of care-patient with extremely poor prognosis family and patient is aware of the poor prognosis but would want to continue with treatment oriented approach    Hepatic encephalopathy  Assessment & Plan  Patient with extensive hepatic metastasis  Lactulose-ammonia level is on the higher side will increase the lactulose dose and monitor response  Tremor present  Patient with worsening encephalopathy today  Increasing the dose of lactulose  Patient had 1 bowel movement today  Rule out infection  Had paracentesis today therapeutic and diagnostic-do not appear to have high enough PMN count for SBP  No fever  Obtain UA and blood culture to rule out infectious process  Obtain GI evaluate  Discussed with GI  Started on rifaximin    Elevated INR  Assessment & Plan  Likely due to metastatic liver disease  S/p vitamin K  Monitor INR-has been stable around 2 4    DENZEL (acute kidney injury) ([facility] [address] )  Assessment & Plan  Monitor kidney function closely  Avoid nephrotoxins  Nephrology following  Creatinine 2 38 -is getting another dose of albumin today  Patient is creatinine is fluctuating    Oropharyngeal candidiasis  Assessment & Plan  Received 7 days fluconazole treatment      Mesenteric thrombosis (HCC)  Assessment & Plan  Received IV heparin GTT  Discussed with oncology - Patient is recommended Lovenox 1 mg/kg body weight every 12 hours  Lovenox 1 mg/kg body wt twice daily-monitor lovenox  use with creatinine  If the patient creatinine is worsening he may not be a candidate for Eliquis  Patient did not have much treatment options either due to his liver failure with baseline elevated pro time  Outpatient oncology follow-up        Malignant ascites  Assessment & Plan  Requires frequent paracentesis    Status post paracentesis on 3/7 removing 6 L  Patient has having paracentesis twice a week Monday and Thursday as outpatient  Discussed with the patient about Tenckhoff catheter-patient and family is considering Tenckhoff catheter placement  Patient needed paracentesis today removed 1600 mL-we will give one-time dose of albumin after the paracentesis even though it is a low volume tap due to concern for low blood pressure        Thrombocytopenia (HCC)  Assessment & Plan  Multifactorial  Oncology inputs noted  Monitor counts-platelets- 76    GERD (gastroesophageal reflux disease)  Assessment & Plan  Continue PPI    Hyponatremia  Assessment & Plan  Multifactorial  Monitor closely  Sodium is 129 today  Monitor    Cholangiocarcinoma, stage IV [facility])  Assessment & Plan  Patient with metastatic cholangiocarcinoma  Discussed with patient overall guarded prognosis  Oncology has reiterated poor prognosis, patient reports he understands his guarded prognosis but would like to pursue active treatment at this time and plans to follow-up with oncology on discharge for further cares  Oncology inputs noted  Follow-up with oncology as an outpatient  Trend LFTs            VTE Pharmacologic Prophylaxis: VTE Score: 8 -therapeutic Lovenox    Patient Centered Rounds: Patient was evaluated along with the nursing staff  Discussions with Specialists or Other Care Team Provider:     Education and Discussions with Family / Patient: Updated  (wife) at bedside  Total Time Spent on Date of Encounter in care of patient: 65 minutes This time was spent on one or more of the following: performing physical exam; counseling and coordination of care; obtaining or reviewing history; documenting in the medical record; reviewing/ordering tests, medications or procedures; communicating with other healthcare professionals and discussing with patient's family/caregivers  Current Length of Stay: 19 day(s)  Current Patient Status: Inpatient   Certification Statement: The patient will continue to require additional inpatient hospital stay due to encephalopathy  Discharge Plan:     Code Status: Level 1 - Full Code    Subjective:   Patient seen and examined  Patient is more encephalopathic today  Wife in the room  Reported that she has not made much urine today either  When discussed with the nurses it appears that patient had urinated in the toilet bowel and later today but there was no documentation about the amount of urine produced  Disc is due with nephrology plan for another IV albumin    Wife reported that his ascites is significantly worse today which is actually true-given worsening encephalopathy concern for infectious process  Had an therapeutic and diagnostic paracentesis today  Removed thousand 600 mL  Due to persistent encephalopathy a CAT scan was ordered  Do not appear to have any acute intracranial abnormality  Objective:     Vitals:   Temp (24hrs), Av 4 °F (36 3 °C), Min:97 2 °F (36 2 °C), Max:97 5 °F (36 4 °C)    Temp:  [97 2 °F (36 2 °C)-97 5 °F (36 4 °C)] 97 2 °F (36 2 °C)  HR:  [76-87] 85  Resp:  [16-20] 18  BP: ()/(49-58) 93/56  SpO2:  [97 %-98 %] 98 %  Body mass index is 31 07 kg/m²  Input and Output Summary (last 24 hours): Intake/Output Summary (Last 24 hours) at 3/11/2023 1941  Last data filed at 3/11/2023 1901  Gross per 24 hour   Intake --   Output 1900 ml   Net -1900 ml       Physical Exam:   Physical Exam  Constitutional:       Appearance: He is not ill-appearing  HENT:      Head: Normocephalic  Nose: Nose normal    Eyes:      General: Scleral icterus present  Cardiovascular:      Rate and Rhythm: Normal rate and regular rhythm  Heart sounds: Normal heart sounds  Pulmonary:      Comments: Decreased breath sounds bilateral  Abdominal:      General: There is distension  Palpations: Abdomen is soft  There is no mass  Tenderness: There is no abdominal tenderness  Musculoskeletal:         General: Normal range of motion  Skin:     Coloration: Skin is not jaundiced  Neurological:      Mental Status: He is alert  Cranial Nerves: No cranial nerve deficit  Comments: Patient with worsening encephalopathy    Asterixis present         Additional Data:     Labs:  Results from last 7 days   Lab Units 23  0628 23  0606 23  0556   WBC Thousand/uL 8 19   < > 9 21   HEMOGLOBIN g/dL 10 3*   < > 10 7*   HEMATOCRIT % 29 1*   < > 30 4*   PLATELETS Thousands/uL 94*   < > 70*   NEUTROS PCT %  --   --  71   LYMPHS PCT %  --   --  11*   MONOS PCT %  --   --  16*   EOS PCT %  --   --  2    < > = values in this interval not displayed  Results from last 7 days   Lab Units 03/11/23  0628   SODIUM mmol/L 129*   POTASSIUM mmol/L 3 8   CHLORIDE mmol/L 98   CO2 mmol/L 18*   BUN mg/dL 104*   CREATININE mg/dL 2 38*   ANION GAP mmol/L 13   CALCIUM mg/dL 7 8*   ALBUMIN g/dL 3 9   TOTAL BILIRUBIN mg/dL 5 41*   ALK PHOS U/L 103   ALT U/L 16   AST U/L 65*   GLUCOSE RANDOM mg/dL 115     Results from last 7 days   Lab Units 03/11/23  1145   INR  2 46*     Results from last 7 days   Lab Units 03/11/23  1736 03/11/23  1117 03/11/23  0727 03/10/23  2120 03/10/23  1559 03/10/23  1136 03/10/23  0738 03/09/23  2126 03/09/23  1633 03/09/23  1135 03/09/23  0706 03/08/23  2048   POC GLUCOSE mg/dl 143* 144* 111 153* 122 168* 140 130 158* 160* 109 155*               Lines/Drains:  Invasive Devices     Central Venous Catheter Line  Duration           Port A Cath Right Subclavian -- days                Central Line:  Goal for removal: Port accessed  Will de-access as appropriate  Imaging: No pertinent imaging reviewed      Recent Cultures (last 7 days):         Last 24 Hours Medication List:   Current Facility-Administered Medications   Medication Dose Route Frequency Provider Last Rate   • al mag oxide-diphenhydramine-lidocaine viscous  10 mL Swish & Swallow Q4H PRN Yael Ruiz MD     • Albumin 25%  12 5 g Intravenous Once Derick Paz MD     • chlorhexidine  15 mL Swish & Spit Q12H [address] Yael Ruiz MD     • enoxaparin  1 mg/kg Subcutaneous Q12H [address] Sunita Morgan MD     • ergocalciferol  50,000 Units Oral Weekly Yael Ruiz MD     • gabapentin  300 mg Oral BID Sumi Caldera DO     • insulin lispro  1-6 Units Subcutaneous TID AC Yael Ruiz MD     • lactulose  30 g Oral TID Derick Paz MD     • melatonin  6 mg Oral HS Yael Ruiz MD     • midodrine  15 mg Oral 4x Daily Diane López MD     • JOYCE ANTIFUNGAL   Topical BID Yael Ruiz MD     • ondansetron  4 mg Intravenous Q8H PRN Graciela Berry MD     • oxyCODONE  5 mg Oral Q4H PRN Graciela Berry MD     • pantoprazole  40 mg Oral Early Morning Graciela Berry MD     • rifaximin  550 mg Oral Q12H Rosa Chacon MD     • saliva substitute  5 spray Mouth/Throat 4x Daily PRN Graciela Berry MD     • sodium bicarbonate  1,300 mg Oral BID (diuretic) Kristin Sandoval MD     • sucralfate  1 g Oral 4x Daily [facility]) Graciela Berry MD          Today, Patient Was Seen By: Romi Cox MD    **Please Note: This note may have been constructed using a voice recognition system  **

## 2023-03-12 NOTE — PLAN OF CARE
Problem: Prexisting or High Potential for Compromised Skin Integrity  Goal: Skin integrity is maintained or improved  Description: INTERVENTIONS:  - Identify patients at risk for skin breakdown  - Assess and monitor skin integrity  - Assess and monitor nutrition and hydration status  - Monitor labs   - Assess for incontinence   - Turn and reposition patient  - Assist with mobility/ambulation  - Relieve pressure over bony prominences  - Avoid friction and shearing  - Provide appropriate hygiene as needed including keeping skin clean and dry  - Evaluate need for skin moisturizer/barrier cream  - Collaborate with interdisciplinary team   - Patient/family teaching  - Consider wound care consult   Outcome: Progressing     Problem: CARDIOVASCULAR - ADULT  Goal: Maintains optimal cardiac output and hemodynamic stability  Description: INTERVENTIONS:  - Monitor I/O, vital signs and rhythm  - Monitor for S/S and trends of decreased cardiac output  - Administer and titrate ordered vasoactive medications to optimize hemodynamic stability  - Assess quality of pulses, skin color and temperature  - Assess for signs of decreased coronary artery perfusion  - Instruct patient to report change in severity of symptoms  Outcome: Progressing  Goal: Absence of cardiac dysrhythmias or at baseline rhythm  Description: INTERVENTIONS:  - Continuous cardiac monitoring, vital signs, obtain 12 lead EKG if ordered  - Administer antiarrhythmic and heart rate control medications as ordered  - Monitor electrolytes and administer replacement therapy as ordered  Outcome: Progressing     Problem: GASTROINTESTINAL - ADULT  Goal: Minimal or absence of nausea and/or vomiting  Description: INTERVENTIONS:  - Administer IV fluids if ordered to ensure adequate hydration  - Maintain NPO status until nausea and vomiting are resolved  - Nasogastric tube if ordered  - Administer ordered antiemetic medications as needed  - Provide nonpharmacologic comfort measures as appropriate  - Advance diet as tolerated, if ordered  - Consider nutrition services referral to assist patient with adequate nutrition and appropriate food choices  Outcome: Progressing  Goal: Maintains or returns to baseline bowel function  Description: INTERVENTIONS:  - Assess bowel function  - Encourage oral fluids to ensure adequate hydration  - Administer IV fluids if ordered to ensure adequate hydration  - Administer ordered medications as needed  - Encourage mobilization and activity  - Consider nutritional services referral to assist patient with adequate nutrition and appropriate food choices  Outcome: Progressing  Goal: Maintains adequate nutritional intake  Description: INTERVENTIONS:  - Monitor percentage of each meal consumed  - Identify factors contributing to decreased intake, treat as appropriate  - Assist with meals as needed  - Monitor I&O, weight, and lab values if indicated  - Obtain nutrition services referral as needed  Outcome: Progressing     Problem: METABOLIC, FLUID AND ELECTROLYTES - ADULT  Goal: Electrolytes maintained within normal limits  Description: INTERVENTIONS:  - Monitor labs and assess patient for signs and symptoms of electrolyte imbalances  - Administer electrolyte replacement as ordered  - Monitor response to electrolyte replacements, including repeat lab results as appropriate  - Instruct patient on fluid and nutrition as appropriate  Outcome: Progressing  Goal: Fluid balance maintained  Description: INTERVENTIONS:  - Monitor labs   - Monitor I/O and WT  - Instruct patient on fluid and nutrition as appropriate  - Assess for signs & symptoms of volume excess or deficit  Outcome: Progressing  Goal: Glucose maintained within target range  Description: INTERVENTIONS:  - Monitor Blood Glucose as ordered  - Assess for signs and symptoms of hyperglycemia and hypoglycemia  - Administer ordered medications to maintain glucose within target range  - Assess nutritional intake and initiate nutrition service referral as needed  Outcome: Progressing     Problem: HEMATOLOGIC - ADULT  Goal: Maintains hematologic stability  Description: INTERVENTIONS  - Assess for signs and symptoms of bleeding or hemorrhage  - Monitor labs  - Administer supportive blood products/factors as ordered and appropriate  Outcome: Progressing     Problem: MUSCULOSKELETAL - ADULT  Goal: Maintain or return mobility to safest level of function  Description: INTERVENTIONS:  - Assess patient's ability to carry out ADLs; assess patient's baseline for ADL function and identify physical deficits which impact ability to perform ADLs (bathing, care of mouth/teeth, toileting, grooming, dressing, etc )  - Assess/evaluate cause of self-care deficits   - Assess range of motion  - Assess patient's mobility  - Assess patient's need for assistive devices and provide as appropriate  - Encourage maximum independence but intervene and supervise when necessary  - Involve family in performance of ADLs  - Assess for home care needs following discharge   - Consider OT consult to assist with ADL evaluation and planning for discharge  - Provide patient education as appropriate  Outcome: Progressing  Goal: Maintain proper alignment of affected body part  Description: INTERVENTIONS:  - Support, maintain and protect limb and body alignment  - Provide patient/ family with appropriate education  Outcome: Progressing     Problem: PAIN - ADULT  Goal: Verbalizes/displays adequate comfort level or baseline comfort level  Description: Interventions:  - Encourage patient to monitor pain and request assistance  - Assess pain using appropriate pain scale  - Administer analgesics based on type and severity of pain and evaluate response  - Implement non-pharmacological measures as appropriate and evaluate response  - Consider cultural and social influences on pain and pain management  - Notify physician/advanced practitioner if interventions unsuccessful or patient reports new pain  Outcome: Progressing     Problem: INFECTION - ADULT  Goal: Absence or prevention of progression during hospitalization  Description: INTERVENTIONS:  - Assess and monitor for signs and symptoms of infection  - Monitor lab/diagnostic results  - Monitor all insertion sites, i e  indwelling lines, tubes, and drains  - Monitor endotracheal if appropriate and nasal secretions for changes in amount and color  - [place] appropriate cooling/warming therapies per order  - Administer medications as ordered  - Instruct and encourage patient and family to use good hand hygiene technique  - Identify and instruct in appropriate isolation precautions for identified infection/condition  Outcome: Progressing     Problem: DISCHARGE PLANNING  Goal: Discharge to home or other facility with appropriate resources  Description: INTERVENTIONS:  - Identify barriers to discharge w/patient and caregiver  - Arrange for needed discharge resources and transportation as appropriate  - Identify discharge learning needs (meds, wound care, etc )  - Arrange for interpretive services to assist at discharge as needed  - Refer to Case Management Department for coordinating discharge planning if the patient needs post-hospital services based on physician/advanced practitioner order or complex needs related to functional status, cognitive ability, or social support system  Outcome: Progressing     Problem: Knowledge Deficit  Goal: Patient/family/caregiver demonstrates understanding of disease process, treatment plan, medications, and discharge instructions  Description: Complete learning assessment and assess knowledge base    Interventions:  - Provide teaching at level of understanding  - Provide teaching via preferred learning methods  Outcome: Progressing     Problem: Nutrition/Hydration-ADULT  Goal: Nutrient/Hydration intake appropriate for improving, restoring or maintaining nutritional needs  Description: Monitor and assess patient's nutrition/hydration status for malnutrition  Collaborate with interdisciplinary team and initiate plan and interventions as ordered  Monitor patient's weight and dietary intake as ordered or per policy  Utilize nutrition screening tool and intervene as necessary  Determine patient's food preferences and provide high-protein, high-caloric foods as appropriate       INTERVENTIONS:  - Monitor oral intake, urinary output, labs, and treatment plans  - Assess nutrition and hydration status and recommend course of action  - Evaluate amount of meals eaten  - Assist patient with eating if necessary   - Allow adequate time for meals  - Recommend/ encourage appropriate diets, oral nutritional supplements, and vitamin/mineral supplements  - Order, calculate, and assess calorie counts as needed  - Recommend, monitor, and adjust tube feedings and TPN/PPN based on assessed needs  - Assess need for intravenous fluids  - Provide specific nutrition/hydration education as appropriate  - Include patient/family/caregiver in decisions related to nutrition  Outcome: Progressing

## 2023-03-13 NOTE — ASSESSMENT & PLAN NOTE
· Patient noted to have bilateral lower extremity edema which is likely multifactorial given his ascites and diffuse hepatic metastasis  · Lower extremity Doppler is negative for DVT  · Patient was started on antibiotics  Even after starting on antibiotics there was no significant improvement in the erythema even though the edema and erythema improved slightly  Discussed with infectious disease    Unlikely this is cellulitis antibiotics discontinued  · Hematology evaluation appreciated  · Lower extremity edema appears to be improving

## 2023-03-13 NOTE — ASSESSMENT & PLAN NOTE
Present on admission    Admitted to the intensive care unit  Shock resolved  Monitor closely-  Currently off of antibiotics  ID was on board

## 2023-03-13 NOTE — PROGRESS NOTES
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  Lytic bone lesion of hip  Cancer-related pain  Nausea/vomiting  Goals of care counseling  Palliative care encounter    Plan  1  Symptom Management  · Pain  · Gabapentin 300 mg BID  · Oxycodone 5 mg PO q4 hrs PRN moderate pain  · Nausea/vomiting  · Pantoprazole 40 mg PO daily  · Sucralfate 1 g four times daily (before meals at bedtime)  · Zofran 4 mg IV q8 hrs PRN nausea  · Bowel Regimen  · Lactulose oral solution 10 g daily  · Mouth discomfort  · Magic mouthwash 10 mL q4 hrs PRN mouth pain or discomfort  · Mouth Kote 5 sprays four times daily PRN dry mouth  · Insomnia  · Melatonin 6 mg qHS    2  Goals of Care  • Level 1 code status  • Plan for Tenckhoff catheter with IR today  • Unfortunately, prognosis is very poor, will have detailed discussions to address CODE STATUS in the next 24 hours        Code Status: Full - Level 1  Decisional apparatus:  Patient is competent on my exam today  If competence is lost, patient's substitute decision maker would default to wife [place]  Advance Directive / Living Will / POLST:  None on file    3  Social Support  • Patient is well-supported by his wife who visits regularly  • He has 1 biological son and two other nonbiological children and is very involved in their life    4  Follow-up  • Palliative care will continue to follow and goals of care conversations will be ongoing  Please reach out with any questions or concerns  • Patient scheduled for outpatient follow-up on 3/17/23    24 Hour History  Chart reviewed before visit  Patient doing well today  He plans to go for Tenckhoff catheter placement for his recurrent malignant ascites  He was still like to continue disease directed cares if they are available  He denies any somatic complaints      Review of Systems   Constitutional: Positive for activity change  Cardiovascular: Positive for leg swelling  Gastrointestinal: Positive for abdominal distention  Hematological: Bruises/bleeds easily  Psychiatric/Behavioral: The patient is not nervous/anxious  All other systems reviewed and are negative        Medications    Current Facility-Administered Medications:   •  al mag oxide-diphenhydramine-lidocaine viscous (MAGIC MOUTHWASH) suspension 10 mL, 10 mL, Swish & Swallow, Q4H PRN, Graciela Berry MD, 10 mL at 02/26/23 1232  •  albumin human (FLEXBUMIN) 25 % injection 25 g, 25 g, Intravenous, Q6H, Ian Gonzáles, DO, Last Rate: 0 mL/hr at 03/12/23 1643, 25 g at 03/13/23 0442  •  chlorhexidine (PERIDEX) 0 12 % oral rinse 15 mL, 15 mL, Swish & Spit, Q12H [address], Graciela Berry MD, 15 mL at 03/13/23 0827  •  enoxaparin (LOVENOX) subcutaneous injection 100 mg, 1 mg/kg, Subcutaneous, Q12H [address], Charis Wiley MD, 100 mg at 03/13/23 4474  •  ergocalciferol (VITAMIN D2) capsule 50,000 Units, 50,000 Units, Oral, Weekly, Graciela Berry MD, 50,000 Units at 03/09/23 1333  •  insulin lispro (HumaLOG) 100 units/mL subcutaneous injection 1-6 Units, 1-6 Units, Subcutaneous, TID AC, 1 Units at 03/12/23 0747 **AND** Fingerstick Glucose (POCT), , , TID AC, Graciela Berry MD  •  lactulose oral solution 30 g, 30 g, Oral, TID, Romi Cox MD, 30 g at 03/13/23 0827  •  midodrine (PROAMATINE) tablet 15 mg, 15 mg, Oral, 4x Daily, Kristin Sandoval MD, 15 mg at 03/13/23 2025  •  moisture barrier miconazole 2% cream (aka JOYCE MOISTURE BARRIER ANTIFUNGAL CREAM), , Topical, BID, Graciela Berry MD, Given at 03/13/23 0834  •  ondansetron (ZOFRAN) injection 4 mg, 4 mg, Intravenous, Q8H PRN, Graciela Berry MD, 4 mg at 02/22/23 0006  •  oxyCODONE (ROXICODONE) IR tablet 5 mg, 5 mg, Oral, Q4H PRN, Graciela Berry MD  •  pantoprazole (PROTONIX) EC tablet 40 mg, 40 mg, Oral, Early Morning, Graciela Berry, MD, 40 mg at 03/13/23 7472  •  rifaximin (XIFAXAN) tablet 550 mg, 550 mg, Oral, Q12H [address], Derick Paz MD, 550 mg at 03/13/23 0654  •  saliva substitute (MOUTH KOTE) mucosal solution 5 spray, 5 spray, Mouth/Throat, 4x Daily PRN, Yael Ruiz MD, 5 spray at 02/25/23 2227  •  sodium bicarbonate tablet 1,300 mg, 1,300 mg, Oral, BID (diuretic), Diane López MD, 1,300 mg at 03/13/23 2121  •  sucralfate (CARAFATE) tablet 1 g, 1 g, Oral, 4x Daily (AC & HS), Yael Ruiz MD, 1 g at 03/13/23 1798    Objective  BP 91/52   Pulse 100   Temp (!) 97 3 °F (36 3 °C)   Resp 16   Ht 6' (1 829 m)   Wt 96 3 kg (212 lb 4 9 oz)   SpO2 96%   BMI 28 79 kg/m²   Physical Exam:   Constitutional: Appears chronically ill  Comfortable and in no acute distress  Head: Normocephalic and atraumatic  Eyes: EOM are normal  No ocular discharge  No scleral icterus  Neck: no visible adenopathy or masses  Cardiac: Normal rate   Respiratory: Effort normal  No stridor  No respiratory distress  No cough  Gastrointestinal: Abdominal distension present  Musculoskeletal: No edema  Neurological: Alert, oriented and appropriately conversant  Skin: Dry, no diaphoresis  Pale  Psychiatric: Displays a normal mood and affect  Behavior, judgement and thought content appear normal      Lab Results:   I have personally reviewed pertinent labs  , CBC:   No results found for: WBC, HGB, HCT, MCV, PLT, ADJUSTEDWBC, MCH, MCHC, RDW, MPV, NRBC, CMP:   No results found for: SODIUM, K, CL, CO2, ANIONGAP, BUN, CREATININE, GLUCOSE, CALCIUM, AST, ALT, ALKPHOS, PROT, BILITOT, EGFR  Imaging Studies: I have personally reviewed pertinent reports  EKG, Pathology, and Other Studies: I have personally reviewed pertinent reports  Counseling / Coordination of Care  Total floor / unit time spent today 30 minutes  Greater than 50% of total time was spent with the patient and / or family counseling and / or coordinating of care   A description of the counseling / coordination of care: Chart reviewed,  provided medical updates, determined goals of care, discussed palliative care and symptom management, determined competency and POA/HCA, determined social/family support, provided psychosocial support  Yakov Husain DO  Palliative and Supportive Care  [phone]  ,        Portions of this document may have been created using dictation software and as such some "sound alike" terms may have been generated by the system  Do not hesitate to contact me with any questions or clarifications

## 2023-03-13 NOTE — ASSESSMENT & PLAN NOTE
Multifactorial  Oncology inputs noted  Monitor counts-platelets-98     Continue with current dose of Lovenox

## 2023-03-13 NOTE — PLAN OF CARE
Problem: Prexisting or High Potential for Compromised Skin Integrity  Goal: Skin integrity is maintained or improved  Description: INTERVENTIONS:  - Identify patients at risk for skin breakdown  - Assess and monitor skin integrity  - Assess and monitor nutrition and hydration status  - Monitor labs   - Assess for incontinence   - Turn and reposition patient  - Assist with mobility/ambulation  - Relieve pressure over bony prominences  - Avoid friction and shearing  - Provide appropriate hygiene as needed including keeping skin clean and dry  - Evaluate need for skin moisturizer/barrier cream  - Collaborate with interdisciplinary team   - Patient/family teaching  - Consider wound care consult   Outcome: Progressing     Problem: CARDIOVASCULAR - ADULT  Goal: Maintains optimal cardiac output and hemodynamic stability  Description: INTERVENTIONS:  - Monitor I/O, vital signs and rhythm  - Monitor for S/S and trends of decreased cardiac output  - Administer and titrate ordered vasoactive medications to optimize hemodynamic stability  - Assess quality of pulses, skin color and temperature  - Assess for signs of decreased coronary artery perfusion  - Instruct patient to report change in severity of symptoms  Outcome: Progressing  Goal: Absence of cardiac dysrhythmias or at baseline rhythm  Description: INTERVENTIONS:  - Continuous cardiac monitoring, vital signs, obtain 12 lead EKG if ordered  - Administer antiarrhythmic and heart rate control medications as ordered  - Monitor electrolytes and administer replacement therapy as ordered  Outcome: Progressing     Problem: METABOLIC, FLUID AND ELECTROLYTES - ADULT  Goal: Electrolytes maintained within normal limits  Description: INTERVENTIONS:  - Monitor labs and assess patient for signs and symptoms of electrolyte imbalances  - Administer electrolyte replacement as ordered  - Monitor response to electrolyte replacements, including repeat lab results as appropriate  - Instruct patient on fluid and nutrition as appropriate  Outcome: Progressing  Goal: Fluid balance maintained  Description: INTERVENTIONS:  - Monitor labs   - Monitor I/O and WT  - Instruct patient on fluid and nutrition as appropriate  - Assess for signs & symptoms of volume excess or deficit  Outcome: Progressing  Goal: Glucose maintained within target range  Description: INTERVENTIONS:  - Monitor Blood Glucose as ordered  - Assess for signs and symptoms of hyperglycemia and hypoglycemia  - Administer ordered medications to maintain glucose within target range  - Assess nutritional intake and initiate nutrition service referral as needed  Outcome: Progressing     Problem: Knowledge Deficit  Goal: Patient/family/caregiver demonstrates understanding of disease process, treatment plan, medications, and discharge instructions  Description: Complete learning assessment and assess knowledge base    Interventions:  - Provide teaching at level of understanding  - Provide teaching via preferred learning methods  Outcome: Progressing

## 2023-03-13 NOTE — PROGRESS NOTES
Progress Note - Infectious Disease   Norman Courts Symone 47 y o  male MRN: [id number]  [facility] Encounter: [id number]      Impression/Recommendations:  1   Bilateral leg erythema over the last few days   Despite erythema, there is not significant warmth or tenderness   In addition, patient has no fever or leukocytosis   Overall, physical exam and clinical picture is still not consistent with cellulitis      Patient had been on antibiotic but now off it   Symptoms and findings stable, and actually slowly improving, off antibiotic  Observe off further antibiotic  Keep legs elevated     Serial exams  Monitor temperature/WBC      2   Ascites, likely malignant   Patient is status post paracentesis   Peritoneal fluid parameters not consistent with infectious peritonitis  Patient is getting intermittent paracentesis for symptomatic relief      3   Hypotension, present on admission   This has resolved   Patient completed empiric antibiotic course for possible septic shock, although there was no obvious active infection   All cultures have had no growth  Monitor hemodynamics      4   DENZEL   Creatinine up and down, overall improved      5   Encephalopathy, with slightly worsening mental status  Blood cultures were obtained rule out bacteremia, without growth  At present, there is no obvious active infection  Monitor mental status  Follow-up on pending blood cultures      6  Stage IV metastatic cholangiocarcinoma   Patient's prognosis is poor   His performance status is also quite poor   Doubt that he can tolerate chemotherapy  Oncology follow-up      Discussed with patient in detail regarding the above plan  Discussed with Dr Eladia James from [facility] service  Without any active infection, I will sign off  Thank you for the consultation    Please do not hesitate to reconsult us for any further questions or issues      Antibiotics:  Off antibiotic     Subjective:  Patient is much more awake and alert today  He complains of abdominal distention but no pain  Leg swelling and redness improved  Temperature stays down   No chills  No diarrhea      Objective:  Vitals:  Temp:  [97 3 °F (36 3 °C)-97 5 °F (36 4 °C)] 97 3 °F (36 3 °C)  HR:  [] 100  Resp:  [16] 16  BP: (89-91)/(52-53) 91/52  SpO2:  [96 %-99 %] 96 %  Temp (24hrs), Av 4 °F (36 3 °C), Min:97 3 °F (36 3 °C), Max:97 5 °F (36 4 °C)  Current: Temperature: (!) 97 3 °F (36 3 °C)    Physical Exam:     General: Awake, alert, cooperative, no distress  Neck:  Supple  No mass  No lymphadenopathy  Lungs: Expansion symmetric, no rales, no wheezing, respirations unlabored  Heart:  Regular rate and rhythm, S1 and S2 normal, no murmur  Abdomen: Soft, nondistended, non-tender, bowel sounds active all four quadrants, no masses, no organomegaly  Extremities: Stable leg edema  Stable mild erythema/warmth  No ulcer  Nontender to palpation  Skin:  No rash  Neuro: Moves all extremities  Invasive Devices     Central Venous Catheter Line  Duration           Port A Cath Right Subclavian -- days          Drain  Duration           Urethral Catheter 18 Fr  1 day                Labs studies:   I have personally reviewed pertinent labs    Results from last 7 days   Lab Units 23  1013 23  0907 23  0740 23  0628   POTASSIUM mmol/L 3 3* 3 7 3 6 3 8   CHLORIDE mmol/L 97 98 98 98   CO2 mmol/L 18* 18* 17* 18*   BUN mg/dL 106* 108* 108* 104*   CREATININE mg/dL 2 80* 2 76* 2 80* 2 38*   EGFR ml/min/1 73sq m 24 25 24 30   CALCIUM mg/dL 8 0* 7 6* 7 5* 7 8*   AST U/L 62* 61*  --  65*   ALT U/L 14 14  --  16   ALK PHOS U/L 104 105  --  103     Results from last 7 days   Lab Units 23  1013 23  0907 23  0628   WBC Thousand/uL 9 10 9 97 8 19   HEMOGLOBIN g/dL 9 6* 9 8* 10 3*   PLATELETS Thousands/uL 98* 93* 94*     Results from last 7 days   Lab Units 23  1635   BLOOD CULTURE  No Growth at 24 hrs  No Growth at 24 hrs   --    GRAM STAIN RESULT   --   --  Rare Polys  No organisms seen   BODY FLUID CULTURE, STERILE   --   --  No growth       Imaging Studies:   I have personally reviewed pertinent imaging study reports and images in PACS  EKG, Pathology, and Other Studies:   I have personally reviewed pertinent reports

## 2023-03-13 NOTE — ASSESSMENT & PLAN NOTE
Requires frequent paracentesis  Status post paracentesis on 3/7 removing 6 L  Patient has having paracentesis twice a week Monday and Thursday as outpatient  Discussed with the patient about Tenckhoff catheter-patient and family is considering Tenckhoff catheter placement  Patient needed paracentesis today removed 1600 mL-we will give one-time dose of albumin after the paracentesis even though it is a low volume tap due to concern for low blood pressure  Patient had another paracentesis today 3 6 L removed    Patient get IV albumin every 6 hours-25 mg  Plan for Tenckhoff catheter noted

## 2023-03-13 NOTE — ASSESSMENT & PLAN NOTE
Likely due to metastatic liver disease  S/p vitamin K  Monitor INR-has been stable around 2 4  Interventional radiology needs INR less than 2 5 for Tenckhoff catheter

## 2023-03-13 NOTE — PROGRESS NOTES
[facility]  Progress Note Charisse Ashby 1970, 47 y o  male MRN: [id number]  Unit/Bed#: [facility] 813-01 Encounter: [id number]  Primary Care Provider: Shamika Odom MD   Date and time admitted to hospital: 2/20/2023  3:04 AM    * Septic shock [facility])  Assessment & Plan  Present on admission  Admitted to the intensive care unit  Shock resolved  Monitor closely-  Currently off of antibiotics  ID was on board    Bilateral lower extremity edema  Assessment & Plan  · Patient noted to have bilateral lower extremity edema which is likely multifactorial given his ascites and diffuse hepatic metastasis  · Lower extremity Doppler is negative for DVT  · Patient was started on antibiotics  Even after starting on antibiotics there was no significant improvement in the erythema even though the edema and erythema improved slightly  Discussed with infectious disease  Unlikely this is cellulitis antibiotics discontinued  · Hematology evaluation appreciated  · Lower extremity edema appears to be improving    Goals of care, counseling/discussion  Assessment & Plan  Patient with metastatic cholangiocarcinoma, worsening kidney function, worsening liver function   Overall guarded prognosis  Patient understands overall guarded prognosis however he would like to pursue active treatment and plans on following oncology outpatient for further cares  Discussed with patient and spouse in detail by previous provider  No limitation of care  Palliative on board-discussed with palliative care    If the patient is not improving as expected may need to revisit the goals of care-patient with extremely poor prognosis family and patient is aware of the poor prognosis but would want to continue with treatment oriented approach  Discussed with palliative care-palliative to have a meeting with family tomorrow    Hepatic encephalopathy  Assessment & Plan  Patient with extensive hepatic metastasis  Lactulose-ammonia level is on the higher side will increase the lactulose dose and monitor response  Tremor present  Patient with worsening encephalopathy today  Increasing the dose of lactulose  Patient had 1 bowel movement today  Rule out infection  Had paracentesis today therapeutic and diagnostic-do not appear to have high enough PMN count for SBP  No fever  Obtain UA and blood culture to rule out infectious process  Obtain GI evaluate  Discussed with GI  Started on rifaximin  Encephalopathy is improving  Patient with diarrhea  We will back off on the lactulose    Elevated INR  Assessment & Plan  Likely due to metastatic liver disease  S/p vitamin K  Monitor INR-has been stable around 2 4  Interventional radiology needs INR less than 2 5 for Tenckhoff catheter    DENZEL (acute kidney injury) [facility])  Assessment & Plan  Monitor kidney function closely  Avoid nephrotoxins  Nephrology following  Creatinine 2 8 today  Patient is creatinine is fluctuating  IV albumin per nephrology  With hepatorenal syndrome  Patient is not a candidate for dialysis  Urine output is on the lower side  Maintain the Harvey catheter for accurate I's and O's    Oropharyngeal candidiasis  Assessment & Plan  Received 7 days fluconazole treatment      Mesenteric thrombosis (HCC)  Assessment & Plan  Received IV heparin GTT  Discussed with oncology - recommended Lovenox 1 mg/kg body weight every 12 hours  Lovenox 1 mg/kg body wt twice daily-monitor lovenox  use with creatinine  Patient is not a candidate for NOACs due to  Child fuentes score, patient with baseline abnormal protime   Outpatient oncology follow-up  Discussed with oncology plan is to continue with the Lovenox for now        Malignant ascites  Assessment & Plan  Requires frequent paracentesis    Status post paracentesis on 3/7 removing 6 L  Patient has having paracentesis twice a week Monday and Thursday as outpatient  Discussed with the patient about Bhupendra catheter-patient and family is considering Tenckhoff catheter placement  Patient needed paracentesis today removed 1600 mL-we will give one-time dose of albumin after the paracentesis even though it is a low volume tap due to concern for low blood pressure  Patient had another paracentesis today 3 6 L removed  Patient get IV albumin every 6 hours-25 mg  Plan for Tenckhoff catheter noted        Thrombocytopenia [facility])  Assessment & Plan  Multifactorial  Oncology inputs noted  Monitor counts-platelets-98  Continue with current dose of Lovenox    GERD (gastroesophageal reflux disease)  Assessment & Plan  Continue PPI    Hyponatremia  Assessment & Plan  Multifactorial  Monitor closely  Sodium is 128 today  Monitor  Replete potassium    Cholangiocarcinoma, stage IV [facility])  Assessment & Plan  Patient with metastatic cholangiocarcinoma  Discussed with patient overall guarded prognosis  Oncology has reiterated poor prognosis, patient reports he understands his guarded prognosis but would like to pursue active treatment at this time and plans to follow-up with oncology on discharge for further cares  Oncology inputs noted  Follow-up with oncology as an outpatient  Trend LFTs          VTE Pharmacologic Prophylaxis: VTE Score: 8 Lovenox  Patient Centered Rounds: I performed bedside rounds with nursing staff today  Discussions with Specialists or Other Care Team Provider: Care and nephrology   Interim sternal radiology  Education and Discussions with Family / Patient: Updated  (wife) at bedside  Total Time Spent on Date of Encounter in care of patient: 45 minutes This time was spent on one or more of the following: performing physical exam; counseling and coordination of care; obtaining or reviewing history; documenting in the medical record; reviewing/ordering tests, medications or procedures; communicating with other healthcare professionals and discussing with patient's family/caregivers      Current Length of Stay: 21 day(s)  Current Patient Status: Inpatient   Certification Statement: The patient will continue to require additional inpatient hospital stay due to DENZEL  Discharge Plan:     Code Status: Level 1 - Full Code    Subjective:   Patient seen and examined  Encephalopathy appears to be  Patient able to answer questions appropriately  Wife also in the room confirmed that he is doing better encephalopathy  Discussed with interventional radiology  Had paracentesis x1 today and removed 3 6 L of fluid  For Tenckhoff catheter Wednesday or Thursday  Discussed with palliative care  Palliative due to meet with the family tomorrow    Objective:     Vitals:   Temp (24hrs), Av 4 °F (36 3 °C), Min:97 3 °F (36 3 °C), Max:97 5 °F (36 4 °C)    Temp:  [97 3 °F (36 3 °C)-97 5 °F (36 4 °C)] 97 3 °F (36 3 °C)  HR:  [] 88  Resp:  [16-19] 16  BP: (87-97)/(49-53) 87/49  SpO2:  [96 %-100 %] 97 %  Body mass index is 28 79 kg/m²  Input and Output Summary (last 24 hours): Intake/Output Summary (Last 24 hours) at 3/13/2023 1940  Last data filed at 3/13/2023 1326  Gross per 24 hour   Intake --   Output 3600 ml   Net -3600 ml       Physical Exam:   Physical Exam  Constitutional:       General: He is not in acute distress  HENT:      Head: Normocephalic and atraumatic  Nose: Nose normal    Eyes:      General: Scleral icterus present  Cardiovascular:      Rate and Rhythm: Normal rate and regular rhythm  Pulmonary:      Effort: No respiratory distress  Abdominal:      General: There is distension  Tenderness: There is no abdominal tenderness  Musculoskeletal:      Cervical back: Neck supple  Right lower leg: Edema present  Left lower leg: Edema present  Comments: Bilateral lower extremity edema and erythema noted improving   Skin:     Findings: Erythema present  Neurological:      Mental Status: He is alert  Comments: History is present    Encephalopathy is improved Additional Data:     Labs:  Results from last 7 days   Lab Units 03/13/23  1013 03/09/23  0606 03/08/23  0556   WBC Thousand/uL 9 10   < > 9 21   HEMOGLOBIN g/dL 9 6*   < > 10 7*   HEMATOCRIT % 27 4*   < > 30 4*   PLATELETS Thousands/uL 98*   < > 70*   NEUTROS PCT %  --   --  71   LYMPHS PCT %  --   --  11*   MONOS PCT %  --   --  16*   EOS PCT %  --   --  2    < > = values in this interval not displayed  Results from last 7 days   Lab Units 03/13/23  1013   SODIUM mmol/L 128*   POTASSIUM mmol/L 3 3*   CHLORIDE mmol/L 97   CO2 mmol/L 18*   BUN mg/dL 106*   CREATININE mg/dL 2 80*   ANION GAP mmol/L 13   CALCIUM mg/dL 8 0*   ALBUMIN g/dL 4 1   TOTAL BILIRUBIN mg/dL 5 99*   ALK PHOS U/L 104   ALT U/L 14   AST U/L 62*   GLUCOSE RANDOM mg/dL 162*     Results from last 7 days   Lab Units 03/13/23  1013   INR  2 32*     Results from last 7 days   Lab Units 03/13/23  1559 03/13/23  1120 03/13/23  0822 03/12/23  2059 03/12/23  1559 03/12/23  1051 03/12/23  0616 03/11/23  1736 03/11/23  1117 03/11/23  0727 03/10/23  2120 03/10/23  1559   POC GLUCOSE mg/dl 160* 141* 122 184* 128 136 187* 143* 144* 111 153* 122               Lines/Drains:  Invasive Devices     Central Venous Catheter Line  Duration           Port A Cath Right Subclavian -- days          Drain  Duration           Urethral Catheter 18 Fr  1 day              Urinary Catheter:  Goal for removal: Remove after 48 hrs of I/O monitoring         Central Line:  Goal for removal: Port accessed  Will de-access as appropriate  Imaging: No pertinent imaging reviewed  Recent Cultures (last 7 days):   Results from last 7 days   Lab Units 03/11/23 2129 03/11/23 2128 03/11/23  1635   BLOOD CULTURE  No Growth at 24 hrs   No Growth at 24 hrs   --    GRAM STAIN RESULT   --   --  Rare Polys  No organisms seen   BODY FLUID CULTURE, STERILE   --   --  No growth       Last 24 Hours Medication List:   Current Facility-Administered Medications   Medication Dose Route Frequency Provider Last Rate   • al mag oxide-diphenhydramine-lidocaine viscous  10 mL Swish & Swallow Q4H PRN Ryan Nix MD     • Albumin 25%  25 g Intravenous Q6H Alena Pelaez MD     • chlorhexidine  15 mL Swish & Spit Q12H [facility] Ryan Nix MD     • enoxaparin  1 mg/kg Subcutaneous Q12H [facility] Curtis Mcwilliams MD     • ergocalciferol  50,000 Units Oral Weekly Ryan Nix MD     • insulin lispro  1-6 Units Subcutaneous TID AC Ryan Nix MD     • [START ON 3/14/2023] lactulose  20 g Oral BID Maxwell Bui MD     • midodrine  15 mg Oral 4x Daily Eliza Pederson MD     • JOYCE ANTIFUNGAL   Topical BID Ryan Nix MD     • ondansetron  4 mg Intravenous Q8H PRN Ryan Nix MD     • oxyCODONE  5 mg Oral Q4H PRN Ryan Nix MD     • pantoprazole  40 mg Oral Early Morning Ryan Nix MD     • rifaximin  550 mg Oral Q12H Mariaelena Clay MD     • saliva substitute  5 spray Mouth/Throat 4x Daily PRN Ryan Nix MD     • sodium bicarbonate  1,300 mg Oral TID after meals Alena Pelaez MD     • sucralfate  1 g Oral 4x Daily (AC & HS) Ryan Nix MD          Today, Patient Was Seen By: Maxwell Bui MD    **Please Note: This note may have been constructed using a voice recognition system  **

## 2023-03-13 NOTE — UTILIZATION REVIEW
Continued Stay Review    Date: 3/13/23                          Current Patient Class: inpatient  Current Level of Care: med surg    HPI:52 y o  male initially admitted on 2/20/23     Assessment/Plan:  Abdominal distention continues, leg swelling noted  Palliative care following and will address code status in next 24 hrs dt poor prognosis  Continue pain control with gabapentin and oxycodone  Continue antiemetics, lactulose, magic mouthwas prn, melatonin  IR consult today for biweekly paracentesis  IR agrees with tariq best option for pt at this time  Plan for tekoff placement on Wednesday or Thursday, make NPO after MN on night prior  INR needs to be <2 5 for placement  Paracentesis today for comfort  ID has signed off  Nephrology following: Continue current treatment with IV albumin, midodrine 15 mg 4 times daily  Avoid hypotension  Reordered IV albumin 25 g every 6 hours  Would hold off on diuretics       Vital Signs:   Date/Time Temp Pulse Resp BP MAP (mmHg) SpO2 O2 Device Patient Position - Orthostatic VS   03/13/23 15:10:08 97 3 °F (36 3 °C) Abnormal  88 16 87/49 Abnormal  62 Abnormal  97 % -- --   03/13/23 13:25:20 -- 86 19 96/53 -- 100 % -- --   03/13/23 13:12:45 -- -- -- -- -- 99 % -- --   03/13/23 13:12:34 -- -- -- 97/52 -- -- -- --   03/13/23 12:41:13 -- 72 -- 93/53 -- 96 % -- --   03/13/23 0745 -- -- -- -- -- -- None (Room air) --   03/13/23 06:24:47 97 3 °F (36 3 °C) Abnormal  100 16 91/52 65 96 % -- --   03/12/23 2313 -- -- -- -- -- -- None (Room air) --   03/12/23 2237 97 5 °F (36 4 °C) 85 16 89/52 Abnormal  -- 99 % -- --   03/12/23 1607 97 3 °F (36 3 °C) Abnormal  -- 16 91/53 -- -- -- --   03/12/23 0900 97 9 °F (36 6 °C) 86 16 91/56 -- 100 % -- --   03/11/23 2238 97 5 °F (36 4 °C) 86 14 97/57 70 96 % -- Lying   03/11/23 16:30:26 -- 85 18 93/56 -- 98 %  -- --   SpO2: RA at 03/11/23 1630   03/11/23 16:23:43 -- 83 18 89/56 Abnormal  -- 98 %  -- --   SpO2: RA at 03/11/23 1623   03/11/23 16:08:09 -- 81 20 105/58 -- 97 % -- --   03/11/23 1500 97 2 °F (36 2 °C) Abnormal  -- 16 96/56 -- -- -- Lying   03/11/23 0750 -- -- -- -- -- -- None (Room air) --   03/11/23 0700 -- 76 -- 96/53 -- -- -- Lying       Pertinent Labs/Diagnostic Results:       Results from last 7 days   Lab Units 03/13/23  1013 03/12/23  0907 03/11/23  0628 03/10/23  0535 03/09/23  0606 03/08/23  0556 03/07/23  0608   WBC Thousand/uL 9 10 9 97 8 19 6 72 7 10 9 21 10 03   HEMOGLOBIN g/dL 9 6* 9 8* 10 3* 9 6* 9 9* 10 7* 10 5*   HEMATOCRIT % 27 4* 28 6* 29 1* 27 0* 29 3* 30 4* 30 6*   PLATELETS Thousands/uL 98* 93* 94* 76* 66* 70* 58*   NEUTROS ABS Thousands/µL  --   --   --   --   --  6 51 7 32         Results from last 7 days   Lab Units 03/13/23  1013 03/12/23  0907 03/12/23  0740 03/11/23  0628 03/10/23  0535 03/09/23  0606 03/08/23  0556 03/07/23  0608   SODIUM mmol/L 128* 129* 127* 129* 130*   < > 127* 128*   POTASSIUM mmol/L 3 3* 3 7 3 6 3 8 3 7   < > 4 3 4 5   CHLORIDE mmol/L 97 98 98 98 99   < > 100 102   CO2 mmol/L 18* 18* 17* 18* 18*   < > 16* 17*   ANION GAP mmol/L 13 13 12 13 13   < > 11 9   BUN mg/dL 106* 108* 108* 104* 98*   < > 96* 89*   CREATININE mg/dL 2 80* 2 76* 2 80* 2 38* 1 88*   < > 2 49* 2 05*   EGFR ml/min/1 73sq m 24 25 24 30 40   < > 28 36   CALCIUM mg/dL 8 0* 7 6* 7 5* 7 8* 7 5*   < > 8 0* 8 4   MAGNESIUM mg/dL  --   --   --   --  2 8*  --   --  2 7*   PHOSPHORUS mg/dL  --   --  4 4  --   --   --  4 8*  --     < > = values in this interval not displayed       Results from last 7 days   Lab Units 03/13/23  1013 03/12/23  0907 03/12/23  0740 03/11/23  0628 03/10/23  0535 03/08/23  0556   AST U/L 62* 61*  --  65* 54* 62*   ALT U/L 14 14  --  16 15 25   ALK PHOS U/L 104 105  --  103 88 98   TOTAL PROTEIN g/dL 5 8* 5 3*  --  5 6* 5 6*  5 5* 5 1*   ALBUMIN g/dL 4 1 3 6 3 6 3 9 4 1 3 3*   TOTAL BILIRUBIN mg/dL 5 99* 6 49*  --  5 41* 5 35* 6 15*   AMMONIA umol/L  --   --   --   --  106*  --      Results from last 7 days   Lab Units 03/13/23  1120 03/13/23  0822 03/12/23  2059 03/12/23  1559 03/12/23  1051 03/12/23  0616 03/11/23  1736 03/11/23  1117 03/11/23  0727 03/10/23  2120 03/10/23  1559 03/10/23  1136   POC GLUCOSE mg/dl 141* 122 184* 128 136 187* 143* 144* 111 153* 122 168*     Results from last 7 days   Lab Units 03/13/23  1013 03/12/23  0907 03/12/23  0740 03/11/23  0986 03/10/23  0535 03/09/23  0606 03/08/23  0556 03/07/23  0608   GLUCOSE RANDOM mg/dL 162* 124 142* 115 132 96 118 101     Results from last 7 days   Lab Units 03/09/23  0606   OSMOLALITY, SERUM mmol/     Results from last 7 days   Lab Units 03/07/23  1258   PH SOLO  7 395   PCO2 SOLO mm Hg 26 4*   PO2 SOLO mm Hg 120 8*   HCO3 SOLO mmol/L 15 8*   BASE EXC SOLO mmol/L -7 6   O2 CONTENT SOLO ml/dL 16 6   O2 HGB, VENOUS % 94 8*     Results from last 7 days   Lab Units 03/13/23  1013 03/12/23  0907 03/11/23  1145   PROTIME seconds 25 7* 26 9* 27 0*   INR  2 32* 2 45* 2 46*     Results from last 7 days   Lab Units 03/09/23  0606 03/08/23  1455   OSMOLALITY, SERUM mmol/  --    OSMO UR mmol/KG  --  373     Results from last 7 days   Lab Units 03/11/23  1938 03/08/23  1455   CLARITY UA  Turbid  --    COLOR UA  Yellow  --    SPEC GRAV UA  1 019  --    PH UA  5 0  --    GLUCOSE UA mg/dl Negative  --    KETONES UA mg/dl Trace*  --    BLOOD UA  Negative  --    PROTEIN UA mg/dl 30 (1+)*  --    NITRITE UA  Negative  --    BILIRUBIN UA  Small*  --    UROBILINOGEN UA (BE) mg/dl 2 0*  --    LEUKOCYTES UA  Negative  --    WBC UA /hpf 2-4*  --    RBC UA /hpf 1-2  --    BACTERIA UA /hpf None Seen  --    EPITHELIAL CELLS WET PREP /hpf Occasional  --    MUCUS THREADS  Occasional*  --    SODIUM UR   --  <5     Results from last 7 days   Lab Units 03/11/23 2129 03/11/23 2128 03/11/23  1635   BLOOD CULTURE  No Growth at 24 hrs   No Growth at 24 hrs   --    GRAM STAIN RESULT   --   --  Rare Polys  No organisms seen   BODY FLUID CULTURE, STERILE   --   --  No growth     Results from last 7 days   Lab Units 03/11/23  1634   TOTAL COUNTED  100   WBC FLUID /ul 385     Medications:   Scheduled Medications: Albumin 25%, 25 g, Intravenous, Q6H  chlorhexidine, 15 mL, Swish & Spit, Q12H JEMMA  enoxaparin, 1 mg/kg, Subcutaneous, Q12H [address]  ergocalciferol, 50,000 Units, Oral, Weekly  insulin lispro, 1-6 Units, Subcutaneous, TID AC  lactulose, 30 g, Oral, TID  midodrine, 15 mg, Oral, 4x Daily  JOYCE ANTIFUNGAL, , Topical, BID  pantoprazole, 40 mg, Oral, Early Morning  rifaximin, 550 mg, Oral, Q12H JEMMA  sodium bicarbonate, 1,300 mg, Oral, TID after meals  sucralfate, 1 g, Oral, 4x Daily (AC & HS)      Continuous IV Infusions: none     PRN Meds:  al mag oxide-diphenhydramine-lidocaine viscous, 10 mL, Swish & Swallow, Q4H PRN  ondansetron, 4 mg, Intravenous, Q8H PRN  oxyCODONE, 5 mg, Oral, Q4H PRN  saliva substitute, 5 spray, Mouth/Throat, 4x Daily PRN        Discharge Plan: d    Network Utilization Review Department  ATTENTION: Please call with any questions or concerns to [phone] and carefully listen to the prompts so that you are directed to the right person  All voicemails are confidential   Sofía Nash all requests for admission clinical reviews, approved or denied determinations and any other requests to dedicated fax number below belonging to the campus where the patient is receiving treatment   List of dedicated fax numbers for the Facilities:  [address] DENIALS (Administrative/Medical Necessity) [phone]   [address] [place] [address] (Maternity/NICU/Pediatrics) [phone]   [address] Luise Anders  [phone]   [address] [facility]  [address] [facility] 310 [phone]   [address] [phone]

## 2023-03-13 NOTE — ASSESSMENT & PLAN NOTE
Patient with extensive hepatic metastasis  Lactulose-ammonia level is on the higher side will increase the lactulose dose and monitor response  Tremor present  Patient with worsening encephalopathy today  Increasing the dose of lactulose  Patient had 1 bowel movement today  Rule out infection  Had paracentesis today therapeutic and diagnostic-do not appear to have high enough PMN count for SBP  No fever  Obtain UA and blood culture to rule out infectious process  Obtain GI evaluate  Discussed with GI  Started on rifaximin  Encephalopathy is improving  Patient with diarrhea    We will back off on the lactulose

## 2023-03-13 NOTE — ASSESSMENT & PLAN NOTE
Patient with metastatic cholangiocarcinoma, worsening kidney function, worsening liver function   Overall guarded prognosis  Patient understands overall guarded prognosis however he would like to pursue active treatment and plans on following oncology outpatient for further cares  Discussed with patient and spouse in detail by previous provider  No limitation of care  Palliative on board-discussed with palliative care    If the patient is not improving as expected may need to revisit the goals of care-patient with extremely poor prognosis family and patient is aware of the poor prognosis but would want to continue with treatment oriented approach  Discussed with palliative care-palliative to have a meeting with family tomorrow

## 2023-03-13 NOTE — TELEMEDICINE
e-Consult (IPC)  - Interventional Radiology  Adelaide Ashby 47 y o  male MRN: [id number]  Unit/Bed#: [facility] 813-01 Encounter: [id number]          Interventional Radiology has been consulted to evaluate Adelaide Ashby    We were consulted by internal medicine concerning this patient with ascites, cholangiocarcinoma IV  Inpatient Consult to IR  Consult performed by: Adelaide Dubon PA-C  Consult ordered by: David Godinez MD        03/13/23    Assessment/Recommendation:     47year old male with pmh of cholangiocarcinoma stage IV, malignant ascites requiring biweekly paracentesis  Poor prognosis per Dr Unique Baker and Dr Travis Watson  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  - I personally discussed with Dr Travis Watson and Dr Unique Baker  Poor prognosis for patient and both agree tenkoff is best option for patient at this time  - will plan for tenkoff placement Wednesday or Thursday  Patient will need to be npo night prior  - INR needs to be less than 2 5 for placement  Currently 2 32  - will plan for paracentesis today for comfort  Patient normally with biweekly paracentesis  11-20 minutes, >50% of the total time devoted to medical consultative verbal/EMR discussion between providers  Written report will be generated in the EMR  Thank you for allowing Interventional Radiology to participate in the care of Aakash King  Please don't hesitate to call or TigerText us with any questions       Adelaide Dubon PA-C

## 2023-03-13 NOTE — SEDATION DOCUMENTATION
Paracentesis completed by Mellisa Villagomez PA-C without complications  Patient tolerated well  3600mL of clear yellow fluid drained  No labs ordered  Exofin glue over left abdomen puncture site  Report given to [facility], RN

## 2023-03-13 NOTE — BRIEF OP NOTE (RAD/CATH)
IR LEFT PARACENTESIS Procedure Note    PATIENT NAME: Chris Joshua  : 1970  MRN: [id number]    Pre-op Diagnosis:   1  Malignant ascites    2  Nausea and vomiting    3  Abdominal pain    4  Fatigue    5  Septic shock (HCC)    6  Dehydration    7  Hypernatremia    8  Sepsis ([address] )    9  Hyponatremia    10  Cholangiocarcinoma, stage IV ([facility] [address] )    11  DENZEL (acute kidney injury) ([facility] [address] )    12  Malignant neoplasm metastatic to both lungs ([facility] [address] )    13  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)    14  Squamous cell carcinoma of tongue (HCC)    15  Mesenteric thrombosis (HCC)    16  Cellulitis    17  Thrombocytopenia (HCC)      Post-op Diagnosis:   1  Malignant ascites    2  Nausea and vomiting    3  Abdominal pain    4  Fatigue    5  Septic shock (HCC)    6  Dehydration    7  Hypernatremia    8  Sepsis ([facility] [address] )    9  Hyponatremia    10  Cholangiocarcinoma, stage IV ([facility] [address] )    11  DENZEL (acute kidney injury) ([facility] [address] )    12  Malignant neoplasm metastatic to both lungs ([facility] [address] )    13  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

## 2023-03-13 NOTE — PROGRESS NOTES
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, recurrent ascites requiring paracentesis presented with abdominal pain nausea and poor oral intake and nephrology was consulted for acute kidney injury    Acute kidney injury, POA  -Baseline creatinine: 0 5-0 7 mg/dl  -Multiple episodes of acute kidney injury this hospital admission  · First episode: Admitted with creatinine 2 37 in February 20, 2023, was prerenal and renal function improved to creatinine 0 55 on 2/24/2023  · Second episode acute kidney injury on 2/26 with peak serum creatinine 2 54 on 3/4, suspected ATN  Renal function improved to creatinine 1 68 on 3/6  · Third episode: Creatinine worsened to 2 49 on 3/8, suspected to be from hemodynamic changes from paracentesis on 3/7 and improved with IV albumin and creatinine was down to 1 8 on 3/10  · Fourth episode: Currently creatinine 2 8 mg/dL and elevated since 3/12  Likely due to low blood pressure with blood pressure in 90s   -Hospital Course: Renal function stable for last 2 days her creatinine 2 8  Was started on IV albumin 25 g every 6 hours on 3/12   -Plan:   · Continue current treatment with IV albumin, midodrine 15 mg 4 times daily  Avoid hypotension  Also patient is planned for paracentesis today, reordered IV albumin 25 g every 6 hours  Would hold off on diuretics  Discussed with primary team regarding continuing IV albumin and regarding goals of care discussion and primary team agreed with the plan  · Avoid nephrotoxins and dose all medications per EGFR  · Avoid hypotension  Chronic hypotension, blood pressure is still soft continue midodrine 15 mg 4 times daily and continue IV albumin      Hyponatremia suspected SIADH in the setting of malignancy  -Continue fluid restriction 1 5 L/day  -Work-up showed serum osmolality 288 which is normal, 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 (M-Peak 1 = 0 15 g/dL),  free kappa light chains (M-Peak 2 = 0 13 g/dL), and IgG lambda (M-Peak 3 = 0 11 g/dL   UPEP was negative  Recommend input from hematology oncology  -Level still low at 128  -Ordered salt tablets 2 g x 1 dose    Metabolic acidosis: Bicarb level 18, continue oral sodium bicarbonate tablets but increase dose to 1300 mg 3 times daily    Hypokalemia, replaced potassium    Anemia, hemoglobin stable at 9 6 g/dL, MCV normal, monitor per primary team    Metastatic cholangiocarcinoma  Status post septic shock which was present on admission  Currently off antibiotics  Bilateral lower extremity edema likely in the setting of low serum albumin  Dopplers negative for DVT  Continue to monitor  Malignant ascites requiring frequent paracentesis, plan for paracentesis today  Mesenteric thrombosis-anticoagulation per primary team    SUBJECTIVE:  No new complaints, has lower extremity edema, no shortness of breath has Harvey catheter with dark urine  Patient's mother at bedside, discussed with her    OBJECTIVE:  Current Weight: Weight - Scale: 96 3 kg (212 lb 4 9 oz)  Vitals:    03/13/23 0624   BP: 91/52   Pulse: 100   Resp: 16   Temp: (!) 97 3 °F (36 3 °C)   SpO2: 96%       Intake/Output Summary (Last 24 hours) at 3/13/2023 1234  Last data filed at 3/12/2023 1701  Gross per 24 hour   Intake 2310 ml   Output 250 ml   Net 2060 ml       Physical Exam  General:  Ill looking, awake  Eyes: Conjunctivae pink,  Sclera anicteric  ENT: lips and mucous membranes moist  Neck: supple   Chest: Clear to Auscultation both lungs,  no crackles, ronchus or wheezing  CVS: S1 & S2 present, normal rate, regular rhythm, no murmur    Abdomen: soft, non-tender, abdomen distended due to ascites, Bowel sounds normoactive  Extremities: 1+ edema of legs  Skin: no rash  Neuro: awake, alert, oriented x3  Psych: Mood and affect appropriate     Medications:    Current Facility-Administered Medications:   •  al mag oxide-diphenhydramine-lidocaine viscous (MAGIC MOUTHWASH) suspension 10 mL, 10 mL, Swish & Swallow, Q4H PRN, Olga Prado MD, 10 mL at 02/26/23 1232  •  albumin human (FLEXBUMIN) 25 % injection 25 g, 25 g, Intravenous, Q6H, Selma Burdick MD  •  chlorhexidine (PERIDEX) 0 12 % oral rinse 15 mL, 15 mL, Swish & Spit, Q12H [address], Olga Prado MD, 15 mL at 03/13/23 0827  •  enoxaparin (LOVENOX) subcutaneous injection 100 mg, 1 mg/kg, Subcutaneous, Q12H [address], Azeb Stewart MD, 100 mg at 03/13/23 1156  •  ergocalciferol (VITAMIN D2) capsule 50,000 Units, 50,000 Units, Oral, Weekly, Olga Prado MD, 50,000 Units at 03/09/23 6986  •  insulin lispro (HumaLOG) 100 units/mL subcutaneous injection 1-6 Units, 1-6 Units, Subcutaneous, TID AC, 1 Units at 03/12/23 0747 **AND** Fingerstick Glucose (POCT), , , TID AC, Olga Prado MD  •  lactulose oral solution 30 g, 30 g, Oral, TID, Eusebio Allison MD, 30 g at 03/13/23 0827  •  midodrine (PROAMATINE) tablet 15 mg, 15 mg, Oral, 4x Daily, Nathalia Montenegro MD, 15 mg at 03/13/23 1128  •  moisture barrier miconazole 2% cream (aka JOYCE MOISTURE BARRIER ANTIFUNGAL CREAM), , Topical, BID, Olga Prado MD, Given at 03/13/23 0834  •  ondansetron (ZOFRAN) injection 4 mg, 4 mg, Intravenous, Q8H PRN, Olga Prado MD, 4 mg at 02/22/23 0006  •  oxyCODONE (ROXICODONE) IR tablet 5 mg, 5 mg, Oral, Q4H PRN, Olga Prado MD  •  pantoprazole (PROTONIX) EC tablet 40 mg, 40 mg, Oral, Early Morning, Olga Prado MD, 40 mg at 03/13/23 6192  •  rifaximin (XIFAXAN) tablet 550 mg, 550 mg, Oral, Q12H [address], Eusebio Allison MD, 550 mg at 03/13/23 7491  •  saliva substitute (MOUTH KOTE) mucosal solution 5 spray, 5 spray, Mouth/Throat, 4x Daily PRN, Olga Prado MD, 5 spray at 02/25/23 8922  • sodium bicarbonate tablet 1,300 mg, 1,300 mg, Oral, BID (diuretic), Rolando Ken MD, 1,300 mg at 03/13/23 2602  •  sucralfate (CARAFATE) tablet 1 g, 1 g, Oral, 4x Daily (AC & HS), Stanislaw White MD, 1 g at 03/13/23 1128    Invasive Devices:   Urethral Catheter 18 Fr  (Active)   Reasons to continue Urinary Catheter  Accurate I&O assessment in critically ill patients (48 hr  max) 03/12/23 2301   Site Assessment Clean;Skin intact 03/12/23 2301   Harvey Care Done 03/12/23 2100   Collection Container Standard drainage bag 03/12/23 2301   Securement Method Securing device (Describe) 03/12/23 2301   Output (mL) 250 mL 03/12/23 1401       Lab Results:   Results from last 7 days   Lab Units 03/13/23  1013 03/12/23  0907 03/12/23  0740 03/11/23  0628 03/10/23  0535 03/09/23  0606 03/08/23  0556 03/07/23  0608   WBC Thousand/uL 9 10 9 97  --  8 19 6 72   < > 9 21 10 03   HEMOGLOBIN g/dL 9 6* 9 8*  --  10 3* 9 6*   < > 10 7* 10 5*   HEMATOCRIT % 27 4* 28 6*  --  29 1* 27 0*   < > 30 4* 30 6*   PLATELETS Thousands/uL 98* 93*  --  94* 76*   < > 70* 58*   POTASSIUM mmol/L 3 3* 3 7 3 6 3 8 3 7   < > 4 3 4 5   CHLORIDE mmol/L 97 98 98 98 99   < > 100 102   CO2 mmol/L 18* 18* 17* 18* 18*   < > 16* 17*   BUN mg/dL 106* 108* 108* 104* 98*   < > 96* 89*   CREATININE mg/dL 2 80* 2 76* 2 80* 2 38* 1 88*   < > 2 49* 2 05*   CALCIUM mg/dL 8 0* 7 6* 7 5* 7 8* 7 5*   < > 8 0* 8 4   MAGNESIUM mg/dL  --   --   --   --  2 8*  --   --  2 7*   PHOSPHORUS mg/dL  --   --  4 4  --   --   --  4 8*  --    ALK PHOS U/L 104 105  --  103 88  --  98 95   ALT U/L 14 14  --  16 15  --  25 27   AST U/L 62* 61*  --  65* 54*  --  62* 64*    < > = values in this interval not displayed  Previous work up:         Portions of the record may have been created with voice recognition software  Occasional wrong word or "sound a like" substitutions may have occurred due to the inherent limitations of voice recognition software   Read the chart carefully and recognize, using context, where substitutions have occurred  If you have any questions, please contact the dictating provider

## 2023-03-13 NOTE — ASSESSMENT & PLAN NOTE
Monitor kidney function closely  Avoid nephrotoxins  Nephrology following  Creatinine 2 8 today  Patient is creatinine is fluctuating  IV albumin per nephrology  With hepatorenal syndrome  Patient is not a candidate for dialysis  Urine output is on the lower side    Maintain the Harvey catheter for accurate I's and O's

## 2023-03-13 NOTE — ASSESSMENT & PLAN NOTE
Received IV heparin GTT  Discussed with oncology - recommended Lovenox 1 mg/kg body weight every 12 hours  Lovenox 1 mg/kg body wt twice daily-monitor lovenox  use with creatinine     Patient is not a candidate for NOACs due to  Child fuentes score, patient with baseline abnormal protime   Outpatient oncology follow-up  Discussed with oncology plan is to continue with the Lovenox for now

## 2023-03-13 NOTE — PLAN OF CARE
Problem: Prexisting or High Potential for Compromised Skin Integrity  Goal: Skin integrity is maintained or improved  Description: INTERVENTIONS:  - Identify patients at risk for skin breakdown  - Assess and monitor skin integrity  - Assess and monitor nutrition and hydration status  - Monitor labs   - Assess for incontinence   - Turn and reposition patient  - Assist with mobility/ambulation  - Relieve pressure over bony prominences  - Avoid friction and shearing  - Provide appropriate hygiene as needed including keeping skin clean and dry  - Evaluate need for skin moisturizer/barrier cream  - Collaborate with interdisciplinary team   - Patient/family teaching  - Consider wound care consult   Outcome: Progressing     Problem: CARDIOVASCULAR - ADULT  Goal: Maintains optimal cardiac output and hemodynamic stability  Description: INTERVENTIONS:  - Monitor I/O, vital signs and rhythm  - Monitor for S/S and trends of decreased cardiac output  - Administer and titrate ordered vasoactive medications to optimize hemodynamic stability  - Assess quality of pulses, skin color and temperature  - Assess for signs of decreased coronary artery perfusion  - Instruct patient to report change in severity of symptoms  Outcome: Progressing  Goal: Absence of cardiac dysrhythmias or at baseline rhythm  Description: INTERVENTIONS:  - Continuous cardiac monitoring, vital signs, obtain 12 lead EKG if ordered  - Administer antiarrhythmic and heart rate control medications as ordered  - Monitor electrolytes and administer replacement therapy as ordered  Outcome: Progressing     Problem: GASTROINTESTINAL - ADULT  Goal: Minimal or absence of nausea and/or vomiting  Description: INTERVENTIONS:  - Administer IV fluids if ordered to ensure adequate hydration  - Maintain NPO status until nausea and vomiting are resolved  - Nasogastric tube if ordered  - Administer ordered antiemetic medications as needed  - Provide nonpharmacologic comfort measures as appropriate  - Advance diet as tolerated, if ordered  - Consider nutrition services referral to assist patient with adequate nutrition and appropriate food choices  Outcome: Progressing  Goal: Maintains or returns to baseline bowel function  Description: INTERVENTIONS:  - Assess bowel function  - Encourage oral fluids to ensure adequate hydration  - Administer IV fluids if ordered to ensure adequate hydration  - Administer ordered medications as needed  - Encourage mobilization and activity  - Consider nutritional services referral to assist patient with adequate nutrition and appropriate food choices  Outcome: Progressing  Goal: Maintains adequate nutritional intake  Description: INTERVENTIONS:  - Monitor percentage of each meal consumed  - Identify factors contributing to decreased intake, treat as appropriate  - Assist with meals as needed  - Monitor I&O, weight, and lab values if indicated  - Obtain nutrition services referral as needed  Outcome: Progressing     Problem: METABOLIC, FLUID AND ELECTROLYTES - ADULT  Goal: Electrolytes maintained within normal limits  Description: INTERVENTIONS:  - Monitor labs and assess patient for signs and symptoms of electrolyte imbalances  - Administer electrolyte replacement as ordered  - Monitor response to electrolyte replacements, including repeat lab results as appropriate  - Instruct patient on fluid and nutrition as appropriate  Outcome: Progressing  Goal: Fluid balance maintained  Description: INTERVENTIONS:  - Monitor labs   - Monitor I/O and WT  - Instruct patient on fluid and nutrition as appropriate  - Assess for signs & symptoms of volume excess or deficit  Outcome: Progressing  Goal: Glucose maintained within target range  Description: INTERVENTIONS:  - Monitor Blood Glucose as ordered  - Assess for signs and symptoms of hyperglycemia and hypoglycemia  - Administer ordered medications to maintain glucose within target range  - Assess nutritional intake and initiate nutrition service referral as needed  Outcome: Progressing     Problem: HEMATOLOGIC - ADULT  Goal: Maintains hematologic stability  Description: INTERVENTIONS  - Assess for signs and symptoms of bleeding or hemorrhage  - Monitor labs  - Administer supportive blood products/factors as ordered and appropriate  Outcome: Progressing     Problem: MUSCULOSKELETAL - ADULT  Goal: Maintain or return mobility to safest level of function  Description: INTERVENTIONS:  - Assess patient's ability to carry out ADLs; assess patient's baseline for ADL function and identify physical deficits which impact ability to perform ADLs (bathing, care of mouth/teeth, toileting, grooming, dressing, etc )  - Assess/evaluate cause of self-care deficits   - Assess range of motion  - Assess patient's mobility  - Assess patient's need for assistive devices and provide as appropriate  - Encourage maximum independence but intervene and supervise when necessary  - Involve family in performance of ADLs  - Assess for home care needs following discharge   - Consider OT consult to assist with ADL evaluation and planning for discharge  - Provide patient education as appropriate  Outcome: Progressing  Goal: Maintain proper alignment of affected body part  Description: INTERVENTIONS:  - Support, maintain and protect limb and body alignment  - Provide patient/ family with appropriate education  Outcome: Progressing     Problem: PAIN - ADULT  Goal: Verbalizes/displays adequate comfort level or baseline comfort level  Description: Interventions:  - Encourage patient to monitor pain and request assistance  - Assess pain using appropriate pain scale  - Administer analgesics based on type and severity of pain and evaluate response  - Implement non-pharmacological measures as appropriate and evaluate response  - Consider cultural and social influences on pain and pain management  - Notify physician/advanced practitioner if interventions unsuccessful or patient reports new pain  Outcome: Progressing     Problem: INFECTION - ADULT  Goal: Absence or prevention of progression during hospitalization  Description: INTERVENTIONS:  - Assess and monitor for signs and symptoms of infection  - Monitor lab/diagnostic results  - Monitor all insertion sites, i e  indwelling lines, tubes, and drains  - Monitor endotracheal if appropriate and nasal secretions for changes in amount and color  - [place] appropriate cooling/warming therapies per order  - Administer medications as ordered  - Instruct and encourage patient and family to use good hand hygiene technique  - Identify and instruct in appropriate isolation precautions for identified infection/condition  Outcome: Progressing     Problem: DISCHARGE PLANNING  Goal: Discharge to home or other facility with appropriate resources  Description: INTERVENTIONS:  - Identify barriers to discharge w/patient and caregiver  - Arrange for needed discharge resources and transportation as appropriate  - Identify discharge learning needs (meds, wound care, etc )  - Arrange for interpretive services to assist at discharge as needed  - Refer to Case Management Department for coordinating discharge planning if the patient needs post-hospital services based on physician/advanced practitioner order or complex needs related to functional status, cognitive ability, or social support system  Outcome: Progressing     Problem: Knowledge Deficit  Goal: Patient/family/caregiver demonstrates understanding of disease process, treatment plan, medications, and discharge instructions  Description: Complete learning assessment and assess knowledge base    Interventions:  - Provide teaching at level of understanding  - Provide teaching via preferred learning methods  Outcome: Progressing     Problem: Nutrition/Hydration-ADULT  Goal: Nutrient/Hydration intake appropriate for improving, restoring or maintaining nutritional needs  Description: Monitor and assess patient's nutrition/hydration status for malnutrition  Collaborate with interdisciplinary team and initiate plan and interventions as ordered  Monitor patient's weight and dietary intake as ordered or per policy  Utilize nutrition screening tool and intervene as necessary  Determine patient's food preferences and provide high-protein, high-caloric foods as appropriate       INTERVENTIONS:  - Monitor oral intake, urinary output, labs, and treatment plans  - Assess nutrition and hydration status and recommend course of action  - Evaluate amount of meals eaten  - Assist patient with eating if necessary   - Allow adequate time for meals  - Recommend/ encourage appropriate diets, oral nutritional supplements, and vitamin/mineral supplements  - Order, calculate, and assess calorie counts as needed  - Recommend, monitor, and adjust tube feedings and TPN/PPN based on assessed needs  - Assess need for intravenous fluids  - Provide specific nutrition/hydration education as appropriate  - Include patient/family/caregiver in decisions related to nutrition  Outcome: Progressing

## 2023-03-14 ENCOUNTER — TELEPHONE (OUTPATIENT)
Dept: HEMATOLOGY ONCOLOGY | Facility: CLINIC | Age: 53
End: 2023-03-14

## 2023-03-14 NOTE — ASSESSMENT & PLAN NOTE
· Patient with extensive hepatic metastasis  · Rule out infection  Had paracentesis therapeutic and diagnostic-do not appear to have high enough PMN count for SBP  No fever  UA not consistent with UTI  · GI started on rifaximin  · Encephalopathy is improving

## 2023-03-14 NOTE — PROGRESS NOTES
[facility]  Progress Note Kasey Ashby 1970, 47 y o  male MRN: [id number]  Unit/Bed#: [facility] 813-01 Encounter: [id number]  Primary Care Provider: Bong Hrenandez MD   Date and time admitted to hospital: 2/20/2023  3:04 AM    * Septic shock [facility])  Assessment & Plan  · Present on admission  Admitted to the intensive care unit  · Shock resolved  · Currently off of antibiotics    Cholangiocarcinoma, stage IV ([address] )  Assessment & Plan  · Patient with metastatic cholangiocarcinoma  · Discussed with patient overall guarded prognosis  · Oncology has reiterated poor prognosis, patient reports he understands his guarded prognosis but would like to pursue active treatment at this time and plans to follow-up with oncology on discharge for further cares  · Oncology inputs noted  · Follow-up with oncology as an outpatient  · Trend LFTs      Malignant ascites  Assessment & Plan  · Requires frequent paracentesis  Status post paracentesis on 3/7 removing 6 L  · Patient has having paracentesis twice a week Monday and Thursday as outpatient  · Discussed with the patient about Tenckhoff catheter-patient and family is considering Tenckhoff catheter placement  · Patient needed paracentesis today removed 1600 mL-we will give one-time dose of albumin after the paracentesis even though it is a low volume tap due to concern for low blood pressure  · Patient had another paracentesis today 3 6 L removed  Patient get IV albumin every 6 hours-25 mg  · Plan for Tenckhoff catheter noted tomorrow vs thursday         Hyponatremia  Assessment & Plan  · Multifactorial  · Sodium is 127 today  · Tightened fluid restriction today  · NaCL tabs added  · Continue to monitor closely    Bilateral lower extremity edema  Assessment & Plan  · Patient noted to have bilateral lower extremity edema which is likely multifactorial given his ascites and diffuse hepatic metastasis    · Lower extremity Doppler is negative for DVT  · Patient was started on antibiotics  Even after starting on antibiotics there was no significant improvement in the erythema even though the edema and erythema improved slightly  Discussed with infectious disease  Unlikely this is cellulitis antibiotics discontinued  · Hematology evaluation appreciated  · Lower extremity edema appears to be improving    Goals of care, counseling/discussion  Assessment & Plan  · Patient with metastatic cholangiocarcinoma, worsening kidney function, worsening liver function   · Overall guarded prognosis  · Patient understands overall guarded prognosis however he would like to pursue active treatment and plans on following oncology outpatient for further cares  · Discussed with patient and spouse in detail by previous provider  · No limitation of care  · Palliative on board-discussed with palliative care  If the patient is not improving as expected may need to revisit the goals of care-patient with extremely poor prognosis family and patient is aware of the poor prognosis but would want to continue with treatment oriented approach    Hepatic encephalopathy  Assessment & Plan  · Patient with extensive hepatic metastasis  · Rule out infection  Had paracentesis therapeutic and diagnostic-do not appear to have high enough PMN count for SBP  No fever  UA not consistent with UTI  · GI started on rifaximin  · Encephalopathy is improving  DENZEL (acute kidney injury) ([facility] [address] )  Assessment & Plan  · Monitor kidney function closely  · Avoid nephrotoxins  · Nephrology following  · Creatinine is stable  · IV albumin per nephrology  With hepatorenal syndrome  · Patient is not a candidate for dialysis  · Urine output is on the lower side    Maintain the Harvey catheter for accurate I's and O's    Oropharyngeal candidiasis  Assessment & Plan  Received 7 days fluconazole treatment      Mesenteric thrombosis (HCC)  Assessment & Plan  · Received IV heparin GTT  · Discussed with oncology - recommended Lovenox 1 mg/kg body weight every 12 hours  · Lovenox 1 mg/kg body wt twice daily-monitor lovenox  use with creatinine  · Patient is not a candidate for NOACs due to  Child fuentes score, patient with baseline abnormal protime   · Outpatient oncology follow-up  · Discussed with oncology plan is to continue with the Lovenox for now        Vitamin D deficiency  Assessment & Plan  · Continue supplementation    Thrombocytopenia ([address] )  Assessment & Plan  · Multifactorial  · Oncology inputs noted  · Monitor counts-platelets-98  · Continue with current dose of Lovenox    GERD (gastroesophageal reflux disease)  Assessment & Plan  Continue PPI        VTE Pharmacologic Prophylaxis: VTE Score: 8 Moderate Risk (Score 3-4) - Pharmacological DVT Prophylaxis Ordered: heparin  Patient Centered Rounds: I performed bedside rounds with nursing staff today  Discussions with Specialists or Other Care Team Provider: nurseGRANT    Education and Discussions with Family / Patient: Patient declined call to   Total Time Spent on Date of Encounter in care of patient: 25 minutes This time was spent on one or more of the following: performing physical exam; counseling and coordination of care; obtaining or reviewing history; documenting in the medical record; reviewing/ordering tests, medications or procedures; communicating with other healthcare professionals and discussing with patient's family/caregivers  Current Length of Stay: 22 day(s)  Current Patient Status: Inpatient   Certification Statement: The patient will continue to require additional inpatient hospital stay due to awaiting tenckoff catheter placement  Discharge Plan: Anticipate discharge in 48-72 hrs to home with home services  Code Status: Level 1 - Full Code    Subjective:   Reports that abdominal bloating is better today post paracentesis      Objective:     Vitals:   Temp (24hrs), Av 4 °F (36 3 °C), Min:97 3 °F (36 3 °C), Max:97 5 °F (36 4 °C)    Temp:  [97 3 °F (36 3 °C)-97 5 °F (36 4 °C)] 97 3 °F (36 3 °C)  HR:  [83-89] 83  Resp:  [16] 16  BP: (84-95)/(44-48) 89/48  SpO2:  [95 %-98 %] 98 %  Body mass index is 27 99 kg/m²  Input and Output Summary (last 24 hours): Intake/Output Summary (Last 24 hours) at 3/14/2023 1621  Last data filed at 3/14/2023 0606  Gross per 24 hour   Intake 200 ml   Output 650 ml   Net -450 ml       Physical Exam:   Physical Exam  Constitutional:       Appearance: Normal appearance  HENT:      Head: Normocephalic and atraumatic  Nose: Nose normal    Cardiovascular:      Rate and Rhythm: Normal rate and regular rhythm  Pulmonary:      Effort: Pulmonary effort is normal       Breath sounds: No wheezing or rales  Abdominal:      General: There is distension  Palpations: Abdomen is soft  Tenderness: There is no abdominal tenderness  Musculoskeletal:      Right lower leg: Edema present  Left lower leg: Edema present  Skin:     General: Skin is warm and dry  Neurological:      General: No focal deficit present  Mental Status: He is alert and oriented to person, place, and time  Psychiatric:         Mood and Affect: Mood normal          Behavior: Behavior normal           Additional Data:     Labs:  Results from last 7 days   Lab Units 03/14/23  0529 03/09/23  0606 03/08/23  0556   WBC Thousand/uL 7 82   < > 9 21   HEMOGLOBIN g/dL 9 1*   < > 10 7*   HEMATOCRIT % 25 7*   < > 30 4*   PLATELETS Thousands/uL 88*   < > 70*   NEUTROS PCT %  --   --  71   LYMPHS PCT %  --   --  11*   MONOS PCT %  --   --  16*   EOS PCT %  --   --  2    < > = values in this interval not displayed       Results from last 7 days   Lab Units 03/14/23  0529   SODIUM mmol/L 127*   POTASSIUM mmol/L 3 4*   CHLORIDE mmol/L 96   CO2 mmol/L 18*   BUN mg/dL 112*   CREATININE mg/dL 2 71*   ANION GAP mmol/L 13   CALCIUM mg/dL 8 0*   ALBUMIN g/dL 4 2   TOTAL BILIRUBIN mg/dL 5 23*   ALK PHOS U/L 94   ALT U/L 16   AST U/L 53*   GLUCOSE RANDOM mg/dL 105     Results from last 7 days   Lab Units 03/13/23  1013   INR  2 32*     Results from last 7 days   Lab Units 03/14/23  1141 03/14/23  0830 03/13/23 2055 03/13/23  1559 03/13/23  1120 03/13/23  0822 03/12/23  2059 03/12/23  1559 03/12/23  1051 03/12/23  0616 03/11/23  1736 03/11/23  1117   POC GLUCOSE mg/dl 163* 130 128 160* 141* 122 184* 128 136 187* 143* 144*               Lines/Drains:  Invasive Devices     Central Venous Catheter Line  Duration           Port A Cath Right Subclavian -- days          Drain  Duration           Urethral Catheter 18 Fr  2 days              Urinary Catheter:  Goal for removal: Remove after 48 hrs of I/O monitoring         Central Line:  Goal for removal: Will discontinue when peripheral access obtained  Imaging: No pertinent imaging reviewed  Recent Cultures (last 7 days):   Results from last 7 days   Lab Units 03/11/23 2129 03/11/23 2128 03/11/23  1635   BLOOD CULTURE  No Growth at 48 hrs   No Growth at 48 hrs   --    GRAM STAIN RESULT   --   --  Rare Polys  No organisms seen   BODY FLUID CULTURE, STERILE   --   --  No growth       Last 24 Hours Medication List:   Current Facility-Administered Medications   Medication Dose Route Frequency Provider Last Rate   • al mag oxide-diphenhydramine-lidocaine viscous  10 mL Swish & Swallow Q4H PRN Mendel Chard, MD     • Albumin 25%  25 g Intravenous Q6H Yuval Benites MD 0 g (03/14/23 0606)   • chlorhexidine  15 mL Swish & Spit Q12H [facility] Mendel Chard, MD     • enoxaparin  1 mg/kg Subcutaneous Q12H [facility] Laina Hills MD     • ergocalciferol  50,000 Units Oral Weekly Mendel Chard, MD     • insulin lispro  1-6 Units Subcutaneous TID AC Mendel Chard, MD     • lactulose  20 g Oral BID Razia Guillen MD     • midodrine  15 mg Oral 4x Daily Vanessa Fernandez MD     • JOYCE ANTIFUNGAL   Topical BID Mendel Chard, MD     • ondansetron  4 mg Intravenous Q8H PRN Alena Banuelos MD     • oxyCODONE  5 mg Oral Q4H PRN Alena Banuelos MD     • pantoprazole  40 mg Oral Early Morning Alena Banuelos MD     • rifaximin  550 mg Oral Q12H Cb Rudd MD     • saliva substitute  5 spray Mouth/Throat 4x Daily PRN Alena Banuelos MD     • sodium bicarbonate  1,300 mg Oral TID after meals Ling Osborne MD     • sodium chloride  2 g Oral BID With Meals Ling Osborne MD     • sucralfate  1 g Oral 4x Daily (AC & HS) Alena Banuelos MD          Today, Patient Was Seen By: Ro Pena MD    **Please Note: This note may have been constructed using a voice recognition system  **

## 2023-03-14 NOTE — ASSESSMENT & PLAN NOTE
· Received IV heparin GTT  · Discussed with oncology - recommended Lovenox 1 mg/kg body weight every 12 hours  · Lovenox 1 mg/kg body wt twice daily-monitor lovenox  use with creatinine     · Patient is not a candidate for NOACs due to  Child fuentes score, patient with baseline abnormal protime   · Outpatient oncology follow-up  · Discussed with oncology plan is to continue with the Lovenox for now

## 2023-03-14 NOTE — ASSESSMENT & PLAN NOTE
· Patient with metastatic cholangiocarcinoma, worsening kidney function, worsening liver function   · Overall guarded prognosis  · Patient understands overall guarded prognosis however he would like to pursue active treatment and plans on following oncology outpatient for further cares  · Discussed with patient and spouse in detail by previous provider  · No limitation of care  · Palliative on board-discussed with palliative care    If the patient is not improving as expected may need to revisit the goals of care-patient with extremely poor prognosis family and patient is aware of the poor prognosis but would want to continue with treatment oriented approach

## 2023-03-14 NOTE — TELEPHONE ENCOUNTER
Appointment Cancellation or Reschedule     Person calling in Spouse   If someone other than patient calling, are they listed on the communication consent form? Yes   Provider Dr Aga Álvarez   Office Visit Date and Time 03/15 at 11:00am   Office Visit Location [facility]   Did patient want to reschedule their visit? If so, when was it scheduled to? NO    Did the patient have STAR scheduled for this appointment? No   Does the patient need STAR scheduled for their new appointment? No   Is this patient calling to reschedule an infusion appointment? No   When is their next infusion appointment? n/a   Is this patient a Chemo patient? No   Reason for Cancellation or Reschedule Hospitalized  , Ava Alberto is requesting a call back regarding rescheduling appointment  Appointment I suggested was 04/26 but it is too far out for her    Was the No show policy reviewed with patient if patient is canceling within 24 hours? No     If the patient is cancelling an appointment and needs their STAR Transport cancelled, please route to Sarah [address]  If the patient is a treatment patient, please route this to the office nurse  If the patient is not on treatment, please route to the Clerical pool based on location  If the patient is a surgical oncology patient, please route to surg/onc clinical pool  Route message as high priority if same day cancellation

## 2023-03-14 NOTE — PLAN OF CARE
Problem: Prexisting or High Potential for Compromised Skin Integrity  Goal: Skin integrity is maintained or improved  Description: INTERVENTIONS:  - Identify patients at risk for skin breakdown  - Assess and monitor skin integrity  - Assess and monitor nutrition and hydration status  - Monitor labs   - Assess for incontinence   - Turn and reposition patient  - Assist with mobility/ambulation  - Relieve pressure over bony prominences  - Avoid friction and shearing  - Provide appropriate hygiene as needed including keeping skin clean and dry  - Evaluate need for skin moisturizer/barrier cream  - Collaborate with interdisciplinary team   - Patient/family teaching  - Consider wound care consult   3/14/2023 1303 by Angelica Lopez RN  Outcome: Progressing  3/14/2023 1303 by Angelica Lopez RN  Outcome: Progressing     Problem: CARDIOVASCULAR - ADULT  Goal: Maintains optimal cardiac output and hemodynamic stability  Description: INTERVENTIONS:  - Monitor I/O, vital signs and rhythm  - Monitor for S/S and trends of decreased cardiac output  - Administer and titrate ordered vasoactive medications to optimize hemodynamic stability  - Assess quality of pulses, skin color and temperature  - Assess for signs of decreased coronary artery perfusion  - Instruct patient to report change in severity of symptoms  3/14/2023 1303 by Angelica Lopez RN  Outcome: Progressing  3/14/2023 1303 by Angelica Lopez RN  Outcome: Progressing  Goal: Absence of cardiac dysrhythmias or at baseline rhythm  Description: INTERVENTIONS:  - Continuous cardiac monitoring, vital signs, obtain 12 lead EKG if ordered  - Administer antiarrhythmic and heart rate control medications as ordered  - Monitor electrolytes and administer replacement therapy as ordered  3/14/2023 1303 by Angelica Lopez RN  Outcome: Progressing  3/14/2023 1303 by Angelica Lopez RN  Outcome: Progressing     Problem: GASTROINTESTINAL - ADULT  Goal: Minimal or absence of nausea and/or vomiting  Description: INTERVENTIONS:  - Administer IV fluids if ordered to ensure adequate hydration  - Maintain NPO status until nausea and vomiting are resolved  - Nasogastric tube if ordered  - Administer ordered antiemetic medications as needed  - Provide nonpharmacologic comfort measures as appropriate  - Advance diet as tolerated, if ordered  - Consider nutrition services referral to assist patient with adequate nutrition and appropriate food choices  3/14/2023 1303 by Angelica Lopez RN  Outcome: Progressing  3/14/2023 1303 by Angelica Lopez RN  Outcome: Progressing  Goal: Maintains or returns to baseline bowel function  Description: INTERVENTIONS:  - Assess bowel function  - Encourage oral fluids to ensure adequate hydration  - Administer IV fluids if ordered to ensure adequate hydration  - Administer ordered medications as needed  - Encourage mobilization and activity  - Consider nutritional services referral to assist patient with adequate nutrition and appropriate food choices  3/14/2023 1303 by Angelica Lopez RN  Outcome: Progressing  3/14/2023 1303 by Angelica Lopez RN  Outcome: Progressing  Goal: Maintains adequate nutritional intake  Description: INTERVENTIONS:  - Monitor percentage of each meal consumed  - Identify factors contributing to decreased intake, treat as appropriate  - Assist with meals as needed  - Monitor I&O, weight, and lab values if indicated  - Obtain nutrition services referral as needed  3/14/2023 1303 by Angelica Lopez RN  Outcome: Progressing  3/14/2023 1303 by Angelica Lopez RN  Outcome: Progressing     Problem: METABOLIC, FLUID AND ELECTROLYTES - ADULT  Goal: Electrolytes maintained within normal limits  Description: INTERVENTIONS:  - Monitor labs and assess patient for signs and symptoms of electrolyte imbalances  - Administer electrolyte replacement as ordered  - Monitor response to electrolyte replacements, including repeat lab results as appropriate  - Instruct patient on fluid and nutrition as appropriate  3/14/2023 1303 by Lucrecia Barriga RN  Outcome: Progressing  3/14/2023 1303 by Lucrecia Barriga RN  Outcome: Progressing  Goal: Fluid balance maintained  Description: INTERVENTIONS:  - Monitor labs   - Monitor I/O and WT  - Instruct patient on fluid and nutrition as appropriate  - Assess for signs & symptoms of volume excess or deficit  3/14/2023 1303 by Maribell Moura RN  Outcome: Progressing  3/14/2023 1303 by Lucrecia Barriga RN  Outcome: Progressing  Goal: Glucose maintained within target range  Description: INTERVENTIONS:  - Monitor Blood Glucose as ordered  - Assess for signs and symptoms of hyperglycemia and hypoglycemia  - Administer ordered medications to maintain glucose within target range  - Assess nutritional intake and initiate nutrition service referral as needed  3/14/2023 1303 by Lucrecia Barirga RN  Outcome: Progressing  3/14/2023 1303 by Lucrecia Barriga RN  Outcome: Progressing     Problem: HEMATOLOGIC - ADULT  Goal: Maintains hematologic stability  Description: INTERVENTIONS  - Assess for signs and symptoms of bleeding or hemorrhage  - Monitor labs  - Administer supportive blood products/factors as ordered and appropriate  3/14/2023 1303 by Lucrecia Barriga RN  Outcome: Progressing  3/14/2023 1303 by Lucrecia Barriga RN  Outcome: Progressing     Problem: MUSCULOSKELETAL - ADULT  Goal: Maintain or return mobility to safest level of function  Description: INTERVENTIONS:  - Assess patient's ability to carry out ADLs; assess patient's baseline for ADL function and identify physical deficits which impact ability to perform ADLs (bathing, care of mouth/teeth, toileting, grooming, dressing, etc )  - Assess/evaluate cause of self-care deficits   - Assess range of motion  - Assess patient's mobility  - Assess patient's need for assistive devices and provide as appropriate  - Encourage maximum independence but intervene and supervise when necessary  - Involve family in performance of ADLs  - Assess for home care needs following discharge   - Consider OT consult to assist with ADL evaluation and planning for discharge  - Provide patient education as appropriate  3/14/2023 1303 by Arsh Boykin RN  Outcome: Progressing  3/14/2023 1303 by Arsh Boykin RN  Outcome: Progressing  Goal: Maintain proper alignment of affected body part  Description: INTERVENTIONS:  - Support, maintain and protect limb and body alignment  - Provide patient/ family with appropriate education  3/14/2023 1303 by Arsh Boykin RN  Outcome: Progressing  3/14/2023 1303 by Arsh Boykin RN  Outcome: Progressing     Problem: PAIN - ADULT  Goal: Verbalizes/displays adequate comfort level or baseline comfort level  Description: Interventions:  - Encourage patient to monitor pain and request assistance  - Assess pain using appropriate pain scale  - Administer analgesics based on type and severity of pain and evaluate response  - Implement non-pharmacological measures as appropriate and evaluate response  - Consider cultural and social influences on pain and pain management  - Notify physician/advanced practitioner if interventions unsuccessful or patient reports new pain  3/14/2023 1303 by Arsh Boykin RN  Outcome: Progressing  3/14/2023 1303 by Arsh Boykin RN  Outcome: Progressing     Problem: INFECTION - ADULT  Goal: Absence or prevention of progression during hospitalization  Description: INTERVENTIONS:  - Assess and monitor for signs and symptoms of infection  - Monitor lab/diagnostic results  - Monitor all insertion sites, i e  indwelling lines, tubes, and drains  - Monitor endotracheal if appropriate and nasal secretions for changes in amount and color  - [place] appropriate cooling/warming therapies per order  - Administer medications as ordered  - Instruct and encourage patient and family to use good hand hygiene technique  - Identify and instruct in appropriate isolation precautions for identified infection/condition  3/14/2023 1303 bettina Reyna SAMI Moura  Outcome: Progressing  3/14/2023 1303 by Lelo Martínez RN  Outcome: Progressing     Problem: DISCHARGE PLANNING  Goal: Discharge to home or other facility with appropriate resources  Description: INTERVENTIONS:  - Identify barriers to discharge w/patient and caregiver  - Arrange for needed discharge resources and transportation as appropriate  - Identify discharge learning needs (meds, wound care, etc )  - Arrange for interpretive services to assist at discharge as needed  - Refer to Case Management Department for coordinating discharge planning if the patient needs post-hospital services based on physician/advanced practitioner order or complex needs related to functional status, cognitive ability, or social support system  3/14/2023 1303 by Lelo Martínez RN  Outcome: Progressing  3/14/2023 1303 by Lelo Martínez RN  Outcome: Progressing     Problem: Knowledge Deficit  Goal: Patient/family/caregiver demonstrates understanding of disease process, treatment plan, medications, and discharge instructions  Description: Complete learning assessment and assess knowledge base  Interventions:  - Provide teaching at level of understanding  - Provide teaching via preferred learning methods  3/14/2023 1303 by Lelo Martínez RN  Outcome: Progressing  3/14/2023 1303 by Lelo Martínez RN  Outcome: Progressing     Problem: Nutrition/Hydration-ADULT  Goal: Nutrient/Hydration intake appropriate for improving, restoring or maintaining nutritional needs  Description: Monitor and assess patient's nutrition/hydration status for malnutrition  Collaborate with interdisciplinary team and initiate plan and interventions as ordered  Monitor patient's weight and dietary intake as ordered or per policy  Utilize nutrition screening tool and intervene as necessary  Determine patient's food preferences and provide high-protein, high-caloric foods as appropriate       INTERVENTIONS:  - Monitor oral intake, urinary output, labs, and treatment plans  - Assess nutrition and hydration status and recommend course of action  - Evaluate amount of meals eaten  - Assist patient with eating if necessary   - Allow adequate time for meals  - Recommend/ encourage appropriate diets, oral nutritional supplements, and vitamin/mineral supplements  - Order, calculate, and assess calorie counts as needed  - Recommend, monitor, and adjust tube feedings and TPN/PPN based on assessed needs  - Assess need for intravenous fluids  - Provide specific nutrition/hydration education as appropriate  - Include patient/family/caregiver in decisions related to nutrition  3/14/2023 1303 by Lui Goodwin RN  Outcome: Progressing  3/14/2023 1303 by Lui Goodwin RN  Outcome: Progressing

## 2023-03-14 NOTE — PROGRESS NOTES
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, recurrent ascites requiring paracentesis presented with abdominal pain nausea and poor oral intake and nephrology was consulted for acute kidney injury    Acute kidney injury, POA  -Baseline creatinine: 0 5-0 7 mg/dl  -Multiple episodes of acute kidney injury this hospital admission  · First episode: Admitted with creatinine 2 37 in February 20, 2023, was prerenal and renal function improved to creatinine 0 55 on 2/24/2023  · Second episode acute kidney injury on 2/26 with peak serum creatinine 2 54 on 3/4, suspected ATN  Renal function improved to creatinine 1 68 on 3/6  · Third episode: Creatinine worsen no new complaints ed to 2 49 on 3/8, suspected to be from hemodynamic changes from paracentesis on 3/7 and improved with IV albumin and creatinine was down to 1 8 on 3/10  · Fourth episode: Peak creatinine 2 8 mg/dL on 3/13 and elevated since 3/12  Likely due to low blood pressure with blood pressure in 90s   [facility] Course:  Was started on IV albumin 25 g every 6 hours on 3/12   -Plan:   · Renal function improving to creatinine 2 7 mg/dL today blood pressure is still soft despite high-dose of midodrine 15 mg 4 times daily  We will continue to monitor renal function, discussed with primary team who agreed with the plan  Continue IV albumin and continue oral midodrine  · Would hold off on diuretics as blood pressure was as low as 80s  Continue to monitor blood pressure and avoid hypotension  If still low,  · Avoid nephrotoxins and dose all medications per EGFR  · Avoid hypotension  Chronic hypotension, blood pressure i replace potassium s still low    continue midodrine 15 mg 4 times daily and continue IV albumin  Hyponatremia suspected SIADH in the setting of malignancy  Also possibility of pseudohyponatremia with normal serum osmolality and finding of  triclonal gammopathy  -Urine sodium was less than 5 and urine osmolality 373  Urine studies not suggestive of SIADH  Previously checked TSH and a m  cortisol was in normal range  -Work-up showed serum osmolality 288 which is normal, 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 (M-Peak 1 = 0 15 g/dL),  free kappa light chains (M-Peak 2 = 0 13 g/dL), and IgG lambda (M-Peak 3 = 0 11 g/dL   UPEP was negative  Recommend input from hematology oncology-discussed with primary team about discussing with hematology oncology  -Sodium level has trended down to 127 today, started on salt tablets 2 g twice daily  Also on sodium bicarbonate tablet  But certainly carries the risk of fluid retention  If blood pressure allows would consider loop diuretics but blood pressure too low at this time to start diuretics  Tighten fluid restriction to 1 2 L/day    Metabolic acidosis: Bicarb level 18, continue oral sodium bicarbonate tablets 20 mg 3 times daily, dose was increased on 3/13 we will continue at same dose for now    Hypokalemia, replaced potassium  Hyperphosphatemia with phosphorus level 4 9 in the setting of acute kidney injury, continue to monitor, recommend low phosphorus diet, no need for binders at this time    Anemia, hemoglobin stable at 9 1 g/dL, MCV normal, monitor per primary team    Metastatic cholangiocarcinoma-plan for outpatient oncology follow up     Status post septic shock which was present on admission  Currently off antibiotics  Bilateral lower extremity edema likely in the setting of low serum albumin   Dopplers negative for DVT  Continue to monitor  Malignant ascites requiring frequent paracentesis, status post paracentesis on 3/13 and 3 6 L fluid removed    Noted plan for  Tenckhoff catheter placement    Mesenteric thrombosis-anticoagulation per primary team    SUBJECTIVE:  No shortness of breath  Status post paracentesis yesterday  Still with lower extremity edema, denies dizziness    OBJECTIVE:  Current Weight: Weight - Scale: 93 6 kg (206 lb 5 6 oz)  Vitals:    03/14/23 0840   BP: (!) 95/48   Pulse: 89   Resp: 16   Temp: 97 5 °F (36 4 °C)   SpO2: 96%       Intake/Output Summary (Last 24 hours) at 3/14/2023 1116  Last data filed at 3/14/2023 0606  Gross per 24 hour   Intake 200 ml   Output 4250 ml   Net -4050 ml       Physical Exam  General:  Ill looking, awake  Eyes: Conjunctivae pink,  Sclera anicteric  ENT: lips and mucous membranes moist  Neck: supple   Chest: Clear to Auscultation both lungs,  no crackles, ronchus or wheezing  CVS: S1 & S2 present, normal rate, regular rhythm, no murmur    Abdomen: soft, non-tender, distended due to ascites, Bowel sounds normoactive  Extremities: 1+ edema of both legs  Skin: no rash  Neuro: awake, alert, oriented x 3   Psych: Mood and affect appropriate     Medications:    Current Facility-Administered Medications:   •  al mag oxide-diphenhydramine-lidocaine viscous (MAGIC MOUTHWASH) suspension 10 mL, 10 mL, Swish & Swallow, Q4H PRN, Jesse Joshua MD, 10 mL at 02/26/23 1232  •  albumin human (FLEXBUMIN) 25 % injection 25 g, 25 g, Intravenous, Q6H, Rahul Jaime MD, Stopped at 03/14/23 0606  •  chlorhexidine (PERIDEX) 0 12 % oral rinse 15 mL, 15 mL, Swish & Spit, Q12H [address], Jesse Joshua MD, 15 mL at 03/14/23 0931  •  enoxaparin (LOVENOX) subcutaneous injection 100 mg, 1 mg/kg, Subcutaneous, Q12H [address], Jada Hernández MD, 100 mg at 03/14/23 0931  •  ergocalciferol (VITAMIN D2) capsule 50,000 Units, 50,000 Units, Oral, Weekly, Jesse Joshua MD, 50,000 Units at 03/09/23 6700  •  insulin lispro (HumaLOG) 100 units/mL subcutaneous injection 1-6 Units, 1-6 Units, Subcutaneous, TID AC, 1 Units at 03/13/23 1631 **AND** Fingerstick Glucose (POCT), , , TID DAVID, Froilan P Riley Lebron MD  •  lactulose oral solution 20 g, 20 g, Oral, BID, Romi Cox MD, 20 g at 03/14/23 0931  •  midodrine (PROAMATINE) tablet 15 mg, 15 mg, Oral, 4x Daily, Kristin Sandoval MD, 15 mg at 03/14/23 7025  •  moisture barrier miconazole 2% cream (aka JOYCE MOISTURE BARRIER ANTIFUNGAL CREAM), , Topical, BID, Graciela Berry MD, Given at 03/14/23 0931  •  ondansetron (ZOFRAN) injection 4 mg, 4 mg, Intravenous, Q8H PRN, Graciela Berry MD, 4 mg at 02/22/23 0006  •  oxyCODONE (ROXICODONE) IR tablet 5 mg, 5 mg, Oral, Q4H PRN, Graciela Berry MD  •  pantoprazole (PROTONIX) EC tablet 40 mg, 40 mg, Oral, Early Morning, Graciela Berry MD, 40 mg at 03/14/23 0533  •  rifaximin (XIFAXAN) tablet 550 mg, 550 mg, Oral, Q12H [facility], Romi Cox MD, 550 mg at 03/14/23 0931  •  saliva substitute (MOUTH KOTE) mucosal solution 5 spray, 5 spray, Mouth/Throat, 4x Daily PRN, Graciela Berry MD, 5 spray at 02/25/23 2227  •  sodium bicarbonate tablet 1,300 mg, 1,300 mg, Oral, TID after meals, Anali Padron MD, 1,300 mg at 03/14/23 0931  •  sucralfate (CARAFATE) tablet 1 g, 1 g, Oral, 4x Daily (AC & HS), Graciela Berry MD, 1 g at 03/14/23 0604    Invasive Devices:   Urethral Catheter 18 Fr   (Active)   Reasons to continue Urinary Catheter  Accurate I&O assessment in critically ill patients (48 hr  max) 03/12/23 2301   Site Assessment Clean;Skin intact 03/12/23 2301   Harvey Care Done 03/12/23 2100   Collection Container Standard drainage bag 03/12/23 2301   Securement Method Securing device (Describe) 03/12/23 2301   Output (mL) 250 mL 03/12/23 1401       Lab Results:   Results from last 7 days   Lab Units 03/14/23  0529 03/13/23  1013 03/12/23  0907 03/12/23  0740 03/11/23  0628 03/10/23  0535 03/09/23  0606 03/08/23  0556   WBC Thousand/uL 7 82 9 10 9 97  --    < > 6 72   < > 9 21   HEMOGLOBIN g/dL 9 1* 9 6* 9 8*  --    < > 9 6*   < > 10 7*   HEMATOCRIT % 25 7* 27 4* 28 6*  --    < > 27 0*   < > 30 4* PLATELETS Thousands/uL 88* 98* 93*  --    < > 76*   < > 70*   POTASSIUM mmol/L 3 4* 3 3* 3 7 3 6   < > 3 7   < > 4 3   CHLORIDE mmol/L 96 97 98 98   < > 99   < > 100   CO2 mmol/L 18* 18* 18* 17*   < > 18*   < > 16*   BUN mg/dL 112* 106* 108* 108*   < > 98*   < > 96*   CREATININE mg/dL 2 71* 2 80* 2 76* 2 80*   < > 1 88*   < > 2 49*   CALCIUM mg/dL 8 0* 8 0* 7 6* 7 5*   < > 7 5*   < > 8 0*   MAGNESIUM mg/dL 3 1*  --   --   --   --  2 8*  --   --    PHOSPHORUS mg/dL 4 9*  --   --  4 4  --   --   --  4 8*   ALK PHOS U/L 94 104 105  --    < > 88  --  98   ALT U/L 16 14 14  --    < > 15  --  25   AST U/L 53* 62* 61*  --    < > 54*  --  62*    < > = values in this interval not displayed  Previous work up:         Portions of the record may have been created with voice recognition software  Occasional wrong word or "sound a like" substitutions may have occurred due to the inherent limitations of voice recognition software  Read the chart carefully and recognize, using context, where substitutions have occurred  If you have any questions, please contact the dictating provider

## 2023-03-14 NOTE — ASSESSMENT & PLAN NOTE
· Multifactorial  · Oncology inputs noted  · Monitor counts-platelets-98     · Continue with current dose of Lovenox

## 2023-03-14 NOTE — ASSESSMENT & PLAN NOTE
· Present on admission    Admitted to the intensive care unit  · Shock resolved  · Currently off of antibiotics

## 2023-03-14 NOTE — ASSESSMENT & PLAN NOTE
· Multifactorial  · Sodium is 127 today    · Tightened fluid restriction today  · NaCL tabs added  · Continue to monitor closely

## 2023-03-14 NOTE — ASSESSMENT & PLAN NOTE
· Patient with metastatic cholangiocarcinoma  · Discussed with patient overall guarded prognosis  · Oncology has reiterated poor prognosis, patient reports he understands his guarded prognosis but would like to pursue active treatment at this time and plans to follow-up with oncology on discharge for further cares  · Oncology inputs noted  · Follow-up with oncology as an outpatient  · Trend LFTs

## 2023-03-14 NOTE — ASSESSMENT & PLAN NOTE
· Monitor kidney function closely  · Avoid nephrotoxins  · Nephrology following  · Creatinine is stable  · IV albumin per nephrology  With hepatorenal syndrome  · Patient is not a candidate for dialysis  · Urine output is on the lower side    Maintain the Harvey catheter for accurate I's and O's

## 2023-03-14 NOTE — ASSESSMENT & PLAN NOTE
· Requires frequent paracentesis  Status post paracentesis on 3/7 removing 6 L  · Patient has having paracentesis twice a week Monday and Thursday as outpatient  · Discussed with the patient about Tenckhoff catheter-patient and family is considering Tenckhoff catheter placement  · Patient needed paracentesis today removed 1600 mL-we will give one-time dose of albumin after the paracentesis even though it is a low volume tap due to concern for low blood pressure  · Patient had another paracentesis today 3 6 L removed    Patient get IV albumin every 6 hours-25 mg  · Plan for Tenckhoff catheter noted tomorrow vs thursday

## 2023-03-14 NOTE — TELEPHONE ENCOUNTER
Patient Call Form   Reason for patient call? Patient's spouse, Abby Hogue, calling in stating that the patient states he received a call and he is confused on which Doctor he will be established with  Abby Hogue states that the 4/26 appointment she spoke with someone about earlier is not soon enough and she has not been called back per her request    Patient's primary oncologist? Dr Susan Menjivar    Name of person the patient was calling for? Barbara Pulliam    Does the patient issue require a call back? Yes   If call back required, inform patient that the message will be forwarded to the team and someone will get back to them as soon as possible    Did you relay this information to the patient?  Yes

## 2023-03-15 PROBLEM — I95.9 HYPOTENSION: Status: ACTIVE | Noted: 2023-03-15

## 2023-03-15 NOTE — PROGRESS NOTES
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, recurrent ascites requiring paracentesis presented with abdominal pain nausea and poor oral intake and nephrology was consulted for acute kidney injury    Acute kidney injury, POA  -Baseline creatinine: 0 5-0 7 mg/dl  -Multiple episodes of acute kidney injury this hospital admission  · First episode: Admitted with creatinine 2 37 in February 20, 2023, was prerenal and renal function improved to creatinine 0 55 on 2/24/2023  · Second episode acute kidney injury on 2/26 with peak serum creatinine 2 54 on 3/4, suspected ATN  Renal function improved to creatinine 1 68 on 3/6  · Third episode: Creatinine worsen no new complaints ed to 2 49 on 3/8, suspected to be from hemodynamic changes from paracentesis on 3/7 and improved with IV albumin and creatinine was down to 1 8 on 3/10  · Fourth episode: Creatinine elevated since 3/12  Most likely ATN from persistently low blood pressure despite high-dose midodrine and albumin  [facility]  [facility] Course:  Was started on IV albumin 25 g every 6 hours on 3/12, and is also on midodrine     -Plan:   · Renal function continues to worsen to creatinine 3 2 in the setting of persistent hypotension despite being on IV albumin as well as high-dose midodrine 15 mg 4 times daily  Discussed with patient about possible transfer to ICU and starting on vasopressors but he is not interested at this time, he wants to have the Tenckhoff catheter placement done first   Also discussed that he would not be a candidate for dialysis considering persistent hypotension    Discussed this plan with primary team and informed my discussion with patient to the primary team who agrees  · Patient does not have liver cirrhosis but has metastatic cholangiocarcinoma, he does have spider angiomata on the chest indicating of liver disease  Does have liver mass, started on octreotide 200 mcg 3 times daily to see if it helps  · Ongoing discussion regarding goals of care  · Avoid nephrotoxins and dose all medications per EGFR  · Avoid hypotension  Chronic hypotension, blood pressure persistently low in 80s  continue midodrine 15 mg 4 times daily and continue IV albumin  If blood pressure drops further and if patient is agreeable would recommend transfer to ICU, discussed with primary team who agrees    Hyponatremia suspected SIADH in the setting of malignancy and use of PPI  Also possibility of pseudohyponatremia with normal serum osmolality and finding of  triclonal gammopathy  -Urine sodium was less than 5 and urine osmolality 373  Urine studies not suggestive of SIADH  Previously checked TSH and a m  cortisol was in normal range  -Work-up showed serum osmolality 288 which is normal, 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 (M-Peak 1 = 0 15 g/dL),  free kappa light chains (M-Peak 2 = 0 13 g/dL), and IgG lambda (M-Peak 3 = 0 11 g/dL   UPEP was negative  Recommend input from hematology oncology-discussed with primary team about discussing with hematology oncology  -Sodium level improving to 128 meq/L, status post salt tablets 2 g given on 3/14  Also on sodium bicarbonate tablet which would help  Continue fluid restriction 1 2 L/day  Continue salt tablets 2 g daily    Not able to start diuretics due to low blood pressure    Metabolic acidosis: Bicarb level  trended down to 17 with anion gap of 14, continue oral sodium bicarbonate tablets 1300 mg 3 times daily, dose was increased on 3/13   -Bicarb level is still low continue same dose of sodium bicarbonate tablets    Hypokalemia, resolved  Hyperphosphatemia with phosphorus level 4 9 on blood work from 3/14 in the setting of acute kidney injury, continue to monitor, recommend low phosphorus diet, no need for binders at this time    Anemia, hemoglobin stable at 9 1 g/dL, MCV normal, monitor per primary team    Metastatic cholangiocarcinoma with finding of pulmonary hepatic lesion, peritoneal carcinomatosis as well as malignant ascites   -plan for outpatient oncology follow up     Status post septic shock which was present on admission  Currently off antibiotics    Bilateral lower extremity edema likely in the setting of low serum albumin   Dopplers negative for DVT  Continue to monitor  Continue IV albumin  Malignant ascites requiring frequent paracentesis, status post paracentesis on 3/13 and 3 6 L fluid removed  Noted plan for  Tenckhoff catheter placement today    Mesenteric thrombosis-anticoagulation per primary team   The setting of worsening renal function, EGFR was 19 recommend decreasing the dose of Lovenox to 1 mg/kg body weight daily  Chronic thrombocytopenia: Stable, continue to monitor    SUBJECTIVE:  Still with lower extremity edema and abdominal distention, patient is waiting for Tenckhoff catheter placement today  Blood pressure has been low  Urine output poor    OBJECTIVE:  Current Weight: Weight - Scale: 93 6 kg (206 lb 5 6 oz)  Vitals:    03/15/23 0745   BP: (!) 84/44   Pulse: (!) 107   Resp: 16   Temp: (!) 97 °F (36 1 °C)   SpO2: 98%       Intake/Output Summary (Last 24 hours) at 3/15/2023 1130  Last data filed at 3/14/2023 2100  Gross per 24 hour   Intake --   Output 200 ml   Net -200 ml       Physical Exam  General:  Ill looking, awake  Eyes: Conjunctivae pink,  Sclera anicteric  ENT: lips and mucous membranes moist  Neck: supple   Chest: Clear to Auscultation both lungs,  no crackles, ronchus or wheezing  CVS: S1 & S2 present, normal rate, regular rhythm, no murmur    Abdomen: soft, non-tender, abdomen distended with ascites, Bowel sounds normoactive  Extremities: 1 +  edema of  legs  Skin: no rash, spider angioma on the chest  Neuro: awake, alert, oriented x 3   Psych: Mood and affect appropriate     Medications:    Current Facility-Administered Medications:   •  al mag oxide-diphenhydramine-lidocaine viscous (MAGIC MOUTHWASH) suspension 10 mL, 10 mL, Swish & Swallow, Q4H PRN, Ramirez Brooks MD, 10 mL at 02/26/23 1232  •  albumin human (FLEXBUMIN) 25 % injection 25 g, 25 g, Intravenous, Q6H, Gera Leung MD, Last Rate: 0 mL/hr at 03/14/23 0606, 25 g at 03/15/23 0526  •  albumin human (FLEXBUMIN) 25 % injection 25 g, 25 g, Intravenous, Q6H, Gera Leung MD  •  chlorhexidine (PERIDEX) 0 12 % oral rinse 15 mL, 15 mL, Swish & Spit, Q12H [facility], Ramirez Brooks MD, 15 mL at 03/15/23 0913  •  enoxaparin (LOVENOX) subcutaneous injection 100 mg, 1 mg/kg, Subcutaneous, Q12H [facility], Alexandra Ponce MD, 100 mg at 03/15/23 0920  •  ergocalciferol (VITAMIN D2) capsule 50,000 Units, 50,000 Units, Oral, Weekly, Ramirez Brooks MD, 50,000 Units at 03/09/23 9803  •  insulin lispro (HumaLOG) 100 units/mL subcutaneous injection 1-6 Units, 1-6 Units, Subcutaneous, TID AC, 1 Units at 03/14/23 1233 **AND** Fingerstick Glucose (POCT), , , TID AC, Ramirez Brooks MD  •  lactulose oral solution 20 g, 20 g, Oral, BID, Lidya Cai MD, 20 g at 03/14/23 1726  •  midodrine (PROAMATINE) tablet 15 mg, 15 mg, Oral, 4x Daily, Vicky Skelton MD, 15 mg at 03/15/23 0912  •  moisture barrier miconazole 2% cream (aka JOYCE MOISTURE BARRIER ANTIFUNGAL CREAM), , Topical, BID, Ramirez Brooks MD, Given at 03/14/23 1727  •  ondansetron (ZOFRAN) injection 4 mg, 4 mg, Intravenous, Q8H PRN, Ramirez Brooks MD, 4 mg at 02/22/23 0006  •  oxyCODONE (ROXICODONE) IR tablet 5 mg, 5 mg, Oral, Q4H PRN, Ramirez Brooks MD  •  pantoprazole (PROTONIX) EC tablet 40 mg, 40 mg, Oral, Early Morning, Ramirez Brooks MD, 40 mg at 03/15/23 0606  •  rifaximin (XIFAXAN) tablet 550 mg, 550 mg, Oral, Q12H [facility], Lidya Cai MD, 550 mg at 03/15/23 1019  •  saliva substitute (MOUTH KOTE) mucosal solution 5 spray, 5 spray, Mouth/Throat, 4x Daily PRN, Mary Swenson MD, 5 spray at 02/25/23 2227  •  sodium bicarbonate tablet 1,300 mg, 1,300 mg, Oral, TID after meals, Bee Madrigal MD, 1,300 mg at 03/14/23 1725  •  sodium chloride tablet 2 g, 2 g, Oral, BID With Meals, Bee Madrigal MD, 2 g at 03/14/23 1725  •  sucralfate (CARAFATE) tablet 1 g, 1 g, Oral, 4x Daily (AC & HS), Mary Swenson MD, 1 g at 03/15/23 0606    Invasive Devices:   Urethral Catheter 18 Fr  (Active)   Reasons to continue Urinary Catheter  Accurate I&O assessment in critically ill patients (48 hr  max) 03/12/23 2301   Site Assessment Clean;Skin intact 03/12/23 2301   Harvey Care Done 03/12/23 2100   Collection Container Standard drainage bag 03/12/23 2301   Securement Method Securing device (Describe) 03/12/23 2301   Output (mL) 250 mL 03/12/23 1401       Lab Results:   Results from last 7 days   Lab Units 03/15/23  0607 03/14/23  0529 03/13/23  1013 03/12/23  0907 03/12/23  0740 03/11/23  0628 03/10/23  0535   WBC Thousand/uL  --  7 82 9 10 9 97  --    < > 6 72   HEMOGLOBIN g/dL  --  9 1* 9 6* 9 8*  --    < > 9 6*   HEMATOCRIT %  --  25 7* 27 4* 28 6*  --    < > 27 0*   PLATELETS Thousands/uL  --  88* 98* 93*  --    < > 76*   POTASSIUM mmol/L 3 8 3 4* 3 3* 3 7 3 6   < > 3 7   CHLORIDE mmol/L 97 96 97 98 98   < > 99   CO2 mmol/L 17* 18* 18* 18* 17*   < > 18*   BUN mg/dL 120* 112* 106* 108* 108*   < > 98*   CREATININE mg/dL 3 40* 2 71* 2 80* 2 76* 2 80*   < > 1 88*   CALCIUM mg/dL 8 2* 8 0* 8 0* 7 6* 7 5*   < > 7 5*   MAGNESIUM mg/dL  --  3 1*  --   --   --   --  2 8*   PHOSPHORUS mg/dL  --  4 9*  --   --  4 4  --   --    ALK PHOS U/L  --  94 104 105  --    < > 88   ALT U/L  --  16 14 14  --    < > 15   AST U/L  --  53* 62* 61*  --    < > 54*    < > = values in this interval not displayed         Previous work up:         Portions of the record may have been created with voice recognition software  Occasional wrong word or "sound a like" substitutions may have occurred due to the inherent limitations of voice recognition software  Read the chart carefully and recognize, using context, where substitutions have occurred  If you have any questions, please contact the dictating provider

## 2023-03-15 NOTE — WOUND OSTOMY CARE
Progress Note - Wound   Mendoza Ashby 47 y o  male MRN: [id number]  Unit/Bed#: [facility] 141-14 Encounter: [id number]      Assessment:   Patient admitted due to septic shock  History of GERD and cholangiocarcinoma stage IV  Wound care follow-up for bilateral buttock wounds  Patient agreeable to assessment, alert and oriented x 4, continent of bowel, davison catheter in place for urine management, is assist of 1 with walker to stand for assessment, is OOB to chair with EHOB waffle cushion in place, heels elevated, is an assist with care  Nutrition is following  1  Bilateral medial aspect of buttock- Suspect moisture and friction component and cannot completely rule out pressure component  Wounds are located on aspect of the buttock that causes skin to skin friction  Wounds are scattered, oval and irregular in shape, full-thickness, approx  30% yellow tissue and 70% beefy red tissue that blanches, with small amount of serosanguineous drainage noted  Britt-wounds are dry, intact, blanchable hyperpigmented and pink skin  2  Sacrum, hips, elbows, and heels- skin is dry, intact, blanchable  Bilateral heels noted with scaly/peeling skin  Educated patient on importance of frequent offloading of pressure via turning, repositioning, and weight-shifting  Verbalized understanding of plan of care  No induration, fluctuance, odor, warmth, redness, or purulence noted to the above noted wounds  Patient tolerated well, reports mild pain to the wounds  Primary nurse aware of plan of care  See flow sheets for more detailed assessment findings  Will follow along  Skin care plans:  1-Cleanse sacro-buttocks with soap and water  Apply TRIAD paste to open areas on bilateral buttock and cover each side with a clover Allevyn foam dressing   Change every other day and as needed for soilage/dislodgement  (discontinue angela)  2-Hydraguard to bilateral heel BID and PRN  3-Elevate heels to offload pressure  4-Ehob cushion when out of bed  5-Turn/repoisiton q2h for pressure re-distribution on skin  6-Moisturize skin daily with skin nourishing cream       Wound 02/22/23 Buttocks Mid (Active)   Wound Image   03/15/23 1048   Wound Description Beefy red;Yellow 03/15/23 1048   Pressure Injury Stage     Britt-wound Assessment Dry; Intact; Hyperpigmented;Pink 03/15/23 1048   Wound Length (cm) 6 5 cm 03/15/23 1048   Wound Width (cm) 6 cm 03/15/23 1048   Wound Depth (cm) 0 2 cm 03/15/23 1048   Wound Surface Area (cm^2) 39 cm^2 03/15/23 1048   Wound Volume (cm^3) 7 8 cm^3 03/15/23 1048   Calculated Wound Volume (cm^3) 7 8 cm^3 03/15/23 1048   Tunneling 0 cm 03/15/23 1048   Tunneling in depth located at 0 03/15/23 1048   Undermining 0 03/15/23 1048   Undermining is depth extending from 0 03/15/23 1048   Wound Site Closure     Drainage Amount Small 03/15/23 1048   Drainage Description Serosanguineous 03/15/23 1048   Non-staged Wound Description Full thickness 03/15/23 1048   Treatments Cleansed;Site care 03/15/23 1048   Dressing Protective barrier 03/15/23 1048   Wound packed?  No 03/15/23 1048   Packing- # removed 0 03/15/23 1048   Packing- # inserted 0 03/15/23 1048   Dressing Changed New 03/15/23 1048   Patient Tolerance Tolerated well 03/15/23 1048   Dressing Status           Call or tigertext with any questions  Wound Care will continue to follow    Jasper TAMAYON RN CWON  Wound and Ostomy care

## 2023-03-15 NOTE — UTILIZATION REVIEW
Continued Stay Review    Date: 3/14/23                          Current Patient Class: inpatient  Current Level of Care: med surg    HPI:52 y o  male initially admitted on 2/20/23     Assessment/Plan:  Reports that abdominal bloating is better today post paracentesis, removed 3 6 L  Abdominal distention and BL LE edema still noted  Continue to trend LFTs, IV Albumin q6h  Plan for Tenckhoff catheter on Wednesday vs Thursday  Added NaCL tabs dt hyponatremia, continue to monitor Na closely  Palliative care following, family and pt aware of poor prognosis but want to continue treatment approach  Encephalopathy improving, GI started rifaximin  UOP remains on the lower side, maintain Harvey catheter for accurate IOs  Continue Lovenox dt mesenteric thrombosis, monitor CBC  Continue Vit D supplementation  Continue PPI      Vital Signs:    Date/Time Temp Pulse Resp BP MAP (mmHg) SpO2 O2 Device Patient Position - Orthostatic VS   03/15/23 07:45:35 97 °F (36 1 °C) Abnormal  107 Abnormal  16 84/44 Abnormal  57 Abnormal  98 % -- --   03/14/23 22:03:19 97 7 °F (36 5 °C) 92 18 90/44 Abnormal  59 Abnormal  97 % -- --   03/14/23 2100 -- -- -- -- -- -- None (Room air) --   03/14/23 15:31:29 97 3 °F (36 3 °C) Abnormal  83 -- 89/48 Abnormal  62 Abnormal  98 % -- --   03/14/23 15:29:05 97 3 °F (36 3 °C) Abnormal  87 -- 88/44 Abnormal  59 Abnormal  95 % -- --   03/14/23 08:40:08 97 5 °F (36 4 °C) 89 16 95/48 Abnormal  64 Abnormal  96 % -- --   03/14/23 06:09:10 -- 87 -- 84/45 Abnormal  58 Abnormal  98 % -- --   03/13/23 2315 -- -- -- 84/46 Abnormal  -- -- -- Lying   03/13/23 22:00:05 97 5 °F (36 4 °C) 86 16 85/46 Abnormal  59 Abnormal  95 % -- --   03/13/23 15:10:08 97 3 °F (36 3 °C) Abnormal  88 16 87/49 Abnormal  62 Abnormal  97 % -- --   03/13/23 13:25:20 -- 86 19 96/53 -- 100 % -- --   03/13/23 13:12:45 -- -- -- -- -- 99 % -- --   03/13/23 13:12:34 -- -- -- 97/52 -- -- -- --   03/13/23 12:41:13 -- 72 -- 93/53 -- 96 % -- --   03/13/23 0745 -- -- -- -- -- -- None (Room air) --   03/13/23 06:24:47 97 3 °F (36 3 °C) Abnormal  100 16 91/52 65 96 % -- --     Pertinent Labs/Diagnostic Results:       Results from last 7 days   Lab Units 03/14/23  0529 03/13/23  1013 03/12/23  0907 03/11/23  0628 03/10/23  0535   WBC Thousand/uL 7 82 9 10 9 97 8 19 6 72   HEMOGLOBIN g/dL 9 1* 9 6* 9 8* 10 3* 9 6*   HEMATOCRIT % 25 7* 27 4* 28 6* 29 1* 27 0*   PLATELETS Thousands/uL 88* 98* 93* 94* 76*         Results from last 7 days   Lab Units 03/15/23  0607 03/14/23  0529 03/13/23  1013 03/12/23  0907 03/12/23  0740 03/11/23  0628 03/10/23  0535   SODIUM mmol/L 128* 127* 128* 129* 127*   < > 130*   POTASSIUM mmol/L 3 8 3 4* 3 3* 3 7 3 6   < > 3 7   CHLORIDE mmol/L 97 96 97 98 98   < > 99   CO2 mmol/L 17* 18* 18* 18* 17*   < > 18*   ANION GAP mmol/L 14* 13 13 13 12   < > 13   BUN mg/dL 120* 112* 106* 108* 108*   < > 98*   CREATININE mg/dL 3 40* 2 71* 2 80* 2 76* 2 80*   < > 1 88*   EGFR ml/min/1 73sq m 19 25 24 25 24   < > 40   CALCIUM mg/dL 8 2* 8 0* 8 0* 7 6* 7 5*   < > 7 5*   MAGNESIUM mg/dL  --  3 1*  --   --   --   --  2 8*   PHOSPHORUS mg/dL  --  4 9*  --   --  4 4  --   --     < > = values in this interval not displayed       Results from last 7 days   Lab Units 03/14/23  0529 03/13/23  1013 03/12/23  0907 03/12/23  0740 03/11/23  0628 03/10/23  0535   AST U/L 53* 62* 61*  --  65* 54*   ALT U/L 16 14 14  --  16 15   ALK PHOS U/L 94 104 105  --  103 88   TOTAL PROTEIN g/dL 5 6* 5 8* 5 3*  --  5 6* 5 6*  5 5*   ALBUMIN g/dL 4 2 4 1 3 6 3 6 3 9 4 1   TOTAL BILIRUBIN mg/dL 5 23* 5 99* 6 49*  --  5 41* 5 35*   AMMONIA umol/L  --   --   --   --   --  106*     Results from last 7 days   Lab Units 03/15/23  0745 03/14/23  2046 03/14/23  1658 03/14/23  1141 03/14/23  0830 03/13/23 2055 03/13/23  1559 03/13/23  1120 03/13/23  0822 03/12/23 2059 03/12/23  1559 03/12/23  1051   POC GLUCOSE mg/dl 73 135 145* 163* 130 128 160* 141* 122 184* 128 136     Results from last 7 days   Lab Units 03/15/23  0607 03/14/23  0529 03/13/23  1013 03/12/23  0907 03/12/23  0740 03/11/23  0628 03/10/23  0535 03/09/23  0606   GLUCOSE RANDOM mg/dL 97 105 162* 124 142* 115 132 96     Results from last 7 days   Lab Units 03/09/23  0606   OSMOLALITY, SERUM mmol/     Results from last 7 days   Lab Units 03/13/23  1013 03/12/23  0907 03/11/23  1145   PROTIME seconds 25 7* 26 9* 27 0*   INR  2 32* 2 45* 2 46*     Results from last 7 days   Lab Units 03/09/23  0606 03/08/23  1455   OSMOLALITY, SERUM mmol/  --    OSMO UR mmol/KG  --  373     Results from last 7 days   Lab Units 03/11/23  1938 03/08/23  1455   CLARITY UA  Turbid  --    COLOR UA  Yellow  --    SPEC GRAV UA  1 019  --    PH UA  5 0  --    GLUCOSE UA mg/dl Negative  --    KETONES UA mg/dl Trace*  --    BLOOD UA  Negative  --    PROTEIN UA mg/dl 30 (1+)*  --    NITRITE UA  Negative  --    BILIRUBIN UA  Small*  --    UROBILINOGEN UA (BE) mg/dl 2 0*  --    LEUKOCYTES UA  Negative  --    WBC UA /hpf 2-4*  --    RBC UA /hpf 1-2  --    BACTERIA UA /hpf None Seen  --    EPITHELIAL CELLS WET PREP /hpf Occasional  --    MUCUS THREADS  Occasional*  --    SODIUM UR   --  <5     Results from last 7 days   Lab Units 03/11/23 2129 03/11/23 2128 03/11/23  1635   BLOOD CULTURE  No Growth at 72 hrs  No Growth at 72 hrs   --    GRAM STAIN RESULT   --   --  Rare Polys  No organisms seen   BODY FLUID CULTURE, STERILE   --   --  No growth     Results from last 7 days   Lab Units 03/11/23  1634   TOTAL COUNTED  100   WBC FLUID /ul 385     Medications:   Scheduled Medications:   Albumin 25%, 25 g, Intravenous, Q6H  chlorhexidine, 15 mL, Swish & Spit, Q12H JEMMA  enoxaparin, 1 mg/kg, Subcutaneous, Q12H JEMMA  ergocalciferol, 50,000 Units, Oral, Weekly  insulin lispro, 1-6 Units, Subcutaneous, TID AC  lactulose, 20 g, Oral, BID  midodrine, 15 mg, Oral, 4x Daily  JOYCE ANTIFUNGAL, , Topical, BID  pantoprazole, 40 mg, Oral, Early Morning  rifaximin, 550 mg, Oral, Q12H [address]  sodium bicarbonate, 1,300 mg, Oral, TID after meals  sodium chloride, 2 g, Oral, BID With Meals  sucralfate, 1 g, Oral, 4x Daily (AC & HS)      Continuous IV Infusions: none     PRN Meds:  al mag oxide-diphenhydramine-lidocaine viscous, 10 mL, Swish & Swallow, Q4H PRN  ondansetron, 4 mg, Intravenous, Q8H PRN  oxyCODONE, 5 mg, Oral, Q4H PRN  saliva substitute, 5 spray, Mouth/Throat, 4x Daily PRN        Discharge Plan: d    Network Utilization Review Department  ATTENTION: Please call with any questions or concerns to [phone] and carefully listen to the prompts so that you are directed to the right person  All voicemails are confidential   Kelvin Bhandari all requests for admission clinical reviews, approved or denied determinations and any other requests to dedicated fax number below belonging to the campus where the patient is receiving treatment   List of dedicated fax numbers for the Facilities:  [address] DENIALS (Administrative/Medical Necessity) [phone]   [address] (Maternity/NICU/Pediatrics) [phone]   0 Rama King [phone]   Yanci Mcnamara  [phone]   [address] [place] [address] [facility] [place] [phone]   [address] [facility] [place] [facility] 134 815 [facility] [phone]

## 2023-03-15 NOTE — ASSESSMENT & PLAN NOTE
· Patient with metastatic cholangiocarcinoma  · Discussed with patient overall guarded prognosis  · Oncology has reiterated poor prognosis, patient reports he understands his guarded prognosis but would like to pursue active treatment at this time and plans to follow-up with oncology on discharge for further cares  · Oncology inputs noted  · Follow-up with oncology as an outpatient when stable

## 2023-03-15 NOTE — ASSESSMENT & PLAN NOTE
· Requires frequent paracentesis  Status post paracentesis on 3/7 removing 6 L  · Patient has having paracentesis twice a week Monday and Thursday as outpatient  · Discussed with the patient about Tenckhoff catheter-patient and family is considering Tenckhoff catheter placement  · Patient needed paracentesis today removed 1600 mL-we will give one-time dose of albumin after the paracentesis even though it is a low volume tap due to concern for low blood pressure  · Patient had another paracentesis today 3 6 L removed    Patient get IV albumin every 6 hours-25 mg  · tenckoff catheter placed

## 2023-03-15 NOTE — ASSESSMENT & PLAN NOTE
· Received IV heparin GTT  · Discussed with oncology - recommended Lovenox 1 mg/kg body weight every 12 hours  · Lovenox 1 mg/kg body wt twice daily-monitor lovenox  use with creatinine     · Patient is not a candidate for NOACs due to  Child fuentes score, patient with baseline abnormal protime   · Outpatient oncology follow-up  · Discussed with oncology plan is to continue with the Lovenox for now renally dosed

## 2023-03-15 NOTE — PLAN OF CARE
Problem: Prexisting or High Potential for Compromised Skin Integrity  Goal: Skin integrity is maintained or improved  Description: INTERVENTIONS:  - Identify patients at risk for skin breakdown  - Assess and monitor skin integrity  - Assess and monitor nutrition and hydration status  - Monitor labs   - Assess for incontinence   - Turn and reposition patient  - Assist with mobility/ambulation  - Relieve pressure over bony prominences  - Avoid friction and shearing  - Provide appropriate hygiene as needed including keeping skin clean and dry  - Evaluate need for skin moisturizer/barrier cream  - Collaborate with interdisciplinary team   - Patient/family teaching  - Consider wound care consult   Outcome: Progressing     Problem: CARDIOVASCULAR - ADULT  Goal: Maintains optimal cardiac output and hemodynamic stability  Description: INTERVENTIONS:  - Monitor I/O, vital signs and rhythm  - Monitor for S/S and trends of decreased cardiac output  - Administer and titrate ordered vasoactive medications to optimize hemodynamic stability  - Assess quality of pulses, skin color and temperature  - Assess for signs of decreased coronary artery perfusion  - Instruct patient to report change in severity of symptoms  Outcome: Progressing  Goal: Absence of cardiac dysrhythmias or at baseline rhythm  Description: INTERVENTIONS:  - Continuous cardiac monitoring, vital signs, obtain 12 lead EKG if ordered  - Administer antiarrhythmic and heart rate control medications as ordered  - Monitor electrolytes and administer replacement therapy as ordered  Outcome: Progressing     Problem: GASTROINTESTINAL - ADULT  Goal: Minimal or absence of nausea and/or vomiting  Description: INTERVENTIONS:  - Administer IV fluids if ordered to ensure adequate hydration  - Maintain NPO status until nausea and vomiting are resolved  - Nasogastric tube if ordered  - Administer ordered antiemetic medications as needed  - Provide nonpharmacologic comfort measures as appropriate  - Advance diet as tolerated, if ordered  - Consider nutrition services referral to assist patient with adequate nutrition and appropriate food choices  Outcome: Progressing  Goal: Maintains or returns to baseline bowel function  Description: INTERVENTIONS:  - Assess bowel function  - Encourage oral fluids to ensure adequate hydration  - Administer IV fluids if ordered to ensure adequate hydration  - Administer ordered medications as needed  - Encourage mobilization and activity  - Consider nutritional services referral to assist patient with adequate nutrition and appropriate food choices  Outcome: Progressing  Goal: Maintains adequate nutritional intake  Description: INTERVENTIONS:  - Monitor percentage of each meal consumed  - Identify factors contributing to decreased intake, treat as appropriate  - Assist with meals as needed  - Monitor I&O, weight, and lab values if indicated  - Obtain nutrition services referral as needed  Outcome: Progressing     Problem: METABOLIC, FLUID AND ELECTROLYTES - ADULT  Goal: Electrolytes maintained within normal limits  Description: INTERVENTIONS:  - Monitor labs and assess patient for signs and symptoms of electrolyte imbalances  - Administer electrolyte replacement as ordered  - Monitor response to electrolyte replacements, including repeat lab results as appropriate  - Instruct patient on fluid and nutrition as appropriate  Outcome: Progressing  Goal: Fluid balance maintained  Description: INTERVENTIONS:  - Monitor labs   - Monitor I/O and WT  - Instruct patient on fluid and nutrition as appropriate  - Assess for signs & symptoms of volume excess or deficit  Outcome: Progressing  Goal: Glucose maintained within target range  Description: INTERVENTIONS:  - Monitor Blood Glucose as ordered  - Assess for signs and symptoms of hyperglycemia and hypoglycemia  - Administer ordered medications to maintain glucose within target range  - Assess nutritional intake and initiate nutrition service referral as needed  Outcome: Progressing     Problem: HEMATOLOGIC - ADULT  Goal: Maintains hematologic stability  Description: INTERVENTIONS  - Assess for signs and symptoms of bleeding or hemorrhage  - Monitor labs  - Administer supportive blood products/factors as ordered and appropriate  Outcome: Progressing     Problem: MUSCULOSKELETAL - ADULT  Goal: Maintain or return mobility to safest level of function  Description: INTERVENTIONS:  - Assess patient's ability to carry out ADLs; assess patient's baseline for ADL function and identify physical deficits which impact ability to perform ADLs (bathing, care of mouth/teeth, toileting, grooming, dressing, etc )  - Assess/evaluate cause of self-care deficits   - Assess range of motion  - Assess patient's mobility  - Assess patient's need for assistive devices and provide as appropriate  - Encourage maximum independence but intervene and supervise when necessary  - Involve family in performance of ADLs  - Assess for home care needs following discharge   - Consider OT consult to assist with ADL evaluation and planning for discharge  - Provide patient education as appropriate  Outcome: Progressing  Goal: Maintain proper alignment of affected body part  Description: INTERVENTIONS:  - Support, maintain and protect limb and body alignment  - Provide patient/ family with appropriate education  Outcome: Progressing     Problem: PAIN - ADULT  Goal: Verbalizes/displays adequate comfort level or baseline comfort level  Description: Interventions:  - Encourage patient to monitor pain and request assistance  - Assess pain using appropriate pain scale  - Administer analgesics based on type and severity of pain and evaluate response  - Implement non-pharmacological measures as appropriate and evaluate response  - Consider cultural and social influences on pain and pain management  - Notify physician/advanced practitioner if interventions unsuccessful or patient reports new pain  Outcome: Progressing     Problem: INFECTION - ADULT  Goal: Absence or prevention of progression during hospitalization  Description: INTERVENTIONS:  - Assess and monitor for signs and symptoms of infection  - Monitor lab/diagnostic results  - Monitor all insertion sites, i e  indwelling lines, tubes, and drains  - Monitor endotracheal if appropriate and nasal secretions for changes in amount and color  - [place] appropriate cooling/warming therapies per order  - Administer medications as ordered  - Instruct and encourage patient and family to use good hand hygiene technique  - Identify and instruct in appropriate isolation precautions for identified infection/condition  Outcome: Progressing     Problem: DISCHARGE PLANNING  Goal: Discharge to home or other facility with appropriate resources  Description: INTERVENTIONS:  - Identify barriers to discharge w/patient and caregiver  - Arrange for needed discharge resources and transportation as appropriate  - Identify discharge learning needs (meds, wound care, etc )  - Arrange for interpretive services to assist at discharge as needed  - Refer to Case Management Department for coordinating discharge planning if the patient needs post-hospital services based on physician/advanced practitioner order or complex needs related to functional status, cognitive ability, or social support system  Outcome: Progressing     Problem: Knowledge Deficit  Goal: Patient/family/caregiver demonstrates understanding of disease process, treatment plan, medications, and discharge instructions  Description: Complete learning assessment and assess knowledge base    Interventions:  - Provide teaching at level of understanding  - Provide teaching via preferred learning methods  Outcome: Progressing     Problem: Nutrition/Hydration-ADULT  Goal: Nutrient/Hydration intake appropriate for improving, restoring or maintaining nutritional needs  Description: Monitor and assess patient's nutrition/hydration status for malnutrition  Collaborate with interdisciplinary team and initiate plan and interventions as ordered  Monitor patient's weight and dietary intake as ordered or per policy  Utilize nutrition screening tool and intervene as necessary  Determine patient's food preferences and provide high-protein, high-caloric foods as appropriate       INTERVENTIONS:  - Monitor oral intake, urinary output, labs, and treatment plans  - Assess nutrition and hydration status and recommend course of action  - Evaluate amount of meals eaten  - Assist patient with eating if necessary   - Allow adequate time for meals  - Recommend/ encourage appropriate diets, oral nutritional supplements, and vitamin/mineral supplements  - Order, calculate, and assess calorie counts as needed  - Recommend, monitor, and adjust tube feedings and TPN/PPN based on assessed needs  - Assess need for intravenous fluids  - Provide specific nutrition/hydration education as appropriate  - Include patient/family/caregiver in decisions related to nutrition  Outcome: Progressing

## 2023-03-15 NOTE — ASSESSMENT & PLAN NOTE
· Worsening hypotension, likely due to worsening liver function due to cholangiocarcinoma  · Continue midodrine 15mg QID, albumin 25g q6h, octreotide added today, monitor for response  · Discussed worsening hypotension leading to worsening renal failure, discussed possibly transferring back to intensive care for vasopressor support, he is currently apprehensive about this and refused to answer  · Pt received just received midodrine and albumin is running, will monitor Bps q15min and upgrade to level 2 stepdown    · Will reassess shortly

## 2023-03-15 NOTE — ASSESSMENT & PLAN NOTE
· Multifactorial  · Tightened fluid restriction today  · NaCL tabs added  · Continue to monitor closely

## 2023-03-15 NOTE — QUICK NOTE
Revisited care plan with the patient and his wife  His BP is improved to the 47L systolic but will likely result if further worsening renal function due to hypotension  Pt is agreeable now to return to ICU for vasopressors for HRS  Dr Lizbet Luna notified of patient

## 2023-03-15 NOTE — SEDATION DOCUMENTATION
Tunneled long term ascites catheter placement performed by Dr Jesica Gonzalez without complication  Drained 3700mL serosanguinous fluid  Patient tolerated procedure well  IR Procedure Bedrest Start Time is 1520  Report called to floor nurse  Supplies and printed directions sent with patient

## 2023-03-15 NOTE — PROGRESS NOTES
[facility]  Progress Note Radha Ashby 1970, 47 y o  male MRN: [id number]  Unit/Bed#: [facility] 813-01 Encounter: [id number]  Primary Care Provider: Maria Luisa Duran MD   Date and time admitted to hospital: 2/20/2023  3:04 AM    * Septic shock [facility])  Assessment & Plan  · Present on admission  Admitted to the intensive care unit  · Currently off of antibiotics    Hypotension  Assessment & Plan  · Worsening hypotension, likely due to worsening liver function due to cholangiocarcinoma  · Continue midodrine 15mg QID, albumin 25g q6h, octreotide added today, monitor for response  · Discussed worsening hypotension leading to worsening renal failure, discussed possibly transferring back to intensive care for vasopressor support, he is currently apprehensive about this and refused to answer  · Pt received just received midodrine and albumin is running, will monitor Bps q15min and upgrade to level 2 stepdown  · Will reassess shortly    Cholangiocarcinoma, stage IV [facility])  Assessment & Plan  · Patient with metastatic cholangiocarcinoma  · Discussed with patient overall guarded prognosis  · Oncology has reiterated poor prognosis, patient reports he understands his guarded prognosis but would like to pursue active treatment at this time and plans to follow-up with oncology on discharge for further cares  · Oncology inputs noted  · Follow-up with oncology as an outpatient when stable      Malignant ascites  Assessment & Plan  · Requires frequent paracentesis    Status post paracentesis on 3/7 removing 6 L  · Patient has having paracentesis twice a week Monday and Thursday as outpatient  · Discussed with the patient about Tenckhoff catheter-patient and family is considering Tenckhoff catheter placement  · Patient needed paracentesis today removed 1600 mL-we will give one-time dose of albumin after the paracentesis even though it is a low volume tap due to concern for low blood pressure  · Patient had another paracentesis today 3 6 L removed  Patient get IV albumin every 6 hours-25 mg  · tenckoff catheter placed        Hyponatremia  Assessment & Plan  · Multifactorial  · Tightened fluid restriction today  · NaCL tabs added  · Continue to monitor closely    DENZEL (acute kidney injury) ([facility] [address] )  Assessment & Plan  · Monitor kidney function closely  · Avoid nephrotoxins  · Nephrology following  · Creatinine is stable  · IV albumin per nephrology  With hepatorenal syndrome  · Patient is not a candidate for dialysis  · Urine output is on the lower side  Maintain the Harvey catheter for accurate I's and O's    Oropharyngeal candidiasis  Assessment & Plan  Received 7 days fluconazole treatment      Mesenteric thrombosis (HCC)  Assessment & Plan  · Received IV heparin GTT  · Discussed with oncology - recommended Lovenox 1 mg/kg body weight every 12 hours  · Lovenox 1 mg/kg body wt twice daily-monitor lovenox  use with creatinine  · Patient is not a candidate for NOACs due to  Child fuentes score, patient with baseline abnormal protime   · Outpatient oncology follow-up  · Discussed with oncology plan is to continue with the Lovenox for now renally dosed        Vitamin D deficiency  Assessment & Plan  · Continue supplementation    [facility]  Assessment & Plan  · Multifactorial  · Oncology inputs noted  · Monitor counts-platelets-98  · Continue with current dose of Lovenox    GERD (gastroesophageal reflux disease)  Assessment & Plan  Continue PPI          VTE Pharmacologic Prophylaxis: VTE Score: 8 Moderate Risk (Score 3-4) - Pharmacological DVT Prophylaxis Ordered: heparin  Patient Centered Rounds: I performed bedside rounds with nursing staff today  Discussions with Specialists or Other Care Team Provider: nurse, CM, nephro, palliative    Education and Discussions with Family / Patient: Updated  (wife and mother) at bedside      Total Time Spent on Date of Encounter in care of patient: 25 minutes This time was spent on one or more of the following: performing physical exam; counseling and coordination of care; obtaining or reviewing history; documenting in the medical record; reviewing/ordering tests, medications or procedures; communicating with other healthcare professionals and discussing with patient's family/caregivers  Current Length of Stay: 23 day(s)  Current Patient Status: Inpatient   Certification Statement: The patient will continue to require additional inpatient hospital stay due to hypotension, ARF  Discharge Plan: to be determined    Code Status: Level 1 - Full Code    Subjective:   Denies any new complaints  Seen post IR catheter placement, pt mildly sedated  Objective:     Vitals:   Temp (24hrs), Av 4 °F (36 3 °C), Min:97 °F (36 1 °C), Max:97 7 °F (36 5 °C)    Temp:  [97 °F (36 1 °C)-97 7 °F (36 5 °C)] 97 °F (36 1 °C)  HR:  [] 88  Resp:  [16-20] 16  BP: (74-92)/(42-48) 74/44  SpO2:  [96 %-98 %] 98 %  Body mass index is 27 99 kg/m²  Input and Output Summary (last 24 hours): Intake/Output Summary (Last 24 hours) at 3/15/2023 1635  Last data filed at 3/15/2023 1520  Gross per 24 hour   Intake 500 ml   Output 3900 ml   Net -3400 ml       Physical Exam:   Physical Exam  Constitutional:       Appearance: Normal appearance  HENT:      Head: Normocephalic and atraumatic  Nose: Nose normal    Eyes:      General: Scleral icterus present  Extraocular Movements: Extraocular movements intact  Cardiovascular:      Rate and Rhythm: Normal rate and regular rhythm  Pulmonary:      Effort: Pulmonary effort is normal    Abdominal:      General: There is no distension  Palpations: Abdomen is soft  Skin:     General: Skin is warm and dry  Neurological:      Mental Status: He is alert and oriented to person, place, and time     Psychiatric:         Mood and Affect: Mood normal          Behavior: Behavior normal           Additional Data: Labs:  Results from last 7 days   Lab Units 03/14/23  0529   WBC Thousand/uL 7 82   HEMOGLOBIN g/dL 9 1*   HEMATOCRIT % 25 7*   PLATELETS Thousands/uL 88*     Results from last 7 days   Lab Units 03/15/23  0607   SODIUM mmol/L 128*   POTASSIUM mmol/L 3 8   CHLORIDE mmol/L 97   CO2 mmol/L 17*   BUN mg/dL 120*   CREATININE mg/dL 3 40*   ANION GAP mmol/L 14*   CALCIUM mg/dL 8 2*   ALBUMIN g/dL 5 1*   TOTAL BILIRUBIN mg/dL 5 93*   ALK PHOS U/L 103   ALT U/L 18   AST U/L 67*   GLUCOSE RANDOM mg/dL 97     Results from last 7 days   Lab Units 03/13/23  1013   INR  2 32*     Results from last 7 days   Lab Units 03/15/23  1630 03/15/23  1153 03/15/23  1022 03/15/23  0745 03/14/23  2046 03/14/23  1658 03/14/23  1141 03/14/23  0830 03/13/23  2055 03/13/23  1559 03/13/23  1120 03/13/23  0822   POC GLUCOSE mg/dl 96 109 69 73 135 145* 163* 130 128 160* 141* 122               Lines/Drains:  Invasive Devices     Central Venous Catheter Line  Duration           Port A Cath Right Subclavian -- days          Drain  Duration           Urethral Catheter 18 Fr  3 days    Peritoneal Ascites Long-Term Catheter 14 Fr  <1 day              Urinary Catheter:  Goal for removal: Remove after 48 hrs of I/O monitoring         Central Line:  Goal for removal: Will discontinue when peripheral access obtained  Imaging: No pertinent imaging reviewed  Recent Cultures (last 7 days):   Results from last 7 days   Lab Units 03/11/23 2129 03/11/23 2128 03/11/23  1635   BLOOD CULTURE  No Growth at 72 hrs   No Growth at 72 hrs   --    GRAM STAIN RESULT   --   --  Rare Polys  No organisms seen   BODY FLUID CULTURE, STERILE   --   --  No growth       Last 24 Hours Medication List:   Current Facility-Administered Medications   Medication Dose Route Frequency Provider Last Rate   • al mag oxide-diphenhydramine-lidocaine viscous  10 mL Swish & Swallow Q4H PRN Alena Banuelos MD     • Albumin 25%  25 g Intravenous Q6H Ling Osborne MD     • chlorhexidine  15 mL Swish & Spit Q12H [address] Lucero Baker MD     • [START ON 3/16/2023] enoxaparin  1 mg/kg Subcutaneous Q24H Dashawn Salmeron MD     • ergocalciferol  50,000 Units Oral Weekly Lucero Baker MD     • insulin lispro  1-6 Units Subcutaneous TID AC Lucero Baker MD     • lactulose  20 g Oral BID Buster Pablo MD     • midodrine  15 mg Oral 4x Daily Doroteo Kirk MD     • octreotide  200 mcg Subcutaneous [facility] Minetta Romberg, MD     • ondansetron  4 mg Intravenous Q8H PRN Lucero Baker MD     • oxyCODONE  5 mg Oral Q4H PRN Lucero Baker MD     • pantoprazole  40 mg Oral Early Morning Lucero Baker MD     • rifaximin  550 mg Oral Q12H Emmanuelle Canas MD     • saliva substitute  5 spray Mouth/Throat 4x Daily PRN Lucero Baker MD     • sodium bicarbonate  1,300 mg Oral TID after meals Minetta Romberg, MD     • sodium chloride  2 g Oral BID With Meals Minetta Romberg, MD     • sucralfate  1 g Oral 4x Daily (AC & HS) Lucero Baker MD          Today, Patient Was Seen By: Laura Reyes MD    **Please Note: This note may have been constructed using a voice recognition system  **

## 2023-03-16 ENCOUNTER — HOME CARE VISIT (OUTPATIENT)
Dept: HOME HOSPICE | Facility: HOSPICE | Age: 53
End: 2023-03-16

## 2023-03-16 PROBLEM — R65.21 SEPTIC SHOCK (HCC): Status: RESOLVED | Noted: 2023-02-21 | Resolved: 2023-03-14

## 2023-03-16 PROBLEM — B37.0 OROPHARYNGEAL CANDIDIASIS: Status: RESOLVED | Noted: 2023-01-01 | Resolved: 2023-01-01

## 2023-03-16 PROBLEM — A41.9 SEPTIC SHOCK (HCC): Status: RESOLVED | Noted: 2023-01-01 | Resolved: 2023-01-01

## 2023-03-16 NOTE — PROGRESS NOTES
Daily Progress Note - Critical Care   Mendoza Ashby 47 y o  male MRN: [id number]  Unit/Bed#: MICU 04 Encounter: [id number]        ----------------------------------------------------------------------------------------  HPI/24hr events: Jazz Castellanos is a 47 y o  male with past medical history of heavy nicotine dependence currently in remission, SCC of head/neck (tongue) diagnosed in 2018 s/p chemo and partial glossectomy, stage IV cholangiocarcinoma (diagnosed in 2020 s/p resection followed by immuno-chemo therapy with recurrence in 2022 now with mets  to bone, peritoneum, lungs, and liver, currently on second round of pemigatinib complicated by recurrent ascites requiring frequent large volume paracentesis twice a week, with removal of 4-5L of ascitic fluid on a weekly basis, who presented to ED this AM with concerns of 7 days of decreased appetite/po intake and worsening generalized fatigue with occasional eps of non-billous vomiting w/o overt s/s bleeding  Per wife, patient noted to be more confused from baseline, prompting ED evaluation  Patient follows with Dr Mahogany Ha, Oncology  Was recently on ivositinib and more recently on Pemigatibib  Denies recent NSAID use  Admits to decrease urine output x3-4d  Denies recent ill contacts or long distance travel  Compliant with home meds including PRN Flexeril for and PRN oxy 5 for cancer/chemo induced pain, lasix 20mg prn for ascites, zofran/low dose dexamethason 4mg for nausea, and Protonix 40mg for GERD       On arrival to ED,  VS notable for T36 7, BP80/50, pulse 71, 98% on RA  Patient noted to be acutely encephalopathic on initial exam with abdominal fluid wave  Work up showed leukocytosis on CBC, elevated K 6 2 in setting of DENZEL with Cr 2 37 (baseline   5- 9) and ?givne bolus of NS  ECG revealing NSR with peaked T waves, non-ischemic  Patient was initiated on IV calcium gluconate   Repeat iSTAT potassium >8 --> given additional IV calcium gluconate along with IV dextrose with regular insulin and additional 1L bolus NS  Initial Hs Trops 17, repeat 2h/4h pending  Sepsis workup iniated, started on IV vanc and IV cefepime  CT C/A/B without acute pathology and unchanged metastatic disease process including dominant hepatic lesion, peritoneal carcinomatosis with moderate moderate ascites; right lower lobe nodules also noted  Additionally patient underwent diagnostic paracentesis in the ED, though he was initially scheduled for his weekly therapeutic paracentesis today by IR as an outpatient  24 hour events:  Patient was initiated on aggressive IV fluid resuscitation as per septic shock protocol on admission to MICU  Started on albumin 25 g every 6 hours to improve MAP  Initiated on hypotonic IV fluid with IV D5W and 75 mg sodium bicarb at 100 cc/h to treat lactic acidosis and prevent overcorrection of hyponatremia  Subsequently required pressor support with levo to maintain MAP >65 despite IVF  Overnight 2/20, patient's levo gtt was switched to run through D5W instead of NS, and was initated on vaso   04  Was further given 5U lispro after  and subsequently 10U regular insulin due to persistently elevated BS at 254      ---------------------------------------------------------------------------------------  SUBJECTIVE  Seen and examined  Denies any complaints at this time  Denies shortness of breath or chest pain  Denies nausea or vomiting  Review of Systems  Review of systems was reviewed and negative unless stated above in HPI/24-hour events   ---------------------------------------------------------------------------------------  Assessment and Plan:    Neuro:   • Diagnosis: Hepatic encephalopathy   o Patient endorses fatigue and presented with mild confusion and slurred speech per wife   o Ammonia elevated 63  o In setting of stage IV cholangiocarcinoma with multiple metastases to the liver   Likely participated by DENZEL and hypovolemia/poor PO intake POA  Hyponatremia on admission possibly contributing  • Diagnosis: GERD  o Known history  o Continue Protonix 40mg      CV:   • Diagnosis: Hypovolemic shock  o Patient with decreased PO intake, vomiting over the past 4-7d    o Severe DENZEL with azotemia and hyponatremia on admission     o Likely also intravascularly volume depleted in setting of ascites requiring weekly large-volume paracentesis 2/2 stage IV cholangiocarcinoma with mets to the liver  o S/P 4 L of IV fluid resuscitation in the ED prior to admission  o Rule out septic shock; blood cultures pending; SBP negative based on diastolic diagnostic paracentesis fluid analysis  o TTE pending  o Started on albumin 25 g every 6 hours to improve MAP  o Currently on levo 15 and vasopressin 0 04 with pressure support  o Consider weaning off vasopressin in setting of mesenteric thrombosis  o Wean levo as tolerated    Pulm: No active issues     GI:   • Diagnosis: Stage IV cholangiocarcinoma cysts with mets metastasis to liver  o Diagnosed in 2020 s/p resection followed by immuno-chemo therapy with recurrence in 2022 now with mets to bone, peritoneum, lungs, and liver  o Currently on second round of pemigatinib as an outpatient under the care of Dr Geronimo ,  o SBP ruled out based on diagnostic paracentesis fluid analysis  o May have underlying type I hepatorenal syndrome given severe DENZEL and hyponatremia on admission, liver hepatorenal syndrome is a diagnosis of exclusion, further work-up pending to r/o hypovolemic vs septic shock  o Hold home pemigatibin in setting of severe DENZEL  · Diagnosis: Mesenteric thrombosis   · Most recent outpatient MRI imaging showing new thrombosis in portions of the splenic, superior mesenteric and main portal veins and a segment of right portal vein  · Likely cancer associated thrombosis in setting of stage IV cholangiocarcinoma  · Continue heparin gtt  • Diagnosis: Ascites  o Likely malignant/peritoneal carcinomatosis in setting of stage IV cholangiocarcinoma  o No signs of compartment syndrome at this time  o Can consider therapeutic paracentesis if needed     :   • Diagnosis: Severe DENZEL  o Cr 2 3 on arrival to ED, increased to 2 6 on admission, baseline serum creatinine around 1 62 8  o Likely in setting of severe hypotension secondary to hypovolemia/poor p o  intake versus septic shock versus hepatorenal syndrome causing ischemic insult to the kidneys  o Patient's anticancer medications including ivosidenib and pemigatibin so known to cause kidney injury, possibly contributing  o Status post aggressive IV fluid resuscitation in the ED  o Continue IVF with 75mg sodium bicarb infusion and D5W at 100 cc/h  • Diagnosis: Metabolic acidosis  o Lactate elevated on admission 3 5, repeat 4 8  o Likely secondary to hypovolemia however also in setting of liver mets   o S/p IVF resuscitation  o Most recent bicarb 17  o Continue sodium bicarb in D5W at 100 cc/h  o Continue to trend lactic acid  · Severe hyponatremia  · Sodium 118 on on arrival to ED, rapidly improved to 124 within 8 hours of arrival  · Goal correction no more than 8 mEq in 24 hours  · Status post multiple boluses of IV normal saline in the ED, contributing to initial overcorrection  · Low utility and checking serum and urine awesome given patient received 4 L of normal saline boluses  · Continue IV fluids with sodium bicarb 75 mg and D5W at 100 cc/h  · BMP every 4 hours    F/E/N:   • F: 75mg sodium bicarb in D5W at 100cc/hr  • Electrolytes:   o Hyperkalemia on admission in setting of severe DENZEL, decreased p o  intake resolved after management with IV insulin, IV dextrose and bicarb  · Nutrition: clear liquids    Heme/Onc:   • Diagnosis:  Thrombocytopenia  o Secondary to portal hypertension in setting of several metastases to the liver secondary to stage IV cholangiocarcinoma  o Trend CBC, Transfuse to maintain platelets over 46,655  • Diagnosis: Anemia chronic disease  o Trend CBC, transfuse to maintain hemoglobin over 7      Endo:   · Diagnosis: Hyperglycemia  · Blood sugars remain elevated despite being given 5 units lispro followed by 10 units regular insulin  · Setting of IV fluid resuscitation with D5W  · Continue SSI, adjust as appropriate   · goal blood glucose 140-180    ID:   • Diagnosis: Septic shock  o Unclear etiology; SBP ruled out  o Blood cultures pending  o Continue broad-spectrum antibiotics with IV cefepime, IV vancomycin, IV Flagyl        MSK/Skin:   • Diagnosis: Pressure ulcer prophylaxis  Frequent turning      Patient appropriate for transfer out of the ICU today?: No  Disposition: Admit to Critical Care   Code Status: Level 1 - Full Code  ---------------------------------------------------------------------------------------  ICU CORE MEASURES    Prophylaxis   VTE Pharmacologic Prophylaxis: Heparin  VTE Mechanical Prophylaxis: sequential compression device  Stress Ulcer Prophylaxis: Pantoprazole PO    ABCDE Protocol (if indicated)  Plan to perform spontaneous awakening trial today? Not applicable  Plan to perform spontaneous breathing trial today? No Not applicable  Obvious barriers to extubation? Not applicable  CAM-ICU: Negative    Invasive Devices Review  Invasive Devices     Central Venous Catheter Line  Duration           Port A Cath Right Subclavian -- days          Peripheral Intravenous Line  Duration           Peripheral IV 02/20/23 Left;Ventral (anterior) Forearm <1 day    Peripheral IV 02/20/23 Right Antecubital <1 day    Peripheral IV 02/20/23 Right Antecubital <1 day          Arterial Line  Duration           Arterial Line 02/20/23 Radial <1 day          Drain  Duration           Urethral Catheter <1 day              Can any invasive devices be discontinued today?  No  ---------------------------------------------------------------------------------------  OBJECTIVE    Vitals   Vitals:    02/21/23 0200 02/21/23 0300 02/21/23 0330 02/21/23 0600   BP: 103/55 BP Location:       Pulse: 76 78 76    Resp: 18 18 21    Temp: 98 2 °F (36 8 °C) 98 2 °F (36 8 °C) 97 9 °F (36 6 °C)    TempSrc:       SpO2: 96% 100% 100%    Weight:    104 kg (229 lb 15 oz)     Temp (24hrs), Av 3 °F (36 3 °C), Min:95 7 °F (35 4 °C), Max:98 2 °F (36 8 °C)  Current: Temperature: 97 9 °F (36 6 °C)      Respiratory:  SpO2: SpO2: 99 %       Invasive/non-invasive ventilation settings   Respiratory    Lab Data (Last 4 hours)    None         O2/Vent Data (Last 4 hours)    None                Physical Exam  Vitals and nursing note reviewed  Constitutional:       General: He is not in acute distress  Appearance: He is well-developed  He is ill-appearing  Comments: Slurred speech, unclear baseline   HENT:      Head: Normocephalic and atraumatic  Eyes:      Conjunctiva/sclera: Conjunctivae normal    Cardiovascular:      Rate and Rhythm: Normal rate and regular rhythm  Heart sounds: No murmur heard  Pulmonary:      Effort: Pulmonary effort is normal  No respiratory distress  Breath sounds: Normal breath sounds  Abdominal:      General: There is distension  Palpations: Abdomen is soft  Tenderness: There is no abdominal tenderness  Comments: Fluid wave positive   Musculoskeletal:         General: No swelling  Cervical back: Neck supple  Skin:     General: Skin is warm and dry  Capillary Refill: Capillary refill takes less than 2 seconds  Neurological:      Mental Status: He is alert and oriented to person, place, and time     Psychiatric:         Mood and Affect: Mood normal               Laboratory and Diagnostics:  Results from last 7 days   Lab Units 23  0432 23  1548 23  0728 23  0341   WBC Thousand/uL 21 30*  --   --  31 18*   HEMOGLOBIN g/dL 13 3 14 6  --  16 9   I STAT HEMOGLOBIN g/dl  --   --  16 7  --    HEMATOCRIT % 38 8 41 5  --  48 2   HEMATOCRIT, ISTAT %  --   --  49  --    PLATELETS Thousands/uL 107*  --   --  136* NEUTROS PCT % 82*  --   --  82*   MONOS PCT % 11  --   --  13*     Results from last 7 days   Lab Units 02/21/23 0432 02/21/23 0022 02/20/23 2013 02/20/23  1548 02/20/23  1132 02/20/23  0827 02/20/23  0728 02/20/23  0341   SODIUM mmol/L 124* 123* 124* 125* 124* 122*  --  118*   POTASSIUM mmol/L 4 2 4 7 5 0 5 1 5 7* 6 3*  --  6 2*   CHLORIDE mmol/L 95* 97 98 99 99 92*  --  91*   CO2 mmol/L 17* 17* 14* 15* 14* 22  --  16*   CO2, I-STAT mmol/L  --   --   --   --   --   --  19*  --    ANION GAP mmol/L 12 9 12 11 11 8  --  11   BUN mg/dL 66* 75* 85* 86* 96* 105*  --  107*   CREATININE mg/dL 1 31* 1 51* 1 71* 1 76* 2 04* 2 60*  --  2 37*   CALCIUM mg/dL 6 2* 6 4* 6 6* 6 7* 7 2* 7 1*  --  6 5*   GLUCOSE RANDOM mg/dL 254* 225* 190* 97 100 125  --  140   ALT U/L 71  --   --   --   --   --   --  91*   AST U/L 73*  --   --   --   --   --   --  89*   ALK PHOS U/L 137*  --   --   --   --   --   --  198*   ALBUMIN g/dL 2 6*  --   --   --   --   --   --  1 9*   TOTAL BILIRUBIN mg/dL 1 96*  --   --   --   --   --   --  1 18*     Results from last 7 days   Lab Units 02/21/23 0432 02/20/23  0827   MAGNESIUM mg/dL 3 6* 4 5*   PHOSPHORUS mg/dL 3 4 4 9*      Results from last 7 days   Lab Units 02/20/23 2234 02/20/23  1548 02/20/23  0341   INR   --  1 66* 1 49*   PTT seconds >210* 38* 47*          Results from last 7 days   Lab Units 02/21/23 0432 02/21/23 0022 02/20/23 2013 02/20/23  1548 02/20/23  1305 02/20/23  1132 02/20/23  0916   LACTIC ACID mmol/L 3 8* 3 1* 4 6* 3 5* 4 1* 3 3* 4 2*     ABG:    VBG:  Results from last 7 days   Lab Units 02/20/23  1548   PH SOLO  7 464*   PCO2 SOLO mm Hg 24 9*   PO2 SOLO mm Hg 45 7*   HCO3 SOLO mmol/L 17 5*   BASE EXC SOLO mmol/L -4 3     Results from last 7 days   Lab Units 02/20/23  0827 02/20/23  0341   PROCALCITONIN ng/ml 4 53* 4 92*       Micro  Results from last 7 days   Lab Units 02/20/23  0916 02/20/23  0634 02/20/23  0341   BLOOD CULTURE   --  Received in Microbiology Lab   Culture in Progress  Received in Microbiology Lab  Culture in Progress  GRAM STAIN RESULT  No Polys or Bacteria seen  --   --        EKG: ECG on arrival to ED revealed normal sinus rhythm, peaked T waves, nonischemic  Imaging:   XR chest portable ICU   Final Result by Christina Richardson MD (02/20 1416)      No acute cardiopulmonary disease  Findings are stable               Workstation performed: [id number]         CT abdomen pelvis wo contrast   Final Result by Rayo Forrest MD (02/20 8368)      No acute pathology  Evaluation is limited in the absence of intravenous contrast enhancement, however metastatic disease appears essentially unchanged, and including dominant hepatic lesion, peritoneal carcinomatosis with moderate moderate ascites  Presumed metastatic    right lower lobe nodules also noted  Workstation performed: [id number]            I have personally reviewed pertinent reports  Intake and Output  I/O       02/19 0701  02/20 0700 02/20 0701 02/21 0700    P  O   180    I V  (mL/kg)  1981 3 (19 1)    IV Piggyback  2700    Total Intake(mL/kg)  4861 3 (46 7)    Urine (mL/kg/hr)  2380 (1)    Stool  0    Total Output  2380    Net  +2481 3          Unmeasured Stool Occurrence  1 x        Height and Weights         Body mass index is 31 19 kg/m²  Weight (last 2 days)     Date/Time Weight    02/21/23 0600 104 (229 94)    02/20/23 0451 97 8 (215 6)            Nutrition       Diet Orders   (From admission, onward)             Start     Ordered    02/20/23 1505  Diet Clear Liquid  Diet effective now        References:    Nutrtion Support Algorithm Enteral vs  Parenteral   Question Answer Comment   Diet Type Clear Liquid    RD to adjust diet per protocol?  Yes        02/20/23 1507                  Active Medications  Scheduled Meds:  Current Facility-Administered Medications   Medication Dose Route Frequency Provider Last Rate   • Albumin 25%  25 g Intravenous Q6H Renetta Munoz DO 0 g (02/21/23 0000)   • calcium gluconate  2 g Intravenous Once Liat Rosado, DO 2 g (02/21/23 1863)   • cefepime  2,000 mg Intravenous Q12H Renetta Munoz DO Stopped (02/21/23 0000)   • chlorhexidine  15 mL Mouth/Throat Q12H [address] Renetta Munoz DO     • ergocalciferol  50,000 Units Oral Weekly Wallace Garcia DO     • heparin (porcine)  3-30 Units/kg/hr (Order-Specific) Intravenous Titrated Claudia Schafer MD 15 Units/kg/hr (02/21/23 0127)   • heparin (porcine)  3,800 Units Intravenous Q6H PRN Claudia Schafer MD     • heparin (porcine)  7,600 Units Intravenous Q6H PRN Claudia Schafer MD     • metroNIDAZOLE  500 mg Intravenous Q8H Renetta Munoz  mg (02/21/23 0048)   • IV infusion builder   Intravenous Titrated Liat Rosado, DO 56 3 mL/hr at 02/21/23 0501   • ondansetron  4 mg Intravenous Q8H PRN Renetta Munoz DO     • oxyCODONE  5 mg Oral Q4H PRN Claudia Schafer MD     • pantoprazole  40 mg Oral Early Morning Renetta Munoz DO     • sodium bicarbonate infusion  100 mL/hr Intravenous Continuous Liat Beams,  mL/hr (02/21/23 0047)   • vancomycin  12 5 mg/kg (Adjusted) Intravenous Daily PRN Joel Knapp MD     • vasopressin  0 04 Units/min Intravenous Continuous Ayla Dent, DO 0 04 Units/min (02/21/23 0011)     Continuous Infusions:  heparin (porcine), 3-30 Units/kg/hr (Order-Specific), Last Rate: 15 Units/kg/hr (02/21/23 0127)  IV infusion builder, , Last Rate: 56 3 mL/hr at 02/21/23 0501  sodium bicarbonate infusion, 100 mL/hr, Last Rate: 100 mL/hr (02/21/23 0047)  vasopressin, 0 04 Units/min, Last Rate: 0 04 Units/min (02/21/23 0011)      PRN Meds:   heparin (porcine), 3,800 Units, Q6H PRN  heparin (porcine), 7,600 Units, Q6H PRN  ondansetron, 4 mg, Q8H PRN  oxyCODONE, 5 mg, Q4H PRN  vancomycin, 12 5 mg/kg (Adjusted), Daily PRN        Allergies   Allergies   Allergen Reactions   • Penicillins Other (See Comments) and Rash     As a child had reaction not sure what it was  ---------------------------------------------------------------------------------------  Advance Directive and Living Will:      Power of :    POLST:    ---------------------------------------------------------------------------------------  Care Time Delivered:   Upon my evaluation, this patient had a high probability of imminent or life-threatening deterioration, which required my direct attention, intervention, and personal management  I have personally provided 30 minutes of critical care time, exclusive of procedures, teaching, family meetings, and any prior time recorded by providers other than myself  Barbara Lozano, DO      Portions of the record may have been created with voice recognition software  Occasional wrong word or "sound a like" substitutions may have occurred due to the inherent limitations of voice recognition software    Read the chart carefully and recognize, using context, where substitutions have occurred Call primary care provider for follow up after discharge/Activities as tolerated/Low salt diet/Monitor weight daily/Report signs and symptoms to primary care provider

## 2023-03-16 NOTE — RESTORATIVE TECHNICIAN NOTE
Restorative Technician Note      Patient Name: Adelaide Ashby     Note Type: Mobility  Patient Position Upon Consult: Supine  Activity Performed: Repositioned  Patient Position at End of Consult: All needs within reach;  Other (comment) (Sitting up in bed)

## 2023-03-16 NOTE — PLAN OF CARE
Problem: Prexisting or High Potential for Compromised Skin Integrity  Goal: Skin integrity is maintained or improved  Description: INTERVENTIONS:  - Identify patients at risk for skin breakdown  - Assess and monitor skin integrity  - Assess and monitor nutrition and hydration status  - Monitor labs   - Assess for incontinence   - Turn and reposition patient  - Assist with mobility/ambulation  - Relieve pressure over bony prominences  - Avoid friction and shearing  - Provide appropriate hygiene as needed including keeping skin clean and dry  - Evaluate need for skin moisturizer/barrier cream  - Collaborate with interdisciplinary team   - Patient/family teaching  -triad paste  Outcome: Progressing     Problem: CARDIOVASCULAR - ADULT  Goal: Maintains optimal cardiac output and hemodynamic stability  Description: INTERVENTIONS:  - Monitor I/O, vital signs and rhythm  - Monitor for S/S and trends of decreased cardiac output  - Administer and titrate ordered vasoactive medications to optimize hemodynamic stability  - Assess quality of pulses, skin color and temperature  - Assess for signs of decreased coronary artery perfusion  - Instruct patient to report change in severity of symptoms  Outcome: Progressing  Goal: Absence of cardiac dysrhythmias or at baseline rhythm  Description: INTERVENTIONS:  - Continuous cardiac monitoring, vital signs, obtain 12 lead EKG if ordered  - Administer antiarrhythmic and heart rate control medications as ordered  - Monitor electrolytes and administer replacement therapy as ordered  Outcome: Progressing     Problem: GASTROINTESTINAL - ADULT  Goal: Minimal or absence of nausea and/or vomiting  Description: INTERVENTIONS:  - Administer IV fluids if ordered to ensure adequate hydration  - Maintain NPO status until nausea and vomiting are resolved  - Nasogastric tube if ordered  - Administer ordered antiemetic medications as needed  - Provide nonpharmacologic comfort measures as appropriate  - Advance diet as tolerated, if ordered  - Consider nutrition services referral to assist patient with adequate nutrition and appropriate food choices  Outcome: Progressing  Goal: Maintains or returns to baseline bowel function  Description: INTERVENTIONS:  - Assess bowel function  - Encourage oral fluids to ensure adequate hydration  - Administer IV fluids if ordered to ensure adequate hydration  - Administer ordered medications as needed  - Encourage mobilization and activity  - Consider nutritional services referral to assist patient with adequate nutrition and appropriate food choices  Outcome: Progressing  Goal: Maintains adequate nutritional intake  Description: INTERVENTIONS:  - Monitor percentage of each meal consumed  - Identify factors contributing to decreased intake, treat as appropriate  - Assist with meals as needed  - Monitor I&O, weight, and lab values if indicated  - Obtain nutrition services referral as needed  Outcome: Progressing     Problem: METABOLIC, FLUID AND ELECTROLYTES - ADULT  Goal: Electrolytes maintained within normal limits  Description: INTERVENTIONS:  - Monitor labs and assess patient for signs and symptoms of electrolyte imbalances  - Administer electrolyte replacement as ordered  - Monitor response to electrolyte replacements, including repeat lab results as appropriate  - Instruct patient on fluid and nutrition as appropriate  Outcome: Progressing  Goal: Fluid balance maintained  Description: INTERVENTIONS:  - Monitor labs   - Monitor I/O and WT  - Instruct patient on fluid and nutrition as appropriate  - Assess for signs & symptoms of volume excess or deficit  Outcome: Progressing  Goal: Glucose maintained within target range  Description: INTERVENTIONS:  - Monitor Blood Glucose as ordered  - Assess for signs and symptoms of hyperglycemia and hypoglycemia  - Administer ordered medications to maintain glucose within target range  - Assess nutritional intake and initiate nutrition service referral as needed  Outcome: Progressing     Problem: HEMATOLOGIC - ADULT  Goal: Maintains hematologic stability  Description: INTERVENTIONS  - Assess for signs and symptoms of bleeding or hemorrhage  - Monitor labs  - Administer supportive blood products/factors as ordered and appropriate  Outcome: Progressing     Problem: MUSCULOSKELETAL - ADULT  Goal: Maintain or return mobility to safest level of function  Description: INTERVENTIONS:  - Assess patient's ability to carry out ADLs; assess patient's baseline for ADL function and identify physical deficits which impact ability to perform ADLs (bathing, care of mouth/teeth, toileting, grooming, dressing, etc )  - Assess/evaluate cause of self-care deficits   - Assess range of motion  - Assess patient's mobility  - Assess patient's need for assistive devices and provide as appropriate  - Encourage maximum independence but intervene and supervise when necessary  - Involve family in performance of ADLs  - Assess for home care needs following discharge   - Consider OT consult to assist with ADL evaluation and planning for discharge  - Provide patient education as appropriate  Outcome: Progressing  Goal: Maintain proper alignment of affected body part  Description: INTERVENTIONS:  - Support, maintain and protect limb and body alignment  - Provide patient/ family with appropriate education  Outcome: Progressing     Problem: PAIN - ADULT  Goal: Verbalizes/displays adequate comfort level or baseline comfort level  Description: Interventions:  - Encourage patient to monitor pain and request assistance  - Assess pain using appropriate pain scale  - Administer analgesics based on type and severity of pain and evaluate response  - Implement non-pharmacological measures as appropriate and evaluate response  - Consider cultural and social influences on pain and pain management  - Notify physician/advanced practitioner if interventions unsuccessful or patient reports new pain  Outcome: Progressing     Problem: INFECTION - ADULT  Goal: Absence or prevention of progression during hospitalization  Description: INTERVENTIONS:  - Assess and monitor for signs and symptoms of infection  - Monitor lab/diagnostic results  - Monitor all insertion sites, i e  indwelling lines, tubes, and drains  - Monitor endotracheal if appropriate and nasal secretions for changes in amount and color  - [place] appropriate cooling/warming therapies per order  - Administer medications as ordered  - Instruct and encourage patient and family to use good hand hygiene technique  - Identify and instruct in appropriate isolation precautions for identified infection/condition  Outcome: Progressing     Problem: DISCHARGE PLANNING  Goal: Discharge to home or other facility with appropriate resources  Description: INTERVENTIONS:  - Identify barriers to discharge w/patient and caregiver  - Arrange for needed discharge resources and transportation as appropriate  - Identify discharge learning needs (meds, wound care, etc )  - Arrange for interpretive services to assist at discharge as needed  - Refer to Case Management Department for coordinating discharge planning if the patient needs post-hospital services based on physician/advanced practitioner order or complex needs related to functional status, cognitive ability, or social support system  Outcome: Progressing     Problem: Knowledge Deficit  Goal: Patient/family/caregiver demonstrates understanding of disease process, treatment plan, medications, and discharge instructions  Description: Complete learning assessment and assess knowledge base    Interventions:  - Provide teaching at level of understanding  - Provide teaching via preferred learning methods  Outcome: Progressing     Problem: Nutrition/Hydration-ADULT  Goal: Nutrient/Hydration intake appropriate for improving, restoring or maintaining nutritional needs  Description: Monitor and assess patient's nutrition/hydration status for malnutrition  Collaborate with interdisciplinary team and initiate plan and interventions as ordered  Monitor patient's weight and dietary intake as ordered or per policy  Utilize nutrition screening tool and intervene as necessary  Determine patient's food preferences and provide high-protein, high-caloric foods as appropriate       INTERVENTIONS:  - Monitor oral intake, urinary output, labs, and treatment plans  - Assess nutrition and hydration status and recommend course of action  - Evaluate amount of meals eaten  - Assist patient with eating if necessary   - Allow adequate time for meals  - Recommend/ encourage appropriate diets, oral nutritional supplements, and vitamin/mineral supplements  - Order, calculate, and assess calorie counts as needed  - Recommend, monitor, and adjust tube feedings and TPN/PPN based on assessed needs  - Assess need for intravenous fluids  - Provide specific nutrition/hydration education as appropriate  - Include patient/family/caregiver in decisions related to nutrition  Outcome: Progressing

## 2023-03-16 NOTE — PROGRESS NOTES
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  Lytic bone lesion of hip  Cancer-related pain  Nausea/vomiting  Goals of care counseling  Palliative care encounter    Plan  1  Symptom Management  · Pain  · Gabapentin 300 mg BID  · Oxycodone 5 mg PO q4 hrs PRN moderate pain --> added IV Dilaudid for breakthrough pain  · Nausea/vomiting  · Pantoprazole 40 mg PO daily  · Sucralfate 1 g four times daily (before meals at bedtime)  · Zofran 4 mg IV q8 hrs PRN nausea  · Bowel Regimen  · Lactulose oral solution 10 g daily  · Mouth discomfort  · Magic mouthwash 10 mL q4 hrs PRN mouth pain or discomfort  · Mouth Kote 5 sprays four times daily PRN dry mouth  · Insomnia  · Melatonin 6 mg qHS    2  Goals of Care  • Level 3 CODE STATUS  • Detailed discussion with spouse at bed side that prognosis is extremely poor  Patient is currently on very high doses of phenylephrine and developing multiorgan system failure with worsening creatinine and elevating INR and T  bili  Discussed at this point there are no further medical options to prolong life and that we need to discuss end-of-life cares  Discussed hospice care and benefit included  Patient would like to go home and spouse is supporting that  I am concerned not he will not be stable for transport and would be best served to remain in the hospital or transfer to the [facility]  Hospice referral placed  Code Status: Full - Level 3  Decisional apparatus:  Patient is competent on my exam today  If competence is lost, patient's substitute decision maker would default to wife [place]  Advance Directive / Living Will / POLST:  None on file    3   Social Support  • Patient is well-supported by his wife who visits regularly  • He has 1 biological son and two other nonbiological children and is very involved in their life    4  Follow-up  • Palliative care will continue to follow and goals of care conversations will be ongoing  Please reach out with any questions or concerns  • Patient scheduled for outpatient follow-up on 3/17/23    24 Hour History  Chart reviewed before visit  Overnight patient had a rapid response for worsening hypotension  Patient was started on phenylephrine increasing doses in addition to albumin and midodrine to support his blood pressure  Patient appears to be aspirating on his pills during my examination today  Even on the supportive medications his blood pressure remains low  Furthermore, patient is developing multiorgan system failure  Review of Systems   Constitutional: Positive for activity change  Cardiovascular: Positive for leg swelling  Gastrointestinal: Positive for abdominal distention and abdominal pain  All other systems reviewed and are negative        Medications    Current Facility-Administered Medications:   •  al mag oxide-diphenhydramine-lidocaine viscous (MAGIC MOUTHWASH) suspension 10 mL, 10 mL, Swish & Swallow, Q4H PRN, Laura Jett MD, 10 mL at 02/26/23 1232  •  albumin human (FLEXBUMIN) 25 % injection 25 g, 25 g, Intravenous, Q6H, Tomasz Nelson MD, Stopped at 03/16/23 0700  •  chlorhexidine (PERIDEX) 0 12 % oral rinse 15 mL, 15 mL, Swish & Spit, Q12H [address], Laura Jett MD, 15 mL at 03/16/23 0834  •  enoxaparin (LOVENOX) subcutaneous injection 100 mg, 1 mg/kg, Subcutaneous, Q24H [address], Louisa Lamar MD, 100 mg at 03/16/23 0846  •  ergocalciferol (VITAMIN D2) capsule 50,000 Units, 50,000 Units, Oral, Weekly, Laura Jett MD, 50,000 Units at 03/16/23 0849  •  HYDROmorphone (DILAUDID) injection 0 2 mg, 0 2 mg, Intravenous, Q3H PRN, Adele Schaumann Bendas, DO  •  insulin lispro (HumaLOG) 100 units/mL subcutaneous injection 1-6 Units, 1-6 Units, Subcutaneous, TID AC, 1 Units at 03/14/23 1233 **AND** Fingerstick Glucose (POCT), , , TID AC, Froilan P Vernie Simmonds, MD  •  lactulose oral solution 20 g, 20 g, Oral, BID, Pb Albert MD, 20 g at 03/16/23 0846  •  midodrine (PROAMATINE) tablet 20 mg, 20 mg, Oral, 4x Daily, Irma Lopez MD, 20 mg at 03/16/23 0846  •  octreotide (SandoSTATIN) injection 200 mcg, 200 mcg, Subcutaneous, Q8H [address], Radha Gonzalez MD, 200 mcg at 03/16/23 0500  •  ondansetron (ZOFRAN) injection 4 mg, 4 mg, Intravenous, Q8H PRN, Sol Lara MD, 4 mg at 02/22/23 0006  •  pantoprazole (PROTONIX) EC tablet 40 mg, 40 mg, Oral, Early Morning, Slo Lara MD, 40 mg at 03/16/23 0500  •  phenylephrine (DEANGELO-SYNEPHRINE) 50 mg (STANDARD CONCENTRATION) in sodium chloride 0 9% 250 mL,  mcg/min, Intravenous, Titrated, Jose Luis Gutierrez PA-C, Last Rate: 66 mL/hr at 03/16/23 0834, 220 mcg/min at 03/16/23 0834  •  rifaximin (XIFAXAN) tablet 550 mg, 550 mg, Oral, Q12H [address], Pb Albert MD, 550 mg at 03/16/23 0847  •  saliva substitute (MOUTH KOTE) mucosal solution 5 spray, 5 spray, Mouth/Throat, 4x Daily PRN, Sol Lara MD, 5 spray at 02/25/23 2227  •  sodium bicarbonate 150 mEq in dextrose 5 % 1,000 mL infusion, 100 mL/hr, Intravenous, Continuous, Jude Arevalo DO, Last Rate: 100 mL/hr at 03/16/23 1016, 100 mL/hr at 03/16/23 1016  •  sodium bicarbonate tablet 1,300 mg, 1,300 mg, Oral, TID after meals, Radha Gonzalez MD, 1,300 mg at 03/16/23 0846  •  sodium chloride tablet 2 g, 2 g, Oral, BID With Meals, Radha Gonzalez MD, 2 g at 03/16/23 0830  •  sucralfate (CARAFATE) tablet 1 g, 1 g, Oral, 4x Daily (AC & HS), Sol Lara MD, 1 g at 03/16/23 0830  •  thiamine (VITAMIN B1) 200 mg in sodium chloride 0 9 % 50 mL IVPB, 200 mg, Intravenous, Q12H, Jude Arevalo DO, Last Rate: 100 mL/hr at 03/16/23 0003, 200 mg at 03/16/23 0003    Objective  BP (!) 81/47   Pulse 83   Temp (!) 96 8 °F (36 °C)   Resp 16   Ht 6' (1 829 m)   Wt 98 4 kg (217 lb)   SpO2 97%   BMI 29 43 kg/m²   Physical Exam:   Constitutional: Appears chronically ill  Comfortable and in no acute distress  Head: Normocephalic and atraumatic  Eyes: EOM are normal  No ocular discharge  No scleral icterus  Neck: no visible adenopathy or masses  Cardiac: Normal rate   Respiratory: Effort normal  No stridor  No respiratory distress  No cough  Gastrointestinal: Abdominal distension present  Musculoskeletal: No edema  Neurological: Alert, oriented and appropriately conversant  Skin: Dry, no diaphoresis  Pale  Psychiatric: Displays a normal mood and affect  Behavior, judgement and thought content appear normal      Lab Results:   I have personally reviewed pertinent labs  , CBC:   Lab Results   Component Value Date    WBC 15 88 (H) 03/16/2023    HGB 9 2 (L) 03/16/2023    HCT 26 3 (L) 03/16/2023    MCV 90 03/16/2023     (L) 03/16/2023    MCH 31 5 03/16/2023    MCHC 35 0 03/16/2023    RDW 22 3 (H) 03/16/2023    MPV 11 4 03/16/2023    NRBC 0 03/16/2023   , CMP:   Lab Results   Component Value Date    SODIUM 130 (L) 03/16/2023    K 3 5 03/16/2023    CL 96 03/16/2023    CO2 19 (L) 03/16/2023     (H) 03/16/2023    CREATININE 3 76 (H) 03/16/2023    CALCIUM 8 1 (L) 03/16/2023    AST 80 (H) 03/16/2023    ALT 22 03/16/2023    ALKPHOS 84 03/16/2023    EGFR 17 03/16/2023     Imaging Studies: I have personally reviewed pertinent reports  EKG, Pathology, and Other Studies: I have personally reviewed pertinent reports  Counseling / Coordination of Care  Total floor / unit time spent today 30 minutes  Greater than 50% of total time was spent with the patient and / or family counseling and / or coordinating of care  A description of the counseling / coordination of care: Chart reviewed,  provided medical updates, determined goals of care, discussed palliative care and symptom management, determined competency and POA/HCA, determined social/family support, provided psychosocial support       Gris Waters, DO  Palliative and Supportive [facility]  [phone]  ,        Portions of this document may have been created using dictation software and as such some "sound alike" terms may have been generated by the system  Do not hesitate to contact me with any questions or clarifications

## 2023-03-16 NOTE — PROGRESS NOTES
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 Pain: oxycodone IR 5 mg q4 PRN- will transition to IV medications per Palliative 2/2 difficulty tolerating PO     Hypotension  Assessment & Plan  · Assessment  · Originally presented with hypotension and [facility]/20 with presumed septic shock- no source identified  · Improved and was off IV vasopressors, transitioned to PO midodrine  · Acute worsening of hypotension on 3/15 requiring initiation of neosynephrine  · Received 500LR, Q6 albumin and 15 Q 6 of midodrine  · Plan   · Continue Andrés, titrate as able, currently at max dose 220  · Continue Midodrine and Albumin scheduled  · MAP Goal >60  · One time does of 5% albumin to assess response  · GOC Discussion, palliative consulted  Cholangiocarcinoma, stage IV [facility])  Assessment & Plan  Assessment:   ? Diagnosed in 2020 s/p resection followed by immuno-chemo therapy with recurrence in 2022 now with mets to bone, peritoneum, lungs, and liver  ? Currently on second round of pemigatinib as an outpatient under the care of Dr Amrita Villa  ? Tenckhoff Catheter in Place: 3/16 Drainage of 3 7L  ? May have underlying type I hepatorenal syndrome given severe DENZEL and hyponatremia on admission,   Plan:  ? Hold home pemigatibin  ? Follow-up Heme-Onc as outpatient  ?  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 Cr 2 3 on arrival to ED, increased to 2 6 on admission, baseline serum creatinine around 0 55-0 9  ? Likely in setting of severe hypotension secondary to hypovolemia/poor p o  iversus hepatorenal syndrome causing ischemic insult to the kidneys  ? Cr 3 4 > 4 32 > 3 76  Plan  · Likely component of hepatorenal syndrome as well as hypovolemia  · Nephrology following, appreciate recs  · Continue to monitor I/O and avoid nephrotoxins  · MAP Goals > 60   · Goals of Care    Elevated INR  Assessment & Plan  Likely 2/2 to progressive liver disease as Bilirubin is also elevated   INR 2 32 (3/13)  Trend INR    Hyperglycemia  Assessment & Plan  Assessment:  · Hyperglycemia in setting of acute illness  Plan Blood sugars at goal  • Continue SSI, algorithm 3, adjust as appropriate   • Goal blood glucose [phone]    Metabolic acidosis  Assessment & Plan  Assessment:   ? Elevated Anion Gap Metabolic Acidosis in the setting of hypoperfusion and Lactic Acidosis  Plan  · Continue Bicarb drip until complete correction of acidosis, transition back to Bicarb PO     Malignant ascites  Assessment & Plan  ? 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  At baseline undergoes large volume paracentesis 2 times weekly  - Negative cultures to this point  - Bhupendra PLACED 3/15    Vitamin D deficiency  Assessment & Plan  - Vitamin D 25hydroxy level low at 18 4, persistently low ionized calcium despite Ca repletion  - Continue weekly vitamin D2 supplemntation 50,000U    Thrombocytopenia ([facility] )  Assessment & Plan  Assessment   ? Likely multifactorial etiology given myelosuppression in setting of acute illness, splenic sequestration in setting of portal hypertension 2/2 several metastases to the liver 2/2 stage IV cholangiocarcinoma  Plan:  ? Trend CBC, Transfuse to maintain platelets over 42,095 or for active bleeding  ? Goals of care discussion    GERD (gastroesophageal reflux disease)  Assessment & Plan  ? Known history  ? Continue Protonix 40mg    Hyponatremia  Assessment & Plan  Assessment:   ? Sodium 118 on on arrival to ED  ? Na 130  (3/16)  Plan:   ? Fluid restricted diet      ----------------------------------------------------------------------------------------  HPI/24hr events: Angela Fletcher is a 47 y o  male with past medical history of metastatic cholangiocarcinoma who initially presented on 2/20 due to hypotension, decreased appetite and confusion  He was initially admitted to the micu for presumed septic shock  He was treated with pressors and IV antibiotics and started on pressors  There was no source identified  He had an DENZEL which improved with fluids  He was weaned off pressors and started on midodrine  He was also started on scheduled albumin q6 hr  He has multiple paracentesis performed in the hospital and had tenckhoff catheter placed on 3/15  He was also followed by nephrology for worsening renal function  He was treated with diuresis and continued on scheduled albumin  His midodrine was increased to 15 mg four times daily  He was started on octreotide on 3/15  On 3/15, patient continued to have worsening renal function and decreasing urine output  He was also having decreasing systolic BP  Critical care was consulted for possible initiation of vasopressors       Today, patient has required up titration of Andrés to 220mcg/min due to hypotension  He has no acute complaints  Disposition: Continue Stepdown Level 1 level of care   Code Status: Level 3 - DNAR and DNI  ---------------------------------------------------------------------------------------  SUBJECTIVE  " I feel ok, my wife is coming today"     Review of Systems   Constitutional: Positive for activity change, appetite change and fatigue  Negative for fever  Respiratory: Negative for apnea, cough, chest tightness and shortness of breath  Cardiovascular: Negative for chest pain and leg swelling  Gastrointestinal: Negative for abdominal pain, nausea and vomiting  Genitourinary: Negative for difficulty urinating  Neurological: Negative for dizziness, tremors, syncope and light-headedness  All other systems reviewed and are negative  Review of systems was reviewed and negative unless stated above in HPI/24-hour events   ---------------------------------------------------------------------------------------  OBJECTIVE    Vitals   Vitals:    23 0600 23 0630 23 0815 23 0900   BP: 92/52 (!) 88/52 (!) 80/48 (!) 81/47   BP Location:   Left arm    Pulse:   83    Resp:   16    Temp:       TempSrc:       SpO2:   97%    Weight:       Height:         Temp (24hrs), Av 1 °F (36 2 °C), Min:96 8 °F (36 °C), Max:97 2 °F (36 2 °C)  Current: Temperature: (!) 96 8 °F (36 °C)  Arterial Line BP: 96/46  Arterial Line MAP (mmHg): (!) 64 mmHg    Respiratory:  SpO2: SpO2: 97 %  Nasal Cannula O2 Flow Rate (L/min): 2 L/min    Invasive/non-invasive ventilation settings   Respiratory    Lab Data (Last 4 hours)    None         O2/Vent Data (Last 4 hours)    None                Physical Exam  Vitals and nursing note reviewed  Constitutional:       Appearance: He is ill-appearing  He is not toxic-appearing  HENT:      Head: Normocephalic        Nose: Nose normal       Mouth/Throat:      Mouth: Mucous membranes are moist    Eyes:      Extraocular Movements: Extraocular movements intact  Pupils: Pupils are equal, round, and reactive to light  Cardiovascular:      Rate and Rhythm: Normal rate and regular rhythm  Pulses: Normal pulses  Heart sounds: Normal heart sounds  Pulmonary:      Effort: Pulmonary effort is normal       Breath sounds: Normal breath sounds  Abdominal:      General: Abdomen is flat  There is no distension  Palpations: Abdomen is soft  There is no mass  Tenderness: There is no abdominal tenderness  Hernia: No hernia is present  Musculoskeletal:         General: Normal range of motion  Cervical back: Normal range of motion and neck supple  Comments: Lower extremity edema with patches of erythema without warmth  Skin:     General: Skin is warm  Capillary Refill: Capillary refill takes less than 2 seconds  Neurological:      General: No focal deficit present  Comments: Awake  falls asleep easily,  Oriented x 1   No focal deficits               Laboratory and Diagnostics:  Results from last 7 days   Lab Units 03/16/23  0331 03/15/23  2131 03/14/23  0529 03/13/23  1013 03/12/23  0907 03/11/23  0628 03/10/23  0535   WBC Thousand/uL 15 88* 14 93* 7 82 9 10 9 97 8 19 6 72   HEMOGLOBIN g/dL 9 2* 9 2* 9 1* 9 6* 9 8* 10 3* 9 6*   HEMATOCRIT % 26 3* 26 2* 25 7* 27 4* 28 6* 29 1* 27 0*   PLATELETS Thousands/uL 102* 81* 88* 98* 93* 94* 76*   NEUTROS PCT % 79* 82*  --   --   --   --   --    MONOS PCT % 13* 11  --   --   --   --   --      Results from last 7 days   Lab Units 03/16/23  0341 03/15/23  2131 03/15/23  0607 03/14/23  0529 03/13/23  1013 03/12/23  0907 03/12/23  0740 03/11/23  0628 03/10/23  0535   SODIUM mmol/L 130* 127* 128* 127* 128* 129* 127* 129* 130*   POTASSIUM mmol/L 3 5 4 2 3 8 3 4* 3 3* 3 7 3 6 3 8 3 7   CHLORIDE mmol/L 96 96 97 96 97 98 98 98 99   CO2 mmol/L 19* 13* 17* 18* 18* 18* 17* 18* 18*   ANION GAP mmol/L 15* 18* 14* 13 13 13 12 13 13   BUN mg/dL 121* 124* 120* 112* 106* 108* 108* 104* 98* CREATININE mg/dL 3 76* 4 32* 3 40* 2 71* 2 80* 2 76* 2 80* 2 38* 1 88*   CALCIUM mg/dL 8 1* 7 4* 8 2* 8 0* 8 0* 7 6* 7 5* 7 8* 7 5*   GLUCOSE RANDOM mg/dL 192* 256* 97 105 162* 124 142* 115 132   ALT U/L 22  --  18 16 14 14  --  16 15   AST U/L 80*  --  67* 53* 62* 61*  --  65* 54*   ALK PHOS U/L 84  --  103 94 104 105  --  103 88   ALBUMIN g/dL 4 4  --  5 1* 4 2 4 1 3 6 3 6 3 9 4 1   TOTAL BILIRUBIN mg/dL 5 34*  --  5 93* 5 23* 5 99* 6 49*  --  5 41* 5 35*     Results from last 7 days   Lab Units 03/14/23  0529 03/12/23  0740 03/10/23  0535   MAGNESIUM mg/dL 3 1*  --  2 8*   PHOSPHORUS mg/dL 4 9* 4 4  --       Results from last 7 days   Lab Units 03/13/23  1013 03/12/23  0907 03/11/23  1145 03/10/23  0535   INR  2 32* 2 45* 2 46* 2 36*          Results from last 7 days   Lab Units 03/16/23  0553 03/16/23  0256 03/16/23  0032 03/15/23  2131   LACTIC ACID mmol/L 4 3* 5 2* 6 1* 8 8*     ABG:    VBG:          Micro  Results from last 7 days   Lab Units 03/11/23  2129 03/11/23 2128 03/11/23  1635   BLOOD CULTURE  No Growth After 4 Days  No Growth After 4 Days  --    GRAM STAIN RESULT   --   --  Rare Polys  No organisms seen   BODY FLUID CULTURE, STERILE   --   --  No growth         Intake and Output  I/O       03/14 0701  03/15 0700 03/15 0701 03/16 0700 03/16 0701 03/17 0700    P  O   0     I V  (mL/kg)  1732 7 (17 6) 784 (8)    IV Piggyback  510     Total Intake(mL/kg)  2242 7 (22 8) 784 (8)    Urine (mL/kg/hr) 200 (0 1) 575 (0 2) 300 (0 8)    Drains  0 0    Other  3700     Total Output 200 4275 300    Net -200 -2032 3 +484                 Height and Weights   Height: 6' (182 9 cm)  IBW (Ideal Body Weight): 77 6 kg  Body mass index is 29 43 kg/m²  Weight (last 2 days)     Date/Time Weight    03/16/23 0548 98 4 (217)    03/14/23 0536 93 6 (206 35)            Nutrition       Diet Orders   (From admission, onward)             Start     Ordered    03/15/23 1736  Diet Regular; Regular House;  Sodium 4 GM (XOCHILT), Fluid Restriction 1500 ML  Diet effective midnight        References:    Nutrtion Support Algorithm Enteral vs  Parenteral   Question Answer Comment   Diet Type Regular    Regular Regular House    Other Restriction(s): Sodium 4 GM (XOCHILT)    Other Restriction(s): Fluid Restriction 1500 ML    RD to adjust diet per protocol?  Yes        03/15/23 1735    03/09/23 1418  Dietary nutrition supplements  Once        Question Answer Comment   Select Supplement: Ensure Plus High Protein Vanilla    Frequency Breakfast        03/09/23 1417                  Active Medications  Scheduled Meds:  Current Facility-Administered Medications   Medication Dose Route Frequency Provider Last Rate   • al mag oxide-diphenhydramine-lidocaine viscous  10 mL Swish & Swallow Q4H PRN Swati Weaver MD     • Albumin 25%  25 g Intravenous Q6H Stephie Jose MD Stopped (03/16/23 0700)   • chlorhexidine  15 mL Swish & Spit Q12H [address] Swati Weaver MD     • enoxaparin  1 mg/kg Subcutaneous Q24H [address] James Matson MD     • ergocalciferol  50,000 Units Oral Weekly Swati Weaver MD     • HYDROmorphone  0 2 mg Intravenous Q3H PRN [place] Connelly DO     • insulin lispro  1-6 Units Subcutaneous TID AC Swati Weaver MD     • lactulose  20 g Oral BID My Sandhu MD     • midodrine  20 mg Oral 4x Daily Maribell Arce MD     • octreotide  200 mcg Subcutaneous [facility] Stephie Jose MD     • ondansetron  4 mg Intravenous Q8H PRN Swati Weaver MD     • pantoprazole  40 mg Oral Early Morning Swati Weaver MD     • phenylephine   mcg/min Intravenous Titrated Rocío Harding PA-C 220 mcg/min (03/16/23 8013)   • rifaximin  550 mg Oral Q12H Jennifer Somers MD     • saliva substitute  5 spray Mouth/Throat 4x Daily PRN Swati Weaver MD     • sodium bicarbonate infusion  100 mL/hr Intravenous Continuous Jude Tesoriotoniel  mL/hr (03/16/23 1016)   • sodium bicarbonate  1,300 mg Oral TID after meals Stephie Jose MD     • sodium chloride  2 g Oral BID With Meals Nathanael Pride MD     • sucralfate  1 g Oral 4x Daily (AC & HS) Pelon Guzman MD     • thiamine  200 mg Intravenous Q12H Jude Michelleoriotoniel,  mg (03/16/23 0003)     Continuous Infusions:  phenylephine,  mcg/min, Last Rate: 220 mcg/min (03/16/23 0834)  sodium bicarbonate infusion, 100 mL/hr, Last Rate: 100 mL/hr (03/16/23 1016)      PRN Meds:   al mag oxide-diphenhydramine-lidocaine viscous, 10 mL, Q4H PRN  HYDROmorphone, 0 2 mg, Q3H PRN  ondansetron, 4 mg, Q8H PRN  saliva substitute, 5 spray, 4x Daily PRN        Invasive Devices Review  Invasive Devices     Central Venous Catheter Line  Duration           Port A Cath Right Subclavian -- days          Peripheral Intravenous Line  Duration           Peripheral IV 03/15/23 Left Forearm <1 day    Peripheral IV 03/15/23 Right Forearm <1 day          Drain  Duration           Urethral Catheter 18 Fr  4 days    Peritoneal Ascites Long-Term Catheter 14 Fr  <1 day                ---------------------------------------------------------------------------------------  Advance Directive and Living Will:      Power of :    POLST:    ---------------------------------------------------------------------------------------  Care Time Delivered:   No Critical Care time spent       NANCY Gill      Portions of the record may have been created with voice recognition software  Occasional wrong word or "sound a like" substitutions may have occurred due to the inherent limitations of voice recognition software    Read the chart carefully and recognize, using context, where substitutions have occurred

## 2023-03-16 NOTE — CASE MANAGEMENT
Case Management Progress Note    Patient name Harrison Chiu  Location [address] [place] [address] 509/PPHP [id number] MRN [id number]  : 1970 Date 3/16/2023       LOS (days): 24  Geometric Mean LOS (GMLOS) (days): 5 00  Days to GMLOS:-19 2        OBJECTIVE:        Current admission status: Inpatient  Preferred Pharmacy:   [address] [place] [address] #[id number] REFUGIO Taylor  [id number]  [facility] [address]  Phone: [phone] Fax: [phone]    Primary Care Provider: Maria Luisa Duran MD    Primary Insurance: BLUE CROSS  Secondary Insurance:     PROGRESS NOTE:  CM received a request from Palliative Team for referral to Hospice  This CM called the patient's wife, Binh La [phone], to discuss Hospice choices  Yasemin Nicely agreed to referral with [facility] as his PCP and Providers have been with   CM sent the referral via Aidin  Plan will be to go home with Hospice and family will provide

## 2023-03-16 NOTE — ACP (ADVANCE CARE PLANNING)
Critical Care Advanced Care Planning Note  Rosalia Ashby 47 y o  male MRN: [id number]  Unit/Bed#: [facility] 184-02 Encounter: [id number]    Kathryn Johnson is a 47 y o  male requiring critical care evaluation and advanced care planning  The patient has chronic comorbidities, including but not limited to Stage IV Cholangiocarcinoma, which is now further complicated by the following acute conditions: DENZEL on CKD, LA, AGMA, Shock  Due to the severity of the patient's acute condition and/or the extent of chronic conditions, additional conversations pertaining to advanced care planning were required  Today's discussion, which was held in a face-to-face meeting and over the phone, included Rosalia Travis in person at the bedside, spouse Montserrats Pallas via telephone  , and it was established that all stake holders understood the rationale for the advanced care planning  The patient was able to participate in the discussion  Summary of Discussion:  Discussed worsening laboratory parameters all consistent with shock, plan for ongoing care with IV resuscitation  Unfortunately not responding well to oral vasopressors and oral bicarbonate thus need to be transition to IV suggesting worsening of hepatorenal syndrome  Introduced via "what if" invasive life support  Rosalia Travis is not interested in mechanical ventilation or CPR based on his advanced disease and knowledge that these will not help him achieve his goal of " walking out of the hospital "  If time is limited, he states that his goal, " would be to go home and be with family "  He requested that I also inform Montserrats Pallas of this conversation and worsening laboratory numbers  I contacted her and provided the same information  Total time spent, (25) minutes (1055p to 1120p)        CODE STATUS: DNR/DNI - Level 3  POA:    POLST:        SIGNATURE: Mark Shah DO  DATE: March 15, 2023  TIME: 11:21 PM

## 2023-03-16 NOTE — CASE COMMUNICATION
Nimo Ashby   72  IPU or Routine - wife unsure if she wants IPU - also unsure if pt is stable for transport  Pt admitted to [facility] on 23 with hypotension, decreased appetite, and confusion  PMH including malignant neoplasm tongue, rectal bleeding, and metastatic cholangiocarcinoma  Current problems: hepatic ecephalopathy, hypotension, malignant ascites, thrombocytopenia, metabolic acidosis, and multiple DENZEL  Pt is alert, but lethargic and falls asleep easily, oriented to self, and at times place and situation  Noted to have decreased renal function and urine output - davison in place  Difficulty swallowing   physically able to take PO medications, but tires easily and refuses  Sips of fluids but not eating  B/L wounds to buttocks and sacrum  C/o pain to sacrum/buttocks r/t wounds and pt expressed generalized pain all over  Requested IV Dila luz marai at end of meeting  PPS 30  Height: 72"  Weight: 217 lbs  VS: BP 81/47, HR 83, RR 16, O2 97% RA, T 96 8  Labs: 2023 - lactic 4 3, total bili 5 34, bili direct 2 54, ALK phos 84, AST 80, ALT 22, total protein 5 8, albumin 4 4, , Creat 3 76, Ca 8 1, Anion Gap 15, Plt 102, PT 25 7, INR 2 32    Meds: Dilaudid 0 2mg Q3hrs (1st dose given 1405 on 23), phenylephrine (elizabeth-synephrine) gtt 220 mcg/min, sodium bicarb 150m Eq 100 ml/hr, albumin 25g Q6hrs, humalog, midodrine 20mg q6hrs, lactulose 20g BID    Can we approve?

## 2023-03-16 NOTE — PROGRESS NOTES
-- Patient:  -- MRN: [id number]  -- Aidin Request ID: [id number]  -- Level of care reserved: Hospice  -- Partner Reserved: [facility][note 3], [address] [phone]  -- Clinical needs requested:  -- Geography searched: [id number]  -- Start of Service:  -- Request sent: 12:01pm EDT on 3/16/2023 by Nadya Milian  -- Partner reserved: 4:18pm EDT on 3/16/2023 by Yoko Potts  -- Choice list shared:

## 2023-03-16 NOTE — HOSPICE NOTE
Met with patient and family (including patient wife Ena Killian) at bedside and discussed hospice services  Ena Killian right away wanted to discuss an alternative option, such as palliative care with home health and if it was an option  Advised that would be something that would need to be discussed with the care team on if it was a possibility  Ena Killian expressed she believed at this time she would prefer to take him home at this request, but that she would like to hear about the [address] [place] [address] services to patient and family and that we may need to see if patient would be stable for transport to either home or IPU first before discharge and that patient would need to be approved and they expressed understanding  Multiple times patient expressed concern with what Ena Killian would prefer for his care and became tearful  Not ready to make decision on hospice care at this time  Will follow-up  Made palliative team aware of patient and family request to have a discussion  Patient did request pain medication and made assigned RN aware of request and provided update on the discussion with family

## 2023-03-16 NOTE — ASSESSMENT & PLAN NOTE
· Assessment  · Originally presented with hypotension and was admitted to [facility] 2/20 with presumed septic shock- no source identified  · Improved and was off IV vasopressors, transitioned to PO midodrine  · Acute worsening of hypotension on 3/15 requiring initiation of neosynephrine  · Received 500LR, Q6 albumin and 15 Q 6 of midodrine  · Plan   · Continue Andrés, titrate as able, currently at max dose 220  · Continue Midodrine and Albumin scheduled  · MAP Goal >60  · One time does of 5% albumin to assess response  · [facility] Discussion, palliative consulted

## 2023-03-16 NOTE — PROGRESS NOTES
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, recurrent ascites requiring paracentesis presented with abdominal pain nausea and poor oral intake and nephrology was consulted for acute kidney injury    Acute kidney injury, POA  -Baseline creatinine: 0 5-0 7 mg/dl  -Multiple episodes of acute kidney injury this hospital admission  · First episode: Admitted with creatinine 2 37 in February 20, 2023, was prerenal and renal function improved to creatinine 0 55 on 2/24/2023  · Second episode acute kidney injury on 2/26 with peak serum creatinine 2 54 on 3/4, suspected ATN  Renal function improved to creatinine 1 68 on 3/6  · Third episode: Creatinine worsen no new complaints ed to 2 49 on 3/8, suspected to be from hemodynamic changes from paracentesis on 3/7 and improved with IV albumin and creatinine was down to 1 8 on 3/10  · Fourth episode: Creatinine elevated since 3/12  Most likely ATN from persistently low blood pressure despite high-dose midodrine and albumin  Also considering small possibility of HRS and started on treatment on 3/15  Peak creatinine 4 32 on 3/15  [place] [facility] Course:  Was started on IV albumin 25 g every 6 hours on 3/12, and is also on midodrine  Was started on octreotide on 3/15  Blood pressure was low and Andrés-Synephrine was started on 3/15  -Plan:   · Renal function improving to creatinine 3 76 mg/dL and acidosis improving to bicarb of 19 with current sodium bicarbonate drip  Patient is nonoliguric  Continue sodium bicarb drip at 100 ml/h and continue vasopressors per ICU  Renal function improving, no urgent indication for renal replacement therapy at this time    Discussed with patient that he would not tolerate hemodialysis treatment due to persistent hypotension and also in the setting of advanced malignancy would not be a good candidate  · Continue treatment of hepatorenal syndrome with high-dose of midodrine, currently on midodrine 20 mg 4 times a day, continue octreotide subcutaneous and IV albumin 25 g every 6 hours  · Patient does not have liver cirrhosis but has metastatic cholangiocarcinoma, he does have spider angiomata on the chest indicating of liver disease  Does have liver mass, started on octreotide 200 mcg 3 times daily on 3/15    · Ongoing discussion regarding goals of care  · Avoid nephrotoxins and dose all medications per EGFR  · Avoid hypotension  Chronic hypotension, continue oral midodrine, IV albumin as well as continue vasopressors per ICU    Hyponatremia suspected SIADH in the setting of malignancy and use of PPI  Also possibility of pseudohyponatremia with normal serum osmolality and finding of  triclonal gammopathy  -Urine sodium was less than 5 and urine osmolality 373  Urine studies not suggestive of SIADH  Previously checked TSH and a m  cortisol was in normal range  -Work-up showed serum osmolality 288 which is normal, 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 (M-Peak 1 = 0 15 g/dL),  free kappa light chains (M-Peak 2 = 0 13 g/dL), and IgG lambda (M-Peak 3 = 0 11 g/dL   UPEP was negative  Recommend input from hematology oncology-discussed with primary team about discussing with hematology oncology  -Sodium level improving to 130 meq/L, on sodium bicarbonate tablet which would help  Continue fluid restriction 1 2 L/day  Continue salt tablets 2 g daily    Not able to start diuretics due to low blood pressure requiring vasopressors    Metabolic acidosis: Bicarb level improving with IV bicarbonate drip as well as oral sodium bicarbonate tablet continue current treatment-bicarb level was 19  -Still with finding of lactic acidosis with peak lactic acid level 8 8 at 9 PM on 3/15 and improving since then, likely due to hypotension    Hyperphosphatemia with phosphorus level 4 9 on blood work from 3/14 in the setting of acute kidney injury, continue to monitor, recommend low phosphorus diet, no need for binders at this time    Anemia, hemoglobin stable at 9 2 g/dL, MCV normal, monitor per primary team    Metastatic cholangiocarcinoma with finding of pulmonary hepatic lesion, peritoneal carcinomatosis as well as malignant ascites   -Status post Tenckhoff catheter placement on 3/15  -plan for outpatient oncology follow up     Status post septic shock which was present on admission  Currently off antibiotics    Bilateral lower extremity edema likely in the setting of low serum albumin   Dopplers negative for DVT  Continue to monitor  Continue IV albumin  Lower extremity edema improving    Malignant ascites requiring frequent paracentesis, status post paracentesis on 3/13 and 3 6 L fluid removed  Status post Tenckhoff catheter placement on 3/15 and 3 7 L fluid removal    Mesenteric thrombosis-anticoagulation per primary team   On renally dosed Lovenox   chronic thrombocytopenia: Stable, continue to monitor    SUBJECTIVE:  Patient was transferred under ICU care on 3/15 due to persistent hypotension and was started on Andrés-Synephrine  Urine output and renal function improving slightly with improved blood pressure  Blood pressure still low with high dose of Andrés-Synephrine    OBJECTIVE:  Current Weight: Weight - Scale: 98 4 kg (217 lb)  Vitals:    03/16/23 0900   BP: (!) 81/47   Pulse:    Resp:    Temp:    SpO2:        Intake/Output Summary (Last 24 hours) at 3/16/2023 1030  Last data filed at 3/16/2023 0901  Gross per 24 hour   Intake 3026 73 ml   Output 4575 ml   Net -1548 27 ml       Physical Exam  General:  Ill looking, awake but slightly lethargic    Eyes: Conjunctivae pink,  Sclera anicteric  ENT: lips and mucous membranes moist  Neck: supple   Chest: Clear to Auscultation both lungs,  no crackles, ronchus or wheezing  CVS: S1 & S2 present, normal rate, regular rhythm, no murmur  Abdomen: soft, non-tender, non-distended, Bowel sounds normoactive    Has Tenckhoff catheter with overlying dressing  Extremities: 1 +  edema of  legs  Skin: Spider angioma on the chest  Neuro: awake, alert, oriented  Psych: Mood and affect appropriate     Medications:    Current Facility-Administered Medications:   •  al mag oxide-diphenhydramine-lidocaine viscous (MAGIC MOUTHWASH) suspension 10 mL, 10 mL, Swish & Swallow, Q4H PRN, Ryan Nix MD, 10 mL at 02/26/23 1232  •  albumin human (FLEXBUMIN) 25 % injection 25 g, 25 g, Intravenous, Q6H, Alena Pelaez MD, Stopped at 03/16/23 0700  •  chlorhexidine (PERIDEX) 0 12 % oral rinse 15 mL, 15 mL, Swish & Spit, Q12H [facility], Ryan Nix MD, 15 mL at 03/16/23 0834  •  enoxaparin (LOVENOX) subcutaneous injection 100 mg, 1 mg/kg, Subcutaneous, Q24H [facility], Berta Quintanilla MD, 100 mg at 03/16/23 0846  •  ergocalciferol (VITAMIN D2) capsule 50,000 Units, 50,000 Units, Oral, Weekly, Ryan Nix MD, 50,000 Units at 03/16/23 0849  •  insulin lispro (HumaLOG) 100 units/mL subcutaneous injection 1-6 Units, 1-6 Units, Subcutaneous, TID AC, 1 Units at 03/14/23 1233 **AND** Fingerstick Glucose (POCT), , , TID AC, Ryan Nix MD  •  lactulose oral solution 20 g, 20 g, Oral, BID, Maxwell Bui MD, 20 g at 03/16/23 0846  •  midodrine (PROAMATINE) tablet 20 mg, 20 mg, Oral, 4x Daily, Mallory Joseph MD, 20 mg at 03/16/23 0846  •  octreotide (SandoSTATIN) injection 200 mcg, 200 mcg, Subcutaneous, Q8H [facility], Alena Pelaez MD, 200 mcg at 03/16/23 0500  •  ondansetron (ZOFRAN) injection 4 mg, 4 mg, Intravenous, Q8H PRN, Ryan Nix MD, 4 mg at 02/22/23 0006  •  oxyCODONE (ROXICODONE) IR tablet 5 mg, 5 mg, Oral, Q4H PRN, Ryan Nix MD  •  pantoprazole (PROTONIX) EC tablet 40 mg, 40 mg, Oral, Early Morning, Ryan Nix MD, 40 mg at 03/16/23 0500  •  phenylephrine (DEANGELO-SYNEPHRINE) 50 mg (STANDARD CONCENTRATION) in sodium chloride 0 9% 250 mL,  mcg/min, Intravenous, Titrated, NANCY Gill, Last Rate: 66 mL/hr at 03/16/23 0834, 220 mcg/min at 03/16/23 0834  •  rifaximin (XIFAXAN) tablet 550 mg, 550 mg, Oral, Q12H [address], Buster Pablo MD, 550 mg at 03/16/23 0847  •  saliva substitute (MOUTH KOTE) mucosal solution 5 spray, 5 spray, Mouth/Throat, 4x Daily PRN, Lucero Baker MD, 5 spray at 02/25/23 2227  •  sodium bicarbonate 150 mEq in dextrose 5 % 1,000 mL infusion, 100 mL/hr, Intravenous, Continuous, Jude Arevalo DO, Last Rate: 100 mL/hr at 03/16/23 1016, 100 mL/hr at 03/16/23 1016  •  sodium bicarbonate tablet 1,300 mg, 1,300 mg, Oral, TID after meals, Minetta Romberg, MD, 1,300 mg at 03/16/23 0846  •  sodium chloride tablet 2 g, 2 g, Oral, BID With Meals, Minetta Romberg, MD, 2 g at 03/16/23 0830  •  sucralfate (CARAFATE) tablet 1 g, 1 g, Oral, 4x Daily (AC & HS), Lucero Baker MD, 1 g at 03/16/23 0830  •  thiamine (VITAMIN B1) 200 mg in sodium chloride 0 9 % 50 mL IVPB, 200 mg, Intravenous, Q12H, Jude Arevalo DO, Last Rate: 100 mL/hr at 03/16/23 0003, 200 mg at 03/16/23 0003    Invasive Devices:   Urethral Catheter 18 Fr   (Active)   Reasons to continue Urinary Catheter  Accurate I&O assessment in critically ill patients (48 hr  max) 03/12/23 2301   Site Assessment Clean;Skin intact 03/12/23 2301   Harvey Care Done 03/12/23 2100   Collection Container Standard drainage bag 03/12/23 2301   Securement Method Securing device (Describe) 03/12/23 2301   Output (mL) 250 mL 03/12/23 1401       Lab Results:   Results from last 7 days   Lab Units 03/16/23  0341 03/16/23  0331 03/15/23  2131 03/15/23  0607 03/14/23  0529 03/12/23  0907 03/12/23  0740 03/11/23  0628 03/10/23  0535   WBC Thousand/uL  --  15 88* 14 93*  --  7 82   < >  --    < > 6 72   HEMOGLOBIN g/dL  --  9 2* 9 2*  --  9 1*   < >  --    < > 9 6*   HEMATOCRIT %  --  26 3* 26 2*  --  25 7*   < >  -- < > 27 0*   PLATELETS Thousands/uL  --  102* 81*  --  88*   < >  --    < > 76*   POTASSIUM mmol/L 3 5  --  4 2 3 8 3 4*   < > 3 6   < > 3 7   CHLORIDE mmol/L 96  --  96 97 96   < > 98   < > 99   CO2 mmol/L 19*  --  13* 17* 18*   < > 17*   < > 18*   BUN mg/dL 121*  --  124* 120* 112*   < > 108*   < > 98*   CREATININE mg/dL 3 76*  --  4 32* 3 40* 2 71*   < > 2 80*   < > 1 88*   CALCIUM mg/dL 8 1*  --  7 4* 8 2* 8 0*   < > 7 5*   < > 7 5*   MAGNESIUM mg/dL  --   --   --   --  3 1*  --   --   --  2 8*   PHOSPHORUS mg/dL  --   --   --   --  4 9*  --  4 4  --   --    ALK PHOS U/L 84  --   --  103 94   < >  --    < > 88   ALT U/L 22  --   --  18 16   < >  --    < > 15   AST U/L 80*  --   --  67* 53*   < >  --    < > 54*    < > = values in this interval not displayed  Previous work up:         Portions of the record may have been created with voice recognition software  Occasional wrong word or "sound a like" substitutions may have occurred due to the inherent limitations of voice recognition software  Read the chart carefully and recognize, using context, where substitutions have occurred  If you have any questions, please contact the dictating provider

## 2023-03-16 NOTE — CONSULTS
Oseas Ashby 47 y o  male MRN: [id number]  Unit/Bed#: [facility] 813-01 Encounter: [id number]      -------------------------------------------------------------------------------------------------------------  Chief Complaint: hypotension, decreased urine output, DENZEL    History of Present Illness   HX and PE limited by: none  Mami Toribio is a 47 y o  male with past medical history of metastatic cholangiocarcinoma who initially presented on 2/20 due to hypotension, decreased appetite and confusion  He was initially admitted to the micu for presumed septic shock  He was treated with pressors and IV antibiotics and started on pressors  He has DENZEL which improved with fluids  He was weaned off pressors and started on midodrine  He was also started on scheduled albumin q6 hr  Patient was seen by hematology who discussed with him that he likely had very poor prognosis and may not have benefit to further systemic chemotherapy but patient wanted to continue anticancer treatment  He has multiple paracentesis performed in the hospital and had tenckhoff catheter placed on 3/15  He was also followed by nephrology for worsening renal function  He was treated with diuresis and continued on scheduled albumin  His midodrine was increased to 15 mg four times daily  He was started on octreotide on 3/15  On 3/15, patient continued to have worsening renal function and decreasing urine output  He was also having decreasing systolic BP  Critical care was consulted for possible initiation of vasopressors       History obtained from chart review and the patient   -------------------------------------------------------------------------------------------------------------  Assessment and Plan:    Neuro:   • Diagnosis: Analgesia  o Plan:   - PRN oxycodone  • Diagnosis: Hepatic encephalopathy  o Plan:   - Continue lactulose 20 g BID and rifamixin 550 mg BID      CV:   • Diagnosis: Hypotension, likely secondary to hepatorenal syndrome vs overdiuresis  o Plan:  - Increase midodrine to 20 mg four times daily  - Continue octreotide, albumin scheduled  - Start phenylephrine for goal SBP>90  - Monitor BP and urine output  - F/u lactate    Pulm:  • Diagnosis: no active issues  o Plan:   - Maintain O2 sat >92%      GI:   • Diagnosis: Stage IV Cholangiocarcinoma with mets  o Plan: Diagnosed in 2020, s/p resection followed by immunotherapy, with recurrence in 2022 to bone, peritoneum, lungs and liver  o Seen by heme/onc, plan for follow up with hemo onc outpatient for further treatment although given worsening renal function, re-consider further goals of care discussions  • Diagnosis: Mesenteric thrombosis  o Plan:   - Continue SQ lovenox 1 mg/kg daily  • Diagnosis: Malignant ascites  o Plan:   - Was getting bi-weekly paracentesis as outpatient  - S/p Tenckhoff catheter placement on 3/15  • Diagnosis: GERD  o Plan:   - Continue home protonix  - Carafate four times daily      :   • Diagnosis: DENZEL  o Plan: Likely secondary to hepatorenal syndrome  - Continue midodrine, will increased to 20 mg four times per day  - Continue octreotide 200 mcg Q8  - Continue scheduled albumin q6 hr  - Will start on phenylephrine  - Nephrology following, appreciate rafa Harvey for strict I and Os  - Monitor urine output and creatinine  • Diagnosis: Metabolic acidosis  o Plan: Likely secondary to acute renal failure  - Nephrology following, appreciate recs  - Follow up lactate  - Continue bicarb tabs 1300 mg TID    F/E/N:   • Fluids: isolyte at 100 ml/hr  • Electrolytes:   o Hyponatremia  - Improving, 128 today  - Continue NaCl tabs 2 g BID and fluid restriction  • Nutrition: regular diet, fluid restriction 1 5L       Heme/Onc:   • Diagnosis:  Thrombocytopenia  o Plan:   - Continue to monitor daily CBC  - No active bleeding, platelets 70R yesterday, no need for transfusion at this time    Endo:   • Diagnosis: Hyperglycemia  o Plan: - Continue SSI  - Goal -180    ID:   • Diagnosis: No active issues  o Plan:   - Monitor WBC and fever curve    MSK/Skin:   • Diagnosis: No active issues  o Plan:   - Frequent turns ans repositioning      Disposition: Transfer to Stepdown Level 1  Code Status: Level 1 - Full Code  --------------------------------------------------------------------------------------------------------------  Review of Systems   Constitutional: Positive for fatigue  Negative for appetite change, chills and fever  HENT: Negative for congestion, rhinorrhea and sore throat  Respiratory: Negative for cough and shortness of breath  Cardiovascular: Negative for chest pain  Gastrointestinal: Positive for abdominal pain  Negative for diarrhea, nausea and vomiting  Genitourinary: Positive for decreased urine volume  Musculoskeletal: Negative for arthralgias and myalgias  Skin: Negative for rash  Neurological: Negative for dizziness, weakness, light-headedness, numbness and headaches  All other systems reviewed and are negative  A 12-point, complete review of systems was reviewed and negative except as stated above     Physical Exam  Vitals and nursing note reviewed  Constitutional:       General: He is not in acute distress  Appearance: He is well-developed and normal weight  He is not ill-appearing, toxic-appearing or diaphoretic  Comments: Chronically ill appearing  HENT:      Head: Normocephalic and atraumatic  Right Ear: External ear normal       Left Ear: External ear normal       Nose: Nose normal       Mouth/Throat:      Mouth: Mucous membranes are moist       Pharynx: Oropharynx is clear  Eyes:      General: Scleral icterus present  Extraocular Movements: Extraocular movements intact  Conjunctiva/sclera: Conjunctivae normal    Cardiovascular:      Rate and Rhythm: Normal rate and regular rhythm  Pulses: Normal pulses  Heart sounds: Normal heart sounds   No murmur heard     No friction rub  No gallop  Pulmonary:      Effort: Pulmonary effort is normal  No respiratory distress  Breath sounds: Normal breath sounds  No wheezing or rales  Abdominal:      General: There is no distension  Palpations: Abdomen is soft  Tenderness: There is no abdominal tenderness  There is no guarding or rebound  Comments: Tenckhoff catheter in place  Musculoskeletal:         General: No tenderness  Cervical back: Neck supple  Right lower leg: No edema  Left lower leg: No edema  Skin:     General: Skin is warm and dry  Coloration: Skin is not pale  Findings: No rash  Neurological:      General: No focal deficit present  Mental Status: He is alert  Cranial Nerves: No cranial nerve deficit  Sensory: No sensory deficit  Motor: No weakness  Psychiatric:         Mood and Affect: Mood normal          Behavior: Behavior normal          --------------------------------------------------------------------------------------------------------------  Vitals:   Vitals:    03/15/23 1846 03/15/23 1905 03/15/23 1935 03/15/23 2004   BP: (!) 87/47 (!) 87/47 (!) 86/47 (!) 86/48   BP Location:    Right arm   Pulse: 85 83 83 82   Resp:    18   Temp:    (!) 97 2 °F (36 2 °C)   TempSrc:       SpO2: 98% 97% 98% 97%   Weight:       Height:         Temp  Min: 95 7 °F (35 4 °C)  Max: 98 8 °F (37 1 °C)  IBW (Ideal Body Weight): 77 6 kg  Height: 6' (182 9 cm)  Body mass index is 27 99 kg/m²    N/A    Laboratory and Diagnostics:  Results from last 7 days   Lab Units 03/14/23  0529 03/13/23  1013 03/12/23  0907 03/11/23  0628 03/10/23  0535 03/09/23  0606   WBC Thousand/uL 7 82 9 10 9 97 8 19 6 72 7 10   HEMOGLOBIN g/dL 9 1* 9 6* 9 8* 10 3* 9 6* 9 9*   HEMATOCRIT % 25 7* 27 4* 28 6* 29 1* 27 0* 29 3*   PLATELETS Thousands/uL 88* 98* 93* 94* 76* 66*     Results from last 7 days   Lab Units 03/15/23  0607 03/14/23  0529 03/13/23  1013 03/12/23  6339 03/12/23  0740 03/11/23  0628 03/10/23  0535   SODIUM mmol/L 128* 127* 128* 129* 127* 129* 130*   POTASSIUM mmol/L 3 8 3 4* 3 3* 3 7 3 6 3 8 3 7   CHLORIDE mmol/L 97 96 97 98 98 98 99   CO2 mmol/L 17* 18* 18* 18* 17* 18* 18*   ANION GAP mmol/L 14* 13 13 13 12 13 13   BUN mg/dL 120* 112* 106* 108* 108* 104* 98*   CREATININE mg/dL 3 40* 2 71* 2 80* 2 76* 2 80* 2 38* 1 88*   CALCIUM mg/dL 8 2* 8 0* 8 0* 7 6* 7 5* 7 8* 7 5*   GLUCOSE RANDOM mg/dL 97 105 162* 124 142* 115 132   ALT U/L 18 16 14 14  --  16 15   AST U/L 67* 53* 62* 61*  --  65* 54*   ALK PHOS U/L 103 94 104 105  --  103 88   ALBUMIN g/dL 5 1* 4 2 4 1 3 6 3 6 3 9 4 1   TOTAL BILIRUBIN mg/dL 5 93* 5 23* 5 99* 6 49*  --  5 41* 5 35*     Results from last 7 days   Lab Units 03/14/23  0529 03/12/23  0740 03/10/23  0535   MAGNESIUM mg/dL 3 1*  --  2 8*   PHOSPHORUS mg/dL 4 9* 4 4  --       Results from last 7 days   Lab Units 03/13/23  1013 03/12/23  0907 03/11/23  1145 03/10/23  0535 03/09/23  0606   INR  2 32* 2 45* 2 46* 2 36* 2 42*              ABG:    VBG:          Micro:  Results from last 7 days   Lab Units 03/11/23  2129 03/11/23  2128 03/11/23  1635   BLOOD CULTURE  No Growth at 72 hrs   No Growth at 72 hrs   --    GRAM STAIN RESULT   --   --  Rare Polys  No organisms seen   BODY FLUID CULTURE, STERILE   --   --  No growth       EKG: N/a  Imaging: I have personally reviewed pertinent films in PACS    Historical Information   Past Medical History:   Diagnosis Date   • 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 [facility])    • Rectal bleeding 1/9/2023   • S/P exploratory laparotomy 12/16/2020     Past Surgical History:   Procedure Laterality Date   • CT NEEDLE BIOPSY LIVER  10/15/2020   • IR BIOPSY LIVER MASS  5/18/2022   • IR PARACENTESIS  1/10/2023   • IR PARACENTESIS  1/24/2023   • IR PARACENTESIS  2/2/2023   • IR PARACENTESIS  1/30/2023   • IR PARACENTESIS  2/6/2023   • IR PARACENTESIS  2/9/2023   • IR PARACENTESIS  2/13/2023   • IR PARACENTESIS  2/16/2023   • IR PARACENTESIS  3/7/2023   • IR PARACENTESIS  3/11/2023   • IR PARACENTESIS  3/3/2023   • IR PERITONEAL ASCITES LONG-TERM CATHETER PLACEMENT  3/15/2023     Social History   Social History     Substance and Sexual Activity   Alcohol Use Not Currently    Comment: 1-2 drinks per day     Social History     Substance and Sexual Activity   Drug Use Not on file     Social History     Tobacco Use   Smoking Status Former   • Packs/day: 2 00   • Years: 20 00   • Pack years: 40 00   • Types: Cigarettes   Smokeless Tobacco Current     Exercise History: n/a  Family History:   Family History   Problem Relation Age of Onset   • Prostate cancer Father      Family history unknown and I have reviewed this patient's family history and commented on sigificant items within the HPI      Medications:  Current Facility-Administered Medications   Medication Dose Route Frequency   • al mag oxide-diphenhydramine-lidocaine viscous (MAGIC MOUTHWASH) suspension 10 mL  10 mL Swish & Swallow Q4H PRN   • albumin human (FLEXBUMIN) 25 % injection 25 g  25 g Intravenous Q6H   • chlorhexidine (PERIDEX) 0 12 % oral rinse 15 mL  15 mL Swish & Spit Q12H [address]   • [START ON 3/16/2023] enoxaparin (LOVENOX) subcutaneous injection 100 mg  1 mg/kg Subcutaneous Q24H JEMMA   • ergocalciferol (VITAMIN D2) capsule 50,000 Units  50,000 Units Oral Weekly   • insulin lispro (HumaLOG) 100 units/mL subcutaneous injection 1-6 Units  1-6 Units Subcutaneous TID AC   • lactulose oral solution 20 g  20 g Oral BID   • midodrine (PROAMATINE) tablet 20 mg  20 mg Oral 4x Daily   • octreotide (SandoSTATIN) injection 200 mcg  200 mcg Subcutaneous Q8H [address]   • ondansetron (ZOFRAN) injection 4 mg  4 mg Intravenous Q8H PRN   • oxyCODONE (ROXICODONE) IR tablet 5 mg  5 mg Oral Q4H PRN   • pantoprazole (PROTONIX) EC tablet 40 mg  40 mg Oral Early Morning   • phenylephrine (DEANGELO-SYNEPHRINE) 50 mg (STANDARD CONCENTRATION) in sodium chloride 0 9% 250 mL   mcg/min Intravenous Titrated   • rifaximin (XIFAXAN) tablet 550 mg  550 mg Oral Q12H [address]   • saliva substitute (MOUTH KOTE) mucosal solution 5 spray  5 spray Mouth/Throat 4x Daily PRN   • sodium bicarbonate tablet 1,300 mg  1,300 mg Oral TID after meals   • sodium chloride tablet 2 g  2 g Oral BID With Meals   • sucralfate (CARAFATE) tablet 1 g  1 g Oral 4x Daily (AC & HS)     Home medications:  Prior to Admission Medications   Prescriptions Last Dose Informant Patient Reported? Taking? Ivosidenib 250 MG TABS   Yes No   Sig: Take by mouth   Patient not taking: Reported on 2/3/2023   cyclobenzaprine (FLEXERIL) 5 mg tablet   Yes No   Sig: take 1 tablet by mouth every 8 hours (3 TIMES A DAY)   Patient not taking: Reported on 1/23/2023   ergocalciferol (VITAMIN D2) 50,000 units   Yes No   Sig: Take 50,000 Units by mouth once a week   furosemide (LASIX) 20 mg tablet   No No   Sig: Take 1 tablet (20 mg total) by mouth daily   magnesium oxide (MAG-OX) 400 mg tablet   No No   Sig: Take 1 tablet (400 mg total) by mouth daily   ondansetron (ZOFRAN) 4 mg tablet   No No   Sig: Take 0 5 tablets (2 mg total) by mouth every 8 (eight) hours as needed for nausea or vomiting   oxyCODONE (ROXICODONE) 5 immediate release tablet   Yes No   Sig: Take 5 mg by mouth every 6 (six) hours as needed   pantoprazole (PROTONIX) 40 mg tablet   No No   Sig: take 1 tablet by mouth once daily   potassium chloride (K-DUR,KLOR-CON) 10 mEq tablet   No No   Sig: Take 1 tablet (10 mEq total) by mouth daily      Facility-Administered Medications: None     Allergies:   Allergies   Allergen Reactions   • Latex Swelling     If long term usage/application   • Penicillins Other (See Comments) and Rash     As a child had reaction not sure what it was       ------------------------------------------------------------------------------------------------------------  Advance Directive and Living Will:      Power of :    POLST: ------------------------------------------------------------------------------------------------------------  Anticipated Length of Stay is > 2 midnights    Care Time Delivered:   Upon my evaluation, this patient had a high probability of imminent or life-threatening deterioration due to hypotension, acute renal failure, which required my direct attention, intervention, and personal management  I have personally provided 30 minutes of critical care time, exclusive of procedures, teaching, family meetings, and any prior time recorded by providers other than myself  Hay Jerome MD        Portions of the record may have been created with voice recognition software  Occasional wrong word or "sound a like" substitutions may have occurred due to the inherent limitations of voice recognition software    Read the chart carefully and recognize, using context, where substitutions have occurred

## 2023-03-17 ENCOUNTER — PATIENT OUTREACH (OUTPATIENT)
Dept: CASE MANAGEMENT | Facility: HOSPITAL | Age: 53
End: 2023-03-17

## 2023-03-17 NOTE — PROGRESS NOTES
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, recurrent ascites requiring paracentesis presented with abdominal pain nausea and poor oral intake and nephrology was consulted for acute kidney injury    Acute kidney injury, POA  -Baseline creatinine: 0 5-0 7 mg/dl  -Multiple episodes of acute kidney injury this hospital admission  · First episode: Admitted with creatinine 2 37 in February 20, 2023, was prerenal and renal function improved to creatinine 0 55 on 2/24/2023  · Second episode acute kidney injury on 2/26 with peak serum creatinine 2 54 on 3/4, suspected ATN  Renal function improved to creatinine 1 68 on 3/6  · Third episode: Creatinine worsen no new complaints ed to 2 49 on 3/8, suspected to be from hemodynamic changes from paracentesis on 3/7 and improved with IV albumin and creatinine was down to 1 8 on 3/10  · Fourth episode: Creatinine elevated since 3/12  Most likely ATN from persistently low blood pressure despite high-dose midodrine and albumin  Also considering small possibility of HRS and started on treatment on 3/15  Peak creatinine 4 32 on 3/15  [facility] Course:  Was started on IV albumin 25 g every 6 hours on 3/12, and is also on midodrine  Was started on octreotide on 3/15  Blood pressure was low and Andrés-Synephrine was started on 3/15  -Plan:   · No labs from today, renal function was improving to creatinine 3 76 3/16, bicarb level is improving to 19 with use of sodium bicarbonate drip  Discussed with critical care advanced practitioner who mentioned that there is ongoing discussion regarding goals of care and transition to hospice    Did agree with checking BMP as patient is still receiving full medical treatment with bicarb drip at 100 mm/h as well as treatment with Andrés-Synephrine due to hypotension  BMP has not been collected yet  We will follow when labs done, for now continue vasopressor per ICU and continue current rate bicarb drip  Patient is more confused today  Recommend checking ammonia level if aggressive interventions planned after family discussion  · Continue treatment of hepatorenal syndrome with high-dose of midodrine, currently on midodrine 20 mg 4 times a day, continue octreotide subcutaneous and IV albumin 25 g every 6 hours  · Patient does not have liver cirrhosis but has metastatic cholangiocarcinoma, he does have spider angiomata on the chest indicating of liver disease  Does have liver mass, started on octreotide 200 mcg 3 times daily on 3/15, continue at current dose  · Ongoing discussion regarding goals of care  · Avoid nephrotoxins and dose all medications per EGFR  · Avoid hypotension  · Addendum: Noted patient was seen by hospice, as per primary team note patient is to transition to hospice  Chronic hypotension, continue oral midodrine, IV albumin as well as continue vasopressors per ICU  Blood pressure still low and requiring vasopressors    Hyponatremia suspected SIADH in the setting of malignancy and use of PPI  Also possibility of pseudohyponatremia with normal serum osmolality and finding of  triclonal gammopathy  -Urine sodium was less than 5 and urine osmolality 373  Urine studies not suggestive of SIADH  Previously checked TSH and a m  cortisol was in normal range  -Work-up showed serum osmolality 288 which is normal, 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 (M-Peak 1 = 0 15 g/dL),  free kappa light chains (M-Peak 2 = 0 13 g/dL), and IgG lambda (M-Peak 3 = 0 11 g/dL   UPEP was negative  Recommend input from hematology oncology-discussed with primary team about discussing with hematology oncology  -Sodium level improving to 130 meq/L, on sodium bicarbonate tablet which would help  Continue fluid restriction 1 2 L/day  Continue salt tablets 2 g daily  Follow-up labs from today    Metabolic acidosis: Bicarb level improving with IV bicarbonate drip as well as oral sodium bicarbonate tablet continue current treatment-bicarb level was 19  -Still with finding of lactic acidosis with peak lactic acid level 8 8 at 9 PM on 3/15 and improving since then, likely due to hypotension  Last lactic acid level was 4 3 3/16, currently on sodium bicarbonate tablet as well as bicarb drip, continue same treatment    Hyperphosphatemia with phosphorus level 4 9 on blood work from 3/14 in the setting of acute kidney injury, continue to monitor, recommend low phosphorus diet, no need for binders at this time    Anemia, hemoglobin stable at 9 2 g/dL, MCV normal, monitor per primary team    Metastatic cholangiocarcinoma with finding of pulmonary hepatic lesion, peritoneal carcinomatosis as well as malignant ascites   -Status post Tenckhoff catheter placement on 3/15  -plan for outpatient oncology follow up     Status post septic shock which was present on admission  Currently off antibiotics    Bilateral lower extremity edema likely in the setting of low serum albumin   Dopplers negative for DVT  Continue to monitor  Continue IV albumin  Lower extremity edema improving    Malignant ascites requiring frequent paracentesis, status post paracentesis on 3/13 and 3 6 L fluid removed  Status post Tenckhoff catheter placement on 3/15 and 3 7 L fluid removal    Mesenteric thrombosis-anticoagulation per primary team   On renally dosed Lovenox   chronic thrombocytopenia: Stable, continue to monitor    SUBJECTIVE:  Still on bicarb drip as well as Andrés-Synephrine    Appears more confused today    OBJECTIVE:  Current Weight: Weight - Scale: 95 kg (209 lb 6 4 oz)  Vitals:    03/17/23 1045   BP: (!) 86/63   Pulse:    Resp:    Temp:    SpO2:        Intake/Output Summary (Last 24 hours) at 3/17/2023 1101  Last data filed at 3/17/2023 1039  Gross per 24 hour   Intake 4769 47 ml   Output 1225 ml   Net 3544 47 ml       Physical Exam  General:  Ill looking, awake  Oriented only x1, slightly confused  Eyes: Conjunctivae pink,  Sclera anicteric  ENT: lips and mucous membranes moist  Neck: supple   Chest: Clear to Auscultation both lungs,  no crackles, ronchus or wheezing  CVS: S1 & S2 present, normal rate, regular rhythm, no murmur    Abdomen: soft, non-tender, slightly distended due to ascites, bowel sounds normoactive  Extremities: trace  edema of  legs  Skin: no rash  Neuro: awake, alert, oriented x 1   Psych: Patient is confused today, not able to assess  Medications:    Current Facility-Administered Medications:   •  al mag oxide-diphenhydramine-lidocaine viscous (MAGIC MOUTHWASH) suspension 10 mL, 10 mL, Swish & Swallow, Q4H PRN, Stanislaw White MD, 10 mL at 02/26/23 1232  •  albumin human (FLEXBUMIN) 25 % injection 25 g, 25 g, Intravenous, Q6H, Renée Maloney MD, Last Rate: 0 mL/hr at 03/17/23 0354, 25 g at 03/17/23 0545  •  chlorhexidine (PERIDEX) 0 12 % oral rinse 15 mL, 15 mL, Swish & Spit, Q12H [address], Stanislaw White MD, 15 mL at 03/17/23 0818  •  enoxaparin (LOVENOX) subcutaneous injection 100 mg, 1 mg/kg, Subcutaneous, Q24H [address], Estelita Faria MD, 100 mg at 03/17/23 0818  •  HYDROmorphone (DILAUDID) injection 0 2 mg, 0 2 mg, Intravenous, Q3H PRN, Key Connelly DO, 0 2 mg at 03/17/23 0914  •  insulin lispro (HumaLOG) 100 units/mL subcutaneous injection 1-6 Units, 1-6 Units, Subcutaneous, TID AC, 1 Units at 03/14/23 1233 **AND** Fingerstick Glucose (POCT), , , TID AC, Stanislaw White MD  •  lactulose oral solution 20 g, 20 g, Oral, BID, Lesley Nichols MD, 20 g at 03/16/23 0846  •  midodrine (PROAMATINE) tablet 20 mg, 20 mg, Oral, 4x Daily, Estuardo Waters MD, 20 mg at 03/17/23 0818  •  octreotide (SandoSTATIN) injection 200 mcg, 200 mcg, Subcutaneous, Q8H [address], Renée Maloney MD, 200 mcg at 03/17/23 0549  • ondansetron (ZOFRAN) injection 4 mg, 4 mg, Intravenous, Q8H PRN, Rere Lauren MD, 4 mg at 02/22/23 0006  •  pantoprazole (PROTONIX) EC tablet 40 mg, 40 mg, Oral, Early Morning, Rere Lauren MD, 40 mg at 03/17/23 0541  •  phenylephrine (DEANGELO-SYNEPHRINE) 50 mg (STANDARD CONCENTRATION) in sodium chloride 0 9% 250 mL,  mcg/min, Intravenous, Titrated, NANCY Gill, Last Rate: 57 mL/hr at 03/17/23 1050, 190 mcg/min at 03/17/23 1050  •  rifaximin (XIFAXAN) tablet 550 mg, 550 mg, Oral, Q12H [address], Maikel Goldberg MD, 550 mg at 03/17/23 0818  •  saliva substitute (MOUTH KOTE) mucosal solution 5 spray, 5 spray, Mouth/Throat, 4x Daily PRN, Rere Laruen MD, 5 spray at 02/25/23 2227  •  sodium bicarbonate 150 mEq in dextrose 5 % 1,000 mL infusion, 100 mL/hr, Intravenous, Continuous, Khoa Sine, DO, Last Rate: 100 mL/hr at 03/16/23 2201, 100 mL/hr at 03/16/23 2201  •  sodium bicarbonate tablet 1,300 mg, 1,300 mg, Oral, TID after meals, Shanice Mercer MD, 1,300 mg at 03/17/23 0818  •  sodium chloride tablet 2 g, 2 g, Oral, BID With Meals, Shanice Mercer MD, 2 g at 03/17/23 0818  •  sucralfate (CARAFATE) tablet 1 g, 1 g, Oral, 4x Daily (AC & HS), Rere Lauren MD, 1 g at 03/16/23 2146  •  thiamine (VITAMIN B1) 200 mg in sodium chloride 0 9 % 50 mL IVPB, 200 mg, Intravenous, Q12H, Jude Tesoriero, DO, Last Rate: 100 mL/hr at 03/16/23 2336, 200 mg at 03/16/23 2336    Invasive Devices:   Urethral Catheter 18 Fr   (Active)   Reasons to continue Urinary Catheter  Accurate I&O assessment in critically ill patients (48 hr  max) 03/12/23 2301   Site Assessment Clean;Skin intact 03/12/23 2301   Harvey Care Done 03/12/23 2100   Collection Container Standard drainage bag 03/12/23 2301   Securement Method Securing device (Describe) 03/12/23 2301   Output (mL) 250 mL 03/12/23 1401       Lab Results:   Results from last 7 days   Lab Units 03/16/23  0341 03/16/23  0331 03/15/23  2131 03/15/23  4010 03/14/23  0568 03/12/23  0907 03/12/23  0740   WBC Thousand/uL  --  15 88* 14 93*  --  7 82   < >  --    HEMOGLOBIN g/dL  --  9 2* 9 2*  --  9 1*   < >  --    HEMATOCRIT %  --  26 3* 26 2*  --  25 7*   < >  --    PLATELETS Thousands/uL  --  102* 81*  --  88*   < >  --    POTASSIUM mmol/L 3 5  --  4 2 3 8 3 4*   < > 3 6   CHLORIDE mmol/L 96  --  96 97 96   < > 98   CO2 mmol/L 19*  --  13* 17* 18*   < > 17*   BUN mg/dL 121*  --  124* 120* 112*   < > 108*   CREATININE mg/dL 3 76*  --  4 32* 3 40* 2 71*   < > 2 80*   CALCIUM mg/dL 8 1*  --  7 4* 8 2* 8 0*   < > 7 5*   MAGNESIUM mg/dL  --   --   --   --  3 1*  --   --    PHOSPHORUS mg/dL  --   --   --   --  4 9*  --  4 4   ALK PHOS U/L 84  --   --  103 94   < >  --    ALT U/L 22  --   --  18 16   < >  --    AST U/L 80*  --   --  67* 53*   < >  --     < > = values in this interval not displayed  Previous work up:         Portions of the record may have been created with voice recognition software  Occasional wrong word or "sound a like" substitutions may have occurred due to the inherent limitations of voice recognition software  Read the chart carefully and recognize, using context, where substitutions have occurred  If you have any questions, please contact the dictating provider

## 2023-03-17 NOTE — PLAN OF CARE
Problem: Prexisting or High Potential for Compromised Skin Integrity  Goal: Skin integrity is maintained or improved  Description: INTERVENTIONS:  - Identify patients at risk for skin breakdown  - Assess and monitor skin integrity  - Assess and monitor nutrition and hydration status  - Monitor labs   - Assess for incontinence   - Turn and reposition patient  - Assist with mobility/ambulation  - Relieve pressure over bony prominences  - Avoid friction and shearing  - Provide appropriate hygiene as needed including keeping skin clean and dry  - Evaluate need for skin moisturizer/barrier cream  - Collaborate with interdisciplinary team   - Patient/family teaching  -triad paste  Outcome: Progressing     Problem: CARDIOVASCULAR - ADULT  Goal: Maintains optimal cardiac output and hemodynamic stability  Description: INTERVENTIONS:  - Monitor I/O, vital signs and rhythm  - Monitor for S/S and trends of decreased cardiac output  - Administer and titrate ordered vasoactive medications to optimize hemodynamic stability  - Assess quality of pulses, skin color and temperature  - Assess for signs of decreased coronary artery perfusion  - Instruct patient to report change in severity of symptoms  Outcome: Progressing  Goal: Absence of cardiac dysrhythmias or at baseline rhythm  Description: INTERVENTIONS:  - Continuous cardiac monitoring, vital signs, obtain 12 lead EKG if ordered  - Administer antiarrhythmic and heart rate control medications as ordered  - Monitor electrolytes and administer replacement therapy as ordered  Outcome: Progressing     Problem: GASTROINTESTINAL - ADULT  Goal: Minimal or absence of nausea and/or vomiting  Description: INTERVENTIONS:  - Administer IV fluids if ordered to ensure adequate hydration  - Maintain NPO status until nausea and vomiting are resolved  - Nasogastric tube if ordered  - Administer ordered antiemetic medications as needed  - Provide nonpharmacologic comfort measures as [1 - Distress Level] : Distress Level: 1 appropriate  - Advance diet as tolerated, if ordered  - Consider nutrition services referral to assist patient with adequate nutrition and appropriate food choices  Outcome: Progressing  Goal: Maintains or returns to baseline bowel function  Description: INTERVENTIONS:  - Assess bowel function  - Encourage oral fluids to ensure adequate hydration  - Administer IV fluids if ordered to ensure adequate hydration  - Administer ordered medications as needed  - Encourage mobilization and activity  - Consider nutritional services referral to assist patient with adequate nutrition and appropriate food choices  Outcome: Progressing  Goal: Maintains adequate nutritional intake  Description: INTERVENTIONS:  - Monitor percentage of each meal consumed  - Identify factors contributing to decreased intake, treat as appropriate  - Assist with meals as needed  - Monitor I&O, weight, and lab values if indicated  - Obtain nutrition services referral as needed  Outcome: Progressing     Problem: METABOLIC, FLUID AND ELECTROLYTES - ADULT  Goal: Electrolytes maintained within normal limits  Description: INTERVENTIONS:  - Monitor labs and assess patient for signs and symptoms of electrolyte imbalances  - Administer electrolyte replacement as ordered  - Monitor response to electrolyte replacements, including repeat lab results as appropriate  - Instruct patient on fluid and nutrition as appropriate  Outcome: Progressing  Goal: Fluid balance maintained  Description: INTERVENTIONS:  - Monitor labs   - Monitor I/O and WT  - Instruct patient on fluid and nutrition as appropriate  - Assess for signs & symptoms of volume excess or deficit  Outcome: Progressing  Goal: Glucose maintained within target range  Description: INTERVENTIONS:  - Monitor Blood Glucose as ordered  - Assess for signs and symptoms of hyperglycemia and hypoglycemia  - Administer ordered medications to maintain glucose within target range  - Assess nutritional intake and [80: Normal activity with effort; some signs or symptoms of disease.] : 80: Normal activity with effort; some signs or symptoms of disease.  initiate nutrition service referral as needed  Outcome: Progressing     Problem: HEMATOLOGIC - ADULT  Goal: Maintains hematologic stability  Description: INTERVENTIONS  - Assess for signs and symptoms of bleeding or hemorrhage  - Monitor labs  - Administer supportive blood products/factors as ordered and appropriate  Outcome: Progressing     Problem: MUSCULOSKELETAL - ADULT  Goal: Maintain or return mobility to safest level of function  Description: INTERVENTIONS:  - Assess patient's ability to carry out ADLs; assess patient's baseline for ADL function and identify physical deficits which impact ability to perform ADLs (bathing, care of mouth/teeth, toileting, grooming, dressing, etc )  - Assess/evaluate cause of self-care deficits   - Assess range of motion  - Assess patient's mobility  - Assess patient's need for assistive devices and provide as appropriate  - Encourage maximum independence but intervene and supervise when necessary  - Involve family in performance of ADLs  - Assess for home care needs following discharge   - Consider OT consult to assist with ADL evaluation and planning for discharge  - Provide patient education as appropriate  Outcome: Progressing  Goal: Maintain proper alignment of affected body part  Description: INTERVENTIONS:  - Support, maintain and protect limb and body alignment  - Provide patient/ family with appropriate education  Outcome: Progressing     Problem: PAIN - ADULT  Goal: Verbalizes/displays adequate comfort level or baseline comfort level  Description: Interventions:  - Encourage patient to monitor pain and request assistance  - Assess pain using appropriate pain scale  - Administer analgesics based on type and severity of pain and evaluate response  - Implement non-pharmacological measures as appropriate and evaluate response  - Consider cultural and social influences on pain and pain management  - Notify physician/advanced practitioner if interventions unsuccessful or [de-identified] : 72 yo M with a PMH of DM2, HTN, and HLD who presents with 3 weeks of fatigue, poor PO intake and exertional dyspnea, found to have bi-cytopenia.\par \par #Bi-Cytopenia\par - Patient developing ZAPATA and new anemia/thrombocytopenia over the past couple months\par - Hb 12.3 (08/2022) --> 10.7 (10/2022) --> 7.9 (admission)\par - Plts 216 (08/2022) --> 193 (10/2022) --> 57 (admission)\par - Patient has been on PO iron outpatient and received iron sucrose in ED\par - Iron studies show normal iron with TIBC (pre-transfusion) but ferritin markedly elevated at 1218\par - B12 and folate normal, and labs not c/w hemolysis. Reticulocyte count inappropriately normal\par - Patient uses alcohol but no evidence of liver disease, s/p US showing no HSM\par - Peripheral smear personally reviewed and interpreted, showing slightly microcytic and hypochromic cells, occasional teardrop cells, rare platelet clumps, but no schistocytes\par - S/p 1U PRBCs in ED\par \par 11/29/22- Bone marrow biopsy c/w Myelodysplastic syndrome with low blasts and increase p53 staining by immunohistochemistry. Cytogenetics, FISH pending. \par \par Patient complaints of having sob with minimal exertion, denies chest pain, bleeding, fevers, night sweats or weight loss.  patient reports new pain  Outcome: Progressing     Problem: INFECTION - ADULT  Goal: Absence or prevention of progression during hospitalization  Description: INTERVENTIONS:  - Assess and monitor for signs and symptoms of infection  - Monitor lab/diagnostic results  - Monitor all insertion sites, i e  indwelling lines, tubes, and drains  - Monitor endotracheal if appropriate and nasal secretions for changes in amount and color  - [place] appropriate cooling/warming therapies per order  - Administer medications as ordered  - Instruct and encourage patient and family to use good hand hygiene technique  - Identify and instruct in appropriate isolation precautions for identified infection/condition  Outcome: Progressing     Problem: DISCHARGE PLANNING  Goal: Discharge to home or other facility with appropriate resources  Description: INTERVENTIONS:  - Identify barriers to discharge w/patient and caregiver  - Arrange for needed discharge resources and transportation as appropriate  - Identify discharge learning needs (meds, wound care, etc )  - Arrange for interpretive services to assist at discharge as needed  - Refer to Case Management Department for coordinating discharge planning if the patient needs post-hospital services based on physician/advanced practitioner order or complex needs related to functional status, cognitive ability, or social support system  Outcome: Progressing     Problem: Knowledge Deficit  Goal: Patient/family/caregiver demonstrates understanding of disease process, treatment plan, medications, and discharge instructions  Description: Complete learning assessment and assess knowledge base    Interventions:  - Provide teaching at level of understanding  - Provide teaching via preferred learning methods  Outcome: Progressing     Problem: Nutrition/Hydration-ADULT  Goal: Nutrient/Hydration intake appropriate for improving, restoring or maintaining nutritional needs  Description: Monitor and assess patient's nutrition/hydration status for malnutrition  Collaborate with interdisciplinary team and initiate plan and interventions as ordered  Monitor patient's weight and dietary intake as ordered or per policy  Utilize nutrition screening tool and intervene as necessary  Determine patient's food preferences and provide high-protein, high-caloric foods as appropriate       INTERVENTIONS:  - Monitor oral intake, urinary output, labs, and treatment plans  - Assess nutrition and hydration status and recommend course of action  - Evaluate amount of meals eaten  - Assist patient with eating if necessary   - Allow adequate time for meals  - Recommend/ encourage appropriate diets, oral nutritional supplements, and vitamin/mineral supplements  - Order, calculate, and assess calorie counts as needed  - Recommend, monitor, and adjust tube feedings and TPN/PPN based on assessed needs  - Assess need for intravenous fluids  - Provide specific nutrition/hydration education as appropriate  - Include patient/family/caregiver in decisions related to nutrition  Outcome: Progressing

## 2023-03-17 NOTE — ASSESSMENT & PLAN NOTE
Assessment   ? Likely multifactorial etiology given myelosuppression in setting of acute illness, splenic sequestration in setting of portal hypertension 2/2 several metastases to the liver 2/2 stage IV cholangiocarcinoma  Plan:  ? Trend CBC, Transfuse to maintain platelets over [phone] or for active bleeding  ?  Goals of care discussion

## 2023-03-17 NOTE — RESTORATIVE TECHNICIAN NOTE
Restorative Technician Note      Patient Name: Kirsty Gaines     Deferring ambulation adjusted for comfort in bed

## 2023-03-17 NOTE — PROGRESS NOTES
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 Pain: PRN dilaudid     Hypotension  Assessment & Plan  · Assessment  · Originally presented with hypotension and was admitted to [facility] service 2/20 with presumed septic shock- no source identified  · Improved and was off IV vasopressors, transitioned to PO midodrine  · Acute worsening of hypotension on 3/15 requiring initiation of neosynephrine  · Received 500LR, Q6 albumin and 15 Q 6 of midodrine  · Plan   · Continue Andrés, titrate as able  · Continue Midodrine and Albumin scheduled  · MAP Goal >60  · GOC Discussion patient to transition to hospice, discussions on going about what type of hospice patient and family are agreeable to    Cholangiocarcinoma, stage IV [facility])  Assessment & Plan  Assessment:   ? Diagnosed in 2020 s/p resection followed by immuno-chemo therapy with recurrence in 2022 now with mets to bone, peritoneum, lungs, and liver  ? Currently on second round of pemigatinib as an outpatient under the care of Dr Tramaine Mi  ? Tenckhoff Catheter in Place: 3/16 Drainage of 3 7L  ? May have underlying type I hepatorenal syndrome given severe DENZEL and hyponatremia on admission,   Plan:  ? Hold home pemigatibin  ? Follow-up Heme-Onc as outpatient  ?  Continued GOC discussion, Palliative Consultation    Mesenteric thrombosis ([facility] [address] )  Assessment & Plan  Assessment:  - Most recent outpatient MRI imaging showing new thrombosis in portions of the splenic, superior mesenteric and main portal veins and a segment of right portal vein  - Likely cancer associated thrombosis in setting of stage IV cholangiocarcinoma  - Eliquis not appropriate given patient meeds Child-Philip class C criteria based on LFT parameters and malignant ascites  Plan  · Continue Lovenox 100mg SQ Q 12 hrs    DENZEL (acute kidney injury) ([facility] [address] )  Assessment & Plan  Assessment   ? Cr 2 3 on arrival to ED, increased to 2 6 on admission, baseline serum creatinine around 0 55-0 9  ? Likely in setting of severe hypotension secondary to hypovolemia/poor p o  iversus hepatorenal syndrome causing ischemic insult to the kidneys  ? Cr 3 4 > 4 32 > 3 76  Plan  · Likely component of hepatorenal syndrome as well as hypovolemia  · Nephrology following, appreciate recs  · Continue to monitor I/O and avoid nephrotoxins  · MAP Goals > 60   · Goals of Care    Hyperglycemia  Assessment & Plan  Assessment:  · Hyperglycemia in setting of acute illness  Plan Blood sugars at goal  • Continue SSI, algorithm 3, adjust as appropriate   • Goal blood glucose [phone]    Metabolic acidosis  Assessment & Plan  Assessment:   ? Elevated Anion Gap Metabolic Acidosis in the setting of hypoperfusion and Lactic Acidosis  Plan  · Continue Bicarb drip until complete correction of acidosis, transition back to Bicarb PO     Malignant ascites  Assessment & Plan  ? 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 At baseline undergoes large volume paracentesis 2 times weekly  - Negative cultures to this point  - Tenckhoff PLACED 3/15    [facility])  Assessment & Plan  Assessment   ?  Likely multifactorial etiology given myelosuppression in setting of acute illness, splenic sequestration in setting of portal hypertension 2/2 several metastases to the liver 2/2 stage IV cholangiocarcinoma  Plan:  ? Trend CBC, Transfuse to maintain platelets over 71,645 or for active bleeding  ? Goals of care discussion    GERD (gastroesophageal reflux disease)  Assessment & Plan  ? Known history  ? Continue Protonix 40mg    Hyponatremia  Assessment & Plan  Assessment:   ? Sodium 118 on on arrival to ED  ? Na 130  (3/16)  Plan:   ? Fluid restricted diet        ----------------------------------------------------------------------------------------  HPI/24hr events: Discussions during the day regarding hospice cares, no decision on type of hospice patient will be discharged to       Disposition: Continue Stepdown Level 1 level of care   Code Status: Level 3 - DNAR and DNI  ---------------------------------------------------------------------------------------  SUBJECTIVE  "I'm doing well thank you"    Review of Systems  Review of systems was reviewed and negative unless stated above in HPI/24-hour events   ---------------------------------------------------------------------------------------  OBJECTIVE    Vitals   Vitals:    23 1700 23 1936 23 2348 23 0247   BP: (!) 82/51 (!) 84/53 92/54 92/54   BP Location: Left arm      Pulse: 80 86 86 83   Resp:  (!)  13 13   Temp: (!) 97 °F (36 1 °C) 99 9 °F (37 7 °C) (!) 97 2 °F (36 2 °C) 99 °F (37 2 °C)   TempSrc: Oral Oral Oral Axillary   SpO2: 97% 97% 99% 95%   Weight:       Height:         Temp (24hrs), Av 3 °F (36 8 °C), Min:97 °F (36 1 °C), Max:99 9 °F (37 7 °C)  Current: Temperature: 99 °F (37 2 °C)  Arterial Line BP: 96/46  Arterial Line MAP (mmHg): (!) 64 mmHg    Respiratory:  SpO2: SpO2: 95 %  Nasal Cannula O2 Flow Rate (L/min): 2 L/min    Invasive/non-invasive ventilation settings   Respiratory    Lab Data (Last 4 hours)    None         O2/Vent Data (Last 4 hours)    None                Physical Exam  Constitutional:       General: He is not in acute distress  Appearance: He is ill-appearing     HENT:      Mouth/Throat:      Mouth: Mucous membranes are moist    Cardiovascular:      Rate and Rhythm: Normal rate and regular rhythm  Pulmonary:      Effort: Pulmonary effort is normal  No respiratory distress  Abdominal:      General: There is distension  Tenderness: There is abdominal tenderness  Musculoskeletal:      Right lower leg: Edema present  Left lower leg: Edema present  Skin:     General: Skin is warm and dry  Neurological:      Mental Status: He is alert        Comments: Pleasantly confused               Laboratory and Diagnostics:  Results from last 7 days   Lab Units 03/16/23  0331 03/15/23  2131 03/14/23  0529 03/13/23  1013 03/12/23  0907 03/11/23  0628 03/10/23  0535   WBC Thousand/uL 15 88* 14 93* 7 82 9 10 9 97 8 19 6 72   HEMOGLOBIN g/dL 9 2* 9 2* 9 1* 9 6* 9 8* 10 3* 9 6*   HEMATOCRIT % 26 3* 26 2* 25 7* 27 4* 28 6* 29 1* 27 0*   PLATELETS Thousands/uL 102* 81* 88* 98* 93* 94* 76*   NEUTROS PCT % 79* 82*  --   --   --   --   --    MONOS PCT % 13* 11  --   --   --   --   --      Results from last 7 days   Lab Units 03/16/23  0341 03/15/23  2131 03/15/23  0607 03/14/23  0529 03/13/23  1013 03/12/23  0907 03/12/23  0740 03/11/23  0628 03/10/23  0535   SODIUM mmol/L 130* 127* 128* 127* 128* 129* 127* 129* 130*   POTASSIUM mmol/L 3 5 4 2 3 8 3 4* 3 3* 3 7 3 6 3 8 3 7   CHLORIDE mmol/L 96 96 97 96 97 98 98 98 99   CO2 mmol/L 19* 13* 17* 18* 18* 18* 17* 18* 18*   ANION GAP mmol/L 15* 18* 14* 13 13 13 12 13 13   BUN mg/dL 121* 124* 120* 112* 106* 108* 108* 104* 98*   CREATININE mg/dL 3 76* 4 32* 3 40* 2 71* 2 80* 2 76* 2 80* 2 38* 1 88*   CALCIUM mg/dL 8 1* 7 4* 8 2* 8 0* 8 0* 7 6* 7 5* 7 8* 7 5*   GLUCOSE RANDOM mg/dL 192* 256* 97 105 162* 124 142* 115 132   ALT U/L 22  --  18 16 14 14  --  16 15   AST U/L 80*  --  67* 53* 62* 61*  --  65* 54*   ALK PHOS U/L 84  --  103 94 104 105  --  103 88   ALBUMIN g/dL 4 4  --  5 1* 4 2 4 1 3 6 3 6 3 9 4 1   TOTAL BILIRUBIN mg/dL 5 34*  --  5 93* 5 23* 5 99* 6 49*  --  5 41* 5 35*     Results from last 7 days   Lab Units 03/14/23  0529 03/12/23  0740 03/10/23  0535   MAGNESIUM mg/dL 3 1*  --  2 8*   PHOSPHORUS mg/dL 4 9* 4 4  --       Results from last 7 days   Lab Units 03/13/23  1013 03/12/23  0907 03/11/23  1145 03/10/23  0535   INR  2 32* 2 45* 2 46* 2 36*          Results from last 7 days   Lab Units 03/16/23  0553 03/16/23  0256 03/16/23  0032 03/15/23  2131   LACTIC ACID mmol/L 4 3* 5 2* 6 1* 8 8*     ABG:    VBG:          Micro  Results from last 7 days   Lab Units 03/11/23  2129 03/11/23  2128 03/11/23  1635   BLOOD CULTURE  No Growth After 5 Days  No Growth After 5 Days  --    GRAM STAIN RESULT   --   --  Rare Polys  No organisms seen   BODY FLUID CULTURE, STERILE   --   --  No growth       EKG: no new  Imaging: no new    Intake and Output  I/O       03/15 0701 03/16 0700 03/16 0701 03/17 0700    P  O  0 118    I V  (mL/kg) 1732 7 (17 6) 3884 5 (39 5)    IV Piggyback 510 550    Total Intake(mL/kg) 2242 7 (22 8) 4552 5 (46 3)    Urine (mL/kg/hr) 575 (0 2) 1350 (0 6)    Drains 0 0    Other 3700     Total Output 4275 1350    Net -2032 3 +3202 5                Height and Weights   Height: 6' (182 9 cm)  IBW (Ideal Body Weight): 77 6 kg  Body mass index is 29 43 kg/m²  Weight (last 2 days)     Date/Time Weight    03/16/23 0548 98 4 (217)            Nutrition       Diet Orders   (From admission, onward)             Start     Ordered    03/15/23 1736  Diet Regular; Regular House; Sodium 4 GM (XOCHILT), Fluid Restriction 1500 ML  Diet effective midnight        References:    Nutrtion Support Algorithm Enteral vs  Parenteral   Question Answer Comment   Diet Type Regular    Regular Regular House    Other Restriction(s): Sodium 4 GM (XOCHILT)    Other Restriction(s): Fluid Restriction 1500 ML    RD to adjust diet per protocol?  Yes        03/15/23 1735    03/09/23 1418  Dietary nutrition supplements  Once        Question Answer Comment   Select Supplement: Ensure Plus High Protein Vanilla    Frequency Breakfast 03/09/23 1417                  Active Medications  Scheduled Meds:  Current Facility-Administered Medications   Medication Dose Route Frequency Provider Last Rate   • al mag oxide-diphenhydramine-lidocaine viscous  10 mL Swish & Swallow Q4H PRN Luisa Cardenas MD     • Albumin 25%  25 g Intravenous Q6H Mario Alberto Paris MD Stopped (03/17/23 7705)   • chlorhexidine  15 mL Swish & Spit Q12H [facility] Luisa Cardenas MD     • enoxaparin  1 mg/kg Subcutaneous Q24H [facility] Krish Allen MD     • HYDROmorphone  0 2 mg Intravenous Q3H PRN Romeo Connelly DO     • insulin lispro  1-6 Units Subcutaneous TID AC Luisa Cardenas MD     • lactulose  20 g Oral BID Jenelle Funk MD     • midodrine  20 mg Oral 4x Daily Marin Rodriguez MD     • octreotide  200 mcg Subcutaneous [facility] Mario Alberto Paris MD     • ondansetron  4 mg Intravenous Q8H PRN Luisa Cardenas MD     • pantoprazole  40 mg Oral Early Morning Luisa Cardenas MD     • phenylephine   mcg/min Intravenous Titrated Betsey NANCY Hernandez 200 mcg/min (03/17/23 0404)   • rifaximin  550 mg Oral Q12H [facility] Jenelle Funk MD     • saliva substitute  5 spray Mouth/Throat 4x Daily PRN Luisa Cardenas MD     • sodium bicarbonate infusion  100 mL/hr Intravenous Continuous Albino Ponds,  mL/hr (03/16/23 2201)   • sodium bicarbonate  1,300 mg Oral TID after meals Mario Alberto Paris MD     • sodium chloride  2 g Oral BID With Meals Mario Alberto Paris MD     • sucralfate  1 g Oral 4x Daily (AC & HS) Luisa Cardenas MD     • thiamine  200 mg Intravenous Q12H Jude Tesoriero,  mg (03/16/23 2336)     Continuous Infusions:  phenylephine,  mcg/min, Last Rate: 200 mcg/min (03/17/23 0404)  sodium bicarbonate infusion, 100 mL/hr, Last Rate: 100 mL/hr (03/16/23 2201)      PRN Meds:   al mag oxide-diphenhydramine-lidocaine viscous, 10 mL, Q4H PRN  HYDROmorphone, 0 2 mg, Q3H PRN  ondansetron, 4 mg, Q8H PRN  saliva substitute, 5 spray, 4x Daily PRN        Invasive Devices Review  Invasive Devices     Central Venous Catheter Line  Duration           Port A Cath Right Subclavian -- days          Peripheral Intravenous Line  Duration           Peripheral IV 03/15/23 Left Forearm 1 day    Peripheral IV 03/15/23 Right Forearm 1 day          Drain  Duration           Urethral Catheter 18 Fr  4 days    Peritoneal Ascites Long-Term Catheter 14 Fr  1 day                Rationale for remaining devices: comfort care   ---------------------------------------------------------------------------------------  Advance Directive and Living Will:      Power of :    POLST:    ---------------------------------------------------------------------------------------  Care Time Delivered:   No Critical Care time spent       [facility], NANCY      Portions of the record may have been created with voice recognition software  Occasional wrong word or "sound a like" substitutions may have occurred due to the inherent limitations of voice recognition software    Read the chart carefully and recognize, using context, where substitutions have occurred

## 2023-03-17 NOTE — ASSESSMENT & PLAN NOTE
Assessment:   ? Diagnosed in 2020 s/p resection followed by immuno-chemo therapy with recurrence in 2022 now with mets to bone, peritoneum, lungs, and liver  ? Currently on second round of pemigatinib as an outpatient under the care of Dr Byron Strickland  ? Tenckhoff Catheter in Place: 3/16 Drainage of 3 7L  ? May have underlying type I hepatorenal syndrome given severe DENZEL and hyponatremia on admission,   Plan:  ? Hold home pemigatibin  ? Follow-up Heme-Onc as outpatient  ?  Continued GOC discussion, Palliative Consultation

## 2023-03-17 NOTE — PROGRESS NOTES
Per chart review patient is currently admitted in the hospital   Patient will be discharged on hospice services    MSW will close this case however will remain available for future referral

## 2023-03-17 NOTE — PROGRESS NOTES
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)   • Thrombocytopenia (HCC)   • Hypomagnesemia   • BMI 35 0-35 9,adult   • Vitamin D deficiency   • Malignant ascites   • Mesenteric thrombosis (HCC)   • DENZEL (acute kidney injury) ([facility] [address] )   • Metabolic acidosis   • Hyperglycemia   • Elevated INR   • Hepatic encephalopathy   • Goals of care, counseling/discussion   • Bilateral lower extremity edema   • Hypotension     Active issues specifically addressed today include:   Cholangiocarcinoma, stage IV  Metastasis to lungs  Lytic bone lesion of hip  Cancer-related pain  Nausea/vomiting  Goals of care counseling  Palliative care encounter    Plan:  1  Symptom management -  Comfort medications  -  Hydromorphone 0 3 Q 3 hours prn  -  Haldol 0 5 mg IV Q 2 hours agitation  -  Zofran 4 mg Q 4 hours prn    2  Goals -  -  Transitioned to comfort care  -  Hospice previously consulted  Family goal was home hospice  Unclear if patient will be stable enough for home hospice  Code Status: Comfort care - Level 4     Decisional apparatus:  Patient is not competent on my exam today  If competence is lost, patient's substitute decision maker would default to spouse by [place]  Advance Directive / Living Will / POLST:  None on file     Interval history:       Patient more confused this morning  The patient's wife, parents, brother, and neices and nephews all present at bedside  Patient transitioned to comfort care    Patient denies current pain  Vasopressors weaned off  Medication regimen modified  Comfort orders all placed  MEDICATIONS / ALLERGIES:     all current active meds have been reviewed    Allergies   Allergen Reactions   • Latex Swelling     If long term usage/application   • Penicillins Other (See Comments) and Rash     As a child had reaction not sure what it was  OBJECTIVE:    Physical Exam  Physical Exam  Vitals and nursing note reviewed  Constitutional:       General: He is not in acute distress  Appearance: He is underweight  He is ill-appearing  Interventions: Nasal cannula in place  HENT:      Head: Normocephalic and atraumatic  Eyes:      Conjunctiva/sclera: Conjunctivae normal    Cardiovascular:      Rate and Rhythm: Normal rate and regular rhythm  Heart sounds: No murmur heard  Pulmonary:      Effort: Pulmonary effort is normal  No respiratory distress  Breath sounds: Normal breath sounds  Abdominal:      Palpations: Abdomen is soft  Tenderness: There is no abdominal tenderness  Musculoskeletal:         General: No swelling  Cervical back: Neck supple  Skin:     General: Skin is warm and dry  Capillary Refill: Capillary refill takes less than 2 seconds  Coloration: Skin is jaundiced  Neurological:      Mental Status: He is disoriented  Psychiatric:         Attention and Perception: He is inattentive  Cognition and Memory: Cognition is impaired  Memory is impaired  Lab Results: I have personally reviewed pertinent labs  Counseling / Coordination of Care    Total floor / unit time spent today 60+  minutes  Greater than 50% of total time was spent with the patient and / or family counseling and / or coordination of care  A description of the counseling / coordination of care: review of chart, modification of medications, transition to comfort care, collaboration with nursing and critical care team   Meeting with family

## 2023-03-17 NOTE — ASSESSMENT & PLAN NOTE
· Assessment: Patient endorses chronic fatigue and confusion   · Ammonia elevated 63 on admission  · In setting of stage IV cholangiocarcinoma with multiple metastases to the liver  Likely participated by DENZEL and hypovolemia/poor PO intake POA  · Plan :   · On Lactulose and Rifamixin  · Neuro Checks Every 4 hours   · Palliative Care Consultation   ?  Pain: PRN dilaudid

## 2023-03-17 NOTE — PLAN OF CARE
Problem: Prexisting or High Potential for Compromised Skin Integrity  Goal: Skin integrity is maintained or improved  Description: INTERVENTIONS:  - Identify patients at risk for skin breakdown  - Assess and monitor skin integrity  - Assess and monitor nutrition and hydration status  - Monitor labs   - Assess for incontinence   - Turn and reposition patient  - Assist with mobility/ambulation  - Relieve pressure over bony prominences  - Avoid friction and shearing  - Provide appropriate hygiene as needed including keeping skin clean and dry  - Evaluate need for skin moisturizer/barrier cream  - Collaborate with interdisciplinary team   - Patient/family teaching  -triad paste  Outcome: Progressing     Problem: CARDIOVASCULAR - ADULT  Goal: Maintains optimal cardiac output and hemodynamic stability  Description: INTERVENTIONS:  - Monitor I/O, vital signs and rhythm  - Monitor for S/S and trends of decreased cardiac output  - Administer and titrate ordered vasoactive medications to optimize hemodynamic stability  - Assess quality of pulses, skin color and temperature  - Assess for signs of decreased coronary artery perfusion  - Instruct patient to report change in severity of symptoms  Outcome: Progressing  Goal: Absence of cardiac dysrhythmias or at baseline rhythm  Description: INTERVENTIONS:  - Continuous cardiac monitoring, vital signs, obtain 12 lead EKG if ordered  - Administer antiarrhythmic and heart rate control medications as ordered  - Monitor electrolytes and administer replacement therapy as ordered  Outcome: Progressing     Problem: GASTROINTESTINAL - ADULT  Goal: Minimal or absence of nausea and/or vomiting  Description: INTERVENTIONS:  - Administer IV fluids if ordered to ensure adequate hydration  - Maintain NPO status until nausea and vomiting are resolved  - Nasogastric tube if ordered  - Administer ordered antiemetic medications as needed  - Provide nonpharmacologic comfort measures as appropriate  - Advance diet as tolerated, if ordered  - Consider nutrition services referral to assist patient with adequate nutrition and appropriate food choices  Outcome: Progressing  Goal: Maintains or returns to baseline bowel function  Description: INTERVENTIONS:  - Assess bowel function  - Encourage oral fluids to ensure adequate hydration  - Administer IV fluids if ordered to ensure adequate hydration  - Administer ordered medications as needed  - Encourage mobilization and activity  - Consider nutritional services referral to assist patient with adequate nutrition and appropriate food choices  Outcome: Progressing  Goal: Maintains adequate nutritional intake  Description: INTERVENTIONS:  - Monitor percentage of each meal consumed  - Identify factors contributing to decreased intake, treat as appropriate  - Assist with meals as needed  - Monitor I&O, weight, and lab values if indicated  - Obtain nutrition services referral as needed  Outcome: Progressing     Problem: METABOLIC, FLUID AND ELECTROLYTES - ADULT  Goal: Electrolytes maintained within normal limits  Description: INTERVENTIONS:  - Monitor labs and assess patient for signs and symptoms of electrolyte imbalances  - Administer electrolyte replacement as ordered  - Monitor response to electrolyte replacements, including repeat lab results as appropriate  - Instruct patient on fluid and nutrition as appropriate  Outcome: Progressing  Goal: Fluid balance maintained  Description: INTERVENTIONS:  - Monitor labs   - Monitor I/O and WT  - Instruct patient on fluid and nutrition as appropriate  - Assess for signs & symptoms of volume excess or deficit  Outcome: Progressing  Goal: Glucose maintained within target range  Description: INTERVENTIONS:  - Monitor Blood Glucose as ordered  - Assess for signs and symptoms of hyperglycemia and hypoglycemia  - Administer ordered medications to maintain glucose within target range  - Assess nutritional intake and initiate nutrition service referral as needed  Outcome: Progressing     Problem: HEMATOLOGIC - ADULT  Goal: Maintains hematologic stability  Description: INTERVENTIONS  - Assess for signs and symptoms of bleeding or hemorrhage  - Monitor labs  - Administer supportive blood products/factors as ordered and appropriate  Outcome: Progressing     Problem: MUSCULOSKELETAL - ADULT  Goal: Maintain or return mobility to safest level of function  Description: INTERVENTIONS:  - Assess patient's ability to carry out ADLs; assess patient's baseline for ADL function and identify physical deficits which impact ability to perform ADLs (bathing, care of mouth/teeth, toileting, grooming, dressing, etc )  - Assess/evaluate cause of self-care deficits   - Assess range of motion  - Assess patient's mobility  - Assess patient's need for assistive devices and provide as appropriate  - Encourage maximum independence but intervene and supervise when necessary  - Involve family in performance of ADLs  - Assess for home care needs following discharge   - Consider OT consult to assist with ADL evaluation and planning for discharge  - Provide patient education as appropriate  Outcome: Progressing  Goal: Maintain proper alignment of affected body part  Description: INTERVENTIONS:  - Support, maintain and protect limb and body alignment  - Provide patient/ family with appropriate education  Outcome: Progressing     Problem: PAIN - ADULT  Goal: Verbalizes/displays adequate comfort level or baseline comfort level  Description: Interventions:  - Encourage patient to monitor pain and request assistance  - Assess pain using appropriate pain scale  - Administer analgesics based on type and severity of pain and evaluate response  - Implement non-pharmacological measures as appropriate and evaluate response  - Consider cultural and social influences on pain and pain management  - Notify physician/advanced practitioner if interventions unsuccessful or patient reports new pain  Outcome: Progressing     Problem: INFECTION - ADULT  Goal: Absence or prevention of progression during hospitalization  Description: INTERVENTIONS:  - Assess and monitor for signs and symptoms of infection  - Monitor lab/diagnostic results  - Monitor all insertion sites, i e  indwelling lines, tubes, and drains  - Monitor endotracheal if appropriate and nasal secretions for changes in amount and color  - [place] appropriate cooling/warming therapies per order  - Administer medications as ordered  - Instruct and encourage patient and family to use good hand hygiene technique  - Identify and instruct in appropriate isolation precautions for identified infection/condition  Outcome: Progressing     Problem: DISCHARGE PLANNING  Goal: Discharge to home or other facility with appropriate resources  Description: INTERVENTIONS:  - Identify barriers to discharge w/patient and caregiver  - Arrange for needed discharge resources and transportation as appropriate  - Identify discharge learning needs (meds, wound care, etc )  - Arrange for interpretive services to assist at discharge as needed  - Refer to Case Management Department for coordinating discharge planning if the patient needs post-hospital services based on physician/advanced practitioner order or complex needs related to functional status, cognitive ability, or social support system  Outcome: Progressing     Problem: Knowledge Deficit  Goal: Patient/family/caregiver demonstrates understanding of disease process, treatment plan, medications, and discharge instructions  Description: Complete learning assessment and assess knowledge base    Interventions:  - Provide teaching at level of understanding  - Provide teaching via preferred learning methods  Outcome: Progressing     Problem: Nutrition/Hydration-ADULT  Goal: Nutrient/Hydration intake appropriate for improving, restoring or maintaining nutritional needs  Description: Monitor and assess patient's nutrition/hydration status for malnutrition  Collaborate with interdisciplinary team and initiate plan and interventions as ordered  Monitor patient's weight and dietary intake as ordered or per policy  Utilize nutrition screening tool and intervene as necessary  Determine patient's food preferences and provide high-protein, high-caloric foods as appropriate       INTERVENTIONS:  - Monitor oral intake, urinary output, labs, and treatment plans  - Assess nutrition and hydration status and recommend course of action  - Evaluate amount of meals eaten  - Assist patient with eating if necessary   - Allow adequate time for meals  - Recommend/ encourage appropriate diets, oral nutritional supplements, and vitamin/mineral supplements  - Order, calculate, and assess calorie counts as needed  - Recommend, monitor, and adjust tube feedings and TPN/PPN based on assessed needs  - Assess need for intravenous fluids  - Provide specific nutrition/hydration education as appropriate  - Include patient/family/caregiver in decisions related to nutrition  Outcome: Progressing

## 2023-03-17 NOTE — ASSESSMENT & PLAN NOTE
· Assessment  · Originally presented with hypotension and [place] [facility] [place] with presumed septic shock- no source identified  · Improved and was off IV vasopressors, transitioned to PO midodrine  · Acute worsening of hypotension on 3/15 requiring initiation of neosynephrine     · Received 500LR, Q6 albumin and 15 Q 6 of midodrine  · Plan   · Continue Andrés, titrate as able  · Continue Midodrine and Albumin scheduled  · MAP Goal >60  · GOC Discussion patient to transition to hospice, discussions on going about what type of hospice patient and family are agreeable to

## 2023-03-17 NOTE — CASE MANAGEMENT
Case Management Progress Note    Patient name Kathryn Johnson  Location [address] [place] [address] 509/PPHP [id number] MRN [id number]  : 1970 Date 3/17/2023       LOS (days): 25  Geometric Mean LOS (GMLOS) (days): 5 00  Days to GMLOS:-20 4        OBJECTIVE:        Current admission status: Inpatient  Preferred Pharmacy:   [address] [place] [address] [id number] [facility] [address] [address]  Phone: [phone] Fax: [phone]    Primary Care Provider: Zeynep Olguin MD    Primary Insurance: BLUE CROSS  Secondary Insurance:     PROGRESS NOTE:

## 2023-03-17 NOTE — HOSPICE NOTE
Met with pts wife and friend at bedside with April from palliative  PTs blood pressure dropping into the 59'H systolic on the Andrés drip  Emotional support provided  Wife is waiting for pts parents to arrive and then his drip and albumin will be dcd  Doubtful pt would be stable for transport home which is what wife was really hoping for   Hospice will continue to follow

## 2023-03-17 NOTE — UTILIZATION REVIEW
Continued Stay Review    Date: 03/17                        Current Patient Class: IP Current Level of Care: MS    HPI:52 y o  male initially admitted on 02/20    Assessment/Plan: Pt was transisitioned to level 4, comfort care today by palliative  Pt's family goal is for home hospice  Unclear if pt will be stable for home hospice  Per hospice Notes, pt's SBP dropping into the 60's on Andrés gtt  Waiting fot pt's parents to arrive then will dc andrés and Albumin  Hospice and palliative care following     Cont comfort medications:  Hydromorphone 0 3 Q 3 hours prn,  Haldol 0 5 mg IV Q 2 hours agitation, Zofran 4 mg Q 4 hours prn     Vital Signs: BP (!) 79/49   Pulse 89   Temp 99 1 °F (37 3 °C) (Oral)   Resp 16   Ht 6' (1 829 m)   Wt 95 kg (209 lb 6 4 oz)   SpO2 96%   BMI 28 40 kg/m²       Pertinent Labs/Diagnostic Results:       Results from last 7 days   Lab Units 03/16/23  0331 03/15/23  2131 03/14/23  0529 03/13/23  1013 03/12/23  0907   WBC Thousand/uL 15 88* 14 93* 7 82 9 10 9 97   HEMOGLOBIN g/dL 9 2* 9 2* 9 1* 9 6* 9 8*   HEMATOCRIT % 26 3* 26 2* 25 7* 27 4* 28 6*   PLATELETS Thousands/uL 102* 81* 88* 98* 93*   NEUTROS ABS Thousands/µL 12 55* 12 26*  --   --   --          Results from last 7 days   Lab Units 03/16/23  0341 03/16/23  0328 03/15/23  2131 03/15/23  0607 03/14/23  0529 03/13/23  1013 03/12/23  0907 03/12/23  0740   SODIUM mmol/L 130*  --  127* 128* 127* 128*   < > 127*   POTASSIUM mmol/L 3 5  --  4 2 3 8 3 4* 3 3*   < > 3 6   CHLORIDE mmol/L 96  --  96 97 96 97   < > 98   CO2 mmol/L 19*  --  13* 17* 18* 18*   < > 17*   ANION GAP mmol/L 15*  --  18* 14* 13 13   < > 12   BUN mg/dL 121*  --  124* 120* 112* 106*   < > 108*   CREATININE mg/dL 3 76*  --  4 32* 3 40* 2 71* 2 80*   < > 2 80*   EGFR ml/min/1 73sq m 17  --  14 19 25 24   < > 24   CALCIUM mg/dL 8 1*  --  7 4* 8 2* 8 0* 8 0*   < > 7 5*   CALCIUM, IONIZED mmol/L  --  1 02*  --   --   --   --   --   --    MAGNESIUM mg/dL  --   --   --   --  3 1* --   --   --    PHOSPHORUS mg/dL  --   --   --   --  4 9*  --   --  4 4    < > = values in this interval not displayed  Results from last 7 days   Lab Units 03/16/23  0341 03/15/23  0607 03/14/23  0529 03/13/23  1013 03/12/23  0907   AST U/L 80* 67* 53* 62* 61*   ALT U/L 22 18 16 14 14   ALK PHOS U/L 84 103 94 104 105   TOTAL PROTEIN g/dL 5 8* 6 2* 5 6* 5 8* 5 3*   ALBUMIN g/dL 4 4 5 1* 4 2 4 1 3 6   TOTAL BILIRUBIN mg/dL 5 34* 5 93* 5 23* 5 99* 6 49*   BILIRUBIN DIRECT mg/dL 2 54* 2 38*  --   --   --      Results from last 7 days   Lab Units 03/17/23  0745 03/16/23  2051 03/16/23  1555 03/16/23  1040 03/16/23  0833 03/15/23  1630 03/15/23  1153 03/15/23  1022 03/15/23  0745 03/14/23  2046 03/14/23  1658 03/14/23  1141   POC GLUCOSE mg/dl 133 116 167* 110 121 96 109 69 73 135 145* 163*     Results from last 7 days   Lab Units 03/16/23  0341 03/15/23  2131 03/15/23  0607 03/14/23  0529 03/13/23  1013 03/12/23  0907 03/12/23  0740 03/11/23  0628   GLUCOSE RANDOM mg/dL 192* 256* 97 105 162* 124 142* 115     Results from last 7 days   Lab Units 03/13/23  1013 03/12/23  0907 03/11/23  1145   PROTIME seconds 25 7* 26 9* 27 0*   INR  2 32* 2 45* 2 46*             Results from last 7 days   Lab Units 03/16/23  0553 03/16/23  0256 03/16/23  0032 03/15/23  2131   LACTIC ACID mmol/L 4 3* 5 2* 6 1* 8 8*     Results from last 7 days   Lab Units 03/11/23  1938   CLARITY UA  Turbid   COLOR UA  Yellow   SPEC GRAV UA  1 019   PH UA  5 0   GLUCOSE UA mg/dl Negative   KETONES UA mg/dl Trace*   BLOOD UA  Negative   PROTEIN UA mg/dl 30 (1+)*   NITRITE UA  Negative   BILIRUBIN UA  Small*   UROBILINOGEN UA (BE) mg/dl 2 0*   LEUKOCYTES UA  Negative   WBC UA /hpf 2-4*   RBC UA /hpf 1-2   BACTERIA UA /hpf None Seen   EPITHELIAL CELLS WET PREP /hpf Occasional   MUCUS THREADS  Occasional*     Results from last 7 days   Lab Units 03/11/23 2129 03/11/23 2128 03/11/23  1635   BLOOD CULTURE  No Growth After 5 Days  No Growth After 5 Days  --    GRAM STAIN RESULT   --   --  Rare Polys  No organisms seen   BODY FLUID CULTURE, STERILE   --   --  No growth     Results from last 7 days   Lab Units 03/11/23  1634   TOTAL COUNTED  100   WBC FLUID /ul 385             Medications:   Scheduled Medications:  chlorhexidine, 15 mL, Swish & Spit, Q12H [address]      Continuous IV Infusions:  sodium bicarbonate 150 mEq in dextrose 5 % 1,000 mL infusion  Rate: 100 mL/hr Dose: 100 mL/hr  Freq: Continuous Route: IV  Last Dose: Stopped (03/17/23 1250)  Start: 03/15/23 2215 End: 03/17/23 1241  phenylephrine (DEANGELO-SYNEPHRINE) 50 mg (STANDARD CONCENTRATION) in sodium chloride 0 9% 250 mL  Rate: 7 5-66 mL/hr Dose:  mcg/min  Freq: Titrated Route: IV  Last Dose: Stopped (03/17/23 1249)  Start: 03/15/23 2030 End: 03/17/23 1241     PRN Meds:  al [place] Hy oxide-diphenhydramine-lidocaine viscous, 10 mL, Swish & Swallow, Q4H PRN  glycopyrrolate, 0 1 mg, Intravenous, Q4H PRN  haloperidol lactate, 0 5 mg, Intravenous, Q2H PRN 03/17 x 1  HYDROmorphone, 0 3 mg, Intravenous, Q2H PRN 03/17 x 1  ondansetron, 4 mg, Intravenous, Q4H PRN  saliva substitute, 5 spray, Mouth/Throat, 4x Daily PRN  HYDROmorphone (DILAUDID) injection 0 2 mg q3h IV prn 03/17 x 2      Discharge Plan: TBD    Network Utilization Review Department  ATTENTION: Please call with any questions or concerns to [phone] and carefully listen to the prompts so that you are directed to the right person  All voicemails are confidential   Benita Sellers all requests for admission clinical reviews, approved or denied determinations and any other requests to dedicated fax number below belonging to the campus where the patient is receiving treatment   List of dedicated fax numbers for the Facilities:  [address] [place] [address] DENIALS (Administrative/Medical Necessity) [phone]   [address] [place] [address] (Maternity/NICU/Pediatrics) Seble Griffiths [address] [place] [address] [place] [phone]   [address] [facility] [address] [place] [id number] [place] [address] 28 [address] [facility] 310 [facility] [place] [address] [phone]

## 2023-03-17 NOTE — TREATMENT PLAN
Discussed with primary team, patient is now transition to comfort care, will sign off    Please call if any change in plan

## 2023-03-18 PROBLEM — Z51.5 COMFORT MEASURES ONLY STATUS: Status: ACTIVE | Noted: 2023-01-01

## 2023-03-18 NOTE — PROGRESS NOTES
[address] [facility]  Progress Note Angelique Ashby 1970, 47 y o  male MRN: [id number]  Unit/Bed#: [facility] 509-01 Encounter: [id number]  Primary Care Provider: Marquis Xiomara MD   Date and time admitted to hospital: 2023  3:04 AM    Comfort measures only status  Assessment & Plan  Patient transitioned to comfort measures only 3/17  Appreciate palliative care recommendations  Prn haldol/dilaudid  Glycopyrrolate  Not a hospice house candidate    Cholangiocarcinoma, stage IV [facility])  Assessment & Plan  Assessment:   ? Diagnosed in  s/p resection followed by immuno-chemo therapy with recurrence in  now with mets to bone, peritoneum, lungs, and liver  ? Currently on second round of pemigatinib as an outpatient under the care of Dr Anthony Zaidi  ? Tenckhoff Catheter in Place: 3/16 Drainage of 3 7L  ? May have underlying type I hepatorenal syndrome given severe DENZEL and hyponatremia on admission,   Plan:  ? CMO    ----------------------------------------------------------------------------------------  HPI/24hr events: Transitioned to comfort measures only  Family at bedside throughout the night  Patient appropriate for transfer out of the ICU today?: Patient does not meet criteria for referral to the ICU Follow-Up Clinic; referral has not been made     Disposition: Med surg  Code Status: Level 4 - Comfort Care  ---------------------------------------------------------------------------------------  SUBJECTIVE  Unable to provide  Review of Systems    ---------------------------------------------------------------------------------------  OBJECTIVE    Vitals   Vitals:    23 1405 23 1419 23 1510 23 2200   BP: (!) 68/43 (!) 73/47 (!) 79/49 (!) 76/40   Pulse: 93  89 90   Resp:       Temp:       TempSrc:       SpO2:       Weight:       Height:         Temp (24hrs), Av 1 °F (37 3 °C), Min:99 1 °F (37 3 °C), Max:99 1 °F (37 3 °C)  Current: Temperature: 99 1 °F (37 3 °C)  Arterial Line BP: 96/46  Arterial Line MAP (mmHg): (!) 64 mmHg    Respiratory:  SpO2: SpO2: 96 %  Nasal Cannula O2 Flow Rate (L/min): 2 L/min    Invasive/non-invasive ventilation settings   Respiratory    Lab Data (Last 4 hours)    None         O2/Vent Data (Last 4 hours)    None                Physical Exam  Vitals and nursing note reviewed  Constitutional:       General: He is not in acute distress  Comments: Resting comfortably, no acute distress   Skin:     Coloration: Skin is pale             Laboratory and Diagnostics:  Results from last 7 days   Lab Units 03/16/23  0331 03/15/23  2131 03/14/23  0529 03/13/23  1013 03/12/23  0907 03/11/23  0628   WBC Thousand/uL 15 88* 14 93* 7 82 9 10 9 97 8 19   HEMOGLOBIN g/dL 9 2* 9 2* 9 1* 9 6* 9 8* 10 3*   HEMATOCRIT % 26 3* 26 2* 25 7* 27 4* 28 6* 29 1*   PLATELETS Thousands/uL 102* 81* 88* 98* 93* 94*   NEUTROS PCT % 79* 82*  --   --   --   --    MONOS PCT % 13* 11  --   --   --   --      Results from last 7 days   Lab Units 03/16/23  0341 03/15/23  2131 03/15/23  0607 03/14/23  0529 03/13/23  1013 03/12/23  0907 03/12/23  0740 03/11/23  0628   SODIUM mmol/L 130* 127* 128* 127* 128* 129* 127* 129*   POTASSIUM mmol/L 3 5 4 2 3 8 3 4* 3 3* 3 7 3 6 3 8   CHLORIDE mmol/L 96 96 97 96 97 98 98 98   CO2 mmol/L 19* 13* 17* 18* 18* 18* 17* 18*   ANION GAP mmol/L 15* 18* 14* 13 13 13 12 13   BUN mg/dL 121* 124* 120* 112* 106* 108* 108* 104*   CREATININE mg/dL 3 76* 4 32* 3 40* 2 71* 2 80* 2 76* 2 80* 2 38*   CALCIUM mg/dL 8 1* 7 4* 8 2* 8 0* 8 0* 7 6* 7 5* 7 8*   GLUCOSE RANDOM mg/dL 192* 256* 97 105 162* 124 142* 115   ALT U/L 22  --  18 16 14 14  --  16   AST U/L 80*  --  67* 53* 62* 61*  --  65*   ALK PHOS U/L 84  --  103 94 104 105  --  103   ALBUMIN g/dL 4 4  --  5 1* 4 2 4 1 3 6 3 6 3 9   TOTAL BILIRUBIN mg/dL 5 34*  --  5 93* 5 23* 5 99* 6 49*  --  5 41*     Results from last 7 days   Lab Units 03/14/23  0529 03/12/23  0740   MAGNESIUM mg/dL 3 1*  -- PHOSPHORUS mg/dL 4 9* 4 4      Results from last 7 days   Lab Units 03/13/23  1013 03/12/23  0907 03/11/23  1145   INR  2 32* 2 45* 2 46*          Results from last 7 days   Lab Units 03/16/23  0553 03/16/23  0256 03/16/23  0032 03/15/23  2131   LACTIC ACID mmol/L 4 3* 5 2* 6 1* 8 8*     ABG:    VBG:          Micro  Results from last 7 days   Lab Units 03/11/23  2129 03/11/23  2128 03/11/23  1635   BLOOD CULTURE  No Growth After 5 Days  No Growth After 5 Days  --    GRAM STAIN RESULT   --   --  Rare Polys  No organisms seen   BODY FLUID CULTURE, STERILE   --   --  No growth       Intake and Output  I/O       03/16 0701 03/17 0700 03/17 0701 03/18 0700    P  O  118 355    I V  (mL/kg) 3884 5 (40 9) 1333 3 (14)    IV Piggyback 550     Total Intake(mL/kg) 4552 5 (47 9) 1688 3 (17 8)    Urine (mL/kg/hr) 1525 (0 7) 300 (0 1)    Drains 0     Total Output 1525 300    Net +3027 5 +1388 3                Height and Weights   Height: 6' (182 9 cm)  IBW (Ideal Body Weight): 77 6 kg  Body mass index is 28 4 kg/m²  Weight (last 2 days)     Date/Time Weight    03/17/23 0500 95 (209 4)    03/16/23 0548 98 4 (217)            Nutrition       Diet Orders   (From admission, onward)             Start     Ordered    03/17/23 1241  Diet Regular; Pleasure Feed  Diet effective now        References:    Nutrtion Support Algorithm Enteral vs  Parenteral   Question Answer Comment   Diet Type Regular    Regular Pleasure Feed    RD to adjust diet per protocol?  No        03/17/23 1241    03/09/23 1418  Dietary nutrition supplements  Once        Question Answer Comment   Select Supplement: Ensure Plus High Protein Vanilla    Frequency Breakfast        03/09/23 1417                  Active Medications  Scheduled Meds:  Current Facility-Administered Medications   Medication Dose Route Frequency Provider Last Rate   • al mag oxide-diphenhydramine-lidocaine viscous  10 mL Swish & Swallow Q4H PRN Yael Ruiz MD     • chlorhexidine  15 mL Swish & Spit Q12H [facility] Lalit Peterson MD     • glycopyrrolate  0 1 mg Intravenous Q4H PRN April D HARINI Jamison     • haloperidol lactate  0 5 mg Intravenous Q2H PRN April D HARINI Jamison     • HYDROmorphone  0 3 mg Intravenous Q2H PRN April D HARINI Jamison     • ondansetron  4 mg Intravenous Q4H PRN April D HARINI Jamison     • saliva substitute  5 spray Mouth/Throat 4x Daily PRN Lalit Peterson MD       Continuous Infusions:     PRN Meds:   al mag oxide-diphenhydramine-lidocaine viscous, 10 mL, Q4H PRN  glycopyrrolate, 0 1 mg, Q4H PRN  haloperidol lactate, 0 5 mg, Q2H PRN  HYDROmorphone, 0 3 mg, Q2H PRN  ondansetron, 4 mg, Q4H PRN  saliva substitute, 5 spray, 4x Daily PRN        Invasive Devices Review  Invasive Devices     Central Venous Catheter Line  Duration           Port A Cath Right Subclavian -- days          Peripheral Intravenous Line  Duration           Peripheral IV 03/15/23 Left Forearm 2 days    Peripheral IV 03/15/23 Right Forearm 2 days          Drain  Duration           Urethral Catheter 18 Fr  5 days    Peritoneal Ascites Long-Term Catheter 14 Fr  2 days              ---------------------------------------------------------------------------------------  Advance Directive and Living Will:      Power of :    POLST:    ---------------------------------------------------------------------------------------  Care Time Delivered:   No Critical Care time spent       [facility], PA-C

## 2023-03-18 NOTE — NURSING NOTE
Wife at bedside  Offered to reposition patient, wife declining at this time  Offered emotional support  Will continue to check on patient and family frequently

## 2023-03-18 NOTE — ASSESSMENT & PLAN NOTE
Patient transitioned to comfort measures only 3/17  Appreciate palliative care recommendations  Prn haldol/dilaudid  Glycopyrrolate  Not a hospice house candidate

## 2023-03-18 NOTE — NURSING NOTE
on call made aware of patients request to speak  States will be up after situation shes currently handling

## 2023-03-18 NOTE — PROGRESS NOTES
Family in room with pt, at this time pt seemed a bit restless, medicated with dilaudid at family request and will continue to assess, will try alternating dilaudid and haldol hourly for now and assess how pt does with that

## 2023-03-18 NOTE — PROGRESS NOTES
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)   • Thrombocytopenia (HCC)   • Hypomagnesemia   • BMI 35 0-35 9,adult   • Vitamin D deficiency   • Malignant ascites   • Mesenteric thrombosis (HCC)   • DENZEL (acute kidney injury) ([facility] [address] )   • Metabolic acidosis   • Hyperglycemia   • Elevated INR   • Hepatic encephalopathy   • Goals of care, counseling/discussion   • Bilateral lower extremity edema   • Hypotension   • Comfort measures only status     Active issues specifically addressed today include: end of life care, comfort care, hospice services, agitation, stage IV cholangiocarcinoma w/ lung metastasis, lytic bone lesion to hip, cancer-related pain, pressure injury    PLAN:    1  Goals:   • Continue comfort care  Family goal has been for hospice in the home but patient has so far not been stable for transport  2  Social Support:  • Supportive listening provided  • Provided anxiety containment    3  Symptom management:  • Continue current effective comfort regimen  o Continue repositioning as appropriate to minimize discomfort from pressure injury  o Oral care  o Glycopyrrolate IV PRN  o Haloperidol 0 5mg IV q2n PRN  o Hydromorphone 0 3,mg IV q2h PRN  o Zofran PRN  o Non-invasive wound care as appropriate    Code status: Level 4 - Comfort Care   Decisional apparatus:  Patient does not have capacity to make medical decisions on my exam today   If such capacity is lost, patient's substitute decision maker would default to spouse by [place]  Advance Directive / Living Will / POLST:  Nothing on file  We appreciate the opportunity to participate in this patient's care  We will continue to follow  Please do not hesitate to contact our on-call provider through our clinic answering service at [phone] should you have acute symptom control concerns  INTERVAL HISTORY:    Patient seen and examined at bedside, family present (wife, parents, brother)  Family requests that monitors be continued as they feel this helps them understand how the patient is doing; wife in particular finds the monitors to be reassuring  Counseling and supportive listening provided  Patient required multiple doses of haloperidol overnight for agitation (4 in the past 24 hours), and 4 doses of hydromorphone 0 2mg IV for discomfort  Since he was repositioned and pressure on sensitive area has been alleviated, patient has been more comfortable (per the family and per the RN)      Review of Systems   Unable to perform ROS: Mental status change       MEDICATIONS / ALLERGIES:  all current active meds have been reviewed and current meds:   Current Facility-Administered Medications   Medication Dose Route Frequency   • al mag oxide-diphenhydramine-lidocaine viscous (MAGIC MOUTHWASH) suspension 10 mL  10 mL Swish & Swallow Q4H PRN   • chlorhexidine (PERIDEX) 0 12 % oral rinse 15 mL  15 mL Swish & Spit Q12H [facility]   • glycopyrrolate (ROBINUL) injection 0 1 mg  0 1 mg Intravenous Q4H PRN   • haloperidol lactate (HALDOL) injection 0 5 mg  0 5 mg Intravenous Q2H PRN   • HYDROmorphone (DILAUDID) injection 0 3 mg  0 3 mg Intravenous Q2H PRN   • ondansetron (ZOFRAN) injection 4 mg  4 mg Intravenous Q4H PRN   • saliva substitute (MOUTH KOTE) mucosal solution 5 spray  5 spray Mouth/Throat 4x Daily PRN       Allergies   Allergen Reactions   • Latex Swelling     If long term usage/application   • Penicillins Other (See Comments) and Rash     As a child had reaction not sure what it was  OBJECTIVE:  BP (!) 76/40   Pulse 90   Temp 99 1 °F (37 3 °C) (Oral)   Resp 16   Ht 6' (1 829 m)   Wt 95 kg (209 lb 6 4 oz)   SpO2 96%   BMI 28 40 kg/m²   Nursing notes reviewed  Physical Exam  Vitals reviewed  Constitutional:       General: He is sleeping  He is not in acute distress  Appearance: He is ill-appearing  Comments: Patient did not rouse to vocal stimuli  HENT:      Head: Normocephalic and atraumatic  Right Ear: External ear normal       Left Ear: External ear normal    Eyes:      General:         Right eye: No discharge  Left eye: No discharge  Comments: Eyes remained closed  Cardiovascular:      Rate and Rhythm: Normal rate  Pulmonary:      Effort: Pulmonary effort is normal  No tachypnea, bradypnea, accessory muscle usage or respiratory distress  Abdominal:      General: There is no distension  Musculoskeletal:      Right lower leg: Edema present  Left lower leg: Edema present  Skin:     General: Skin is dry  Coloration: Skin is jaundiced  Neurological:      Mental Status: He is lethargic  Psychiatric:         Attention and Perception: He is inattentive  Speech: He is noncommunicative  Behavior: Behavior is not agitated, aggressive or combative  Comments: Unable to fully assess  Lab Results: I have personally reviewed pertinent labs      HEMATOLOGY PROFILE:  Results from last 7 days   Lab Units 03/16/23  0331 03/15/23  2131 03/14/23  0529   WBC Thousand/uL 15 88* 14 93* 7 82   HEMOGLOBIN g/dL 9 2* 9 2* 9 1*   HEMATOCRIT % 26 3* 26 2* 25 7*   PLATELETS Thousands/uL 102* 81* 88*   NEUTROS PCT % 79* 82*  --    MONOS PCT % 13* 11  --        CHEMISTRY PROFILE:  Results from last 7 days   Lab Units 03/16/23  0341 03/15/23  2131 03/15/23  0607 03/14/23  0529   SODIUM mmol/L 130* 127* 128* 127*   POTASSIUM mmol/L 3 5 4 2 3 8 3 4* CHLORIDE mmol/L 96 96 97 96   CO2 mmol/L 19* 13* 17* 18*   BUN mg/dL 121* 124* 120* 112*   CREATININE mg/dL 3 76* 4 32* 3 40* 2 71*   ALBUMIN g/dL 4 4  --  5 1* 4 2   CALCIUM mg/dL 8 1* 7 4* 8 2* 8 0*   ALK PHOS U/L 84  --  103 94   ALT U/L 22  --  18 16   AST U/L 80*  --  67* 53*       Albumin:  0   Lab Value Date/Time    ALB 4 4 03/16/2023 0341     Imaging Studies: I have personally reviewed pertinent reports  EKG, Pathology, and Other Studies: I have personally reviewed pertinent reports  Counseling / Coordination of Care: Total floor / unit time spent today 25 minutes  Greater than 50% of total time was spent with the patient and / or family counseling and / or coordination of care  A description of the counseling / coordination of care: symptom assessment and management, medication review, psychosocial support, chart review, imaging review, lab review, advanced directives, goals of care, hospice services, supportive listening and anticipatory guidance  Joey Santos MD  [facility] Palliative and Supportive Care  [phone]    Portions of this document may have been created using dictation software and as such some "sound alike" terms may have been generated by the system  Do not hesitate to contact me with any questions or clarifications

## 2023-03-18 NOTE — PROGRESS NOTES
Patient is comfort care at this time  Patient was repositioned, bathed, and was given PRN pain medication  Patients family states that patient is comfortable at this time

## 2023-03-18 NOTE — PROGRESS NOTES
[address] [facility]  Transfer  Note Lorie Ashby 1970, 47 y o  male MRN: [id number]  Unit/Bed#: [facility] 509-01 Encounter: [id number]  Primary Care Provider: Viv Hall MD   Date and time admitted to hospital: 2/20/2023  3:04 AM    Comfort measures only status  Assessment & Plan  Patient transitioned to comfort measures only 3/17  Appreciate palliative care recommendations  Prn haldol/dilaudid  Glycopyrrolate  Not a hospice house candidate         Code Status: Level 4 - Comfort Care  POA:    POLST:      Reason for ICU admission:   Septic shock    Active problems:   Active Problems:    Hepatic encephalopathy    Comfort measures only status    Hypotension    Cholangiocarcinoma, stage IV (HCC)    Mesenteric thrombosis (HCC)    DENZEL (acute kidney injury) ([address] )    Hyponatremia    GERD (gastroesophageal reflux disease)    Thrombocytopenia (HCC)    Malignant ascites    Metabolic acidosis    Hyperglycemia  Resolved Problems:    Septic shock [facility])      Consultants:   Nephrology  Palliative Care  Oncology Hematology  ID  GI    History of Present Illness:   Patient is a 43-year-old woman with a 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, and partial glossectomy; cholangiocarcinoma diagnosed in 2020 with metastatic disease to bone, liver, lungs, with associated cirrhosis requiring frequent paracentesis in addition to portal venous system thrombosis  The patient presented to the emergency department on February 20 with fatigue, nausea, vomiting, and odynophagia  The patient was admitted to the medical critical care unit in the setting of acidosis, acute kidney injury and hyperkalemia  Concern for type I hepatorenal syndrome and the patient was initiated on triple therapy with octreotide albumin and midodrine  The patient received a course of broad-spectrum antibiotics, fluid required fluid resuscitation and to vasopressors    Patient had 2 paracentesis while in the ICU  He was subsequently weaned off of vasopressors and transferred out to the hospitalist service on February 27th  Patient continued to be treated with midodrine, IV albumin, and serial paracentesis  Hematology was consulted with goals of care discussion and treatment options were discussed the patient was also supported by the palliative care service and expressed interest that he would like to be able to pursue active outpatient treatment plans  Infectious disease was consulted on March 10 secondary to black bilateral leg erythema  The patient was monitored off antibiotics  Patient with worsening of encephalopathy on March 11 and 12, lactulose dosing increased, rifaximin added, and infectious etiologies ruled out  In preparation for a Tenkoff procedure on 3/15 the patient was made NPO and underwent placement of Tenkoff catheter by IR  In the evening of 3/15 the patient became more hypotensive with worsening DENZEL, and acidosis, thus the patient was transferred back to ICU where he was fluid resuscitated and had additional goals of care discussion  The patient's code status was transitioned to a level 3 through advanced are planning discussions with Dr Itz Rodas and communication of deterioration in multisystem organ system  The patient expressed understanding that CPR and and mechanical ventilation based on his advanced disease and knowledge that these would not help him achieve his goal of "walking our of the hospital"  The patient transitioned his goals of care to comfort on 3/17 and PLS continues to follow      Summary of clinical course:   2/22 paracentesis  2/26 paracentesis  3/3 paracentesis  3/7 paracentesis  3/11 paracentesis  3/13 paracentesis        Invasive Devices Review  Invasive Devices     Central Venous Catheter Line  Duration           Port A Cath Right Subclavian -- days          Peripheral Intravenous Line  Duration           Peripheral IV 03/15/23 Right Forearm 2 days          Drain  Duration           Urethral Catheter 18 Fr  6 days    Peritoneal Ascites Long-Term Catheter 14 Fr  3 days                Rationale for remaining devices: IV access      Discharge Plan:   Patient should be ready for discharge TBD if patient becomes a candidate for home hospice      Home medications that are not reordered and reason why:       Dr Adelita Kam update via TT  regarding transfer  Please contact critical care via [employer] with any questions or concerns  Portions of the record may have been created with voice recognition software  Occasional wrong word or "sound a like" substitutions may have occurred due to the inherent limitations of voice recognition software  Read the chart carefully and recognize, using context, where substitutions have occurred

## 2023-03-18 NOTE — ASSESSMENT & PLAN NOTE
Assessment:   ? Diagnosed in 2020 s/p resection followed by immuno-chemo therapy with recurrence in 2022 now with mets to bone, peritoneum, lungs, and liver  ? Currently on second round of pemigatinib as an outpatient under the care of Dr Uche Kaplan  ? Tenckhoff Catheter in Place: 3/16 Drainage of 3 7L  ? May have underlying type I hepatorenal syndrome given severe DENZEL and hyponatremia on admission,   Plan:  ?  [facility]

## 2023-03-19 NOTE — PROGRESS NOTES
Pastoral Care Progress Note    3/18/2023  Patient: Suhail Vela : 1970  Admission Date & Time: 2023 0304  MRN: [id number] [facility]: [id number]                     Chaplaincy Interventions Utilized:   Empowerment: Encouraged self-care and Facilitated group experience    Exploration: Explored hope and Facilitated expression of regret    Relationship Building: Cultivated a relationship of care and support and Listened empathically    Ritual: Provided prayer    Chaplaincy Outcomes Achieved:  Distress reduced, Expressed intermediate hope, Expressed ultimate hope, and Progressed toward acceptance    Spiritual Coping Strategies Utilized:   Connectedness, Spiritual comfort, and Positive spiritual reframing    Blessed visit with Patricio Larson and his [address], who is struggling because she feels like she shouldn't go home  Patricio Larson has been sick for some time, but just recently stopped communicating  His family has had many losses which they are trying to process, and understand God's part in it  Christlourdeser's mother and father were also present, and his brother  His brother's wife also   We talked for quite a while, and prayed

## 2023-03-19 NOTE — PROGRESS NOTES
Patient is resting comfortably at this time  Family states patient looks comfortable and does not need to be repositioned yet  Will check back

## 2023-03-19 NOTE — DISCHARGE SUMMARY
Discharge Summary - [facility] Internal Medicine    Patient Information: Mauricio Ashby 47 y o  male MRN: [id number]  Unit/Bed#: [facility] 509-01 Encounter: [id number]    Discharging Physician / Practitioner: Obey Camacho MD  PCP: Bryan Glasgow MD  Admission Date: 2/20/2023  Discharge Date: 03/19/23    Disposition:     Other: Inpatient hospice unit     Reason for Admission: Abdominal pain nausea dehydration    Discharge Diagnoses:     Principal Problem:    Cholangiocarcinoma, stage IV ([facility] )  Active Problems:    Hyponatremia    GERD (gastroesophageal reflux disease)    Thrombocytopenia (HCC)    Malignant ascites    Mesenteric thrombosis ([facility] )    DENZEL (acute kidney injury) ([facility] )    Metabolic acidosis    Hyperglycemia    Hepatic encephalopathy    Hypotension    Comfort measures only status  Resolved Problems:    Septic shock ([facility] )      Consultations During Hospital Stay:  · GI  · Factious disease  · Oncology  · Nephrology    Procedures Performed:     · CT abdomen pelvis peritoneal carcinomatosis, metastatic disease dominant including hepatic lesion  · Chest x-ray no active lung disease  · Abdominal paracentesis      Hospital Course:     Yessenia Norris is a 47 y o  male patient who originally presented to the hospital on 2/20/2023 due to abdominal pain nausea  Patient was initially admitted to the ICU  He has history of metastatic bladder carcinoma  He had a lengthy complicated stay in the hospital, he was noted to have acute renal failure was managed by nephrology  He underwent multiple paracenteses for his abdominal ascites  Overall given his metastatic disease renal failure encephalopathy poor quality of life -discussed by oncology with patient and family and he opted for comfort care status  He was transitioned to the floor from the ICU  He has been accepted to inpatient hospice unit, arrangements underway for transfer to inpatient hospice unit    Kindly review the chart for details  Condition at Discharge: To inpatient hospice unit     Discharge Day Visit / Exam:     * Please refer to separate progress note for these details *    Discussion with Family:     Discussed with family in detail they are agreeable to plan as outlined      Discharge instructions/Information to patient and family:   See after visit summary for information provided to patient and family  Provisions for Follow-Up Care:  See after visit summary for information related to follow-up care and any pertinent home health orders  Planned Readmission: no    Discharge Statement:  I spent 45 minutes discharging the patient  This time was spent on the day of discharge  I had direct contact with the patient on the day of discharge  Greater than 50% of the total time was spent examining patient, answering all patient questions, arranging and discussing plan of care with patient as well as directly providing post-discharge instructions  Additional time then spent on discharge activities  Discharge Medications:  See after visit summary for reconciled discharge medications provided to patient and family  ** Please Note: This note has been constructed using a voice recognition system   **

## 2023-03-19 NOTE — PROGRESS NOTES
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)   • Thrombocytopenia (HCC)   • Hypomagnesemia   • BMI 35 0-35 9,adult   • Vitamin D deficiency   • Malignant ascites   • Mesenteric thrombosis (HCC)   • DENZEL (acute kidney injury) ([facility] )   • Metabolic acidosis   • Hyperglycemia   • Elevated INR   • Hepatic encephalopathy   • Goals of care, counseling/discussion   • Bilateral lower extremity edema   • Hypotension   • Comfort measures only status     Active issues specifically addressed today include: comfort care, end of life care, stage IV cholangiocarcinoma, agitation, anxiety, lung metastasis, psychosocial support, pressure injury, cancer-related pain    PLAN:    1  Goals:   • Continue comfort cares  Patient so far has not been appropriately stable for transport home for home hospice  2  Social Support:  • Supportive listening provided  • Normalized experience of patient/family  • Provided anxiety containment  • Engaged in life review w/ family  • [place] support    3   Symptom management:  • Continue current comfort regimen   o Oral care  o Glycopyrrolate IV PRN  o Haloperidol 0 5mg IV q2n PRN  o Hydromorphone 0 3,mg IV q2h PRN  o Zofran PRN  o Non-invasive wound care as appropriate  o Repositioning as appropriate to minimize pressure injury discomfort    Code status: Level 4 - Comfort Care   Decisional apparatus:  Patient does not have capacity to make medical decisions on my exam today  If such capacity is lost, patient's substitute decision maker would default to spouse by [place]  Advance Directive / Living Will / POLST:  Nothing on file  We appreciate the opportunity to participate in this patient's care  We will continue to follow  Please do not hesitate to contact our on-call provider through our clinic answering service at [phone] should you have acute symptom control concerns  INTERVAL HISTORY:    PSC contacted by [facility] yesterday evening as patient's wife Yasemin Vogt was experiencing some emotional distress at the patient's current condition  She has been having notable anxiety and has been hesitant to leave the patient's side  Appreciate Pastoral Care's support  At time of visit today, Yasemin Vogt had gone home to get some much-needed rest; patient's mother, father, and brother at bedside  Patient noted to be supine in bed, eyes open but no iris or pupil visible, eyes not tracking persons in room, slightly restless  Family states they have noted some moaning and restlessness, which response appropriately to PRN medications (though they have a shorter duration than before)  He received 1 dose of haloperidol overnight, 5 dose of hydromorphone in prior 24 hours  Patient's mother expresses grief and sadness at her son's condition, and seems to get some relief by sharing memories and some of patient's history      Review of Systems   Unable to perform ROS: Mental status change     MEDICATIONS / ALLERGIES:  all current active meds have been reviewed and current meds:   Current Facility-Administered Medications   Medication Dose Route Frequency   • al mag oxide-diphenhydramine-lidocaine viscous (MAGIC MOUTHWASH) suspension 10 mL  10 mL Swish & Swallow Q4H PRN   • chlorhexidine (PERIDEX) 0 12 % oral rinse 15 mL  15 mL Swish & Spit Q12H [address]   • glycopyrrolate (ROBINUL) injection 0 1 mg  0 1 mg Intravenous Q4H PRN   • haloperidol lactate (HALDOL) injection 0 5 mg  0 5 mg Intravenous Q2H PRN   • HYDROmorphone (DILAUDID) injection 0 3 mg  0 3 mg Intravenous Q2H PRN   • ondansetron (ZOFRAN) injection 4 mg  4 mg Intravenous Q4H PRN   • saliva substitute (MOUTH KOTE) mucosal solution 5 spray  5 spray Mouth/Throat 4x Daily PRN       Allergies   Allergen Reactions   • Latex Swelling     If long term usage/application   • Penicillins Other (See Comments) and Rash     As a child had reaction not sure what it was  OBJECTIVE:  BP (!) 71/34 (BP Location: Left arm)   Pulse 93   Temp 97 8 °F (36 6 °C) (Axillary)   Resp (!) 10   Ht 6' (1 829 m)   Wt 95 kg (209 lb 6 4 oz)   SpO2 95%   BMI 28 40 kg/m²   Nursing notes reviewed  Physical Exam  Vitals reviewed  Constitutional:       General: He is sleeping  He is not in acute distress  Appearance: He is overweight  He is ill-appearing  HENT:      Head: Normocephalic and atraumatic  Right Ear: External ear normal       Left Ear: External ear normal    Eyes:      General:         Right eye: No discharge  Left eye: No discharge  Comments: Eyes open bilaterally, not tracking people, neither iris/pupil visible  Pulmonary:      Effort: Pulmonary effort is normal  No tachypnea, bradypnea, accessory muscle usage or respiratory distress  Musculoskeletal:      Right lower leg: Edema present  Left lower leg: Edema present  Skin:     General: Skin is warm and dry  Coloration: Skin is jaundiced  Comments: Dusky color noted in b/l LE  Neurological:      Mental Status: He is lethargic  Cranial Nerves: No facial asymmetry  Psychiatric:         Attention and Perception: He is inattentive  Speech: He is noncommunicative  Behavior: Behavior is not agitated or aggressive  Comments: Unable to fully assess  Lab Results: I have personally reviewed pertinent labs      HEMATOLOGY PROFILE:  Results from last 7 days   Lab Units 03/16/23  3313 03/15/23  2131 03/14/23  0529   WBC Thousand/uL 15 88* 14 93* 7 82   HEMOGLOBIN g/dL 9 2* 9 2* 9 1*   HEMATOCRIT % 26 3* 26 2* 25 7*   PLATELETS Thousands/uL 102* 81* 88*   NEUTROS PCT % 79* 82*  --    MONOS PCT % 13* 11  --        CHEMISTRY PROFILE:  Results from last 7 days   Lab Units 03/16/23  0341 03/15/23  2131 03/15/23  0607 03/14/23  0529   SODIUM mmol/L 130* 127* 128* 127*   POTASSIUM mmol/L 3 5 4 2 3 8 3 4*   CHLORIDE mmol/L 96 96 97 96   CO2 mmol/L 19* 13* 17* 18*   BUN mg/dL 121* 124* 120* 112*   CREATININE mg/dL 3 76* 4 32* 3 40* 2 71*   ALBUMIN g/dL 4 4  --  5 1* 4 2   CALCIUM mg/dL 8 1* 7 4* 8 2* 8 0*   ALK PHOS U/L 84  --  103 94   ALT U/L 22  --  18 16   AST U/L 80*  --  67* 53*       Albumin:  0   Lab Value Date/Time    ALB 4 4 03/16/2023 0341     Imaging Studies: I have personally reviewed pertinent reports  EKG, Pathology, and Other Studies: I have personally reviewed pertinent reports  Counseling / Coordination of Care: Total floor / unit time spent today 45 minutes  Greater than 50% of total time was spent with the patient and / or family counseling and / or coordination of care  A description of the counseling / coordination of care: symptom assessment and management, medication review, psychosocial support, chart review, imaging review, lab review, goals of care, hospice services, supportive listening, life review, and anticipatory guidance  Joe Beth MD  [facility] Palliative and Supportive Care  [phone]    Portions of this document may have been created using dictation software and as such some "sound alike" terms may have been generated by the system  Do not hesitate to contact me with any questions or clarifications  This note was not shared with the patient due to privacy exception: note includes other individuals

## 2023-03-19 NOTE — CASE MANAGEMENT
Case Management Discharge Planning Note    Patient name Leonardo Stock  Location [address] 509/PPHP 674-66 MRN [id number]  : 1970 Date 3/19/2023       Current Admission Date: 2023  Current Admission Diagnosis:Cholangiocarcinoma, stage IV [facility])   Patient Active Problem List    Diagnosis Date Noted   • Comfort measures only status 2023   • Hypotension 03/15/2023   • Bilateral lower extremity edema 2023   • Goals of care, counseling/discussion 2023   • Mesenteric thrombosis ([facility] ) 2023   • DENZEL (acute kidney injury) ([facility] )    • Metabolic acidosis    • Hyperglycemia 2023   • Elevated INR 2023   • Hepatic encephalopathy 2023   • Malignant ascites 2023   • Pulmonary nodules 2023   • Malignant neoplasm metastatic to both lungs ([facility] ) 2023   • 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 ([facility] ) 2023   • Thrombocytopenia ([facility] ) 2023   • Hypomagnesemia 2023   • BMI 35 0-35 9,adult 2023   • Vitamin D deficiency 2023   • GERD (gastroesophageal reflux disease) 2023   • Nausea and vomiting 2023   • Ascites 2023   • Lytic bone lesion of hip 2023   • Hyponatremia 2023   • Cholangiocarcinoma, stage IV ([facility] ) 2020   • Tobacco use disorder 2019   • Spinal accessory neuropathy 2018   • History of tongue cancer 2018   • Squamous cell carcinoma of tongue ([facility] ) 2018      LOS (days): 27  Geometric Mean LOS (GMLOS) (days): 5 00  Days to GMLOS:-22 4     OBJECTIVE:  Risk of Unplanned Readmission Score: 31 67         Current admission status: Inpatient   Preferred Pharmacy:   [address] [place] [address] #[id number] [address] Berenice NORTON  [address] [address]  Phone: [phone] Fax: [phone]    Primary Care Provider: Mann Alexandre MD    Primary Insurance: BLUE CROSS  Secondary Insurance:     DISCHARGE DETAILS:    Hospice liaison will evaluate pt for IPU per attending request

## 2023-03-19 NOTE — NURSING NOTE
Unable to send over inter-facility transfer after visit summary d/t no facility contact information in the chart  Tiger texted [facility] requesting information and/or who to get it from

## 2023-03-19 NOTE — QUICK NOTE
Discussed with spouse and family, overall guarded prognosis explained  Discussed with spouse and reviewed lab results imaging studies on epic  She verbalized understanding of metastatic extensive spread of the disease, his worsening clinical condition    She expressed understanding of his poor prognosis    Discussed with nursing supervisor on the floor  Hospice liaison follow-up awaited    Jyoti Lancaster

## 2023-03-19 NOTE — PROGRESS NOTES
[facility]  Progress Note Mendoza Stable Symone 1970, 47 y o  male MRN: [id number]  Unit/Bed#: [facility] 509-01 Encounter: [id number]  Primary Care Provider: King Rachel MD   Date and time admitted to hospital: 2023  3:04 AM    Cholangiocarcinoma, stage IV [facility])  Assessment & Plan  · Patient with metastatic cholangiocarcinoma  · Discussed with patient overall guarded prognosis  · Oncology notes noted  · Patient presently on comfort care status      Comfort measures only status  Assessment & Plan  Comfort care status  Hospice following    Hepatic encephalopathy  Assessment & Plan  · Patient with extensive hepatic metastatic disease  · Comfort care status              VTE Pharmacologic Prophylaxis: VTE Score: 8 Comfort care status    Patient Centered Rounds: I performed bedside rounds with nursing staff today  Discussions with Specialists or Other Care Team Provider: Hospice nurse    Education and Discussions with Family / Patient: Discussed with family at bedside  Total Time Spent on Date of Encounter in care of patient: 35 minutes This time was spent on one or more of the following: performing physical exam; counseling and coordination of care; obtaining or reviewing history; documenting in the medical record; reviewing/ordering tests, medications or procedures; communicating with other healthcare professionals and discussing with patient's family/caregivers      Current Length of Stay: 27 day(s)  Current Patient Status: Inpatient   Certification Statement: The patient will continue to require additional inpatient hospital stay due to As outlined  Discharge Plan: Comfort care status, hospice following, case management following    Code Status: Level 4 - Comfort Care    Subjective:     Comfortably in bed  History chart labs medications reviewed  Discussed with family at bedside    Objective:     Vitals:   Temp (24hrs), Av 8 °F (36 6 °C), Min:97 8 °F (36 6 °C), Max:97 8 °F (36 6 °C)    Temp:  [97 8 °F (36 6 °C)] 97 8 °F (36 6 °C)  HR:  [93-94] 93  Resp:  [10] 10  BP: (71-76)/(34-43) 71/34  SpO2:  [94 %-95 %] 95 %  Body mass index is 28 4 kg/m²  Input and Output Summary (last 24 hours):      Intake/Output Summary (Last 24 hours) at 3/19/2023 1343  Last data filed at 3/19/2023 1223  Gross per 24 hour   Intake 0 ml   Output 0 ml   Net 0 ml       Physical Exam:   Physical Exam     Comfortably in bed  Neck supple  Lungs diminished breath sounds bilaterally  Heart sounds S1-S2 noted  Abdomen soft  Abdominal distended  Ascites noted  Encephalopathy noted  Pedal edema noted      Additional Data:     Labs:  Results from last 7 days   Lab Units 03/16/23  0331   WBC Thousand/uL 15 88*   HEMOGLOBIN g/dL 9 2*   HEMATOCRIT % 26 3*   PLATELETS Thousands/uL 102*   NEUTROS PCT % 79*   LYMPHS PCT % 7*   MONOS PCT % 13*   EOS PCT % 1     Results from last 7 days   Lab Units 03/16/23  0341   SODIUM mmol/L 130*   POTASSIUM mmol/L 3 5   CHLORIDE mmol/L 96   CO2 mmol/L 19*   BUN mg/dL 121*   CREATININE mg/dL 3 76*   ANION GAP mmol/L 15*   CALCIUM mg/dL 8 1*   ALBUMIN g/dL 4 4   TOTAL BILIRUBIN mg/dL 5 34*   ALK PHOS U/L 84   ALT U/L 22   AST U/L 80*   GLUCOSE RANDOM mg/dL 192*     Results from last 7 days   Lab Units 03/13/23  1013   INR  2 32*     Results from last 7 days   Lab Units 03/17/23  0745 03/16/23  2051 03/16/23  1555 03/16/23  1040 03/16/23  0833 03/15/23  1630 03/15/23  1153 03/15/23  1022 03/15/23  0745 03/14/23  2046 03/14/23  1658 03/14/23  1141   POC GLUCOSE mg/dl 133 116 167* 110 121 96 109 69 73 135 145* 163*         Results from last 7 days   Lab Units 03/16/23  0553 03/16/23  0256 03/16/23  0032 03/15/23  2131   LACTIC ACID mmol/L 4 3* 5 2* 6 1* 8 8*       Lines/Drains:  Invasive Devices     Central Venous Catheter Line  Duration           Port A Cath Right Subclavian -- days          Peripheral Intravenous Line  Duration           Peripheral IV 03/15/23 Right Forearm 3 days          Drain  Duration           Urethral Catheter 18 Fr  7 days    Peritoneal Ascites Long-Term Catheter 14 Fr  3 days              Urinary Catheter:  Goal for removal: Comfort care status                 Imaging: Reviewed radiology reports from this admission including: abdominal/pelvic CT    Recent Cultures (last 7 days):         Last 24 Hours Medication List:   Current Facility-Administered Medications   Medication Dose Route Frequency Provider Last Rate   • al mag oxide-diphenhydramine-lidocaine viscous  10 mL Swish & Swallow Q4H PRN Darien Art MD     • chlorhexidine  15 mL Swish & Spit Q12H [facility] Darien Art MD     • glycopyrrolate  0 1 mg Intravenous Q4H PRN April D HARINI Jamison     • haloperidol lactate  0 5 mg Intravenous Q2H PRN April D HARINI Jamison     • HYDROmorphone  0 3 mg Intravenous Q2H PRN April D HARINI Jamison     • ondansetron  4 mg Intravenous Q4H PRN April D HARINI Jamison     • saliva substitute  5 spray Mouth/Throat 4x Daily PRN Darien Art MD          Today, Patient Was Seen By: Arnold Art MD    **Please Note: This note may have been constructed using a voice recognition system  **

## 2023-03-19 NOTE — CASE MANAGEMENT
Case Management Discharge Planning Note    Patient name Tamie Ulricher  [facility] [address] [facility] [id number]  : 1970 Date 3/19/2023       Current Admission Date: 2023  Current Admission Diagnosis:Cholangiocarcinoma, stage IV [facility])   Patient Active Problem List    Diagnosis Date Noted   • Comfort measures only status 2023   • Hypotension 03/15/2023   • Bilateral lower extremity edema 2023   • Goals of care, counseling/discussion 2023   • Mesenteric thrombosis ([facility] ) 2023   • DENZEL (acute kidney injury) ([facility] ) 86/15/1460   • Metabolic acidosis 50/10/5670   • Hyperglycemia 2023   • Elevated INR 2023   • Hepatic encephalopathy 2023   • Malignant ascites 2023   • Pulmonary nodules 2023   • Malignant neoplasm metastatic to both lungs ([facility] ) 2023   • 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 ([facility] ) 2023   • Thrombocytopenia ([facility] ) 2023   • Hypomagnesemia 2023   • BMI 35 0-35 9,adult 2023   • Vitamin D deficiency 2023   • GERD (gastroesophageal reflux disease) 2023   • Nausea and vomiting 2023   • Ascites 2023   • Lytic bone lesion of hip 2023   • Hyponatremia 2023   • Cholangiocarcinoma, stage IV ([facility] ) 2020   • Tobacco use disorder 2019   • Spinal accessory neuropathy 2018   • History of tongue cancer 2018   • Squamous cell carcinoma of tongue ([facility] ) 2018      LOS (days): 27  Geometric Mean LOS (GMLOS) (days): 5 00  Days to GMLOS:-22 4     OBJECTIVE:  Risk of Unplanned Readmission Score: 31 73         Current admission status: Inpatient   Preferred Pharmacy:   [address] [place] [address] #[id number] [address] Berenice NORTON  [facility] [address]  Phone: [phone] Fax: [phone]    Primary Care Provider: Gerard Cloud MD    Primary Insurance: BLUE CROSS  Secondary Insurance:     DISCHARGE DETAILS:    Discharge Destination Plan[de-identified] Hospice (Counts include 234 beds at the [facility])  Transport at Discharge : S Amb[facility]  Dispatcher Contacted: Yes  Transported by [employer] and Unit #): [id number]  ETA of Transport (Date): 03/19/23  ETA of Transport (Time): 2035  Transfer Mode: Stretcher        [facility] liaison [facility]  Pt can DC tonight after 830pm      Hospice liaison and nurse made aware of transport time

## 2023-03-19 NOTE — HOSPICE NOTE
Met with pts wife Bartolome Wing and pts parents at bedside  Pt approved for IPU today  Family all in agreement with the same  Consents signed, OOH DNR to chart for transport  Copies to wife  At least 30 minutes prior to  time please call the [facility] house at [phone]  To give report  Just prior to transport please give pt his comfort meds   OOH DNR in his paper chart for transport

## 2023-03-19 NOTE — ASSESSMENT & PLAN NOTE
· Patient with metastatic cholangiocarcinoma  · Discussed with patient overall guarded prognosis  · Oncology notes noted  · Patient presently on comfort care status

## 2023-03-20 NOTE — UTILIZATION REVIEW
NOTIFICATION OF ADMISSION DISCHARGE   This is a Notification of Discharge from [facility]  Please be advised that this patient has been discharge from our facility  Below you will find the admission and discharge date and time including the patient’s disposition  UTILIZATION REVIEW CONTACT:  Milvia Clayton  Utilization   Network Utilization Review Department  Phone: [phone] x carefully listen to the prompts  All voicemails are confidential   Email: [email or web address]     ADMISSION INFORMATION  PRESENTATION DATE: 2023  3:04 AM  OBERVATION ADMISSION DATE:  INPATIENT ADMISSION DATE: 23  8:16 AM   DISCHARGE DATE: 3/19/2023  9:12 PM   DISPOSITION:    IMPORTANT INFORMATION:  Send all requests for admission clinical reviews, approved or denied determinations and any other requests to dedicated fax number below belonging to the campus where the patient is receiving treatment   List of dedicated fax numbers:  [address] DENIALS (Administrative/Medical Necessity) [phone]   [address] (Maternity/NICU/Pediatrics) [phone]   [facility] [phone]   [place] [address] [phone]   [facility] [address] [phone]   [address] [facility] [phone]   [address] [place] [address] [phone]   [address] [place] [address] 119 [phone]   [facility]  [phone]   [facility] [facility] [phone]   [address] [facility] [address] [facility] [phone]

## 2023-03-23 ENCOUNTER — HOME CARE VISIT (OUTPATIENT)
Dept: HOME HOSPICE | Facility: HOSPICE | Age: 53
End: 2023-03-23

## 2023-03-23 NOTE — CASE COMMUNICATION
Dotti Cabot, Bereavement Final IDG 3/22/23 (1MR) Due: 4/3/23  : 1970  SOC: 3/19/23  DOD: 3/20/23, <24hrs  Diagnosis: Metastatic Cholangiocarcinoma  Primary: Wife - Sherman Herman was a 47year old man at the [place] [id number] less than 24 hours  Wife Bartolome Wing and additional family visited  Their desire was for Christopher's comfort  CC: BC left a message offering condolences to Bartolome Wing  BC received a return call from She rry  She stated she was at the  home  BC let her know she would reach out within the next few weeks, but stated she has BC's number if caring to talk sooner  Bartolome Wing then ended the call  Call Assignments:  Anita Perry to assess Bartolome Wing at  and request her address for bereavement follow-up   (Due: 4/3/23)

## 2024-03-30 NOTE — PROGRESS NOTES
[address] [facility]  Progress Note Eliza Ashby 1970, 47 y o  male MRN: [id number]  Unit/Bed#: [facility] 813-01 Encounter: [id number]  Primary Care Provider: Dedra Antonio MD   Date and time admitted to hospital: 2/20/2023  3:04 AM    * Septic shock [facility])  Assessment & Plan  Present on admission  Shock resolved  Monitor closely    Cholangiocarcinoma, stage IV [facility])  Assessment & Plan  Patient is progressing metastatic cholangiocarcinoma  Discussed with patient overall guarded prognosis  Oncology has reiterated poor prognosis, patient reports he understands his guarded prognosis but would like to pursue active treatment at this time and plans to follow-up with oncology on discharge for further cares  Oncology inputs noted    Goals of care, counseling/discussion  Assessment & Plan  Patient with metastatic cholangiocarcinoma, worsening kidney function, worsening liver function   Overall guarded prognosis  Patient understands overall guarded prognosis however he would like to pursue active treatment and plans on following oncology outpatient for further cares  Discussed with patient and spouse in detail    Hepatic encephalopathy  Assessment & Plan  Monitor    Elevated INR  Assessment & Plan  Likely due to metastatic liver disease  S/p vitamin K  Monitor INR    DENZEL (acute kidney injury) ([address] )  Assessment & Plan  Monitor kidney function closely  Avoid nephrotoxins  Nephrology following    Oropharyngeal candidiasis  Assessment & Plan  Received 7 days fluconazole treatment      Mesenteric thrombosis ([address] )  Assessment & Plan  Received IV heparin GTT  discussed with oncology - Patient is recommended Lovenox 1 mg/kg body weight every 12 hours  Lovenox 1 mg/kg body wt twice daily  Outpatient oncology follow-up      Malignant ascites  Assessment & Plan  S/p paracentesis  Monitor    Thrombocytopenia ([address] )  Assessment & Plan  Multifactorial  Oncology inputs noted  Monitor counts    GERD (gastroesophageal reflux disease)  Assessment & Plan  Continue PPI    Hyponatremia  Assessment & Plan  Multifactorial  Monitor closely  Nephrology following          VTE Pharmacologic Prophylaxis: VTE Score: 8 High Risk (Score >/= 5) - Pharmacological DVT Prophylaxis Ordered: enoxaparin (Lovenox)  Sequential Compression Devices Ordered  Patient Centered Rounds: I performed bedside rounds with nursing staff today  Discussions with Specialists or Other Care Team Provider: Nephrology    Education and Discussions with Family / Patient: Patient, called and left a message for spouse Papo Garcia  Total Time Spent on Date of Encounter in care of patient: 35 minutes This time was spent on one or more of the following: performing physical exam; counseling and coordination of care; obtaining or reviewing history; documenting in the medical record; reviewing/ordering tests, medications or procedures; communicating with other healthcare professionals and discussing with patient's family/caregivers  Current Length of Stay: 13 day(s)  Current Patient Status: Inpatient   Certification Statement: The patient will continue to require additional inpatient hospital stay due to As outlined  Discharge Plan: Awaiting clinical and symptomatic improvement    Code Status: Level 1 - Full Code    Subjective:     Comfortably in bed  Reports feeling tired  Poor appetite      Objective:     Vitals:   Temp (24hrs), Av 4 °F (36 3 °C), Min:97 3 °F (36 3 °C), Max:97 5 °F (36 4 °C)    Temp:  [97 3 °F (36 3 °C)-97 5 °F (36 4 °C)] 97 3 °F (36 3 °C)  HR:  [78-91] 87  Resp:  [16-20] 20  BP: (85-99)/(48-56) 95/52  SpO2:  [95 %-97 %] 97 %  Body mass index is 31 07 kg/m²  Input and Output Summary (last 24 hours):      Intake/Output Summary (Last 24 hours) at 3/5/2023 1542  Last data filed at 3/5/2023 1100  Gross per 24 hour   Intake --   Output 1000 ml   Net -1000 ml       Physical Exam:   Physical Exam     Comfortably in bed  Asthenia noted  Features of protein calorie malnutrition noted  Anasarca noted  Neck supple  Lungs diminished breath sounds bilaterally  Heart sounds S1 and S2 noted  Abdomen soft  Ascites noted  Awake, obey simple commands  Pedal edema noted      Additional Data:     Labs:  Results from last 7 days   Lab Units 03/05/23  0607   WBC Thousand/uL 9 05   HEMOGLOBIN g/dL 9 9*   HEMATOCRIT % 29 3*   PLATELETS Thousands/uL 35*   NEUTROS PCT % 78*   LYMPHS PCT % 9*   MONOS PCT % 11   EOS PCT % 2     Results from last 7 days   Lab Units 03/05/23  0607   SODIUM mmol/L 131*   POTASSIUM mmol/L 4 3   CHLORIDE mmol/L 104   CO2 mmol/L 19*   BUN mg/dL 81*   CREATININE mg/dL 1 80*   ANION GAP mmol/L 8   CALCIUM mg/dL 8 5   ALBUMIN g/dL 3 6   TOTAL BILIRUBIN mg/dL 5 94*   ALK PHOS U/L 109   ALT U/L 27   AST U/L 80*   GLUCOSE RANDOM mg/dL 106     Results from last 7 days   Lab Units 03/05/23  0607   INR  2 67*     Results from last 7 days   Lab Units 03/05/23  1117 03/05/23  0805 03/05/23  0710 03/04/23  2101 03/04/23  1600 03/04/23  1119 03/04/23  0736 03/03/23  2050 03/03/23  1541 03/03/23  1104 03/03/23  0753 03/02/23  2108   POC GLUCOSE mg/dl 125 113 111 117 153* 123 156* 158* 132 129 228* 115               Lines/Drains:  Invasive Devices     Central Venous Catheter Line  Duration           Port A Cath Right Subclavian -- days                Central Line:  Goal for removal: Metastatic cholangiocarcinoma      Imaging: No pertinent imaging reviewed      Recent Cultures (last 7 days):         Last 24 Hours Medication List:   Current Facility-Administered Medications   Medication Dose Route Frequency Provider Last Rate   • al mag oxide-diphenhydramine-lidocaine viscous  10 mL Swish & Swallow Q4H PRN Laura Jett MD     • Albumin 25%  25 g Intravenous Q6H Lawanda Canchola MD     • bumetanide  2 mg Intravenous Once Tomasa Butler MD     • chlorhexidine  15 mL Swish & Spit Q12H 6300 Enzo Kessler MD     • enoxaparin  1 mg/kg Subcutaneous Q12H 900 Collyer MD Tony     • ergocalciferol  50,000 Units Oral Weekly Yael Ruiz MD     • gabapentin  200 mg Oral BID Arpita Mccoy MD     • insulin lispro  1-6 Units Subcutaneous TID AC Yael Ruiz MD     • melatonin  6 mg Oral HS Yael Ruiz MD     • midodrine  15 mg Oral TID AC Yael Ruiz MD     • JOYCE ANTIFUNGAL   Topical BID Yael Ruiz MD     • ondansetron  4 mg Intravenous Q8H PRN Yael Ruiz MD     • oxyCODONE  5 mg Oral Q4H PRN Yael Ruiz MD     • pantoprazole  40 mg Oral Early Morning Yael Ruiz MD     • saliva substitute  5 spray Mouth/Throat 4x Daily PRN Yael Ruiz MD     • sucralfate  1 g Oral 4x Daily (AC & HS) Yael Ruiz MD          Today, Patient Was Seen By: Sunita Morgan MD    **Please Note: This note may have been constructed using a voice recognition system  ** (V5) oriented

## 2025-04-24 NOTE — PLAN OF CARE
Problem: PAIN - ADULT  Goal: Verbalizes/displays adequate comfort level or baseline comfort level  Description: Interventions:  - Encourage patient to monitor pain and request assistance  - Assess pain using appropriate pain scale  - Administer analgesics based on type and severity of pain and evaluate response  - Implement non-pharmacological measures as appropriate and evaluate response  - Consider cultural and social influences on pain and pain management  - Notify physician/advanced practitioner if interventions unsuccessful or patient reports new pain  Outcome: Progressing     Problem: DISCHARGE PLANNING  Goal: Discharge to home or other facility with appropriate resources  Description: INTERVENTIONS:  - Identify barriers to discharge w/patient and caregiver  - Arrange for needed discharge resources and transportation as appropriate  - Identify discharge learning needs (meds, wound care, etc )  - Arrange for interpretive services to assist at discharge as needed  - Refer to Case Management Department for coordinating discharge planning if the patient needs post-hospital services based on physician/advanced practitioner order or complex needs related to functional status, cognitive ability, or social support system  Outcome: Progressing
Problem: PAIN - ADULT  Goal: Verbalizes/displays adequate comfort level or baseline comfort level  Description: Interventions:  - Encourage patient to monitor pain and request assistance  - Assess pain using appropriate pain scale  - Administer analgesics based on type and severity of pain and evaluate response  - Implement non-pharmacological measures as appropriate and evaluate response  - Consider cultural and social influences on pain and pain management  - Notify physician/advanced practitioner if interventions unsuccessful or patient reports new pain  Outcome: Progressing     Problem: DISCHARGE PLANNING  Goal: Discharge to home or other facility with appropriate resources  Description: INTERVENTIONS:  - Identify barriers to discharge w/patient and caregiver  - Arrange for needed discharge resources and transportation as appropriate  - Identify discharge learning needs (meds, wound care, etc )  - Arrange for interpretive services to assist at discharge as needed  - Refer to Case Management Department for coordinating discharge planning if the patient needs post-hospital services based on physician/advanced practitioner order or complex needs related to functional status, cognitive ability, or social support system  Outcome: Progressing     Problem: GASTROINTESTINAL - ADULT  Goal: Minimal or absence of nausea and/or vomiting  Description: INTERVENTIONS:  - Administer IV fluids if ordered to ensure adequate hydration  - Maintain NPO status until nausea and vomiting are resolved  - Nasogastric tube if ordered  - Administer ordered antiemetic medications as needed  - Provide nonpharmacologic comfort measures as appropriate  - Advance diet as tolerated, if ordered  - Consider nutrition services referral to assist patient with adequate nutrition and appropriate food choices  Outcome: Progressing
rolling walker